# Patient Record
Sex: MALE | Race: WHITE | Employment: OTHER | URBAN - METROPOLITAN AREA
[De-identification: names, ages, dates, MRNs, and addresses within clinical notes are randomized per-mention and may not be internally consistent; named-entity substitution may affect disease eponyms.]

---

## 2017-01-10 ENCOUNTER — NURSE ONLY (OUTPATIENT)
Dept: CARDIOLOGY CLINIC | Age: 72
End: 2017-01-10

## 2017-01-10 DIAGNOSIS — Z45.09 ENCOUNTER FOR ELECTRONIC ANALYSIS OF REVEAL EVENT RECORDER: Primary | ICD-10-CM

## 2017-01-10 DIAGNOSIS — I48.0 PAROXYSMAL ATRIAL FIBRILLATION (HCC): ICD-10-CM

## 2017-01-10 PROCEDURE — 93299 PR REM INTERROG ICPMS/SCRMS <30 D TECH REVIEW: CPT | Performed by: INTERNAL MEDICINE

## 2017-01-10 PROCEDURE — 93298 REM INTERROG DEV EVAL SCRMS: CPT | Performed by: INTERNAL MEDICINE

## 2017-02-03 RX ORDER — LOSARTAN POTASSIUM 25 MG/1
TABLET ORAL
Qty: 90 TABLET | Refills: 3 | Status: SHIPPED | OUTPATIENT
Start: 2017-02-03 | End: 2017-08-21 | Stop reason: SDUPTHER

## 2017-02-03 RX ORDER — LORAZEPAM 1 MG/1
1 TABLET ORAL 2 TIMES DAILY PRN
Qty: 180 TABLET | Refills: 1 | OUTPATIENT
Start: 2017-02-03

## 2017-02-14 ENCOUNTER — NURSE ONLY (OUTPATIENT)
Dept: CARDIOLOGY CLINIC | Age: 72
End: 2017-02-14

## 2017-02-14 DIAGNOSIS — Z45.09 ENCOUNTER FOR ELECTRONIC ANALYSIS OF REVEAL EVENT RECORDER: Primary | ICD-10-CM

## 2017-02-14 DIAGNOSIS — I48.0 PAROXYSMAL ATRIAL FIBRILLATION (HCC): ICD-10-CM

## 2017-02-14 PROCEDURE — 93299 PR REM INTERROG ICPMS/SCRMS <30 D TECH REVIEW: CPT | Performed by: INTERNAL MEDICINE

## 2017-02-14 PROCEDURE — 93298 REM INTERROG DEV EVAL SCRMS: CPT | Performed by: INTERNAL MEDICINE

## 2017-02-21 ENCOUNTER — OFFICE VISIT (OUTPATIENT)
Dept: CARDIOLOGY CLINIC | Age: 72
End: 2017-02-21

## 2017-02-21 VITALS
BODY MASS INDEX: 30.21 KG/M2 | OXYGEN SATURATION: 97 % | SYSTOLIC BLOOD PRESSURE: 130 MMHG | WEIGHT: 211 LBS | HEIGHT: 70 IN | HEART RATE: 66 BPM | DIASTOLIC BLOOD PRESSURE: 70 MMHG

## 2017-02-21 DIAGNOSIS — I48.0 PAROXYSMAL ATRIAL FIBRILLATION (HCC): Primary | ICD-10-CM

## 2017-02-21 DIAGNOSIS — R55 SYNCOPE AND COLLAPSE: ICD-10-CM

## 2017-02-21 DIAGNOSIS — R07.2 PRECORDIAL PAIN: ICD-10-CM

## 2017-02-21 PROCEDURE — G8417 CALC BMI ABV UP PARAM F/U: HCPCS | Performed by: NURSE PRACTITIONER

## 2017-02-21 PROCEDURE — 3017F COLORECTAL CA SCREEN DOC REV: CPT | Performed by: NURSE PRACTITIONER

## 2017-02-21 PROCEDURE — G8427 DOCREV CUR MEDS BY ELIG CLIN: HCPCS | Performed by: NURSE PRACTITIONER

## 2017-02-21 PROCEDURE — 1036F TOBACCO NON-USER: CPT | Performed by: NURSE PRACTITIONER

## 2017-02-21 PROCEDURE — 1123F ACP DISCUSS/DSCN MKR DOCD: CPT | Performed by: NURSE PRACTITIONER

## 2017-02-21 PROCEDURE — 99214 OFFICE O/P EST MOD 30 MIN: CPT | Performed by: NURSE PRACTITIONER

## 2017-02-21 PROCEDURE — 4040F PNEUMOC VAC/ADMIN/RCVD: CPT | Performed by: NURSE PRACTITIONER

## 2017-02-21 PROCEDURE — 93000 ELECTROCARDIOGRAM COMPLETE: CPT | Performed by: NURSE PRACTITIONER

## 2017-02-21 PROCEDURE — G8484 FLU IMMUNIZE NO ADMIN: HCPCS | Performed by: NURSE PRACTITIONER

## 2017-02-21 RX ORDER — SOTALOL HYDROCHLORIDE 80 MG/1
40 TABLET ORAL 2 TIMES DAILY
Qty: 60 TABLET | Refills: 5 | Status: ON HOLD | OUTPATIENT
Start: 2017-02-21 | End: 2022-05-14 | Stop reason: HOSPADM

## 2017-02-22 ENCOUNTER — TELEPHONE (OUTPATIENT)
Dept: CARDIOLOGY | Age: 72
End: 2017-02-22

## 2017-02-27 ENCOUNTER — TELEPHONE (OUTPATIENT)
Dept: CARDIOLOGY CLINIC | Age: 72
End: 2017-02-27

## 2017-02-27 ENCOUNTER — OFFICE VISIT (OUTPATIENT)
Dept: CARDIOLOGY CLINIC | Age: 72
End: 2017-02-27

## 2017-02-27 VITALS
DIASTOLIC BLOOD PRESSURE: 86 MMHG | HEART RATE: 62 BPM | SYSTOLIC BLOOD PRESSURE: 146 MMHG | BODY MASS INDEX: 30.64 KG/M2 | WEIGHT: 214 LBS | OXYGEN SATURATION: 96 % | HEIGHT: 70 IN

## 2017-02-27 DIAGNOSIS — I48.0 PAROXYSMAL ATRIAL FIBRILLATION (HCC): ICD-10-CM

## 2017-02-27 DIAGNOSIS — R07.2 PRECORDIAL PAIN: Primary | ICD-10-CM

## 2017-02-27 PROCEDURE — 1123F ACP DISCUSS/DSCN MKR DOCD: CPT | Performed by: INTERNAL MEDICINE

## 2017-02-27 PROCEDURE — G8417 CALC BMI ABV UP PARAM F/U: HCPCS | Performed by: INTERNAL MEDICINE

## 2017-02-27 PROCEDURE — 1036F TOBACCO NON-USER: CPT | Performed by: INTERNAL MEDICINE

## 2017-02-27 PROCEDURE — 99214 OFFICE O/P EST MOD 30 MIN: CPT | Performed by: INTERNAL MEDICINE

## 2017-02-27 PROCEDURE — 3017F COLORECTAL CA SCREEN DOC REV: CPT | Performed by: INTERNAL MEDICINE

## 2017-02-27 PROCEDURE — G8484 FLU IMMUNIZE NO ADMIN: HCPCS | Performed by: INTERNAL MEDICINE

## 2017-02-27 PROCEDURE — 4040F PNEUMOC VAC/ADMIN/RCVD: CPT | Performed by: INTERNAL MEDICINE

## 2017-02-27 PROCEDURE — G8427 DOCREV CUR MEDS BY ELIG CLIN: HCPCS | Performed by: INTERNAL MEDICINE

## 2017-02-27 RX ORDER — ASPIRIN 81 MG/1
81 TABLET ORAL DAILY
Qty: 30 TABLET | Refills: 3 | Status: SHIPPED | OUTPATIENT
Start: 2017-02-27 | End: 2017-06-23 | Stop reason: SINTOL

## 2017-02-27 RX ORDER — OMEPRAZOLE 20 MG/1
20 CAPSULE, DELAYED RELEASE ORAL 2 TIMES DAILY
COMMUNITY

## 2017-03-07 ENCOUNTER — TELEPHONE (OUTPATIENT)
Dept: CARDIOLOGY | Age: 72
End: 2017-03-07

## 2017-03-08 ENCOUNTER — TELEPHONE (OUTPATIENT)
Dept: CARDIOLOGY CLINIC | Age: 72
End: 2017-03-08

## 2017-03-22 ENCOUNTER — OFFICE VISIT (OUTPATIENT)
Dept: CARDIOLOGY CLINIC | Age: 72
End: 2017-03-22

## 2017-03-22 VITALS
WEIGHT: 214 LBS | HEART RATE: 68 BPM | OXYGEN SATURATION: 97 % | BODY MASS INDEX: 30.64 KG/M2 | SYSTOLIC BLOOD PRESSURE: 132 MMHG | HEIGHT: 70 IN | DIASTOLIC BLOOD PRESSURE: 74 MMHG

## 2017-03-22 DIAGNOSIS — Z79.01 CHRONIC ANTICOAGULATION: ICD-10-CM

## 2017-03-22 DIAGNOSIS — I48.0 PAROXYSMAL ATRIAL FIBRILLATION (HCC): Primary | ICD-10-CM

## 2017-03-22 DIAGNOSIS — I25.10 CORONARY ARTERY DISEASE INVOLVING NATIVE CORONARY ARTERY OF NATIVE HEART WITHOUT ANGINA PECTORIS: ICD-10-CM

## 2017-03-22 DIAGNOSIS — E78.5 HYPERLIPIDEMIA, UNSPECIFIED HYPERLIPIDEMIA TYPE: ICD-10-CM

## 2017-03-22 PROCEDURE — G8484 FLU IMMUNIZE NO ADMIN: HCPCS | Performed by: NURSE PRACTITIONER

## 2017-03-22 PROCEDURE — G8427 DOCREV CUR MEDS BY ELIG CLIN: HCPCS | Performed by: NURSE PRACTITIONER

## 2017-03-22 PROCEDURE — 1036F TOBACCO NON-USER: CPT | Performed by: NURSE PRACTITIONER

## 2017-03-22 PROCEDURE — G8598 ASA/ANTIPLAT THER USED: HCPCS | Performed by: NURSE PRACTITIONER

## 2017-03-22 PROCEDURE — 4040F PNEUMOC VAC/ADMIN/RCVD: CPT | Performed by: NURSE PRACTITIONER

## 2017-03-22 PROCEDURE — 3017F COLORECTAL CA SCREEN DOC REV: CPT | Performed by: NURSE PRACTITIONER

## 2017-03-22 PROCEDURE — 99214 OFFICE O/P EST MOD 30 MIN: CPT | Performed by: NURSE PRACTITIONER

## 2017-03-22 PROCEDURE — 1123F ACP DISCUSS/DSCN MKR DOCD: CPT | Performed by: NURSE PRACTITIONER

## 2017-03-22 PROCEDURE — G8417 CALC BMI ABV UP PARAM F/U: HCPCS | Performed by: NURSE PRACTITIONER

## 2017-03-22 RX ORDER — ATORVASTATIN CALCIUM 40 MG/1
40 TABLET, FILM COATED ORAL NIGHTLY
Qty: 90 TABLET | Refills: 3 | Status: SHIPPED | OUTPATIENT
Start: 2017-03-22 | End: 2017-06-23 | Stop reason: ALTCHOICE

## 2017-03-22 RX ORDER — ISOSORBIDE MONONITRATE 30 MG/1
30 TABLET, EXTENDED RELEASE ORAL DAILY
Qty: 90 TABLET | Refills: 3 | Status: SHIPPED | OUTPATIENT
Start: 2017-03-22 | End: 2017-08-21 | Stop reason: SDUPTHER

## 2017-03-28 ENCOUNTER — NURSE ONLY (OUTPATIENT)
Dept: CARDIOLOGY CLINIC | Age: 72
End: 2017-03-28

## 2017-03-28 DIAGNOSIS — Z45.09 ENCOUNTER FOR ELECTRONIC ANALYSIS OF REVEAL EVENT RECORDER: Primary | ICD-10-CM

## 2017-03-28 DIAGNOSIS — R55 SYNCOPE AND COLLAPSE: ICD-10-CM

## 2017-03-28 PROCEDURE — 93299 PR REM INTERROG ICPMS/SCRMS <30 D TECH REVIEW: CPT | Performed by: INTERNAL MEDICINE

## 2017-03-28 PROCEDURE — 93298 REM INTERROG DEV EVAL SCRMS: CPT | Performed by: INTERNAL MEDICINE

## 2017-04-10 RX ORDER — PRASUGREL 10 MG/1
10 TABLET, FILM COATED ORAL DAILY
Qty: 90 TABLET | Refills: 3 | Status: SHIPPED | OUTPATIENT
Start: 2017-04-10 | End: 2017-06-23 | Stop reason: ALTCHOICE

## 2017-05-02 ENCOUNTER — NURSE ONLY (OUTPATIENT)
Dept: CARDIOLOGY CLINIC | Age: 72
End: 2017-05-02

## 2017-05-02 DIAGNOSIS — Z45.09 ENCOUNTER FOR ELECTRONIC ANALYSIS OF REVEAL EVENT RECORDER: Primary | ICD-10-CM

## 2017-05-02 DIAGNOSIS — R55 SYNCOPE AND COLLAPSE: ICD-10-CM

## 2017-05-02 DIAGNOSIS — I48.0 PAROXYSMAL ATRIAL FIBRILLATION (HCC): ICD-10-CM

## 2017-05-02 PROCEDURE — 93298 REM INTERROG DEV EVAL SCRMS: CPT | Performed by: INTERNAL MEDICINE

## 2017-05-02 PROCEDURE — 93299 PR REM INTERROG ICPMS/SCRMS <30 D TECH REVIEW: CPT | Performed by: INTERNAL MEDICINE

## 2017-06-06 ENCOUNTER — NURSE ONLY (OUTPATIENT)
Dept: CARDIOLOGY CLINIC | Age: 72
End: 2017-06-06

## 2017-06-06 DIAGNOSIS — Z45.09 ENCOUNTER FOR ELECTRONIC ANALYSIS OF REVEAL EVENT RECORDER: Primary | ICD-10-CM

## 2017-06-06 DIAGNOSIS — I48.0 PAROXYSMAL ATRIAL FIBRILLATION (HCC): ICD-10-CM

## 2017-06-06 DIAGNOSIS — R55 SYNCOPE AND COLLAPSE: ICD-10-CM

## 2017-06-06 PROCEDURE — 93298 REM INTERROG DEV EVAL SCRMS: CPT | Performed by: INTERNAL MEDICINE

## 2017-06-06 PROCEDURE — 93299 PR REM INTERROG ICPMS/SCRMS <30 D TECH REVIEW: CPT | Performed by: INTERNAL MEDICINE

## 2017-06-23 ENCOUNTER — OFFICE VISIT (OUTPATIENT)
Dept: CARDIOLOGY CLINIC | Age: 72
End: 2017-06-23

## 2017-06-23 VITALS
SYSTOLIC BLOOD PRESSURE: 110 MMHG | HEIGHT: 70 IN | DIASTOLIC BLOOD PRESSURE: 60 MMHG | HEART RATE: 104 BPM | BODY MASS INDEX: 30.21 KG/M2 | WEIGHT: 211 LBS

## 2017-06-23 DIAGNOSIS — I25.10 CORONARY ARTERY DISEASE INVOLVING NATIVE CORONARY ARTERY OF NATIVE HEART WITHOUT ANGINA PECTORIS: Primary | ICD-10-CM

## 2017-06-23 PROCEDURE — G8427 DOCREV CUR MEDS BY ELIG CLIN: HCPCS | Performed by: INTERNAL MEDICINE

## 2017-06-23 PROCEDURE — 1123F ACP DISCUSS/DSCN MKR DOCD: CPT | Performed by: INTERNAL MEDICINE

## 2017-06-23 PROCEDURE — 99214 OFFICE O/P EST MOD 30 MIN: CPT | Performed by: INTERNAL MEDICINE

## 2017-06-23 PROCEDURE — G8417 CALC BMI ABV UP PARAM F/U: HCPCS | Performed by: INTERNAL MEDICINE

## 2017-06-23 PROCEDURE — 1036F TOBACCO NON-USER: CPT | Performed by: INTERNAL MEDICINE

## 2017-06-23 PROCEDURE — G8598 ASA/ANTIPLAT THER USED: HCPCS | Performed by: INTERNAL MEDICINE

## 2017-06-23 PROCEDURE — 4040F PNEUMOC VAC/ADMIN/RCVD: CPT | Performed by: INTERNAL MEDICINE

## 2017-06-23 PROCEDURE — 3017F COLORECTAL CA SCREEN DOC REV: CPT | Performed by: INTERNAL MEDICINE

## 2017-06-23 RX ORDER — CLOPIDOGREL BISULFATE 75 MG/1
75 TABLET ORAL DAILY
COMMUNITY
End: 2017-08-07 | Stop reason: SDUPTHER

## 2017-06-23 RX ORDER — ATORVASTATIN CALCIUM 40 MG/1
40 TABLET, FILM COATED ORAL DAILY
COMMUNITY
End: 2017-08-07 | Stop reason: SDUPTHER

## 2017-07-11 ENCOUNTER — PROCEDURE VISIT (OUTPATIENT)
Dept: CARDIOLOGY CLINIC | Age: 72
End: 2017-07-11

## 2017-07-11 DIAGNOSIS — I48.0 PAROXYSMAL ATRIAL FIBRILLATION (HCC): ICD-10-CM

## 2017-07-11 DIAGNOSIS — Z45.09 ENCOUNTER FOR ELECTRONIC ANALYSIS OF REVEAL EVENT RECORDER: Primary | ICD-10-CM

## 2017-07-11 PROCEDURE — 93299 PR REM INTERROG ICPMS/SCRMS <30 D TECH REVIEW: CPT | Performed by: INTERNAL MEDICINE

## 2017-07-11 PROCEDURE — 93298 REM INTERROG DEV EVAL SCRMS: CPT | Performed by: INTERNAL MEDICINE

## 2017-08-04 ENCOUNTER — TELEPHONE (OUTPATIENT)
Dept: CARDIOLOGY CLINIC | Age: 72
End: 2017-08-04

## 2017-08-07 RX ORDER — ATORVASTATIN CALCIUM 40 MG/1
40 TABLET, FILM COATED ORAL DAILY
Qty: 90 TABLET | Refills: 3 | Status: SHIPPED | OUTPATIENT
Start: 2017-08-07 | End: 2017-08-21 | Stop reason: SDUPTHER

## 2017-08-07 RX ORDER — DABIGATRAN ETEXILATE 150 MG/1
150 CAPSULE, COATED PELLETS ORAL 2 TIMES DAILY
Qty: 180 CAPSULE | Refills: 3 | Status: ON HOLD | OUTPATIENT
Start: 2017-08-07 | End: 2022-05-14 | Stop reason: HOSPADM

## 2017-08-07 RX ORDER — CLOPIDOGREL BISULFATE 75 MG/1
75 TABLET ORAL DAILY
Qty: 90 TABLET | Refills: 3 | Status: SHIPPED | OUTPATIENT
Start: 2017-08-07 | End: 2017-09-26 | Stop reason: ALTCHOICE

## 2017-08-15 ENCOUNTER — NURSE ONLY (OUTPATIENT)
Dept: CARDIOLOGY CLINIC | Age: 72
End: 2017-08-15

## 2017-08-15 DIAGNOSIS — Z45.09 ENCOUNTER FOR ELECTRONIC ANALYSIS OF REVEAL EVENT RECORDER: Primary | ICD-10-CM

## 2017-08-15 DIAGNOSIS — I48.0 PAROXYSMAL ATRIAL FIBRILLATION (HCC): ICD-10-CM

## 2017-08-15 PROCEDURE — 93298 REM INTERROG DEV EVAL SCRMS: CPT | Performed by: INTERNAL MEDICINE

## 2017-08-15 PROCEDURE — 93299 PR REM INTERROG ICPMS/SCRMS <30 D TECH REVIEW: CPT | Performed by: INTERNAL MEDICINE

## 2017-08-21 ENCOUNTER — PROCEDURE VISIT (OUTPATIENT)
Dept: CARDIOLOGY CLINIC | Age: 72
End: 2017-08-21

## 2017-08-21 ENCOUNTER — OFFICE VISIT (OUTPATIENT)
Dept: CARDIOLOGY CLINIC | Age: 72
End: 2017-08-21

## 2017-08-21 VITALS
SYSTOLIC BLOOD PRESSURE: 134 MMHG | HEART RATE: 57 BPM | DIASTOLIC BLOOD PRESSURE: 76 MMHG | BODY MASS INDEX: 30.21 KG/M2 | HEIGHT: 70 IN | WEIGHT: 211 LBS

## 2017-08-21 DIAGNOSIS — I25.10 CORONARY ARTERY DISEASE INVOLVING NATIVE CORONARY ARTERY OF NATIVE HEART WITHOUT ANGINA PECTORIS: ICD-10-CM

## 2017-08-21 DIAGNOSIS — I48.0 PAROXYSMAL ATRIAL FIBRILLATION (HCC): Primary | ICD-10-CM

## 2017-08-21 DIAGNOSIS — I48.0 PAROXYSMAL ATRIAL FIBRILLATION (HCC): ICD-10-CM

## 2017-08-21 DIAGNOSIS — Z45.09 ENCOUNTER FOR ELECTRONIC ANALYSIS OF REVEAL EVENT RECORDER: Primary | ICD-10-CM

## 2017-08-21 PROCEDURE — 1036F TOBACCO NON-USER: CPT | Performed by: INTERNAL MEDICINE

## 2017-08-21 PROCEDURE — 99214 OFFICE O/P EST MOD 30 MIN: CPT | Performed by: INTERNAL MEDICINE

## 2017-08-21 PROCEDURE — 93285 PRGRMG DEV EVAL SCRMS IP: CPT | Performed by: INTERNAL MEDICINE

## 2017-08-21 PROCEDURE — 1123F ACP DISCUSS/DSCN MKR DOCD: CPT | Performed by: INTERNAL MEDICINE

## 2017-08-21 PROCEDURE — 3017F COLORECTAL CA SCREEN DOC REV: CPT | Performed by: INTERNAL MEDICINE

## 2017-08-21 PROCEDURE — 4040F PNEUMOC VAC/ADMIN/RCVD: CPT | Performed by: INTERNAL MEDICINE

## 2017-08-21 PROCEDURE — G8417 CALC BMI ABV UP PARAM F/U: HCPCS | Performed by: INTERNAL MEDICINE

## 2017-08-21 PROCEDURE — G8598 ASA/ANTIPLAT THER USED: HCPCS | Performed by: INTERNAL MEDICINE

## 2017-08-21 PROCEDURE — G8427 DOCREV CUR MEDS BY ELIG CLIN: HCPCS | Performed by: INTERNAL MEDICINE

## 2017-08-21 RX ORDER — LOSARTAN POTASSIUM 25 MG/1
TABLET ORAL
Qty: 90 TABLET | Refills: 3 | Status: SHIPPED | OUTPATIENT
Start: 2017-08-21 | End: 2018-02-02 | Stop reason: SDUPTHER

## 2017-08-21 RX ORDER — ATORVASTATIN CALCIUM 40 MG/1
40 TABLET, FILM COATED ORAL DAILY
Qty: 90 TABLET | Refills: 3 | Status: ON HOLD | OUTPATIENT
Start: 2017-08-21 | End: 2022-07-19 | Stop reason: SDUPTHER

## 2017-08-21 RX ORDER — ISOSORBIDE MONONITRATE 30 MG/1
30 TABLET, EXTENDED RELEASE ORAL DAILY
Qty: 90 TABLET | Refills: 3 | Status: ON HOLD | OUTPATIENT
Start: 2017-08-21 | End: 2022-05-14 | Stop reason: HOSPADM

## 2017-09-26 ENCOUNTER — OFFICE VISIT (OUTPATIENT)
Dept: ORTHOPEDIC SURGERY | Age: 72
End: 2017-09-26

## 2017-09-26 ENCOUNTER — NURSE ONLY (OUTPATIENT)
Dept: CARDIOLOGY CLINIC | Age: 72
End: 2017-09-26

## 2017-09-26 VITALS — HEIGHT: 70 IN | WEIGHT: 200 LBS | BODY MASS INDEX: 28.63 KG/M2

## 2017-09-26 DIAGNOSIS — M79.641 HAND PAIN, RIGHT: ICD-10-CM

## 2017-09-26 DIAGNOSIS — M79.642 LEFT HAND PAIN: Primary | ICD-10-CM

## 2017-09-26 DIAGNOSIS — R55 SYNCOPE AND COLLAPSE: ICD-10-CM

## 2017-09-26 DIAGNOSIS — Z45.09 ENCOUNTER FOR ELECTRONIC ANALYSIS OF REVEAL EVENT RECORDER: Primary | ICD-10-CM

## 2017-09-26 DIAGNOSIS — I48.0 PAROXYSMAL ATRIAL FIBRILLATION (HCC): ICD-10-CM

## 2017-09-26 DIAGNOSIS — M18.0 ARTHRITIS OF CARPOMETACARPAL (CMC) JOINTS OF BOTH THUMBS: ICD-10-CM

## 2017-09-26 PROCEDURE — 3017F COLORECTAL CA SCREEN DOC REV: CPT | Performed by: ORTHOPAEDIC SURGERY

## 2017-09-26 PROCEDURE — 99203 OFFICE O/P NEW LOW 30 MIN: CPT | Performed by: ORTHOPAEDIC SURGERY

## 2017-09-26 PROCEDURE — 73130 X-RAY EXAM OF HAND: CPT | Performed by: ORTHOPAEDIC SURGERY

## 2017-09-26 PROCEDURE — 93299 PR REM INTERROG ICPMS/SCRMS <30 D TECH REVIEW: CPT | Performed by: INTERNAL MEDICINE

## 2017-09-26 PROCEDURE — G8417 CALC BMI ABV UP PARAM F/U: HCPCS | Performed by: ORTHOPAEDIC SURGERY

## 2017-09-26 PROCEDURE — 93298 REM INTERROG DEV EVAL SCRMS: CPT | Performed by: INTERNAL MEDICINE

## 2017-09-26 PROCEDURE — G8427 DOCREV CUR MEDS BY ELIG CLIN: HCPCS | Performed by: ORTHOPAEDIC SURGERY

## 2017-09-26 PROCEDURE — G8598 ASA/ANTIPLAT THER USED: HCPCS | Performed by: ORTHOPAEDIC SURGERY

## 2017-09-26 PROCEDURE — 4040F PNEUMOC VAC/ADMIN/RCVD: CPT | Performed by: ORTHOPAEDIC SURGERY

## 2017-09-26 PROCEDURE — 1036F TOBACCO NON-USER: CPT | Performed by: ORTHOPAEDIC SURGERY

## 2017-09-26 RX ORDER — MELOXICAM 15 MG/1
15 TABLET ORAL DAILY
Qty: 30 TABLET | Refills: 3 | Status: SHIPPED | OUTPATIENT
Start: 2017-09-26 | End: 2017-09-26 | Stop reason: CLARIF

## 2017-10-31 ENCOUNTER — NURSE ONLY (OUTPATIENT)
Dept: CARDIOLOGY CLINIC | Age: 72
End: 2017-10-31

## 2017-10-31 DIAGNOSIS — R55 SYNCOPE AND COLLAPSE: ICD-10-CM

## 2017-10-31 DIAGNOSIS — Z45.09 ENCOUNTER FOR ELECTRONIC ANALYSIS OF REVEAL EVENT RECORDER: Primary | ICD-10-CM

## 2017-10-31 PROCEDURE — 93299 PR REM INTERROG ICPMS/SCRMS <30 D TECH REVIEW: CPT | Performed by: INTERNAL MEDICINE

## 2017-10-31 PROCEDURE — 93298 REM INTERROG DEV EVAL SCRMS: CPT | Performed by: INTERNAL MEDICINE

## 2017-10-31 NOTE — LETTER
[address] [facility] [phone]  [place]- [address] [facility] [phone]    Pacemaker/Defibrillator Clinic          11/07/17        Adele Pedro  [id number]  [place]        Dear Adele Pedro    This letter is to inform you that we received the transmission from your monitor at home that checks your pacemaker and/or defibrillator, or implanted heart monitor. Everything is within normal limits. The next date your monitor will automatically transmit will be 12/5/17. Please do not send additional routine transmissions unless specifically requested. Your device and monitor are wireless and most transmit cellularly, but please periodically check your monitor is still plugged in to the electrical outlet. If you still use the telephone land line to send please ensure the connection to the phone shelby is secure. This will help to ensure successful automatic transmissions in the future. Also, the monitor needs to be close to you while sleeping at night. Please be aware that the remote device transmission sites are periodically monitored only during regular business hours during which simultaneous in-office device clinics are being run. If your transmission requires attention, we will contact you as soon as possible. Thank you.             [address]

## 2017-11-07 NOTE — PROGRESS NOTES
See PACEART report under Cardiology tab. Carelink/loop recorder transmission. No new arrhythmias recorded. No symptom episodes recorded. Follow up in 1 month via carelink.

## 2017-12-05 ENCOUNTER — NURSE ONLY (OUTPATIENT)
Dept: CARDIOLOGY CLINIC | Age: 72
End: 2017-12-05

## 2017-12-05 DIAGNOSIS — Z45.09 ENCOUNTER FOR ELECTRONIC ANALYSIS OF REVEAL EVENT RECORDER: Primary | ICD-10-CM

## 2017-12-05 DIAGNOSIS — R55 SYNCOPE AND COLLAPSE: ICD-10-CM

## 2017-12-05 PROCEDURE — 93299 PR REM INTERROG ICPMS/SCRMS <30 D TECH REVIEW: CPT | Performed by: INTERNAL MEDICINE

## 2017-12-05 PROCEDURE — 93298 REM INTERROG DEV EVAL SCRMS: CPT | Performed by: INTERNAL MEDICINE

## 2017-12-06 ENCOUNTER — OFFICE VISIT (OUTPATIENT)
Dept: CARDIOLOGY CLINIC | Age: 72
End: 2017-12-06

## 2017-12-06 VITALS
OXYGEN SATURATION: 97 % | HEIGHT: 70 IN | BODY MASS INDEX: 29.35 KG/M2 | DIASTOLIC BLOOD PRESSURE: 72 MMHG | HEART RATE: 58 BPM | WEIGHT: 205 LBS | SYSTOLIC BLOOD PRESSURE: 122 MMHG

## 2017-12-06 DIAGNOSIS — I48.0 PAROXYSMAL ATRIAL FIBRILLATION (HCC): ICD-10-CM

## 2017-12-06 DIAGNOSIS — I25.10 CORONARY ARTERY DISEASE INVOLVING NATIVE CORONARY ARTERY OF NATIVE HEART WITHOUT ANGINA PECTORIS: Primary | ICD-10-CM

## 2017-12-06 DIAGNOSIS — E78.00 PURE HYPERCHOLESTEROLEMIA: ICD-10-CM

## 2017-12-06 DIAGNOSIS — Z79.01 CHRONIC ANTICOAGULATION: ICD-10-CM

## 2017-12-06 PROCEDURE — G8417 CALC BMI ABV UP PARAM F/U: HCPCS | Performed by: NURSE PRACTITIONER

## 2017-12-06 PROCEDURE — G8427 DOCREV CUR MEDS BY ELIG CLIN: HCPCS | Performed by: NURSE PRACTITIONER

## 2017-12-06 PROCEDURE — 3017F COLORECTAL CA SCREEN DOC REV: CPT | Performed by: NURSE PRACTITIONER

## 2017-12-06 PROCEDURE — 1036F TOBACCO NON-USER: CPT | Performed by: NURSE PRACTITIONER

## 2017-12-06 PROCEDURE — 99213 OFFICE O/P EST LOW 20 MIN: CPT | Performed by: NURSE PRACTITIONER

## 2017-12-06 PROCEDURE — G8484 FLU IMMUNIZE NO ADMIN: HCPCS | Performed by: NURSE PRACTITIONER

## 2017-12-06 PROCEDURE — G8598 ASA/ANTIPLAT THER USED: HCPCS | Performed by: NURSE PRACTITIONER

## 2017-12-06 PROCEDURE — 4040F PNEUMOC VAC/ADMIN/RCVD: CPT | Performed by: NURSE PRACTITIONER

## 2017-12-06 PROCEDURE — 1123F ACP DISCUSS/DSCN MKR DOCD: CPT | Performed by: NURSE PRACTITIONER

## 2017-12-06 RX ORDER — B-COMPLEX WITH VITAMIN C
TABLET ORAL
COMMUNITY

## 2017-12-06 NOTE — PROGRESS NOTES
gastrointestinal endoscopy (09/26/2016); and Coronary angioplasty with stent. Social History:   reports that he quit smoking about 24 years ago. He has a 80.00 pack-year smoking history. He has never used smokeless tobacco. He reports that he does not drink alcohol or use drugs. Family History:   Family History   Problem Relation Age of Onset    Heart Disease Mother     Arthritis Mother     High Blood Pressure Mother    Jatinder Templeton / Rae Snyder Mother    [facility] Stroke Mother     Arthritis Father     Cancer Sister     Arthritis Sister     Depression Sister     Arthritis Brother     Arthritis Maternal Grandmother     Arthritis Maternal Grandfather     Arthritis Paternal Grandmother     Diabetes Paternal Grandmother     Arthritis Paternal Grandfather        Home Medications:  Prior to Admission medications    Medication Sig Start Date End Date Taking?  Authorizing Provider   clopidogrel (PLAVIX) 75 MG tablet Take 75 mg by mouth daily   Yes Historical Provider, MD   vitamin B-2 (RIBOFLAVIN) 25 MG TABS Take by mouth   Yes Historical Provider, MD   diclofenac sodium (VOLTAREN) 1 % GEL Apply 4 g topically 4 times daily 10/3/17 12/6/17 Yes Oscar Servin MD   diclofenac (PENNSAID) 2 % SOLN 2 pumps to the affected area 2 times a day [id number] 9/26/17  Yes Oscar Servin MD   atorvastatin (LIPITOR) 40 MG tablet Take 1 tablet by mouth daily 8/21/17  Yes Damir Lang MD   isosorbide mononitrate (IMDUR) 30 MG extended release tablet Take 1 tablet by mouth daily 8/21/17  Yes Damir Lang MD   losartan (COZAAR) 25 MG tablet TAKE 1 TABLET DAILY 8/21/17  Yes Damir Lang MD   dabigatran (PRADAXA) 150 MG capsule Take 1 capsule by mouth 2 times daily 8/7/17  Yes Ami Bennett MD   omeprazole (PRILOSEC) 20 MG delayed release capsule Take 20 mg by mouth 2 times daily   Yes Historical Provider, MD   sotalol (BETAPACE) 80 MG tablet Take 0.5 tablets by mouth 2 times daily 2/21/17  Yes Christel GEOVANNA Dale   fluocinonide (LIDEX) 0.05 % cream Apply topically 2 times daily. 5/20/16  Yes Gayatri Cruz CNP   furosemide (LASIX) 20 MG tablet TAKE 1 TABLET DAILY  Patient taking differently: TAKE 1 TABLET DAILY prn 11/19/15  Yes Angelica Almanza MD   ferrous sulfate 325 (65 FE) MG tablet Take 1 tablet by mouth 2 times daily (with meals) 10/5/15  Yes Angelica Almanza MD   zolpidem (AMBIEN) 10 MG tablet Take 10 mg by mouth nightly as needed. Yes Historical Provider, MD   LORazepam (ATIVAN) 1 MG tablet Take 1 mg by mouth 2 times daily as needed. Yes Historical Provider, MD   Lacosamide (VIMPAT PO) Take 100 mg by mouth 2 times daily. Indications: take dos   Yes Historical Provider, MD   paroxetine (PAXIL) 20 MG tablet Take 30 mg by mouth every morning  12/28/10  Yes Historical Provider, MD        Allergies:  Morphine; Codeine; Penicillins; and Plavix [clopidogrel bisulfate]     Review of Systems:   · Constitutional: there has been no unanticipated weight loss. There's been no change in energy level, sleep pattern, or activity level. · Eyes: No visual changes or diplopia. No scleral icterus. · ENT: No Headaches, hearing loss or vertigo. No mouth sores or sore throat. · Cardiovascular: Reviewed in HPI  · Respiratory: No cough or wheezing, no sputum production. No hematemesis. · Gastrointestinal: No abdominal pain, appetite loss, blood in stools. No change in bowel or bladder habits. · Genitourinary: No dysuria, trouble voiding, or hematuria. · Musculoskeletal:  No gait disturbance, weakness or joint complaints. · Integumentary: No rash or pruritis. · Neurological: No headache, diplopia, change in muscle strength, numbness or tingling. No change in gait, balance, coordination, mood, affect, memory, mentation, behavior. · Psychiatric: No anxiety, no depression. · Endocrine: No malaise, fatigue or temperature intolerance. No excessive thirst, fluid intake, or urination.  No tremor. · Hematologic/Lymphatic: No abnormal bruising or bleeding, blood clots or swollen lymph nodes. · Allergic/Immunologic: No nasal congestion or hives. Physical Examination:    Vitals:    12/06/17 1234   BP: 122/72   Pulse: 58   SpO2: 97%   Weight: 205 lb (93 kg)   Height: 5' 10\" (1.778 m)        Constitutional and General Appearance: Warm and dry, no apparent distress, normal coloration  HEENT:  Normocephalic, atraumatic  Respiratory:  · Normal excursion and expansion without use of accessory muscles  · Resp Auscultation: Normal breath sounds without dullness  Cardiovascular:  · The apical impulses not displaced  · Heart tones are crisp and normal  · JVP 8 cm H2O  · Regular rate and rhythm, normal S1S2, no m/g/r/c  · Peripheral pulses are symmetrical and full  · There is no clubbing, cyanosis of the extremities.   · No edema  · Pedal Pulses: 2+ and equal   Abdomen:  · No masses or tenderness  · Liver/Spleen: No Abnormalities Noted  Neurological/Psychiatric:  · Alert and oriented in all spheres  · Moves all extremities well  · Exhibits normal gait balance and coordination  · No abnormalities of mood, affect, memory, mentation, or behavior are noted    Lab Data:    CBC:   Lab Results   Component Value Date    WBC 7.7 03/06/2017    WBC 8.6 01/13/2017    WBC 7.0 12/07/2016    WBC 7.7 08/17/2016    WBC 9.0 10/16/2014    WBC 8.3 09/26/2014    RBC 4.51 03/06/2017    RBC 4.84 01/13/2017    RBC 4.43 12/07/2016    RBC 4.85 08/17/2016    HGB 13.8 03/06/2017    HGB 14.4 01/13/2017    HGB 13.5 12/07/2016    HCT 40.5 03/06/2017    HCT 46.5 01/13/2017    HCT 42.4 12/07/2016    MCV 89.8 03/06/2017    MCV 96.0 01/13/2017    MCV 95.8 12/07/2016    RDW 14.2 03/06/2017    RDW 14.1 01/13/2017    RDW 15.0 12/07/2016     03/06/2017     01/13/2017     12/07/2016     BMP:  Lab Results   Component Value Date     03/07/2017     03/06/2017     06/03/2016    K 4.5 03/07/2017    K 4.2 03/06/2017 K 4.5 06/03/2016     03/07/2017     03/06/2017     06/03/2016    CO2 27 03/07/2017    CO2 27 03/06/2017    CO2 25 06/03/2016    BUN 17 03/07/2017    BUN 19 03/06/2017    BUN 18 06/03/2016    CREATININE 0.8 03/07/2017    CREATININE 0.7 03/06/2017    CREATININE 1.0 06/03/2016     BNP: No results found for: PROBNP     Cardiac Imaging:  ECHO: 9/27/14  Summary  No significant pericardial effusion noted. Overall left ventricular function is normal.  Ejection fraction is visually estimated to be 55-60 %     DONG 9/4/14:  Summary   Overall left ventricular function is normal.   Ejection fraction is visually estimated to be 55-60 %. Mitral valve is structurally normal.   Mild mitral regurgitation is present. There is no evidence of mass or thrombus in the left atrium or appendage. Redundancy of the interatrial septum. No evidence of atrial septal defect. CTA cardiac 10/2014  FINDINGS:       Left atrium is again noted to be dilated. There is mild esophageal   wall thickening. Esophagus however is incompletely distended. No obvious gas seen in the left atrium. No obvious gas seen in the   esophageal soft tissues. No contrast extravasation is seen. Coronary artery calcifications are seen       Mild emphysema is seen within the lungs. Diffuse coronary artery calcifications are identified          , LDL 93, HDL 45, TRIG 235 3/6/17      CATH: 3/6/17  IMPRESSION:  1. Successful percutaneous coronary intervention of high-grade distal LAD  lesion with placement of 2.25 mm x 18 mm Xience Alpine drug-eluting stent. 2.  Mild circumflex and right coronary artery disease. 3.  Normal left ventricular systolic function. 4.  Mildly elevated left ventricular filling pressure, from hypertension. Assessment:    1. Coronary artery disease involving native coronary artery of native heart without angina pectoris    2. Paroxysmal atrial fibrillation (HCC)    3.  Chronic anticoagulation

## 2017-12-30 RX ORDER — CLOPIDOGREL BISULFATE 75 MG/1
75 TABLET ORAL DAILY
COMMUNITY
End: 2018-03-02 | Stop reason: ALTCHOICE

## 2018-02-05 RX ORDER — LOSARTAN POTASSIUM 25 MG/1
25 TABLET ORAL DAILY
Qty: 90 TABLET | Refills: 3 | Status: ON HOLD | OUTPATIENT
Start: 2018-02-05 | End: 2022-05-14 | Stop reason: HOSPADM

## 2018-02-06 ENCOUNTER — NURSE ONLY (OUTPATIENT)
Dept: CARDIOLOGY CLINIC | Age: 73
End: 2018-02-06

## 2018-02-06 DIAGNOSIS — Z45.09 ENCOUNTER FOR ELECTRONIC ANALYSIS OF REVEAL EVENT RECORDER: Primary | ICD-10-CM

## 2018-02-06 DIAGNOSIS — I48.0 PAROXYSMAL ATRIAL FIBRILLATION (HCC): ICD-10-CM

## 2018-02-06 PROCEDURE — 93298 REM INTERROG DEV EVAL SCRMS: CPT | Performed by: INTERNAL MEDICINE

## 2018-02-06 PROCEDURE — 93299 PR REM INTERROG ICPMS/SCRMS <30 D TECH REVIEW: CPT | Performed by: INTERNAL MEDICINE

## 2018-02-26 ENCOUNTER — PROCEDURE VISIT (OUTPATIENT)
Dept: CARDIOLOGY CLINIC | Age: 73
End: 2018-02-26

## 2018-02-26 DIAGNOSIS — Z45.09 ENCOUNTER FOR ELECTRONIC ANALYSIS OF REVEAL EVENT RECORDER: Primary | ICD-10-CM

## 2018-03-02 ENCOUNTER — OFFICE VISIT (OUTPATIENT)
Dept: CARDIOLOGY CLINIC | Age: 73
End: 2018-03-02

## 2018-03-02 ENCOUNTER — PROCEDURE VISIT (OUTPATIENT)
Dept: CARDIOLOGY CLINIC | Age: 73
End: 2018-03-02

## 2018-03-02 VITALS
OXYGEN SATURATION: 95 % | WEIGHT: 206 LBS | DIASTOLIC BLOOD PRESSURE: 62 MMHG | BODY MASS INDEX: 29.49 KG/M2 | HEART RATE: 59 BPM | HEIGHT: 70 IN | SYSTOLIC BLOOD PRESSURE: 122 MMHG

## 2018-03-02 DIAGNOSIS — I48.0 PAROXYSMAL ATRIAL FIBRILLATION (HCC): Primary | ICD-10-CM

## 2018-03-02 DIAGNOSIS — I48.0 PAROXYSMAL ATRIAL FIBRILLATION (HCC): ICD-10-CM

## 2018-03-02 DIAGNOSIS — R42 DIZZINESS: ICD-10-CM

## 2018-03-02 DIAGNOSIS — I25.10 CORONARY ARTERY DISEASE INVOLVING NATIVE CORONARY ARTERY OF NATIVE HEART WITHOUT ANGINA PECTORIS: ICD-10-CM

## 2018-03-02 DIAGNOSIS — Z45.09 ENCOUNTER FOR ELECTRONIC ANALYSIS OF REVEAL EVENT RECORDER: Primary | ICD-10-CM

## 2018-03-02 DIAGNOSIS — R55 SYNCOPE AND COLLAPSE: ICD-10-CM

## 2018-03-02 PROCEDURE — 3017F COLORECTAL CA SCREEN DOC REV: CPT | Performed by: NURSE PRACTITIONER

## 2018-03-02 PROCEDURE — G8598 ASA/ANTIPLAT THER USED: HCPCS | Performed by: NURSE PRACTITIONER

## 2018-03-02 PROCEDURE — 1123F ACP DISCUSS/DSCN MKR DOCD: CPT | Performed by: NURSE PRACTITIONER

## 2018-03-02 PROCEDURE — G8417 CALC BMI ABV UP PARAM F/U: HCPCS | Performed by: NURSE PRACTITIONER

## 2018-03-02 PROCEDURE — G8484 FLU IMMUNIZE NO ADMIN: HCPCS | Performed by: NURSE PRACTITIONER

## 2018-03-02 PROCEDURE — G8427 DOCREV CUR MEDS BY ELIG CLIN: HCPCS | Performed by: NURSE PRACTITIONER

## 2018-03-02 PROCEDURE — 93291 INTERROG DEV EVAL SCRMS IP: CPT | Performed by: INTERNAL MEDICINE

## 2018-03-02 PROCEDURE — 99214 OFFICE O/P EST MOD 30 MIN: CPT | Performed by: NURSE PRACTITIONER

## 2018-03-02 PROCEDURE — 93000 ELECTROCARDIOGRAM COMPLETE: CPT | Performed by: NURSE PRACTITIONER

## 2018-03-02 PROCEDURE — 1036F TOBACCO NON-USER: CPT | Performed by: NURSE PRACTITIONER

## 2018-03-02 PROCEDURE — 4040F PNEUMOC VAC/ADMIN/RCVD: CPT | Performed by: NURSE PRACTITIONER

## 2018-03-02 NOTE — COMMUNICATION BODY
[facility]     Outpatient Follow Up Note    CHIEF COMPLAINT / HPI: Hospital Follow Up secondary to chest pain and light headedness    Hospital record has been [facility]  [facility] progressed as follows per discharge summary: 2/26/18  Presented with a 3 weeks hx of CP/SOB and light headedness occurring greatest bending over; resolved with writing position. Also c/o room spinning x 1 week. Losartan recently added. C/O CP off/on for two weeks lasting 3 minutes +6-7/10  132/71 (60). Troponin < 0.02, proBNP 91. EKG sinus rhythm, no acute ST changes. Loop recorder interrogated without events noted. Lorraine Newton is 67 y.o. male who presents today for a routine follow up after a recent hospitalization related to the above mentioned issues. He recalls not feeling well. His heart was fluttering. It made him feel weak and a little sweaty. Subjective:   Since the time of discharge, the patient admits their symptoms have improved. He guesses he feels alright. He hasn't been up walking much. He had surgery on his neck yesterday: knots removed from the back of his neck. He keeps getting gas causing discomfort in his chest. Its different than his angina. There is no SOB yet sometimes he needs to take deeper breaths to catch up. He denies orthopnea/PND/swelling. His weight is stable. The patient had a brief episode of fluttering yesterday, transient in duration. These symptoms are improving over the last several hours. With regard to medication therapy the patient has been compliant with prescribed regimen. They have tolerated therapy to date.      Current Outpatient Prescriptions   Medication Sig Dispense Refill    carbidopa-levodopa (SINEMET)  MG per tablet Take 1 tablet by mouth daily      losartan (COZAAR) 25 MG tablet Take 1 tablet by mouth daily TAKE 1 TABLET DAILY 90 tablet 3    vitamin B-2 (RIBOFLAVIN) 25 MG TABS Take by mouth      diclofenac (PENNSAID) 2 % SOLN 2 pumps to the affected area 2 times a day [phone] 1 Bottle 0    atorvastatin (LIPITOR) 40 MG tablet Take 1 tablet by mouth daily 90 tablet 3    isosorbide mononitrate (IMDUR) 30 MG extended release tablet Take 1 tablet by mouth daily 90 tablet 3    dabigatran (PRADAXA) 150 MG capsule Take 1 capsule by mouth 2 times daily 180 capsule 3    omeprazole (PRILOSEC) 20 MG delayed release capsule Take 20 mg by mouth 2 times daily      sotalol (BETAPACE) 80 MG tablet Take 0.5 tablets by mouth 2 times daily 60 tablet 5    fluocinonide (LIDEX) 0.05 % cream Apply topically 2 times daily. 1 Tube 0    furosemide (LASIX) 20 MG tablet TAKE 1 TABLET DAILY (Patient taking differently: TAKE 1 TABLET DAILY prn) 90 tablet 2    ferrous sulfate 325 (65 FE) MG tablet Take 1 tablet by mouth 2 times daily (with meals) 180 tablet 3    zolpidem (AMBIEN) 10 MG tablet Take 10 mg by mouth nightly as needed.  LORazepam (ATIVAN) 1 MG tablet Take 1 mg by mouth 2 times daily as needed.  Lacosamide (VIMPAT PO) Take 100 mg by mouth 2 times daily. Indications: take dos      paroxetine (PAXIL) 20 MG tablet Take 30 mg by mouth every morning       diclofenac sodium (VOLTAREN) 1 % GEL Apply 4 g topically 4 times daily 5 Tube 0       Objective:   PHYSICAL EXAM:    Vitals:    03/02/18 1108 03/02/18 1216   BP: 110/60 122/62   Pulse: 59    SpO2: 95%    Weight: 206 lb (93.4 kg)    Height: 5' 10\" (1.778 m)          VITALS:  /60   Pulse 59   Ht 5' 10\" (1.778 m)   Wt 206 lb (93.4 kg)   SpO2 95%   BMI 29.56 kg/m²      CONSTITUTIONAL: Cooperative, no apparent distress, and appears well nourished / developed  NEUROLOGIC:  Awake and orientated to person, place and time. PSYCH: Calm affect. SKIN: Warm and dry. HEENT: Sclera non-icteric, normocephalic, neck supple, no elevation of JVP, normal carotid pulses with no bruits and thyroid normal size.   LUNGS:  No increased work of breathing and clear to auscultation, no crackles or wheezing. CARDIOVASCULAR:  Regular rate 60 and rhythm with no murmurs, gallops, rubs, or abnormal heart sounds, normal PMI. The apical impulses not displaced. Heart tones are crisp and normal                                                                                            Cervical veins are not engorged                 JVP less than 8 cm H2O                                                                              The carotid upstroke is normal in amplitude and contour without delay or bruit    ABDOMEN:  Normal bowel sounds, non-distended and non-tender to palpation   EXT: No edema, no calf tenderness. Pulses are present bilaterally. Assessment:     1. Paroxysmal atrial fibrillation (HCC)   ~unremarkable Loop interrogation for arrhythmia  ~s/p DCCV & RFA '14  ~sotalol 40 mg bid / NOAC  ~CHADsVASc 3 EKG 12 Lead   2. Coronary artery disease involving native coronary artery of native heart without angina pectoris   ~s/p PTCA LAD March '17  ~atypical chest discomfort from angina. Trop neg. EKG without acute changes  ~plavix /  Statin / BB    3. Dizziness   ~ unclear description of events. Hasn't done much after having skin lesions removed yesterday  ~Loop recorder: interrogation : neg for events   ~BP controlled / AP 60        Patient  is stable since hospital discharge. Plan:  EKG: sinus layne 54   ~device interrogation: no events noted. Avg HR 60 (nearing end of battery life)   F/U as scheduled in three months       Thank you for allowing to us to participate in the care of Mary Cooper.       [facility]

## 2018-03-02 NOTE — LETTER
Current Outpatient Prescriptions   Medication Sig Dispense Refill    carbidopa-levodopa (SINEMET)  MG per tablet Take 1 tablet by mouth daily      losartan (COZAAR) 25 MG tablet Take 1 tablet by mouth daily TAKE 1 TABLET DAILY 90 tablet 3    vitamin B-2 (RIBOFLAVIN) 25 MG TABS Take by mouth      diclofenac (PENNSAID) 2 % SOLN 2 pumps to the affected area 2 times a day [phone] 1 Bottle 0    atorvastatin (LIPITOR) 40 MG tablet Take 1 tablet by mouth daily 90 tablet 3    isosorbide mononitrate (IMDUR) 30 MG extended release tablet Take 1 tablet by mouth daily 90 tablet 3    dabigatran (PRADAXA) 150 MG capsule Take 1 capsule by mouth 2 times daily 180 capsule 3    omeprazole (PRILOSEC) 20 MG delayed release capsule Take 20 mg by mouth 2 times daily      sotalol (BETAPACE) 80 MG tablet Take 0.5 tablets by mouth 2 times daily 60 tablet 5    fluocinonide (LIDEX) 0.05 % cream Apply topically 2 times daily. 1 Tube 0    furosemide (LASIX) 20 MG tablet TAKE 1 TABLET DAILY (Patient taking differently: TAKE 1 TABLET DAILY prn) 90 tablet 2    ferrous sulfate 325 (65 FE) MG tablet Take 1 tablet by mouth 2 times daily (with meals) 180 tablet 3    zolpidem (AMBIEN) 10 MG tablet Take 10 mg by mouth nightly as needed.  LORazepam (ATIVAN) 1 MG tablet Take 1 mg by mouth 2 times daily as needed.  Lacosamide (VIMPAT PO) Take 100 mg by mouth 2 times daily.  Indications: take dos      paroxetine (PAXIL) 20 MG tablet Take 30 mg by mouth every morning       diclofenac sodium (VOLTAREN) 1 % GEL Apply 4 g topically 4 times daily 5 Tube 0       Objective:   PHYSICAL EXAM:    Vitals:    03/02/18 1108 03/02/18 1216   BP: 110/60 122/62   Pulse: 59    SpO2: 95%    Weight: 206 lb (93.4 kg)    Height: 5' 10\" (1.778 m)          VITALS:  /60   Pulse 59   Ht 5' 10\" (1.778 m)   Wt 206 lb (93.4 kg)   SpO2 95%   BMI 29.56 kg/m²      CONSTITUTIONAL: Cooperative, no apparent distress, and appears well nourished / developed  NEUROLOGIC:  Awake and orientated to person, place and time. PSYCH: Calm affect. SKIN: Warm and dry. HEENT: Sclera non-icteric, normocephalic, neck supple, no elevation of JVP, normal carotid pulses with no bruits and thyroid normal size. LUNGS:  No increased work of breathing and clear to auscultation, no crackles or wheezing. CARDIOVASCULAR:  Regular rate 60 and rhythm with no murmurs, gallops, rubs, or abnormal heart sounds, normal PMI. The apical impulses not displaced. Heart tones are crisp and normal                                                                                            Cervical veins are not engorged                 JVP less than 8 cm H2O                                                                              The carotid upstroke is normal in amplitude and contour without delay or bruit    ABDOMEN:  Normal bowel sounds, non-distended and non-tender to palpation   EXT: No edema, no calf tenderness. Pulses are present bilaterally. Assessment:     1. Paroxysmal atrial fibrillation (HCC)   ~unremarkable Loop interrogation for arrhythmia  ~s/p DCCV & RFA '14  ~sotalol 40 mg bid / NOAC  ~CHADsVASc 3 EKG 12 Lead   2. Coronary artery disease involving native coronary artery of native heart without angina pectoris   ~s/p PTCA LAD March '17  ~atypical chest discomfort from angina. Trop neg. EKG without acute changes  ~plavix /  Statin / BB    3. Dizziness   ~ unclear description of events. Hasn't done much after having skin lesions removed yesterday  ~Loop recorder: interrogation : neg for events   ~BP controlled / AP 60        Patient  is stable since hospital discharge. Plan:  EKG: sinus layne 54   ~device interrogation: no events noted. Avg HR 60 (nearing end of battery life)   F/U as scheduled in three months       Thank you for allowing to us to participate in the care of Gui Stafford.   [address]

## 2018-03-02 NOTE — PROGRESS NOTES
for arrhythmia  ~s/p DCCV & RFA '14  ~sotalol 40 mg bid / NOAC  ~CHADsVASc 3 EKG 12 Lead   2. Coronary artery disease involving native coronary artery of native heart without angina pectoris   ~s/p PTCA LAD March '17  ~atypical chest discomfort from angina. Trop neg. EKG without acute changes  ~plavix /  Statin / BB    3. Dizziness   ~ unclear description of events. Hasn't done much after having skin lesions removed yesterday  ~Loop recorder: interrogation : neg for events   ~BP controlled / AP 60        Patient  is stable since hospital discharge. Plan:  EKG: sinus layne 54   ~device interrogation: no events noted. Avg HR 60 (nearing end of battery life)   F/U as scheduled in three months     I have addressed the patient's cardiac risk factors and adjusted pharmacologic treatment as needed. In addition, I have reinforced the need for patient directed risk factor modification. Further evaluation will be based upon the patient's clinical course and testing results. All questions and concerns were addressed to the patient. Alternatives to  treatment were discussed. The patient  currently  is not smoking. The risks related to smoking were reviewed with the patient. Recommend maintaining a smoke-free lifestyle. Antiplatelet therapy / anti-coagulation has been recommended / prescribed for this patient. Education conducted on adverse reactions including bleeding was discussed. The patient verbalizes understanding. Pt is on a BB  Pt is on an ARB  Pt is on a statin      Saturated fat diet discussed  Exercise program discussed    Thank you for allowing to us to participate in the care of Kindra [address]  Documentation of today's visit sent to PCP

## 2019-06-03 ENCOUNTER — HOSPITAL ENCOUNTER (OUTPATIENT)
Dept: ULTRASOUND IMAGING | Age: 74
Discharge: HOME | End: 2019-06-03
Payer: MEDICARE

## 2019-06-03 DIAGNOSIS — Z13.6 SCREENING FOR AAA (ABDOMINAL AORTIC ANEURYSM): ICD-10-CM

## 2019-06-03 PROCEDURE — 76706 US ABDL AORTA SCREEN AAA: CPT

## 2020-07-29 ENCOUNTER — OFFICE VISIT (OUTPATIENT)
Dept: ORTHOPEDIC SURGERY | Age: 75
End: 2020-07-29
Payer: MEDICARE

## 2020-07-29 VITALS — BODY MASS INDEX: 29.49 KG/M2 | WEIGHT: 206 LBS | HEIGHT: 70 IN

## 2020-07-29 PROBLEM — M65.4 RADIAL STYLOID TENOSYNOVITIS (DE QUERVAIN): Status: ACTIVE | Noted: 2020-07-29

## 2020-07-29 PROCEDURE — 4004F PT TOBACCO SCREEN RCVD TLK: CPT | Performed by: ORTHOPAEDIC SURGERY

## 2020-07-29 PROCEDURE — G8428 CUR MEDS NOT DOCUMENT: HCPCS | Performed by: ORTHOPAEDIC SURGERY

## 2020-07-29 PROCEDURE — L3908 WHO COCK-UP NONMOLDE PRE OTS: HCPCS | Performed by: ORTHOPAEDIC SURGERY

## 2020-07-29 PROCEDURE — 20550 NJX 1 TENDON SHEATH/LIGAMENT: CPT | Performed by: ORTHOPAEDIC SURGERY

## 2020-07-29 PROCEDURE — 3017F COLORECTAL CA SCREEN DOC REV: CPT | Performed by: ORTHOPAEDIC SURGERY

## 2020-07-29 PROCEDURE — 1123F ACP DISCUSS/DSCN MKR DOCD: CPT | Performed by: ORTHOPAEDIC SURGERY

## 2020-07-29 PROCEDURE — G8417 CALC BMI ABV UP PARAM F/U: HCPCS | Performed by: ORTHOPAEDIC SURGERY

## 2020-07-29 PROCEDURE — 4040F PNEUMOC VAC/ADMIN/RCVD: CPT | Performed by: ORTHOPAEDIC SURGERY

## 2020-07-29 PROCEDURE — 99213 OFFICE O/P EST LOW 20 MIN: CPT | Performed by: ORTHOPAEDIC SURGERY

## 2020-07-29 RX ORDER — LIDOCAINE HYDROCHLORIDE 10 MG/ML
1 INJECTION, SOLUTION INFILTRATION; PERINEURAL ONCE
Status: COMPLETED | OUTPATIENT
Start: 2020-07-29 | End: 2020-07-29

## 2020-07-29 RX ORDER — BETAMETHASONE SODIUM PHOSPHATE AND BETAMETHASONE ACETATE 3; 3 MG/ML; MG/ML
1 INJECTION, SUSPENSION INTRA-ARTICULAR; INTRALESIONAL; INTRAMUSCULAR; SOFT TISSUE ONCE
Status: COMPLETED | OUTPATIENT
Start: 2020-07-29 | End: 2020-07-29

## 2020-07-29 RX ADMIN — LIDOCAINE HYDROCHLORIDE 1 ML: 10 INJECTION, SOLUTION INFILTRATION; PERINEURAL at 08:39

## 2020-07-29 RX ADMIN — BETAMETHASONE SODIUM PHOSPHATE AND BETAMETHASONE ACETATE 1.02 MG: 3; 3 INJECTION, SUSPENSION INTRA-ARTICULAR; INTRALESIONAL; INTRAMUSCULAR; SOFT TISSUE at 08:38

## 2020-07-29 NOTE — PROGRESS NOTES
Chief Complaint   Patient presents with    Hand Pain     right hand pain for 2.5 months, atruamatic       HISTORY OF PRESENT ILLNESS: Jie Wolff is a  right-hand-dominant patient here for a new problem regarding his right hand and wrist. Patient developed tenderness over the dorsal radial thumb and wrist approximately 3 months. he has not had any specific traumatic injury or numbness and tingling. .Pain is persistent, typically moderate in intensity, and is exacerbated by with thumb extension or pinch. Medical History:  Past Medical History:   Diagnosis Date    Anxiety     Arthritis     OA    Bradycardia 4/19/2014    Cancer ([address]     skin cancer-forearm right , face    Hemoptysis 10/16/2014    Since Ablation    Irregular heart  beats     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[facility] [address] 12/10/2014    S/P drug eluting coronary stent placement 03/06/2017    2.25 x 18 Xience [place] ADRI[facility]N - Distal LAD    Seizure disorder ([facility] [address]     4 weeks ago black out, pt states seizure but may be due to Heart Rate changes    Seizures (HCC)     Shortness of breath 9/4/2014    Total knee replacement status 1/12/2011     Past Surgical History:   Procedure Laterality Date    ABLATION OF DYSRHYTHMIC FOCUS  9/2014    CARPAL TUNNEL RELEASE      CERVICAL [address] SURGERY  2010    COLONOSCOPY  09/26/2016    normal    CORONARY ANGIOPLASTY WITH STENT PLACEMENT      FEMUR SURGERY      left    KNEE SURGERY  1-12-11 R total knee replacement with femoral nerve block for pain control    OTHER SURGICAL HISTORY Bilateral     excisions of lesions on bilat. neck and back    OTHER SURGICAL HISTORY  9/2014    Reveal Loop recorder placed in Cath Lab    UPPER GASTROINTESTINAL ENDOSCOPY  09/26/2016    gastric and esophogeal biopsy     Family History   Problem Relation Age of Onset    Heart Disease Mother     Arthritis Mother     High Blood Pressure Mother    Belenda Roof / Stillbirths Mother     Stroke Mother     Arthritis Father     Cancer Sister     Arthritis Sister     Depression Sister     Arthritis Brother     Arthritis Maternal Grandmother     Arthritis Maternal Grandfather     Arthritis Paternal Grandmother     Diabetes Paternal Grandmother     Arthritis Paternal Grandfather      Social History     Socioeconomic History    Marital status:      Spouse name: Not on file    Number of children: Not on file    Years of education: Not on file    Highest education level: Not on file   Occupational History    Not on file   Social Needs    Financial resource strain: Not on file    Food insecurity     Worry: Not on file     Inability: Not on file   [employer] needs     Medical: Not on file     Non-medical: Not on file   Tobacco Use    Smoking status: Former Smoker     Packs/day: 2.00     Years: 40.00     Pack years: 80.00     Last attempt to quit: 1993     Years since quittin.1    Smokeless tobacco: Never Used    Tobacco comment: 20 yrs   Substance and Sexual Activity    Alcohol use: No    Drug use: No    Sexual activity: Not Currently   Lifestyle    Physical activity     Days per week: Not on file     Minutes per session: Not on file    Stress: Not on file   Relationships    Social connections     Talks on phone: Not on file     Gets together: Not on file     Attends Mormonism service: Not on file     Active member of club or organization: Not on file     Attends meetings of clubs or organizations: Not on file     Relationship status: Not on file    Intimate partner violence     Fear of current or ex partner: Not on file     Emotionally abused: Not on file     Physically abused: Not on file     Forced sexual activity: Not on file   Other Topics Concern    Not on file   Social History Narrative    Not on file     Current Outpatient Medications   Medication Sig Dispense Refill    diclofenac sodium 1 % GEL APPLY 4GM TOPICALLY FOUR TIMES A DAY 5 Tube 0    carbidopa-levodopa (SINEMET)  MG per tablet lymphatic examination bilaterally reveals all areas to be without enlargement or induration. Skin intact without lymphadenopathy, discoloration, or abnormal temperature. Vascular: There is intact, symmetric circulation in both upper extremities. Musculoskeletal       Cervical spine, shoulders, elbows:  satisfactory comfortable baseline range of motion, strength, and stability       Hand/Wrist and digits:   Satisfactory range of motion bilaterally. There is tenderness over the radial wrist at the                                    right first dorsal extensor compartment and a                                      positive Finkelsteins test which reproduces symptoms. No thumb triggering. Minimal to no discomfort over the [facility] joint today. Neurological: neg provocation testing for carpal tunnel    Radiology:     X-rays obtained and reviewed in office:  Views 3  Location right hand  Impression :  Mild narrowing thumb CMC joint without acute fracture or dislocation      IMPRESSION AND PLAN: right wrist and thumb de Quervain's tendinitis. I think this is amenable to conservative care as an initial line of treatment. I have recommended a short arm thumb spica brace and injection. Patient was in agreement. We also provided range of motion exercises. The risks and benefits of cortisone injection were discussed thoroughly. Risks discussed included infection, adverse drug reaction, increased pain post-injection, skin de-pigmentation, fatty atrophy, and persistent clinical symptoms despite injection. Use of ethyl chloride spray during injection to decrease injection site pain was discussed. The injection was given after cleansing the skin with alcohol. The right wrist first dorsal compartment tendon sheath was injected with 1 cc of 1% lidocaine without epinephrine and 1 cc of Celestone without difficulty.   The patient tolerated the injection well and a Band-aid was

## 2020-08-19 ENCOUNTER — OFFICE VISIT (OUTPATIENT)
Dept: ORTHOPEDIC SURGERY | Age: 75
End: 2020-08-19
Payer: MEDICARE

## 2020-08-19 VITALS — WEIGHT: 206 LBS | BODY MASS INDEX: 29.49 KG/M2 | HEIGHT: 70 IN

## 2020-08-19 PROCEDURE — 99213 OFFICE O/P EST LOW 20 MIN: CPT | Performed by: ORTHOPAEDIC SURGERY

## 2020-08-19 PROCEDURE — 20605 DRAIN/INJ JOINT/BURSA W/O US: CPT | Performed by: ORTHOPAEDIC SURGERY

## 2020-08-19 RX ORDER — BETAMETHASONE SODIUM PHOSPHATE AND BETAMETHASONE ACETATE 3; 3 MG/ML; MG/ML
1 INJECTION, SUSPENSION INTRA-ARTICULAR; INTRALESIONAL; INTRAMUSCULAR; SOFT TISSUE ONCE
Status: COMPLETED | OUTPATIENT
Start: 2020-08-19 | End: 2020-08-19

## 2020-08-19 RX ORDER — LIDOCAINE HYDROCHLORIDE 10 MG/ML
1 INJECTION, SOLUTION INFILTRATION; PERINEURAL ONCE
Status: COMPLETED | OUTPATIENT
Start: 2020-08-19 | End: 2020-08-19

## 2020-08-19 RX ADMIN — BETAMETHASONE SODIUM PHOSPHATE AND BETAMETHASONE ACETATE 1.02 MG: 3; 3 INJECTION, SUSPENSION INTRA-ARTICULAR; INTRALESIONAL; INTRAMUSCULAR; SOFT TISSUE at 09:38

## 2020-08-19 RX ADMIN — LIDOCAINE HYDROCHLORIDE 1 ML: 10 INJECTION, SOLUTION INFILTRATION; PERINEURAL at 09:39

## 2020-08-19 NOTE — PROGRESS NOTES
Chief Complaint  Follow-up (RIGHT ELBOW PAIN FOR 3 MONTHS, ATRUAMATIIC(5/19/2020))      History of Present Illness:  Cookie Vo is a 76 y.o. male right-hand-dominant who presents for lateral right elbow pain and some discomfort in the elbow with motion. He denies clicking or locking. He denies signs of bursitis. Patient states that the pain in the radial right wrist has gone away. Cortisone injection was placed there last visit. He is quite pleased. Patient denies numbness in the hand today. No thumb triggering reported. Medical History  Past Medical History:   Diagnosis Date    Anxiety     Arthritis     OA    Bradycardia 4/19/2014    Cancer ([address]     skin cancer-forearm right , face    Hemoptysis 10/16/2014    Since Ablation    Irregular heart  beats     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[address] 12/10/2014    S/P drug eluting coronary stent placement 03/06/2017    2.25 x 18 X[employer] [place]RI[place]N - Distal LAD    Seizure disorder ([address]     4 weeks ago black out, pt states seizure but may be due to Heart Rate changes    Seizures (HCC)     Shortness of breath 9/4/2014    Total knee replacement status 1/12/2011     Past Surgical History:   Procedure Laterality Date    ABLATION OF DYSRHYTHMIC FOCUS  9/2014    CARPAL TUNNEL RELEASE      CERVICAL [address] SURGERY  2010    COLONOSCOPY  09/26/2016    normal    CORONARY ANGIOPLASTY WITH STENT PLACEMENT      FEMUR SURGERY      left    KNEE SURGERY  1-12-11 R total knee replacement with femoral nerve block for pain control    OTHER SURGICAL HISTORY Bilateral     excisions of lesions on bilat. neck and back    OTHER SURGICAL HISTORY  9/2014    Reveal Loop recorder placed in Cath Lab    UPPER GASTROINTESTINAL ENDOSCOPY  09/26/2016    gastric and esophogeal biopsy     Family History   Problem Relation Age of Onset    Heart Disease Mother     Arthritis Mother     High Blood Pressure Mother    [de-identified] / Stillbirths Mother     Stroke Mother     Arthritis Father     Cancer Sister     Arthritis Sister     Depression Sister     Arthritis Brother     Arthritis Maternal Grandmother     Arthritis Maternal Grandfather     Arthritis Paternal Grandmother     Diabetes Paternal Grandmother     Arthritis Paternal Grandfather      Social History     Socioeconomic History    Marital status:      Spouse name: None    Number of children: None    Years of education: None    Highest education level: None   Occupational History    None   Social Needs    Financial resource strain: None    Food insecurity     Worry: None     Inability: None    Transportation needs     Medical: None     Non-medical: None   Tobacco Use    Smoking status: Former Smoker     Packs/day: 2.00     Years: 40.00     Pack years: 80.00     Last attempt to quit: 1993     Years since quittin.2    Smokeless tobacco: Never Used    Tobacco comment: 20 yrs   Substance and Sexual Activity    Alcohol use: No    Drug use: No    Sexual activity: Not Currently   Lifestyle    Physical activity     Days per week: None     Minutes per session: None    Stress: None   Relationships    Social connections     Talks on phone: None     Gets together: None     Attends Orthodoxy service: None     Active member of club or organization: None     Attends meetings of clubs or organizations: None     Relationship status: None    Intimate partner violence     Fear of current or ex partner: None     Emotionally abused: None     Physically abused: None     Forced sexual activity: None   Other Topics Concern    None   Social History Narrative    None     Current Outpatient Medications   Medication Sig Dispense Refill    diclofenac sodium 1 % GEL APPLY 4GM TOPICALLY FOUR TIMES A DAY 5 Tube 0    carbidopa-levodopa (SINEMET)  MG per tablet Take 1 tablet by mouth daily      losartan (COZAAR) 25 MG tablet Take 1 tablet by mouth daily TAKE 1 TABLET DAILY 90 tablet 3    vitamin B-2 (RIBOFLAVIN) 25 all areas to be without enlargement or induration. Neurological: The patient has good coordination. There is no weakness or sensory deficit. Elbow Examination  Inspection: No sign of bursitis. Good alignment of the elbow. No skin lesions. Palpation: Palpation about the elbow reveals no mass, except for the posterior olecranon spur. Range of Motion: Elbow motion reveals lack of terminal 10 degrees, flexion is full. He is full pronation, he has about 7 degrees of supination. Strength: Fingers reveal good motion. Fingers are well perfused    Special Tests: No ligamentous laxity at the elbow today    Skin: There are no additional worrisome rashes, ulcerations or lesions. Gait: normal    Circulation normal      Radiology:     X-rays obtained and reviewed in office:  Views 3  Location right elbow  Impression :  Arthritic change of the joint with a posterior olecranon spur. Assessment: Right elbow pain, right elbow arthritis    Impression:  Encounter Diagnosis   Name Primary?  Right elbow pain Yes       Office Procedures:  Orders Placed This Encounter   Procedures    XR ELBOW RIGHT (MIN 3 VIEWS)     Standing Status:   Future     Number of Occurrences:   1     Standing Expiration Date:   8/19/2021       Treatment Plan: Patient was interested in cortisone injection. We injected into the right elbow joint with 1 cc of 1% lidocaine without epinephrine and 1 cc of Celestone without difficulty. This was done under sterile conditions, skin cleansed with alcohol, and the use of ethyl chloride spray for his comfort. Tolerated well and Band-Aid placed. Activity modifications outlined. We discussed long-term diagnosis options for elbow arthritis. Follow-up as needed. All questions and concerns were addressed today. Patient is in agreement with the plan.         Leonel Alexander MD  Hand & Upper Extremity Surgery  [address]  A partner of [facility]        Please note that this transcription was created using voice recognition software. Any errors are unintentional and may be due to voice recognition transcription.

## 2020-08-27 ENCOUNTER — HOSPITAL ENCOUNTER (OUTPATIENT)
Dept: MAMMOGRAPHY | Age: 75
Discharge: HOME | End: 2020-08-27
Payer: MEDICARE

## 2020-08-27 ENCOUNTER — HOSPITAL ENCOUNTER (OUTPATIENT)
Dept: ULTRASOUND IMAGING | Age: 75
Discharge: HOME | End: 2020-08-27
Payer: MEDICARE

## 2020-08-27 PROCEDURE — 76642 ULTRASOUND BREAST LIMITED: CPT

## 2020-08-27 PROCEDURE — G0279 TOMOSYNTHESIS, MAMMO: HCPCS

## 2020-09-30 ENCOUNTER — OFFICE VISIT (OUTPATIENT)
Dept: ORTHOPEDIC SURGERY | Age: 75
End: 2020-09-30
Payer: MEDICARE

## 2020-09-30 VITALS — WEIGHT: 206 LBS | BODY MASS INDEX: 29.49 KG/M2 | HEIGHT: 70 IN

## 2020-09-30 PROCEDURE — 20550 NJX 1 TENDON SHEATH/LIGAMENT: CPT | Performed by: ORTHOPAEDIC SURGERY

## 2020-09-30 PROCEDURE — 99213 OFFICE O/P EST LOW 20 MIN: CPT | Performed by: ORTHOPAEDIC SURGERY

## 2020-09-30 RX ORDER — BETAMETHASONE SODIUM PHOSPHATE AND BETAMETHASONE ACETATE 3; 3 MG/ML; MG/ML
1 INJECTION, SUSPENSION INTRA-ARTICULAR; INTRALESIONAL; INTRAMUSCULAR; SOFT TISSUE ONCE
Status: COMPLETED | OUTPATIENT
Start: 2020-09-30 | End: 2020-09-30

## 2020-09-30 RX ORDER — LIDOCAINE HYDROCHLORIDE 10 MG/ML
1 INJECTION, SOLUTION INFILTRATION; PERINEURAL ONCE
Status: COMPLETED | OUTPATIENT
Start: 2020-09-30 | End: 2020-09-30

## 2020-09-30 RX ADMIN — BETAMETHASONE SODIUM PHOSPHATE AND BETAMETHASONE ACETATE 1.02 MG: 3; 3 INJECTION, SUSPENSION INTRA-ARTICULAR; INTRALESIONAL; INTRAMUSCULAR; SOFT TISSUE at 07:39

## 2020-09-30 RX ADMIN — LIDOCAINE HYDROCHLORIDE 1 ML: 10 INJECTION, SOLUTION INFILTRATION; PERINEURAL at 07:40

## 2020-09-30 NOTE — PROGRESS NOTES
Chief Complaint   Patient presents with    Follow-up     ck r wrist, Inj 8/19/20 DEQ       HISTORY OF PRESENT ILLNESS:  Wally Reynolds is a 76 y.o.  patient here for repeat evaluation of right wrist pain, dorsal radial.  Cortisone injection last month helped immensely, unfortunately wore off. He would like another injection today. ROS:  ROS neg     Past medical history is reviewed again today, no changes to report    PHYSICAL EXAMINATION:  Patient is alert and pleasant, in no acute distress. The affected extremity is examined today. Right wrist reveals no injection site change. Very mild swelling over the first dorsal compartment tendon sheath but there is a positive Finkelstein test.  No thumb triggering. Minimal  weakness. Intact neurovascular exam otherwise    X-rays: None today, no new injury reported    IMPRESSION AND PLAN: Right wrist de Quervain's tendinitis  We will continue with our current care plan. We offered a repeat cortisone injection into the right wrist, first dorsal compartment tendon sheath as the patient requested. The risks and benefits of cortisone injection were discussed thoroughly. Risks discussed included infection, adverse drug reaction, increased pain post-injection, skin de-pigmentation, fatty atrophy, and persistent clinical symptoms despite injection. Use of ethyl chloride spray during injection to decrease injection site pain was discussed. The injection was given after cleansing the skin with alcohol. The right wrist first dorsal compartment tendon sheath was injected with 1 cc of 1% lidocaine without epinephrine and 1 cc of Celestone without difficulty. The patient tolerated the injection well and a Band-aid was placed. Conservative measures as outlined previously and follow-up as needed. All questions and concerns were addressed today. Patient is in agreement with the plan.         Bola Valdivia MD  Hand & Upper Extremity Surgery  [facility] [address] [facility] partner of [facility] ([facility])        Please note that this transcription was created using voice recognition software. Any errors are unintentional and may be due to voice recognition transcription.

## 2021-05-10 ENCOUNTER — OFFICE VISIT (OUTPATIENT)
Dept: ENT CLINIC | Age: 76
End: 2021-05-10
Payer: MEDICARE

## 2021-05-10 VITALS
DIASTOLIC BLOOD PRESSURE: 68 MMHG | BODY MASS INDEX: 29.85 KG/M2 | WEIGHT: 213.2 LBS | HEART RATE: 67 BPM | TEMPERATURE: 97.2 F | SYSTOLIC BLOOD PRESSURE: 128 MMHG | HEIGHT: 71 IN

## 2021-05-10 DIAGNOSIS — J30.0 VASOMOTOR RHINITIS: Primary | ICD-10-CM

## 2021-05-10 DIAGNOSIS — M26.623 BILATERAL TEMPOROMANDIBULAR JOINT PAIN: ICD-10-CM

## 2021-05-10 PROCEDURE — 99204 OFFICE O/P NEW MOD 45 MIN: CPT | Performed by: STUDENT IN AN ORGANIZED HEALTH CARE EDUCATION/TRAINING PROGRAM

## 2021-05-10 RX ORDER — FINASTERIDE 5 MG/1
5 TABLET, FILM COATED ORAL DAILY
COMMUNITY

## 2021-05-10 RX ORDER — IPRATROPIUM BROMIDE 21 UG/1
2 SPRAY, METERED NASAL EVERY 12 HOURS
Qty: 1 BOTTLE | Refills: 3 | Status: SHIPPED | OUTPATIENT
Start: 2021-05-10

## 2021-05-10 ASSESSMENT — ENCOUNTER SYMPTOMS
VOMITING: 0
NAUSEA: 0
COUGH: 0
EYE PAIN: 0
RHINORRHEA: 1
SHORTNESS OF BREATH: 0

## 2021-05-10 NOTE — PROGRESS NOTES
[facility]      Patient Name: Francisco Garza Record Number:  [id number]  Primary Care Physician:  Martin Elena MD  Date of Consultation: 5/10/2021    Chief Complaint:   Chief Complaint   Patient presents with    Sinus Problem     States sinuses \"run, run, run\" all day long.  Ear Problem     Gets an ear ache everyonce in a while. HISTORY OF PRESENT ILLNESS  Ana Newton is a(n) 76 y.o. male who persistent nose drainage, and earaches. He states his nose drains all day every day. He does not get any relief from his nose draining. He does have ear pain that comes and goes without any treatment. He has dentures and arthritis throughout his entire body. He denies any significant drainage from his ears. He denies any ear pain or discomfort at this time. He does have some mild hearing loss that he is aware of. Is never had any history of ear surgeries or ear infections. He did have a skin cancer move from his external ear. Patient Active Problem List   Diagnosis    Dizziness    Syncope and collapse    Paroxysmal atrial fibrillation (HCC)    Contusion of knee, right    Dysphagia    Encounter for electronic analysis of reveal event recorder    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 [address]    History of nonmelanoma skin cancer    Ischemic chest pain ([facility] [address]    Left hand pain    Hand pain, right    Arthritis of carpometacarpal (CMC) joints of both thumbs    Radial styloid tenosynovitis (de quervain)     Past Surgical History:   Procedure Laterality Date    ABLATION OF DYSRHYTHMIC FOCUS  9/2014    CARPAL TUNNEL RELEASE      CERVICAL [address] SURGERY  2010    COLONOSCOPY  09/26/2016    normal    CORONARY ANGIOPLASTY WITH STENT PLACEMENT      FEMUR SURGERY      left    KNEE SURGERY  1-12-11 R total knee replacement with femoral nerve block for pain control    OTHER SURGICAL HISTORY Bilateral     excisions of lesions on bilat. neck and back    OTHER SURGICAL HISTORY  2014    Reveal Loop recorder placed in Cath Lab    UPPER GASTROINTESTINAL ENDOSCOPY  2016    gastric and esophogeal biopsy     Family History   Problem Relation Age of Onset    Heart Disease Mother     Arthritis Mother     High Blood Pressure Mother    [de-identified] / Stillbirths Mother     Stroke Mother     Arthritis Father     Cancer Sister     Arthritis Sister     Depression Sister     Arthritis Brother     Arthritis Maternal Grandmother     Arthritis Maternal Grandfather     Arthritis Paternal Grandmother     Diabetes Paternal Grandmother     Arthritis Paternal Grandfather      Social History     Socioeconomic History    Marital status:      Spouse name: Not on file    Number of children: Not on file    Years of education: Not on file    Highest education level: Not on file   Occupational History    Not on file   Social Needs    Financial resource strain: Not on file    Food insecurity     Worry: Not on file     Inability: Not on file    Transportation needs     Medical: Not on file     Non-medical: Not on file   Tobacco Use    Smoking status: Former Smoker     Packs/day: 2.00     Years: 40.00     Pack years: 80.00     Quit date: 1993     Years since quittin.9    Smokeless tobacco: Never Used    Tobacco comment: 20 yrs   Substance and Sexual Activity    Alcohol use: No    Drug use: No    Sexual activity: Not Currently   Lifestyle    Physical activity     Days per week: Not on file     Minutes per session: Not on file    Stress: Not on file   Relationships    Social connections     Talks on phone: Not on file     Gets together: Not on file     Attends Christianity service: Not on file     Active member of club or organization: Not on file     Attends meetings of clubs or organizations: Not on file     Relationship status: Not on file    Intimate partner violence     Fear of current or ex partner: Not on file     Emotionally abused: Not on file     Physically abused: Not on file     Forced sexual activity: Not on file   Other Topics Concern    Not on file   Social History Narrative    Not on file       DRUG/FOOD ALLERGIES: Morphine, Codeine, Penicillins, and Plavix [clopidogrel bisulfate]    CURRENT MEDICATIONS  Prior to Admission medications    Medication Sig Start Date End Date Taking? Authorizing Provider   finasteride (PROSCAR) 5 MG tablet Take 5 mg by mouth daily   Yes Historical Provider, MD   ipratropium (ATROVENT) 0.03 % nasal spray 2 sprays by Each Nostril route every 12 hours Can use of to 4 times daily as needed 5/10/21  Yes Javy Amin DO   losartan (COZAAR) 25 MG tablet Take 1 tablet by mouth daily TAKE 1 TABLET DAILY 2/5/18  Yes KATIE Mack CNP   atorvastatin (LIPITOR) 40 MG tablet Take 1 tablet by mouth daily 8/21/17  Yes Analisa Rodríguez MD   isosorbide mononitrate (IMDUR) 30 MG extended release tablet Take 1 tablet by mouth daily 8/21/17  Yes Analisa Rodríguez MD   dabigatran (PRADAXA) 150 MG capsule Take 1 capsule by mouth 2 times daily 8/7/17  Yes Yaneli Yoon MD   sotalol (BETAPACE) 80 MG tablet Take 0.5 tablets by mouth 2 times daily 2/21/17  Yes KATIE Escalera CNP   furosemide (LASIX) 20 MG tablet TAKE 1 TABLET DAILY  Patient taking differently: TAKE 1 TABLET DAILY prn 11/19/15  Yes Analisa Rodríguez MD   ferrous sulfate 325 (65 FE) MG tablet Take 1 tablet by mouth 2 times daily (with meals) 10/5/15  Yes Analisa Rodríguez MD   zolpidem (AMBIEN) 10 MG tablet Take 10 mg by mouth nightly as needed. Yes Historical Provider, MD   LORazepam (ATIVAN) 1 MG tablet Take 1 mg by mouth 2 times daily as needed. Yes Historical Provider, MD   Lacosamide (VIMPAT PO) Take 100 mg by mouth 2 times daily.  Indications: take dos   Yes Historical Provider, MD   paroxetine (PAXIL) 20 MG tablet Take 30 mg by mouth every morning  12/28/10  Yes Historical Provider, MD   diclofenac sodium 1 % GEL APPLY 4GM TOPICALLY FOUR TIMES A DAY 12/6/18   Too Sesay MD   carbidopa-levodopa (SINEMET)  MG per tablet Take 1 tablet by mouth daily    Historical Provider, MD   vitamin B-2 (RIBOFLAVIN) 25 MG TABS Take by mouth    Historical Provider, MD   diclofenac sodium (VOLTAREN) 1 % GEL Apply 4 g topically 4 times daily 10/3/17 12/6/17  Too Sesay MD   diclofenac (PENNSAID) 2 % SOLN 2 pumps to the affected area 2 times a day [id number] 9/26/17   Too Sesay MD   omeprazole (PRILOSEC) 20 MG delayed release capsule Take 20 mg by mouth 2 times daily    Historical Provider, MD   fluocinonide (LIDEX) 0.05 % cream Apply topically 2 times daily. 5/20/16   KATIE Slaughter - CNP       REVIEW OF SYSTEMS  The following systems were reviewed and revealed the following in addition to any already discussed in the HPI:    Review of Systems   Constitutional: Negative for fatigue and fever. HENT: Positive for ear pain and rhinorrhea. Negative for congestion, postnasal drip and sneezing. Eyes: Negative for pain and visual disturbance. Respiratory: Negative for cough and shortness of breath. Cardiovascular: Negative for chest pain. Gastrointestinal: Negative for nausea and vomiting. Endocrine: Negative. Genitourinary: Negative. Musculoskeletal: Negative for neck pain and neck stiffness. Skin: Negative for rash. Neurological: Negative for dizziness and headaches.           PHYSICAL EXAM  /68 (Site: Right Upper Arm, Position: Sitting, Cuff Size: Large Adult)   Pulse 67   Temp 97.2 °F (36.2 °C) (Oral)   Ht 5' 11\" (1.803 m)   Wt 213 lb 3.2 oz (96.7 kg)   BMI 29.74 kg/m²     GENERAL: No Acute Distress, Alert and Oriented, no hoarseness  EYES: EOMI, Anti-icteric  NOSE: No epistaxis, nasal mucosa within normal limits, no purulent drainage  EARS: Normal external canal appearance, EAC patent bilaterally, TMs intact bilaterally with no evidence of effusions  FACE: 1/6 House-Brackmann Scale, symmetric, sensation equal bilaterally  ORAL CAVITY: No masses or lesions palpated, uvula is midline, moist mucous membranes, post surgical UPPP, upper and lower dentures  NECK: Normal range of motion, no thyromegaly, trachea is midline, no lymphadenopathy, no neck masses, no crepitus  CHEST: Normal respiratory effort, no retractions, breathing comfortably  SKIN: No rashes, normal appearing skin, no evidence of skin lesions/tumors    RADIOLOGY  Summary of findings:      PROCEDURE      ASSESSMENT/PLAN  Jnue Zambrano is a very pleasant 76 y.o. male with    1. Vasomotor rhinitis  I believe his nasal drainage is due to vasomotor rhinitis. I will treat him with Atrovent nasal spray and see how he does. - ipratropium (ATROVENT) 0.03 % nasal spray; 2 sprays by Each Nostril route every 12 hours Can use of to 4 times daily as needed  Dispense: 1 Bottle; Refill: 3    2. Bilateral temporomandibular joint pain  He has TMJ bilaterally likely. He will monitor to see if this is an ear issue or a TMJ issue. If it is TMJ he will use Advil or ibuprofen with heat or ice whichever feels better. He will follow-up with me as needed. Medical Decision Making:   The following items were considered in medical decision making:  Independent review of images  Review / order clinical lab tests  Review / order radiology tests  Decision to obtain old records

## 2021-09-14 ENCOUNTER — OFFICE VISIT (OUTPATIENT)
Dept: ENT CLINIC | Age: 76
End: 2021-09-14
Payer: MEDICARE

## 2021-09-14 VITALS
HEART RATE: 85 BPM | DIASTOLIC BLOOD PRESSURE: 79 MMHG | BODY MASS INDEX: 30.21 KG/M2 | HEIGHT: 70 IN | TEMPERATURE: 97.3 F | WEIGHT: 211 LBS | SYSTOLIC BLOOD PRESSURE: 133 MMHG

## 2021-09-14 DIAGNOSIS — K13.70 ORAL MUCOSAL LESION: ICD-10-CM

## 2021-09-14 DIAGNOSIS — J30.0 VASOMOTOR RHINITIS: ICD-10-CM

## 2021-09-14 DIAGNOSIS — M26.623 BILATERAL TEMPOROMANDIBULAR JOINT PAIN: Primary | ICD-10-CM

## 2021-09-14 PROCEDURE — 99214 OFFICE O/P EST MOD 30 MIN: CPT | Performed by: STUDENT IN AN ORGANIZED HEALTH CARE EDUCATION/TRAINING PROGRAM

## 2021-09-14 RX ORDER — METHYLPREDNISOLONE 4 MG/1
TABLET ORAL
Qty: 1 KIT | Refills: 0 | Status: SHIPPED | OUTPATIENT
Start: 2021-09-14 | End: 2021-09-20

## 2021-09-14 ASSESSMENT — ENCOUNTER SYMPTOMS
SHORTNESS OF BREATH: 0
EYE PAIN: 0
COUGH: 0
NAUSEA: 0
VOMITING: 0

## 2021-09-14 NOTE — PROGRESS NOTES
esophogeal biopsy     Family History   Problem Relation Age of Onset    Heart Disease Mother     Arthritis Mother     High Blood Pressure Mother    [de-identified] / [place] Mother     Stroke Mother     Arthritis Father     Cancer Sister     Arthritis Sister     Depression Sister     Arthritis Brother     Arthritis Maternal Grandmother     Arthritis Maternal Grandfather     Arthritis Paternal Grandmother     Diabetes Paternal Grandmother     Arthritis Paternal Grandfather      Social History     Socioeconomic History    Marital status:      Spouse name: Not on file    Number of children: Not on file    Years of education: Not on file    Highest education level: Not on file   Occupational History    Not on file   Tobacco Use    Smoking status: Former Smoker     Packs/day: 2.00     Years: 40.00     Pack years: 80.00     Quit date: 1993     Years since quittin.3    Smokeless tobacco: Never Used    Tobacco comment: 21 yrs   Vaping Use    Vaping Use: Never used   Substance and Sexual Activity    Alcohol use: No    Drug use: No    Sexual activity: Not Currently   Other Topics Concern    Not on file   Social History Narrative    Not on file     Social Determinants of Health     Financial Resource Strain:     Difficulty of Paying Living Expenses:    Food Insecurity:     Worried About Running Out of Food in the Last Year:     [address] in the Last Year:    Transportation Needs:     Lack of Transportation (Medical):      Lack of Transportation (Non-Medical):    Physical Activity:     Days of Exercise per Week:     Minutes of Exercise per Session:    Stress:     Feeling of Stress :    Social Connections:     Frequency of Communication with Friends and Family:     Frequency of Social Gatherings with Friends and Family:     Attends Christianity Services:     Active Member of Clubs or Organizations:     Attends Club or Organization Meetings:     Marital Status: Intimate Partner Violence:     Fear of Current or Ex-Partner:     Emotionally Abused:     Physically Abused:     Sexually Abused:        DRUG/FOOD ALLERGIES: Morphine, Codeine, Penicillins, and Plavix [clopidogrel bisulfate]    CURRENT MEDICATIONS  Prior to Admission medications    Medication Sig Start Date End Date Taking? Authorizing Provider   methylPREDNISolone (MEDROL DOSEPACK) 4 MG tablet Take by mouth.  9/14/21 9/20/21 Yes Javy Amin DO   finasteride (PROSCAR) 5 MG tablet Take 5 mg by mouth daily    Historical Provider, MD   ipratropium (ATROVENT) 0.03 % nasal spray 2 sprays by Each Nostril route every 12 hours Can use of to 4 times daily as needed 5/10/21   Ramon Amin DO   diclofenac sodium 1 % GEL APPLY 4GM TOPICALLY FOUR TIMES A DAY 12/6/18   Luz Lake MD   carbidopa-levodopa (SINEMET)  MG per tablet Take 1 tablet by mouth daily    Historical Provider, MD   losartan (COZAAR) 25 MG tablet Take 1 tablet by mouth daily TAKE 1 TABLET DAILY 2/5/18   KATIE Loomis CNP   vitamin B-2 (RIBOFLAVIN) 25 MG TABS Take by mouth    Historical Provider, MD   diclofenac sodium (VOLTAREN) 1 % GEL Apply 4 g topically 4 times daily 10/3/17 12/6/17  Luz Lake MD   diclofenac (PENNSAID) 2 % SOLN 2 pumps to the affected area 2 times a day [id number] 9/26/17   Luz Lake MD   atorvastatin (LIPITOR) 40 MG tablet Take 1 tablet by mouth daily 8/21/17   Dany Patel MD   isosorbide mononitrate (IMDUR) 30 MG extended release tablet Take 1 tablet by mouth daily 8/21/17   Dany Patel MD   dabigatran (PRADAXA) 150 MG capsule Take 1 capsule by mouth 2 times daily 8/7/17   Nupur Martinez MD   omeprazole (PRILOSEC) 20 MG delayed release capsule Take 20 mg by mouth 2 times daily    Historical Provider, MD   sotalol (BETAPACE) 80 MG tablet Take 0.5 tablets by mouth 2 times daily 2/21/17   KATIE London CNP   fluocinonide (LIDEX) 0.05 % cream Apply topically 2 times daily. 5/20/16   Gordon Almanza, APRN - CNP   furosemide (LASIX) 20 MG tablet TAKE 1 TABLET DAILY  Patient taking differently: TAKE 1 TABLET DAILY prn 11/19/15   Joanne Cyr MD   ferrous sulfate 325 (65 FE) MG tablet Take 1 tablet by mouth 2 times daily (with meals) 10/5/15   Joanne Cyr MD   zolpidem (AMBIEN) 10 MG tablet Take 10 mg by mouth nightly as needed. Historical Provider, MD   LORazepam (ATIVAN) 1 MG tablet Take 1 mg by mouth 2 times daily as needed. Historical Provider, MD   Lacosamide (VIMPAT PO) Take 100 mg by mouth 2 times daily. Indications: take dos    Historical Provider, MD   paroxetine (PAXIL) 20 MG tablet Take 30 mg by mouth every morning  12/28/10   Historical Provider, MD       REVIEW OF SYSTEMS  The following systems were reviewed and revealed the following in addition to any already discussed in the HPI:    Review of Systems   Constitutional: Negative for fatigue and fever. HENT: Positive for ear discharge, ear pain and hearing loss. Negative for congestion, postnasal drip and sneezing. Eyes: Negative for pain and visual disturbance. Respiratory: Negative for cough and shortness of breath. Cardiovascular: Negative for chest pain. Gastrointestinal: Negative for nausea and vomiting. Endocrine: Negative. Genitourinary: Negative. Musculoskeletal: Negative for neck pain and neck stiffness. Skin: Negative for rash. Neurological: Negative for dizziness and headaches.           PHYSICAL EXAM  /79 (Site: Right Upper Arm, Position: Sitting)   Pulse 85   Temp 97.3 °F (36.3 °C)   Ht 5' 10\" (1.778 m)   Wt 211 lb (95.7 kg)   BMI 30.28 kg/m²     GENERAL: No Acute Distress, Alert and Oriented, no hoarseness  EYES: EOMI, Anti-icteric  NOSE: No epistaxis, nasal mucosa within normal limits, no purulent drainage  EARS: Normal external canal appearance, EAC patent bilaterally, TMs intact bilaterally with no evidence of effusions  FACE: 1/6 House-Brackmann Scale, symmetric, sensation equal bilaterally  ORAL CAVITY: No masses or lesions palpated, uvula is midline, moist mucous membranes, post surgical UPPP, small 0.5 mm ulceration of the right mandible underneath the denture, crepitus the bilateral TMJ joint  NECK: Normal range of motion, no thyromegaly, trachea is midline, no lymphadenopathy, no neck masses, no crepitus  CHEST: Normal respiratory effort, no retractions, breathing comfortably  SKIN: No rashes, normal appearing skin, no evidence of skin lesions/tumors    RADIOLOGY  Summary of findings:      PROCEDURE      ASSESSMENT/PLAN  Jet De La Cruz is a very pleasant 68 y.o. male with    1. Bilateral temporomandibular joint pain  I will prescribe a Medrol Dosepak to get him over the acute portion of his TMJ flareup. He will continue to use Advil and ibuprofen with ice and/or heat for continued pain. I recommend that he follow-up with his dentist who provides his dentures to see if have any other recommendations for TMJ issues. - methylPREDNISolone (MEDROL DOSEPACK) 4 MG tablet; Take by mouth. Dispense: 1 kit; Refill: 0    2. Oral mucosal lesion  Small lesion on the alveolar surface of the right mandible has been present for 1 to 2 weeks. This is underneath his denture and is causing him pain which could be contributing to his TMJ issues. 3. Vasomotor rhinitis  Vasomotor rhinitis is improved with Atrovent that he has been taking. He will follow-up in 1 month for evaluation of his oral lesion to ensure that it does not worsen if it does we will biopsy to the point time. Medical Decision Making:   The following items were considered in medical decision making:  Independent review of images  Review / order clinical lab tests  Review / order radiology tests  Decision to obtain old records

## 2021-10-19 NOTE — PROGRESS NOTES
Received a call from Farideh sun/ [facility] Dr. Zeb Morejon' office. She still has not heard anything from [address] regarding patient's upcoming scan. His scan is still pending with insurance. She will give us a call back once she hears anything more. Patient waiting for PET to see Dr. Su Helm.

## 2021-10-20 ENCOUNTER — TELEPHONE (OUTPATIENT)
Dept: CARDIOTHORACIC SURGERY | Age: 76
End: 2021-10-20

## 2021-10-20 NOTE — TELEPHONE ENCOUNTER
Spoke with Celina Melendez at [facility] regarding delay in scheduling pt PET as is needed in order to provide a thorough evaluation. Pt not yet scheduled with Dr. Navarro Cruz.  Was notified that pt needed a Peer to Peer review but it was not completed and the request for the scan was closed by the insurance co. As reported, Dr. Temi Vega will need to complete an appeal. Await further report.  Osvaldo Pendleton

## 2021-10-28 ENCOUNTER — TELEPHONE (OUTPATIENT)
Dept: CARDIOTHORACIC SURGERY | Age: 76
End: 2021-10-28

## 2021-10-28 NOTE — TELEPHONE ENCOUNTER
Waiting on PET scan to be scheduled. Appeal was needed by Dr Adrianne Ball. No testing sched as of yet . Attempted to reach pt. NA. Will follow up with phone call to MD for verification.   Esperanza Smith

## 2021-10-29 ENCOUNTER — TELEPHONE (OUTPATIENT)
Dept: CARDIOTHORACIC SURGERY | Age: 76
End: 2021-10-29

## 2021-10-29 NOTE — TELEPHONE ENCOUNTER
Contacted Dr. Luo Skill office regarding scheduling of a PET scan. Message left with his office staff requesting call back to verify when pt will be having a PET scan. Await a return call.

## 2021-11-03 ENCOUNTER — TELEPHONE (OUTPATIENT)
Dept: CARDIOTHORACIC SURGERY | Age: 76
End: 2021-11-03

## 2021-11-03 NOTE — TELEPHONE ENCOUNTER
Was able to reach Dr. Zen Schultz nurse, Jordon Crespo, who reports that PET scan was not approved and learned he had one at the [facility] in August 2021. [facility] and spoke with Selina. Request for disk of recent films for PET, CT and CXR along with reports to be prepared for  by . Successful transmission of fax. Await call for .

## 2021-11-03 NOTE — TELEPHONE ENCOUNTER
Spoke with pt. States he has an appt tomorrow at the [facility] first thing in the am. Wants to wait until after that appt to schedule with Dr. Servando Boles. Requested that writer call back tomorrow late morning.

## 2021-11-05 ENCOUNTER — TELEPHONE (OUTPATIENT)
Dept: CARDIOTHORACIC SURGERY | Age: 76
End: 2021-11-05

## 2021-11-05 NOTE — TELEPHONE ENCOUNTER
[facility] requesting call back to sched appt with Dr. Bing Blair as requested by the [facility].

## 2021-11-08 ENCOUNTER — TELEPHONE (OUTPATIENT)
Dept: CARDIOTHORACIC SURGERY | Age: 76
End: 2021-11-08

## 2021-11-08 NOTE — TELEPHONE ENCOUNTER
Pt referred to Dr. Apolonia Rangel for surgical consideration of Lung Cancer. Pt had delayed scheduling appt until discuss with [facility].  In order to have surgery covered, pt will be having surgery at [facility].  Duly noted.   Benjamin Hernandez

## 2022-03-30 ENCOUNTER — APPOINTMENT (OUTPATIENT)
Dept: CT IMAGING | Age: 77
DRG: 563 | End: 2022-03-30
Payer: MEDICARE

## 2022-03-30 ENCOUNTER — APPOINTMENT (OUTPATIENT)
Dept: GENERAL RADIOLOGY | Age: 77
DRG: 563 | End: 2022-03-30
Payer: MEDICARE

## 2022-03-30 ENCOUNTER — HOSPITAL ENCOUNTER (INPATIENT)
Age: 77
LOS: 1 days | Discharge: HOME | DRG: 563 | End: 2022-04-01
Attending: EMERGENCY MEDICINE | Admitting: INTERNAL MEDICINE
Payer: MEDICARE

## 2022-03-30 DIAGNOSIS — R53.1 GENERAL WEAKNESS: ICD-10-CM

## 2022-03-30 DIAGNOSIS — S42.402A LEFT ELBOW FRACTURE, CLOSED, INITIAL ENCOUNTER: ICD-10-CM

## 2022-03-30 DIAGNOSIS — E86.0 DEHYDRATION: ICD-10-CM

## 2022-03-30 DIAGNOSIS — R29.6 FREQUENT FALLS: Primary | ICD-10-CM

## 2022-03-30 DIAGNOSIS — D69.6 THROMBOCYTOPENIA (HCC): ICD-10-CM

## 2022-03-30 DIAGNOSIS — S09.90XA CLOSED HEAD INJURY, INITIAL ENCOUNTER: ICD-10-CM

## 2022-03-30 DIAGNOSIS — Z79.01 ANTICOAGULATED: ICD-10-CM

## 2022-03-30 DIAGNOSIS — J90 PLEURAL EFFUSION ON RIGHT: ICD-10-CM

## 2022-03-30 LAB
A/G RATIO: 1.6 (ref 1.1–2.2)
ALBUMIN SERPL-MCNC: 4.1 G/DL (ref 3.4–5)
ALP BLD-CCNC: 107 U/L (ref 40–129)
ALT SERPL-CCNC: 14 U/L (ref 10–40)
ANION GAP SERPL CALCULATED.3IONS-SCNC: 11 MMOL/L (ref 3–16)
AST SERPL-CCNC: 17 U/L (ref 15–37)
BASOPHILS ABSOLUTE: 0.1 K/UL (ref 0–0.2)
BASOPHILS RELATIVE PERCENT: 2.1 %
BILIRUB SERPL-MCNC: 0.6 MG/DL (ref 0–1)
BUN BLDV-MCNC: 24 MG/DL (ref 7–20)
CALCIUM SERPL-MCNC: 9 MG/DL (ref 8.3–10.6)
CHLORIDE BLD-SCNC: 102 MMOL/L (ref 99–110)
CO2: 26 MMOL/L (ref 21–32)
CREAT SERPL-MCNC: 1.3 MG/DL (ref 0.8–1.3)
EOSINOPHILS ABSOLUTE: 0.1 K/UL (ref 0–0.6)
EOSINOPHILS RELATIVE PERCENT: 1 %
GFR AFRICAN AMERICAN: >60
GFR NON-AFRICAN AMERICAN: 54
GLUCOSE BLD-MCNC: 102 MG/DL (ref 70–99)
HCT VFR BLD CALC: 32.7 % (ref 40.5–52.5)
HEMOGLOBIN: 10.9 G/DL (ref 13.5–17.5)
LACTIC ACID, SEPSIS: 1.4 MMOL/L (ref 0.4–1.9)
LYMPHOCYTES ABSOLUTE: 1.6 K/UL (ref 1–5.1)
LYMPHOCYTES RELATIVE PERCENT: 28.8 %
MCH RBC QN AUTO: 29 PG (ref 26–34)
MCHC RBC AUTO-ENTMCNC: 33.3 G/DL (ref 31–36)
MCV RBC AUTO: 87.1 FL (ref 80–100)
MONOCYTES ABSOLUTE: 0.6 K/UL (ref 0–1.3)
MONOCYTES RELATIVE PERCENT: 11.2 %
NEUTROPHILS ABSOLUTE: 3.1 K/UL (ref 1.7–7.7)
NEUTROPHILS RELATIVE PERCENT: 56.9 %
PDW BLD-RTO: 16.3 % (ref 12.4–15.4)
PLATELET # BLD: 75 K/UL (ref 135–450)
PLATELET SLIDE REVIEW: ABNORMAL
PMV BLD AUTO: 7.5 FL (ref 5–10.5)
POTASSIUM REFLEX MAGNESIUM: 4.9 MMOL/L (ref 3.5–5.1)
RBC # BLD: 3.76 M/UL (ref 4.2–5.9)
SARS-COV-2, NAAT: NOT DETECTED
SLIDE REVIEW: ABNORMAL
SODIUM BLD-SCNC: 139 MMOL/L (ref 136–145)
TOTAL PROTEIN: 6.7 G/DL (ref 6.4–8.2)
TROPONIN: <0.01 NG/ML
WBC # BLD: 5.5 K/UL (ref 4–11)

## 2022-03-30 PROCEDURE — 72125 CT NECK SPINE W/O DYE: CPT

## 2022-03-30 PROCEDURE — 29105 APPLICATION LONG ARM SPLINT: CPT

## 2022-03-30 PROCEDURE — 73560 X-RAY EXAM OF KNEE 1 OR 2: CPT

## 2022-03-30 PROCEDURE — 87635 SARS-COV-2 COVID-19 AMP PRB: CPT

## 2022-03-30 PROCEDURE — 80053 COMPREHEN METABOLIC PANEL: CPT

## 2022-03-30 PROCEDURE — 72131 CT LUMBAR SPINE W/O DYE: CPT

## 2022-03-30 PROCEDURE — 84484 ASSAY OF TROPONIN QUANT: CPT

## 2022-03-30 PROCEDURE — 96360 HYDRATION IV INFUSION INIT: CPT

## 2022-03-30 PROCEDURE — 70450 CT HEAD/BRAIN W/O DYE: CPT

## 2022-03-30 PROCEDURE — 87040 BLOOD CULTURE FOR BACTERIA: CPT

## 2022-03-30 PROCEDURE — 2580000003 HC RX 258: Performed by: PHYSICIAN ASSISTANT

## 2022-03-30 PROCEDURE — 71045 X-RAY EXAM CHEST 1 VIEW: CPT

## 2022-03-30 PROCEDURE — 83605 ASSAY OF LACTIC ACID: CPT

## 2022-03-30 PROCEDURE — 73070 X-RAY EXAM OF ELBOW: CPT

## 2022-03-30 PROCEDURE — 85025 COMPLETE CBC W/AUTO DIFF WBC: CPT

## 2022-03-30 PROCEDURE — 93005 ELECTROCARDIOGRAM TRACING: CPT | Performed by: PHYSICIAN ASSISTANT

## 2022-03-30 PROCEDURE — 6370000000 HC RX 637 (ALT 250 FOR IP): Performed by: PHYSICIAN ASSISTANT

## 2022-03-30 PROCEDURE — 36415 COLL VENOUS BLD VENIPUNCTURE: CPT

## 2022-03-30 PROCEDURE — 99284 EMERGENCY DEPT VISIT MOD MDM: CPT

## 2022-03-30 RX ORDER — 0.9 % SODIUM CHLORIDE 0.9 %
1000 INTRAVENOUS SOLUTION INTRAVENOUS ONCE
Status: COMPLETED | OUTPATIENT
Start: 2022-03-30 | End: 2022-03-30

## 2022-03-30 RX ORDER — ACETAMINOPHEN 325 MG/1
650 TABLET ORAL ONCE
Status: COMPLETED | OUTPATIENT
Start: 2022-03-30 | End: 2022-03-30

## 2022-03-30 RX ADMIN — SODIUM CHLORIDE 1000 ML: 9 INJECTION, SOLUTION INTRAVENOUS at 19:22

## 2022-03-30 RX ADMIN — ACETAMINOPHEN 650 MG: 325 TABLET ORAL at 19:21

## 2022-03-30 ASSESSMENT — PAIN DESCRIPTION - LOCATION: LOCATION: KNEE

## 2022-03-30 ASSESSMENT — PAIN SCALES - GENERAL
PAINLEVEL_OUTOF10: 6
PAINLEVEL_OUTOF10: 8
PAINLEVEL_OUTOF10: 8

## 2022-03-30 ASSESSMENT — PAIN - FUNCTIONAL ASSESSMENT
PAIN_FUNCTIONAL_ASSESSMENT: ACTIVITIES ARE NOT PREVENTED
PAIN_FUNCTIONAL_ASSESSMENT: 0-10

## 2022-03-30 ASSESSMENT — PAIN DESCRIPTION - PROGRESSION: CLINICAL_PROGRESSION: NOT CHANGED

## 2022-03-30 ASSESSMENT — ENCOUNTER SYMPTOMS
VOMITING: 0
DIARRHEA: 0
SHORTNESS OF BREATH: 1
ABDOMINAL PAIN: 0
BACK PAIN: 1

## 2022-03-30 ASSESSMENT — PAIN DESCRIPTION - PAIN TYPE
TYPE: ACUTE PAIN
TYPE: ACUTE PAIN

## 2022-03-30 ASSESSMENT — PAIN DESCRIPTION - DESCRIPTORS
DESCRIPTORS: BURNING
DESCRIPTORS: DISCOMFORT

## 2022-03-30 ASSESSMENT — PAIN DESCRIPTION - ORIENTATION: ORIENTATION: LEFT

## 2022-03-30 ASSESSMENT — PAIN DESCRIPTION - FREQUENCY: FREQUENCY: CONTINUOUS

## 2022-03-30 ASSESSMENT — PAIN DESCRIPTION - ONSET: ONSET: ON-GOING

## 2022-03-30 NOTE — ED TRIAGE NOTES
Chief Complaint   Patient presents with   John Medrano     pt states has fallen 4 times in past 2 days, getting chemo for lung cancer, weak, states fell backwards and hit head on concrete, denies loc, on blood thinners, left knee pain and swelling

## 2022-03-30 NOTE — ED PROVIDER NOTES
[address] ED  EMERGENCY DEPARTMENT ENCOUNTER        Pt Name: Di Aguayo  MRN: [id number]  Armstrongfsoraya [birth year]  Date of evaluation: 3/30/2022  Provider: Chasity Schultz PA-C  PCP: Juma Bolivar MD    Shared Visit or Autonomous Visit:  I have seen and evaluated this patient with my supervising physician Dr Cherry Poster   Patient presents with   [facility]     pt states has fallen 4 times in past 2 days, getting chemo for lung cancer, weak, states fell backwards and hit head on concrete, denies loc, on blood thinners, left knee pain and swelling       HISTORY OF PRESENT ILLNESS   (Location/Symptom, Timing/Onset, Context/Setting, Quality, Duration, Modifying Factors, Severity)  Note limiting factors. Di Aguayo is a 68 y.o. male presenting to the emergency department for evaluation after multiple falls. Has fallen 4 times in the past 2 days. Today fell when his wife was trying to get him in the doorway states he just fell backwards hit his head hard. He is on Pradaxa blood thinner. Has cardiac stent. Denies any chest pain. He has lung cancer undergoing chemotherapy last chemo was 2 weeks ago. Has been generally weak and fatigued and also lightheaded. Denies any syncope. No cough or fever. Wife states he is also been a little confused for about 3 to 4 days she had wanted to get his urine checked having some difficulty with urination possible UTI. The history is provided by the patient and the spouse. Fall  The accident occurred 1 to 2 hours ago. The fall occurred while standing. He landed on concrete. There was no blood loss. The point of impact was the head (low back, left elbow and left knee). Associated symptoms include headaches. Pertinent negatives include no fever, no numbness, no abdominal pain, no vomiting and no loss of consciousness.    Fatigue  Duration:  4 days  Progression:  Worsening  Chronicity:  New  Associated symptoms: dizziness, headaches and shortness of breath    Associated symptoms: no abdominal pain, no chest pain, no diarrhea, no numbness in extremities, no fever, no loss of consciousness, no syncope and no vomiting        Nursing Notes were reviewed    REVIEW OF SYSTEMS    (2-9 systems for level 4, 10 or more for level 5)     Review of Systems   Constitutional: Positive for fatigue. Negative for fever. Respiratory: Positive for shortness of breath. Cardiovascular: Negative for chest pain and syncope. Gastrointestinal: Negative for abdominal pain, diarrhea and vomiting. Musculoskeletal: Positive for back pain. Left elbow pain, left knee pain   Neurological: Positive for dizziness, light-headedness and headaches. Negative for loss of consciousness, syncope and numbness. Psychiatric/Behavioral: Positive for confusion. All other systems reviewed and are negative. Positives and Pertinent negatives as per HPI.        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    S/P drug eluting coronary stent placement 03/06/2017    2.25 x 18 [employer] ADRI[facility]N - Distal LAD    Seizure disorder ([facility] [address]     4 weeks ago black out, pt states seizure but may be due to Heart Rate changes    Seizures (HCC)     Shortness of breath 9/4/2014    Total knee replacement status 1/12/2011         SURGICAL HISTORY     Past Surgical History:   Procedure Laterality Date    ABLATION OF DYSRHYTHMIC FOCUS  9/2014    CARPAL TUNNEL RELEASE      CERVICAL [address] SURGERY  2010    COLONOSCOPY  09/26/2016    normal    CORONARY ANGIOPLASTY WITH STENT PLACEMENT      FEMUR SURGERY      left    KNEE SURGERY  1-12-11 R total knee replacement with femoral nerve block for pain control    LUNG REMOVAL, PARTIAL Right     20%    OTHER SURGICAL HISTORY Bilateral     excisions of lesions on bilat. neck and back    OTHER SURGICAL HISTORY  9/2014    Reveal Loop recorder placed in Cath Lab    UPPER GASTROINTESTINAL ENDOSCOPY  09/26/2016    gastric and esophogeal biopsy         CURRENTMEDICATIONS       Previous Medications    ATORVASTATIN (LIPITOR) 40 MG TABLET    Take 1 tablet by mouth daily    CARBIDOPA-LEVODOPA (SINEMET)  MG PER TABLET    Take 1 tablet by mouth daily    DABIGATRAN (PRADAXA) 150 MG CAPSULE    Take 1 capsule by mouth 2 times daily    DICLOFENAC (PENNSAID) 2 % SOLN    2 pumps to the affected area 2 times a day [phone]    DICLOFENAC SODIUM (VOLTAREN) 1 % GEL    Apply 4 g topically 4 times daily    DICLOFENAC SODIUM 1 % GEL    APPLY 4GM TOPICALLY FOUR TIMES A DAY    FERROUS SULFATE 325 (65 FE) MG TABLET    Take 1 tablet by mouth 2 times daily (with meals)    FINASTERIDE (PROSCAR) 5 MG TABLET    Take 5 mg by mouth daily    FLUOCINONIDE (LIDEX) 0.05 % CREAM    Apply topically 2 times daily. FUROSEMIDE (LASIX) 20 MG TABLET    TAKE 1 TABLET DAILY    IPRATROPIUM (ATROVENT) 0.03 % NASAL SPRAY    2 sprays by Each Nostril route every 12 hours Can use of to 4 times daily as needed    ISOSORBIDE MONONITRATE (IMDUR) 30 MG EXTENDED RELEASE TABLET    Take 1 tablet by mouth daily    LACOSAMIDE (VIMPAT PO)    Take 100 mg by mouth 2 times daily. Indications: take dos    LORAZEPAM (ATIVAN) 1 MG TABLET    Take 1 mg by mouth 2 times daily as needed.     LOSARTAN (COZAAR) 25 MG TABLET    Take 1 tablet by mouth daily TAKE 1 TABLET DAILY    OMEPRAZOLE (PRILOSEC) 20 MG DELAYED RELEASE CAPSULE    Take 20 mg by mouth 2 times daily    PAROXETINE (PAXIL) 20 MG TABLET    Take 30 mg by mouth every morning     SOTALOL (BETAPACE) 80 MG TABLET    Take 0.5 tablets by mouth 2 times daily    VITAMIN B-2 (RIBOFLAVIN) 25 MG TABS    Take by mouth    ZOLPIDEM (AMBIEN) 10 MG TABLET    Take 10 mg by mouth nightly as needed. ALLERGIES     Morphine, Codeine, Penicillins, and Plavix [clopidogrel bisulfate]    FAMILYHISTORY       Family History   Problem Relation Age of Onset    Heart Disease Mother     Arthritis Mother     High Blood Pressure Mother    [de-identified] / Stillbirths Mother     Stroke Mother     Arthritis Father     Cancer Sister     Arthritis Sister     Depression Sister     Arthritis Brother     Arthritis Maternal Grandmother     Arthritis Maternal Grandfather     Arthritis Paternal Grandmother     Diabetes Paternal Grandmother     Arthritis Paternal Grandfather           SOCIAL HISTORY       Social History     Socioeconomic History    Marital status:      Spouse name: None    Number of children: None    Years of education: None    Highest education level: None   Occupational History    None   Tobacco Use    Smoking status: Former Smoker     Packs/day: 2.00     Years: 40.00     Pack years: 80.00     Quit date: 1993     Years since quittin.8    Smokeless tobacco: Never Used    Tobacco comment: 21 yrs   Vaping Use    Vaping Use: Never used   Substance and Sexual Activity    Alcohol use: No    Drug use: No    Sexual activity: Not Currently   Other Topics Concern    None   Social History Narrative    None     Social Determinants of Health     Financial Resource Strain:     Difficulty of Paying Living Expenses: Not on file   Food Insecurity:     Worried About Running Out of Food in the Last Year: Not on file    Christine of Food in the Last Year: Not on file   Transportation Needs:     Lack of Transportation (Medical): Not on file    Lack of Transportation (Non-Medical):  Not on file   Physical Activity:     Days of Exercise per Week: Not on file    Minutes of Exercise per Session: Not on file   Stress:     Feeling of Stress : Not on file   Social Connections:     Frequency of Communication with Friends and Family: Not on file    Frequency of Social Gatherings with Friends and Family: Not on file    Attends Gnosticism Services: Not on file    Active Member of Clubs or Organizations: Not on file    Attends Club or Organization Meetings: Not on file    Marital Status: Not on file   Intimate Partner Violence:     Fear of Current or Ex-Partner: Not on file    Emotionally Abused: Not on file    Physically Abused: Not on file    Sexually Abused: Not on file   Housing Stability:     Unable to Pay for Housing in the Last Year: Not on file    Number of [place] in the Last Year: Not on file    Unstable Housing in the Last Year: Not on file       SCREENINGS    [place] Coma Scale  Eye Opening: Spontaneous  Best Verbal Response: Oriented  Best Motor Response: Obeys commands  Tala Coma Scale Score: 15        PHYSICAL EXAM    (up to 7 for level 4, 8 or more for level 5)     ED Triage Vitals [03/30/22 1739]   BP Temp Temp Source Pulse Resp SpO2 Height Weight   (!) 93/54 97.4 °F (36.3 °C) Oral 90 16 93 % 5' 9.5\" (1.765 m) 175 lb (79.4 kg)       Physical Exam  Vitals and nursing note reviewed. Constitutional:       Appearance: He is well-developed. He is not toxic-appearing. HENT:      Head: Normocephalic. Contusion present. No raccoon eyes, Kennedy's sign or laceration. Right Ear: Tympanic membrane and ear canal normal. No hemotympanum. Left Ear: Tympanic membrane and ear canal normal. No hemotympanum. Mouth/Throat:      Mouth: Mucous membranes are moist.      Pharynx: Oropharynx is clear. No pharyngeal swelling or posterior oropharyngeal erythema. Eyes:      Extraocular Movements: Extraocular movements intact. Conjunctiva/sclera: Conjunctivae normal.      Pupils: Pupils are equal, round, and reactive to light. Cardiovascular:      Rate and Rhythm: Normal rate and regular rhythm. Heart sounds: Normal heart sounds. Pulmonary:      Effort: Pulmonary effort is normal. No respiratory distress. Breath sounds: Normal breath sounds. No stridor. No wheezing, rhonchi or rales. Abdominal:      General: Bowel sounds are normal. There is no distension. Palpations: Abdomen is soft. Abdomen is not rigid. Tenderness: There is no abdominal tenderness. There is no guarding or rebound. Musculoskeletal:         General: Normal range of motion. Left elbow: Swelling present. No deformity or lacerations. Tenderness present. Cervical back: Normal range of motion and neck supple. No tenderness. Thoracic back: No tenderness. Lumbar back: Tenderness present. Left knee: Swelling and ecchymosis present. No deformity, erythema or crepitus. Tenderness present. Skin:     General: Skin is warm and dry. Neurological:      Mental Status: He is alert and oriented to person, place, and time. GCS: GCS eye subscore is 4. GCS verbal subscore is 5. GCS motor subscore is 6. Cranial Nerves: No cranial nerve deficit. Sensory: Sensation is intact. No sensory deficit. Motor: Motor function is intact. No abnormal muscle tone. Coordination: Coordination normal.      Comments: Sitting up in the chair. Alert and oriented x3. Moving all extremities. Equal strength upper and lower extremities. Intact sensation. Psychiatric:         Speech: Speech normal.         Behavior: Behavior normal. Behavior is cooperative. Thought Content:  Thought content normal.         DIAGNOSTIC RESULTS   LABS:    Labs Reviewed   CBC WITH AUTO DIFFERENTIAL - Abnormal; Notable for the following components:       Result Value    RBC 3.76 (*)     Hemoglobin 10.9 (*)     Hematocrit 32.7 (*)     RDW 16.3 (*)     Platelets 75 (*)     All other components within normal limits   COMPREHENSIVE METABOLIC PANEL W/ REFLEX TO MG FOR LOW K - Abnormal; Notable for the following components:    Glucose 102 (*)     BUN 24 (*)     GFR Non- 54 (*)     All other components within normal limits COVID-19, RAPID   CULTURE, BLOOD 1   CULTURE, BLOOD 2   TROPONIN   LACTATE, SEPSIS   URINALYSIS WITH REFLEX TO CULTURE     Results for orders placed or performed during the hospital encounter of 03/30/22   COVID-19, Rapid    Specimen: Nasopharyngeal Swab   Result Value Ref Range    SARS-CoV-2, NAAT Not Detected Not Detected   CBC with Auto Differential   Result Value Ref Range    WBC 5.5 4.0 - 11.0 K/uL    RBC 3.76 (L) 4.20 - 5.90 M/uL    Hemoglobin 10.9 (L) 13.5 - 17.5 g/dL    Hematocrit 32.7 (L) 40.5 - 52.5 %    MCV 87.1 80.0 - 100.0 fL    MCH 29.0 26.0 - 34.0 pg    MCHC 33.3 31.0 - 36.0 g/dL    RDW 16.3 (H) 12.4 - 15.4 %    Platelets 75 (L) 571 - 450 K/uL    MPV 7.5 5.0 - 10.5 fL    PLATELET SLIDE REVIEW Decreased     SLIDE REVIEW see below     Neutrophils % 56.9 %    Lymphocytes % 28.8 %    Monocytes % 11.2 %    Eosinophils % 1.0 %    Basophils % 2.1 %    Neutrophils Absolute 3.1 1.7 - 7.7 K/uL    Lymphocytes Absolute 1.6 1.0 - 5.1 K/uL    Monocytes Absolute 0.6 0.0 - 1.3 K/uL    Eosinophils Absolute 0.1 0.0 - 0.6 K/uL    Basophils Absolute 0.1 0.0 - 0.2 K/uL   Comprehensive Metabolic Panel w/ Reflex to MG   Result Value Ref Range    Sodium 139 136 - 145 mmol/L    Potassium reflex Magnesium 4.9 3.5 - 5.1 mmol/L    Chloride 102 99 - 110 mmol/L    CO2 26 21 - 32 mmol/L    Anion Gap 11 3 - 16    Glucose 102 (H) 70 - 99 mg/dL    BUN 24 (H) 7 - 20 mg/dL    CREATININE 1.3 0.8 - 1.3 mg/dL    GFR Non-African American 54 (A) >60    GFR African American >60 >60    Calcium 9.0 8.3 - 10.6 mg/dL    Total Protein 6.7 6.4 - 8.2 g/dL    Albumin 4.1 3.4 - 5.0 g/dL    Albumin/Globulin Ratio 1.6 1.1 - 2.2    Total Bilirubin 0.6 0.0 - 1.0 mg/dL    Alkaline Phosphatase 107 40 - 129 U/L    ALT 14 10 - 40 U/L    AST 17 15 - 37 U/L   Troponin   Result Value Ref Range    Troponin <0.01 <0.01 ng/mL   Lactate, Sepsis   Result Value Ref Range    Lactic Acid, Sepsis 1.4 0.4 - 1.9 mmol/L   EKG 12 Lead   Result Value Ref Range Ventricular Rate 87 BPM    Atrial Rate 388 BPM    QRS Duration 80 ms    Q-T Interval 378 ms    QTc Calculation (Bazett) 454 ms    R Axis 18 degrees    T Axis 46 degrees    Diagnosis       Atrial flutter with variable A-V blockAbnormal ECGNo previous ECGs available       All other labs were within normal range or not returned as of this dictation. EKG: All EKG's are interpreted by the Emergency Department Physician in the absence of a cardiologist.  Please see their note for interpretation of EKG. RADIOLOGY:   Non-plain film images such as CT, Ultrasound and MRI are read by the radiologist. Plain radiographic images are visualized andpreliminarily interpreted by the  ED Provider with the below findings:        Interpretation [facility] Radiologist below, if available at the time of this note:    [facility]   Final Result   1. No acute fracture. No listhesis. 2. Nonspecific small complex right pleural effusion. Correlate with history. 3. Other findings as described. CT CERVICAL SPINE WO CONTRAST   Final Result   No acute abnormality of the cervical spine. CT HEAD WO CONTRAST   Final Result   No acute intracranial abnormality. RECOMMENDATIONS:   Unavailable         XR CHEST PORTABLE   Final Result   Possible right pleural effusion      Otherwise, no acute cardiopulmonary process         XR KNEE LEFT (1-2 VIEWS)   Preliminary Result   1. No acute abnormality of the left knee. 2. Chronic healed fracture deformity of the distal left femur. 3. Mild tricompartmental osteoarthritis. XR ELBOW LEFT (2 VIEWS)   Final Result   Proximal ulna fractures suspected. CT suggested to further evaluate           XR ELBOW LEFT (2 VIEWS)    Result Date: 3/30/2022  EXAMINATION: XRAY VIEWS OF THE LEFT ELBOW 3/30/2022 6:41 pm COMPARISON: None.  HISTORY: ORDERING SYSTEM PROVIDED HISTORY: Trauma, R/O fx TECHNOLOGIST PROVIDED HISTORY: Reason for exam:->Trauma, R/O fx Reason for Exam: Fall (pt states has fallen 4 times in past 2 days, getting chemo for lung cancer, weak, states fell backwards and hit head on concrete, denies loc, on blood thinners, left knee pain and swelling FINDINGS: In one view there is subtle irregularity of the proximal ulna suspicious for nondisplaced fracture. There is soft tissue swelling. There are moderate degenerative changes. Proximal ulna fractures suspected. CT suggested to further evaluate     XR KNEE LEFT (1-2 VIEWS)    Result Date: 3/30/2022  EXAMINATION: TWO XRAY VIEWS OF THE LEFT KNEE 3/30/2022 6:41 pm COMPARISON: None. HISTORY: ORDERING SYSTEM PROVIDED HISTORY: trauma TECHNOLOGIST PROVIDED HISTORY: Reason for exam:->trauma Reason for Exam: Fall (pt states has fallen 4 times in past 2 days, getting chemo for lung cancer, weak, states fell backwards and hit head on concrete, denies loc, on blood thinners, left knee pain and swelling FINDINGS: Frontal and lateral views of the left knee were performed. There is no acute fracture or dislocation. A joint effusion is not identified. There is a chronic healed fracture deformity of the distal left femur. There is mild osteoarthritis within all 3 compartments. No destructive osseous lesion is seen. No significant soft tissue abnormality is evident. 1. No acute abnormality of the left knee. 2. Chronic healed fracture deformity of the distal left femur. 3. Mild tricompartmental osteoarthritis. CT HEAD WO CONTRAST    Result Date: 3/30/2022  EXAMINATION: CT OF THE HEAD WITHOUT CONTRAST  3/30/2022 6:50 pm TECHNIQUE: CT of the head was performed without the administration of intravenous contrast. Dose modulation, iterative reconstruction, and/or weight based adjustment of the mA/kV was utilized to reduce the radiation dose to as low as reasonably achievable. COMPARISON: None.  HISTORY: ORDERING SYSTEM PROVIDED HISTORY: trauma TECHNOLOGIST PROVIDED HISTORY: Has a \"code stroke\" or \"stroke alert\" been called? ->No Reason for exam:->trauma Decision Support Exception - unselect if not a suspected or confirmed emergency medical condition->Emergency Medical Condition (MA) Reason for Exam: fell multiple times FINDINGS: BRAIN/VENTRICLES: There is no acute intracranial hemorrhage, mass effect or midline shift. No abnormal extra-axial fluid collection. The gray-white differentiation is maintained without evidence of an acute infarct. There is no evidence of hydrocephalus. ORBITS: The visualized portion of the orbits demonstrate no acute abnormality. SINUSES: The visualized paranasal sinuses and mastoid air cells demonstrate no acute abnormality. SOFT TISSUES/SKULL:  No acute abnormality of the visualized skull or soft tissues. No acute intracranial abnormality. RECOMMENDATIONS: Unavailable     CT CERVICAL SPINE WO CONTRAST    Result Date: 3/30/2022  EXAMINATION: CT OF THE CERVICAL SPINE WITHOUT CONTRAST 3/30/2022 7:01 pm TECHNIQUE: CT of the cervical spine was performed without the administration of intravenous contrast. Multiplanar reformatted images are provided for review. Dose modulation, iterative reconstruction, and/or weight based adjustment of the mA/kV was utilized to reduce the radiation dose to as low as reasonably achievable. COMPARISON: 03/02/2005 HISTORY: ORDERING SYSTEM PROVIDED HISTORY: trauma TECHNOLOGIST PROVIDED HISTORY: Reason for exam:->trauma Decision Support Exception - unselect if not a suspected or confirmed emergency medical condition->Emergency Medical Condition (MA) Reason for Exam: fell multiple times FINDINGS: BONES/ALIGNMENT: There is no acute fracture or traumatic malalignment. Anterior fusion C5-7 status post C6 corpectomy. DEGENERATIVE CHANGES: Multilevel degenerative changes. SOFT TISSUES: There is no prevertebral soft tissue swelling. No acute abnormality of the cervical spine.      CT LUMBAR SPINE WO CONTRAST    Result Date: 3/30/2022  EXAMINATION: CT OF THE LUMBAR SPINE WITHOUT CONTRAST  3/30/2022 TECHNIQUE: CT of the lumbar spine was performed without the administration of intravenous contrast. Multiplanar reformatted images are provided for review. Adjustment of mA and/or kV according to patient size was utilized. Dose modulation, iterative reconstruction, and/or weight based adjustment of the mA/kV was utilized to reduce the radiation dose to as low as reasonably achievable. COMPARISON: None HISTORY: ORDERING SYSTEM PROVIDED HISTORY: trauma TECHNOLOGIST PROVIDED HISTORY: Reason for exam:->trauma Decision Support Exception - unselect if not a suspected or confirmed emergency medical condition->Emergency Medical Condition (MA) Reason for Exam: fell multiple times FINDINGS: BONES/ALIGNMENT: The inferior-most complete disc space represents L5-S1. Alignment is within normal limits. Vertebral body heights appear maintained. Mild loss of disc height. Chronic left L5 pars defect. Posterior elements appear otherwise intact. DEGENERATIVE CHANGES: Scattered degenerative changes noted in the visualized spine without spondylolisthesis. SOFT TISSUES: Visualized paraspinal soft tissues appear unremarkable. Small complex right pleural effusion. Borderline enlarged lymph node in the peripancreatic region. [place] for penile implant noted in the left lower quadrant. 1. No acute fracture. No listhesis. 2. Nonspecific small complex right pleural effusion. Correlate with history. 3. Other findings as described.      XR CHEST PORTABLE    Result Date: 3/30/2022  EXAMINATION: ONE XRAY VIEW OF THE CHEST 3/30/2022 6:41 pm COMPARISON: 09/08/2014 HISTORY: ORDERING SYSTEM PROVIDED HISTORY: sob, fatigue, lung cancer TECHNOLOGIST PROVIDED HISTORY: Reason for exam:->sob, fatigue, lung cancer Reason for Exam: Fall (pt states has fallen 4 times in past 2 days, getting chemo for lung cancer, weak, states fell backwards and hit head on concrete, denies loc, on blood thinners, left knee pain and swelling FINDINGS: Loop recorder in place. Probable right pleural effusion. Heart size within normal limits. No acute airspace disease. No pneumothorax. Possible right pleural effusion Otherwise, no acute cardiopulmonary process         PROCEDURES   Unless otherwise noted below, none     Procedures    CRITICAL CARE TIME       CONSULTS:  IP CONSULT TO HOSPITALIST      EMERGENCY DEPARTMENT COURSE and DIFFERENTIAL DIAGNOSIS/MDM:   Vitals:    Vitals:    03/31/22 0025 03/31/22 0032 03/31/22 0047 03/31/22 0109   BP: 105/85 81/64 103/69 118/66   Pulse: 85   84   Resp:    16   Temp:    98.2 °F (36.8 °C)   TempSrc:    Oral   SpO2: 93% 96%  96%   Weight:       Height:           Patient was given thefollowing medications:  Medications   0.9 % sodium chloride bolus (0 mLs IntraVENous Stopped 3/30/22 2024)   acetaminophen (TYLENOL) tablet 650 mg (650 mg Oral Given 3/30/22 1921)       ED course  Patient has lung cancer is undergoing chemotherapy last was 2 weeks ago, presented to the ER for evaluation of frequent falls wife states he has fallen 4 times in the past 2 days. Today he fell in the doorway states was too weak to hold himself up and hit his head. No loss of consciousness. He is on Pradaxa. Complains of headache, low back pain, left elbow pain, left knee pain. CT brain negative for hemorrhage or fracture cervical spine no fracture, lumbar spine no fracture, left elbow suspicious for nondisplaced proximal ulnar fracture. Placed in long-arm splint and sling. Left knee no fracture. BP a little low on arrival in the 90s was given IV fluids with improvement. Plan for admission due to fatigue, risk for falling and anticoagulated. FINAL IMPRESSION      1. Frequent falls    2. General weakness    3. Dehydration    4. Thrombocytopenia ([address]    5. Anticoagulated    6. Closed head injury, initial encounter    7. Left elbow fracture, closed, initial encounter    8.  Pleural effusion on right DISPOSITION/PLAN   DISPOSITION Admitted    PATIENT REFERREDTO:  No follow-up provider specified.     DISCHARGE MEDICATIONS:  New Prescriptions    No medications on file       DISCONTINUED MEDICATIONS:  Discontinued Medications    No medications on file              (Please note that portions ofthis note were completed with a voice recognition program.  Efforts were made to edit the dictations but occasionally words are mis-transcribed.)    Mike Watts PA-C (electronically signed)            Benson Collado PA-C  03/31/22 0113

## 2022-03-31 PROBLEM — N17.9 AKI (ACUTE KIDNEY INJURY) (HCC): Status: ACTIVE | Noted: 2022-03-31

## 2022-03-31 LAB
A/G RATIO: 1.5 (ref 1.1–2.2)
ALBUMIN SERPL-MCNC: 3.3 G/DL (ref 3.4–5)
ALP BLD-CCNC: 86 U/L (ref 40–129)
ALT SERPL-CCNC: 13 U/L (ref 10–40)
ANION GAP SERPL CALCULATED.3IONS-SCNC: 10 MMOL/L (ref 3–16)
AST SERPL-CCNC: 14 U/L (ref 15–37)
BASOPHILS ABSOLUTE: 0 K/UL (ref 0–0.2)
BASOPHILS RELATIVE PERCENT: 0.2 %
BILIRUB SERPL-MCNC: 0.4 MG/DL (ref 0–1)
BUN BLDV-MCNC: 26 MG/DL (ref 7–20)
CALCIUM SERPL-MCNC: 8 MG/DL (ref 8.3–10.6)
CHLORIDE BLD-SCNC: 107 MMOL/L (ref 99–110)
CO2: 23 MMOL/L (ref 21–32)
CREAT SERPL-MCNC: 1.1 MG/DL (ref 0.8–1.3)
EKG ATRIAL RATE: 388 BPM
EKG DIAGNOSIS: NORMAL
EKG Q-T INTERVAL: 378 MS
EKG QRS DURATION: 80 MS
EKG QTC CALCULATION (BAZETT): 454 MS
EKG R AXIS: 18 DEGREES
EKG T AXIS: 46 DEGREES
EKG VENTRICULAR RATE: 87 BPM
EOSINOPHILS ABSOLUTE: 0 K/UL (ref 0–0.6)
EOSINOPHILS RELATIVE PERCENT: 1.1 %
GFR AFRICAN AMERICAN: >60
GFR NON-AFRICAN AMERICAN: >60
GLUCOSE BLD-MCNC: 122 MG/DL (ref 70–99)
HCT VFR BLD CALC: 28.7 % (ref 40.5–52.5)
HEMOGLOBIN: 9.2 G/DL (ref 13.5–17.5)
LYMPHOCYTES ABSOLUTE: 1.7 K/UL (ref 1–5.1)
LYMPHOCYTES RELATIVE PERCENT: 43.7 %
MCH RBC QN AUTO: 28.8 PG (ref 26–34)
MCHC RBC AUTO-ENTMCNC: 32 G/DL (ref 31–36)
MCV RBC AUTO: 90.2 FL (ref 80–100)
MONOCYTES ABSOLUTE: 0.6 K/UL (ref 0–1.3)
MONOCYTES RELATIVE PERCENT: 14.1 %
NEUTROPHILS ABSOLUTE: 1.6 K/UL (ref 1.7–7.7)
NEUTROPHILS RELATIVE PERCENT: 40.9 %
PDW BLD-RTO: 16.6 % (ref 12.4–15.4)
PLATELET # BLD: 58 K/UL (ref 135–450)
PMV BLD AUTO: 7.5 FL (ref 5–10.5)
POTASSIUM REFLEX MAGNESIUM: 4 MMOL/L (ref 3.5–5.1)
RBC # BLD: 3.18 M/UL (ref 4.2–5.9)
SODIUM BLD-SCNC: 140 MMOL/L (ref 136–145)
TOTAL PROTEIN: 5.5 G/DL (ref 6.4–8.2)
WBC # BLD: 4 K/UL (ref 4–11)

## 2022-03-31 PROCEDURE — 1200000000 HC SEMI PRIVATE

## 2022-03-31 PROCEDURE — 96361 HYDRATE IV INFUSION ADD-ON: CPT

## 2022-03-31 PROCEDURE — 97166 OT EVAL MOD COMPLEX 45 MIN: CPT

## 2022-03-31 PROCEDURE — 80053 COMPREHEN METABOLIC PANEL: CPT

## 2022-03-31 PROCEDURE — 93010 ELECTROCARDIOGRAM REPORT: CPT | Performed by: INTERNAL MEDICINE

## 2022-03-31 PROCEDURE — 36415 COLL VENOUS BLD VENIPUNCTURE: CPT

## 2022-03-31 PROCEDURE — 6370000000 HC RX 637 (ALT 250 FOR IP): Performed by: INTERNAL MEDICINE

## 2022-03-31 PROCEDURE — 2580000003 HC RX 258: Performed by: INTERNAL MEDICINE

## 2022-03-31 PROCEDURE — 97162 PT EVAL MOD COMPLEX 30 MIN: CPT

## 2022-03-31 PROCEDURE — 85025 COMPLETE CBC W/AUTO DIFF WBC: CPT

## 2022-03-31 PROCEDURE — G0378 HOSPITAL OBSERVATION PER HR: HCPCS

## 2022-03-31 PROCEDURE — 97116 GAIT TRAINING THERAPY: CPT

## 2022-03-31 PROCEDURE — 97530 THERAPEUTIC ACTIVITIES: CPT

## 2022-03-31 PROCEDURE — 97535 SELF CARE MNGMENT TRAINING: CPT

## 2022-03-31 RX ORDER — SODIUM CHLORIDE 9 MG/ML
INJECTION, SOLUTION INTRAVENOUS CONTINUOUS
Status: DISCONTINUED | OUTPATIENT
Start: 2022-03-31 | End: 2022-04-01 | Stop reason: HOSPADM

## 2022-03-31 RX ORDER — PAROXETINE HYDROCHLORIDE 20 MG/1
20 TABLET, FILM COATED ORAL 2 TIMES DAILY
Status: DISCONTINUED | OUTPATIENT
Start: 2022-03-31 | End: 2022-04-01 | Stop reason: HOSPADM

## 2022-03-31 RX ORDER — POTASSIUM CHLORIDE 7.45 MG/ML
10 INJECTION INTRAVENOUS PRN
Status: DISCONTINUED | OUTPATIENT
Start: 2022-03-31 | End: 2022-04-01 | Stop reason: HOSPADM

## 2022-03-31 RX ORDER — LANOLIN ALCOHOL/MO/W.PET/CERES
3 CREAM (GRAM) TOPICAL NIGHTLY PRN
Status: DISCONTINUED | OUTPATIENT
Start: 2022-03-31 | End: 2022-04-01 | Stop reason: HOSPADM

## 2022-03-31 RX ORDER — FINASTERIDE 5 MG/1
5 TABLET, FILM COATED ORAL DAILY
Status: DISCONTINUED | OUTPATIENT
Start: 2022-03-31 | End: 2022-04-01 | Stop reason: HOSPADM

## 2022-03-31 RX ORDER — OXYCODONE HYDROCHLORIDE AND ACETAMINOPHEN 5; 325 MG/1; MG/1
1 TABLET ORAL EVERY 6 HOURS PRN
Status: DISCONTINUED | OUTPATIENT
Start: 2022-03-31 | End: 2022-04-01 | Stop reason: HOSPADM

## 2022-03-31 RX ORDER — POTASSIUM CHLORIDE 20 MEQ/1
40 TABLET, EXTENDED RELEASE ORAL PRN
Status: DISCONTINUED | OUTPATIENT
Start: 2022-03-31 | End: 2022-04-01 | Stop reason: HOSPADM

## 2022-03-31 RX ORDER — SODIUM CHLORIDE 9 MG/ML
INJECTION, SOLUTION INTRAVENOUS PRN
Status: DISCONTINUED | OUTPATIENT
Start: 2022-03-31 | End: 2022-04-01 | Stop reason: HOSPADM

## 2022-03-31 RX ORDER — POLYETHYLENE GLYCOL 3350 17 G/17G
17 POWDER, FOR SOLUTION ORAL DAILY PRN
Status: DISCONTINUED | OUTPATIENT
Start: 2022-03-31 | End: 2022-04-01 | Stop reason: HOSPADM

## 2022-03-31 RX ORDER — ATORVASTATIN CALCIUM 40 MG/1
40 TABLET, FILM COATED ORAL DAILY
Status: DISCONTINUED | OUTPATIENT
Start: 2022-03-31 | End: 2022-04-01 | Stop reason: HOSPADM

## 2022-03-31 RX ORDER — ONDANSETRON 2 MG/ML
4 INJECTION INTRAMUSCULAR; INTRAVENOUS EVERY 6 HOURS PRN
Status: DISCONTINUED | OUTPATIENT
Start: 2022-03-31 | End: 2022-04-01 | Stop reason: HOSPADM

## 2022-03-31 RX ORDER — PANTOPRAZOLE SODIUM 20 MG/1
20 TABLET, DELAYED RELEASE ORAL
Status: DISCONTINUED | OUTPATIENT
Start: 2022-03-31 | End: 2022-04-01 | Stop reason: HOSPADM

## 2022-03-31 RX ORDER — FERROUS SULFATE 325(65) MG
325 TABLET ORAL 2 TIMES DAILY WITH MEALS
Status: DISCONTINUED | OUTPATIENT
Start: 2022-03-31 | End: 2022-04-01 | Stop reason: HOSPADM

## 2022-03-31 RX ORDER — DABIGATRAN ETEXILATE 150 MG/1
150 CAPSULE, COATED PELLETS ORAL 2 TIMES DAILY
Status: DISCONTINUED | OUTPATIENT
Start: 2022-03-31 | End: 2022-04-01 | Stop reason: HOSPADM

## 2022-03-31 RX ORDER — LORAZEPAM 1 MG/1
1 TABLET ORAL 2 TIMES DAILY PRN
Status: DISCONTINUED | OUTPATIENT
Start: 2022-03-31 | End: 2022-04-01 | Stop reason: HOSPADM

## 2022-03-31 RX ORDER — ACETAMINOPHEN 325 MG/1
650 TABLET ORAL EVERY 6 HOURS PRN
Status: DISCONTINUED | OUTPATIENT
Start: 2022-03-31 | End: 2022-04-01 | Stop reason: HOSPADM

## 2022-03-31 RX ORDER — SODIUM CHLORIDE 0.9 % (FLUSH) 0.9 %
5-40 SYRINGE (ML) INJECTION EVERY 12 HOURS SCHEDULED
Status: DISCONTINUED | OUTPATIENT
Start: 2022-03-31 | End: 2022-04-01 | Stop reason: HOSPADM

## 2022-03-31 RX ORDER — CALCIUM CARBONATE 200(500)MG
500 TABLET,CHEWABLE ORAL 3 TIMES DAILY PRN
Status: DISCONTINUED | OUTPATIENT
Start: 2022-03-31 | End: 2022-04-01 | Stop reason: HOSPADM

## 2022-03-31 RX ORDER — MAGNESIUM SULFATE IN WATER 40 MG/ML
2000 INJECTION, SOLUTION INTRAVENOUS PRN
Status: DISCONTINUED | OUTPATIENT
Start: 2022-03-31 | End: 2022-04-01 | Stop reason: HOSPADM

## 2022-03-31 RX ORDER — SODIUM CHLORIDE 0.9 % (FLUSH) 0.9 %
5-40 SYRINGE (ML) INJECTION PRN
Status: DISCONTINUED | OUTPATIENT
Start: 2022-03-31 | End: 2022-04-01 | Stop reason: HOSPADM

## 2022-03-31 RX ORDER — ACETAMINOPHEN 650 MG/1
650 SUPPOSITORY RECTAL EVERY 6 HOURS PRN
Status: DISCONTINUED | OUTPATIENT
Start: 2022-03-31 | End: 2022-04-01 | Stop reason: HOSPADM

## 2022-03-31 RX ORDER — ONDANSETRON 4 MG/1
4 TABLET, ORALLY DISINTEGRATING ORAL EVERY 8 HOURS PRN
Status: DISCONTINUED | OUTPATIENT
Start: 2022-03-31 | End: 2022-04-01 | Stop reason: HOSPADM

## 2022-03-31 RX ORDER — LACOSAMIDE 50 MG/1
100 TABLET ORAL 2 TIMES DAILY
Status: DISCONTINUED | OUTPATIENT
Start: 2022-03-31 | End: 2022-04-01 | Stop reason: HOSPADM

## 2022-03-31 RX ADMIN — PAROXETINE HYDROCHLORIDE 20 MG: 20 TABLET, FILM COATED ORAL at 19:56

## 2022-03-31 RX ADMIN — FERROUS SULFATE TAB 325 MG (65 MG ELEMENTAL FE) 325 MG: 325 (65 FE) TAB at 09:38

## 2022-03-31 RX ADMIN — SODIUM CHLORIDE: 9 INJECTION, SOLUTION INTRAVENOUS at 19:57

## 2022-03-31 RX ADMIN — SODIUM CHLORIDE: 9 INJECTION, SOLUTION INTRAVENOUS at 03:27

## 2022-03-31 RX ADMIN — CALCIUM CARBONATE (ANTACID) CHEW TAB 500 MG 500 MG: 500 CHEW TAB at 22:42

## 2022-03-31 RX ADMIN — OXYCODONE HYDROCHLORIDE AND ACETAMINOPHEN 1 TABLET: 5; 325 TABLET ORAL at 03:45

## 2022-03-31 RX ADMIN — PANTOPRAZOLE SODIUM 20 MG: 20 TABLET, DELAYED RELEASE ORAL at 05:16

## 2022-03-31 RX ADMIN — FINASTERIDE 5 MG: 5 TABLET, FILM COATED ORAL at 09:38

## 2022-03-31 RX ADMIN — LORAZEPAM 1 MG: 1 TABLET ORAL at 19:53

## 2022-03-31 RX ADMIN — LACOSAMIDE 100 MG: 50 TABLET, FILM COATED ORAL at 09:38

## 2022-03-31 RX ADMIN — CALCIUM CARBONATE (ANTACID) CHEW TAB 500 MG 500 MG: 500 CHEW TAB at 17:30

## 2022-03-31 RX ADMIN — DABIGATRAN ETEXILATE MESYLATE 150 MG: 150 CAPSULE ORAL at 09:51

## 2022-03-31 RX ADMIN — LORAZEPAM 1 MG: 1 TABLET ORAL at 03:26

## 2022-03-31 RX ADMIN — LACOSAMIDE 100 MG: 50 TABLET, FILM COATED ORAL at 19:53

## 2022-03-31 RX ADMIN — PANTOPRAZOLE SODIUM 20 MG: 20 TABLET, DELAYED RELEASE ORAL at 16:42

## 2022-03-31 RX ADMIN — ATORVASTATIN CALCIUM 40 MG: 40 TABLET, FILM COATED ORAL at 09:38

## 2022-03-31 RX ADMIN — FERROUS SULFATE TAB 325 MG (65 MG ELEMENTAL FE) 325 MG: 325 (65 FE) TAB at 16:43

## 2022-03-31 RX ADMIN — PAROXETINE HYDROCHLORIDE 20 MG: 20 TABLET, FILM COATED ORAL at 09:38

## 2022-03-31 RX ADMIN — DABIGATRAN ETEXILATE MESYLATE 150 MG: 150 CAPSULE ORAL at 21:24

## 2022-03-31 RX ADMIN — CARBIDOPA AND LEVODOPA 1 TABLET: 25; 100 TABLET ORAL at 09:38

## 2022-03-31 RX ADMIN — OXYCODONE HYDROCHLORIDE AND ACETAMINOPHEN 1 TABLET: 5; 325 TABLET ORAL at 23:57

## 2022-03-31 ASSESSMENT — PAIN SCALES - GENERAL
PAINLEVEL_OUTOF10: 0
PAINLEVEL_OUTOF10: 5
PAINLEVEL_OUTOF10: 7
PAINLEVEL_OUTOF10: 0
PAINLEVEL_OUTOF10: 6

## 2022-03-31 ASSESSMENT — PAIN - FUNCTIONAL ASSESSMENT
PAIN_FUNCTIONAL_ASSESSMENT: PREVENTS OR INTERFERES SOME ACTIVE ACTIVITIES AND ADLS
PAIN_FUNCTIONAL_ASSESSMENT: PREVENTS OR INTERFERES SOME ACTIVE ACTIVITIES AND ADLS
PAIN_FUNCTIONAL_ASSESSMENT: 0-10
PAIN_FUNCTIONAL_ASSESSMENT: ACTIVITIES ARE NOT PREVENTED

## 2022-03-31 ASSESSMENT — PAIN DESCRIPTION - PROGRESSION
CLINICAL_PROGRESSION: NOT CHANGED
CLINICAL_PROGRESSION: GRADUALLY IMPROVING

## 2022-03-31 ASSESSMENT — PAIN DESCRIPTION - FREQUENCY
FREQUENCY: CONTINUOUS

## 2022-03-31 ASSESSMENT — PAIN DESCRIPTION - PAIN TYPE
TYPE: ACUTE PAIN

## 2022-03-31 ASSESSMENT — PAIN DESCRIPTION - DESCRIPTORS
DESCRIPTORS: ACHING

## 2022-03-31 ASSESSMENT — PAIN DESCRIPTION - ORIENTATION
ORIENTATION: LEFT

## 2022-03-31 ASSESSMENT — PAIN DESCRIPTION - ONSET
ONSET: ON-GOING

## 2022-03-31 ASSESSMENT — PAIN DESCRIPTION - LOCATION
LOCATION: ARM
LOCATION: ARM;ELBOW
LOCATION: ARM

## 2022-03-31 NOTE — ED PROVIDER NOTES
I independently performed a history and physical on Jaky Mullins. All diagnostic, treatment, and disposition decisions were made by myself in conjunction with the advanced practice provider. I have participated in the medical decision making and directed the treatment plan and disposition of the patient. For further details of [facility] emergency department encounter, please see the advanced practice provider's documentation. CHIEF COMPLAINT  Chief Complaint   Patient presents with   Walter Osman     pt states has fallen 4 times in past 2 days, getting chemo for lung cancer, weak, states fell backwards and hit head on concrete, denies loc, on blood thinners, left knee pain and swelling       Briefly, Jaky Mullins is a 68 y.o. male  who presents to the ED complaining of fall with weakness and fatigue. States he is fallen 4 times in the past 2 days. States he has no appetite feels weak fatigue has not been eating or drinking well. He has fallen backwards and hit his head but did not blackout or lose consciousness. He is on Pradaxa for underlying A. fib. Complaining of elbow pain as well. Denies fevers or chills. No vomiting or diarrhea    FOCUSED PHYSICAL EXAMINATION  /76   Pulse 88   Temp 98.1 °F (36.7 °C) (Oral)   Resp 18   Ht 5' 9.5\" (1.765 m)   Wt 175 lb (79.4 kg)   SpO2 98%   BMI 25.47 kg/m²      Focused physical examination:  General appearance:  Cooperative. No acute distress. Skin:  Warm. Dry. Eye:  Extraocular movements intact. Ears, nose, mouth and throat:  Oral mucosa moist,  Neck:  Trachea midline. No cervical spine tenderness    Heart:  Regular rate and rhythm  Perfusion:  intact  Respiratory:  Lungs clear to auscultation bilaterally. Respirations nonlabored. Abdominal:   Non distended. Nontender  Neurological:  Alert and oriented x 3.   Moves all extremities spontaneously  Musculoskeletal:   Tenderness to palpation of the left elbow with somewhat limited range of motion and difficulty supinating the elbow. Psychiatric:  Normal mood      EKG: Atrial flutter variable block rate of 87 bpm without ST elevation. MDM: Patient presents emergency department today for weakness and fatigue. History of lung cancer receiving outpatient chemotherapy and radiation. Last chemo 2 weeks ago. Has fallen 4 times over the past 2 days and is injured his left elbow and x-rays are concerning for possible underlying fracture. Placed in a posterior long-arm splint and may require CT scan. Imaging of the head neck were performed due to being on blood thinners and fall and ultimately negative. Patient was initially somewhat hypotensive and does note significant positional dyspnea is likely contributing to his falls. Question underlying sepsis but no clear infectious etiology. No leukocytosis. No lactic acidosis. Chest x-ray with some pleural effusion but has had prior surgery at the site I have no signs or concerns for infection at present. Was given IV fluids and blood pressure has improved but he was found to have renal insufficiency as well. Still feels unsteady will be admitted for further rehydration and treatment. During the patient's ED course, the patient was given:  Medications   0.9 % sodium chloride bolus (0 mLs IntraVENous Stopped 3/30/22 2024)   acetaminophen (TYLENOL) tablet 650 mg (650 mg Oral Given 3/30/22 1921)        CLINICAL IMPRESSION  1. Frequent falls    2. General weakness    3. Dehydration    4. Thrombocytopenia ([address]    5. Anticoagulated    6. Closed head injury, initial encounter    7. Left elbow fracture, closed, initial encounter        DISPOSITION  Admission      This chart was created using Dragon dictation software. Efforts were made by me to ensure accuracy, however some errors may be present due to limitations of this technology.             Tong Castillo MD  03/30/22 2383

## 2022-03-31 NOTE — CARE COORDINATION
Case Management Assessment  Initial Evaluation      Patient Name: Alek Finley  YOB: 1945  Diagnosis: Dehydration [E86.0]  Thrombocytopenia ([facility] [D69.6]  General weakness [R53.1]  Anticoagulated [Z79.01]  Pleural effusion on right [J90]  MIRNA (acute kidney injury) ([facility] [N17.9]  Frequent falls [R29.6]  Closed head injury, initial encounter [S09.90XA]  Left elbow fracture, closed, initial encounter Faith Khoi  Date / Time: 3/30/2022  5:42 PM    Admission status/Date:3/31 inpt  Chart Reviewed: Yes      Patient Interviewed: Yes   Family Interviewed:  No      Hospitalization in the last 30 days:  No      Health Care Decision Maker :   Primary Decision Maker: Helen Luna - Spouse - [phone]    (CM - must 1st enter selection under Navigator - emergency contact- Health Care Decision Maker Relationship and pick relationship)   Who do you trust or have selected to make healthcare decisions for you      Met with: patient  Interview conducted  (bedside/phone):    Current PCP: [address] [place] [address] required for SNF : Y          3 night stay required -  [employer]  Support Systems/Care Needs: Spouse/Significant Other  Transportation: self    Meal Preparation: self    Housing  Living Arrangements: lives 1 story home  Steps: 3  Intent for return to present living arrangements: Yes  Identified Issues: NN/A    Home Care Information  Active with [address] : No Agency:(Services)  Type of Home Care Services: None  Passport/Waiver : No  :                      Phone Number:    Passport/Waiver Services: N/A          Durable Medical Equiptment   DME Provider:   Equipment:   Walker___Cane_x__RTS___ BSC___Shower Chair_x__Hospital Bed___W/C____Other________  02 at ____Liter(s)---wears(frequency)_______ HHN ___ CPAP___ BiPap___   N/A____      Home O2 Use :  No    If No for home O2---if presently on O2 during hospitalization:  No  if yes CM to follow for potential DC O2 need  Informed of need for care provider to bring portable home O2 tank on day of discharge for nursing to connect prior to leaving:   Not Indicated  Verbalized agreement/Understanding:   Not Indicated    Community Service Affiliation  Dialysis:  No    · Agency:  · Location:  · Dialysis Schedule:  · Phone:   · Fax: Other Community Services: (ex:PT/OT,Mental Health,Wound Clinic, Cardio/Pul [address]) None    DISCHARGE PLAN: Explained Case Management role/services. Reviewed chart and met with pt at bedside. Role of CM explained. States is IPTA with assist of wife. Noted pt has increased weakness d/t chemo Tx  staes is active with [facility] outpt therapy PTA. Will follow and request therapy eval and rec's as may need inpt rehab. Will follow.

## 2022-03-31 NOTE — PROGRESS NOTES
house   Steps to second floor: N/A  Bathroom: tub/shower unit, shower chair , comfort height commode  Equipment owned: [facility], two wheeled walkers, shower seat ,reacher      Preadmission Status:  Pt. Able to drive: Yes  Pt Fully independent with ADLs: Yes  Pt. Required assistance from family for: Cooking and both do the cleaning and laundry   Pt. independent for transfers and gait and walked with No Device  History of falls Yes, multiple in last ~week. Pt reports due to low BP and dehydration. Pain   Yes  Location: back  Rating: mild /10  Pain Medicine Status: No request made    Cognition    A&O x4   Able to follow 2 step commands    Subjective  Patient lying supine in bed with no family present. Pt agreeable to this PT eval & tx. Upper Extremity ROM/Strength  Please see OT evaluation. Lower Extremity ROM / Strength   AROM WFL: Yes    BLE strength WFL, but not formally assessed with MMT. Lower Extremity Sensation    WFL    Lower Extremity Proprioception:   WFL    Coordination and Tone  WFL    Balance  Sitting:  Good ; Supervision  Comments: some limitations in trunk ROM / dynamic reach 2/2 presence of sling. Standing: Good - ; SBA  Comments: with SPC and LBQC. Bed Mobility   Supine to Sit:    Min A   Sit to Supine:   Not Tested   (up to chair to end session)  Rolling:   N/A  Scooting in sitting: Independent  Scooting in supine:  Not Tested    Transfer Training  (to/from EOB, recliner, and std height toilet)   Sit to stand:   SBA  Stand to sit:   SBA  Bed to Chair:   SBA with use of Quad cane    Gait gait completed as indicated below  Distance:      20 ft + 20 ft (with SBQC) + 150 ft (with SPC)  Deviations (firm surface/linoleum):  decreased chico and narrow JAYCEE  Assistive Device Used:    as above  Level of Assist:    SBA  Comment: steady throughout.     Stair Training stairs completed as indicated below  # of Steps:   2 (single step ups on 6\" stepstool)  Level of Assist:  CGA  UE Support:  no handrail  Assistive Device:  Cane, SPCon right  Pattern:   non-reciprocal pattern  Comments: pt education RE sequencing    Activity Tolerance   Pt completed therapy session with No adverse symptoms noted w/activity. Denies dizziness/lightheadedness at all times. BP sitting 147/78   BP standing 106/62    Positioning Needs   Pt up in chair, alarm set, positioned in proper neutral alignment and pressure relief provided. Call light provided and all needs within reach    Exercises Initiated  Hortencia deferred secondary to treatment focus on functional mobility  NA    Other  RN aware of status and BP drop. Patient/Family Education   Pt educated on role of inpatient PT, POC, importance of continued activity, DC recommendations, safety awareness, transfer techniques, pacing activity and calling for assist with mobility. Assessment  Pt seen for Physical Therapy evaluation in acute care setting. Pt demonstrated decreased Activity tolerance and Balance as well as decreased independence with Ambulation and Transfers. Pt has had multiple falls in last week which he attributes to hypotension/dehydration. Now NWB LUE in sling following ulnar fx. Today his standing balance is Good(-) with SPC and he is able to mobilize safely at SBA. He has good awareness of his limitations. Good family support at home. Recommending Home 24 hr supervision and with home PT upon discharge as patient functioning below baseline level and would benefit from continued therapy services    Goals : To be met in 3 visits:  1). Independent with LE Ex x 10 reps    To be met in 6 visits:  1). Supine to/from sit: Independent  2). Sit to/from stand: Modified Independent  3). Bed to chair: Modified Independent  4). Gait: Ambulate 150 ft.  with  Modified Independent and use of Single point cane   5). Tolerate B LE exercises 3 sets of 10-15 reps  6).   Ascend/descend 2 steps with Supervision with use of no handrail and Single point cane Rehabilitation Potential: Good  Strengths for achieving goals include:   Pt motivated, PLOF, Family Support and Pt cooperative   Barriers to achieving goals include:    No Barriers    Plan    To be seen 2-3 x / week  while in acute care setting for therapeutic exercises, bed mobility, transfers, progressive gait training, balance training, and family/patient education. Signature: Jaye Johnson, PT, DPT    If patient discharges from this facility prior to next visit, this note will serve as the Discharge Summary.

## 2022-03-31 NOTE — CONSULTS
Department of Orthopedic Surgery  Physician Assistant   Consult Note        Reason for Consult:  Left elbow pain  Requesting Physician: Parviz Summers MD  Date of Service: 3/31/2022 12:23 PM    CHIEF COMPLAINT:  As Above    History Obtained From:  patient    HISTORY OF PRESENT ILLNESS:                The patient is a right hand dominant 68 y.o. male with PMHx of cancer, and afib who presents with above chief complaint. States he was outside in his yard yesterday when he became lightheaded and fell, landing on his left side. Reports that he actually fell 2-3 times yesterday and a few times a day before. He attributes his weakness and lightheadedness to current chemotherapy treatments. He was able to get up and ambulate after falling with the help of his neighbors, however, he contacted his primary care physician who recommended that he come to the hospital.  He was evaluated at [facility] emergency department for elbow x-rays were obtained showing proximal ulnar fracture. Patient denies any current pain in his elbow. He is currently immobilized in an Orthoglass splint. Denies any dysesthesias. Lives at home with his wife, uses a cane at baseline.     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    S/P drug eluting coronary stent placement 03/06/2017    2.25 x 18 Xience [employer] ADRIÁN - Distal LAD    Seizure disorder ([address]     4 weeks ago black out, pt states seizure but may be due to Heart Rate changes    Seizures (HCC)     Shortness of breath 9/4/2014    Total knee replacement status 1/12/2011     Past Surgical History:        Procedure Laterality Date    ABLATION OF DYSRHYTHMIC FOCUS  9/2014    CARPAL TUNNEL RELEASE      CERVICAL DISC SURGERY  2010    COLONOSCOPY  09/26/2016    normal    CORONARY ANGIOPLASTY WITH STENT PLACEMENT      FEMUR SURGERY      left    KNEE SURGERY  1-12-11 R total knee replacement with femoral nerve block for pain control    LUNG REMOVAL, PARTIAL Right     20%    OTHER SURGICAL HISTORY Bilateral     excisions of lesions on bilat. neck and back    OTHER SURGICAL HISTORY  9/2014    Reveal Loop recorder placed in Cath Lab    UPPER GASTROINTESTINAL ENDOSCOPY  09/26/2016    gastric and esophogeal biopsy         Medications Prior to Admission:   Prior to Admission medications    Medication Sig Start Date End Date Taking?  Authorizing Provider   finasteride (PROSCAR) 5 MG tablet Take 5 mg by mouth daily    Historical Provider, MD   ipratropium (ATROVENT) 0.03 % nasal spray 2 sprays by Each Nostril route every 12 hours Can use of to 4 times daily as needed 5/10/21   Michael Amin DO   diclofenac sodium 1 % GEL APPLY 4GM TOPICALLY FOUR TIMES A DAY 12/6/18   Jeanette Altman MD   carbidopa-levodopa (SINEMET)  MG per tablet Take 1 tablet by mouth daily    Historical Provider, MD   losartan (COZAAR) 25 MG tablet Take 1 tablet by mouth daily TAKE 1 TABLET DAILY 2/5/18   KATIE Stiles - CNP   vitamin B-2 (RIBOFLAVIN) 25 MG TABS Take by mouth    Historical Provider, MD   diclofenac (PENNSAID) 2 % SOLN 2 pumps to the affected area 2 times a day [id number] 9/26/17   Jeanette Altman MD   atorvastatin (LIPITOR) 40 MG tablet Take 1 tablet by mouth daily 8/21/17   Jayy Jackson MD   isosorbide mononitrate (IMDUR) 30 MG extended release tablet Take 1 tablet by mouth daily 8/21/17   Jayy Jackson MD   dabigatran (PRADAXA) 150 MG capsule Take 1 capsule by mouth 2 times daily 8/7/17   John Onofre MD   omeprazole (PRILOSEC) 20 MG delayed release capsule Take 20 mg by mouth 2 times daily    Historical Provider, MD   sotalol (BETAPACE) 80 MG tablet Take 0.5 tablets by mouth 2 times daily 2/21/17   KATIE Quinn - CNP   fluocinonide (LIDEX) 0.05 % cream Apply topically 2 times daily. 5/20/16   KATIE Villalba CNP   furosemide (LASIX) 20 MG tablet TAKE 1 TABLET DAILY  Patient taking differently: TAKE 1 TABLET DAILY prn 11/19/15   Ynes Goncalves MD   ferrous sulfate 325 (65 FE) MG tablet Take 1 tablet by mouth 2 times daily (with meals) 10/5/15   Ynes Goncalves MD   zolpidem (AMBIEN) 10 MG tablet Take 10 mg by mouth nightly as needed. Historical Provider, MD   LORazepam (ATIVAN) 1 MG tablet Take 1 mg by mouth 2 times daily as needed. Historical Provider, MD   Lacosamide (VIMPAT PO) Take 100 mg by mouth 2 times daily. Indications: take dos    Historical Provider, MD   paroxetine (PAXIL) 20 MG tablet Take 20 mg by mouth in the morning and at bedtime  12/28/10   Historical Provider, MD       Allergies:  Morphine, Codeine, Penicillins, and Plavix [clopidogrel bisulfate]    Social History:    Tobacco:  reports that he quit smoking about 28 years ago. He has a 80.00 pack-year smoking history. He has never used smokeless tobacco.   Alcohol:  reports no history of alcohol use.    Illicit Drug: No  Family History:       Problem Relation Age of Onset    Heart Disease Mother     Arthritis Mother     High Blood Pressure Mother    [de-identified] / [place] Mother     Stroke Mother     Arthritis Father     Cancer Sister     Arthritis Sister     Depression Sister     Arthritis Brother     Arthritis Maternal Grandmother     Arthritis Maternal Grandfather     Arthritis Paternal Grandmother     Diabetes Paternal Grandmother     Arthritis Paternal Grandfather        REVIEW OF SYSTEMS:    CONSTITUTIONAL:  negative  MUSCULOSKELETAL:  positive for  pain  All other systems reviewed and negative    PHYSICAL EXAM:    awake, alert, cooperative, no apparent distress, and appears stated age  MUSCULOSKELETAL:  LEFT ELBOW:  Immobilized in splint, well aligned, no pressure points  No edema noted into the left hand  Full ROM in the left hand and wrist  Ulnar, median, and radial nerve motor intact  NVI sensation intact to light touch on the outside of splint  Radial pulse 2+, brisk cap refill  Non-tender to palpation throughout exposed upper extremity    DATA:    CBC:   Recent Labs     03/30/22 1925 03/31/22  0602   WBC 5.5 4.0   HGB 10.9* 9.2*   PLT 75* 58*     BMP:    Recent Labs     03/30/22 1925 03/31/22  0602    140   K 4.9 4.0    107   CO2 26 23   BUN 24* 26*   CREATININE 1.3 1.1   GLUCOSE 102* 122*     INR: No results for input(s): INR in the last 72 hours. Radiology:   CT LUMBAR SPINE WO CONTRAST   Final Result   1. No acute fracture. No listhesis. 2. Nonspecific small complex right pleural effusion. Correlate with history. 3. Other findings as described. CT CERVICAL SPINE WO CONTRAST   Final Result   No acute abnormality of the cervical spine. CT HEAD WO CONTRAST   Final Result   No acute intracranial abnormality. RECOMMENDATIONS:   Unavailable         XR CHEST PORTABLE   Final Result   Possible right pleural effusion      Otherwise, no acute cardiopulmonary process         XR KNEE LEFT (1-2 VIEWS)   Preliminary Result   1. No acute abnormality of the left knee. 2. Chronic healed fracture deformity of the distal left femur. 3. Mild tricompartmental osteoarthritis. XR ELBOW LEFT (2 VIEWS)   Final Result   Proximal ulna fractures suspected. CT suggested to further evaluate                IMPRESSION/RECOMMENDATIONS:    Assessment: left non-displaced proximal ulnar fracture    Plan:  1) discussed case with Dr. Anne Colin, orthopedic surgeon on-call. Patient will be managed nonoperatively, in Ortho-Glass splint in approximately 45 degrees flexion. Nonweightbearing to left upper extremity, okay for gentle hand/finger/wrist range of motion. Please follow-up outpatient in office in 2 weeks for repeat x-rays.   Ortho will sign off at this time, call with any questions        Thank you for the opportunity to consult on this patient.     Marilee Obrien

## 2022-03-31 NOTE — ACP (ADVANCE CARE PLANNING)
Advance Care Planning     General Advance Care Planning (ACP) Conversation    Date of Conversation: 3/30/2022  Conducted with: Patient with Decision Making Capacity    Healthcare Decision Maker:    Primary Decision Maker: Helen Luna - [facility] - [phone]  Click here to complete Healthcare Decision Makers including selection of the Healthcare Decision Maker Relationship (ie \"Primary\"). Today we documented Decision Maker(s) consistent with Legal Next of Kin hierarchy.     Content/Action Overview:  DECLINED ACP Conversation - will revisit periodically  Reviewed DNR/DNI and patient elects DNR order - referred to ACP Clinical Specialist & placed order        Length of Voluntary ACP Conversation in minutes:  <16 minutes (Non-Billable)    Rosalie Boone RN

## 2022-03-31 NOTE — PROGRESS NOTES
Patient admitted to room __225__ from ER. Patient oriented to room, call light, bed rails, phone, lights and bathroom. Patient instructed about the schedule of the day including: vital sign frequency, lab draws, possible tests, frequency of MD and staff rounds, daily weights, I &O's and prescribed diet. Bed alarm on, Telemetry box in place, patient aware of placement and reason. Bed locked, in lowest position, side rails up 2/4, call light within reach. Recliner Assessment:     Patient is not able to demonstrate the ability to move from a reclining position to an upright position within the recliner due to L arm fx.       4 Eyes Skin Assessment     The patient is being assess for   Admission    I agree that 2 RN's have performed a thorough Head to Toe Skin Assessment on the patient. ALL assessment sites listed below have been assessed. Areas assessed for pressure by both nurses:   [x]   Head, Face, and Ears   [x]   Shoulders, Back, and Chest, Abdomen  [x]   Arms, Elbows, and Hands   [x]   Coccyx, Sacrum, and Ischium  [x]   Legs, Feet, and Heels        Skin Assessed Under all Medical Devices by both nurses:  n/a              All Mepilex Borders were peeled back and area peeked at by both nurses:  No: n/a  Please list where Mepilex Borders are located:  n/a             **SHARE this note so that the co-signing nurse is able to place an eSignature**    Co-signer eSignature: Electronically signed by Esther Awad RN on 3/31/22 at 5:19 AM EDT    Does the Patient have Skin Breakdown related to pressure?   No     (Insert Photo here n/a)         Jorge Prevention initiated:  Yes   Wound Care Orders initiated:  NA      [facility] nurse consulted for Pressure Injury (Stage 3,4, Unstageable, DTI, NWPT, Complex wounds)and New or Established Ostomies:  NA      Primary Nurse eSignature: Electronically signed by Luis Christie RN on 3/31/22 at 2:30 AM EDT

## 2022-03-31 NOTE — H&P
Hospital Medicine History & Physical      PCP: Brennan Fraga MD    Date of Service: Pt seen/examined on 3/31/22 and admitted on 3/31/22 to Inpatient    Chief Complaint   Patient presents with   Levonia Halo     pt states has fallen 4 times in past 2 days, getting chemo for lung cancer, weak, states fell backwards and hit head on concrete, denies loc, on blood thinners, left knee pain and swelling       History Of Present Illness: The patient is a 68 y.o. male with PMH below, presents with multiple falls, generalized weakness, L knee and elbow pain, lung cancer, struck head, fatigue, lightheadedness. Pt has hx of lung cancer and currently undergoing chemo/XRT. Pt has fallen 4 times over the last 2 days. Today he fell backwards and hit his head. He denies LOC but he is on Pradaxa. CT head did not show bleed. He has had L knee pain and swelling since his fall. Xray of his knee was negative for change. He also has L elbow pain. He was found to have non-displaced L ulnar Fx. He has had increasing associate fatigue, generalized weakness and intermittent lightheadedness.       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    S/P drug eluting coronary stent placement 03/06/2017    2.25 x 18 [employer] ADRIÁN - Distal LAD    Seizure disorder ([address]     4 weeks ago black out, pt states seizure but may be due to Heart Rate changes    Seizures (HCC)     Shortness of breath 9/4/2014    Total knee replacement status 1/12/2011       Past Surgical History:        Procedure Laterality Date    ABLATION OF DYSRHYTHMIC FOCUS  9/2014    CARPAL TUNNEL RELEASE      CERVICAL [address] SURGERY  2010    COLONOSCOPY  09/26/2016    normal    CORONARY ANGIOPLASTY WITH STENT PLACEMENT      FEMUR SURGERY      left    KNEE SURGERY  1-12-11 R total knee replacement with femoral nerve block for pain control    LUNG REMOVAL, PARTIAL Right     20%    OTHER SURGICAL HISTORY Bilateral     excisions of lesions on bilat. neck and back    OTHER SURGICAL HISTORY  9/2014    Reveal Loop recorder placed in Cath Lab    UPPER GASTROINTESTINAL ENDOSCOPY  09/26/2016    gastric and esophogeal biopsy       Medications Prior to Admission:    Prior to Admission medications    Medication Sig Start Date End Date Taking?  Authorizing Provider   finasteride (PROSCAR) 5 MG tablet Take 5 mg by mouth daily    Historical Provider, MD   ipratropium (ATROVENT) 0.03 % nasal spray 2 sprays by Each Nostril route every 12 hours Can use of to 4 times daily as needed 5/10/21   Lesvia Ground Mitts, DO   diclofenac sodium 1 % GEL APPLY 4GM TOPICALLY FOUR TIMES A DAY 12/6/18   Lashawn Greenfield MD   carbidopa-levodopa (SINEMET)  MG per tablet Take 1 tablet by mouth daily    Historical Provider, MD   losartan (COZAAR) 25 MG tablet Take 1 tablet by mouth daily TAKE 1 TABLET DAILY 2/5/18   KATIE Posadas - CNP   vitamin B-2 (RIBOFLAVIN) 25 MG TABS Take by mouth    Historical Provider, MD   diclofenac sodium (VOLTAREN) 1 % GEL Apply 4 g topically 4 times daily 10/3/17 12/6/17  Lashawn Greenfield MD   diclofenac (PENNSAID) 2 % SOLN 2 pumps to the affected area 2 times a day [id number] 9/26/17   Lashawn Greenfield MD   atorvastatin (LIPITOR) 40 MG tablet Take 1 tablet by mouth daily 8/21/17   Natalia Puckett MD   isosorbide mononitrate (IMDUR) 30 MG extended release tablet Take 1 tablet by mouth daily 8/21/17   Natalia Puckett MD   dabigatran (PRADAXA) 150 MG capsule Take 1 capsule by mouth 2 times daily 8/7/17   Higinio Snow MD   omeprazole (PRILOSEC) 20 MG delayed release capsule Take 20 mg by mouth 2 times daily    Historical Provider, MD   sotalol (BETAPACE) 80 MG tablet Take 0.5 tablets by mouth 2 times daily 2/21/17   KATIE Peng CNP   fluocinonide (LIDEX) 0.05 % cream Apply topically 2 times daily. 5/20/16   KATIE Casiano CNP   furosemide (LASIX) 20 MG tablet TAKE 1 TABLET DAILY  Patient taking differently: TAKE 1 TABLET DAILY prn 11/19/15   Reuben Peter MD   ferrous sulfate 325 (65 FE) MG tablet Take 1 tablet by mouth 2 times daily (with meals) 10/5/15   Reuben Peter MD   zolpidem (AMBIEN) 10 MG tablet Take 10 mg by mouth nightly as needed. Historical Provider, MD   LORazepam (ATIVAN) 1 MG tablet Take 1 mg by mouth 2 times daily as needed. Historical Provider, MD   Lacosamide (VIMPAT PO) Take 100 mg by mouth 2 times daily. Indications: take dos    Historical Provider, MD   paroxetine (PAXIL) 20 MG tablet Take 30 mg by mouth every morning  12/28/10   Historical Provider, MD       Allergies:  Morphine, Codeine, Penicillins, and Plavix [clopidogrel bisulfate]    Social History:    TOBACCO:   reports that he quit smoking about 28 years ago. He has a 80.00 pack-year smoking history. He has never used smokeless tobacco.  ETOH:   reports no history of alcohol use. Family History:  Reviewed in detail and negative for DM, Early CAD, Cancer (except as below).  Positive as follows:        Problem Relation Age of Onset    Heart Disease Mother     Arthritis Mother     High Blood Pressure Mother    [place] / [place] Mother     Stroke Mother     Arthritis Father     Cancer Sister     Arthritis Sister     Depression Sister     Arthritis Brother     Arthritis Maternal Grandmother     Arthritis Maternal Grandfather     Arthritis Paternal Grandmother     Diabetes Paternal Grandmother     Arthritis Paternal Grandfather        REVIEW OF SYSTEMS:   Pertinent positives/negatives as follows: multiple falls, generalized weakness, L knee and elbow pain, lung cancer, struck head, fatigue, lightheadedness, and as discussed in HPI, otherwise a complete ROS performed and all other systems are negative. PHYSICAL EXAM PERFORMED:  /76   Pulse 88   Temp 98.1 °F (36.7 °C) (Oral)   Resp 18   Ht 5' 9.5\" (1.765 m)   Wt 175 lb (79.4 kg)   SpO2 98%   BMI 25.47 kg/m²     GEN:  A&Ox3, appears chronically ill. HEENT:  NC/AT,EOMI, dry MM, no erythema/exudates or visible masses. CVS:  Normal S1,S2.  ii. Without M/G/R.   LUNG:   CTA-B. no wheezes, rales or rhonchi  ABD:  Soft, ND/NT, BS+ x4. Without G/R.  EXT: 2+ pulses, no c/c/e. L knee contusion and ttp. L distal elbow ttp, currently in sling. Brisk cap refill. PSY:  Thought process intact, affect appropriate. LESTER:  CN III-XII intact, moves all 4 spontaneously, sensory grossly intact. SKIN: Decreased skin turgor. Chart review shows recent radiographs:  XR ELBOW LEFT (2 VIEWS)    Result Date: 3/30/2022  EXAMINATION: XRAY VIEWS OF THE LEFT ELBOW 3/30/2022 6:41 pm COMPARISON: None. HISTORY: ORDERING SYSTEM PROVIDED HISTORY: Trauma, R/O fx TECHNOLOGIST PROVIDED HISTORY: Reason for exam:->Trauma, R/O fx Reason for Exam: Fall (pt states has fallen 4 times in past 2 days, getting chemo for lung cancer, weak, states fell backwards and hit head on concrete, denies loc, on blood thinners, left knee pain and swelling FINDINGS: In one view there is subtle irregularity of the proximal ulna suspicious for nondisplaced fracture. There is soft tissue swelling. There are moderate degenerative changes. Proximal ulna fractures suspected. CT suggested to further evaluate     XR KNEE LEFT (1-2 VIEWS)    Result Date: 3/30/2022  EXAMINATION: TWO XRAY VIEWS OF THE LEFT KNEE 3/30/2022 6:41 pm COMPARISON: None.  HISTORY: ORDERING SYSTEM PROVIDED HISTORY: trauma TECHNOLOGIST PROVIDED HISTORY: Reason for exam:->trauma Reason for Exam: Fall (pt states has fallen 4 times in past 2 days, getting chemo for lung cancer, weak, states fell backwards and hit head on concrete, denies loc, on blood thinners, left knee pain and swelling FINDINGS: Frontal and lateral views of the left knee were performed. There is no acute fracture or dislocation. A joint effusion is not identified. There is a chronic healed fracture deformity of the distal left femur. There is mild osteoarthritis within all 3 compartments. No destructive osseous lesion is seen. No significant soft tissue abnormality is evident. 1. No acute abnormality of the left knee. 2. Chronic healed fracture deformity of the distal left femur. 3. Mild tricompartmental osteoarthritis. CT HEAD WO CONTRAST    Result Date: 3/30/2022  EXAMINATION: CT OF THE HEAD WITHOUT CONTRAST  3/30/2022 6:50 pm TECHNIQUE: CT of the head was performed without the administration of intravenous contrast. Dose modulation, iterative reconstruction, and/or weight based adjustment of the mA/kV was utilized to reduce the radiation dose to as low as reasonably achievable. COMPARISON: None. HISTORY: ORDERING SYSTEM PROVIDED HISTORY: trauma TECHNOLOGIST PROVIDED HISTORY: Has a \"code stroke\" or \"stroke alert\" been called? ->No Reason for exam:->trauma Decision Support Exception - unselect if not a suspected or confirmed emergency medical condition->Emergency Medical Condition (MA) Reason for Exam: fell multiple times FINDINGS: BRAIN/VENTRICLES: There is no acute intracranial hemorrhage, mass effect or midline shift. No abnormal extra-axial fluid collection. The gray-white differentiation is maintained without evidence of an acute infarct. There is no evidence of hydrocephalus. ORBITS: The visualized portion of the orbits demonstrate no acute abnormality. SINUSES: The visualized paranasal sinuses and mastoid air cells demonstrate no acute abnormality. SOFT TISSUES/SKULL:  No acute abnormality of the visualized skull or soft tissues. No acute intracranial abnormality.  RECOMMENDATIONS: Unavailable     CT CERVICAL SPINE WO CONTRAST    Result Date: 3/30/2022  EXAMINATION: CT OF THE CERVICAL SPINE WITHOUT CONTRAST 3/30/2022 7:01 pm TECHNIQUE: CT of the cervical spine was performed without the administration of intravenous contrast. Multiplanar reformatted images are provided for review. Dose modulation, iterative reconstruction, and/or weight based adjustment of the mA/kV was utilized to reduce the radiation dose to as low as reasonably achievable. COMPARISON: 03/02/2005 HISTORY: ORDERING SYSTEM PROVIDED HISTORY: trauma TECHNOLOGIST PROVIDED HISTORY: Reason for exam:->trauma Decision Support Exception - unselect if not a suspected or confirmed emergency medical condition->Emergency Medical Condition (MA) Reason for Exam: fell multiple times FINDINGS: BONES/ALIGNMENT: There is no acute fracture or traumatic malalignment. Anterior fusion C5-7 status post C6 corpectomy. DEGENERATIVE CHANGES: Multilevel degenerative changes. SOFT TISSUES: There is no prevertebral soft tissue swelling. No acute abnormality of the cervical spine. CT LUMBAR SPINE WO CONTRAST    Result Date: 3/30/2022  EXAMINATION: CT OF THE LUMBAR SPINE WITHOUT CONTRAST  3/30/2022 TECHNIQUE: CT of the lumbar spine was performed without the administration of intravenous contrast. Multiplanar reformatted images are provided for review. Adjustment of mA and/or kV according to patient size was utilized. Dose modulation, iterative reconstruction, and/or weight based adjustment of the mA/kV was utilized to reduce the radiation dose to as low as reasonably achievable. COMPARISON: None HISTORY: ORDERING SYSTEM PROVIDED HISTORY: trauma TECHNOLOGIST PROVIDED HISTORY: Reason for exam:->trauma Decision Support Exception - unselect if not a suspected or confirmed emergency medical condition->Emergency Medical Condition (MA) Reason for Exam: fell multiple times FINDINGS: BONES/ALIGNMENT: The inferior-most complete disc space represents L5-S1. Alignment is within normal limits. Vertebral body heights appear maintained. Mild loss of disc height. Chronic left L5 pars defect. Posterior elements appear otherwise intact. DEGENERATIVE CHANGES: Scattered degenerative changes noted in the visualized spine without spondylolisthesis. SOFT TISSUES: Visualized paraspinal soft tissues appear unremarkable. Small complex right pleural effusion. Borderline enlarged lymph node in the peripancreatic region. [place] for penile implant noted in the left lower quadrant. 1. No acute fracture. No listhesis. 2. Nonspecific small complex right pleural effusion. Correlate with history. 3. Other findings as described. XR CHEST PORTABLE    Result Date: 3/30/2022  EXAMINATION: ONE XRAY VIEW OF THE CHEST 3/30/2022 6:41 pm COMPARISON: 09/08/2014 HISTORY: ORDERING SYSTEM PROVIDED HISTORY: sob, fatigue, lung cancer TECHNOLOGIST PROVIDED HISTORY: Reason for exam:->sob, fatigue, lung cancer Reason for Exam: Fall (pt states has fallen 4 times in past 2 days, getting chemo for lung cancer, weak, states fell backwards and hit head on concrete, denies loc, on blood thinners, left knee pain and swelling FINDINGS: Loop recorder in place. Probable right pleural effusion. Heart size within normal limits. No acute airspace disease. No pneumothorax.      Possible right pleural effusion Otherwise, no acute cardiopulmonary process       EKG:    EKG 12 Lead [[id number]]    Collected: 03/30/22 1926    Updated: 03/30/22 1958     Ventricular Rate 87 BPM    Atrial Rate 388 BPM    QRS Duration 80 ms    Q-T Interval 378 ms    QTc Calculation (Bazett) 454 ms    R Axis 18 degrees    T Axis 46 degrees    Diagnosis Atrial flutter with variable A-V blockAbnormal ECGNo previous ECGs available         CBC:  Recent Labs     03/30/22 1925   WBC 5.5   HGB 10.9*   HCT 32.7*   PLT 75*        RENAL  Recent Labs     03/30/22 1925      K 4.9      CO2 26   BUN 24*   CREATININE 1.3   GLUCOSE 102*       Hemoglobin a1c:  Lab Results   Component Value Date    LABA1C 5.6 01/13/2011 LFT'S:  Recent Labs     03/30/22 1925   AST 17   ALT 14   BILITOT 0.6   ALKPHOS 107     CARDIAC ENZYMES:   Recent Labs     03/30/22 1925   TROPONINI <0.01     LACTIC ACID:  Recent Labs     03/30/22 1925   LACTSEPSIS 1.4     PHYSICIAN CERTIFICATION  I certify that Chicho Hoover is expected to be hospitalized for 2 midnights based on the following assessment and plan:    ASSESSMENT/PLAN:  1. MIRNA, Cr 1.3, baseline 0.8. Pt appears dry so suspect pre-renal, however, since ion chemo intra renal is a consideration. IVF and repeat in am.  Defer to DD for nephro c/s if needed. 2. Generalized weakness w/ multiple recent falls. On AC. Multifactorial.  Fall precautions. PT/OT eval.  May need rehab placement. 3. L ulnar Fx, non-displaced, 2/2 fall. Placed on sling in ED. Likely non-surgical.  This may negatively affect pt being appropriate for home based PT. Defer to DD for Ortho c/s.  4. Hx lung cancer currently undergoing chemo. Pt of Dr. Tobias Huffman. Hem Onc c/s.  5. HTN, has had soft BP's so holding home regimen for now. Restart as tolerated. DVT Prophylaxis: Pradaxa  Diet: gen   Code Status: Full Code   PT/OT Eval Status: Will order if needed and as patient condition allows  Dispo - Admit to inpatient 2w    Gilberto Monroe MD    Thank you Ashley Boyd MD for the opportunity to be involved in this patient's care. If you have any questions or concerns please feel free to contact me via the [employer] at [phone]. This chart was generated using the [address] [place] [address] [address]  [employer] system. I created this record but it may contain dictation errors given the limitations of this technology.

## 2022-03-31 NOTE — PROGRESS NOTES
/64   Pulse 85   Temp 97 °F (36.1 °C) (Oral)   Resp 16   Ht 5' 10\" (1.778 m)   Wt 185 lb 9 oz (84.2 kg)   SpO2 94%   BMI 26.63 kg/m²     Assessment complete. Meds passed. Pt denies needs at this time. pradaxa given per md. Ortho consulted. PTOT to see pateint. Bedside Mobility Assessment Tool (BMAT):     Assessment Level 1- Sit and Shake    1. From a semi-reclined position, ask patient to sit up and rotate to a seated position at the side of the bed. Can use the bedrail. 2. Ask patient to reach out and grab your hand and shake making sure patient reaches across his/her midline. Pass- Patient is able to come to a seated position, maintain core strength. Maintains seated balance while reaching across midline. Move on to Assessment Level 2. Assessment Level 2- Stretch and Point   1. With patient in seated position at the side of the bed, have patient place both feet on the floor (or stool) with knees no higher than hips. 2. Ask patient to stretch one leg and straighten the knee, then bend the ankle/flex and point the toes. If appropriate, repeat with the other leg. Pass- Patient is able to demonstrate appropriate quad strength on intended weight bearing limb(s). Move onto Assessment Level 3. Assessment Level 3- Stand   1. Ask patient to elevate off the bed or chair (seated to standing) using an assistive device (cane, bedrail). 2. Patient should be able to raise buttocks off be and hold for a count of five. May repeat once. Fail- Patient unable to demonstrate standing stability. Patient is MOBILITY LEVEL 3. Assessment Level 4- Walk   1. Ask patient to march in place at bedside. 2. Then ask patient to advance step and return each foot. Some medical conditions may render a patient from stepping backwards, use your best clinical judgement. Fail- Patient not able to complete tasks OR requires use of assistive device. Patient is MOBILITY LEVEL 3.        Mobility Level- 3

## 2022-03-31 NOTE — PROGRESS NOTES
Pt c/o anxiety, pain 6/10 in left arm, pt has left elbow Fx, gave ativan 1 mg po and percocet 5/325 mg po, will continue to monitor.  Antolin Pereira RN

## 2022-03-31 NOTE — PROGRESS NOTES
pain, discharge, change in appearance or texture  Gastrointestinal:  No pain, cramping, jaundice, change to eating and bowel habits  Urinary:  No pain, bleeding or change in continence  Genitalia: No pain, bleeding or discharge  Musculoskeletal:  No redness, pain, edema or weakness  Skin:  No pruritus, rash, change to nodules or lesions  Neurologic:  No discomfort, change in mental status, speech, sensory or motor activity  Psychiatric:  No change in concentration or change to affect or mood  Endocrine:  No hot flashes, increased thirst, or change to urine production  Hematologic: No petechiae, ecchymosis or bleeding  Lymphatic:  No lymphadenopathy or lymphedema  Allergy / Immunologic:  No eczema, hives, frequent or recurrent infections      Vital Signs  Blood pressure: 120/60, Regular, Pulse: 90, Temperature: 97.6 F, Respirations: 12, O2 sat: , Pain Scale: 0, Height: 70 in, Weight: 191 lb, BSA: 2.07, BMI: 27.41 kg/m2    Physical Exam    CONSTITUTIONAL: awake, alert, cooperative, no apparent distress   EYES: pupils equal, round and reactive to light, sclera clear and conjunctiva normal  ENT: Normocephalic, without obvious abnormality, atraumatic  NECK: supple, symmetrical, no jugular venous distension and no carotid bruits   HEMATOLOGIC/LYMPHATIC: no cervical, supraclavicular or axillary lymphadenopathy   LUNGS: no increased work of breathing and clear to auscultation   CARDIOVASCULAR: regular rate and rhythm, normal S1 and S2, no murmur noted  ABDOMEN: normal bowel sounds x 4, soft, non-distended, non-tender, no masses palpated, no hepatosplenomegaly   MUSCULOSKELETAL: full range of motion noted, tone is normal  NEUROLOGIC: awake, alert, oriented to name, place and time. Motor skills grossly intact. SKIN: Normal skin color, texture, turgor and no jaundice.  appears intact   EXTREMITIES: no LE edema         Labs  CBC  Lab Results 03/17/2022 03/10/2022 02/24/2022 02/15/2022 02/01/2022 10/12/2021    CBC WBC x 10^3/uL 6.7 4.9 9.4 (H) 9.7 (H)   7.0      RBC x 10^6/uL 4.32 (L) 3.70 (L) 3.86 (L) 4.12 (L)   4.19 (L)      HGB g/dL 12.3 (L) 10.6 (L) 11.1 (L) 11.8 (L)   13.0 (L)      HCT % 38.6 (L) 32.6 (L) 34.9 (L) 37.7 (L)   38.0 (L)      MCV fL 89.4 88.1 90.4 91.5   90.7      MCH pg 28.5 28.6 28.8 28.6   31.0      MCHC g/dL 31.9 (L) 32.5 31.8 (L) 31.3 (L)   34.2      RDW-CV, % 15.8 (H) 13.8 14.1 14.3   13.9      PLT x 10^3/uL 285.0 97.0 (L) 243.0 236.0   180.0      Ac % 48.9 43.7 68.5 (H) 60.4   45.5      LY % 39.3 42.8 21.2 (L) 26.9   42.7      MO % 9.7 10.2 8.4 10.0   9.1      EO % 1.8 3.1 1.7 2.6   2.3      BA % 0.3 0.2 0.2 0.1 (L)   0.4      Ac # (ANC) x 10^3/uL 3.28 2.15 6.41 (H) 5.86 (H)   3.17      LY # x 10^3/uL 2.63 2.10 1.98 2.61   2.97      MO # x 10^3/uL 0.65 0.50 0.79 0.97 (H)   0.63      EO # x 10^3/uL 0.12 0.15 0.16 0.25   0.16      BA # x 10^3/uL 0.02 0.01 0.02 0.01   0.03  CMP  Lab Results 03/17/2022 03/10/2022 02/24/2022 02/15/2022 02/01/2022 10/12/2021    Chemistries                 Glucose mg/dL   95 117 106         BUN mg/dL   17 14 14         Creatinine mg/dL   0.87 0.9 0.93         BUN/Creatinine ratio   19.5   15.1         Sodium mmol/L   141 142 144         Potassium mmol/L   4.6 4.4 4.6         Chloride mmol/L   105 111 (H) 107         CO2 mmol/L   27.8 28 28.4         Anion gap, mmol/L   8.2   8.6         Calcium mg/dL   9.0 9.4 9.3         Albumin g/dL   3.2 (L) 3.4 3.3 (L)         Total protein g/dL   6.8 7.7 7.4         A/G ratio   0.9 (L)   0.8 (L)         Bilirubin, total mg/dL   0.7 0.9 0.6         Alkaline phosphatase U/L   113 126 152 (H)         AST/SGOT U/L   19 30 21         ALT/SGPT U/L   7 (L) 9 (L) 7 (L)         GFR non-African American, estimated mL/min/1.73m2   >60.0 >60.0 >60.0         GFR African American, estimated mL/min/1.73m2   >60.0 >60.0 >60.0                    Lab Results 03/17/2022 03/10/2022 02/24/2022 02/15/2022 02/01/2022 10/12/2021    Anemia Labs               Imaging  None      OCM - Patient Care Management    Pain, if applicable:        Performance Status: ECOG 0 Normal activity. Fully active, able to carry on all pre-disease performance without restriction. (Date: 03/17/2022)     Depression Status: Was screened; Outcome positive: Yes; Screening Date: 02/15/2022; Screening Tool: Patient Health Questionnaire [facility]); Plan: Other interventions or follow-up for the diagnosis or treatment of depression; Total depression score: 15    Psycho-social PHQ-9 Follow-up Plan (if applicable):     Research    Would you like this patient screened for clinical trial eligibility? If yes, please enter the order for \"Research: Screen for Eligibility\"    Assessment & Plan      1. Porphyria cutanea tarda. He has had excellent previous response to phlebotomy. CBC remains stable  His last ferritin was 133   He does feel his symptoms are well controlled with a q 2-3 month phlebotomy scheduled      2. Stage IIB Adenocarcinoma RUL  S/P RUL lobectomy at the [address] [facility] 12/2022  [id number] - stage IIB  Healing well since surgery   Recommend adjuvant chemotherapy with platinum/alimta x 4 cycles with node (+) disease  Concerned he will not tolerate cisplatin - will place POT for carboplatin  Will request PDL-1 and EGFR testing to see if he is a candidate for maintetnance immunotherapy or biologics after initial chemotherapy  We will proceed with cycle #2 today  Encouraged to push fluids   Refilled folic acid and Q65     3. URI  Resolved     4.  Anxiety  Continue Lorazepam

## 2022-03-31 NOTE — PROGRESS NOTES
" Discharge Summary    Fadia George MRN# [id number]   Age: 1 day old YOB: 2018     Date of Admission:  2018  4:12 PM  Date of Discharge::  2018  Admitting Physician:  Savanah Wilder MD  Discharge Physician:  Andressa Mabry MD, MD  Primary care provider: No Ref-Primary, Physician         Interval history:   Fadia George was born at 2018 4:12 PM by      Stable, no new events  Feeding plan: Breast feeding going well    Hearing Screen Date: 10/06/18  Hearing Screen Left Ear Abr (Auditory Brainstem Response): passed  Hearing Screen Right Ear Abr (Auditory Brainstem Response): passed     Oxygen Screen/CCHD                     Immunization History   Administered Date(s) Administered     Hep B, Peds or Adolescent 2018            Physical Exam:   Vital Signs:  Patient Vitals for the past 24 hrs:   Temp Temp src Heart Rate Resp SpO2 Height Weight   10/06/18 0050 98.2  F (36.8  C) Axillary 148 48 - - 3.99 kg (8 lb 12.7 oz)   10/05/18 2113 98.5  F (36.9  C) - - - - - -   10/05/18 1830 - - - - 100 % - -   10/05/18 1745 99.1  F (37.3  C) Axillary 142 40 - - -   10/05/18 1715 98.2  F (36.8  C) Axillary 140 42 - - -   10/05/18 1645 98.8  F (37.1  C) Axillary 130 48 - - -   10/05/18 1615 98.1  F (36.7  C) Axillary 160 38 - - -   10/05/18 1612 - - - - - 0.552 m (1' 9.75\") 4.03 kg (8 lb 14.2 oz)     Wt Readings from Last 3 Encounters:   10/06/18 3.99 kg (8 lb 12.7 oz) (93 %)*     * Growth percentiles are based on WHO (Girls, 0-2 years) data.     Weight change since birth: -1%    General:  alert and normally responsive  Skin:  no abnormal markings; normal color without significant rash.  No jaundice  Head/Neck  normal anterior and posterior fontanelle, intact scalp; Neck without masses.  Eyes  normal red reflex  Ears/Nose/Mouth:  intact canals, patent nares, mouth normal  Thorax:  normal contour, clavicles intact  Lungs:  clear, no retractions, no increased work of " Consult has been called to Dr. Isha Junior on 3/31/22. Spoke with perfect served dr Isha Junior.  10:32 AM    [facility]  3/31/2022 breathing  Heart:  normal rate, rhythm.  No murmurs.  Normal femoral pulses.  Abdomen  soft without mass, tenderness, organomegaly, hernia.  Umbilicus normal.  Genitalia:  normal female external genitalia  Anus:  patent  Trunk/Spine  straight, intact  Musculoskeletal:  Normal Huitron and Ortolani maneuvers.  intact without deformity.  Normal digits.  Neurologic:  normal, symmetric tone and strength.  normal reflexes.         Data:   All laboratory data reviewed  TcB:  No results for input(s): TCBIL in the last 168 hours. and Serum bilirubin:No results for input(s): BILITOTAL in the last 168 hours.      bilitool        Assessment:   Baby1 Ronna George is a Term  appropriate for gestational age female    Patient Active Problem List   Diagnosis     Single liveborn infant delivered vaginally           Plan:   -Discharge to home with parents    Attestation:  I have reviewed today's vital signs, notes, medications, labs and imaging.        Andressa Mabry MD, MD

## 2022-03-31 NOTE — CONSULTS
HEMATOLOGY/ONCOLOGY CONSULTATION:     3/31/2022 7:59 AM    REASON FOR CONSULT: Lung cancer    PROVIDERS:  Sridhar Tuttle MD    CHIEF COMPLAINT:     Chief Complaint   Patient presents with   Krista Ríos     pt states has fallen 4 times in past 2 days, getting chemo for lung cancer, weak, states fell backwards and hit head on concrete, denies loc, on blood thinners, left knee pain and swelling       HISTORY OF PRESENT ILLNESS:     HPI:  68 WM with pmh stage II lung cancer, a fib and porphyria cutanea tarda. The patient had a RUL 12/2021 and has been on adjuvant chemotherapy with carbo/alimta. He received cycle 2 on 3/17/22. He presented to the ER after having several falls at home. XR revealed an ulnar fracture. His Cr was mildly elevated at 1.3. PAST MEDICAL HISTORY:     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    S/P drug eluting coronary stent placement 03/06/2017    2.25 x 18 [employer] ADRI[facility]N - Distal LAD    Seizure disorder ([address]     4 weeks ago black out, pt states seizure but may be due to Heart Rate changes    Seizures (HCC)     Shortness of breath 9/4/2014    Total knee replacement status 1/12/2011       PAST SURGICAL HISTORY:        Past Surgical History:   Procedure Laterality Date    ABLATION OF DYSRHYTHMIC FOCUS  9/2014    CARPAL TUNNEL RELEASE      CERVICAL [address] SURGERY  2010    COLONOSCOPY  09/26/2016    normal    CORONARY ANGIOPLASTY WITH STENT PLACEMENT      FEMUR SURGERY      left    KNEE SURGERY  1-12-11 R total knee replacement with femoral nerve block for pain control    LUNG REMOVAL, PARTIAL Right     20%    OTHER SURGICAL HISTORY Bilateral     excisions of lesions on bilat. neck and back    OTHER SURGICAL HISTORY  2014    Reveal Loop recorder placed in Cath Lab    UPPER GASTROINTESTINAL ENDOSCOPY  2016    gastric and esophogeal biopsy       SOCIAL HISTORY:     Social History     Socioeconomic History    Marital status:      Spouse name: Not on file    Number of children: Not on file    Years of education: Not on file    Highest education level: Not on file   Occupational History    Not on file   Tobacco Use    Smoking status: Former Smoker     Packs/day: 2.00     Years: 40.00     Pack years: 80.00     Quit date: 1993     Years since quittin.8    Smokeless tobacco: Never Used    Tobacco comment: 21 yrs   Vaping Use    Vaping Use: Never used   Substance and Sexual Activity    Alcohol use: No    Drug use: No    Sexual activity: Not Currently   Other Topics Concern    Not on file   Social History Narrative    Not on file     Social Determinants of Health     Financial Resource Strain:     Difficulty of Paying Living Expenses: Not on file   Food Insecurity:     Worried About [employer] in the Last Year: Not on file    Christine of Food in the Last Year: Not on file   Transportation Needs:     Lack of Transportation (Medical): Not on file    Lack of Transportation (Non-Medical):  Not on file   Physical Activity:     Days of Exercise per Week: Not on file    Minutes of Exercise per Session: Not on file   Stress:     Feeling of Stress : Not on file   Social Connections:     Frequency of Communication with Friends and Family: Not on file    Frequency of Social Gatherings with Friends and Family: Not on file    Attends Judaism Services: Not on file    Active Member of Clubs or Organizations: Not on file    Attends Club or Organization Meetings: Not on file    Marital Status: Not on file   Intimate Partner Violence:     Fear of Current or Ex-Partner: Not on file    Emotionally Abused: Not on file    Physically Abused: Not on file    Sexually Abused: Not dabigatran (PRADAXA) 150 MG capsule Take 1 capsule by mouth 2 times daily 180 capsule 3    omeprazole (PRILOSEC) 20 MG delayed release capsule Take 20 mg by mouth 2 times daily      sotalol (BETAPACE) 80 MG tablet Take 0.5 tablets by mouth 2 times daily 60 tablet 5    fluocinonide (LIDEX) 0.05 % cream Apply topically 2 times daily. 1 Tube 0    furosemide (LASIX) 20 MG tablet TAKE 1 TABLET DAILY (Patient taking differently: TAKE 1 TABLET DAILY prn) 90 tablet 2    ferrous sulfate 325 (65 FE) MG tablet Take 1 tablet by mouth 2 times daily (with meals) 180 tablet 3    zolpidem (AMBIEN) 10 MG tablet Take 10 mg by mouth nightly as needed.  LORazepam (ATIVAN) 1 MG tablet Take 1 mg by mouth 2 times daily as needed.  Lacosamide (VIMPAT PO) Take 100 mg by mouth 2 times daily. Indications: take dos      paroxetine (PAXIL) 20 MG tablet Take 20 mg by mouth in the morning and at bedtime        REVIEW OF SYSTEMS:       10 point ROS completed. Pertinent positives in HPI, otherwise negative.      PHYSICAL EXAM:       Vitals:    03/31/22 0300   BP: (!) 148/73   Pulse: 95   Resp: 16   Temp: 98.1 °F (36.7 °C)   SpO2: 96%       General appearance: alert and cooperative  Head: Normocephalic, without obvious abnormality, atraumatic  Neck: No palpable lymphadenopathy in supraclavicular or cervical chains  Lungs: Clear to auscultation bilaterally, no audible rales, wheezes or crackles  Heart: Regular rate and rhythm, S1, S2 normal  Abdomen: Soft, non-tender; bowel sounds normal; no masses,  no organomegaly  Extremities: without cyanosis, clubbing, edema or asymmetry  Skin: No jaundice, purpura or petechiae      LABS:     Lab Results   Component Value Date    WBC 4.0 03/31/2022    HGB 9.2 (L) 03/31/2022    HCT 28.7 (L) 03/31/2022    MCV 90.2 03/31/2022    PLT 58 (L) 03/31/2022       Lab Results   Component Value Date    GLUCOSE 122 (H) 03/31/2022    BUN 26 (H) 03/31/2022    CREATININE 1.1 03/31/2022    K 4.0 03/31/2022 Lab Results   Component Value Date    ALKPHOS 86 03/31/2022    ALT 13 03/31/2022    AST 14 (L) 03/31/2022    BILITOT 0.4 03/31/2022    BILIDIR 0.20 04/19/2014    PROT 5.5 (L) 03/31/2022       Lab Results   Component Value Date    IRON 175 08/12/2015    TIBC 392 08/12/2015    FERRITIN 58.8 01/13/2017       IMAGING:     XR ELBOW LEFT (2 VIEWS)  Result Date: 3/30/2022  Proximal ulna fractures suspected. CT suggested to further evaluate     XR KNEE LEFT (1-2 VIEWS)  Result Date: 3/30/2022  1. No acute abnormality of the left knee. 2. Chronic healed fracture deformity of the distal left femur. 3. Mild tricompartmental osteoarthritis. CT HEAD WO CONTRAST  Result Date: 3/30/2022  No acute intracranial abnormality. RECOMMENDATIONS: Unavailable     CT CERVICAL SPINE WO CONTRAST  Result Date: 3/30/2022  No acute abnormality of the cervical spine. CT LUMBAR SPINE WO CONTRAST  Result Date: 3/30/2022  1. No acute fracture. No listhesis. 2. Nonspecific small complex right pleural effusion. Correlate with history. 3. Other findings as described. XR CHEST PORTABLE  Result Date: 3/30/2022  Possible right pleural effusion Otherwise, no acute cardiopulmonary process       STAGING:     Cancer Staging  IIB    ASSESSMENT:     Problem List Items Addressed This Visit     None      Visit Diagnoses     Frequent falls    -  Primary    General weakness        Dehydration        Thrombocytopenia (HCC)        Anticoagulated        Closed head injury, initial encounter        Left elbow fracture, closed, initial encounter        Relevant Medications    acetaminophen (TYLENOL) tablet 650 mg (Completed)    oxyCODONE-acetaminophen (PERCOCET) 5-325 MG per tablet 1 tablet    acetaminophen (TYLENOL) tablet 650 mg    acetaminophen (TYLENOL) suppository 650 mg    Pleural effusion on right                    PLAN:     1. L Ulnar Fx  - per ortho    2.  Stage IIB NSCLC   - s/p RUL lobectomy 12/2022  - getting adjuvant chemotherapy with carbo/alimta  - s/p C2/4 3/17/22  - monitor CBC    3. MIRNA/Dehydration  - suspect pre-renal  - IVF    4. TCP  - from chemo  - monitor CBC    5. Afib  - on pradaxa  - may need to hold if Plt < 50    6. PCT  - gets intermittent phlebotomy to keep ferritin around 50  - no intervention at this time    7.  Falls  - PT/OT    Aviva Olson MD

## 2022-03-31 NOTE — PROGRESS NOTES
Inpatient Occupational Therapy  Evaluation and Treatment    Unit: 2 [place]  Date:  3/31/2022  Patient Name:    Diego Hutton  Admitting diagnosis:  Dehydration [E86.0]  Thrombocytopenia ([facility] [D69.6]  General weakness [R53.1]  Anticoagulated [Z79.01]  Pleural effusion on right [J90]  MIRNA (acute kidney injury) ([facility] [N17.9]  Frequent falls [R29.6]  Closed head injury, initial encounter [S09.90XA]  Left elbow fracture, closed, initial encounter Kathlean Nail  Admit Date:  3/30/2022   Precautions/Restrictions/WB Status/ Lines/ Wounds/ Oxygen: Fall risk, Bed/chair alarm and Lines -IV. Left UE sling   NWB on the Left UE   Treatment Time:  8380-0865  Treatment Number: 1   Timed code treatment minutes 30minutes   Total Treatment minutes:   40   minutes    Patient Goals for Therapy:  \" not stated \"      Discharge Recommendations: Home 24 hr assist   DME needs for discharge: Needs Met       Therapy recommendations for staff:   Assist of 1 with use of quad cane [facility]) for all ambulation to/from bathroom    History of Present Illness:   68 y.o. male presenting to the emergency department for evaluation after multiple falls. Has fallen 4 times in the past 2 days. Today fell when his wife was trying to get him in the doorway states he just fell backwards hit his head hard. He is on Pradaxa blood thinner. Has cardiac stent. Denies any chest pain. He has lung cancer undergoing chemotherapy last chemo was 2 weeks ago. Has been generally weak and fatigued and also lightheaded. Denies any syncope. No cough or fever. Wife states he is also been a little confused for about 3 to 4 days she had wanted to get his urine checked having some difficulty with urination possible UTI. The history is provided by the patient and the spouse  Home Health S4 Level Recommendation:  Level 1 Standard  AM-PAC Score: 16    Preadmission Environment    Pt.  Lives with spouse  Home environment:  one story home  Steps to enter first floor: 2 steps to enter no railing and one step in the house   Steps to second floor: N/A  Bathroom: tub/shower unit, shower chair , comfort height commode  Equipment owned: Melissa Lundberg, two wheeled walkers, shower seat ,reacher     Preadmission Status:  Pt. Able to drive: Yes  Pt Fully independent with ADLs: Yes  Pt. Required assistance from family for: Cooking and both do the cleaning and laundry   Pt. independent for transfers and gait and walked with No Device  History of falls Yes    Pain  Yes  Rating:mild  Location:back   Pain Medicine Status: No request made      Cognition    A&O x4   Able to follow 2 step commands    Subjective  Patient lying supine in bed with no family present. Pt agreeable to this OT eval & tx. Upper Extremity ROM:    WFL on the right   Left UE in sling   Pt is rt handed   Upper Extremity Strength:        Rt UE WFL   Upper Extremity Sensation    WFL    Upper Extremity Proprioception:  WFL    Coordination and Tone  WFL    Balance  Functional Sitting Balance:  WFL  Functional Standing Balance:Diminished    Bed mobility:    Supine to sit:   Min A   Sit to supine:   Not Tested  Rolling:    Not Tested  Scooting in sitting:  Independent  Scooting to head of bed:   Not Tested    Bridging:   Not Tested    Transfers:    Sit to stand:  SBA  Stand to sit:  SBA with VC for hand placement   Bed to chair:   SBA with cane  Standard toilet: Not Tested  Bed to [facility]:  Not Tested    Dressing:      UE:   Not Tested  LE:    Min assist to pull underwear up/down    Bathing:    UE:  Not Tested  LE:  Not Tested    Eating:   Not Tested    Toileting:  CGA and Min A     Activity Tolerance   Pt completed therapy session with decreased BP with sit to stand however no adverse symptoms  BP sitting 147/78   BP standing 106/62    Positioning Needs:   Pt up in chair, alarm set, positioned in proper neutral alignment and pressure relief provided.      Exercise / Activities Initiated:   N/A    Patient/Family Education:   Role of OT    Assessment of Deficits: Pt seen for Occupational therapy evaluation in acute care setting. Pt demonstrated decreased Activity tolerance, IADLs, Balance , Bed mobility, Safety Awareness and Transfers. Pt functioning below baseline and will likely benefit from skilled occupational therapy services to maximize safety and independence. Goal(s) : To be met in 3 Visits:  1). Bed to toilet/BSC: Supervision    To be met in 5 Visits:  1). Supine to/from Sit:  SBA  2). Upper Body Bathing:   Min A   3). Lower Body Bathing:   Min A   4). Upper Body Dressing:  Min A   5). Lower Body Dressing:  Min A   6). Pt to demonstrate UE exs x 15 reps with minimal cues    Rehabilitation Potential:  Fair for goals listed above. Strengths for achieving goals include: Pt cooperative  Barriers to achieving goals include:  Complexity of condition     Plan: To be seen 3-5 x/wk while in acute care setting for therapeutic exercises, bed mobility, transfers, dressing, bathing, family/patient education, ADL/IADL retraining, energy conservation training.      Junior Hernandes OTR/L [id number]          If patient discharges from this facility prior to next visit, this note will serve as the Discharge Summary

## 2022-03-31 NOTE — PLAN OF CARE
Problem: Pain:  Goal: Control of acute pain  Description: Control of acute pain  Outcome: Ongoing  Goal: Control of chronic pain  Description: Control of chronic pain  Outcome: Ongoing  Goal: Patient's pain/discomfort is manageable  Description: Patient's pain/discomfort is manageable  Outcome: Ongoing

## 2022-04-01 VITALS
BODY MASS INDEX: 26.57 KG/M2 | RESPIRATION RATE: 16 BRPM | WEIGHT: 185.56 LBS | DIASTOLIC BLOOD PRESSURE: 66 MMHG | OXYGEN SATURATION: 94 % | HEIGHT: 70 IN | SYSTOLIC BLOOD PRESSURE: 115 MMHG | TEMPERATURE: 98.7 F | HEART RATE: 87 BPM

## 2022-04-01 PROBLEM — N17.9 AKI (ACUTE KIDNEY INJURY) (HCC): Status: RESOLVED | Noted: 2022-03-31 | Resolved: 2022-04-01

## 2022-04-01 LAB
ANION GAP SERPL CALCULATED.3IONS-SCNC: 12 MMOL/L (ref 3–16)
BASOPHILS ABSOLUTE: 0 K/UL (ref 0–0.2)
BASOPHILS RELATIVE PERCENT: 0.3 %
BUN BLDV-MCNC: 17 MG/DL (ref 7–20)
CALCIUM SERPL-MCNC: 8.7 MG/DL (ref 8.3–10.6)
CHLORIDE BLD-SCNC: 105 MMOL/L (ref 99–110)
CO2: 20 MMOL/L (ref 21–32)
CREAT SERPL-MCNC: 0.8 MG/DL (ref 0.8–1.3)
EOSINOPHILS ABSOLUTE: 0 K/UL (ref 0–0.6)
EOSINOPHILS RELATIVE PERCENT: 0.9 %
GFR AFRICAN AMERICAN: >60
GFR NON-AFRICAN AMERICAN: >60
GLUCOSE BLD-MCNC: 92 MG/DL (ref 70–99)
HCT VFR BLD CALC: 31.4 % (ref 40.5–52.5)
HEMOGLOBIN: 10.2 G/DL (ref 13.5–17.5)
LYMPHOCYTES ABSOLUTE: 1.8 K/UL (ref 1–5.1)
LYMPHOCYTES RELATIVE PERCENT: 45.2 %
MCH RBC QN AUTO: 29 PG (ref 26–34)
MCHC RBC AUTO-ENTMCNC: 32.5 G/DL (ref 31–36)
MCV RBC AUTO: 89 FL (ref 80–100)
MONOCYTES ABSOLUTE: 0.6 K/UL (ref 0–1.3)
MONOCYTES RELATIVE PERCENT: 14.7 %
NEUTROPHILS ABSOLUTE: 1.5 K/UL (ref 1.7–7.7)
NEUTROPHILS RELATIVE PERCENT: 38.9 %
PDW BLD-RTO: 16.5 % (ref 12.4–15.4)
PLATELET # BLD: 65 K/UL (ref 135–450)
PMV BLD AUTO: 7.4 FL (ref 5–10.5)
POTASSIUM REFLEX MAGNESIUM: 4.1 MMOL/L (ref 3.5–5.1)
RBC # BLD: 3.53 M/UL (ref 4.2–5.9)
SODIUM BLD-SCNC: 137 MMOL/L (ref 136–145)
WBC # BLD: 3.9 K/UL (ref 4–11)

## 2022-04-01 PROCEDURE — 99238 HOSP IP/OBS DSCHRG MGMT 30/<: CPT

## 2022-04-01 PROCEDURE — 80048 BASIC METABOLIC PNL TOTAL CA: CPT

## 2022-04-01 PROCEDURE — 6370000000 HC RX 637 (ALT 250 FOR IP): Performed by: INTERNAL MEDICINE

## 2022-04-01 PROCEDURE — 36415 COLL VENOUS BLD VENIPUNCTURE: CPT

## 2022-04-01 PROCEDURE — 96361 HYDRATE IV INFUSION ADD-ON: CPT

## 2022-04-01 PROCEDURE — G0378 HOSPITAL OBSERVATION PER HR: HCPCS

## 2022-04-01 PROCEDURE — 85025 COMPLETE CBC W/AUTO DIFF WBC: CPT

## 2022-04-01 RX ORDER — OXYCODONE HYDROCHLORIDE AND ACETAMINOPHEN 5; 325 MG/1; MG/1
1 TABLET ORAL EVERY 6 HOURS PRN
Qty: 12 TABLET | Refills: 0 | Status: SHIPPED | OUTPATIENT
Start: 2022-04-01 | End: 2022-04-04

## 2022-04-01 RX ORDER — FUROSEMIDE 20 MG/1
TABLET ORAL
Qty: 90 TABLET | Refills: 2 | Status: SHIPPED | OUTPATIENT
Start: 2022-04-01 | End: 2022-05-09

## 2022-04-01 RX ADMIN — ATORVASTATIN CALCIUM 40 MG: 40 TABLET, FILM COATED ORAL at 09:37

## 2022-04-01 RX ADMIN — CALCIUM CARBONATE (ANTACID) CHEW TAB 500 MG 500 MG: 500 CHEW TAB at 09:37

## 2022-04-01 RX ADMIN — FERROUS SULFATE TAB 325 MG (65 MG ELEMENTAL FE) 325 MG: 325 (65 FE) TAB at 09:37

## 2022-04-01 RX ADMIN — FINASTERIDE 5 MG: 5 TABLET, FILM COATED ORAL at 09:37

## 2022-04-01 RX ADMIN — DABIGATRAN ETEXILATE MESYLATE 150 MG: 150 CAPSULE ORAL at 09:37

## 2022-04-01 RX ADMIN — CARBIDOPA AND LEVODOPA 1 TABLET: 25; 100 TABLET ORAL at 09:37

## 2022-04-01 RX ADMIN — PANTOPRAZOLE SODIUM 20 MG: 20 TABLET, DELAYED RELEASE ORAL at 06:16

## 2022-04-01 RX ADMIN — PAROXETINE HYDROCHLORIDE 20 MG: 20 TABLET, FILM COATED ORAL at 09:37

## 2022-04-01 RX ADMIN — LACOSAMIDE 100 MG: 50 TABLET, FILM COATED ORAL at 09:36

## 2022-04-01 ASSESSMENT — PAIN SCALES - GENERAL
PAINLEVEL_OUTOF10: 0
PAINLEVEL_OUTOF10: 0

## 2022-04-01 NOTE — PROGRESS NOTES
ONCOLOGY FOLLOW-UP:       Primary Oncologist:  Ani    PROBLEM LIST:       Patient Active Problem List   Diagnosis Code    Dizziness R42    Syncope and collapse R55    Paroxysmal atrial fibrillation (HCC) I48.0    Contusion of knee, right S80. 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[facility] E80.1    History of nonmelanoma skin cancer Z85.828    Ischemic chest pain (HCC) I20.9    Left hand pain M79.642    Hand pain, right M79.641    Arthritis of carpometacarpal (CMC) joints of both thumbs M18.0    Radial styloid tenosynovitis (de quervain) M65.4    MIRNA (acute kidney injury) ([facility] N17.9       INTERVAL HISTORY:       Pt remains admitted. He is afebrile. Seen by orthopedics and no surgery planned. AM CBC pending. REVIEW OF SYSTEMS:       10 point ROS completed. Pertinent positives in HPI, otherwise negative.      PHYSICAL EXAM:       Vitals:    04/01/22 0239   BP: 121/69   Pulse: 88   Resp: 16   Temp: 97.2 °F (36.2 °C)   SpO2: 94%       General appearance: alert and cooperative  Head: Normocephalic, without obvious abnormality, atraumatic  Neck: No palpable lymphadenopathy in supraclavicular or cervical chains  Lungs: Clear to auscultation bilaterally, no audible rales, wheezes or crackles  Heart: Regular rate and rhythm, S1, S2 normal  Abdomen: Soft, non-tender; bowel sounds normal; no masses,  no organomegaly  Extremities: without cyanosis, clubbing, edema or asymmetry  Skin: No jaundice, purpura or petechiae      LABS:     Lab Results   Component Value Date    WBC 4.0 03/31/2022    HGB 9.2 (L) 03/31/2022    HCT 28.7 (L) 03/31/2022    MCV 90.2 03/31/2022    PLT 58 (L) 03/31/2022       Lab Results   Component Value Date    GLUCOSE 122 (H) 03/31/2022    BUN 26 (H) 03/31/2022    CREATININE 1.1 03/31/2022    K 4.0 03/31/2022       Lab Results   Component Value Date    ALKPHOS 86 03/31/2022    ALT 13 03/31/2022    AST 14 (L) 03/31/2022    BILITOT 0.4 03/31/2022    BILIDIR 0.20 04/19/2014    PROT 5.5 (L) 03/31/2022       Lab Results   Component Value Date    IRON 175 08/12/2015    TIBC 392 08/12/2015    FERRITIN 58.8 01/13/2017       IMAGING:     XR ELBOW LEFT (2 VIEWS)  Result Date: 3/30/2022  Proximal ulna fractures suspected. CT suggested to further evaluate     XR KNEE LEFT (1-2 VIEWS)  Result Date: 3/30/2022  1. No acute abnormality of the left knee. 2. Chronic healed fracture deformity of the distal left femur. 3. Mild tricompartmental osteoarthritis. CT HEAD WO CONTRAST  Result Date: 3/30/2022  No acute intracranial abnormality. RECOMMENDATIONS: Unavailable     CT CERVICAL SPINE WO CONTRAST  Result Date: 3/30/2022  No acute abnormality of the cervical spine. CT LUMBAR SPINE WO CONTRAST  Result Date: 3/30/2022  1. No acute fracture. No listhesis. 2. Nonspecific small complex right pleural effusion. Correlate with history. 3. Other findings as described. XR CHEST PORTABLE  Result Date: 3/30/2022  Possible right pleural effusion Otherwise, no acute cardiopulmonary process       STAGING:     Cancer Staging  IIB    ASSESSMENT:     Problem List Items Addressed This Visit     None      Visit Diagnoses     Frequent falls    -  Primary    General weakness        Dehydration        Thrombocytopenia (HCC)        Anticoagulated        Closed head injury, initial encounter        Left elbow fracture, closed, initial encounter        Relevant Medications    acetaminophen (TYLENOL) tablet 650 mg (Completed)    oxyCODONE-acetaminophen (PERCOCET) 5-325 MG per tablet 1 tablet    acetaminophen (TYLENOL) tablet 650 mg    acetaminophen (TYLENOL) suppository 650 mg    Pleural effusion on right                    PLAN:     1. L Ulnar Fx  - per ortho - no surgery planned at this time    2. Stage IIB NSCLC   - s/p RUL lobectomy 12/2022  - getting adjuvant chemotherapy with carbo/alimta  - s/p C2/4 3/17/22  - monitor CBC    3. MIRNA/Dehydration  - suspect pre-renal  - IVF    4. TCP  - from chemo  - monitor CBC    5. Afib  - on pradaxa  - may need to hold if Plt < 50    6. PCT  - gets intermittent phlebotomy to keep ferritin around 50  - no intervention at this time    7.  Falls  - PT/OT    Bhavna Rodríguez MD

## 2022-04-01 NOTE — CARE COORDINATION
DISCHARGE ORDER  Date/Time 2022 10:32 AM  Completed by: Corazon Vinson RN, Case Management    Patient Name: Chaim Chavez      : 1945  Admitting Diagnosis: Dehydration [E86.0]  Thrombocytopenia ([facility] [address] [D69.6]  General weakness [R53.1]  Anticoagulated [Z79.01]  Pleural effusion on right [J90]  MIRNA (acute kidney injury) ([facility] [address] [N17.9]  Frequent falls [R29.6]  Closed head injury, initial encounter [S09.90XA]  Left elbow fracture, closed, initial encounter [S42.402A]      Admit order Date and Status: 3/31/22 inpt  (verify MD's last order for status of admission)      Noted discharge order. If applicable PT/OT recommendation at Discharge:  Home with prn assist  DME recommendation by PT/OT:N/A  Confirmed discharge plan : Yes  with whom patient  If pt confirmed DC plan does family need to be contacted by CM Yes if yes who wife  Discharge Plan:  Order for dc noted. Spoke with pt and wife at bedside. Plan remains for return home. Therapy eval noted. Discussed HHC for SN and pt and wife decline at this time. Chart reviewed and no other dc needs identified. Reviewed chart. Role of discharge planner explained and patient verbalized understanding. Discharge order is noted. Has Home O2 in place on admit:  No  Informed of need to bring portable home O2 tank on day of discharge for nursing to connect prior to leaving:   Not Indicated  Verbalized agreement/Understanding:   Not Indicated  Pt is being d/c'd to home  today. Pt's O2 sats are 94% on RA. Discharge timeout done with  Nsg, CM and pt and wife. All discharge needs and concerns addressed.

## 2022-04-01 NOTE — PLAN OF CARE
Problem: Falls - Risk of:  Goal: Will remain free from falls  Description: Will remain free from falls  4/1/2022 1026 by Shraddha Gamboa RN  Outcome: Completed  4/1/2022 0553 by Luis Christie RN  Outcome: Ongoing  Goal: Absence of physical injury  Description: Absence of physical injury  4/1/2022 1026 by Shraddha Gamboa RN  Outcome: Completed  4/1/2022 0553 by Luis Christie RN  Outcome: Ongoing     Problem: Pain:  Goal: Pain level will decrease  Description: Pain level will decrease  4/1/2022 1026 by Shraddha Gamboa RN  Outcome: Completed  4/1/2022 0553 by Luis Christie RN  Outcome: Ongoing  Goal: Control of acute pain  Description: Control of acute pain  4/1/2022 1026 by Shraddha Gambao RN  Outcome: Completed  4/1/2022 0553 by Luis Christie RN  Outcome: Ongoing  Goal: Control of chronic pain  Description: Control of chronic pain  4/1/2022 1026 by Shraddha Gamboa RN  Outcome: Completed  4/1/2022 [id number] by Luis Christie RN  Outcome: Ongoing  Goal: Patient's pain/discomfort is manageable  Description: Patient's pain/discomfort is manageable  4/1/2022 1026 by Shraddha Gamboa RN  Outcome: Completed  4/1/2022 0553 by Luis Christie RN  Outcome: Ongoing     Problem: Infection:  Goal: Will remain free from infection  Description: Will remain free from infection  4/1/2022 1026 by Shraddha Gamboa RN  Outcome: Completed  4/1/2022 0553 by Luis Christie RN  Outcome: Ongoing     Problem: Safety:  Goal: Free from accidental physical injury  Description: Free from accidental physical injury  4/1/2022 1026 by Shraddha Gamboa RN  Outcome: Completed  4/1/2022 0553 by Luis Christie RN  Outcome: Ongoing  Goal: Free from intentional harm  Description: Free from intentional harm  4/1/2022 1026 by Shraddha Gamboa RN  Outcome: Completed  4/1/2022 0553 by Luis Christie RN  Outcome: Ongoing     Problem: Daily Care:  Goal: Daily care needs are met  Description: Daily care needs are met  4/1/2022 1026 by Shraddha Gamboa RN  Outcome: Completed  4/1/2022 0553 by Clinton Alonzo RN  Outcome: Ongoing     Problem: Skin Integrity:  Goal: Skin integrity will stabilize  Description: Skin integrity will stabilize  4/1/2022 1026 by Karl Rivera RN  Outcome: Completed  4/1/2022 0553 by Clinton Alonzo RN  Outcome: Ongoing     Problem: Discharge Planning:  Goal: Patients continuum of care needs are met  Description: Patients continuum of care needs are met  4/1/2022 1026 by Karl Rivera RN  Outcome: Completed  4/1/2022 0553 by Clinton Alonzo RN  Outcome: Ongoing

## 2022-04-01 NOTE — PROGRESS NOTES
Pt alert and oriented, up in chair, shift assessment completed, vss, medications given, pt c/o anxiety gave Ativan 1 mg po, call light within reach, will continue to monitor.  Matthew Kebede RN

## 2022-04-01 NOTE — DISCHARGE INSTR - COC
Continuity of Care Form    Patient Name: Rosalind Pedersen   :    MRN:  [id number]    Admit date:  3/30/2022  Discharge date:  ***    Code Status Order: Full Code   Advance Directives:      Admitting Physician:  Latisha Martin MD  PCP: Tamanna Head MD    Discharging Nurse: [facility] Unit/Room#: 0225/0225-02  Discharging Unit Phone Number: ***    Emergency Contact:   Extended Emergency Contact Information  Primary Emergency Contact: Helen Luna  Address: [facility]           [address] , [facility] [address] [place] [address] [place] [address] Phone: [phone]  Mobile Phone: [phone]  Relation: Spouse   needed? No  Secondary Emergency Contact: [address] [place] [address] Phone: [phone]  Mobile Phone: [phone]  Relation: Other   needed? No    Past Surgical History:  Past Surgical History:   Procedure Laterality Date    ABLATION OF DYSRHYTHMIC FOCUS  2014    CARPAL TUNNEL RELEASE      CERVICAL [address] [place] SURGERY  2010    COLONOSCOPY  2016    normal    CORONARY ANGIOPLASTY WITH STENT PLACEMENT      FEMUR SURGERY      left    KNEE SURGERY  11 R total knee replacement with femoral nerve block for pain control    LUNG REMOVAL, PARTIAL Right     20%    OTHER SURGICAL HISTORY Bilateral     excisions of lesions on bilat. neck and back    OTHER SURGICAL HISTORY  2014    Reveal Loop recorder placed in Cath Lab    UPPER GASTROINTESTINAL ENDOSCOPY  2016    gastric and esophogeal biopsy       Immunization History:   Immunization History   Administered Date(s) Administered    Influenza Virus Vaccine 2014       Active Problems:  Patient Active Problem List   Diagnosis Code    Dizziness R42    Syncope and collapse R55    Paroxysmal atrial fibrillation (HCC) I48.0    Contusion of knee, right S80. 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    History of nonmelanoma skin cancer Z85.828    Ischemic chest pain (HCC) I20.9    Left hand pain M79.642    Hand pain, right M79.641    Arthritis of carpometacarpal (CMC) joints of both thumbs M18.0    Radial styloid tenosynovitis (de quervain) M65.4       Isolation/Infection:   Isolation            No Isolation          Patient Infection Status       None to display            Nurse Assessment:  Last Vital Signs: /66   Pulse 87   Temp 98.7 °F (37.1 °C) (Oral)   Resp 16   Ht 5' 10\" (1.778 m)   Wt 185 lb 9 oz (84.2 kg)   SpO2 94%   BMI 26.63 kg/m²     Last documented pain score (0-10 scale): Pain Level: 0  Last Weight:   Wt Readings from Last 1 Encounters:   03/31/22 185 lb 9 oz (84.2 kg)     Mental Status:  {IP PT MENTAL STATUS:20030}    IV Access:  { JOANIE IV ACCESS:226828951}    Nursing Mobility/ADLs:  Walking   {P DME UQOI:117168312}  Transfer  {[facility] DME MKXT:861589248}  Bathing  {[facility] DME VKQW:543599439}  Dressing  {P DME TUNI:021382202}  Toileting  {[facility] DME NAQU:161705158}  Feeding  {[facility] DME MGZV:460788642}  Med Admin  {[facility] DME RLGA:239702975}  Med Delivery   { JOANIE MED Delivery:180611411}    Wound Care Documentation and Therapy:        Elimination:  Continence: Bowel: {YES / CF:28345}  Bladder: {YES / KI:60420}  Urinary Catheter: {Urinary Catheter:948896191}   Colostomy/Ileostomy/Ileal Conduit: {YES / PQ:81270}       Date of Last BM: ***    Intake/Output Summary (Last 24 hours) at 4/1/2022 1036  Last data filed at 4/1/2022 0931  Gross per 24 hour   Intake 1428.07 ml   Output 450 ml   Net 978.07 ml     I/O last 3 completed shifts:   In: 240 [P.O.:240]  Out: 450 [Urine:450]    Safety Concerns:     [address] Safety Concerns:383598170}    Impairments/Disabilities:      [address] NAIR Impairments/Disabilities:022300570}    Nutrition Therapy:  Current Nutrition Therapy:   [address] NAIR Diet List:724379696}    Routes of Feeding: {CHP DME Other Feedings:257683832}  Liquids: {Slp liquid thickness:24240}  Daily Fluid Restriction: {LUANNE ASENCIO Yes amt XSNCTAL:703632301}  Last Modified Barium Swallow with Video (Video Swallowing Test): {Done Not Done BFCZ:329406576}    Treatments at the Time of Hospital Discharge:   Respiratory Treatments: ***  Oxygen Therapy:  {Therapy; copd oxygen:90151}  Ventilator:    {[facility] Vent IOEO:222212618}    Rehab Therapies: {THERAPEUTIC INTERVENTION:7090583842}  Weight Bearing Status/Restrictions: {[facility] Weight Bearin}  Other Medical Equipment (for information only, NOT a DME order):  {EQUIPMENT:034282222}  Other Treatments: ***    Patient's personal belongings (please select all that are sent with patient):  {CHP DME Belongings:241074080}    RN SIGNATURE:  {Esignature:668223721}    CASE MANAGEMENT/SOCIAL WORK SECTION    Inpatient Status Date: ***    Readmission Risk Assessment Score:  Readmission Risk              Risk of Unplanned Readmission:  20           Discharging to Facility/ Agency   Name:   Address:  Phone:  Fax:    Dialysis Facility (if applicable)   Name:  Address:  Dialysis Schedule:  Phone:  Fax:    / signature: {Esignature:700406330}    PHYSICIAN SECTION    Prognosis: {Prognosis:3861790894}    Condition at Discharge: [facility] Patient Condition:294293610}    Rehab Potential (if transferring to Rehab): {Prognosis:5594079996}    Recommended Follow-up, Labs or Other Treatments After Discharge:    Non weight bearing Left arm. Keep arm immobilized in splint at all times until follow up appointment. You may use a sling for comfort  Follow up with Dr. Eileen Saucedo for orthopaedics. He will give further recommendations about splint usage at the office visit.  Call [address] [place] [address] at [phone] to schedule an appointment or with any questions               Physician Certification: I certify the above information and transfer of Jaky Mullins  is necessary for the continuing treatment of the diagnosis listed and that he requires {Admit to Appropriate Level of Care:54507} for {GREATER/LESS:158831779} 30 days.      Update Admission H&P: {CHP DME Changes in XPNWV:400332336}    PHYSICIAN SIGNATURE:  {Esignature:490306327}

## 2022-04-01 NOTE — PROGRESS NOTES
/66   Pulse 87   Temp 98.7 °F (37.1 °C) (Oral)   Resp 16   Ht 5' 10\" (1.778 m)   Wt 185 lb 9 oz (84.2 kg)   SpO2 94%   BMI 26.63 kg/m²     Assessment complete. Meds passed. Pt denies needs at this time. x1 up with a cane        Bedside Mobility Assessment Tool (BMAT):     Assessment Level 1- Sit and Shake    1. From a semi-reclined position, ask patient to sit up and rotate to a seated position at the side of the bed. Can use the bedrail. 2. Ask patient to reach out and grab your hand and shake making sure patient reaches across his/her midline. Pass- Patient is able to come to a seated position, maintain core strength. Maintains seated balance while reaching across midline. Move on to Assessment Level 2. Assessment Level 2- Stretch and Point   1. With patient in seated position at the side of the bed, have patient place both feet on the floor (or stool) with knees no higher than hips. 2. Ask patient to stretch one leg and straighten the knee, then bend the ankle/flex and point the toes. If appropriate, repeat with the other leg. Pass- Patient is able to demonstrate appropriate quad strength on intended weight bearing limb(s). Move onto Assessment Level 3. Assessment Level 3- Stand   1. Ask patient to elevate off the bed or chair (seated to standing) using an assistive device (cane, bedrail). 2. Patient should be able to raise buttocks off be and hold for a count of five. May repeat once. Pass- Patient maintains standing stability for at least 5 seconds, proceed to assessment level 4. Assessment Level 4- Walk   1. Ask patient to march in place at bedside. 2. Then ask patient to advance step and return each foot. Some medical conditions may render a patient from stepping backwards, use your best clinical judgement. Fail- Patient not able to complete tasks OR requires use of assistive device. Patient is MOBILITY LEVEL 3.        Mobility Level- 3

## 2022-04-01 NOTE — DISCHARGE SUMMARY
Name:  [place]  Room:  85/1955-73  MRN:    [id number]    Discharge Summary      This discharge summary is in conjunction with a complete physical exam done on the day of discharge. Discharging Provider: Jessa Hickman PA-C  Attending Physician: Dr. Jose Manuel Holder MD     Admit: 3/30/2022  Discharge:  4/1/2022     HPI taken from admission H&P:    The patient is a 68 y.o. male with PMH below, presents with multiple falls, generalized weakness, L knee and elbow pain, lung cancer, struck head, fatigue, lightheadedness. Pt has hx of lung cancer and currently undergoing chemo/XRT. Pt has fallen 4 times over the last 2 days. Today he fell backwards and hit his head. He denies LOC but he is on Pradaxa. CT head did not show bleed. He has had L knee pain and swelling since his fall. Xray of his knee was negative for change. He also has L elbow pain. He was found to have non-displaced L ulnar Fx. He has had increasing associate fatigue, generalized weakness and intermittent lightheadedness. Diagnoses this Admission and Hospital Course   #L Ulnar Fx  -Ortho consulted; no surgery planned at this time  -Non weight bearing Left arm.  Keep arm immobilized in splint at all times until follow up appointment in 2 weeks with Dr. August Mckoy  -Pain controlled at this time; sent with 3 days of percocet for breakthrough pain     #MIRNA/Dehydration  -Resolved with IVF    #Stage IIB NSCLC   -S/p RUL lobectomy 12/2022  -Chemotherapy with carbo/alimta  -Heme/Onc consulted    #Thrombocytopenia  -From chemotherapy  -Monitored CBC  -Followed by Dr. Kina Bridges     #Afib  -On pradaxa     #Generalized weakness and falls  - PT/OT - rec home with PRN assist    Procedures (Please Review Full Report for Details)  None    Consults    Ortho  Heme/Onc    Physical Exam at Discharge:    /66   Pulse 87   Temp 98.7 °F (37.1 °C) (Oral)   Resp 16   Ht 5' 10\" (1.778 m)   Wt 185 lb 9 oz (84.2 kg)   SpO2 94%   BMI 26.63 kg/m²     Gen: No distress. Alert. Eyes: PERRL. No sclera icterus. No conjunctival injection. ENT: No discharge. Pharynx clear. Neck: Trachea midline. Resp: No accessory muscle use. No crackles. No wheezes. No rhonchi. CV: Regular rate. Regular rhythm. No murmur. No rub. No edema. Capillary Refill: Brisk,< 3 seconds   Peripheral Pulses: +2 palpable, equal bilaterally   GI: Non-tender. Non-distended. Normal bowel sounds. Skin: Warm and dry. No nodule on exposed extremities. No rash on exposed extremities. M/S: No cyanosis. No joint deformity. No clubbing. Splint in place over left arm. Left hand neurovascularly in tact. Neuro: Awake. Grossly nonfocal    Psych: Oriented x 3. No anxiety or agitation. CBC:   Recent Labs     03/30/22 1925 03/31/22  0602 04/01/22  0910   WBC 5.5 4.0 3.9*   HGB 10.9* 9.2* 10.2*   HCT 32.7* 28.7* 31.4*   MCV 87.1 90.2 89.0   PLT 75* 58* 65*     BMP:   Recent Labs     03/30/22 1925 03/31/22  0602 04/01/22  0910    140 137   K 4.9 4.0 4.1    107 105   CO2 26 23 20*   BUN 24* 26* 17   CREATININE 1.3 1.1 0.8     LIVER PROFILE:   Recent Labs     03/30/22 1925 03/31/22  0602   AST 17 14*   ALT 14 13   BILITOT 0.6 0.4   ALKPHOS 107 86     CULTURES  Results for La Rodriguez (MRN [id number]) as of 4/1/2022 12:14   Ref. Range 3/30/2022 23:01   SARS-CoV-2, NAAT Latest Ref Range: Not Detected  Not Detected     Blood cx x2 NGTD    RADIOLOGY  CT LUMBAR SPINE WO CONTRAST   Final Result   1. No acute fracture. No listhesis. 2. Nonspecific small complex right pleural effusion. Correlate with history. 3. Other findings as described. CT CERVICAL SPINE WO CONTRAST   Final Result   No acute abnormality of the cervical spine. CT HEAD WO CONTRAST   Final Result   No acute intracranial abnormality.       RECOMMENDATIONS:   Unavailable         XR CHEST PORTABLE   Final Result   Possible right pleural effusion      Otherwise, no acute cardiopulmonary process         XR KNEE LEFT (1-2 VIEWS)   Final Result   1. No acute abnormality of the left knee. 2. Chronic healed fracture deformity of the distal left femur. 3. Mild tricompartmental osteoarthritis. XR ELBOW LEFT (2 VIEWS)   Final Result   Proximal ulna fractures suspected. CT suggested to further evaluate              Discharge Medications     Medication List      START taking these medications    oxyCODONE-acetaminophen 5-325 MG per tablet  Commonly known as: PERCOCET  Take 1 tablet by mouth every 6 hours as needed for Pain for up to 3 days. Notes to patient: Oxycontin(Oxycodone)  Use:  pain    Side effects: sleepiness, constipation        CONTINUE taking these medications    atorvastatin 40 MG tablet  Commonly known as: LIPITOR  Take 1 tablet by mouth daily     carbidopa-levodopa  MG per tablet  Commonly known as: SINEMET     dabigatran 150 MG capsule  Commonly known as: PRADAXA  Take 1 capsule by mouth 2 times daily     * diclofenac 2 % Soln  Commonly known as: Pennsaid  2 pumps to the affected area 2 times a day [phone]     * diclofenac sodium 1 % Gel  Commonly known as: VOLTAREN  APPLY 4GM TOPICALLY FOUR TIMES A DAY     ferrous sulfate 325 (65 Fe) MG tablet  Commonly known as: IRON 325  Take 1 tablet by mouth 2 times daily (with meals)     finasteride 5 MG tablet  Commonly known as: PROSCAR     fluocinonide 0.05 % cream  Commonly known as: LIDEX  Apply topically 2 times daily.      furosemide 20 MG tablet  Commonly known as: LASIX  TAKE 1 TABLET DAILY prn     ipratropium 0.03 % nasal spray  Commonly known as: ATROVENT  2 sprays by Each Nostril route every 12 hours Can use of to 4 times daily as needed     isosorbide mononitrate 30 MG extended release tablet  Commonly known as: IMDUR  Take 1 tablet by mouth daily     LORazepam 1 MG tablet  Commonly known as: ATIVAN     losartan 25 MG tablet  Commonly known as: COZAAR  Take 1 tablet by mouth daily TAKE 1 TABLET DAILY     omeprazole 20 MG delayed

## 2022-04-03 LAB
BLOOD CULTURE, ROUTINE: NORMAL
CULTURE, BLOOD 2: NORMAL

## 2022-05-09 ENCOUNTER — APPOINTMENT (OUTPATIENT)
Dept: GENERAL RADIOLOGY | Age: 77
DRG: 057 | End: 2022-05-09
Payer: MEDICARE

## 2022-05-09 ENCOUNTER — HOSPITAL ENCOUNTER (INPATIENT)
Age: 77
LOS: 1 days | Discharge: HOME HEALTH | DRG: 057 | End: 2022-05-14
Attending: EMERGENCY MEDICINE | Admitting: INTERNAL MEDICINE
Payer: MEDICARE

## 2022-05-09 ENCOUNTER — APPOINTMENT (OUTPATIENT)
Dept: CT IMAGING | Age: 77
DRG: 057 | End: 2022-05-09
Payer: MEDICARE

## 2022-05-09 DIAGNOSIS — Z79.01 ANTICOAGULATED: ICD-10-CM

## 2022-05-09 DIAGNOSIS — R29.6 FREQUENT FALLS: ICD-10-CM

## 2022-05-09 DIAGNOSIS — R53.1 GENERAL WEAKNESS: ICD-10-CM

## 2022-05-09 DIAGNOSIS — I95.1 ORTHOSTATIC HYPOTENSION: Primary | ICD-10-CM

## 2022-05-09 LAB
A/G RATIO: 1.1 (ref 1.1–2.2)
ALBUMIN SERPL-MCNC: 3.9 G/DL (ref 3.4–5)
ALP BLD-CCNC: 103 U/L (ref 40–129)
ALT SERPL-CCNC: 14 U/L (ref 10–40)
ANION GAP SERPL CALCULATED.3IONS-SCNC: 11 MMOL/L (ref 3–16)
AST SERPL-CCNC: 22 U/L (ref 15–37)
BASOPHILS ABSOLUTE: 0 K/UL (ref 0–0.2)
BASOPHILS RELATIVE PERCENT: 0.3 %
BILIRUB SERPL-MCNC: 0.6 MG/DL (ref 0–1)
BILIRUBIN URINE: NEGATIVE
BLOOD, URINE: NEGATIVE
BUN BLDV-MCNC: 15 MG/DL (ref 7–20)
CALCIUM SERPL-MCNC: 9.1 MG/DL (ref 8.3–10.6)
CHLORIDE BLD-SCNC: 101 MMOL/L (ref 99–110)
CLARITY: CLEAR
CO2: 26 MMOL/L (ref 21–32)
COLOR: YELLOW
CREAT SERPL-MCNC: 0.7 MG/DL (ref 0.8–1.3)
EKG ATRIAL RATE: 357 BPM
EKG DIAGNOSIS: NORMAL
EKG Q-T INTERVAL: 400 MS
EKG QRS DURATION: 84 MS
EKG QTC CALCULATION (BAZETT): 470 MS
EKG R AXIS: 5 DEGREES
EKG T AXIS: 34 DEGREES
EKG VENTRICULAR RATE: 83 BPM
EOSINOPHILS ABSOLUTE: 0 K/UL (ref 0–0.6)
EOSINOPHILS RELATIVE PERCENT: 0.8 %
GFR AFRICAN AMERICAN: >60
GFR NON-AFRICAN AMERICAN: >60
GLUCOSE BLD-MCNC: 104 MG/DL (ref 70–99)
GLUCOSE URINE: 100 MG/DL
HCT VFR BLD CALC: 26.4 % (ref 40.5–52.5)
HEMOGLOBIN: 9 G/DL (ref 13.5–17.5)
INR BLD: 1.45 (ref 0.88–1.12)
KETONES, URINE: NEGATIVE MG/DL
LEUKOCYTE ESTERASE, URINE: NEGATIVE
LYMPHOCYTES ABSOLUTE: 1.4 K/UL (ref 1–5.1)
LYMPHOCYTES RELATIVE PERCENT: 34.2 %
MCH RBC QN AUTO: 30.5 PG (ref 26–34)
MCHC RBC AUTO-ENTMCNC: 34.2 G/DL (ref 31–36)
MCV RBC AUTO: 89.1 FL (ref 80–100)
MICROSCOPIC EXAMINATION: ABNORMAL
MONOCYTES ABSOLUTE: 0.9 K/UL (ref 0–1.3)
MONOCYTES RELATIVE PERCENT: 21.2 %
NEUTROPHILS ABSOLUTE: 1.8 K/UL (ref 1.7–7.7)
NEUTROPHILS RELATIVE PERCENT: 43.5 %
NITRITE, URINE: NEGATIVE
PDW BLD-RTO: 22.2 % (ref 12.4–15.4)
PH UA: 6 (ref 5–8)
PLATELET # BLD: 107 K/UL (ref 135–450)
PMV BLD AUTO: 7.3 FL (ref 5–10.5)
POTASSIUM SERPL-SCNC: 3.7 MMOL/L (ref 3.5–5.1)
PROTEIN UA: NEGATIVE MG/DL
PROTHROMBIN TIME: 16.6 SEC (ref 9.9–12.7)
RAPID INFLUENZA  B AGN: NEGATIVE
RAPID INFLUENZA A AGN: NEGATIVE
RBC # BLD: 2.96 M/UL (ref 4.2–5.9)
SARS-COV-2, NAAT: NOT DETECTED
SODIUM BLD-SCNC: 138 MMOL/L (ref 136–145)
SPECIFIC GRAVITY UA: 1.02 (ref 1–1.03)
TOTAL PROTEIN: 7.4 G/DL (ref 6.4–8.2)
TROPONIN: <0.01 NG/ML
URINE REFLEX TO CULTURE: ABNORMAL
URINE TYPE: ABNORMAL
UROBILINOGEN, URINE: 0.2 E.U./DL
WBC # BLD: 4.2 K/UL (ref 4–11)

## 2022-05-09 PROCEDURE — 72125 CT NECK SPINE W/O DYE: CPT

## 2022-05-09 PROCEDURE — 99285 EMERGENCY DEPT VISIT HI MDM: CPT

## 2022-05-09 PROCEDURE — 96361 HYDRATE IV INFUSION ADD-ON: CPT

## 2022-05-09 PROCEDURE — 93005 ELECTROCARDIOGRAM TRACING: CPT | Performed by: NURSE PRACTITIONER

## 2022-05-09 PROCEDURE — G0378 HOSPITAL OBSERVATION PER HR: HCPCS

## 2022-05-09 PROCEDURE — 6370000000 HC RX 637 (ALT 250 FOR IP): Performed by: NURSE PRACTITIONER

## 2022-05-09 PROCEDURE — 71046 X-RAY EXAM CHEST 2 VIEWS: CPT

## 2022-05-09 PROCEDURE — 85610 PROTHROMBIN TIME: CPT

## 2022-05-09 PROCEDURE — 97165 OT EVAL LOW COMPLEX 30 MIN: CPT

## 2022-05-09 PROCEDURE — 97535 SELF CARE MNGMENT TRAINING: CPT

## 2022-05-09 PROCEDURE — 81003 URINALYSIS AUTO W/O SCOPE: CPT

## 2022-05-09 PROCEDURE — 87804 INFLUENZA ASSAY W/OPTIC: CPT

## 2022-05-09 PROCEDURE — 87635 SARS-COV-2 COVID-19 AMP PRB: CPT

## 2022-05-09 PROCEDURE — 85025 COMPLETE CBC W/AUTO DIFF WBC: CPT

## 2022-05-09 PROCEDURE — 73130 X-RAY EXAM OF HAND: CPT

## 2022-05-09 PROCEDURE — 73560 X-RAY EXAM OF KNEE 1 OR 2: CPT

## 2022-05-09 PROCEDURE — 93010 ELECTROCARDIOGRAM REPORT: CPT | Performed by: INTERNAL MEDICINE

## 2022-05-09 PROCEDURE — 97162 PT EVAL MOD COMPLEX 30 MIN: CPT

## 2022-05-09 PROCEDURE — 97116 GAIT TRAINING THERAPY: CPT

## 2022-05-09 PROCEDURE — 70450 CT HEAD/BRAIN W/O DYE: CPT

## 2022-05-09 PROCEDURE — 6370000000 HC RX 637 (ALT 250 FOR IP): Performed by: INTERNAL MEDICINE

## 2022-05-09 PROCEDURE — 2580000003 HC RX 258: Performed by: INTERNAL MEDICINE

## 2022-05-09 PROCEDURE — 84484 ASSAY OF TROPONIN QUANT: CPT

## 2022-05-09 PROCEDURE — 80053 COMPREHEN METABOLIC PANEL: CPT

## 2022-05-09 PROCEDURE — 2580000003 HC RX 258: Performed by: NURSE PRACTITIONER

## 2022-05-09 PROCEDURE — 73502 X-RAY EXAM HIP UNI 2-3 VIEWS: CPT

## 2022-05-09 RX ORDER — ACETAMINOPHEN 650 MG/1
650 SUPPOSITORY RECTAL EVERY 6 HOURS PRN
Status: DISCONTINUED | OUTPATIENT
Start: 2022-05-09 | End: 2022-05-14 | Stop reason: HOSPADM

## 2022-05-09 RX ORDER — SOTALOL HYDROCHLORIDE 80 MG/1
40 TABLET ORAL 2 TIMES DAILY
Status: DISCONTINUED | OUTPATIENT
Start: 2022-05-09 | End: 2022-05-14 | Stop reason: HOSPADM

## 2022-05-09 RX ORDER — ACETAMINOPHEN 325 MG/1
650 TABLET ORAL ONCE
Status: COMPLETED | OUTPATIENT
Start: 2022-05-09 | End: 2022-05-09

## 2022-05-09 RX ORDER — FERROUS SULFATE 325(65) MG
325 TABLET ORAL 2 TIMES DAILY WITH MEALS
Status: DISCONTINUED | OUTPATIENT
Start: 2022-05-09 | End: 2022-05-14 | Stop reason: HOSPADM

## 2022-05-09 RX ORDER — PANTOPRAZOLE SODIUM 20 MG/1
20 TABLET, DELAYED RELEASE ORAL
Status: DISCONTINUED | OUTPATIENT
Start: 2022-05-10 | End: 2022-05-14 | Stop reason: HOSPADM

## 2022-05-09 RX ORDER — LOSARTAN POTASSIUM 25 MG/1
25 TABLET ORAL DAILY
Status: DISCONTINUED | OUTPATIENT
Start: 2022-05-09 | End: 2022-05-14 | Stop reason: HOSPADM

## 2022-05-09 RX ORDER — ENOXAPARIN SODIUM 100 MG/ML
40 INJECTION SUBCUTANEOUS DAILY
Status: DISCONTINUED | OUTPATIENT
Start: 2022-05-10 | End: 2022-05-14 | Stop reason: HOSPADM

## 2022-05-09 RX ORDER — PAROXETINE HYDROCHLORIDE 20 MG/1
20 TABLET, FILM COATED ORAL 2 TIMES DAILY
Status: DISCONTINUED | OUTPATIENT
Start: 2022-05-09 | End: 2022-05-14 | Stop reason: HOSPADM

## 2022-05-09 RX ORDER — FINASTERIDE 5 MG/1
5 TABLET, FILM COATED ORAL DAILY
Status: DISCONTINUED | OUTPATIENT
Start: 2022-05-09 | End: 2022-05-14 | Stop reason: HOSPADM

## 2022-05-09 RX ORDER — SODIUM CHLORIDE 0.9 % (FLUSH) 0.9 %
5-40 SYRINGE (ML) INJECTION EVERY 12 HOURS SCHEDULED
Status: DISCONTINUED | OUTPATIENT
Start: 2022-05-09 | End: 2022-05-14 | Stop reason: HOSPADM

## 2022-05-09 RX ORDER — LORAZEPAM 1 MG/1
2 TABLET ORAL NIGHTLY
Status: DISCONTINUED | OUTPATIENT
Start: 2022-05-09 | End: 2022-05-14 | Stop reason: HOSPADM

## 2022-05-09 RX ORDER — SODIUM CHLORIDE 0.9 % (FLUSH) 0.9 %
5-40 SYRINGE (ML) INJECTION PRN
Status: DISCONTINUED | OUTPATIENT
Start: 2022-05-09 | End: 2022-05-14 | Stop reason: HOSPADM

## 2022-05-09 RX ORDER — 0.9 % SODIUM CHLORIDE 0.9 %
1000 INTRAVENOUS SOLUTION INTRAVENOUS ONCE
Status: COMPLETED | OUTPATIENT
Start: 2022-05-09 | End: 2022-05-09

## 2022-05-09 RX ORDER — SODIUM CHLORIDE 9 MG/ML
INJECTION, SOLUTION INTRAVENOUS CONTINUOUS
Status: DISCONTINUED | OUTPATIENT
Start: 2022-05-09 | End: 2022-05-11

## 2022-05-09 RX ORDER — LACOSAMIDE 50 MG/1
100 TABLET ORAL 2 TIMES DAILY
Status: DISCONTINUED | OUTPATIENT
Start: 2022-05-09 | End: 2022-05-14 | Stop reason: HOSPADM

## 2022-05-09 RX ORDER — ACETAMINOPHEN 325 MG/1
650 TABLET ORAL EVERY 6 HOURS PRN
Status: DISCONTINUED | OUTPATIENT
Start: 2022-05-09 | End: 2022-05-14 | Stop reason: HOSPADM

## 2022-05-09 RX ORDER — LORAZEPAM 1 MG/1
1 TABLET ORAL DAILY
Status: DISCONTINUED | OUTPATIENT
Start: 2022-05-10 | End: 2022-05-14 | Stop reason: HOSPADM

## 2022-05-09 RX ORDER — PROCHLORPERAZINE EDISYLATE 5 MG/ML
10 INJECTION INTRAMUSCULAR; INTRAVENOUS EVERY 6 HOURS PRN
Status: DISCONTINUED | OUTPATIENT
Start: 2022-05-09 | End: 2022-05-14 | Stop reason: HOSPADM

## 2022-05-09 RX ORDER — ATORVASTATIN CALCIUM 40 MG/1
40 TABLET, FILM COATED ORAL DAILY
Status: DISCONTINUED | OUTPATIENT
Start: 2022-05-10 | End: 2022-05-14 | Stop reason: HOSPADM

## 2022-05-09 RX ORDER — SODIUM CHLORIDE 9 MG/ML
INJECTION, SOLUTION INTRAVENOUS PRN
Status: DISCONTINUED | OUTPATIENT
Start: 2022-05-09 | End: 2022-05-14 | Stop reason: HOSPADM

## 2022-05-09 RX ORDER — POLYETHYLENE GLYCOL 3350 17 G/17G
17 POWDER, FOR SOLUTION ORAL DAILY PRN
Status: DISCONTINUED | OUTPATIENT
Start: 2022-05-09 | End: 2022-05-14 | Stop reason: HOSPADM

## 2022-05-09 RX ORDER — ZOLPIDEM TARTRATE 5 MG/1
10 TABLET ORAL NIGHTLY
Status: DISCONTINUED | OUTPATIENT
Start: 2022-05-09 | End: 2022-05-14 | Stop reason: HOSPADM

## 2022-05-09 RX ADMIN — CARBIDOPA AND LEVODOPA 1 TABLET: 25; 100 TABLET ORAL at 23:16

## 2022-05-09 RX ADMIN — SODIUM CHLORIDE 1000 ML: 9 INJECTION, SOLUTION INTRAVENOUS at 16:12

## 2022-05-09 RX ADMIN — SODIUM CHLORIDE, PRESERVATIVE FREE 10 ML: 5 INJECTION INTRAVENOUS at 23:18

## 2022-05-09 RX ADMIN — ZOLPIDEM TARTRATE 10 MG: 5 TABLET ORAL at 23:15

## 2022-05-09 RX ADMIN — PAROXETINE HYDROCHLORIDE 20 MG: 20 TABLET, FILM COATED ORAL at 23:16

## 2022-05-09 RX ADMIN — LACOSAMIDE 100 MG: 50 TABLET, FILM COATED ORAL at 23:16

## 2022-05-09 RX ADMIN — ACETAMINOPHEN 650 MG: 325 TABLET ORAL at 12:22

## 2022-05-09 RX ADMIN — SOTALOL HYDROCHLORIDE 40 MG: 80 TABLET ORAL at 23:15

## 2022-05-09 RX ADMIN — SODIUM CHLORIDE: 9 INJECTION, SOLUTION INTRAVENOUS at 23:23

## 2022-05-09 RX ADMIN — LORAZEPAM 2 MG: 1 TABLET ORAL at 23:16

## 2022-05-09 ASSESSMENT — PAIN - FUNCTIONAL ASSESSMENT
PAIN_FUNCTIONAL_ASSESSMENT: NONE - DENIES PAIN
PAIN_FUNCTIONAL_ASSESSMENT: 0-10
PAIN_FUNCTIONAL_ASSESSMENT: NONE - DENIES PAIN

## 2022-05-09 ASSESSMENT — LIFESTYLE VARIABLES
HOW OFTEN DO YOU HAVE A DRINK CONTAINING ALCOHOL: NEVER
HOW MANY STANDARD DRINKS CONTAINING ALCOHOL DO YOU HAVE ON A TYPICAL DAY: 1 OR 2
HOW OFTEN DO YOU HAVE A DRINK CONTAINING ALCOHOL: NEVER

## 2022-05-09 ASSESSMENT — PAIN SCALES - GENERAL: PAINLEVEL_OUTOF10: 0

## 2022-05-09 ASSESSMENT — PAIN DESCRIPTION - LOCATION: LOCATION: HEAD

## 2022-05-09 NOTE — PROGRESS NOTES
Occupational Therapy  Facility/Department: [address] [place] [address]  ED  Occupational Therapy Initial Assessment    Name: Amilcar Carvalho  : [birth year]3  MRN: [id number]  Date of Service: 2022    Discharge Recommendations:  24 hour supervision or assist,Outpatient OT          Patient Diagnosis(es): There were no encounter diagnoses. Past Medical History:  has a past medical history of MIRNA (acute kidney injury) ([facility] [address] Anxiety, Arthritis, Bradycardia, Cancer ([facility] [address] Hemoptysis, Irregular heart  beats, 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[facility] [address] S/P drug eluting coronary stent placement, Seizure disorder ([facility] [address] Seizures ([facility] [address] Shortness of breath, and Total knee replacement status. Past Surgical History:  has a past surgical history that includes Carpal tunnel release; Femur Surgery; knee surgery (12-11 R total knee replacement with femoral nerve block for pain control); Cervical disc surgery (); other surgical history (Bilateral); ablation of dysrhythmic focus (2014); other surgical history (2014); Colonoscopy (2016); Upper gastrointestinal endoscopy (2016); Coronary angioplasty with stent; and Lung removal, partial (Right). Assessment   Performance deficits / Impairments: Decreased functional mobility ; Decreased endurance;Decreased ADL status; Decreased balance;Decreased high-level IADLs  Assessment: Pt tolerated OT evaluation well. At baseline pt Mod I with ADLs, Mod I light IADLs, drives, ambulates without AD. Pt reports 7 falls over the last month and has recently started using a cane. Pt currently requiring CGA with simple transfers and ambulation using cane. Pt requiring Min-CGA with simple ADLs. Pt is below baseline level of occupational performance. Recommend continued OT services to increase safety and independence with ADLs and functional transfers.  Anticipate pt will be safe to return home with 24/7 supervision and outpatient therapy  Prognosis: Good  Decision Making: Low Complexity    REQUIRES OT FOLLOW-UP: Yes  Activity Tolerance  Activity Tolerance: Patient Tolerated treatment well  Activity Tolerance Comments: Vitals supine: 106/53, 79bpm. Vitals seated: 105/63, 95bpm. Vitals standin/53. Vitals after ambulation 150 feet: 116/56, 108bpm        Plan   Plan  Times per Week: 3-5x/week  Current Treatment Recommendations: Balance training,Functional mobility training,Endurance training,Gait training,Neuromuscular re-education,Safety education & training,Patient/Caregiver education & training,Equipment evaluation, education, & procurement,Positioning,Self-Care / ADL,Home management training     Restrictions  Restrictions/Precautions  Restrictions/Precautions: Fall Risk    Subjective   General  Chart Reviewed: Yes  Patient assessed for rehabilitation services?: Yes  Additional Pertinent Hx: lung cancer, R lobectomy, Parkinson's disease  Family / Caregiver Present: Yes (wife)  Diagnosis: Pt admitted due to recurrent falls. Pt reports 7 falls in the last 3 weeks. Subjective  Subjective: Pt supine in bed upon therapy arrival. Pt reports that when he falls often his legs become weak and tremulous. General Comment  Comments: RN agreeable to OT session. Social/Functional History  Social/Functional History  Lives With: Spouse  Type of Home: House  Home Access: Stairs to enter without rails  Entrance Stairs - Number of Steps: 1  Bathroom Shower/Tub: Tub/Shower unit,Shower chair with back  Bathroom Toilet: Handicap height  Home Equipment: Cane,Walker, rolling  Has the patient had two or more falls in the past year or any fall with injury in the past year?: Yes  ADL Assistance: Independent  Homemaking Assistance: Independent  Ambulation Assistance: Independent  Transfer Assistance: Independent  Active : Yes  Occupation: Retired  Type of Occupation: helm  Leisure & Hobbies: mowing the lawn. Additional Comments: Pt reports 7 falls in the last month.  Pt ambulates without AD, but recently started using a cane due to frequent falls. Wife available to provide 24/7 supervision/assistance if needed. Objective   Pulse: 82  Heart Rate Source: Monitor  BP: 119/68  BP Location: Left upper arm  BP Method: Automatic  Patient Position: Left side; High fowlers  MAP (Calculated): 85  Resp: 15  SpO2: 98 %  O2 Device: None (Room air)     Vision Exceptions: Wears glasses for reading  Hearing: Exceptions to [facility]  Hearing Exceptions: Bilateral hearing aid;Hard of hearing/hearing concerns          Safety Devices  Type of Devices: All fall risk precautions in place;Gait belt;Call light within reach;Nurse notified; Left in bed     Bed Mobility Training  Bed Mobility Training: Yes  Overall Level of Assistance: Stand-by assistance  Supine to Sit: Stand-by assistance; Additional time  Sit to Supine: Stand-by assistance; Additional time  Scooting: Stand-by assistance    Balance  Sitting: Intact  Standing: Impaired (CGA with cane)  Standing - Dynamic:  (CGA for marching in place with [facility])    Transfer Training  Transfer Training: Yes  Overall Level of Assistance: Contact-guard assistance  Interventions: Verbal cues  Sit to Stand: Contact-guard assistance; Adaptive equipment  Stand to Sit: Contact-guard assistance; Adaptive equipment  Bed to Chair: Contact-guard assistance    Gait Training  Gait Training: Yes  Gait  Overall Level of Assistance: Contact-guard assistance  Interventions: Verbal cues  Base of Support: Widened  Speed/Shruthi: Slow  Distance (ft): 150 Feet  Assistive Device: Cane, straight     AROM: Within functional limits  PROM: Within functional limits  Strength:  Within functional limits (grossly 4+/5 BUE)  Coordination: Within functional limits  Tone: Normal  Sensation: Impaired     ADL  Grooming: Contact guard assistance (standing at sink to wash hands)  LE Dressing: Minimal assistance  LE Dressing Skilled Clinical Factors: doff/don socks and shoes with extra time     Activity Tolerance  Activity Tolerance: Patient tolerated evaluation without incident  Activity Tolerance Comments: Sitting /63; Standing BP 98/53; after ambulation /56     Bed mobility  Supine to Sit: Stand by assistance  Sit to Supine: Stand by assistance     Transfers  Stand Step Transfers: Contact guard assistance  Sit to stand: Contact guard assistance  Stand to sit: Contact guard assistance  Transfer Comments: to/from EOB using cane     Vision - Basic Assessment  Prior Vision: Wears glasses only for reading  Visual History: No significant visual history  Patient Visual Report: No visual complaint reported. Visual Field Cut: No  Oculo Motor Control: WNL     Cognition  Overall Cognitive Status: WNL                  Education Given To: Patient; Family  Education Provided: Role of Therapy;Plan of Care;ADL Adaptive Strategies;Transfer Training;Family Education; Fall Prevention Strategies  Education Method: Demonstration;Verbal  Barriers to Learning: None  Education Outcome: Verbalized understanding;Demonstrated understanding  LUE AROM (degrees)  LUE AROM : WNL  Left Hand AROM (degrees)  Left Hand AROM: WNL      patient Safety: pt left supine in bed, wife present, call light in reach, all needs met, RN aware. AM-PAC Score        AM-[facility] Inpatient Daily Activity Raw Score: 19 (05/09/22 1550)  AM-PAC Inpatient ADL T-Scale Score : 40.22 (05/09/22 1550)  ADL Inpatient CMS 0-100% Score: 42.8 (05/09/22 1550)  ADL Inpatient CMS G-Code Modifier : CK (05/09/22 1550)    Goals  Short Term Goals  Time Frame for Short term goals: 2 weeks (5/23/22)  Short Term Goal 1: Mod I functional transfers to/from EOB, chair, toilet/BSC  Short Term Goal 2: Mod I LB dressing  Short Term Goal 3: Mod I toileting  Short Term Goal 4: Mod I grooming in stance at sink  Short Term Goal 5: Supervision bathing.   Patient Goals   Patient goals : to walk without falling       Therapy Time   Individual Concurrent Group Co-treatment   Time In 1404         Time Out 3874 Minutes 23         Timed Code Treatment Minutes: 8 Minutes (15 minute evaluation)     If pt is discharged prior to next OT session, this note will serve as the discharge summary.     Angie Gomez, OT

## 2022-05-09 NOTE — ED NOTES
Sandy Rodriguez is a 68 y.o. male who presents to the ED via wife for recurrent falls. Pt reports symptoms began approximately 3 weeks ago, and he has had 7 falls. Pt also reports prior to the fall his legs become shaky and weak, then give out. Last fall Friday night, which resulted in hitting head. Pt takes Pradaxa. Pt reports headache x 1 month. Pt has Hx of lung cancer resulting in right lobectomy. Pt reports last chemo just before onset of falls. Pt uses cane at baseline. Pt denies pain locations other than head, numbness/tingling, chest pain, shortness of breath, recent medication changes.        Lola Frankel, RN  05/09/22 4525

## 2022-05-09 NOTE — ED PROVIDER NOTES
I independently performed a history and physical on Imelda Hodgson. All diagnostic, treatment, and disposition decisions were made by myself in conjunction with the advanced practice provider. I have participated in the medical decision making and directed the treatment plan and disposition of the patient. For further details of [facility] emergency department encounter, please see the advanced practice provider's documentation. CHIEF COMPLAINT  Chief Complaint   Patient presents with    Fall     7 falls over a month, last fall was friday night, hit his head, has leg weakness       Briefly, Imelda Hodgson is a 68 y.o. male  who presents to the ED complaining of multiple falls. FOCUSED PHYSICAL EXAMINATION  /68   Pulse 82   Temp 98.7 °F (37.1 °C) (Oral)   Resp 14   Ht 5' 10.5\" (1.791 m)   Wt 186 lb (84.4 kg)   SpO2 97%   BMI 26.31 kg/m²      Focused physical examination:  General appearance:  Cooperative. No acute distress. Skin:  Warm. Dry. Eye:  Extraocular movements intact. Ears, nose, mouth and throat:  Oral mucosa moist,  Neck:  Trachea midline. Heart:  Regular rate and rhythm  Perfusion:  intact  Respiratory:  Lungs clear to auscultation bilaterally. Respirations nonlabored. Abdominal:   Non distended. Nontender  Neurological:  Alert and oriented x 3. Moves all extremities spontaneously  Musculoskeletal:   Normal ROM, no deformities          Psychiatric:  Normal mood    EKG: Atrial fibrillation rate of 83 bpm.  No ST elevation    MDM: Patient presents the emergency department today after multiple frequent falls. Primarily positional in. Has had some syncopal events. EKG with A. fib but stable. Laboratory studies and thorough radiographic examination shows no obvious abnormalities. Patient is markedly orthostatic here. No other signs of dehydration.   Given IV fluids with plan to admit to the hospital    During the patient's ED course, the patient was given: Medications   0.9 % sodium chloride bolus (has no administration in time range)   acetaminophen (TYLENOL) tablet 650 mg (650 mg Oral Given 5/9/22 1222)        CLINICAL IMPRESSION  1. Orthostatic hypotension    2. General weakness    3. Frequent falls    4. Anticoagulated        DISPOSITION  Admission      This chart was created using Dragon dictation software. Efforts were made by me to ensure accuracy, however some errors may be present due to limitations of this technology.             Luz Bazan MD  05/09/22 6161

## 2022-05-09 NOTE — CARE COORDINATION
CASE MANAGEMENT INITIAL ASSESSMENT      Reviewed chart and completed assessment with patient: yes and with wife, Venancio Severs  Family present: wife  Explained Case Management role/services. yes and with wife    Primary contact information:Wife, Alexis Augustin Decision Maker :   Primary Decision Maker: Caprice Almaraz - Spouse - [phone]          Can this person be reached and be able to respond quickly, such as within a few minutes or hours? Yes    Admit date/status:ER  Diagnosis: Fall   Is this a Readmission?:  No      Insurance:[employer] Medicare   Precert required for SNF: Yes       3 night stay required: No    Living arrangements, Adls, care needs, prior to admission:Lives at home with wife, independent in ADLs    Durable Medical Equipment at home:  Walker__Cane_X_RTS__ BSC__Shower Chair__  02__ HHN__ CPAP__  BiPap__  Hospital Bed__ W/C___ Other_____    Services in the home and/or outpatient, prior to admission: none    Current PCP: Asher Elmore MD                          Medications: yes   Prescription coverage? Yes Will pt require financial assistance with medications No     Transportation needs: none     Dialysis Facility (if applicable)   · Name:  · Address:  · Dialysis Schedule:  · Phone:  · Fax:    PT/OT recs:    Hospital Exemption Notification (HEN):    Barriers to discharge:medical complications     Plan/comments:Referred to patient and wife for discharge planning. Patient is a 68year old male presenting to the ED with frequent falls. Patient and wife reports that he uses a cane at home and is independent in ADLs. Patient comes from a one story home with wife. Pt and wife report there is currently no home care or any other assistance coming to the home. Wife reports that they cannot afford private duty home care. Referral in place for pt to be seen by PT/OT. Case management offered local SNF and rehabilitation facilities covered by pt's insurance. No other needs at this time. Electronically signed by MAURA Gipson Student on 5/9/2022 at 12:29 PM   Electronically signed by TRAMAINE Almeida on 5/9/2022 at 1:39 PM     UPDATE: Patient was reviewed by PT/OT, they do not think pt is suitable for SNF at this time and recommends being d/c to home with prescription for outpatient PT/OT. Pt and wife stated they will call [facility] to set up appointment.   Electronically signed by MAURA Gipson on 5/9/2022 at 3:01 PM   ECOC on chart for MD signature

## 2022-05-09 NOTE — ED PROVIDER NOTES
[facility] Emergency Department    CHIEF COMPLAINT  Fall (7 falls over a month, last fall was friday night, hit his head, has leg weakness)      HISTORY OF PRESENT ILLNESS  Shay Sainz is a 68 y.o. male who presents to the ED complaining of frequent falls. Patient reports that his legs are weak and \"give out on him. \"  Patient reports he has had a 7 falls in the last month. Last fall was Friday evening he did hit the back of his head and his forehead. Patient denies loss of consciousness. Patient also reports from the 7 falls he has left hand pain right buttocks pain and right knee pain. Patient also reports some neck stiffness and discomfort. Patient denies numbness, tingling, extremity weakness, saddle anesthesia, bowel or bladder incontinence, urinary retention, fever, chills, body aches, recent illness, cough, chest pain, shortness of breath, emesis, diarrhea, urinary complaints or flank pain, leg swelling, calf pain, palpitations. Patient is notably on blood thinning medications. Patient was also admitted to the hospital for frequent falls approximately 1 months 1 month ago but ultimately declined rehab for physical therapy. Patient is from home with his wife. No other complaints, modifying factors or associated symptoms. Nursing notes reviewed.    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    S/P drug eluting coronary stent placement 03/06/2017    2.25 x 18 Xience [place] ADRIÁN - Distal LAD    Seizure disorder ([address]     4 weeks ago black out, pt states seizure but may be due to Heart Rate changes    Seizures (HCC)     Shortness of breath 9/4/2014    Total knee replacement status 1/12/2011 Past Surgical History:   Procedure Laterality Date    ABLATION OF DYSRHYTHMIC FOCUS  2014    CARPAL TUNNEL RELEASE      CERVICAL [address] SURGERY  2010    COLONOSCOPY  2016    normal    CORONARY ANGIOPLASTY WITH STENT PLACEMENT      FEMUR SURGERY      left    KNEE SURGERY  11 R total knee replacement with femoral nerve block for pain control    LUNG REMOVAL, PARTIAL Right     20%    OTHER SURGICAL HISTORY Bilateral     excisions of lesions on bilat. neck and back    OTHER SURGICAL HISTORY  2014    Reveal Loop recorder placed in Cath Lab    UPPER GASTROINTESTINAL ENDOSCOPY  2016    gastric and esophogeal biopsy     Family History   Problem Relation Age of Onset    Heart Disease Mother     Arthritis Mother     High Blood Pressure Mother    [de-identified] / Stillbirths Mother     Stroke Mother     Arthritis Father     Cancer Sister     Arthritis Sister     Depression Sister     Arthritis Brother     Arthritis Maternal Grandmother     Arthritis Maternal Grandfather     Arthritis Paternal Grandmother     Diabetes Paternal Grandmother     Arthritis Paternal Grandfather      Social History     Socioeconomic History    Marital status:      Spouse name: Not on file    Number of children: Not on file    Years of education: Not on file    Highest education level: Not on file   Occupational History    Not on file   Tobacco Use    Smoking status: Former Smoker     Packs/day: 2.00     Years: 40.00     Pack years: 80.00     Quit date: 1993     Years since quittin.9    Smokeless tobacco: Never Used    Tobacco comment: 21 yrs   Vaping Use    Vaping Use: Never used   Substance and Sexual Activity    Alcohol use: No    Drug use: No    Sexual activity: Not Currently   Other Topics Concern    Not on file   Social History Narrative    Not on file     Social Determinants of Health     Financial Resource Strain:     Difficulty of Paying Living Expenses: Not on file Food Insecurity:     Worried About Running Out of Food in the Last Year: Not on file    Christine of Food in the Last Year: Not on file   Transportation Needs:     Lack of Transportation (Medical): Not on file    Lack of Transportation (Non-Medical): Not on file   Physical Activity:     Days of Exercise per Week: Not on file    Minutes of Exercise per Session: Not on file   Stress:     Feeling of Stress : Not on file   Social Connections:     Frequency of Communication with Friends and Family: Not on file    Frequency of Social Gatherings with Friends and Family: Not on file    Attends Hinduism Services: Not on file    Active Member of [address] [place] [address] [email or web address] or Organizations: Not on file    Attends Club or Organization Meetings: Not on file    Marital Status: Not on file   Intimate Partner Violence:     Fear of Current or Ex-Partner: Not on file    Emotionally Abused: Not on file    Physically Abused: Not on file    Sexually Abused: Not on file   Housing Stability:     Unable to Pay for Housing in the Last Year: Not on file    Number of [place] in the Last Year: Not on file    Unstable Housing in the Last Year: Not on file     No current facility-administered medications for this encounter.      Current Outpatient Medications   Medication Sig Dispense Refill    furosemide (LASIX) 20 MG tablet TAKE 1 TABLET DAILY prn 90 tablet 2    finasteride (PROSCAR) 5 MG tablet Take 5 mg by mouth daily      ipratropium (ATROVENT) 0.03 % nasal spray 2 sprays by Each Nostril route every 12 hours Can use of to 4 times daily as needed 1 Bottle 3    diclofenac sodium 1 % GEL APPLY 4GM TOPICALLY FOUR TIMES A DAY 5 Tube 0    carbidopa-levodopa (SINEMET)  MG per tablet Take 1 tablet by mouth daily      losartan (COZAAR) 25 MG tablet Take 1 tablet by mouth daily TAKE 1 TABLET DAILY 90 tablet 3    vitamin B-2 (RIBOFLAVIN) 25 MG TABS Take by mouth      diclofenac (PENNSAID) 2 % SOLN 2 pumps to the affected area 2 times a day [phone] 1 Bottle 0    atorvastatin (LIPITOR) 40 MG tablet Take 1 tablet by mouth daily 90 tablet 3    isosorbide mononitrate (IMDUR) 30 MG extended release tablet Take 1 tablet by mouth daily 90 tablet 3    dabigatran (PRADAXA) 150 MG capsule Take 1 capsule by mouth 2 times daily 180 capsule 3    omeprazole (PRILOSEC) 20 MG delayed release capsule Take 20 mg by mouth 2 times daily      sotalol (BETAPACE) 80 MG tablet Take 0.5 tablets by mouth 2 times daily 60 tablet 5    fluocinonide (LIDEX) 0.05 % cream Apply topically 2 times daily. 1 Tube 0    ferrous sulfate 325 (65 FE) MG tablet Take 1 tablet by mouth 2 times daily (with meals) 180 tablet 3    zolpidem (AMBIEN) 10 MG tablet Take 10 mg by mouth nightly as needed.  LORazepam (ATIVAN) 1 MG tablet Take 1 mg by mouth 2 times daily as needed.  Lacosamide (VIMPAT PO) Take 100 mg by mouth 2 times daily. Indications: take dos      paroxetine (PAXIL) 20 MG tablet Take 20 mg by mouth in the morning and at bedtime        Allergies   Allergen Reactions    Morphine      Bad sweats    Codeine      dizzy    Penicillins Nausea And Vomiting    Plavix [Clopidogrel Bisulfate] Rash       REVIEW OF SYSTEMS  10 systems reviewed, pertinent positives per HPI otherwise noted to be negative    PHYSICAL EXAM  /77   Pulse 80   Temp 98.7 °F (37.1 °C) (Oral)   Resp 15   Ht 5' 10.5\" (1.791 m)   Wt 186 lb (84.4 kg)   SpO2 97%   BMI 26.31 kg/m²   GENERAL APPEARANCE: Awake and alert. Cooperative. No acute distress. Vital signs are stable. Well appearing and non toxic. HEAD: Normocephalic. Small abrasion to the right side of the forehead. Otherwise no scalp hematoma or laceration. No mastoid tenderness or lieberman sign. EYES: PERRL. EOM's grossly intact. ENT: Mucous membranes are moist.   NECK: Supple. Normal ROM. No cervical spine tenderness to palpation. Paraspinal musculature tenderness to the right and left.   No crepitus or step-off. HEART: RRR. Distal pulses are equal and intact. Cap refill less than 2 seconds. LUNGS: Respirations unlabored. CTAB. Good air exchange. Speaking comfortably in full sentences. No wheezing, rhonchi, rales, stridor. ABDOMEN: Soft. Non-distended. Non-tender. No guarding or rebound. No rigidity. Bowel sounds are present. Negative parker's. Negative McBurney's point. Negative CVA tenderness. EXTREMITIES: No peripheral edema. Moves all extremities equally. All extremities neurovascularly intact. No obvious deformities. Left hand no erythema, edema, ecchymosis. No bony tenderness. Patient able to perform active and passive range of motion. Patient able to perform finger to thumb opposition. Normal strength. Sensation intact. Cap refill less than 2 seconds. Distal pulse intact. Neurovascularly intact. Right hip no leg shortening, internal or external rotation, erythema, edema, ecchymosis. Right knee no edema, erythema, ecchymosis. Patient able to perform active and passive range of motion. Normal strength. Sensation intact. Cap refill less than 2 seconds. Distal pulse intact. Neurovascularly intact. SKIN: Warm and dry. No acute rashes. NEUROLOGICAL: Alert and oriented. No gross facial drooping. Strength 5/5, sensation intact. PSYCHIATRIC: Normal mood and affect. SCREENINGS       RADIOLOGY  XR HAND LEFT (MIN 3 VIEWS)    Result Date: 5/9/2022  EXAMINATION: THREE XRAY VIEWS OF THE LEFT HAND 5/9/2022 11:59 am COMPARISON: None. HISTORY: ORDERING SYSTEM PROVIDED HISTORY: fall TECHNOLOGIST PROVIDED HISTORY: Reason for exam:->fall Reason for Exam: fall FINDINGS: There is no evidence of a fracture. There is a 7 mm long metallic foreign body in the soft tissue at the medial aspect of the distal metaphysis of the 2nd metacarpal.  There is slight hypertrophic change at the D IP joints and at the 5th PIP joint. There is calcification of the triangular fibrocartilage.   There is 1 mm of ulna plus variance. There is moderate narrowing of the 3rd MCP joint. Degenerative changes. Metallic foreign body. Chondrocalcinosis. 1 mm of ulna plus variance. No acute process. Sign report     XR KNEE RIGHT (1-2 VIEWS)    Result Date: 5/9/2022  EXAMINATION: 2 XRAY VIEWS OF THE RIGHT KNEE 5/9/2022 11:59 am COMPARISON: None. HISTORY: ORDERING SYSTEM PROVIDED HISTORY: fall TECHNOLOGIST PROVIDED HISTORY: Reason for exam:->fall Reason for Exam: fall FINDINGS: There is a right TKA with a nonmetallic patellar resurfacing component. The metallic orthopedic components appear well seated. There are osteocartilaginous loose bodies projected at the suprapatellar region and posterior aspect of the knee joint. There is regional osteoporosis. There is no evidence of a fracture, subluxation, nor knee joint effusion. Well-seated TKA. Regional osteoporosis. No evidence of an acute process. CT HEAD WO CONTRAST    Result Date: 5/9/2022  EXAMINATION: CT OF THE HEAD WITHOUT CONTRAST  5/9/2022 12:39 pm TECHNIQUE: CT of the head was performed without the administration of intravenous contrast. Dose modulation, iterative reconstruction, and/or weight based adjustment of the mA/kV was utilized to reduce the radiation dose to as low as reasonably achievable. COMPARISON: None. HISTORY: ORDERING SYSTEM PROVIDED HISTORY: fall head injury TECHNOLOGIST PROVIDED HISTORY: Reason for exam:->fall head injury Has a \"code stroke\" or \"stroke alert\" been called? ->No Decision Support Exception - unselect if not a suspected or confirmed emergency medical condition->Emergency Medical Condition (MA) Reason for Exam: fall; hx frequent falls; takes blood thinners; headache; pt fell 2 days ago and hit front of head; Additional signs and symptoms: hx seizure FINDINGS: BRAIN/VENTRICLES: There is slight generalized, age-appropriate bilateral cerebral atrophy. There is no acute intracranial hemorrhage, mass effect or midline shift.   No abnormal left greater than right. Mild degenerative change at the C1-2 articulation anteriorly. SOFT TISSUES: There is no prevertebral soft tissue swelling. Calcified atherosclerotic plaque at the level the carotid bifurcation bilaterally. No acute abnormality of the cervical spine. Stable postoperative changes at C5 through C7. Multilevel cervical spondylosis and facet arthropathy also stable. XR HIP 2-3 VW W PELVIS RIGHT    Result Date: 5/9/2022  EXAMINATION: ONE XRAY VIEW OF THE PELVIS AND TWO XRAY VIEWS RIGHT HIP 5/9/2022 11:59 am COMPARISON: None. HISTORY: ORDERING SYSTEM PROVIDED HISTORY: fall TECHNOLOGIST PROVIDED HISTORY: Reason for exam:->fall Reason for Exam: fall FINDINGS: No acute fracture or dislocation. Degenerative changes about the left hip greater trochanter. Mild narrowing of the superolateral hip joint space bilaterally. No acute findings. Degenerative changes as described above. CONSULTS  IP CONSULT TO CASE MANAGEMENT    ED COURSE/MDM  Patient seen and evaluated. Old records reviewed. Diagnostic testing reviewed and results discussed. I have seen this patient in collaboration with supervising physician Dr. Artem Nielson. We thoroughly discussed the history, physical exam, diagnostic testing and emergency department course. Lawrence Holley presented to the ED today with above noted complaints. Imaging of reported injuries was obtained. All of which are unremarkable no findings of acute fracture, intracranial hemorrhage, dislocation. Blood work unremarkable stable anemia hemoglobin and hematocrit 9 and 26.4. Platelets 858. No leukocytosis or bandemia. No MIRNA. No electrolyte abnormality. No transaminitis. Urinalysis negative for infection or hematuria. Rapid influenza and rapid COVID are both negative.     Orthostatic vital signs positive for a significant drop in blood pressure upon standing from 121/64 to 74/47    Physical therapy and occupational therapy evaluated patient in the emergency department. Unfortunately they do not recommend SNF at this time and this is very concerning for patient's wife who does not feel comfortable taking patient home. Given the significant orthostasis with frequent falls, hospitalist did agree to admit patient for further observation and management. Patient received tylenol for pain, with good relief. While in ED patient received   Medications   acetaminophen (TYLENOL) tablet 650 mg (650 mg Oral Given 5/9/22 1222)   0.9 % sodium chloride bolus (0 mLs IntraVENous Stopped 5/9/22 1712)       A discussion was had with the patient and/or their surrogate regarding diagnosis, diagnostic testing results, treatment/ plan of care. There was shared decision-making between myself as well as the patient and/or their surrogate and we are all in agreement with hospital admission. There was an opportunity for questions and all questions were answered to the best of my ability and to the satisfaction of the patient and/or patient family.      Results for orders placed or performed during the hospital encounter of 05/09/22   Rapid influenza A/B antigens    Specimen: Nasopharyngeal   Result Value Ref Range    Rapid Influenza A Ag Negative Negative    Rapid Influenza B Ag Negative Negative   COVID-19, Rapid    Specimen: Nasopharyngeal Swab   Result Value Ref Range    SARS-CoV-2, NAAT Not Detected Not Detected   CBC with Auto Differential   Result Value Ref Range    WBC 4.2 4.0 - 11.0 K/uL    RBC 2.96 (L) 4.20 - 5.90 M/uL    Hemoglobin 9.0 (L) 13.5 - 17.5 g/dL    Hematocrit 26.4 (L) 40.5 - 52.5 %    MCV 89.1 80.0 - 100.0 fL    MCH 30.5 26.0 - 34.0 pg    MCHC 34.2 31.0 - 36.0 g/dL    RDW 22.2 (H) 12.4 - 15.4 %    Platelets 092 (L) 874 - 450 K/uL    MPV 7.3 5.0 - 10.5 fL    Neutrophils % 43.5 %    Lymphocytes % 34.2 %    Monocytes % 21.2 %    Eosinophils % 0.8 %    Basophils % 0.3 %    Neutrophils Absolute 1.8 1.7 - 7.7 K/uL    Lymphocytes Absolute 1.4 1.0 - 5.1 K/uL    Monocytes Absolute 0.9 0.0 - 1.3 K/uL    Eosinophils Absolute 0.0 0.0 - 0.6 K/uL    Basophils Absolute 0.0 0.0 - 0.2 K/uL   Comprehensive Metabolic Panel   Result Value Ref Range    Sodium 138 136 - 145 mmol/L    Potassium 3.7 3.5 - 5.1 mmol/L    Chloride 101 99 - 110 mmol/L    CO2 26 21 - 32 mmol/L    Anion Gap 11 3 - 16    Glucose 104 (H) 70 - 99 mg/dL    BUN 15 7 - 20 mg/dL    CREATININE 0.7 (L) 0.8 - 1.3 mg/dL    GFR Non-African American >60 >60    GFR African American >60 >60    Calcium 9.1 8.3 - 10.6 mg/dL    Total Protein 7.4 6.4 - 8.2 g/dL    Albumin 3.9 3.4 - 5.0 g/dL    Albumin/Globulin Ratio 1.1 1.1 - 2.2    Total Bilirubin 0.6 0.0 - 1.0 mg/dL    Alkaline Phosphatase 103 40 - 129 U/L    ALT 14 10 - 40 U/L    AST 22 15 - 37 U/L   Troponin   Result Value Ref Range    Troponin <0.01 <0.01 ng/mL   Protime-INR   Result Value Ref Range    Protime 16.6 (H) 9.9 - 12.7 sec    INR 1.45 (H) 0.88 - 1.12   Urinalysis with Reflex to Culture    Specimen: Urine   Result Value Ref Range    Color, UA Yellow Straw/Yellow    Clarity, UA Clear Clear    Glucose, Ur 100 (A) Negative mg/dL    Bilirubin Urine Negative Negative    Ketones, Urine Negative Negative mg/dL    Specific Gravity, UA 1.025 1.005 - 1.030    Blood, Urine Negative Negative    pH, UA 6.0 5.0 - 8.0    Protein, UA Negative Negative mg/dL    Urobilinogen, Urine 0.2 <2.0 E.U./dL    Nitrite, Urine Negative Negative    Leukocyte Esterase, Urine Negative Negative    Microscopic Examination Not Indicated     Urine Type NotGiven     Urine Reflex to Culture Not Indicated    EKG 12 Lead   Result Value Ref Range    Ventricular Rate 83 BPM    Atrial Rate 357 BPM    QRS Duration 84 ms    Q-T Interval 400 ms    QTc Calculation (Bazett) 470 ms    R Axis 5 degrees    T Axis 34 degrees    Diagnosis       Baseline artifactAtrial fibrillationLow voltage in the limb leadsAbnormal ECGWhen compared with ECG of 30-MAR-2022 19:26,artifact is slightly improvedConfirmed by Carley Ayala MD, Nataliya [id number] ([id number]) on 5/9/2022 6:55:47 PM       I spoke with Dr. Tye Russell. We thoroughly discussed the history, physical exam, laboratory and imaging studies, as well as, emergency department course. Based upon that discussion, we've decided to admit Marlene Guaman for further observation and evaluation of Yas Luna's orthostatic hypotension and frequent falls. FINAL IMPRESSION  1. Orthostatic hypotension    2. General weakness    3. Frequent falls    4. Anticoagulated        Vitals:  Blood pressure 137/75, pulse 80, temperature 98.7 °F (37.1 °C), temperature source Oral, resp. rate 16, height 5' 10.5\" (1.791 m), weight 186 lb (84.4 kg), SpO2 (!) 77 %. DISPOSITION  Patient was admitted to the hospital in stable condition. Comment: Please note this report has been produced using speech recognition software and may contain errors related to that system including errors in grammar, punctuation, and spelling, as well as words and phrases that may be inappropriate. If there are any questions or concerns please feel free to contact the dictating provider for clarification.             Phylicia Calloway, KATIE - GEOVANNA  05/09/22 1925

## 2022-05-09 NOTE — PROGRESS NOTES
Physical Therapy  Facility/Department: [address] [place] [address]  ED  Physical Therapy Initial Assessment/Treatment    Name: Tash Alexandra  : [id number]  MRN: [id number]  Date of Service: 2022    Discharge Recommendations:  24 hour supervision or assist,Outpatient PT   PT Equipment Recommendations  Equipment Needed: No (Pt has cane)      Patient Diagnosis(es): There were no encounter diagnoses. Past Medical History:  has a past medical history of MIRNA (acute kidney injury) ([facility] Anxiety, Arthritis, Bradycardia, Cancer ([facility] [address] [facility] Hemoptysis, Irregular heart  beats, 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[facility] [address] S/P drug eluting coronary stent placement, Seizure disorder ([facility] [address] Seizures ([facility] [address] Shortness of breath, and Total knee replacement status. Past Surgical History:  has a past surgical history that includes Carpal tunnel release; Femur Surgery; knee surgery (12-11 R total knee replacement with femoral nerve block for pain control); Cervical disc surgery (); other surgical history (Bilateral); ablation of dysrhythmic focus (2014); other surgical history (2014); Colonoscopy (2016); Upper gastrointestinal endoscopy (2016); Coronary angioplasty with stent; and Lung removal, partial (Right). Assessment   Body Structures, Functions, Activity Limitations Requiring Skilled Therapeutic Intervention: Decreased functional mobility ; Decreased endurance;Decreased strength;Decreased balance  Assessment: Pt presents to [facility] with multiple falls, reports 7 falls in the last 3 weeks. Prior to falls, pt was independent and ambulating without device. Pt currently using cane but continues to have falls. Pt currently requires CGA for functional mobility with SPC. Pt would benefit from continued skilled PT to address deficits.  Recommend home with 24/7 supervision, continues use of cane and outpatient PT  Treatment Diagnosis: impaired functional mobility  Therapy Prognosis: Good  Decision Making: Medium Complexity  Requires PT Follow-Up: Yes  Activity Tolerance  Activity Tolerance: Patient tolerated evaluation without incident  Activity Tolerance Comments: Sitting /63; Standing BP 98/53; after ambulation /56    Disease Specific Education: Pt educated on importance of OOB mobility, prevention of complications of bedrest, and general safety during hospitalization. Pt verbalized understanding    Plan   Plan  Plan: 3-5 times per week  Current Treatment Recommendations: Strengthening,Balance training,Functional mobility training,Transfer training,Endurance training,Neuromuscular re-education,Stair training,Gait training,Pain management,Home exercise program,Safety education & training,Patient/Caregiver education & training,Therapeutic activities  Safety Devices  Type of Devices:  All fall risk precautions in place,Gait belt,Call light within reach,Nurse notified,Left in bed     Restrictions  Restrictions/Precautions  Restrictions/Precautions: Fall Risk     Subjective   General  Chart Reviewed: Yes  Patient assessed for rehabilitation services?: Yes  Response To Previous Treatment: Not applicable  Family / Caregiver Present: Yes (wife)  Referring Practitioner: KATIE Morgan Ma, CNP  Referral Date : 05/09/22  Diagnosis: Falls  Follows Commands: Within Functional Limits  General Comment  Comments: Rn cleared pt for PT eval  Subjective  Subjective: Pt supine in bed upon arrival with OT present, agreeable to PT eval         Social/Functional History  Social/Functional History  Lives With: Spouse  Type of Home: House  Home Access: Stairs to enter without rails  Entrance Stairs - Number of Steps: 1  Bathroom Shower/Tub: Tub/Shower unit,Shower chair with back  Bathroom Toilet: Handicap height  Home Equipment: Cane,Walker, rolling  Has the patient had two or more falls in the past year or any fall with injury in the past year?: Yes  ADL Assistance: Independent  Homemaking Assistance: Independent  Ambulation Assistance: Independent  Transfer Assistance: Independent  Active : Yes  Occupation: Retired  Type of Occupation: helm  Leisure & Hobbies: mowing the lawn. Additional Comments: Pt reports 7 falls in the last month. Pt ambulates without AD, but recently started using a cane due to frequent falls. Wife available to provide 24/7 supervision/assistance if needed. Vision/Hearing  Vision Exceptions: Wears glasses for reading  Hearing: Exceptions to [place]  Hearing Exceptions: Bilateral hearing aid;Hard of hearing/hearing concerns      Cognition   Orientation  Overall Orientation Status: Within Normal Limits  Orientation Level: Oriented X4  Cognition  Overall Cognitive Status: WNL     Objective   Pulse: 83  Heart Rate Source: Monitor  BP: (!) 106/53  BP Location: Left upper arm  BP Method: Automatic  Patient Position: Semi fowlers  MAP (Calculated): 70.67  Resp: 15  SpO2: 97 %  O2 Device: None (Room air)        Gross Assessment  AROM: Within functional limits  PROM: Within functional limits  Strength: Generally decreased, functional  Coordination: Within functional limits  Tone: Normal  Sensation: Intact     Bed Mobility Training  Bed Mobility Training: Yes  Overall Level of Assistance: Stand-by assistance  Supine to Sit: Stand-by assistance; Additional time  Sit to Supine: Stand-by assistance; Additional time  Scooting: Stand-by assistance  Balance  Sitting: Intact  Standing: Impaired (CGA with cane)  Standing - Dynamic:  (CGA for marching in place with [facility])  Transfer Training  Transfer Training: Yes  Overall Level of Assistance: Contact-guard assistance  Interventions: Verbal cues  Sit to Stand: Contact-guard assistance; Adaptive equipment (cane)  Stand to Sit: Contact-guard assistance; Adaptive equipment  Gait Training  Gait Training: Yes  Gait  Overall Level of Assistance: Contact-guard assistance (no overt LOB; pt denies dizziness)  Interventions: Verbal cues  Base of Support: Widened  Speed/Shruthi: Slow  Distance (ft): 150 Feet  Assistive Device: Cane, straight       AM-PAC Score  AM-PAC Inpatient Mobility Raw Score : 18 (05/09/22 1449)  AM-PAC Inpatient T-Scale Score : 43.63 (05/09/22 1449)  Mobility Inpatient CMS 0-100% Score: 46.58 (05/09/22 1449)  Mobility Inpatient CMS G-Code Modifier : CK (05/09/22 1449)          Goals  Long Term Goals  Time Frame for Long term goals : 7 days (5/16/22)  Long term goal 1: Pt will perform bed mobility IND  Long term goal 2: Pt will perform transfers with LRAD and IND  Long term goal 3: Pt will ambulate 150 ft with LRAD and supervision  Long term goal 4: Pt will perform 12-15 reps of BLE exercises to improve strength by 5/12/22  Patient Goals   Patient goals : \"to get stronger and stop falling\"       Therapy Time   Individual Concurrent Group Co-treatment   Time In 1428         Time Out 1448         Minutes 20         Timed Code Treatment Minutes: 10 Minutes (10 min eval)     If pt is unable to be seen after this session, please let this note serve as discharge summary. Please see case management note for discharge disposition. Thank you.     Agustin De Los Santos, PT

## 2022-05-10 LAB
ANION GAP SERPL CALCULATED.3IONS-SCNC: 9 MMOL/L (ref 3–16)
BUN BLDV-MCNC: 13 MG/DL (ref 7–20)
CALCIUM SERPL-MCNC: 8.8 MG/DL (ref 8.3–10.6)
CHLORIDE BLD-SCNC: 107 MMOL/L (ref 99–110)
CO2: 26 MMOL/L (ref 21–32)
CREAT SERPL-MCNC: 0.7 MG/DL (ref 0.8–1.3)
GFR AFRICAN AMERICAN: >60
GFR NON-AFRICAN AMERICAN: >60
GLUCOSE BLD-MCNC: 99 MG/DL (ref 70–99)
HCT VFR BLD CALC: 24 % (ref 40.5–52.5)
HEMOGLOBIN: 8.2 G/DL (ref 13.5–17.5)
MCH RBC QN AUTO: 30.7 PG (ref 26–34)
MCHC RBC AUTO-ENTMCNC: 34.3 G/DL (ref 31–36)
MCV RBC AUTO: 89.6 FL (ref 80–100)
PDW BLD-RTO: 21.8 % (ref 12.4–15.4)
PLATELET # BLD: 76 K/UL (ref 135–450)
PMV BLD AUTO: 7 FL (ref 5–10.5)
POTASSIUM REFLEX MAGNESIUM: 3.8 MMOL/L (ref 3.5–5.1)
RBC # BLD: 2.68 M/UL (ref 4.2–5.9)
SODIUM BLD-SCNC: 142 MMOL/L (ref 136–145)
WBC # BLD: 3.3 K/UL (ref 4–11)

## 2022-05-10 PROCEDURE — 6370000000 HC RX 637 (ALT 250 FOR IP): Performed by: INTERNAL MEDICINE

## 2022-05-10 PROCEDURE — 2580000003 HC RX 258: Performed by: INTERNAL MEDICINE

## 2022-05-10 PROCEDURE — G0378 HOSPITAL OBSERVATION PER HR: HCPCS

## 2022-05-10 PROCEDURE — 85027 COMPLETE CBC AUTOMATED: CPT

## 2022-05-10 PROCEDURE — 99205 OFFICE O/P NEW HI 60 MIN: CPT | Performed by: INTERNAL MEDICINE

## 2022-05-10 PROCEDURE — 97116 GAIT TRAINING THERAPY: CPT

## 2022-05-10 PROCEDURE — 36415 COLL VENOUS BLD VENIPUNCTURE: CPT

## 2022-05-10 PROCEDURE — 97530 THERAPEUTIC ACTIVITIES: CPT

## 2022-05-10 PROCEDURE — 96361 HYDRATE IV INFUSION ADD-ON: CPT

## 2022-05-10 PROCEDURE — 96372 THER/PROPH/DIAG INJ SC/IM: CPT

## 2022-05-10 PROCEDURE — 6360000002 HC RX W HCPCS: Performed by: INTERNAL MEDICINE

## 2022-05-10 PROCEDURE — 80048 BASIC METABOLIC PNL TOTAL CA: CPT

## 2022-05-10 RX ADMIN — SOTALOL HYDROCHLORIDE 40 MG: 80 TABLET ORAL at 08:32

## 2022-05-10 RX ADMIN — ATORVASTATIN CALCIUM 40 MG: 40 TABLET, FILM COATED ORAL at 08:32

## 2022-05-10 RX ADMIN — PANTOPRAZOLE SODIUM 20 MG: 20 TABLET, DELAYED RELEASE ORAL at 17:20

## 2022-05-10 RX ADMIN — FINASTERIDE 5 MG: 5 TABLET, FILM COATED ORAL at 08:32

## 2022-05-10 RX ADMIN — PANTOPRAZOLE SODIUM 20 MG: 20 TABLET, DELAYED RELEASE ORAL at 05:17

## 2022-05-10 RX ADMIN — ENOXAPARIN SODIUM 40 MG: 100 INJECTION SUBCUTANEOUS at 08:33

## 2022-05-10 RX ADMIN — CARBIDOPA AND LEVODOPA 1 TABLET: 25; 100 TABLET ORAL at 08:32

## 2022-05-10 RX ADMIN — ZOLPIDEM TARTRATE 10 MG: 5 TABLET ORAL at 22:02

## 2022-05-10 RX ADMIN — LORAZEPAM 2 MG: 1 TABLET ORAL at 22:02

## 2022-05-10 RX ADMIN — LORAZEPAM 1 MG: 1 TABLET ORAL at 08:33

## 2022-05-10 RX ADMIN — PAROXETINE HYDROCHLORIDE 20 MG: 20 TABLET, FILM COATED ORAL at 08:32

## 2022-05-10 RX ADMIN — PAROXETINE HYDROCHLORIDE 20 MG: 20 TABLET, FILM COATED ORAL at 22:02

## 2022-05-10 RX ADMIN — LACOSAMIDE 100 MG: 50 TABLET, FILM COATED ORAL at 08:36

## 2022-05-10 RX ADMIN — FERROUS SULFATE TAB 325 MG (65 MG ELEMENTAL FE) 325 MG: 325 (65 FE) TAB at 17:20

## 2022-05-10 RX ADMIN — LACOSAMIDE 100 MG: 50 TABLET, FILM COATED ORAL at 22:02

## 2022-05-10 RX ADMIN — FERROUS SULFATE TAB 325 MG (65 MG ELEMENTAL FE) 325 MG: 325 (65 FE) TAB at 08:32

## 2022-05-10 RX ADMIN — SODIUM CHLORIDE: 9 INJECTION, SOLUTION INTRAVENOUS at 08:37

## 2022-05-10 ASSESSMENT — PAIN SCALES - GENERAL
PAINLEVEL_OUTOF10: 0

## 2022-05-10 NOTE — PLAN OF CARE
Problem: Discharge Planning  Goal: Discharge to home or other facility with appropriate resources  5/10/2022 1226 by Nabil Knowles  Outcome: Progressing  Flowsheets (Taken 5/10/2022 0842)  Discharge to home or other facility with appropriate resources:   Identify barriers to discharge with patient and caregiver   Arrange for needed discharge resources and transportation as appropriate  5/10/2022 0148 by Abdiel Calderón RN  Outcome: Progressing  Flowsheets (Taken 5/9/2022 2220)  Discharge to home or other facility with appropriate resources: Identify discharge learning needs (meds, wound care, etc)     Problem: Safety - Adult  Goal: Free from fall injury  5/10/2022 1226 by Nabil Knowles  Outcome: Progressing  5/10/2022 0148 by Abdiel Calderón RN  Outcome: Progressing     Problem: ABCDS Injury Assessment  Goal: Absence of physical injury  5/10/2022 1226 by Nabil Knowles  Outcome: Progressing  5/10/2022 0148 by Abdiel Calderón RN  Outcome: Progressing     Problem: Pain  Goal: Verbalizes/displays adequate comfort level or baseline comfort level  5/10/2022 1226 by Nabil Knowles  Outcome: Progressing  5/10/2022 0148 by Abdiel Calderón RN  Outcome: Progressing

## 2022-05-10 NOTE — DISCHARGE INSTR - COC
Continuity of Care Form    Patient Name: Mercy Zhong   :  [age]  MRN:  [id number]    Admit date:  2022  Discharge date:  2022    Code Status Order: Full Code   Advance Directives:      Admitting Physician:  No admitting provider for patient encounter. PCP: Vince Abrams MD    Discharging Nurse: [facility] Unit/Room#: 1122/4027-01  Discharging Unit Phone Number:     Emergency Contact:   Extended Emergency Contact Information  Primary Emergency Contact: Helen Luna  Address: [facility]           [address], [address] Phone: [phone]  Mobile Phone: [phone]  Relation: Spouse   needed? No  Secondary Emergency Contact: [address] [place] [address] Phone: [phone]  Mobile Phone: [phone]  Relation: Other   needed? No    Past Surgical History:  Past Surgical History:   Procedure Laterality Date    ABLATION OF DYSRHYTHMIC FOCUS  2014    CARPAL TUNNEL RELEASE      CERVICAL [address] SURGERY  2010    COLONOSCOPY  2016    normal    CORONARY ANGIOPLASTY WITH STENT PLACEMENT      FEMUR SURGERY      left    KNEE SURGERY  11 R total knee replacement with femoral nerve block for pain control    LUNG REMOVAL, PARTIAL Right     20%    OTHER SURGICAL HISTORY Bilateral     excisions of lesions on bilat. neck and back    OTHER SURGICAL HISTORY  2014    Reveal Loop recorder placed in Cath Lab    UPPER GASTROINTESTINAL ENDOSCOPY  2016    gastric and esophogeal biopsy       Immunization History:   Immunization History   Administered Date(s) Administered    Influenza Virus Vaccine 2014       Active Problems:  Patient Active Problem List   Diagnosis Code    Dizziness R42    Syncope and collapse R55    Paroxysmal atrial fibrillation (HCC) I48.0    Contusion of knee, right S80. 01XA    Dysphagia R13.10    Encounter for electronic analysis of reveal event recorder Z45.09    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 E80.1    History of nonmelanoma skin cancer Z85.828    Ischemic chest pain (HCC) I20.9    Left hand pain M79.642    Hand pain, right M79.641    Arthritis of carpometacarpal (CMC) joints of both thumbs M18.0    Radial styloid tenosynovitis (de quervain) M65.4    Left elbow fracture, closed, initial encounter S42.402A    Frequent falls R29.6    General weakness R53.1    Thrombocytopenia (HCC) D69.6    Malignant neoplasm of lung (HCC) C34.90    Orthostasis I95.1       Isolation/Infection:   Isolation            No Isolation          Patient Infection Status       Infection Onset Added Last Indicated Last Indicated By Review Planned Expiration Resolved Resolved By    None active    Resolved    COVID-19 (Rule Out) 05/09/22 05/09/22 05/09/22 COVID-19, Rapid (Ordered)   05/09/22 Rule-Out Test Resulted            Nurse Assessment:  Last Vital Signs: /64   Pulse 78   Temp 97.4 °F (36.3 °C) (Oral)   Resp 18   Ht 5' 10.5\" (1.791 m)   Wt 186 lb (84.4 kg)   SpO2 97%   BMI 26.31 kg/m²     Last documented pain score (0-10 scale): Pain Level: 0  Last Weight:   Wt Readings from Last 1 Encounters:   05/09/22 186 lb (84.4 kg)     Mental Status:  oriented and alert    IV Access:  - None    Nursing Mobility/ADLs:  Walking   Independent  Transfer  Independent  Bathing  Independent  Dressing  Independent  Toileting  Independent  Feeding  [address]  Independent  Med Delivery   whole    Wound Care Documentation and Therapy:        Elimination:  Continence: Bowel: Yes  Bladder: Yes  Urinary Catheter: None   Colostomy/Ileostomy/Ileal Conduit: No       Date of Last BM: 5/14/2022    Intake/Output Summary (Last 24 hours) at 5/10/2022 1646  Last data filed at 5/10/2022 0630  Gross per 24 hour   Intake 1000 ml   Output 475 ml   Net 525 ml     I/O last 3 completed shifts:   In: 1000 [IV Piggyback:1000]  Out: 475 [Urine:475]    Safety Concerns:     History of Falls (last 30 days) and At Risk for Falls    Impairments/Disabilities:      None    Nutrition Therapy:  Current Nutrition Therapy:   - Oral Diet:  General    Routes of Feeding: Oral  Liquids: Thin Liquids  Daily Fluid Restriction: no  Last Modified Barium Swallow with Video (Video Swallowing Test): not done    Treatments at the Time of Hospital Discharge:   Respiratory Treatments:   Oxygen Therapy:  is not on home oxygen therapy. Ventilator:    - No ventilator support    Rehab Therapies: Physical Therapy, Occupational Therapy,   Weight Bearing Status/Restrictions: No weight bearing restrictions  Other Medical Equipment (for information only, NOT a DME order):  cane  Other Treatments:     Patient's personal belongings (please select all that are sent with patient): All belonging sent with patient at time of discharge. RN SIGNATURE:  Electronically signed by Amalia Bolton RN on 5/14/22 at 3:45 PM EDT    CASE MANAGEMENT/SOCIAL WORK SECTION    Inpatient Status Date:  5/10/22    Readmission Risk Assessment Score:  Readmission Risk              Risk of Unplanned Readmission:  0           Discharging to Facility/ [address], [address] [place] [address]   [phone]     / signature: Electronically signed by Tyron Burgess RN on 5/14/22 at 2:42 PM EDT    PHYSICIAN SECTION    Prognosis: Fair    Condition at Discharge: Stable    Rehab Potential (if transferring to Rehab): Fair    Recommended Labs or Other Treatments After Discharge: Home care, Repeat Digoxin level and BMP in 3 days, Follow up with PCP/Cardiology/Hematology    Physician Certification: I certify the above information and transfer of Sam Olsen  is necessary for the continuing treatment of the diagnosis listed and that he requires [address] for less 30 days.      Update Admission H&P: No change in H&P    PHYSICIAN SIGNATURE:  Electronically signed by KATIE Cardona - CNP/Ej Espino MD on 5/14/22 at 2:45 PM EDT

## 2022-05-10 NOTE — PROGRESS NOTES
Physical Therapy  Facility/Department: [facility] C5 - MED SURG/ORTHO  Daily Treatment Note  NAME: Amrita Mora  :   MRN: [id number]    Date of Service: 5/10/2022    Discharge Recommendations:  24 hour supervision or assist,Outpatient PT   PT Equipment Recommendations  Equipment Needed: No    Patient Diagnosis(es): The primary encounter diagnosis was Orthostatic hypotension. Diagnoses of General weakness, Frequent falls, and Anticoagulated were also pertinent to this visit. Assessment   Assessment: Pt seen this date for follow up PT session. Pt reports orthostatis with nursing staff earlier this date. Vitals from this session Sitting /64, Standing BP 9/61, Post-ambulation BP 95/50, after seated exercises /60. Pt continues to require grossly CGA for functional mobility. Pt would benefit from continued skilled PT to address deficits listed above. Activity Tolerance: Patient tolerated evaluation without incident  Equipment Needed: No     Plan    Plan  Plan: 3-5 times per week  Current Treatment Recommendations: Strengthening;Balance training;Functional mobility training;Transfer training; Endurance training;Neuromuscular re-education;Stair training;Gait training;Pain management;Home exercise program;Safety education & training;Patient/Caregiver education & training; Therapeutic activities     Restrictions  Restrictions/Precautions  Restrictions/Precautions: Fall Risk     Subjective    Subjective  Subjective: Pt supine in bed upon arrival, agreeable to PT session. Pt states he became orthostatic with nursing earlier  Pain: Denies  Orientation  Overall Orientation Status: Within Normal Limits  Orientation Level: Oriented X4  Cognition  Overall Cognitive Status: WNL     Objective   Vitals  Vitals:    05/10/22 1554   BP: 105/64   Pulse: 78   Resp: 18   Temp:    SpO2: 97% on RA          Bed Mobility Training  Bed Mobility Training: Yes  Overall Level of Assistance: Supervision; Additional time  Supine to Sit: Supervision; Additional time  Balance  Sitting: Intact (Pt sits EOB ~3-4 minutes while assessing BP prior to ambulation)  Standing: Impaired  Standing - Static: Good (with SPC while assessing BP prior to ambulation)  Standing - Dynamic: Good  Transfer Training  Transfer Training: Yes  Overall Level of Assistance: Contact-guard assistance (all transfers with SPC; pt denies dizziness upon standing; static standing 1-2 minutes prior to ambulation)  Interventions: Verbal cues  Sit to Stand: Contact-guard assistance; Adaptive equipment  Stand to Sit: Contact-guard assistance; Adaptive equipment  Bed to Chair: Contact-guard assistance; Adaptive equipment  Gait Training  Gait Training: Yes  Gait  Overall Level of Assistance: Contact-guard assistance (After ambulation, pt reports mild dizziness; BP 95/50)  Interventions: Verbal cues  Base of Support: Widened  Speed/Shruthi: Slow  Distance (ft): 100 Feet  Assistive Device: Cane, straight;Gait belt     PT Exercises  A/AROM Exercises: Pt perform seated marches x1 minute, and LAQ x1 minute after ambulation; BP after exercises 113/60     Safety Devices  Type of Devices: All fall risk precautions in place;Gait belt;Call light within reach;Nurse notified; Chair alarm in place; Left in chair     Patient Education  Education Given To: Patient  Education Provided: Role of Therapy;Transfer Training; Fall Prevention Strategies  Education Provided Comments: Pt educated on monitoring symptoms, performing exercises prior to ambulating, etc.  Education Method: Demonstration;Verbal  Barriers to Learning: None  Education Outcome: Verbalized understanding;Demonstrated understanding     [facility] 6 Clicks Inpatient Mobility:  AM-PAC Mobility Inpatient   How much difficulty turning over in bed?: A Little  How much difficulty sitting down on / standing up from a chair with arms?: A Little  How much difficulty moving from lying on back to sitting on side of bed?: A Little  How much help from another person moving to and from a bed to a chair?: A Little  How much help from another person needed to walk in hospital room?: A Little  How much help from another person for climbing 3-5 steps with a railing?: A Little  AM-PAC Inpatient Mobility Raw Score : 18  AM-PAC Inpatient T-Scale Score : 43.63  Mobility Inpatient CMS 0-100% Score: 46.58  Mobility Inpatient CMS G-Code Modifier : CK      Goals  Long Term Goals  Time Frame for Long term goals : 7 days (5/16/22)  Long term goal 1: Pt will perform bed mobility IND  Long term goal 2: Pt will perform transfers with LRAD and IND  Long term goal 3: Pt will ambulate 150 ft with LRAD and supervision  Long term goal 4: Pt will perform 12-15 reps of BLE exercises to improve strength by 5/12/22  Patient Goals   Patient goals : \"to get stronger and stop falling\"      Therapy Time   Individual Concurrent Group Co-treatment   Time In 1545         Time Out 1608         Minutes 23         Timed Code Treatment Minutes: 23 Minutes     If pt is unable to be seen after this session, please let this note serve as discharge summary. Please see case management note for discharge disposition. Thank you.     Juan Sexton, PT

## 2022-05-10 NOTE — PLAN OF CARE
Problem: Discharge Planning  Goal: Discharge to home or other facility with appropriate resources  Outcome: Progressing  Flowsheets (Taken 5/9/2022 2220)  Discharge to home or other facility with appropriate resources: Identify discharge learning needs (meds, wound care, etc)     Problem: Safety - Adult  Goal: Free from fall injury  Outcome: Progressing     Problem: ABCDS Injury Assessment  Goal: Absence of physical injury  Outcome: Progressing     Problem: Pain  Goal: Verbalizes/displays adequate comfort level or baseline comfort level  Outcome: Progressing

## 2022-05-10 NOTE — PROGRESS NOTES
4 Eyes Skin Assessment     The patient is being assess for   Admission    I agree that 2 RN's have performed a thorough Head to Toe Skin Assessment on the patient. ALL assessment sites listed below have been assessed. Areas assessed for pressure by both nurses:   []   Head, Face, and Ears   []   Shoulders, Back, and Chest, Abdomen  []   Arms, Elbows, and Hands   []   Coccyx, Sacrum, and Ischium  []   Legs, Feet, and Heels  Looked at what patient would allow me to Abrasion to right knee from fall, two scabbed over lacerations form all on right side of head and forehead. Scattered bruising from falls. Skin Assessed Under all Medical Devices by both nurses:  {Medical devices:06826}              All Mepilex Borders were peeled back and area peeked at by both nurses:  No: n/a  Please list where Mepilex Borders are located:              **SHARE this note so that the co-signing nurse is able to place an eSignature**    Co-signer eSignature: Electronically signed by Laura Kovacs on 5/10/22 at 1:35 AM EDT    Does the Patient have Skin Breakdown related to pressure?   No              Jorge Prevention initiated:  Yes   Wound Care Orders initiated:  NO      C nurse consulted for Pressure Injury (Stage 3,4, Unstageable, DTI, NWPT, Complex wounds)and New or Established Ostomies:  No      Primary Nurse eSignature: Electronically signed by No Grey RN on 5/10/22 at 1:33 AM EDT

## 2022-05-10 NOTE — CARE COORDINATION
CM spoke to wife about DCP and therapy recs Home with PT/OT. Wife in agreement with [facility], [facility]. with no agency preference. Referral to [facility], Qi Zavala with [address] [phone]. SN, PT and OT and they will work on referrals on Area on [employer].  CM following-Birdie Jones RN

## 2022-05-10 NOTE — H&P
Hospital Medicine History & Physical      PCP: Aslhey Boyd MD    Date of Admission: 5/9/2022    Date of Service: Pt seen/examined on 05/09/22 and placed in Observation. Chief Complaint:  Frequent falls, generalized weakness    History Of Present Illness: The patient is a pleasant 68 Y M with a h/o former smoking, CAD, PAF, parkinson disease, and NSCLC s/p RU lobectomy in 12/2021 and completion of chemotherapy 10 days ago. He and his wife say that in the first 4 months after his lung resection he unintentionally lost about 50 lbs. Since losing all of this weight he has been falling often. He was admitted to [facility] in 3/2022 due to falls. An ulnar fracture was treated conservatively. His MIRNA resolved with IVFs. He was discharged home with \"PRN assistance\" per PT/OT recommendations. Since then the patient has continued to fall. He estimates he has fallen about 7 times since that discharge 6 weeks ago. Sometimes he just loses his balance, sometimes he trips on the vacuum robot, sometimes the wind just blows him over. The only consistent theme between all of these falls is that they always seem to happen within a few seconds of the patient standing up. He denies any lightheadedness or dizziness, but after standing up \"my legs just go to jello after a few steps and I fall down. \"  In the ED he was severely orthostatic: BP dropped from 121/64 to 71/47. He actually did quite well with PT, walked 150 feet with contact-guard assistance, and they only recommended outpatient PT. He and his wife insisted that he wasn't safe to go home like this, since they were sure he would seriously injure himself before long. It has gotten to the point that the patient is afraid to get up out of his chair \"because I know I'll just fall over. \"  His wife worries his muscles are going to wast away due to inactivity. The patient doesn't have any new, acute symptoms and doesn't feel particularly ill.   He and his wife agree that he has been eating well more recently, and intentionally drinking lots of fluids. His labs and imaging were fairly unremarkable. Past Medical History:          Diagnosis Date    MIRNA (acute kidney injury) ([facility] [address] 3/31/2022    Anxiety     Arthritis     OA    Bradycardia 4/19/2014    Cancer ([facility] [address]     skin cancer-forearm right , face    Hemoptysis 10/16/2014    Since Ablation    Irregular heart  beats     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[facility] [address] 12/10/2014    S/P drug eluting coronary stent placement 03/06/2017    2.25 x 18 [employer] ADRI[facility]N - Distal LAD    Seizure disorder ([facility] [address]     4 weeks ago black out, pt states seizure but may be due to Heart Rate changes    Seizures (HCC)     Shortness of breath 9/4/2014    Total knee replacement status 1/12/2011       Past Surgical History:          Procedure Laterality Date    ABLATION OF DYSRHYTHMIC FOCUS  9/2014    CARPAL TUNNEL RELEASE      CERVICAL [address] [place] SURGERY  2010    COLONOSCOPY  09/26/2016    normal    CORONARY ANGIOPLASTY WITH STENT PLACEMENT      FEMUR SURGERY      left    KNEE SURGERY  1-12-11 R total knee replacement with femoral nerve block for pain control    LUNG REMOVAL, PARTIAL Right     20%    OTHER SURGICAL HISTORY Bilateral     excisions of lesions on bilat. neck and back    OTHER SURGICAL HISTORY  9/2014    Reveal Loop recorder placed in Cath Lab    UPPER GASTROINTESTINAL ENDOSCOPY  09/26/2016    gastric and esophogeal biopsy       Medications Prior to Admission:      Prior to Admission medications    Medication Sig Start Date End Date Taking?  Authorizing Provider   finasteride (PROSCAR) 5 MG tablet Take 5 mg by mouth daily    Historical Provider, MD   ipratropium (ATROVENT) 0.03 % nasal spray 2 sprays by Each Nostril route every 12 hours Can use of to 4 times daily as needed 5/10/21   Kenrick Amin,    diclofenac sodium 1 % GEL APPLY 4GM TOPICALLY FOUR TIMES A DAY 12/6/18   Cyril Mohs, MD   carbidopa-levodopa Lori Sunil)  MG per tablet Take 1 tablet by mouth daily    Historical Provider, MD   losartan (COZAAR) 25 MG tablet Take 1 tablet by mouth daily TAKE 1 TABLET DAILY 2/5/18   KATIE Cook CNP   vitamin B-2 (RIBOFLAVIN) 25 MG TABS Take by mouth    Historical Provider, MD   diclofenac (PENNSAID) 2 % SOLN 2 pumps to the affected area 2 times a day [id number] 9/26/17   Jarrell Bazan MD   atorvastatin (LIPITOR) 40 MG tablet Take 1 tablet by mouth daily 8/21/17   Haim Ball MD   isosorbide mononitrate (IMDUR) 30 MG extended release tablet Take 1 tablet by mouth daily 8/21/17   Haim Ball MD   dabigatran (PRADAXA) 150 MG capsule Take 1 capsule by mouth 2 times daily 8/7/17   Lorna Lentz MD   omeprazole (PRILOSEC) 20 MG delayed release capsule Take 20 mg by mouth 2 times daily    Historical Provider, MD   sotalol (BETAPACE) 80 MG tablet Take 0.5 tablets by mouth 2 times daily 2/21/17   KATIE Mondragon CNP   fluocinonide (LIDEX) 0.05 % cream Apply topically 2 times daily. 5/20/16   Darylene Basta, APRN - CNP   ferrous sulfate 325 (65 FE) MG tablet Take 1 tablet by mouth 2 times daily (with meals) 10/5/15   Haim Ball MD   zolpidem (AMBIEN) 10 MG tablet Take 10 mg by mouth nightly as needed. Historical Provider, MD   LORazepam (ATIVAN) 1 MG tablet Take 1 mg by mouth 2 times daily as needed. Historical Provider, MD   Lacosamide (VIMPAT PO) Take 100 mg by mouth 2 times daily. Indications: take dos    Historical Provider, MD   paroxetine (PAXIL) 20 MG tablet Take 20 mg by mouth in the morning and at bedtime  12/28/10   Historical Provider, MD       Allergies:  Morphine, Codeine, Penicillins, and Plavix [clopidogrel bisulfate]    Social History:      The patient currently lives at home    TOBACCO:   reports that he quit smoking about 28 years ago. He has a 80.00 pack-year smoking history.  He has never used smokeless tobacco.  ETOH:   reports no history of alcohol use.  E-Cigarettes/Vaping Use     Questions Responses    E-Cigarette/Vaping Use Never User    Start Date     Passive Exposure     Quit Date     Counseling Given     Comments             Family History:      Reviewed in detail and negative for ESRD. Positive as follows:        Problem Relation Age of Onset    Heart Disease Mother     Arthritis Mother     High Blood Pressure Mother    [de-identified] / [place] Mother     Stroke Mother     Arthritis Father     Cancer Sister     Arthritis Sister     Depression Sister     Arthritis Brother     Arthritis Maternal Grandmother     Arthritis Maternal Grandfather     Arthritis Paternal Grandmother     Diabetes Paternal Grandmother     Arthritis Paternal Grandfather        REVIEW OF SYSTEMS COMPLETED:   Pertinent positives as noted in the HPI. All other systems reviewed and negative. PHYSICAL EXAM PERFORMED:    BP (!) 141/83   Pulse 90   Temp 98.7 °F (37.1 °C) (Oral)   Resp 17   Ht 5' 10.5\" (1.791 m)   Wt 186 lb (84.4 kg)   SpO2 98%   BMI 26.31 kg/m²       General appearance:  No apparent distress, appears stated age and cooperative. Appears chronically ill. HEENT:  Normal cephalic, atraumatic without obvious deformity. Pupils equal, round, and reactive to light. Extra ocular muscles intact. Conjunctivae/corneas clear. Neck: Supple, with full range of motion. No jugular venous distention. Trachea midline. Respiratory:  Normal respiratory effort. Clear to auscultation, bilaterally without Rales/Wheezes/Rhonchi. Cardiovascular:  Regular rate and rhythm with normal S1/S2 without murmurs, rubs or gallops. Abdomen: Soft, non-tender, non-distended with normal bowel sounds. Musculoskeletal:  No clubbing, cyanosis or edema bilaterally. Full range of motion without deformity. Skin: Skin color, texture, turgor normal.  No rashes or lesions. Neurologic:  Neurovascularly intact without any focal sensory/motor deficits.  Cranial nerves: II-XII intact, grossly non-focal.  Psychiatric:  Alert and oriented, thought content appropriate, normal insight. Stoic. Capillary Refill: Brisk,3 seconds, normal  Peripheral Pulses: +2 palpable, equal bilaterally       Labs:     Recent Labs     05/09/22  1232   WBC 4.2   HGB 9.0*   HCT 26.4*   *     Recent Labs     05/09/22  1232      K 3.7      CO2 26   BUN 15   CREATININE 0.7*   CALCIUM 9.1     Recent Labs     05/09/22  1232   AST 22   ALT 14   BILITOT 0.6   ALKPHOS 103     Recent Labs     05/09/22  1232   INR 1.45*     Recent Labs     05/09/22  1232   TROPONINI <0.01       Urinalysis:      Lab Results   Component Value Date    NITRU Negative 05/09/2022    WBCUA 0-2 09/08/2014    BACTERIA 1+ 05/31/2012    RBCUA 0-2 09/08/2014    BLOODU Negative 05/09/2022    SPECGRAV 1.025 05/09/2022    GLUCOSEU 100 05/09/2022    GLUCOSEU NEGATIVE 05/31/2012       Radiology:     CXR: I have reviewed the CXR with the following interpretation: small R effusion  EKG:  I have reviewed the EKG with the following interpretation: nonspecific abnormalities    XR CHEST (2 VW)   Final Result   Small right pleural effusion. CT HEAD WO CONTRAST   Final Result   Fluid collections described in few left mastoid sinus cells are likely   incidental sterile fluid collections. No acute intracranial abnormality. CT CERVICAL SPINE WO CONTRAST   Final Result   No acute abnormality of the cervical spine. Stable postoperative changes at C5 through C7. Multilevel cervical   spondylosis and facet arthropathy also stable. XR HAND LEFT (MIN 3 VIEWS)   Final Result   Degenerative changes. Metallic foreign body. Chondrocalcinosis. 1 mm of ulna plus variance. No acute process. Sign report         XR KNEE RIGHT (1-2 VIEWS)   Final Result   Well-seated TKA. Regional osteoporosis. No evidence of an acute process. XR HIP 2-3 VW W PELVIS RIGHT   Final Result   No acute findings. Degenerative changes as described above. ASSESSMENT:    Active Hospital Problems    Diagnosis Date Noted    Orthostasis [I95.1] 05/09/2022     Priority: Medium    Frequent falls [R29.6]          PLAN:      Falls. Likely due to combination of muscular deconditioning and orthostasis. - nutrition supplements. PT/OT. - stand up slowly, stay hydrated  - we discussed many of his medications which might be contributing to his falls:   - he no longer takes his PRN furosemide. Good. - held losartan, might have to tolerate higher supine BP's. Trend orthostats daily. - stopped ISMN. His coronary stent was 10 years ago and he hasn't had angina in a long time. - the patient feels strongly about continuing his high dose zolpidem and lorazepam.   - supposedly he was diagnosed with parkinson's disease by a neurologist 5 years ago and that neurologist started him on sinemet. The neurologist moved out of state and his PCP has been refilling this med ever since. The patient says that he had a bad tremor before he started sinemet, and he doesn't want to stop or try cutting back on this med. PAF. Continue sotalol. Stopped dabigatran. He has hit his head hard twice in the last few weeks alone. Reassess in the future if he stops falling. F/u with cardiology for consideration of watchman device. Obstructive uropathy. Finasteride. HLD. Statin. DVT Prophylaxis: enoxaparin  Diet: ADULT DIET; Regular  ADULT ORAL NUTRITION SUPPLEMENT; Breakfast, Lunch, Dinner; Standard High Calorie/High Protein Oral Supplement  Code Status: Full Code    PT/OT Eval Status: rec'd outpatient PT/OT    Dispo - home when his orthstasis seems about as good as it is going to get. Perhaps 5/10 - 5/12. Estefania Omer MD    Thank you Saima López MD for the opportunity to be involved in this patient's care. If you have any questions or concerns please feel free to contact me at [phone].

## 2022-05-11 LAB
ANION GAP SERPL CALCULATED.3IONS-SCNC: 10 MMOL/L (ref 3–16)
BUN BLDV-MCNC: 17 MG/DL (ref 7–20)
CALCIUM SERPL-MCNC: 8.3 MG/DL (ref 8.3–10.6)
CHLORIDE BLD-SCNC: 109 MMOL/L (ref 99–110)
CO2: 25 MMOL/L (ref 21–32)
CREAT SERPL-MCNC: 0.9 MG/DL (ref 0.8–1.3)
GFR AFRICAN AMERICAN: >60
GFR NON-AFRICAN AMERICAN: >60
GLUCOSE BLD-MCNC: 98 MG/DL (ref 70–99)
HCT VFR BLD CALC: 24.7 % (ref 40.5–52.5)
HEMOGLOBIN: 8.4 G/DL (ref 13.5–17.5)
MCH RBC QN AUTO: 30.8 PG (ref 26–34)
MCHC RBC AUTO-ENTMCNC: 33.9 G/DL (ref 31–36)
MCV RBC AUTO: 91.1 FL (ref 80–100)
PDW BLD-RTO: 22.6 % (ref 12.4–15.4)
PLATELET # BLD: 65 K/UL (ref 135–450)
PLATELET SLIDE REVIEW: ABNORMAL
PMV BLD AUTO: 7.4 FL (ref 5–10.5)
POTASSIUM REFLEX MAGNESIUM: 4.5 MMOL/L (ref 3.5–5.1)
RBC # BLD: 2.72 M/UL (ref 4.2–5.9)
SLIDE REVIEW: ABNORMAL
SODIUM BLD-SCNC: 144 MMOL/L (ref 136–145)
WBC # BLD: 4.3 K/UL (ref 4–11)

## 2022-05-11 PROCEDURE — 96372 THER/PROPH/DIAG INJ SC/IM: CPT

## 2022-05-11 PROCEDURE — G0378 HOSPITAL OBSERVATION PER HR: HCPCS

## 2022-05-11 PROCEDURE — 36415 COLL VENOUS BLD VENIPUNCTURE: CPT

## 2022-05-11 PROCEDURE — 99215 OFFICE O/P EST HI 40 MIN: CPT | Performed by: NURSE PRACTITIONER

## 2022-05-11 PROCEDURE — 6370000000 HC RX 637 (ALT 250 FOR IP): Performed by: INTERNAL MEDICINE

## 2022-05-11 PROCEDURE — 97110 THERAPEUTIC EXERCISES: CPT

## 2022-05-11 PROCEDURE — 85027 COMPLETE CBC AUTOMATED: CPT

## 2022-05-11 PROCEDURE — 97116 GAIT TRAINING THERAPY: CPT

## 2022-05-11 PROCEDURE — 6360000002 HC RX W HCPCS: Performed by: INTERNAL MEDICINE

## 2022-05-11 PROCEDURE — 2580000003 HC RX 258: Performed by: INTERNAL MEDICINE

## 2022-05-11 PROCEDURE — 97530 THERAPEUTIC ACTIVITIES: CPT

## 2022-05-11 PROCEDURE — 80048 BASIC METABOLIC PNL TOTAL CA: CPT

## 2022-05-11 RX ADMIN — LACOSAMIDE 100 MG: 50 TABLET, FILM COATED ORAL at 20:32

## 2022-05-11 RX ADMIN — LORAZEPAM 2 MG: 1 TABLET ORAL at 20:32

## 2022-05-11 RX ADMIN — LACOSAMIDE 100 MG: 50 TABLET, FILM COATED ORAL at 08:48

## 2022-05-11 RX ADMIN — ATORVASTATIN CALCIUM 40 MG: 40 TABLET, FILM COATED ORAL at 08:48

## 2022-05-11 RX ADMIN — PAROXETINE HYDROCHLORIDE 20 MG: 20 TABLET, FILM COATED ORAL at 20:32

## 2022-05-11 RX ADMIN — ZOLPIDEM TARTRATE 10 MG: 5 TABLET ORAL at 20:31

## 2022-05-11 RX ADMIN — PANTOPRAZOLE SODIUM 20 MG: 20 TABLET, DELAYED RELEASE ORAL at 17:03

## 2022-05-11 RX ADMIN — ENOXAPARIN SODIUM 40 MG: 100 INJECTION SUBCUTANEOUS at 08:47

## 2022-05-11 RX ADMIN — FINASTERIDE 5 MG: 5 TABLET, FILM COATED ORAL at 08:48

## 2022-05-11 RX ADMIN — SODIUM CHLORIDE, PRESERVATIVE FREE 10 ML: 5 INJECTION INTRAVENOUS at 08:49

## 2022-05-11 RX ADMIN — PAROXETINE HYDROCHLORIDE 20 MG: 20 TABLET, FILM COATED ORAL at 08:48

## 2022-05-11 RX ADMIN — CARBIDOPA AND LEVODOPA 1 TABLET: 25; 100 TABLET ORAL at 08:48

## 2022-05-11 RX ADMIN — FERROUS SULFATE TAB 325 MG (65 MG ELEMENTAL FE) 325 MG: 325 (65 FE) TAB at 08:48

## 2022-05-11 RX ADMIN — LORAZEPAM 1 MG: 1 TABLET ORAL at 08:48

## 2022-05-11 RX ADMIN — FERROUS SULFATE TAB 325 MG (65 MG ELEMENTAL FE) 325 MG: 325 (65 FE) TAB at 17:03

## 2022-05-11 RX ADMIN — PANTOPRAZOLE SODIUM 20 MG: 20 TABLET, DELAYED RELEASE ORAL at 05:07

## 2022-05-11 ASSESSMENT — PAIN SCALES - GENERAL
PAINLEVEL_OUTOF10: 0

## 2022-05-11 NOTE — CONSULTS
[address]                                  CONSULTATION    PATIENT NAME:  La Rodriguez                    :        1945  MED REC NO:   [id number]                          ROOM:       [id number]  ACCOUNT NO:   [de-identified]                           ADMIT DATE: 2022  PROVIDER:     Amaury Gilliam MD    CONSULT DATE:  05/10/2022    CARDIAC ELECTROPHYSIOLOGY CONSULTATION    REASON FOR CONSULTATION:  Syncope. HISTORY OF PRESENT ILLNESS:  The patient is a 80-year-old man with  history of coronary artery disease, atrial fibrillation and lung cancer  under treatment who presents for recurrent syncope. The patient reports  that following thoracotomy for lung resection, he has lost about 50  pounds. He is currently finishing his second round of chemotherapy. He  reports excessive weakness and frequent episodes of falling. The  episodes of falling always occur following change in posture from seated  or supine to standing position. He becomes progressively lightheaded to  the point that he can no longer maintain postural tone and falls to the  ground. He knows he is falling, but simply cannot stop it. He  estimates that this occurs within 10 seconds of standing. On admission,  orthostatic blood pressure evaluation did demonstrate a decline of 50  mmHg systolic with a drop from 121 mmHg supine to 71 mmHg standing. He has history of atrial fibrillation which is longstanding. He  underwent catheter ablation for atrial fibrillation in 2014. He also  had an ILR implanted at that time. For the next four years, the ILR was  followed through [facility]'s office and there are no  recurrences of atrial fibrillation. ECG during a stress test in 2020  demonstrated sinus rhythm. The next ECG in our system from 2022  demonstrates atrial fibrillation.   In atrial fibrillation, his heart  rates are well controlled and he does not recognize the arrhythmia. He  has been on Pradaxa for many years for stroke prophylaxis. PAST MEDICAL HISTORY:  1. Coronary artery disease status post LAD stent 03/17. 2.  Paroxysmal atrial fibrillation status post catheter ablation  09/2014. 3.  Porphyria cutanea tarda. 4.  Parkinson's syndrome. 5.  Non-small-cell lung cancer. MEDICATIONS:  At the time of admission include Lipitor 40 mg p.o. daily,  Sinemet 25/100 one p.o. daily, Pradaxa 150 mg p.o. b.i.d., ferrous  sulfate 325 mg p.o. b.i.d., Proscar 5 mg p.o. daily, isosorbide  mononitrate 30 mg p.o. daily, losartan 25 mg p.o. daily, Ativan 1 mg  p.o. b.i.d. as needed, Prilosec 20 mg p.o. daily, Paxil 20 mg p.o. at  bedtime, sotalol 40 mg p.o. b.i.d. and Ambien 10 mg p.o. at bedtime as  needed    ALLERGIES:  Include CODEINE, PENICILLIN, PLAVIX and MORPHINE. SOCIAL HISTORY:  The patient is a former smoker who stopped smoking in  1993. He does not consume alcohol at this time. FAMILY HISTORY:  Remarkable for hypertension, stroke and heart disease  in the mother. There is a history of heart disease in the father. History of arthritis in multiple family members. REVIEW OF SYSTEMS:  Demonstrates recent 50 pounds weight loss since  12/2021. He does not describe palpitations. He has not had abrupt  syncope, but does describe the above-mentioned episodes of progressive  lightheadedness with change in posture. All other components of  14-system review are negative. PHYSICAL EXAMINATION:  VITAL SIGNS:  Blood pressure is 105/64 in the seated position. Heart  rate is 78 beats per minute regular. The patient is afebrile. GENERAL:  He is awake, alert, oriented and in no discomfort. HEENT:  Exam demonstrates normocephalic, atraumatic head. There is no  scleral icterus. Pupils are round and reactive. NECK:  Supple without thyromegaly. There is no cervical  lymphadenopathy.   LUNGS:  Clear to auscultation. CARDIOVASCULAR:  Exam reveals an irregular rhythm. The apical impulse  is discrete. S1 and S2 are normal.  There is no audible murmur or  gallop. The jugular venous pressure does not appear elevated. ABDOMEN:  Mildly obese, soft, and nontender. EXTREMITIES:  Demonstrate no pitting pretibial dependent edema. There  is no cyanosis. There is no evidence for chronic venous stasis. SKIN:  Otherwise, warm and dry without skin rash. DIAGNOSTIC DATA:  A 12-lead ECG from 05/09/2022 demonstrates atrial  fibrillation with an average ventricular rate of 83 beats per minute. There is low QRS voltage in the limb leads. IMPRESSION:  1. Recurrent falls. 2.  Persistent atrial fibrillation of unknown duration. 3.  Coronary artery disease. 4.  Parkinson's syndrome. 5.  Non-small-cell lung cancer. 6.  Orthostatic hypotension. The patient presents with recurring episodes of falling. The  association of these events with a course of chemotherapy, substantial  weight loss, and change in posture, would all favor orthostatic  hypotension as the etiology for the syncope. He has a long history of  atrial fibrillation, but event recordings from 2014 to 2018 did not  demonstrate any recurrences. He informs me that he had another ILR  implanted recently. We will interrogate this device to identify the  behavior of his atrial fibrillation over the last year. An office note  from 2021 appears to suggest sinus rhythm, but ECG was not obtained at  that time. The rate is adequately controlled and he is asymptomatic  with respect to palpitations. At issue is his risk for stroke versus risk for intracranial hemorrhage  on oral anticoagulation in view of his recurring falls. Since his falls  have been unremitting, I would advise holding Pradaxa for now. We can  make several modifications in his medical regimen in an attempt to  minimize the likelihood of falling.   OT and PT have also made  recommendations to minimize the fall likelihood. Ultimately, if the  falls cannot be prevented and the atrial fibrillation persists, he may  be a candidate for left atrial appendage occlusion. RECOMMENDATIONS:  1. Hold Proscar, Imdur, and losartan for now. 2.  Interrogate ILR to evaluate AF behavior. 3.  Fall risk reduction per OT/PT. 4.  If AF is persistent and falls continue despite appropriate  modification and medical regimen, consideration for left atrial  appendage occlusion would be reasonable. Thanks for the opportunity to assist in the care of the patient. Please  contact me, if you have any questions regarding his evaluation.         Adam Russell MD    D: 05/10/2022 17:00:08       T: 05/10/2022 17:04:12     [email or web address]  Job#: [id number]     Doc#: [id number]    CC:

## 2022-05-11 NOTE — PROGRESS NOTES
Physical Therapy  Facility/Department: [place] [address] - MED SURG/ORTHO  Physical Therapy Daily Note    Name: Aydee Corona  :   MRN: [id number]  Date of Service: 2022    Discharge Recommendations:  24 hour supervision or assist,Outpatient PT   PT Equipment Recommendations  Equipment Needed: No      Patient Diagnosis(es): The primary encounter diagnosis was Orthostatic hypotension. Diagnoses of General weakness, Frequent falls, and Anticoagulated were also pertinent to this visit. Past Medical History:  has a past medical history of MIRNA (acute kidney injury) ([address] Anxiety, Arthritis, Bradycardia, Cancer ([facility] [address] Hemoptysis, Irregular heart  beats, 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[facility] [address] S/P drug eluting coronary stent placement, Seizure disorder ([facility] [address] Seizures ([facility] [address] Shortness of breath, and Total knee replacement status. Past Surgical History:  has a past surgical history that includes Carpal tunnel release; Femur Surgery; knee surgery (11 R total knee replacement with femoral nerve block for pain control); Cervical disc surgery (); other surgical history (Bilateral); ablation of dysrhythmic focus (2014); other surgical history (2014); Colonoscopy (2016); Upper gastrointestinal endoscopy (2016); Coronary angioplasty with stent; and Lung removal, partial (Right). Assessment   Body Structures, Functions, Activity Limitations Requiring Skilled Therapeutic Intervention: Decreased functional mobility ; Decreased endurance;Decreased strength;Decreased balance  Assessment: Pt continues to require SBA/CGA for transfers and ambulation with use of SPC. Pt continues to be limited by low BP with activity. Will continue to progress mobility as pt tolerates. Recommend home with 24hr supervision and outpatient PT. Treatment Diagnosis: impaired functional mobility  Therapy Prognosis: Good  Requires PT Follow-Up: Yes  Activity Tolerance  Activity Tolerance: Patient tolerated treatment well; Other CMS G-Code Modifier : CK (05/11/22 1035)         Education:  Educated on safety with mobility and BP dropping with standing/walking. Pt verbalized understanding.     Goals  Long Term Goals  Time Frame for Long term goals : 7 days (5/16/22)  Long term goal 1: Pt will perform bed mobility IND 5/11 not tested  Long term goal 2: Pt will perform transfers with LRAD and IND 5/11 not met  Long term goal 3: Pt will ambulate 150 ft with LRAD and supervision 5/11 not met  Long term goal 4: Pt will perform 12-15 reps of BLE exercises to improve strength by 5/12/22  Patient Goals   Patient goals : \"to get stronger and stop falling\"     Therapy Time   Individual Concurrent Group Co-treatment   Time In 0368         Time Out 1007         Minutes 40         Timed Code Treatment Minutes: [address] [place], [place] [id number]

## 2022-05-11 NOTE — PROGRESS NOTES
Hospitalist Progress Note      PCP: Vince Abrams MD    Date of Admission: 5/9/2022    Chief Complaint: freq fall, generalized weakness    Hospital Course: The patient is a pleasant 68 Y M with a h/o former smoking, CAD, PAF, parkinson disease, and NSCLC s/p RU lobectomy in 12/2021 and completion of chemotherapy 10 days ago. He and his wife say that in the first 4 months after his lung resection he unintentionally lost about 50 lbs. Since losing all of this weight he has been falling often. He was admitted to [facility] in 3/2022 due to falls. An ulnar fracture was treated conservatively. His MIRNA resolved with IVFs. He was discharged home with \"PRN assistance\" per PT/OT recommendations. Since then the patient has continued to fall. He estimates he has fallen about 7 times since that discharge 6 weeks ago. Sometimes he just loses his balance, sometimes he trips on the vacuum robot, sometimes the wind just blows him over. The only consistent theme between all of these falls is that they always seem to happen within a few seconds of the patient standing up. He denies any lightheadedness or dizziness, but after standing up \"my legs just go to jello after a few steps and I fall down. \"  In the ED he was severely orthostatic: BP dropped from 121/64 to 71/47. He actually did quite well with PT, walked 150 feet with contact-guard assistance, and they only recommended outpatient PT. He and his wife insisted that he wasn't safe to go home like this, since they were sure he would seriously injure himself before long. It has gotten to the point that the patient is afraid to get up out of his chair \"because I know I'll just fall over. \"  His wife worries his muscles are going to wast away due to inactivity. The patient doesn't have any new, acute symptoms and doesn't feel particularly ill. He and his wife agree that he has been eating well more recently, and intentionally drinking lots of fluids.   His labs and imaging were fairly unremarkable. Subjective: No new complaints, updated on plan of care      Medications:  Reviewed    Infusion Medications    sodium chloride 100 mL/hr at 05/10/22 0837    sodium chloride       Scheduled Medications    atorvastatin  40 mg Oral Daily    carbidopa-levodopa  1 tablet Oral Daily    ferrous sulfate  325 mg Oral BID WC    finasteride  5 mg Oral Daily    lacosamide  100 mg Oral BID    LORazepam  1 mg Oral Daily    LORazepam  2 mg Oral Nightly    [Held by provider] losartan  25 mg Oral Daily    pantoprazole  20 mg Oral BID AC    PARoxetine  20 mg Oral BID    [Held by provider] sotalol  40 mg Oral BID    zolpidem  10 mg Oral Nightly    sodium chloride flush  5-40 mL IntraVENous 2 times per day    enoxaparin  40 mg SubCUTAneous Daily     PRN Meds: perflutren lipid microspheres, sodium chloride flush, sodium chloride, polyethylene glycol, acetaminophen **OR** acetaminophen, prochlorperazine      Intake/Output Summary (Last 24 hours) at 5/11/2022 1650  Last data filed at 5/11/2022 1200  Gross per 24 hour   Intake --   Output 1935 ml   Net -1935 ml       Physical Exam Performed:    /70   Pulse 80   Temp 98 °F (36.7 °C) (Oral)   Resp 16   Ht 5' 10.5\" (1.791 m)   Wt 186 lb (84.4 kg)   SpO2 (!) 87%   BMI 26.31 kg/m²     General appearance: No apparent distress, appears stated age and cooperative. HEENT: Pupils equal, round, and reactive to light. Conjunctivae/corneas clear. Neck: Supple, with full range of motion. No jugular venous distention. Trachea midline. Respiratory:  Normal respiratory effort. Clear to auscultation, bilaterally without Rales/Wheezes/Rhonchi. Cardiovascular: Regular rate and rhythm with normal S1/S2 without murmurs, rubs or gallops. Abdomen: Soft, non-tender, non-distended with normal bowel sounds. Musculoskeletal: No clubbing, cyanosis or edema bilaterally. Full range of motion without deformity.   Skin: Skin color, texture, turgor normal.  No rashes or lesions. Neurologic:  Neurovascularly intact without any focal sensory/motor deficits. Cranial nerves: II-XII intact, grossly non-focal.  Psychiatric: Alert and oriented, thought content appropriate, normal insight  Capillary Refill: Brisk,3 seconds, normal   Peripheral Pulses: +2 palpable, equal bilaterally       Labs:   Recent Labs     05/09/22  1232 05/10/22  0631 05/11/22  0652   WBC 4.2 3.3* 4.3   HGB 9.0* 8.2* 8.4*   HCT 26.4* 24.0* 24.7*   * 76* 65*     Recent Labs     05/09/22  1232 05/10/22  0631 05/11/22  0652    142 144   K 3.7 3.8 4.5    107 109   CO2 26 26 25   BUN 15 13 17   CREATININE 0.7* 0.7* 0.9   CALCIUM 9.1 8.8 8.3     Recent Labs     05/09/22  1232   AST 22   ALT 14   BILITOT 0.6   ALKPHOS 103     Recent Labs     05/09/22  1232   INR 1.45*     Recent Labs     05/09/22  1232   TROPONINI <0.01       Urinalysis:      Lab Results   Component Value Date    NITRU Negative 05/09/2022    WBCUA 0-2 09/08/2014    BACTERIA 1+ 05/31/2012    RBCUA 0-2 09/08/2014    BLOODU Negative 05/09/2022    SPECGRAV 1.025 05/09/2022    GLUCOSEU 100 05/09/2022    GLUCOSEU NEGATIVE 05/31/2012       Radiology:  XR CHEST (2 VW)   Final Result   Small right pleural effusion. CT HEAD WO CONTRAST   Final Result   Fluid collections described in few left mastoid sinus cells are likely   incidental sterile fluid collections. No acute intracranial abnormality. CT CERVICAL SPINE WO CONTRAST   Final Result   No acute abnormality of the cervical spine. Stable postoperative changes at C5 through C7. Multilevel cervical   spondylosis and facet arthropathy also stable. XR HAND LEFT (MIN 3 VIEWS)   Final Result   Degenerative changes. Metallic foreign body. Chondrocalcinosis. 1 mm of ulna plus variance. No acute process. Sign report         XR KNEE RIGHT (1-2 VIEWS)   Final Result   Well-seated TKA. Regional osteoporosis.       No evidence of an acute process. XR HIP 2-3 VW W PELVIS RIGHT   Final Result   No acute findings. Degenerative changes as described above. Assessment/Plan:    Active Hospital Problems    Diagnosis     Orthostasis [I95.1]      Priority: Medium    Frequent falls [R29.6]      Falls. Likely due to combination of muscular deconditioning and orthostasis. - nutrition supplements. PT/OT. - stand up slowly, stay hydrated  - we discussed many of his medications which might be contributing to his falls:              - he no longer takes his PRN furosemide.                - held losartan, might have to tolerate higher supine BP's. Trend orthostats daily. - stopped ISMN. His coronary stent was 10 years ago and he hasn't had angina in a long time. - the patient feels strongly about continuing his high dose zolpidem and lorazepam.              - supposedly he was diagnosed with parkinson's disease by a neurologist 5 years ago and that neurologist started him on sinemet. The neurologist moved out of state and his PCP has been refilling this med ever since. The patient says that he had a bad tremor before he started sinemet, and he doesn't want to stop or try cutting back on this med.     PAF. held sotalol. Stopped dabigatran. He has hit his head hard twice in the last few weeks alone. Reassess in the future if he stops falling. F/u with cardiology for consideration of watchman device. ECHO pending      Obstructive uropathy. Finasteride.     HLD. Statin. DVT Prophylaxis: Scds  Diet: ADULT DIET;  Regular  ADULT ORAL NUTRITION SUPPLEMENT; Breakfast, Lunch, Dinner; Standard High Calorie/High Protein Oral Supplement  Code Status: Full Code    PT/OT Eval Status: consulted    Dispo - 1-2 days pending clinical improvement    sEtelita Montalvo, APRN - CNP

## 2022-05-11 NOTE — PROGRESS NOTES
[address]     Electrophysiology                                     Progress Note    Admission date:  2022    Reason for follow up visit: Recurrent syncope    HPI/CC: Lexi Napoles was admitted on 2022 with excessive weakness, recurrent falls and near syncope. Orthostatic BP's were positive. Rhythm has been AF. Subjective: He is feeling better. Reports that his legs are sore from PT/OT. Denies chest pain, palpitations, shortness of breath, and dizziness. Vitals:  Blood pressure (!) 143/52, pulse 79, temperature 98 °F (36.7 °C), temperature source Oral, resp. rate 16, height 5' 10.5\" (1.791 m), weight 186 lb (84.4 kg), SpO2 93 %.   Temp  Av.2 °F (36.8 °C)  Min: 97.9 °F (36.6 °C)  Max: 98.7 °F (37.1 °C)  Pulse  Av.6  Min: 78  Max: 91  BP  Min: 100/54  Max: 144/79  SpO2  Av.3 %  Min: 93 %  Max: 94 %    24 hour I/O    Intake/Output Summary (Last 24 hours) at 2022 1016  Last data filed at 2022 1590  Gross per 24 hour   Intake --   Output 675 ml   Net -675 ml     Current Facility-Administered Medications   Medication Dose Route Frequency Provider Last Rate Last Admin    perflutren lipid microspheres (DEFINITY) injection 1.65 mg  1.5 mL IntraVENous ONCE PRN Esetlita Montalvo APRN - CNP        0.9 % sodium chloride infusion   IntraVENous Continuous Jak Collier  mL/hr at 05/10/22 0837 New Bag at 05/10/22 0837    atorvastatin (LIPITOR) tablet 40 mg  40 mg Oral Daily Jak Collier MD   40 mg at 22 0848    carbidopa-levodopa (SINEMET)  MG per tablet 1 tablet  1 tablet Oral Daily Jak Collier MD   1 tablet at 22 0848    ferrous sulfate (IRON 325) tablet 325 mg  325 mg Oral BID  Jak Collier MD   325 mg at 22 0848    finasteride (PROSCAR) tablet 5 mg  5 mg Oral Daily Jak Collier MD   5 mg at 22 0848    lacosamide (VIMPAT) tablet 100 mg  100 mg Oral BID Jak Collier MD   100 mg at 22 0848    LORazepam (ATIVAN) tablet 1 mg  1 mg Oral Daily Binh Pierson MD   1 mg at 05/11/22 0848    LORazepam (ATIVAN) tablet 2 mg  2 mg Oral Nightly Binh Pierson MD   2 mg at 05/10/22 2202    [Held by provider] losartan (COZAAR) tablet 25 mg  25 mg Oral Daily Binh Pierson MD        pantoprazole (PROTONIX) tablet 20 mg  20 mg Oral BID AC Binh Pierson MD   20 mg at 05/11/22 0507    PARoxetine (PAXIL) tablet 20 mg  20 mg Oral BID Binh Pierson MD   20 mg at 05/11/22 0848    [Held by provider] sotalol (BETAPACE) tablet 40 mg  40 mg Oral BID Binh Pierson MD   40 mg at 05/10/22 6793    zolpidem (AMBIEN) tablet 10 mg  10 mg Oral Nightly Binh Pierson MD   10 mg at 05/10/22 2202    sodium chloride flush 0.9 % injection 5-40 mL  5-40 mL IntraVENous 2 times per day Binh Pierson MD   10 mL at 05/11/22 0849    sodium chloride flush 0.9 % injection 5-40 mL  5-40 mL IntraVENous PRN Binh Pierson MD        0.9 % sodium chloride infusion   IntraVENous PRN Binh Pierson MD        enoxaparin (LOVENOX) injection 40 mg  40 mg SubCUTAneous Daily Binh Pierson MD   40 mg at 05/11/22 0847    polyethylene glycol (GLYCOLAX) packet 17 g  17 g Oral Daily PRN Binh Pierson MD        acetaminophen (TYLENOL) tablet 650 mg  650 mg Oral Q6H PRN Binh Pierson MD        Or    acetaminophen (TYLENOL) suppository 650 mg  650 mg Rectal Q6H PRN Binh Pierson MD        prochlorperazine (COMPAZINE) injection 10 mg  10 mg IntraVENous Q6H PRN Binh Pierson MD           Objective:     Telemetry monitor: AF    Physical Exam:  Constitutional and general appearance: alert, cooperative, no distress and appears stated age  HEENT: PERRL, no cervical lymphadenopathy. No masses palpable.  Normal oral mucosa  Respiratory:  · Normal excursion and expansion without use of accessory muscles  · Resp auscultation: Normal breath sounds without wheezing, rhonchi, and rales  Cardiovascular:  · The apical impulse is not displaced  · Heart tones are crisp and normal. regular S1 and S2.  · Jugular venous pulsation Normal  · The carotid upstroke is normal in amplitude and contour without delay or bruit  · Peripheral pulses are symmetrical and full   Abdomen:  · No masses or tenderness  · Bowel sounds present  Extremities:  ·  No cyanosis or clubbing  ·  No lower extremity edema  ·  Skin: warm and dry  Neurological:  · Alert and oriented  · Moves all extremities well  · No abnormalities of mood, affect, memory, mentation, or behavior are noted    Data  Stress 12/2020: Interpetation Summary:   The LV EF is 81%   The left ventricular wall motion is normal.   There is a small sized, fixed defect in the inferolateral wall consistent with   mild infarct.        o Non-diagnostic ECG for ischemia with pharmocologic stress.      o No significant areas of ischemia identified with pharmocologic stress. Stress echo 11/2020:    Baseline Echo Findings: The LV ejection fraction at rest is 55-60%. The LV   chamber size is normal. There is mild LVH. Post Stress Echo Findings: The LV ejection fraction post stress is 60-65%. Normal increase in contractillity of all segments was observed. Echo 9/2014:      Conclusions      Summary   No significant pericardial effusion noted. Overall left ventricular function is normal.   Ejection fraction is visually estimated to be 55-60 %     All labs and testing reviewed.   Lab Review     Renal Profile:   Lab Results   Component Value Date    CREATININE 0.9 05/11/2022    BUN 17 05/11/2022     05/11/2022    K 4.5 05/11/2022     05/11/2022    CO2 25 05/11/2022     CBC:    Lab Results   Component Value Date    WBC 3.3 05/10/2022    RBC 2.68 05/10/2022    RBC 4.84 01/13/2017    HGB 8.2 05/10/2022    HCT 24.0 05/10/2022    MCV 89.6 05/10/2022    RDW 21.8 05/10/2022    PLT 76 05/10/2022     BNP:    Lab Results   Component Value Date    BNP 99 04/09/2012     Fasting Lipid Panel:    Lab Results Component Value Date    CHOL 185 03/06/2017    HDL 45 03/06/2017    TRIG 235 03/06/2017     Cardiac Enzymes:  CK/MbTroponin  Lab Results   Component Value Date    TROPONINI <0.01 05/09/2022     PT/ INR   Lab Results   Component Value Date    INR 1.45 05/09/2022    INR 1.06 03/06/2017    INR 1.27 09/26/2014    PROTIME 16.6 05/09/2022    PROTIME 12.0 03/06/2017    PROTIME 13.9 09/26/2014     PTT No results found for: PTT No results found for: MG   Lab Results   Component Value Date    TSH 3.21 04/19/2014       Assessment:  Recurrent falls/syncope: stable    -orthostatic BP's positive this admission   Persistent atrial fibrillation of unknown duration: stable    -s/p RFCA of AF 9/2014   -[id number] score 3 (age, CAD)  S/P loop recorder implant 2014 with reimplant 2018   -device at [facility]   CAD s/p PCI to LAD 3/2017  Non-small cell lung cancer s/p thoracotomy    -undergoing second round of chemotherapy   Parkinson's syndrome       Plan:   1. Ideally, there will be an improvement in orthostasis and ultimately falls/syncope with IV hydration, medication adjustments and utilization of PT/OT strategies. 2. Given recurrent falls, would not resume Pradaxa at this time as risk for injury/bleeding outweighs the benefit of stroke protection. If falls are recurrent despite medication changes and lifestyle modifications, would refer for Watchman evaluation. 3. Would not resume sotalol at discharge due to ineffectiveness and absence of AC   4. Unable to interrogate loop recorder as battery has reached end of life. 5. Office to arrange for follow up  6.  Nothing further to add from an EP standpoint     KATIE Licona-GEOVANNA  [address]  [phone]

## 2022-05-12 ENCOUNTER — APPOINTMENT (OUTPATIENT)
Dept: GENERAL RADIOLOGY | Age: 77
DRG: 057 | End: 2022-05-12
Payer: MEDICARE

## 2022-05-12 LAB
ANION GAP SERPL CALCULATED.3IONS-SCNC: 9 MMOL/L (ref 3–16)
BUN BLDV-MCNC: 16 MG/DL (ref 7–20)
CALCIUM SERPL-MCNC: 8.5 MG/DL (ref 8.3–10.6)
CHLORIDE BLD-SCNC: 105 MMOL/L (ref 99–110)
CO2: 26 MMOL/L (ref 21–32)
CREAT SERPL-MCNC: 0.8 MG/DL (ref 0.8–1.3)
GFR AFRICAN AMERICAN: >60
GFR NON-AFRICAN AMERICAN: >60
GLUCOSE BLD-MCNC: 103 MG/DL (ref 70–99)
HCT VFR BLD CALC: 22.6 % (ref 40.5–52.5)
HEMOGLOBIN: 7.9 G/DL (ref 13.5–17.5)
LV EF: 58 %
LVEF MODALITY: NORMAL
MCH RBC QN AUTO: 30.7 PG (ref 26–34)
MCHC RBC AUTO-ENTMCNC: 34.9 G/DL (ref 31–36)
MCV RBC AUTO: 87.9 FL (ref 80–100)
PDW BLD-RTO: 22.3 % (ref 12.4–15.4)
PLATELET # BLD: 61 K/UL (ref 135–450)
PMV BLD AUTO: 7.4 FL (ref 5–10.5)
POTASSIUM REFLEX MAGNESIUM: 3.7 MMOL/L (ref 3.5–5.1)
RBC # BLD: 2.57 M/UL (ref 4.2–5.9)
SODIUM BLD-SCNC: 140 MMOL/L (ref 136–145)
WBC # BLD: 4.2 K/UL (ref 4–11)

## 2022-05-12 PROCEDURE — 71045 X-RAY EXAM CHEST 1 VIEW: CPT

## 2022-05-12 PROCEDURE — 6370000000 HC RX 637 (ALT 250 FOR IP): Performed by: INTERNAL MEDICINE

## 2022-05-12 PROCEDURE — 6370000000 HC RX 637 (ALT 250 FOR IP): Performed by: NURSE PRACTITIONER

## 2022-05-12 PROCEDURE — 36415 COLL VENOUS BLD VENIPUNCTURE: CPT

## 2022-05-12 PROCEDURE — G0378 HOSPITAL OBSERVATION PER HR: HCPCS

## 2022-05-12 PROCEDURE — 85027 COMPLETE CBC AUTOMATED: CPT

## 2022-05-12 PROCEDURE — 97535 SELF CARE MNGMENT TRAINING: CPT

## 2022-05-12 PROCEDURE — 97530 THERAPEUTIC ACTIVITIES: CPT

## 2022-05-12 PROCEDURE — 2580000003 HC RX 258: Performed by: INTERNAL MEDICINE

## 2022-05-12 PROCEDURE — C8929 TTE W OR WO FOL WCON,DOPPLER: HCPCS

## 2022-05-12 PROCEDURE — 80048 BASIC METABOLIC PNL TOTAL CA: CPT

## 2022-05-12 RX ORDER — DIGOXIN 125 MCG
125 TABLET ORAL DAILY
Status: DISCONTINUED | OUTPATIENT
Start: 2022-05-12 | End: 2022-05-14 | Stop reason: HOSPADM

## 2022-05-12 RX ORDER — TETRAHYDROZOLINE HCL 0.05 %
2 DROPS OPHTHALMIC (EYE) 2 TIMES DAILY
Status: DISCONTINUED | OUTPATIENT
Start: 2022-05-12 | End: 2022-05-14 | Stop reason: HOSPADM

## 2022-05-12 RX ADMIN — CARBIDOPA AND LEVODOPA 1 TABLET: 25; 100 TABLET ORAL at 09:21

## 2022-05-12 RX ADMIN — ZOLPIDEM TARTRATE 10 MG: 5 TABLET ORAL at 21:47

## 2022-05-12 RX ADMIN — SODIUM CHLORIDE, PRESERVATIVE FREE 10 ML: 5 INJECTION INTRAVENOUS at 21:50

## 2022-05-12 RX ADMIN — LACOSAMIDE 100 MG: 50 TABLET, FILM COATED ORAL at 09:31

## 2022-05-12 RX ADMIN — ATORVASTATIN CALCIUM 40 MG: 40 TABLET, FILM COATED ORAL at 09:21

## 2022-05-12 RX ADMIN — PANTOPRAZOLE SODIUM 20 MG: 20 TABLET, DELAYED RELEASE ORAL at 05:13

## 2022-05-12 RX ADMIN — LORAZEPAM 1 MG: 1 TABLET ORAL at 09:21

## 2022-05-12 RX ADMIN — SODIUM CHLORIDE, PRESERVATIVE FREE 10 ML: 5 INJECTION INTRAVENOUS at 09:22

## 2022-05-12 RX ADMIN — FINASTERIDE 5 MG: 5 TABLET, FILM COATED ORAL at 09:22

## 2022-05-12 RX ADMIN — PAROXETINE HYDROCHLORIDE 20 MG: 20 TABLET, FILM COATED ORAL at 21:48

## 2022-05-12 RX ADMIN — FERROUS SULFATE TAB 325 MG (65 MG ELEMENTAL FE) 325 MG: 325 (65 FE) TAB at 09:26

## 2022-05-12 RX ADMIN — FERROUS SULFATE TAB 325 MG (65 MG ELEMENTAL FE) 325 MG: 325 (65 FE) TAB at 17:18

## 2022-05-12 RX ADMIN — DIGOXIN 125 MCG: 125 TABLET ORAL at 18:05

## 2022-05-12 RX ADMIN — TETRAHYDROZOLINE HCL 2 DROP: 0.05 SOLUTION/ DROPS OPHTHALMIC at 22:19

## 2022-05-12 RX ADMIN — PAROXETINE HYDROCHLORIDE 20 MG: 20 TABLET, FILM COATED ORAL at 09:22

## 2022-05-12 RX ADMIN — LACOSAMIDE 100 MG: 50 TABLET, FILM COATED ORAL at 21:47

## 2022-05-12 RX ADMIN — LORAZEPAM 2 MG: 1 TABLET ORAL at 21:47

## 2022-05-12 RX ADMIN — PANTOPRAZOLE SODIUM 20 MG: 20 TABLET, DELAYED RELEASE ORAL at 17:18

## 2022-05-12 ASSESSMENT — PAIN SCALES - GENERAL
PAINLEVEL_OUTOF10: 0

## 2022-05-12 NOTE — PROGRESS NOTES
DONNA- ASHLEY.    Sent-Patient states starting to feel short of breathe when up and ambulating. O2 assessed 97% on room air. Lungs sound dim in lower bases.     Reply- we can check port cxr    Sent-Patient also states he uses visine eye drops at home and requesting something for dry eyes    Reply-Can you order pharmacy equivalent

## 2022-05-12 NOTE — PROGRESS NOTES
and imaging were fairly unremarkable. Subjective: No new complaints, updated on plan of care. HR in 110-140's intermittently      Medications:  Reviewed    Infusion Medications    sodium chloride       Scheduled Medications    atorvastatin  40 mg Oral Daily    carbidopa-levodopa  1 tablet Oral Daily    ferrous sulfate  325 mg Oral BID WC    finasteride  5 mg Oral Daily    lacosamide  100 mg Oral BID    LORazepam  1 mg Oral Daily    LORazepam  2 mg Oral Nightly    [Held by provider] losartan  25 mg Oral Daily    pantoprazole  20 mg Oral BID AC    PARoxetine  20 mg Oral BID    [Held by provider] sotalol  40 mg Oral BID    zolpidem  10 mg Oral Nightly    sodium chloride flush  5-40 mL IntraVENous 2 times per day    [Held by provider] enoxaparin  40 mg SubCUTAneous Daily     PRN Meds: perflutren lipid microspheres, sodium chloride flush, sodium chloride, polyethylene glycol, acetaminophen **OR** acetaminophen, prochlorperazine      Intake/Output Summary (Last 24 hours) at 5/12/2022 1507  Last data filed at 5/12/2022 1316  Gross per 24 hour   Intake 480 ml   Output 650 ml   Net -170 ml       Physical Exam Performed:    /62   Pulse 84   Temp 98.2 °F (36.8 °C)   Resp 18   Ht 5' 10.5\" (1.791 m)   Wt 186 lb (84.4 kg)   SpO2 94%   BMI 26.31 kg/m²     General appearance: No apparent distress, appears stated age and cooperative. HEENT: Pupils equal, round, and reactive to light. Conjunctivae/corneas clear. Neck: Supple, with full range of motion. No jugular venous distention. Trachea midline. Respiratory:  Normal respiratory effort. Clear to auscultation, bilaterally without Rales/Wheezes/Rhonchi. Cardiovascular: Regular rate and rhythm with normal S1/S2 without murmurs, rubs or gallops. Abdomen: Soft, non-tender, non-distended with normal bowel sounds. Musculoskeletal: No clubbing, cyanosis or edema bilaterally. Full range of motion without deformity.   Skin: Skin color, texture, turgor normal.  No rashes or lesions. Neurologic:  Neurovascularly intact without any focal sensory/motor deficits. Cranial nerves: II-XII intact, grossly non-focal.  Psychiatric: Alert and oriented, thought content appropriate, normal insight  Capillary Refill: Brisk,3 seconds, normal   Peripheral Pulses: +2 palpable, equal bilaterally       Labs:   Recent Labs     05/10/22  0631 05/11/22  0652 05/12/22  0641   WBC 3.3* 4.3 4.2   HGB 8.2* 8.4* 7.9*   HCT 24.0* 24.7* 22.6*   PLT 76* 65* 61*     Recent Labs     05/10/22  0631 05/11/22  0652 05/12/22  0641    144 140   K 3.8 4.5 3.7    109 105   CO2 26 25 26   BUN 13 17 16   CREATININE 0.7* 0.9 0.8   CALCIUM 8.8 8.3 8.5     No results for input(s): AST, ALT, BILIDIR, BILITOT, ALKPHOS in the last 72 hours. No results for input(s): INR in the last 72 hours. No results for input(s): Peter Beery in the last 72 hours. Urinalysis:      Lab Results   Component Value Date    NITRU Negative 05/09/2022    WBCUA 0-2 09/08/2014    BACTERIA 1+ 05/31/2012    RBCUA 0-2 09/08/2014    BLOODU Negative 05/09/2022    SPECGRAV 1.025 05/09/2022    GLUCOSEU 100 05/09/2022    GLUCOSEU NEGATIVE 05/31/2012       Radiology:  XR CHEST (2 VW)   Final Result   Small right pleural effusion. CT HEAD WO CONTRAST   Final Result   Fluid collections described in few left mastoid sinus cells are likely   incidental sterile fluid collections. No acute intracranial abnormality. CT CERVICAL SPINE WO CONTRAST   Final Result   No acute abnormality of the cervical spine. Stable postoperative changes at C5 through C7. Multilevel cervical   spondylosis and facet arthropathy also stable. XR HAND LEFT (MIN 3 VIEWS)   Final Result   Degenerative changes. Metallic foreign body. Chondrocalcinosis. 1 mm of ulna plus variance. No acute process. Sign report         XR KNEE RIGHT (1-2 VIEWS)   Final Result   Well-seated TKA. Regional osteoporosis. No evidence of an acute process. XR HIP 2-3 VW W PELVIS RIGHT   Final Result   No acute findings. Degenerative changes as described above. Assessment/Plan:    Active Hospital Problems    Diagnosis     Orthostasis [I95.1]      Priority: Medium    Frequent falls [R29.6]      Falls. Likely due to combination of muscular deconditioning and orthostasis. - nutrition supplements. PT/OT. - stand up slowly, stay hydrated  - we discussed many of his medications which might be contributing to his falls:              - he no longer takes his PRN furosemide.                - held losartan, might have to tolerate higher supine BP's. Trend orthostats daily. - stopped ISMN. His coronary stent was 10 years ago and he hasn't had angina in a long time. - the patient feels strongly about continuing his high dose zolpidem and lorazepam.              - supposedly he was diagnosed with parkinson's disease by a neurologist 5 years ago and that neurologist started him on sinemet. The neurologist moved out of state and his PCP has been refilling this med ever since. The patient says that he had a bad tremor before he started sinemet, and he doesn't want to stop or try cutting back on this med.     PAF. held sotalol. Stopped dabigatran. He has hit his head hard twice in the last few weeks alone. Reassess in the future if he stops falling. F/u with cardiology for consideration of watchman device. ECHO reviewed      Obstructive uropathy. Finasteride.     HLD. Statin. DVT Prophylaxis: Scds  Diet: ADULT DIET;  Regular  ADULT ORAL NUTRITION SUPPLEMENT; Breakfast, Lunch, Dinner; Standard High Calorie/High Protein Oral Supplement  Code Status: Full Code    PT/OT Eval Status: consulted    Dispo - 1-2 days pending clinical improvement    Farideh Tian, KATIE - CNP

## 2022-05-12 NOTE — PROGRESS NOTES
KAMINI HARRISON    SENT-Patient ECHO still pending. Patient wanting MD to come see patient regarding discharge. Reply- Neita Cue from EP coming to see patient in morning. HR was in 140s this afternoon while sitting in chair.  NO) Digoxin 125 daily

## 2022-05-12 NOTE — PROGRESS NOTES
Occupational Therapy  Facility/Department: [facility] C5 - MED SURG/ORTHO  Daily Treatment Note  NAME: Meenakshi Adams  :   MRN: [id number]    Date of Service: 2022    Discharge Recommendations:  24 hour supervision or assist,Home with Home health OT       AM-PAC Daily Activity Inpatient   How much help for putting on and taking off regular lower body clothing?: A Little  How much help for Bathing?: A Little  How much help for Toileting?: A Little  How much help for putting on and taking off regular upper body clothing?: A Little  How much help for taking care of personal grooming?: A Little  How much help for eating meals?: None  AM-PAC Inpatient Daily Activity Raw Score: 19  AM-PAC Inpatient ADL T-Scale Score : 40.22  ADL Inpatient CMS 0-100% Score: 42.8  ADL Inpatient CMS G-Code Modifier : CK      Patient Diagnosis(es): The primary encounter diagnosis was Orthostatic hypotension. Diagnoses of General weakness, Frequent falls, and Anticoagulated were also pertinent to this visit. Assessment    Assessment: Pt tolerated OT session well, no c/o dizziness or lightheadedness with standing activity. Pt does report feeling fatigued, SOB and increase in tremors with activity, SPO2 did drop grom 94-90% and HR from 80s to 140s with activity, however recovers with seated rest. Pt grossly SBA for functional transfers and mobility with SPC, some mild undsteadiness at times but no LOB. Pt with increased activity tolerance, continues to make progress toward goals. Pt would benefit from continued skilled OT per POC. Activity Tolerance: Patient tolerated treatment well  Discharge Recommendations: 24 hour supervision or assist;Home with Home health OT      Plan   Plan  Times per Week: 3-5x/week     Restrictions  Restrictions/Precautions  Restrictions/Precautions: Fall Risk    Subjective   Subjective  Subjective: Pt up in chair, pleasant and agreeable to OT treatment. Pain: Pt denies pain.     Cognition  Overall Cognitive Status: WFL        Objective    Vitals  Vitals  Pulse: 84  BP: 105/62  BP Location: Left upper arm  BP Method: Automatic  Patient Position: Sitting  MAP (Calculated): 76.33  SpO2: 94 %  O2 Device: None (Room air)  Comment: SPO2 90% and  after functional mobility    Bed Mobility Training  Bed Mobility Training: No (pt in chair at start and end of session)    Balance  Sitting: Intact  Standing:  (static standing prior to mobility, functional/bathroom mobility, LE dressing, sink ADLs ~6 mins at sink)  Standing - Static: Good (with SPC)  Standing - Dynamic: Good (with SPC)    Transfer Training  Transfer Training: Yes  Sit to Stand: Stand-by assistance; Additional time  Stand to Sit: Stand-by assistance  Toilet Transfer: Stand-by assistance (with use of grab bars)     ADL  Grooming: Stand by assistance (in stance at sink to brush teeth, wash face, and comb hair)  LE Dressing: Stand by assistance (socks, briefs, pants)     Safety Devices  Type of Devices: Left in chair;Chair alarm in place;Call light within reach;Nurse notified;Gait belt     Patient Education  Education Given To: Patient; Family  Education Provided: Role of Therapy;Plan of Care;ADL Adaptive Strategies;Transfer Training  Education Method: Demonstration;Verbal  Barriers to Learning: None  Education Outcome: Verbalized understanding;Demonstrated understanding    Goals  Short Term Goals  Time Frame for Short term goals: 2 weeks (5/23/22)  Short Term Goal 1: Mod I functional transfers to/from EOB, chair, toilet/BSC  Short Term Goal 2: Mod I LB dressing  Short Term Goal 3: Mod I toileting  Short Term Goal 4: Mod I grooming in stance at sink  Short Term Goal 5: Supervision bathing.   Patient Goals   Patient goals : to walk without falling       Therapy Time   Individual Concurrent Group Co-treatment   Time In 1334         Time Out 1415         Minutes 1263 PERRY Lind/L

## 2022-05-13 ENCOUNTER — APPOINTMENT (OUTPATIENT)
Dept: CT IMAGING | Age: 77
DRG: 057 | End: 2022-05-13
Payer: MEDICARE

## 2022-05-13 LAB
ANION GAP SERPL CALCULATED.3IONS-SCNC: 9 MMOL/L (ref 3–16)
BUN BLDV-MCNC: 15 MG/DL (ref 7–20)
CALCIUM SERPL-MCNC: 8.6 MG/DL (ref 8.3–10.6)
CHLORIDE BLD-SCNC: 107 MMOL/L (ref 99–110)
CO2: 28 MMOL/L (ref 21–32)
CREAT SERPL-MCNC: 0.7 MG/DL (ref 0.8–1.3)
GFR AFRICAN AMERICAN: >60
GFR NON-AFRICAN AMERICAN: >60
GLUCOSE BLD-MCNC: 95 MG/DL (ref 70–99)
HCT VFR BLD CALC: 22.7 % (ref 40.5–52.5)
HEMOGLOBIN: 7.9 G/DL (ref 13.5–17.5)
MCH RBC QN AUTO: 31.2 PG (ref 26–34)
MCHC RBC AUTO-ENTMCNC: 34.8 G/DL (ref 31–36)
MCV RBC AUTO: 89.5 FL (ref 80–100)
PDW BLD-RTO: 23.3 % (ref 12.4–15.4)
PLATELET # BLD: 67 K/UL (ref 135–450)
PMV BLD AUTO: 7.8 FL (ref 5–10.5)
POTASSIUM REFLEX MAGNESIUM: 3.9 MMOL/L (ref 3.5–5.1)
RBC # BLD: 2.53 M/UL (ref 4.2–5.9)
SODIUM BLD-SCNC: 144 MMOL/L (ref 136–145)
TSH REFLEX: 4.18 UIU/ML (ref 0.27–4.2)
WBC # BLD: 4.2 K/UL (ref 4–11)

## 2022-05-13 PROCEDURE — 6360000004 HC RX CONTRAST MEDICATION: Performed by: NURSE PRACTITIONER

## 2022-05-13 PROCEDURE — 6370000000 HC RX 637 (ALT 250 FOR IP): Performed by: NURSE PRACTITIONER

## 2022-05-13 PROCEDURE — 36415 COLL VENOUS BLD VENIPUNCTURE: CPT

## 2022-05-13 PROCEDURE — G0378 HOSPITAL OBSERVATION PER HR: HCPCS

## 2022-05-13 PROCEDURE — 6370000000 HC RX 637 (ALT 250 FOR IP): Performed by: INTERNAL MEDICINE

## 2022-05-13 PROCEDURE — 71260 CT THORAX DX C+: CPT

## 2022-05-13 PROCEDURE — 99233 SBSQ HOSP IP/OBS HIGH 50: CPT | Performed by: NURSE PRACTITIONER

## 2022-05-13 PROCEDURE — 85027 COMPLETE CBC AUTOMATED: CPT

## 2022-05-13 PROCEDURE — 80048 BASIC METABOLIC PNL TOTAL CA: CPT

## 2022-05-13 PROCEDURE — 84443 ASSAY THYROID STIM HORMONE: CPT

## 2022-05-13 PROCEDURE — 2580000003 HC RX 258: Performed by: INTERNAL MEDICINE

## 2022-05-13 RX ADMIN — PANTOPRAZOLE SODIUM 20 MG: 20 TABLET, DELAYED RELEASE ORAL at 06:53

## 2022-05-13 RX ADMIN — SODIUM CHLORIDE, PRESERVATIVE FREE 10 ML: 5 INJECTION INTRAVENOUS at 08:36

## 2022-05-13 RX ADMIN — TETRAHYDROZOLINE HCL 2 DROP: 0.05 SOLUTION/ DROPS OPHTHALMIC at 08:34

## 2022-05-13 RX ADMIN — FERROUS SULFATE TAB 325 MG (65 MG ELEMENTAL FE) 325 MG: 325 (65 FE) TAB at 17:00

## 2022-05-13 RX ADMIN — PAROXETINE HYDROCHLORIDE 20 MG: 20 TABLET, FILM COATED ORAL at 21:37

## 2022-05-13 RX ADMIN — IOPAMIDOL 75 ML: 755 INJECTION, SOLUTION INTRAVENOUS at 10:11

## 2022-05-13 RX ADMIN — LACOSAMIDE 100 MG: 50 TABLET, FILM COATED ORAL at 21:37

## 2022-05-13 RX ADMIN — LORAZEPAM 1 MG: 1 TABLET ORAL at 08:34

## 2022-05-13 RX ADMIN — LORAZEPAM 2 MG: 1 TABLET ORAL at 21:37

## 2022-05-13 RX ADMIN — PANTOPRAZOLE SODIUM 20 MG: 20 TABLET, DELAYED RELEASE ORAL at 17:00

## 2022-05-13 RX ADMIN — FERROUS SULFATE TAB 325 MG (65 MG ELEMENTAL FE) 325 MG: 325 (65 FE) TAB at 08:33

## 2022-05-13 RX ADMIN — ATORVASTATIN CALCIUM 40 MG: 40 TABLET, FILM COATED ORAL at 08:34

## 2022-05-13 RX ADMIN — PAROXETINE HYDROCHLORIDE 20 MG: 20 TABLET, FILM COATED ORAL at 08:34

## 2022-05-13 RX ADMIN — LACOSAMIDE 100 MG: 50 TABLET, FILM COATED ORAL at 08:34

## 2022-05-13 RX ADMIN — CARBIDOPA AND LEVODOPA 1 TABLET: 25; 100 TABLET ORAL at 08:34

## 2022-05-13 RX ADMIN — ZOLPIDEM TARTRATE 10 MG: 5 TABLET ORAL at 21:37

## 2022-05-13 RX ADMIN — FINASTERIDE 5 MG: 5 TABLET, FILM COATED ORAL at 08:34

## 2022-05-13 RX ADMIN — DIGOXIN 125 MCG: 125 TABLET ORAL at 08:34

## 2022-05-13 RX ADMIN — TETRAHYDROZOLINE HCL 2 DROP: 0.05 SOLUTION/ DROPS OPHTHALMIC at 21:38

## 2022-05-13 RX ADMIN — SODIUM CHLORIDE, PRESERVATIVE FREE 10 ML: 5 INJECTION INTRAVENOUS at 21:38

## 2022-05-13 RX ADMIN — SODIUM CHLORIDE, PRESERVATIVE FREE 10 ML: 5 INJECTION INTRAVENOUS at 08:35

## 2022-05-13 ASSESSMENT — PAIN SCALES - GENERAL
PAINLEVEL_OUTOF10: 0
PAINLEVEL_OUTOF10: 0

## 2022-05-13 NOTE — CARE COORDINATION
Case Management/Follow up:    Chart reviewed for length of stay. Hospital day #  4 Unit:  C5  Diagnosis and current status as per MD progress: Pt admitted into observation status with frequent falls, generalized weakness. Being followed by IM and EP. Anticipated d/c date: 1-2 days. .  Expected plan for discharge: Home with [facility] through [employer]. Potential barriers: None  Precert required/date initiated: N/A  Confirmed plan with patient and/or family: yes  Comments:  Had long discussion with pt and wife at bedside and [facility] Spoke to [place] with [employer] and they will reach out to [facility] as well once pt is at home.  CM following-Birdie Jones RN

## 2022-05-13 NOTE — PROGRESS NOTES
[facility]     Electrophysiology                                     Progress Note    Admission date:  2022    Reason for follow up visit: Recurrent syncope    HPI/CC: Robert Leon was admitted on 2022 with excessive weakness, recurrent falls and near syncope. Orthostatic BP's were positive. On 2022, he had brief RVR from approximately 6509-8037. He has remained orthostatic. Hgb 7.9 and platelets 23,490. He complained of shortness of breath and CXR showed hazy right basilar abnormalities. Rhythm has been AF. Average rates are in the 80's. Subjective: He complains of some shortness of breath but improvement in dizziness. Denies chest pain or palpitations. Vitals:  Blood pressure (!) 146/80, pulse 80, temperature 98.1 °F (36.7 °C), temperature source Oral, resp. rate 18, height 5' 10.5\" (1.791 m), weight 186 lb (84.4 kg), SpO2 94 %.   Temp  Av.1 °F (36.7 °C)  Min: 97.8 °F (36.6 °C)  Max: 98.7 °F (37.1 °C)  Pulse  Av.4  Min: 80  Max: 151  BP  Min: 105/62  Max: 150/75  SpO2  Av %  Min: 94 %  Max: 94 %    24 hour I/O    Intake/Output Summary (Last 24 hours) at 2022 1020  Last data filed at 2022 0811  Gross per 24 hour   Intake 480 ml   Output 1275 ml   Net -795 ml     Current Facility-Administered Medications   Medication Dose Route Frequency Provider Last Rate Last Admin    digoxin (LANOXIN) tablet 125 mcg  125 mcg Oral Daily Isabel Rivera APRN - CNP   125 mcg at 22 0834    tetrahydrozoline 0.05 % ophthalmic solution 2 drop  2 drop Both Eyes BID KATIE Nassar - CNP   2 drop at 22 0834    perflutren lipid microspheres (DEFINITY) injection 1.65 mg  1.5 mL IntraVENous ONCE PRN Isabel Rivera APRN - CNP        atorvastatin (LIPITOR) tablet 40 mg  40 mg Oral Daily Tiffany Marcano MD   40 mg at 22 0834    carbidopa-levodopa (SINEMET)  MG per tablet 1 tablet  1 tablet Oral Daily Tiffany Marcano MD   1 tablet at 22 0812    ferrous sulfate (IRON 325) tablet 325 mg  325 mg Oral BID WC Angella Douglas MD   325 mg at 05/13/22 0833    finasteride (PROSCAR) tablet 5 mg  5 mg Oral Daily Angella Douglas MD   5 mg at 05/13/22 0834    lacosamide (VIMPAT) tablet 100 mg  100 mg Oral BID Angella Douglas MD   100 mg at 05/13/22 0834    LORazepam (ATIVAN) tablet 1 mg  1 mg Oral Daily Angella Douglas MD   1 mg at 05/13/22 0834    LORazepam (ATIVAN) tablet 2 mg  2 mg Oral Nightly Angella Douglas MD   2 mg at 05/12/22 2147    [Held by provider] losartan (COZAAR) tablet 25 mg  25 mg Oral Daily Angella Douglas MD        pantoprazole (PROTONIX) tablet 20 mg  20 mg Oral BID AC Angella Douglas MD   20 mg at 05/13/22 0653    PARoxetine (PAXIL) tablet 20 mg  20 mg Oral BID Angella Douglas MD   20 mg at 05/13/22 0834    [Held by provider] sotalol (BETAPACE) tablet 40 mg  40 mg Oral BID Angella Douglas MD   40 mg at 05/10/22 7828    zolpidem (AMBIEN) tablet 10 mg  10 mg Oral Nightly Angella Douglas MD   10 mg at 05/12/22 2147    sodium chloride flush 0.9 % injection 5-40 mL  5-40 mL IntraVENous 2 times per day Angella Douglas MD   10 mL at 05/13/22 0836    sodium chloride flush 0.9 % injection 5-40 mL  5-40 mL IntraVENous PRN Angella Douglas MD        0.9 % sodium chloride infusion   IntraVENous PRN Angella Douglas MD        [Held by provider] enoxaparin (LOVENOX) injection 40 mg  40 mg SubCUTAneous Daily Angella Douglas MD   40 mg at 05/11/22 0847    polyethylene glycol (GLYCOLAX) packet 17 g  17 g Oral Daily PRN Angella Douglas MD        acetaminophen (TYLENOL) tablet 650 mg  650 mg Oral Q6H PRN Angella Douglas MD        Or    acetaminophen (TYLENOL) suppository 650 mg  650 mg Rectal Q6H PRN Angella Douglas MD        prochlorperazine (COMPAZINE) injection 10 mg  10 mg IntraVENous Q6H PRN Angella Douglas MD           Objective:     Telemetry monitor: AF    Physical Exam:  Constitutional and general appearance: alert, cooperative, no distress and appears stated age  [de-identified]: PERRL, no cervical lymphadenopathy. No masses palpable. Normal oral mucosa  Respiratory:  · Normal excursion and expansion without use of accessory muscles  · Resp auscultation: Normal breath sounds without wheezing, rhonchi, and rales  Cardiovascular:  · The apical impulse is not displaced  · Heart tones are crisp and normal. regular S1 and S2.  · Jugular venous pulsation Normal  · The carotid upstroke is normal in amplitude and contour without delay or bruit  · Peripheral pulses are symmetrical and full   Abdomen:  · No masses or tenderness  · Bowel sounds present  Extremities:  ·  No cyanosis or clubbing  ·  No lower extremity edema  ·  Skin: warm and dry  Neurological:  · Alert and oriented  · Moves all extremities well  · No abnormalities of mood, affect, memory, mentation, or behavior are noted    Data  Stress 12/2020: Interpetation Summary:   The LV EF is 81%   The left ventricular wall motion is normal.   There is a small sized, fixed defect in the inferolateral wall consistent with   mild infarct.        o Non-diagnostic ECG for ischemia with pharmocologic stress.      o No significant areas of ischemia identified with pharmocologic stress. Stress echo 11/2020:    Baseline Echo Findings: The LV ejection fraction at rest is 55-60%. The LV   chamber size is normal. There is mild LVH. Post Stress Echo Findings: The LV ejection fraction post stress is 60-65%. Normal increase in contractillity of all segments was observed. Echo 9/2014:      Conclusions      Summary   No significant pericardial effusion noted. Overall left ventricular function is normal.   Ejection fraction is visually estimated to be 55-60 %     All labs and testing reviewed.   Lab Review     Renal Profile:   Lab Results   Component Value Date    CREATININE 0.7 05/13/2022    BUN 15 05/13/2022     05/13/2022    K 3.9 05/13/2022     05/13/2022    CO2 28 05/13/2022     CBC: Lab Results   Component Value Date    WBC 4.2 05/13/2022    RBC 2.53 05/13/2022    RBC 4.84 01/13/2017    HGB 7.9 05/13/2022    HCT 22.7 05/13/2022    MCV 89.5 05/13/2022    RDW 23.3 05/13/2022    PLT 67 05/13/2022     BNP:    Lab Results   Component Value Date    BNP 99 04/09/2012     Fasting Lipid Panel:    Lab Results   Component Value Date    CHOL 185 03/06/2017    HDL 45 03/06/2017    TRIG 235 03/06/2017     Cardiac Enzymes:  CK/MbTroponin  Lab Results   Component Value Date    TROPONINI <0.01 05/09/2022     PT/ INR   Lab Results   Component Value Date    INR 1.45 05/09/2022    INR 1.06 03/06/2017    INR 1.27 09/26/2014    PROTIME 16.6 05/09/2022    PROTIME 12.0 03/06/2017    PROTIME 13.9 09/26/2014     PTT No results found for: PTT No results found for: MG   Lab Results   Component Value Date    TSH 3.21 04/19/2014       Assessment:  Recurrent falls/syncope: stable    -orthostatic BP's positive this admission   Persistent atrial fibrillation of unknown duration: stable    -s/p RFCA of AF 9/2014   -[id number] score 3 (age, CAD)  S/P loop recorder implant 2014 with reimplant 2018   -device at [facility]   CAD s/p PCI to LAD 3/2017  Non-small cell lung cancer s/p thoracotomy    -undergoing second round of chemotherapy   Parkinson's syndrome   Normocytic anemia  Thrombocytopenia   Shortness of breath       Plan:   1. Continue digoxin for rate control. Check digoxin level in 5 days. 2. Recheck orthostatic blood pressures. Suspect orthostasis may be related to anemia. 3. Continue compression stockings. 4. Given recurrent falls and thrombocytopenia, would not resume Pradaxa at this time as risk for injury/bleeding outweighs the benefit of stroke protection. If falls are recurrent despite medication changes, normal platelet count and lifestyle modifications, would refer for Watchman evaluation. 5. Would not resume sotalol at discharge due to ineffectiveness and absence of AC   6.  Unable to interrogate loop

## 2022-05-13 NOTE — PROGRESS NOTES
PS- B.    Patient stating still SOB this morning. Chest x-ray was complete last night showing some issues on the right side.

## 2022-05-13 NOTE — PROGRESS NOTES
Hospitalist Progress Note      PCP: Alyce Martínez MD    Date of Admission: 5/9/2022    Chief Complaint: freq fall, generalized weakness    Hospital Course: The patient is a pleasant 68 Y M with a h/o former smoking, CAD, PAF, parkinson disease, and NSCLC s/p RU lobectomy in 12/2021 and completion of chemotherapy 10 days ago. He and his wife say that in the first 4 months after his lung resection he unintentionally lost about 50 lbs. Since losing all of this weight he has been falling often. He was admitted to [facility] in 3/2022 due to falls. An ulnar fracture was treated conservatively. His MIRNA resolved with IVFs. He was discharged home with \"PRN assistance\" per PT/OT recommendations. Since then the patient has continued to fall. He estimates he has fallen about 7 times since that discharge 6 weeks ago. Sometimes he just loses his balance, sometimes he trips on the vacuum robot, sometimes the wind just blows him over. The only consistent theme between all of these falls is that they always seem to happen within a few seconds of the patient standing up. He denies any lightheadedness or dizziness, but after standing up \"my legs just go to jello after a few steps and I fall down. \"  In the ED he was severely orthostatic: BP dropped from 121/64 to 71/47. He actually did quite well with PT, walked 150 feet with contact-guard assistance, and they only recommended outpatient PT. He and his wife insisted that he wasn't safe to go home like this, since they were sure he would seriously injure himself before long. It has gotten to the point that the patient is afraid to get up out of his chair \"because I know I'll just fall over. \"  His wife worries his muscles are going to wast away due to inactivity. The patient doesn't have any new, acute symptoms and doesn't feel particularly ill. He and his wife agree that he has been eating well more recently, and intentionally drinking lots of fluids.   His labs of motion without deformity. Skin: Skin color-Pale, texture/turgor normal.  No rashes or lesions. Neurologic:  Neurovascularly intact without any focal sensory/motor deficits. Cranial nerves: II-XII intact, grossly non-focal.  Psychiatric: Alert and oriented, thought content appropriate, normal insight  Capillary Refill: Brisk,3 seconds, normal   Peripheral Pulses: +2 palpable, equal bilaterally       Labs:   Recent Labs     05/11/22 0652 05/12/22 0641 05/13/22 0630   WBC 4.3 4.2 4.2   HGB 8.4* 7.9* 7.9*   HCT 24.7* 22.6* 22.7*   PLT 65* 61* 67*     Recent Labs     05/11/22 0652 05/12/22 0641 05/13/22 0630    140 144   K 4.5 3.7 3.9    105 107   CO2 25 26 28   BUN 17 16 15   CREATININE 0.9 0.8 0.7*   CALCIUM 8.3 8.5 8.6     No results for input(s): AST, ALT, BILIDIR, BILITOT, ALKPHOS in the last 72 hours. No results for input(s): INR in the last 72 hours. No results for input(s): [place] in the last 72 hours. Urinalysis:      Lab Results   Component Value Date    NITRU Negative 05/09/2022    WBCUA 0-2 09/08/2014    BACTERIA 1+ 05/31/2012    RBCUA 0-2 09/08/2014    BLOODU Negative 05/09/2022    SPECGRAV 1.025 05/09/2022    GLUCOSEU 100 05/09/2022    GLUCOSEU NEGATIVE 05/31/2012       Radiology:  CT CHEST PULMONARY EMBOLISM W CONTRAST   Final Result   No evidence of pulmonary embolism. Small right pleural effusion with adjacent atelectasis. XR CHEST PORTABLE   Final Result   Mild cardiomegaly and mild central pulmonary congestion which is more   apparent. Hazy right basilar atelectasis or developing infiltrate and a small right   pleural effusion or pleural thickening which is more apparent. XR CHEST (2 VW)   Final Result   Small right pleural effusion. CT HEAD WO CONTRAST   Final Result   Fluid collections described in few left mastoid sinus cells are likely   incidental sterile fluid collections. No acute intracranial abnormality.          CT CERVICAL SPINE WO CONTRAST   Final Result   No acute abnormality of the cervical spine. Stable postoperative changes at C5 through C7. Multilevel cervical   spondylosis and facet arthropathy also stable. XR HAND LEFT (MIN 3 VIEWS)   Final Result   Degenerative changes. Metallic foreign body. Chondrocalcinosis. 1 mm of ulna plus variance. No acute process. Sign report         XR KNEE RIGHT (1-2 VIEWS)   Final Result   Well-seated TKA. Regional osteoporosis. No evidence of an acute process. XR HIP 2-3 VW W PELVIS RIGHT   Final Result   No acute findings. Degenerative changes as described above. Assessment/Plan:    Active Hospital Problems    Diagnosis     Orthostasis [I95.1]      Priority: Medium    Frequent falls [R29.6]      Falls. Likely due to combination of muscular deconditioning and orthostasis. - nutrition supplements. PT/OT. - stand up slowly, stay hydrated  - we discussed many of his medications which might be contributing to his falls:              - he no longer takes his PRN furosemide.                - held losartan, might have to tolerate higher supine BP's. Trend orthostats daily. - stopped ISMN. His coronary stent was 10 years ago and he hasn't had angina in a long time. - the patient feels strongly about continuing his high dose zolpidem and lorazepam.              - supposedly he was diagnosed with parkinson's disease by a neurologist 5 years ago and that neurologist started him on sinemet. The neurologist moved out of [place] and his PCP has been refilling this med ever since. The patient says that he had a bad tremor before he started sinemet, and he doesn't want to stop or try cutting back on this med.     PAF. held sotalol. Stopped dabigatran. He has hit his head hard twice in the last few weeks alone. Reassess in the future if he stops falling.   F/u with cardiology for consideration of watchman device. ECHO reviewed, CTPA: No evidence of pulmonary embolism. Small right pleural effusion with adjacent atelectasis.     Obstructive uropathy. Finasteride.     HLD. Statin. DVT Prophylaxis: Scds  Diet: ADULT DIET;  Regular  ADULT ORAL NUTRITION SUPPLEMENT; Breakfast, Lunch, Dinner; Standard High Calorie/High Protein Oral Supplement  Code Status: Full Code    PT/OT Eval Status: consulted    Dispo -anticipate 5/14    KATIE Larios - CNP

## 2022-05-13 NOTE — PROGRESS NOTES
Orthostatic Blood pressure obtained at this time. 154/83 lying  139/63 sitting  148/64 standing      oxygen walk test not completed at this time.  Still needed

## 2022-05-14 VITALS
RESPIRATION RATE: 14 BRPM | HEART RATE: 83 BPM | WEIGHT: 186 LBS | SYSTOLIC BLOOD PRESSURE: 129 MMHG | DIASTOLIC BLOOD PRESSURE: 71 MMHG | HEIGHT: 71 IN | OXYGEN SATURATION: 94 % | BODY MASS INDEX: 26.04 KG/M2 | TEMPERATURE: 98.1 F

## 2022-05-14 PROBLEM — I95.9 HYPOTENSION: Status: ACTIVE | Noted: 2022-05-14

## 2022-05-14 LAB
ANION GAP SERPL CALCULATED.3IONS-SCNC: 9 MMOL/L (ref 3–16)
BUN BLDV-MCNC: 14 MG/DL (ref 7–20)
CALCIUM SERPL-MCNC: 8.6 MG/DL (ref 8.3–10.6)
CHLORIDE BLD-SCNC: 105 MMOL/L (ref 99–110)
CO2: 27 MMOL/L (ref 21–32)
CREAT SERPL-MCNC: 0.8 MG/DL (ref 0.8–1.3)
GFR AFRICAN AMERICAN: >60
GFR NON-AFRICAN AMERICAN: >60
GLUCOSE BLD-MCNC: 105 MG/DL (ref 70–99)
HCT VFR BLD CALC: 24.5 % (ref 40.5–52.5)
HEMOGLOBIN: 8.3 G/DL (ref 13.5–17.5)
MCH RBC QN AUTO: 31.2 PG (ref 26–34)
MCHC RBC AUTO-ENTMCNC: 34 G/DL (ref 31–36)
MCV RBC AUTO: 91.6 FL (ref 80–100)
PDW BLD-RTO: 23.6 % (ref 12.4–15.4)
PLATELET # BLD: 85 K/UL (ref 135–450)
PMV BLD AUTO: 7.6 FL (ref 5–10.5)
POTASSIUM REFLEX MAGNESIUM: 4.1 MMOL/L (ref 3.5–5.1)
PRO-BNP: 2402 PG/ML (ref 0–449)
RBC # BLD: 2.68 M/UL (ref 4.2–5.9)
SODIUM BLD-SCNC: 141 MMOL/L (ref 136–145)
WBC # BLD: 4.2 K/UL (ref 4–11)

## 2022-05-14 PROCEDURE — 96374 THER/PROPH/DIAG INJ IV PUSH: CPT

## 2022-05-14 PROCEDURE — 85027 COMPLETE CBC AUTOMATED: CPT

## 2022-05-14 PROCEDURE — 97535 SELF CARE MNGMENT TRAINING: CPT

## 2022-05-14 PROCEDURE — 36415 COLL VENOUS BLD VENIPUNCTURE: CPT

## 2022-05-14 PROCEDURE — 97110 THERAPEUTIC EXERCISES: CPT

## 2022-05-14 PROCEDURE — 1200000000 HC SEMI PRIVATE

## 2022-05-14 PROCEDURE — G0378 HOSPITAL OBSERVATION PER HR: HCPCS

## 2022-05-14 PROCEDURE — 80048 BASIC METABOLIC PNL TOTAL CA: CPT

## 2022-05-14 PROCEDURE — 2580000003 HC RX 258: Performed by: INTERNAL MEDICINE

## 2022-05-14 PROCEDURE — 83880 ASSAY OF NATRIURETIC PEPTIDE: CPT

## 2022-05-14 PROCEDURE — 6370000000 HC RX 637 (ALT 250 FOR IP): Performed by: INTERNAL MEDICINE

## 2022-05-14 PROCEDURE — 97530 THERAPEUTIC ACTIVITIES: CPT

## 2022-05-14 PROCEDURE — 6360000002 HC RX W HCPCS: Performed by: NURSE PRACTITIONER

## 2022-05-14 PROCEDURE — 6370000000 HC RX 637 (ALT 250 FOR IP): Performed by: NURSE PRACTITIONER

## 2022-05-14 RX ORDER — DIGOXIN 125 MCG
125 TABLET ORAL DAILY
Qty: 30 TABLET | Refills: 0 | Status: ON HOLD
Start: 2022-05-15 | End: 2022-06-17 | Stop reason: HOSPADM

## 2022-05-14 RX ORDER — FUROSEMIDE 10 MG/ML
20 INJECTION INTRAMUSCULAR; INTRAVENOUS ONCE
Status: COMPLETED | OUTPATIENT
Start: 2022-05-14 | End: 2022-05-14

## 2022-05-14 RX ADMIN — LACOSAMIDE 100 MG: 50 TABLET, FILM COATED ORAL at 10:19

## 2022-05-14 RX ADMIN — SODIUM CHLORIDE, PRESERVATIVE FREE 10 ML: 5 INJECTION INTRAVENOUS at 08:40

## 2022-05-14 RX ADMIN — PANTOPRAZOLE SODIUM 20 MG: 20 TABLET, DELAYED RELEASE ORAL at 08:39

## 2022-05-14 RX ADMIN — CARBIDOPA AND LEVODOPA 1 TABLET: 25; 100 TABLET ORAL at 08:39

## 2022-05-14 RX ADMIN — FERROUS SULFATE TAB 325 MG (65 MG ELEMENTAL FE) 325 MG: 325 (65 FE) TAB at 08:39

## 2022-05-14 RX ADMIN — TETRAHYDROZOLINE HCL 2 DROP: 0.05 SOLUTION/ DROPS OPHTHALMIC at 08:40

## 2022-05-14 RX ADMIN — PAROXETINE HYDROCHLORIDE 20 MG: 20 TABLET, FILM COATED ORAL at 08:39

## 2022-05-14 RX ADMIN — FERROUS SULFATE TAB 325 MG (65 MG ELEMENTAL FE) 325 MG: 325 (65 FE) TAB at 15:07

## 2022-05-14 RX ADMIN — PANTOPRAZOLE SODIUM 20 MG: 20 TABLET, DELAYED RELEASE ORAL at 15:07

## 2022-05-14 RX ADMIN — FUROSEMIDE 20 MG: 10 INJECTION, SOLUTION INTRAMUSCULAR; INTRAVENOUS at 10:19

## 2022-05-14 RX ADMIN — ATORVASTATIN CALCIUM 40 MG: 40 TABLET, FILM COATED ORAL at 08:39

## 2022-05-14 RX ADMIN — FINASTERIDE 5 MG: 5 TABLET, FILM COATED ORAL at 08:39

## 2022-05-14 RX ADMIN — DIGOXIN 125 MCG: 125 TABLET ORAL at 08:39

## 2022-05-14 RX ADMIN — LORAZEPAM 1 MG: 1 TABLET ORAL at 08:39

## 2022-05-14 ASSESSMENT — PAIN SCALES - GENERAL
PAINLEVEL_OUTOF10: 0
PAINLEVEL_OUTOF10: 0

## 2022-05-14 NOTE — CARE COORDINATION
CASE MANAGEMENT DISCHARGE SUMMARY      Discharge to: Home with [address]    Transportation:    Family/car: yes     Confirmed discharge plan with:     Patient: yes per RN     Family:      Facility/Agency, name:  [phone]     RN, name: Selma Goldberg RN    Note: Discharging nurse to complete JOANIE, reconcile AVS, and place final copy with patient's discharge packet. RN to ensure that written prescriptions for  Level II medications are sent with patient to the facility as per protocol.

## 2022-05-14 NOTE — PROGRESS NOTES
Pt a/o x4. VSS. All discharge and follow up instructions given to the patient and wife. The patient verbalizes understanding and denies questions. All belongings collected and sent with the patient. The patient was discharged off the unit by wheelchair and writer in stable condition.

## 2022-05-14 NOTE — PROGRESS NOTES
Occupational Therapy  Facility/Department: [facility] C5 - MED SURG/ORTHO  Daily Treatment Note  NAME: Barrington Flores  :   MRN: [id number]    Date of Service: 2022    Discharge Recommendations:  24 hour supervision or assist,Home with Home health OT,S Level 1  OT Equipment Recommendations  Equipment Needed: No      Patient Diagnosis(es): The primary encounter diagnosis was Orthostatic hypotension. Diagnoses of General weakness, Frequent falls, and Anticoagulated were also pertinent to this visit. Assessment    Assessment: Pt tolerated treatment well. Pt completed ambulation >200ft with can and Mod I. Pt demo good safety and O2 level maintained >92% for the duration of the walk. Pt demo good tolerance of BUE therex and completed ADLs with Mod I. Pt educated on fall prevention strategies for return to home and reports he has no concerns for d/c. Pt at baseline level and no longer needs acute OT-- will sign off. Cont with d/c recommendations  Activity Tolerance: Patient tolerated treatment well  Discharge Recommendations: 24 hour supervision or assist;Home with Home health OT;S Level 1  Equipment Needed: No      Plan   Plan  Times per Week: 3-5x/week  Current Treatment Recommendations: Balance training;Functional mobility training; Endurance training;Gait training;Neuromuscular re-education; Safety education & training;Patient/Caregiver education & training;Equipment evaluation, education, & procurement;Positioning;Self-Care / ADL; Home management training     Restrictions  Restrictions/Precautions  Restrictions/Precautions: Fall Risk    Subjective   Subjective  Subjective: Pt up in chair, pleasant and agreeable to OT treatment. Pain: Pt denies pain.   Cognition  Overall Cognitive Status: WFL        Objective    Vitals  Vitals  Pulse: 94  BP: 126/75  BP Location: Right upper arm  Patient Position: Semi fowlers  MAP (Calculated): 92  SpO2: 96 %  O2 Device: None (Room air)  Bed Mobility Training  Bed Mobility Training: No (seated in chair on arrival)  Balance  Sitting: Intact  Standing: Intact  Standing - Static: Good  Standing - Dynamic: Good  Transfer Training  Transfer Training: Yes  Overall Level of Assistance: Modified independent  Sit to Stand: Independent  Stand to Sit: Independent     ADL  Grooming: Modified independent   LE Dressing: Modified independent   OT Exercises  A/AROM Exercises: Pt completed x20 BUE shoulder press, chest press, shoulder AB/Adduction, elbow flexion with good tolerance and range     Safety Devices  Type of Devices: Left in chair;Call light within reach;Nurse notified;Gait belt (pt unalarmed on arrival and left as he was found)     Patient Education  Education Given To: Patient  Education Provided: Role of Therapy;Plan of Care;ADL Adaptive Strategies;Transfer Training  Education Method: Demonstration;Verbal  Barriers to Learning: None  Education Outcome: Verbalized understanding;Demonstrated understanding    Goals  Short Term Goals  Time Frame for Short term goals: 2 weeks (5/23/22)  Short Term Goal 1: Mod I functional transfers to/from EOB, chair, toilet/BSC  Short Term Goal 2: Mod I LB dressing  Short Term Goal 3: Mod I toileting  Short Term Goal 4: Mod I grooming in stance at sink  Short Term Goal 5: Supervision bathing.   Patient Goals   Patient goals : to walk without falling       Therapy Time   Individual Concurrent Group Co-treatment   Time In 1315         Time Out 1353         Minutes 38         Timed Code Treatment Minutes: [address] [facility], OT

## 2022-05-16 ENCOUNTER — CARE COORDINATION (OUTPATIENT)
Dept: CASE MANAGEMENT | Age: 77
End: 2022-05-16

## 2022-05-16 NOTE — CARE COORDINATION
Rafat [facility] Transitions Initial Follow Up Call    Call within 2 business days of discharge: Yes    Patient: Alek Finley Patient : [birth year]   MRN: [id number]  Reason for Admission: Fall orthostatic hypotension   Discharge Date: 22 RARS: Readmission Risk Score: 17.7 ( )      Last Discharge [facility]       Complaint Diagnosis Description Type Department Provider    22 Fall Orthostatic hypotension . .. ED to Hosp-Admission (Discharged) (ADMITTED) MT Leal MD; Sarah Delong . .. Attempted to reach patient via phone for initial post hospital transition call. VM left stating purpose of call along with my contact information requesting a return call.     Non-face-to-face services provided:  Communication with home health agencies or other community services the patient is currently using-[facility]     Care Transitions 24 Hour Call    Care Transitions Interventions         Follow Up  Future Appointments   Date Time Provider Dmitriy Galo   2022 10:00 AM MMO CT  [facility]   2022 11:00 AM KATIE Alberto - GEOVANNA CHAUHAN, RN, [facility]  Care Transition Nurse  [phone] mobile

## 2022-05-17 ENCOUNTER — CARE COORDINATION (OUTPATIENT)
Dept: CASE MANAGEMENT | Age: 77
End: 2022-05-17

## 2022-05-17 DIAGNOSIS — R29.6 FREQUENT FALLS: Primary | ICD-10-CM

## 2022-05-17 PROCEDURE — 1111F DSCHRG MED/CURRENT MED MERGE: CPT | Performed by: FAMILY MEDICINE

## 2022-05-17 NOTE — CARE COORDINATION
Rafat 45 Transitions Initial Follow Up Call    Call within 2 business days of discharge: Yes    Patient: Lexi Napoles Patient :    MRN: [id number]  Reason for Admission: Fall   Discharge Date: 22 RARS: Readmission Risk Score: 17.7 ( )      Last Discharge [facility]       Complaint Diagnosis Description Type Department Provider    22 Fall Orthostatic hypotension . .. ED to Hosp-Admission (Discharged) (ADMITTED) MT Can MD; Shantell Whittaker . .. Spoke with: [address]: ANGELITO     Non-face-to-face services provided:  Obtained and reviewed discharge summary and/or continuity of care documents  Education of patient/family/caregiver/guardian to support self-management-.     Transitions of Care Initial Call    Was this an external facility discharge? No Discharge Facility: n/a    Challenges to be reviewed by the provider   Additional needs identified to be addressed with provider: No  none             Method of communication with provider : none    Advance Care Planning:   Does patient have an Advance Directive: not on file. Care Transition Nurse (CTN) contacted the patient by telephone to perform post hospital discharge assessment. Verified name and  with patient as identifiers. Provided introduction to self, and explanation of the CTN role. CTN reviewed discharge instructions, medical action plan and red flags with patient who verbalized understanding. Patient given an opportunity to ask questions and does not have any further questions or concerns at this time. Were discharge instructions available to patient? Yes. Reviewed appropriate site of care based on symptoms and resources available to patient including: PCP  Home health  When to call 911. The patient agrees to contact the PCP office for questions related to their healthcare.      Medication reconciliation was performed with patient, who verbalizes understanding of administration of home medications. Advised obtaining a 90-day supply of all daily and as-needed medications. Was patient discharged with a pulse oximeter? no    CTN provided contact information. No further follow-up call indicated based on severity of symptoms and risk factors. CTN spoke with patient who reported he is doing alright. Patient reported he is using a walker and getting around fairly well on his own. CTN reviewed all medications with patient who reported he is taking all as prescribed. Patient reported he has an apt with PCP Dr. Artie Ball on 5/19. Patient denied any other concerns at this time. Patient encouraged to notify his provider right away if he has any worsening weakness, balance issues, or dizziness. CTN advised patient of use of urgent care or physicians 24 hr access line if assistance is needed after hours. Also advised that they can always contact their home care provider to request a nurse visit even if it isn't their regularly scheduled day for their nurse to visit.          Care Transitions 24 Hour Call    Do you have a copy of your discharge instructions?: Yes  Do you have all of your prescriptions and are they filled?: Yes  Have you been contacted by a [address]?: No  Have you scheduled your follow up appointment?: Yes  How are you going to get to your appointment?: Car - family or friend to transport  Do you have support at home?: Partner/Spouse/SO  Do you feel like you have everything you need to keep you well at home?: Yes  Are you an active caregiver in your home?: No  Care Transitions Interventions         Follow Up  Future Appointments   Date Time Provider Dmitriy Galo   5/23/2022 10:00 AM BRITTANYO CT [facility]   7/21/2022 11:00 AM KATIE Hernandez - GEOVANNA CHAUHAN, RN, [facility]  Care Transition Nurse  [phone] mobile

## 2022-05-18 ENCOUNTER — HOSPITAL ENCOUNTER (OUTPATIENT)
Age: 77
Discharge: HOME | End: 2022-05-18
Payer: MEDICARE

## 2022-05-18 DIAGNOSIS — Z79.899 MEDICATION MANAGEMENT: Primary | ICD-10-CM

## 2022-05-18 DIAGNOSIS — Z79.899 MEDICATION MANAGEMENT: ICD-10-CM

## 2022-05-18 LAB
ANION GAP SERPL CALCULATED.3IONS-SCNC: 13 MMOL/L (ref 3–16)
BUN BLDV-MCNC: 18 MG/DL (ref 7–20)
CALCIUM SERPL-MCNC: 8.8 MG/DL (ref 8.3–10.6)
CHLORIDE BLD-SCNC: 104 MMOL/L (ref 99–110)
CO2: 25 MMOL/L (ref 21–32)
CREAT SERPL-MCNC: 0.8 MG/DL (ref 0.8–1.3)
DIGOXIN LEVEL: 1.1 NG/ML (ref 0.8–2)
GFR AFRICAN AMERICAN: >60
GFR NON-AFRICAN AMERICAN: >60
GLUCOSE BLD-MCNC: 105 MG/DL (ref 70–99)
POTASSIUM SERPL-SCNC: 4.5 MMOL/L (ref 3.5–5.1)
SODIUM BLD-SCNC: 142 MMOL/L (ref 136–145)

## 2022-05-18 PROCEDURE — 80162 ASSAY OF DIGOXIN TOTAL: CPT

## 2022-05-18 PROCEDURE — 36415 COLL VENOUS BLD VENIPUNCTURE: CPT

## 2022-05-18 PROCEDURE — 80048 BASIC METABOLIC PNL TOTAL CA: CPT

## 2022-05-22 NOTE — DISCHARGE SUMMARY
Hospital Medicine Discharge Summary    Patient ID: Destiney Irene      Patient's PCP: Mae Garcia MD    Admit Date: 5/9/2022     Discharge Date: 5/14/2022      Admitting Provider: Timmy Rice MD     Discharge Provider: KATIE Nassar CNP     Discharge Diagnoses: Active Hospital Problems    Diagnosis     Hypotension [I95.9]      Priority: Medium    Orthostasis [I95.1]      Priority: Medium    Frequent falls [R29.6]        The patient was seen and examined on day of discharge and this discharge summary is in conjunction with any daily progress note from day of discharge. Hospital Course:  68 Y M with a h/o former smoking, CAD, PAF, parkinson disease, and NSCLC s/p RU lobectomy in 12/2021 and completion of chemotherapy 10 days ago. [facility] and his wife say that in the first 4 months after his lung resection he unintentionally lost about 50 lbs.  Since losing all of this weight he has been falling often. [facility] was admitted to [facility] in 3/2022 due to falls.  An ulnar fracture was treated conservatively.  His MIRNA resolved with IVFs. [facility] was discharged home with \"PRN assistance\" per PT/OT recommendations. Sriram Sainz then the patient has continued to fall. [facility] estimates he has fallen about 7 times since that discharge 6 weeks ago.  Sometimes he just loses his balance, sometimes he trips on the vacuum robot, sometimes the wind just blows him over.  The only consistent theme between all of these falls is that they always seem to happen within a few seconds of the patient standing up. [facility] denies any lightheadedness or dizziness, but after standing up \"my legs just go to jello after a few steps and I fall down. \"  In the ED he was severely orthostatic: BP dropped from 121/64 to 71/47. [facility] actually did quite well with PT, walked 150 feet with contact-guard assistance, and they only recommended outpatient PT. [facility] and his wife insisted that he wasn't safe to go home like this, since they were sure he would seriously injure himself before long. Adela Vergara has gotten to the point that the patient is afraid to get up out of his chair \"because I know I'll just fall over. \"  His wife worries his muscles are going to wast away due to inactivity.  The patient doesn't have any new, acute symptoms and doesn't feel particularly ill.  He and his wife agree that he has been eating well more recently, and intentionally drinking lots of fluids.  His labs and imaging were fairly unremarkable. Falls.  Likely due to combination of muscular deconditioning and orthostasis. - nutrition supplements.  PT/OT. - stand up slowly, stay hydrated  - we discussed many of his medications which might be contributing to his falls:              - he no longer takes his PRN furosemide.                - held losartan, might have to tolerate higher supine BP's.  Trend orthostats daily.             - stopped ISMN.  His coronary stent was 10 years ago and he hasn't had angina in a long time.              - the patient feels strongly about continuing his high dose zolpidem and lorazepam.              - supposedly he was diagnosed with parkinson's disease by a neurologist 5 years ago and that neurologist started him on sinemet.  The neurologist [place] out of [place] and his PCP has been refilling this med ever since. Renetta Almanzaer patient says that he had a bad tremor before he started sinemet, and he doesn't want to stop or try cutting back on this med.     PAF.  held sotalol.  Stopped dabigatran. Terri Dave has hit his head hard twice in the last few weeks alone.  Reassess in the future if he stops falling.  F/u with cardiology for consideration of watchman device. ECHO reviewed, CTPA: No evidence of pulmonary embolism. Small right pleural effusion with adjacent atelectasis.     Obstructive uropathy.  Finasteride.     HLD.  Statin. HOLD Pradaxa, Losartan, Imdur and Sotalol for now, follow up with PCP/Cardiology/Hematology.  BMP/Dig level in 3 days-call to arrange through PCP. Continue compression stockings. Monitor blood pressure daily, keep a log for PCP. Physical Exam Performed:     /71   Pulse 83   Temp 98.1 °F (36.7 °C) (Oral)   Resp 14   Ht 5' 10.5\" (1.791 m)   Wt 186 lb (84.4 kg)   SpO2 94%   BMI 26.31 kg/m²       General appearance:  No apparent distress, appears stated age and cooperative. HEENT:  Normal cephalic, atraumatic without obvious deformity. Pupils equal, round, and reactive to light. Extra ocular muscles intact. Conjunctivae/corneas clear. Neck: Supple, with full range of motion. No jugular venous distention. Trachea midline. Respiratory:  Normal respiratory effort. Clear to auscultation, bilaterally without Rales/Wheezes/Rhonchi. Cardiovascular:  Regular rate and rhythm with normal S1/S2 without murmurs, rubs or gallops. Abdomen: Soft, non-tender, non-distended with normal bowel sounds. Musculoskeletal:  No clubbing, cyanosis or edema bilaterally. Full range of motion without deformity. Skin: Skin color, texture, turgor normal.  No rashes or lesions. Neurologic:  Neurovascularly intact without any focal sensory/motor deficits. Cranial nerves: II-XII intact, grossly non-focal.  Psychiatric:  Alert and oriented, thought content appropriate, normal insight  Capillary Refill: Brisk,< 3 seconds   Peripheral Pulses: +2 palpable, equal bilaterally       Labs:  For convenience and continuity at follow-up the following most recent labs are provided:      CBC:    Lab Results   Component Value Date    WBC 4.2 05/14/2022    HGB 8.3 05/14/2022    HCT 24.5 05/14/2022    PLT 85 05/14/2022       Renal:    Lab Results   Component Value Date     05/18/2022    K 4.5 05/18/2022    K 4.1 05/14/2022     05/18/2022    CO2 25 05/18/2022    BUN 18 05/18/2022    CREATININE 0.8 05/18/2022    CALCIUM 8.8 05/18/2022         Significant Diagnostic Studies    Radiology:   CT CHEST PULMONARY EMBOLISM W CONTRAST   Final Result   No evidence of pulmonary embolism. Small right pleural effusion with adjacent atelectasis. XR CHEST PORTABLE   Final Result   Mild cardiomegaly and mild central pulmonary congestion which is more   apparent. Hazy right basilar atelectasis or developing infiltrate and a small right   pleural effusion or pleural thickening which is more apparent. XR CHEST (2 VW)   Final Result   Small right pleural effusion. CT HEAD WO CONTRAST   Final Result   Fluid collections described in few left mastoid sinus cells are likely   incidental sterile fluid collections. No acute intracranial abnormality. CT CERVICAL SPINE WO CONTRAST   Final Result   No acute abnormality of the cervical spine. Stable postoperative changes at C5 through C7. Multilevel cervical   spondylosis and facet arthropathy also stable. XR HAND LEFT (MIN 3 VIEWS)   Final Result   Degenerative changes. Metallic foreign body. Chondrocalcinosis. 1 mm of ulna plus variance. No acute process. Sign report         XR KNEE RIGHT (1-2 VIEWS)   Final Result   Well-seated TKA. Regional osteoporosis. No evidence of an acute process. XR HIP 2-3 VW W PELVIS RIGHT   Final Result   No acute findings. Degenerative changes as described above.                 Consults:     IP CONSULT TO CASE MANAGEMENT  IP CONSULT TO CARDIOLOGY  IP CONSULT TO HOME CARE NEEDS    Disposition: Home     Condition at Discharge: Stable    Discharge Instructions/Follow-up:  See AVS    Code Status:  Prior     Activity: activity as tolerated    Diet: cardiac diet      Discharge Medications:     Discharge Medication List as of 5/14/2022  3:45 PM           Details   digoxin (LANOXIN) 125 MCG tablet Take 1 tablet by mouth daily, Disp-30 tablet, R-0Normal              Details   finasteride (PROSCAR) 5 MG tablet Take 5 mg by mouth dailyHistorical Med      ipratropium (ATROVENT) 0.03 % nasal spray 2 sprays by Each Nostril route every 12 hours Can use of to 4 times daily as needed, Disp-1 Bottle, R-3Normal      diclofenac sodium 1 % GEL APPLY 4GM TOPICALLY FOUR TIMES A DAY, Disp-5 Tube, R-0, Normal      carbidopa-levodopa (SINEMET)  MG per tablet Take 1 tablet by mouth dailyHistorical Med      vitamin B-2 (RIBOFLAVIN) 25 MG TABS Take by mouthHistorical Med      diclofenac (PENNSAID) 2 % SOLN 2 pumps to the affected area 2 times a day [phone], Disp-1 Bottle, R-0Normal      atorvastatin (LIPITOR) 40 MG tablet Take 1 tablet by mouth daily, Disp-90 tablet, R-3Print      omeprazole (PRILOSEC) 20 MG delayed release capsule Take 20 mg by mouth 2 times daily      fluocinonide (LIDEX) 0.05 % cream Apply topically 2 times daily. , Disp-1 Tube, R-0, VELASQUEZ, Normal      ferrous sulfate 325 (65 FE) MG tablet Take 1 tablet by mouth 2 times daily (with meals), Disp-180 tablet, R-3      zolpidem (AMBIEN) 10 MG tablet Take 10 mg by mouth nightly as needed. LORazepam (ATIVAN) 1 MG tablet Take 1 mg by mouth 2 times daily as needed. Lacosamide (VIMPAT PO) Take 100 mg by mouth 2 times daily. Indications: take dos      paroxetine (PAXIL) 20 MG tablet Take 20 mg by mouth in the morning and at bedtime Historical Med             Time Spent on discharge is more than 30 minutes in the examination, evaluation, counseling and review of medications and discharge plan. Signed:    KATIE Bedolla - CNP   5/22/2022      Thank you Judy Cr MD for the opportunity to be involved in this patient's care. If you have any questions or concerns, please feel free to contact me at [phone].

## 2022-05-23 ENCOUNTER — HOSPITAL ENCOUNTER (OUTPATIENT)
Age: 77
Discharge: HOME | End: 2022-05-23
Payer: MEDICARE

## 2022-05-23 ENCOUNTER — HOSPITAL ENCOUNTER (OUTPATIENT)
Dept: CT IMAGING | Age: 77
Discharge: HOME | End: 2022-05-23
Payer: MEDICARE

## 2022-05-23 DIAGNOSIS — C34.01 MALIGNANT NEOPLASM OF RIGHT MAIN BRONCHUS (HCC): ICD-10-CM

## 2022-05-23 LAB
BUN BLDV-MCNC: 20 MG/DL (ref 7–20)
CREAT SERPL-MCNC: 0.8 MG/DL (ref 0.8–1.3)
GFR AFRICAN AMERICAN: >60
GFR NON-AFRICAN AMERICAN: >60

## 2022-05-23 PROCEDURE — 84520 ASSAY OF UREA NITROGEN: CPT

## 2022-05-23 PROCEDURE — 74177 CT ABD & PELVIS W/CONTRAST: CPT

## 2022-05-23 PROCEDURE — 6360000004 HC RX CONTRAST MEDICATION: Performed by: NURSE PRACTITIONER

## 2022-05-23 PROCEDURE — 36415 COLL VENOUS BLD VENIPUNCTURE: CPT

## 2022-05-23 PROCEDURE — 82565 ASSAY OF CREATININE: CPT

## 2022-05-23 RX ADMIN — IOPAMIDOL 75 ML: 755 INJECTION, SOLUTION INTRAVENOUS at 10:54

## 2022-05-23 RX ADMIN — IOHEXOL 50 ML: 240 INJECTION, SOLUTION INTRATHECAL; INTRAVASCULAR; INTRAVENOUS; ORAL at 10:54

## 2022-06-04 NOTE — PROGRESS NOTES
Physician Progress Note      PATIENT:               Franca Mireles  CSN #:                  [id number]  :                       1945  ADMIT DATE:       2022 11:19 AM  DISCH DATE:        2022 4:56 PM  RESPONDING  PROVIDER #:        Tameka BERGER          QUERY TEXT:    Pt admitted with frequent falls. Pt noted to have history of Parkinson's   \"Suspect orthostasis may be related to anemia\" per Cardiology. If possible,   please document in progress notes and discharge summary the relationship, if   any, between *** and ***. The medical record reflects the following:  Risk Factors: anemia, Parkinson's, anemia, afib  Clinical Indicators: frequent falls, positive orthostatics, Cardiology states,   \"Suspect orthostasis may be related to anemia\"  Treatment: serial labs, Cardiology consult, Sinemet, monitor hemodynamics, CT,   ECHO, supportive care    Thank you,  Yelena Aponte RN, MARY KAY [email or web address]  Options provided:  -- Neurogenic orthostatic hypotension due to Parkinson's  -- Orthostatic hypotension due to anemia  -- Other - I will add my own diagnosis  -- Disagree - Not applicable / Not valid  -- Disagree - Clinically unable to determine / Unknown  -- Refer to Clinical Documentation Reviewer    PROVIDER RESPONSE TEXT:    Orthostatic hypotension, multifactorial, likely due to medications-could be   neurogenic due to Parkinson's.     Query created by: Jagdish Bruce on 6/3/2022 1:27 PM      Electronically signed by:  Laron Herron 2022 9:13 AM

## 2022-06-14 ENCOUNTER — APPOINTMENT (OUTPATIENT)
Dept: CT IMAGING | Age: 77
DRG: 308 | End: 2022-06-14
Payer: MEDICARE

## 2022-06-14 ENCOUNTER — APPOINTMENT (OUTPATIENT)
Dept: GENERAL RADIOLOGY | Age: 77
DRG: 308 | End: 2022-06-14
Payer: MEDICARE

## 2022-06-14 ENCOUNTER — HOSPITAL ENCOUNTER (INPATIENT)
Age: 77
LOS: 2 days | Discharge: HOME | DRG: 308 | End: 2022-06-17
Attending: STUDENT IN AN ORGANIZED HEALTH CARE EDUCATION/TRAINING PROGRAM | Admitting: INTERNAL MEDICINE
Payer: MEDICARE

## 2022-06-14 DIAGNOSIS — J90 PLEURAL EFFUSION: ICD-10-CM

## 2022-06-14 DIAGNOSIS — R10.9 ABDOMINAL PAIN, UNSPECIFIED ABDOMINAL LOCATION: ICD-10-CM

## 2022-06-14 DIAGNOSIS — U07.1 COVID-19: Primary | ICD-10-CM

## 2022-06-14 LAB
A/G RATIO: 1.2 (ref 1.1–2.2)
ALBUMIN SERPL-MCNC: 4.1 G/DL (ref 3.4–5)
ALP BLD-CCNC: 114 U/L (ref 40–129)
ALT SERPL-CCNC: <5 U/L (ref 10–40)
AMORPHOUS: ABNORMAL /HPF
ANION GAP SERPL CALCULATED.3IONS-SCNC: 13 MMOL/L (ref 3–16)
AST SERPL-CCNC: 25 U/L (ref 15–37)
BASOPHILS ABSOLUTE: 0.1 K/UL (ref 0–0.2)
BASOPHILS RELATIVE PERCENT: 1.3 %
BILIRUB SERPL-MCNC: 1.2 MG/DL (ref 0–1)
BILIRUBIN URINE: ABNORMAL
BLOOD, URINE: NEGATIVE
BUN BLDV-MCNC: 12 MG/DL (ref 7–20)
CALCIUM SERPL-MCNC: 9.3 MG/DL (ref 8.3–10.6)
CHLORIDE BLD-SCNC: 104 MMOL/L (ref 99–110)
CLARITY: CLEAR
CO2: 25 MMOL/L (ref 21–32)
COLOR: YELLOW
CREAT SERPL-MCNC: 0.7 MG/DL (ref 0.8–1.3)
DIGOXIN LEVEL: 2.8 NG/ML (ref 0.8–2)
EKG ATRIAL RATE: 220 BPM
EKG DIAGNOSIS: NORMAL
EKG Q-T INTERVAL: 338 MS
EKG QRS DURATION: 86 MS
EKG QTC CALCULATION (BAZETT): 354 MS
EKG R AXIS: -28 DEGREES
EKG T AXIS: 253 DEGREES
EKG VENTRICULAR RATE: 66 BPM
EOSINOPHILS ABSOLUTE: 0.1 K/UL (ref 0–0.6)
EOSINOPHILS RELATIVE PERCENT: 1.2 %
GFR AFRICAN AMERICAN: >60
GFR NON-AFRICAN AMERICAN: >60
GLUCOSE BLD-MCNC: 103 MG/DL (ref 70–99)
GLUCOSE URINE: NEGATIVE MG/DL
HCT VFR BLD CALC: 35.7 % (ref 40.5–52.5)
HEMATOLOGY PATH CONSULT: NO
HEMOGLOBIN: 11.7 G/DL (ref 13.5–17.5)
INFLUENZA A: NOT DETECTED
INFLUENZA B: NOT DETECTED
KETONES, URINE: 15 MG/DL
LEUKOCYTE ESTERASE, URINE: NEGATIVE
LIPASE: 19 U/L (ref 13–60)
LYMPHOCYTES ABSOLUTE: 1.1 K/UL (ref 1–5.1)
LYMPHOCYTES RELATIVE PERCENT: 13.6 %
MCH RBC QN AUTO: 32.1 PG (ref 26–34)
MCHC RBC AUTO-ENTMCNC: 32.7 G/DL (ref 31–36)
MCV RBC AUTO: 98.2 FL (ref 80–100)
MICROSCOPIC EXAMINATION: YES
MONOCYTES ABSOLUTE: 1.1 K/UL (ref 0–1.3)
MONOCYTES RELATIVE PERCENT: 14 %
MUCUS: ABNORMAL /LPF
NEUTROPHILS ABSOLUTE: 5.7 K/UL (ref 1.7–7.7)
NEUTROPHILS RELATIVE PERCENT: 69.9 %
NITRITE, URINE: NEGATIVE
PDW BLD-RTO: 17.9 % (ref 12.4–15.4)
PH UA: 6 (ref 5–8)
PLATELET # BLD: 221 K/UL (ref 135–450)
PLATELET SLIDE REVIEW: ADEQUATE
PMV BLD AUTO: 8.2 FL (ref 5–10.5)
POTASSIUM REFLEX MAGNESIUM: 3.8 MMOL/L (ref 3.5–5.1)
PRO-BNP: 3857 PG/ML (ref 0–449)
PROCALCITONIN: 0.05 NG/ML (ref 0–0.15)
PROTEIN UA: 30 MG/DL
RBC # BLD: 3.63 M/UL (ref 4.2–5.9)
RBC UA: ABNORMAL /HPF (ref 0–4)
SARS-COV-2 RNA, RT PCR: DETECTED
SLIDE REVIEW: ABNORMAL
SODIUM BLD-SCNC: 142 MMOL/L (ref 136–145)
SPECIFIC GRAVITY UA: >=1.03 (ref 1–1.03)
TOTAL PROTEIN: 7.6 G/DL (ref 6.4–8.2)
TROPONIN: 0.01 NG/ML
TROPONIN: 0.02 NG/ML
URINE TYPE: ABNORMAL
UROBILINOGEN, URINE: 0.2 E.U./DL
WBC # BLD: 8.1 K/UL (ref 4–11)
WBC UA: ABNORMAL /HPF (ref 0–5)

## 2022-06-14 PROCEDURE — 93005 ELECTROCARDIOGRAM TRACING: CPT | Performed by: NURSE PRACTITIONER

## 2022-06-14 PROCEDURE — 2580000003 HC RX 258: Performed by: STUDENT IN AN ORGANIZED HEALTH CARE EDUCATION/TRAINING PROGRAM

## 2022-06-14 PROCEDURE — 93005 ELECTROCARDIOGRAM TRACING: CPT | Performed by: STUDENT IN AN ORGANIZED HEALTH CARE EDUCATION/TRAINING PROGRAM

## 2022-06-14 PROCEDURE — 70450 CT HEAD/BRAIN W/O DYE: CPT

## 2022-06-14 PROCEDURE — 6360000002 HC RX W HCPCS: Performed by: STUDENT IN AN ORGANIZED HEALTH CARE EDUCATION/TRAINING PROGRAM

## 2022-06-14 PROCEDURE — 74177 CT ABD & PELVIS W/CONTRAST: CPT

## 2022-06-14 PROCEDURE — 84145 PROCALCITONIN (PCT): CPT

## 2022-06-14 PROCEDURE — 81001 URINALYSIS AUTO W/SCOPE: CPT

## 2022-06-14 PROCEDURE — 6360000004 HC RX CONTRAST MEDICATION: Performed by: STUDENT IN AN ORGANIZED HEALTH CARE EDUCATION/TRAINING PROGRAM

## 2022-06-14 PROCEDURE — 83690 ASSAY OF LIPASE: CPT

## 2022-06-14 PROCEDURE — 87636 SARSCOV2 & INF A&B AMP PRB: CPT

## 2022-06-14 PROCEDURE — 6370000000 HC RX 637 (ALT 250 FOR IP): Performed by: STUDENT IN AN ORGANIZED HEALTH CARE EDUCATION/TRAINING PROGRAM

## 2022-06-14 PROCEDURE — 36415 COLL VENOUS BLD VENIPUNCTURE: CPT

## 2022-06-14 PROCEDURE — 6360000002 HC RX W HCPCS: Performed by: NURSE PRACTITIONER

## 2022-06-14 PROCEDURE — 96367 TX/PROPH/DG ADDL SEQ IV INF: CPT

## 2022-06-14 PROCEDURE — 85025 COMPLETE CBC W/AUTO DIFF WBC: CPT

## 2022-06-14 PROCEDURE — 84484 ASSAY OF TROPONIN QUANT: CPT

## 2022-06-14 PROCEDURE — 80053 COMPREHEN METABOLIC PANEL: CPT

## 2022-06-14 PROCEDURE — 83880 ASSAY OF NATRIURETIC PEPTIDE: CPT

## 2022-06-14 PROCEDURE — 96375 TX/PRO/DX INJ NEW DRUG ADDON: CPT

## 2022-06-14 PROCEDURE — 80162 ASSAY OF DIGOXIN TOTAL: CPT

## 2022-06-14 PROCEDURE — 96365 THER/PROPH/DIAG IV INF INIT: CPT

## 2022-06-14 PROCEDURE — 71260 CT THORAX DX C+: CPT

## 2022-06-14 PROCEDURE — 71045 X-RAY EXAM CHEST 1 VIEW: CPT

## 2022-06-14 PROCEDURE — 99285 EMERGENCY DEPT VISIT HI MDM: CPT

## 2022-06-14 PROCEDURE — 2580000003 HC RX 258: Performed by: NURSE PRACTITIONER

## 2022-06-14 PROCEDURE — 93010 ELECTROCARDIOGRAM REPORT: CPT | Performed by: INTERNAL MEDICINE

## 2022-06-14 RX ORDER — 0.9 % SODIUM CHLORIDE 0.9 %
1000 INTRAVENOUS SOLUTION INTRAVENOUS ONCE
Status: COMPLETED | OUTPATIENT
Start: 2022-06-14 | End: 2022-06-14

## 2022-06-14 RX ORDER — ONDANSETRON 2 MG/ML
4 INJECTION INTRAMUSCULAR; INTRAVENOUS ONCE
Status: COMPLETED | OUTPATIENT
Start: 2022-06-14 | End: 2022-06-14

## 2022-06-14 RX ORDER — ASPIRIN 81 MG/1
324 TABLET, CHEWABLE ORAL ONCE
Status: COMPLETED | OUTPATIENT
Start: 2022-06-14 | End: 2022-06-14

## 2022-06-14 RX ADMIN — DEXTROSE MONOHYDRATE 500 MG: 50 INJECTION, SOLUTION INTRAVENOUS at 23:20

## 2022-06-14 RX ADMIN — CEFTRIAXONE SODIUM 1000 MG: 1 INJECTION, POWDER, FOR SOLUTION INTRAMUSCULAR; INTRAVENOUS at 22:10

## 2022-06-14 RX ADMIN — ASPIRIN 324 MG: 81 TABLET, CHEWABLE ORAL at 22:06

## 2022-06-14 RX ADMIN — SODIUM CHLORIDE 1000 ML: 9 INJECTION, SOLUTION INTRAVENOUS at 18:28

## 2022-06-14 RX ADMIN — ONDANSETRON HYDROCHLORIDE 4 MG: 2 INJECTION, SOLUTION INTRAMUSCULAR; INTRAVENOUS at 18:30

## 2022-06-14 RX ADMIN — IOPAMIDOL 85 ML: 755 INJECTION, SOLUTION INTRAVENOUS at 19:23

## 2022-06-14 RX ADMIN — HYDROMORPHONE HYDROCHLORIDE 0.25 MG: 1 INJECTION, SOLUTION INTRAMUSCULAR; INTRAVENOUS; SUBCUTANEOUS at 18:35

## 2022-06-14 ASSESSMENT — ENCOUNTER SYMPTOMS
DIARRHEA: 0
EYE PAIN: 0
ABDOMINAL PAIN: 1
RHINORRHEA: 0
SORE THROAT: 0
BLOOD IN STOOL: 0
SHORTNESS OF BREATH: 0
COUGH: 0
NAUSEA: 0
BACK PAIN: 0
VOMITING: 0

## 2022-06-14 ASSESSMENT — PAIN SCALES - GENERAL
PAINLEVEL_OUTOF10: 8
PAINLEVEL_OUTOF10: 4
PAINLEVEL_OUTOF10: 4

## 2022-06-14 ASSESSMENT — PAIN DESCRIPTION - LOCATION
LOCATION: ABDOMEN

## 2022-06-14 ASSESSMENT — PAIN DESCRIPTION - ORIENTATION: ORIENTATION: RIGHT

## 2022-06-14 ASSESSMENT — PAIN - FUNCTIONAL ASSESSMENT: PAIN_FUNCTIONAL_ASSESSMENT: 0-10

## 2022-06-14 ASSESSMENT — PAIN DESCRIPTION - PAIN TYPE: TYPE: ACUTE PAIN

## 2022-06-14 ASSESSMENT — PAIN DESCRIPTION - DESCRIPTORS: DESCRIPTORS: SHARP

## 2022-06-14 NOTE — ED TRIAGE NOTES
Chief Complaint   Patient presents with    Abdominal Pain     pt c/o right side abd pain x 4-5 days, states having nausea and vomiting, denies urinary symptoms

## 2022-06-14 NOTE — ED PROVIDER NOTES
I independently examined and evaluated Sam Olsen. I personally saw the patient and performed a substantive portion of the visit including all aspects of the medical decision making. In brief, this 57-year-old male is presenting of right lower chest/right upper quadrant abdominal pain for the last week. Has had nausea and 2 episodes of yellow emesis. Denies any fevers or chills. Endorses some constipation. No shortness of breath. Focused exam revealed   General: Alert, no acute distress, patient resting comfortably   Skin: warm, intact, no pallor noted   Head: Normocephalic, atraumatic   Eye: Normal conjunctiva   Cardiac: Normal peripheral perfusion  Respiratory: No acute distress. Decreased lung sounds in the right lower chest.  Abdominal: Moderate diffuse tenderness with severe right upper quadrant tenderness to palpation. Musculoskeletal: No deformity, full ROM. Neurological: alert and oriented, normal sensory and motor observed. Psychiatric: Cooperative    ED course: We will obtain lab work and CT of the chest/abdomen/pelvis. Treat with hydromorphone for pain. Lab work reveals a mildly elevated troponin 0.02. Also has mildly elevated BNP. At this point he had already been treated with 1 L of IV fluid. Repeat Trop did decrease to 0.01. Digoxin level is mildly elevated 2.8. CT imaging concerning for possible right middle lobe pneumonia. He is started on Rocephin and azithromycin. COVID swab was initially reported as negative in error, he is COVID-positive. Discussed with on-call cardiology, Dr. Cipriano Farnsworth. He agrees that the EKG changes are likely due to his digoxin. Regarding his level 2.8, he recommends holding the patient's digoxin, but no other intervention. He does feel that he is appropriate for [facility] admission. Patient discussed with Dr. Denilson Gonzalez, who agreed to admit the patient to his service.       ECG    The Ekg interpreted by me shows A. fib with a rate of 66 bpm.

## 2022-06-14 NOTE — ED PROVIDER NOTES
pain. Negative for blood in stool, diarrhea, nausea and vomiting. Genitourinary: Negative for difficulty urinating, dysuria, flank pain and frequency. Musculoskeletal: Negative for back pain and neck pain. Skin: Negative for rash and wound. Neurological: Negative for dizziness and light-headedness. 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    S/P drug eluting coronary stent placement 03/06/2017    2.25 x 18 Xience Alpine ADRIÁN - Distal LAD    Seizure disorder ([facility] [address]     4 weeks ago black out, pt states seizure but may be due to Heart Rate changes    Seizures (HCC)     Shortness of breath 9/4/2014    Total knee replacement status 1/12/2011       SURGICAL HISTORY     Past Surgical History:   Procedure Laterality Date    ABLATION OF DYSRHYTHMIC FOCUS  9/2014    CARPAL TUNNEL RELEASE      CERVICAL [address] [place] SURGERY  2010    COLONOSCOPY  09/26/2016    normal    CORONARY ANGIOPLASTY WITH STENT PLACEMENT      FEMUR SURGERY      left    KNEE SURGERY  1-12-11 R total knee replacement with femoral nerve block for pain control    LUNG REMOVAL, PARTIAL Right     20%    OTHER SURGICAL HISTORY Bilateral     excisions of lesions on bilat. neck and back    OTHER SURGICAL HISTORY  9/2014    Reveal Loop recorder placed in Cath Lab    UPPER GASTROINTESTINAL ENDOSCOPY  09/26/2016    gastric and esophogeal biopsy       CURRENTMEDICATIONS       Current Discharge Medication List      CONTINUE these medications which have NOT CHANGED    Details   digoxin (LANOXIN) 125 MCG tablet Take 1 tablet by mouth daily  Qty: 30 tablet, Refills: 0      finasteride (PROSCAR) 5 MG tablet Take 5 mg by mouth daily      ipratropium (ATROVENT) 0.03 % nasal spray 2 sprays by Each Nostril route every 12 hours Can use of to 4 times daily as needed  Qty: 1 Bottle, Refills: 3    Associated Diagnoses: Vasomotor rhinitis      diclofenac sodium 1 % GEL APPLY 4GM TOPICALLY FOUR TIMES A DAY  Qty: 5 Tube, Refills: 0      carbidopa-levodopa (SINEMET)  MG per tablet Take 1 tablet by mouth in the morning and at bedtime       vitamin B-2 (RIBOFLAVIN) 25 MG TABS Take by mouth      diclofenac (PENNSAID) 2 % SOLN 2 pumps to the affected area 2 times a day [phone]  Qty: 1 Bottle, Refills: 0    Associated Diagnoses: Left hand pain; Hand pain, right      atorvastatin (LIPITOR) 40 MG tablet Take 1 tablet by mouth daily  Qty: 90 tablet, Refills: 3      omeprazole (PRILOSEC) 20 MG delayed release capsule Take 20 mg by mouth 2 times daily      fluocinonide (LIDEX) 0.05 % cream Apply topically 2 times daily. Qty: 1 Tube, Refills: 0      ferrous sulfate 325 (65 FE) MG tablet Take 1 tablet by mouth 2 times daily (with meals)  Qty: 180 tablet, Refills: 3      zolpidem (AMBIEN) 10 MG tablet Take 10 mg by mouth nightly as needed. LORazepam (ATIVAN) 1 MG tablet Take 2 mg by mouth in the morning and at bedtime. Lacosamide (VIMPAT PO) Take 100 mg by mouth 2 times daily.  Indications: take dos      paroxetine (PAXIL) 20 MG tablet Take 20 mg by mouth in the morning and at bedtime              ALLERGIES     Morphine, Codeine, Penicillins, and Plavix [clopidogrel bisulfate]    FAMILYHISTORY       Family History   Problem Relation Age of Onset    Heart Disease Mother     Arthritis Mother     High Blood Pressure Mother    Deena Rede / Stillbirths Mother     Stroke Mother     Arthritis Father     Heart Disease Father     Cancer Sister     Arthritis Sister     Depression Sister     Arthritis Brother     Arthritis Maternal Grandmother     Arthritis Maternal Grandfather     Arthritis Paternal Grandmother     Diabetes Paternal Grandmother     Arthritis Paternal Grandfather         SOCIAL HISTORY       Social History     Socioeconomic History    Marital status:      Spouse name: None    Number of children: None    Years of education: None    Highest education level: None   Occupational History    None   Tobacco Use    Smoking status: Former Smoker     Packs/day: 2.00     Years: 40.00     Pack years: 80.00     Quit date: 1993     Years since quittin.0    Smokeless tobacco: Never Used    Tobacco comment: 21 yrs   Vaping Use    Vaping Use: Never used   Substance and Sexual Activity    Alcohol use: No    Drug use: No    Sexual activity: Not Currently   Other Topics Concern    None   Social History Narrative    None     Social Determinants of Health     Financial Resource Strain:     Difficulty of Paying Living Expenses: Not on file   Food Insecurity:     Worried About Running Out of Food in the Last Year: Not on file    Christine of Food in the Last Year: Not on file   Transportation Needs:     Lack of Transportation (Medical): Not on file    Lack of Transportation (Non-Medical):  Not on file   Physical Activity:     Days of Exercise per Week: Not on file    Minutes of Exercise per Session: Not on file   Stress:     Feeling of Stress : Not on file   Social Connections:     Frequency of Communication with Friends and Family: Not on file    Frequency of Social Gatherings with Friends and Family: Not on file    Attends Druze Services: Not on file    Active Member of Clubs or Organizations: Not on file    Attends Club or Organization Meetings: Not on file    Marital Status: Not on file   Intimate Partner Violence:     Fear of Current or Ex-Partner: Not on file    Emotionally Abused: Not on file    Physically Abused: Not on file    Sexually Abused: Not on file   Housing Stability:     Unable to Pay for Housing in the Last Year: Not on file    Number of [place] in the Last Year: Not on file    Unstable Housing in the Last Year: Not on file       SCREENINGS    Tala Coma Scale  Eye Opening: Spontaneous  Best Verbal Response: Oriented  Best Motor Response: Obeys commands  Tlaa Coma Scale Score: 15        PHYSICAL EXAM    (up to 7 for level 4, 8 or more for level 5)     ED Triage Vitals [06/14/22 1501]   BP Temp Temp src Heart Rate Resp SpO2 Height Weight   125/60 98.1 °F (36.7 °C) -- 56 16 96 % 5' 10.5\" (1.791 m) 180 lb (81.6 kg)       Physical Exam  Vitals and nursing note reviewed. Constitutional:       Appearance: Normal appearance. He is not toxic-appearing or diaphoretic. HENT:      Head: Normocephalic and atraumatic. Nose: Nose normal.   Eyes:      General:         Right eye: No discharge. Left eye: No discharge. Cardiovascular:      Rate and Rhythm: Normal rate and regular rhythm. Heart sounds: No murmur heard. Pulmonary:      Effort: Pulmonary effort is normal. No respiratory distress. Abdominal:      General: Abdomen is flat. Bowel sounds are increased. There is no distension. Palpations: Abdomen is soft. Tenderness: There is abdominal tenderness in the right upper quadrant. There is no rebound. Negative signs include Jose's sign and McBurney's sign. Musculoskeletal:         General: Normal range of motion. Cervical back: Normal range of motion and neck supple. Skin:     General: Skin is warm and dry. Capillary Refill: Capillary refill takes less than 2 seconds. Neurological:      General: No focal deficit present. Mental Status: He is alert and oriented to person, place, and time. GCS: GCS eye subscore is 4. GCS verbal subscore is 5. GCS motor subscore is 6. Cranial Nerves: No dysarthria. Sensory: Sensation is intact. No sensory deficit. Motor: Motor function is intact.    Psychiatric:         Mood and Affect: Mood normal.         Behavior: Behavior normal.         DIAGNOSTIC RESULTS   LABS:    Labs Reviewed   COVID-19 & INFLUENZA COMBO - Abnormal; Notable for the following components:       Result Value    SARS-CoV-2 RNA, RT PCR DETECTED (*)     All other components within normal limits   CBC WITH AUTO DIFFERENTIAL - Abnormal; Notable for the following components:    RBC 3.63 (*)     Hemoglobin 11.7 (*)     Hematocrit 35.7 (*)     RDW 17.9 (*)     All other components within normal limits   URINALYSIS WITH MICROSCOPIC - Abnormal; Notable for the following components:    Bilirubin Urine MODERATE (*)     Ketones, Urine 15 (*)     Protein, UA 30 (*)     Mucus, UA 2+ (*)     All other components within normal limits   COMPREHENSIVE METABOLIC PANEL W/ REFLEX TO MG FOR LOW K - Abnormal; Notable for the following components:    Glucose 103 (*)     CREATININE 0.7 (*)     Total Bilirubin 1.2 (*)     ALT <5 (*)     All other components within normal limits    Narrative:     CALL  [place]  [facility] [employer] tel. [phone],  Chemistry results called to and read back by Christiano Lam RN, 06/14/2022  18:50, by [facility] - Abnormal; Notable for the following components:    Digoxin Lvl 2.8 (*)     All other components within normal limits    Narrative:     CALL  [facility] [employer] tel. [phone],  Chemistry results called to and read back by Christiano Lam RN, 06/14/2022  18:50, by Yves Hsieh   TROPONIN - Abnormal; Notable for the following components:    Troponin 0.02 (*)     All other components within normal limits    Narrative:     CALL  [place]  [facility] [employer] tel. [phone],  Chemistry results called to and read back by Christiano Lam RN, 06/14/2022  18:50, by [address] - Abnormal; Notable for the following components:    Pro-BNP 3,857 (*)     All other components within normal limits    Narrative:     [facility]  [facility] tel. [phone],  Chemistry results called to and read back by Christiano Lam RN, 06/14/2022  18:50, by [address]    Narrative:     Simon Burleson tel. [phone],  Chemistry results called to and read back by Priscilla Berger Shawn Doshi RN, 06/14/2022  18:50, by Melody Lopez   TROPONIN   PROCALCITONIN   COMPREHENSIVE METABOLIC PANEL W/ REFLEX TO MG FOR LOW K   CBC WITH AUTO DIFFERENTIAL   DIGOXIN LEVEL       When ordered, only abnormal lab results are displayed. All other labs were within normal range or not returned as of this dictation. EKG: When ordered, EKG's are interpreted by the Emergency Department Physician in the absence of a cardiologist.  Please see their note for interpretation of EKG. RADIOLOGY:   Non-plain film images such as CT, Ultrasound and MRI are read by the radiologist. Plain radiographic images are visualized andpreliminarily interpreted by the  ED Provider with the below findings:        Interpretation perthe Radiologist below, if available at the time of this note:    CT ABDOMEN PELVIS W IV CONTRAST Additional Contrast? None   Final Result   No acute intra-abdominal abnormality seen. Scattered diverticula along the left colon with no pericolonic inflammation. Penile prosthesis in place which is unchanged. Small right pleural effusion which has decreased in size with slowly   resolving bibasilar atelectasis or infiltrates. Mild chronic liver changes with fatty replacement throughout which is   unchanged. CT CHEST PULMONARY EMBOLISM W CONTRAST   Final Result   1. No evidence of pulmonary embolic disease. 2. Patchy ground-glass opacification in the right middle lobe, possibly   infiltrate. Stable small partially loculated right pleural effusion with   dependent atelectasis. 3. Cardiomegaly with coronary vascular calcification. CT HEAD WO CONTRAST   Final Result   Mild atrophy and mild chronic microischemic changes scattered in the deep   white matter which is unchanged with no acute abnormality seen. Mild chronic ethmoid sinusitis which is more prominent and mild chronic left   mastoiditis which is unchanged.          XR CHEST PORTABLE   Final Result   Small right pleural effusion with associated right basilar airspace opacities   which could represent atelectasis or infiltrate. XR CHEST PORTABLE    Result Date: 6/14/2022  EXAMINATION: ONE XRAY VIEW OF THE CHEST 6/14/2022 4:22 pm COMPARISON: None. HISTORY: ORDERING SYSTEM PROVIDED HISTORY: abdominal pain TECHNOLOGIST PROVIDED HISTORY: Reason for exam:->abdominal pain Reason for Exam: Pt c/o SOB and RUQ pain FINDINGS: Small right pleural effusion with associated right basilar airspace opacities. The left lung is clear. No pneumothorax. The cardiomediastinal silhouette is normal.     Small right pleural effusion with associated right basilar airspace opacities which could represent atelectasis or infiltrate.          PROCEDURES   Unless otherwise noted below, none     Procedures    CRITICAL CARE TIME   N/A    CONSULTS:  IP CONSULT TO HOSPITALIST  IP CONSULT TO CARDIOLOGY      EMERGENCY DEPARTMENT COURSE and DIFFERENTIAL DIAGNOSIS/MDM:   Vitals:    Vitals:    06/15/22 0930 06/15/22 1409 06/15/22 2107 06/16/22 0004   BP:  (!) 139/59 123/63 (!) 151/76   Pulse:  51 55 61   Resp:  16 16 16   Temp:  97.8 °F (36.6 °C) 98.5 °F (36.9 °C) 97.1 °F (36.2 °C)   TempSrc:  Oral Oral Oral   SpO2: 94% 95% 96% 95%   Weight:    180 lb 9.6 oz (81.9 kg)   Height:           Patient was given thefollowing medications:  Medications   sodium chloride flush 0.9 % injection 10 mL (10 mLs IntraVENous Given 6/15/22 2109)   sodium chloride flush 0.9 % injection 10 mL (has no administration in time range)   0.9 % sodium chloride infusion (has no administration in time range)   potassium chloride 10 mEq/100 mL IVPB (Peripheral Line) (has no administration in time range)   magnesium sulfate 2000 mg in 50 mL IVPB premix (has no administration in time range)   enoxaparin (LOVENOX) injection 40 mg (40 mg SubCUTAneous Given 6/15/22 0928)   promethazine (PHENERGAN) tablet 12.5 mg (has no administration in time range)     Or   ondansetron (ZOFRAN) injection 4 mg (has no administration in time range)   acetaminophen (TYLENOL) tablet 650 mg (has no administration in time range)     Or   acetaminophen (TYLENOL) suppository 650 mg (has no administration in time range)   atorvastatin (LIPITOR) tablet 40 mg (40 mg Oral Given 6/15/22 0924)   ferrous sulfate (IRON 325) tablet 325 mg (325 mg Oral Given 6/15/22 1634)   finasteride (PROSCAR) tablet 5 mg (5 mg Oral Given 6/15/22 0924)   omeprazole (PRILOSEC) delayed release capsule 20 mg (20 mg Oral Given 6/15/22 2109)   LORazepam (ATIVAN) tablet 1 mg (has no administration in time range)   PARoxetine (PAXIL) tablet 20 mg (20 mg Oral Given 6/15/22 2109)   lacosamide (VIMPAT) tablet 100 mg (100 mg Oral Given 6/15/22 2109)   zolpidem (AMBIEN) tablet 10 mg (10 mg Oral Given 6/15/22 2109)   carbidopa-levodopa (SINEMET)  MG per tablet 1 tablet (1 tablet Oral Given 6/15/22 2109)   0.9 % sodium chloride bolus (0 mLs IntraVENous Stopped 6/14/22 2022)   ondansetron (ZOFRAN) injection 4 mg (4 mg IntraVENous Given 6/14/22 1830)   iopamidol (ISOVUE-370) 76 % injection 85 mL (85 mLs IntraVENous Given 6/14/22 1923)   aspirin chewable tablet 324 mg (324 mg Oral Given 6/14/22 2206)   cefTRIAXone (ROCEPHIN) 1000 mg IVPB in 50 mL D5W minibag (0 mg IntraVENous Stopped 6/14/22 2316)     And   azithromycin (ZITHROMAX) 500 mg in D5W 250ml addavial (0 mg IntraVENous Stopped 6/15/22 0020)         Is this patient to be included in the SEP-1 Core Measure due to severe sepsis or septic shock? No   Exclusion criteria - the patient is NOT to be included for SEP-1 Core Measure due to:  2+ SIRS criteria are not met    The patient was placed in the hospital.  The patient diagnosed with COVID-19 and abdominal pain. He is also had vomiting. Patient is to be admitted to the hospital service for further evaluation and treatment. No other acute complaints at this time. Patient's basic basic laboratory findings are unremarkable.   Patient is noted to have a elevation in the digoxin level. FINAL IMPRESSION      1. COVID-19    2. Pleural effusion    3. Abdominal pain, unspecified abdominal location          DISPOSITION/PLAN   DISPOSITION Admitted 06/15/2022 12:32:02 AM      PATIENT REFERREDTO:  No follow-up provider specified.     DISCHARGE MEDICATIONS:  Current Discharge Medication List          DISCONTINUED MEDICATIONS:  Current Discharge Medication List                 (Please note that portions ofthis note were completed with a voice recognition program.  Efforts were made to edit the dictations but occasionally words are mis-transcribed.)    KATIE Zuleta CNP (electronically signed)            KATIE Zuleta CNP  06/16/22 0522

## 2022-06-15 PROBLEM — R07.9 CHEST PAIN: Status: ACTIVE | Noted: 2022-06-15

## 2022-06-15 PROBLEM — I48.91 ATRIAL FIBRILLATION (HCC): Status: ACTIVE | Noted: 2022-06-15

## 2022-06-15 LAB
EKG ATRIAL RATE: 288 BPM
EKG DIAGNOSIS: NORMAL
EKG Q-T INTERVAL: 388 MS
EKG QRS DURATION: 84 MS
EKG QTC CALCULATION (BAZETT): 393 MS
EKG R AXIS: -3 DEGREES
EKG T AXIS: -32 DEGREES
EKG VENTRICULAR RATE: 62 BPM

## 2022-06-15 PROCEDURE — 2060000000 HC ICU INTERMEDIATE R&B

## 2022-06-15 PROCEDURE — 6370000000 HC RX 637 (ALT 250 FOR IP): Performed by: INTERNAL MEDICINE

## 2022-06-15 PROCEDURE — 2580000003 HC RX 258: Performed by: INTERNAL MEDICINE

## 2022-06-15 PROCEDURE — 93010 ELECTROCARDIOGRAM REPORT: CPT | Performed by: INTERNAL MEDICINE

## 2022-06-15 PROCEDURE — 6360000002 HC RX W HCPCS: Performed by: INTERNAL MEDICINE

## 2022-06-15 PROCEDURE — 99223 1ST HOSP IP/OBS HIGH 75: CPT | Performed by: INTERNAL MEDICINE

## 2022-06-15 RX ORDER — ACETAMINOPHEN 325 MG/1
650 TABLET ORAL EVERY 6 HOURS PRN
Status: DISCONTINUED | OUTPATIENT
Start: 2022-06-15 | End: 2022-06-17 | Stop reason: HOSPADM

## 2022-06-15 RX ORDER — FERROUS SULFATE 325(65) MG
325 TABLET ORAL 2 TIMES DAILY WITH MEALS
Status: DISCONTINUED | OUTPATIENT
Start: 2022-06-15 | End: 2022-06-17 | Stop reason: HOSPADM

## 2022-06-15 RX ORDER — ATORVASTATIN CALCIUM 40 MG/1
40 TABLET, FILM COATED ORAL DAILY
Status: DISCONTINUED | OUTPATIENT
Start: 2022-06-15 | End: 2022-06-17 | Stop reason: HOSPADM

## 2022-06-15 RX ORDER — ACETAMINOPHEN 650 MG/1
650 SUPPOSITORY RECTAL EVERY 6 HOURS PRN
Status: DISCONTINUED | OUTPATIENT
Start: 2022-06-15 | End: 2022-06-17 | Stop reason: HOSPADM

## 2022-06-15 RX ORDER — MAGNESIUM SULFATE IN WATER 40 MG/ML
2000 INJECTION, SOLUTION INTRAVENOUS PRN
Status: DISCONTINUED | OUTPATIENT
Start: 2022-06-15 | End: 2022-06-17 | Stop reason: HOSPADM

## 2022-06-15 RX ORDER — ENOXAPARIN SODIUM 100 MG/ML
40 INJECTION SUBCUTANEOUS DAILY
Status: DISCONTINUED | OUTPATIENT
Start: 2022-06-15 | End: 2022-06-17 | Stop reason: HOSPADM

## 2022-06-15 RX ORDER — PAROXETINE HYDROCHLORIDE 20 MG/1
20 TABLET, FILM COATED ORAL 2 TIMES DAILY
Status: DISCONTINUED | OUTPATIENT
Start: 2022-06-15 | End: 2022-06-17 | Stop reason: HOSPADM

## 2022-06-15 RX ORDER — SODIUM CHLORIDE 0.9 % (FLUSH) 0.9 %
10 SYRINGE (ML) INJECTION PRN
Status: DISCONTINUED | OUTPATIENT
Start: 2022-06-15 | End: 2022-06-17 | Stop reason: HOSPADM

## 2022-06-15 RX ORDER — OMEPRAZOLE 20 MG/1
20 CAPSULE, DELAYED RELEASE ORAL 2 TIMES DAILY
Status: DISCONTINUED | OUTPATIENT
Start: 2022-06-15 | End: 2022-06-17 | Stop reason: HOSPADM

## 2022-06-15 RX ORDER — SODIUM CHLORIDE 0.9 % (FLUSH) 0.9 %
10 SYRINGE (ML) INJECTION EVERY 12 HOURS SCHEDULED
Status: DISCONTINUED | OUTPATIENT
Start: 2022-06-15 | End: 2022-06-17 | Stop reason: HOSPADM

## 2022-06-15 RX ORDER — FINASTERIDE 5 MG/1
5 TABLET, FILM COATED ORAL DAILY
Status: DISCONTINUED | OUTPATIENT
Start: 2022-06-15 | End: 2022-06-17 | Stop reason: HOSPADM

## 2022-06-15 RX ORDER — PROMETHAZINE HYDROCHLORIDE 25 MG/1
12.5 TABLET ORAL EVERY 6 HOURS PRN
Status: DISCONTINUED | OUTPATIENT
Start: 2022-06-15 | End: 2022-06-17 | Stop reason: HOSPADM

## 2022-06-15 RX ORDER — SODIUM CHLORIDE 9 MG/ML
INJECTION, SOLUTION INTRAVENOUS PRN
Status: DISCONTINUED | OUTPATIENT
Start: 2022-06-15 | End: 2022-06-17 | Stop reason: HOSPADM

## 2022-06-15 RX ORDER — POTASSIUM CHLORIDE 7.45 MG/ML
10 INJECTION INTRAVENOUS PRN
Status: DISCONTINUED | OUTPATIENT
Start: 2022-06-15 | End: 2022-06-17 | Stop reason: HOSPADM

## 2022-06-15 RX ORDER — LACOSAMIDE 50 MG/1
100 TABLET ORAL 2 TIMES DAILY
Status: DISCONTINUED | OUTPATIENT
Start: 2022-06-15 | End: 2022-06-17 | Stop reason: HOSPADM

## 2022-06-15 RX ORDER — ONDANSETRON 2 MG/ML
4 INJECTION INTRAMUSCULAR; INTRAVENOUS EVERY 6 HOURS PRN
Status: DISCONTINUED | OUTPATIENT
Start: 2022-06-15 | End: 2022-06-17 | Stop reason: HOSPADM

## 2022-06-15 RX ORDER — ZOLPIDEM TARTRATE 5 MG/1
10 TABLET ORAL NIGHTLY PRN
Status: DISCONTINUED | OUTPATIENT
Start: 2022-06-15 | End: 2022-06-17 | Stop reason: HOSPADM

## 2022-06-15 RX ORDER — LORAZEPAM 1 MG/1
1 TABLET ORAL 2 TIMES DAILY PRN
Status: DISCONTINUED | OUTPATIENT
Start: 2022-06-15 | End: 2022-06-17 | Stop reason: HOSPADM

## 2022-06-15 RX ORDER — DIGOXIN 125 MCG
125 TABLET ORAL DAILY
Status: DISCONTINUED | OUTPATIENT
Start: 2022-06-15 | End: 2022-06-15

## 2022-06-15 RX ADMIN — OMEPRAZOLE 20 MG: 20 CAPSULE, DELAYED RELEASE ORAL at 21:09

## 2022-06-15 RX ADMIN — PAROXETINE HYDROCHLORIDE 20 MG: 20 TABLET, FILM COATED ORAL at 21:09

## 2022-06-15 RX ADMIN — PAROXETINE HYDROCHLORIDE 20 MG: 20 TABLET, FILM COATED ORAL at 09:24

## 2022-06-15 RX ADMIN — LACOSAMIDE 100 MG: 50 TABLET, FILM COATED ORAL at 09:24

## 2022-06-15 RX ADMIN — Medication 10 ML: at 21:09

## 2022-06-15 RX ADMIN — CARBIDOPA AND LEVODOPA 1 TABLET: 25; 100 TABLET ORAL at 21:09

## 2022-06-15 RX ADMIN — OMEPRAZOLE 20 MG: 20 CAPSULE, DELAYED RELEASE ORAL at 09:24

## 2022-06-15 RX ADMIN — ENOXAPARIN SODIUM 40 MG: 100 INJECTION SUBCUTANEOUS at 09:28

## 2022-06-15 RX ADMIN — CARBIDOPA AND LEVODOPA 1 TABLET: 25; 100 TABLET ORAL at 09:24

## 2022-06-15 RX ADMIN — FINASTERIDE 5 MG: 5 TABLET, FILM COATED ORAL at 09:24

## 2022-06-15 RX ADMIN — Medication 10 ML: at 09:29

## 2022-06-15 RX ADMIN — FERROUS SULFATE TAB 325 MG (65 MG ELEMENTAL FE) 325 MG: 325 (65 FE) TAB at 09:24

## 2022-06-15 RX ADMIN — ATORVASTATIN CALCIUM 40 MG: 40 TABLET, FILM COATED ORAL at 09:24

## 2022-06-15 RX ADMIN — FERROUS SULFATE TAB 325 MG (65 MG ELEMENTAL FE) 325 MG: 325 (65 FE) TAB at 16:34

## 2022-06-15 RX ADMIN — ZOLPIDEM TARTRATE 10 MG: 5 TABLET ORAL at 21:09

## 2022-06-15 RX ADMIN — LACOSAMIDE 100 MG: 50 TABLET, FILM COATED ORAL at 21:09

## 2022-06-15 ASSESSMENT — PAIN SCALES - GENERAL
PAINLEVEL_OUTOF10: 0

## 2022-06-15 NOTE — H&P
Hospital Medicine History & Physical      PCP: Mikel Bay MD    Date of Admission: 6/14/2022    Date of Service: Pt seen/examined on 6/15/2022 and Admitted to Inpatient       Chief Complaint:  Chest pain. History Of Present Illness: The patient is a 68 y.o. male Hx of R lung Cancer s/p surgical resection at [place] and undergoing chemotherapy, who presents with chest pain. Pt reports symptoms onset yesterday and has since mostly resolved. He denies nausea or emesis. No fever, no cough. + emesis x2 - non bloody non bilious. He reports he has received 2 doses of COVID vaccine. Work up in ED showed reassuring CT chest abd and pelvis. Negative troponin. New EKG changes with II III and Avf and anterolateral T wave inversions. He is noted to be in Afib with SVR - HR as low as 32 overnight. Digoxin level elevated at 2.8.            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    S/P drug eluting coronary stent placement 03/06/2017    2.25 x 18 Xience [place]pine ADRIÁN - Distal LAD    Seizure disorder ([address]     4 weeks ago black out, pt states seizure but may be due to Heart Rate changes    Seizures (HCC)     Shortness of breath 9/4/2014    Total knee replacement status 1/12/2011       Past Surgical History:        Procedure Laterality Date    ABLATION OF DYSRHYTHMIC FOCUS  9/2014    CARPAL TUNNEL RELEASE      CERVICAL [address] [place] SURGERY  2010    COLONOSCOPY  09/26/2016    normal    CORONARY ANGIOPLASTY WITH STENT PLACEMENT      FEMUR SURGERY      left    KNEE SURGERY 1-12-11 R total knee replacement with femoral nerve block for pain control    LUNG REMOVAL, PARTIAL Right     20%    OTHER SURGICAL HISTORY Bilateral     excisions of lesions on bilat. neck and back    OTHER SURGICAL HISTORY  9/2014    Reveal Loop recorder placed in Cath Lab    UPPER GASTROINTESTINAL ENDOSCOPY  09/26/2016    gastric and esophogeal biopsy       Medications Prior to Admission:    Prior to Admission medications    Medication Sig Start Date End Date Taking? Authorizing Provider   digoxin (LANOXIN) 125 MCG tablet Take 1 tablet by mouth daily 5/15/22   KATIE Irizarry CNP   finasteride (PROSCAR) 5 MG tablet Take 5 mg by mouth daily    Historical Provider, MD   ipratropium (ATROVENT) 0.03 % nasal spray 2 sprays by Each Nostril route every 12 hours Can use of to 4 times daily as needed 5/10/21   Michael Amin,    diclofenac sodium 1 % GEL APPLY 4GM TOPICALLY FOUR TIMES A DAY 12/6/18   Jeanette Altman MD   carbidopa-levodopa (SINEMET)  MG per tablet Take 1 tablet by mouth in the morning and at bedtime     Historical Provider, MD   vitamin B-2 (RIBOFLAVIN) 25 MG TABS Take by mouth    Historical Provider, MD   diclofenac (PENNSAID) 2 % SOLN 2 pumps to the affected area 2 times a day [phone] 9/26/17   Jeanette Altman MD   atorvastatin (LIPITOR) 40 MG tablet Take 1 tablet by mouth daily 8/21/17   Jayy Jackson MD   omeprazole (PRILOSEC) 20 MG delayed release capsule Take 20 mg by mouth 2 times daily    Historical Provider, MD   fluocinonide (LIDEX) 0.05 % cream Apply topically 2 times daily. 5/20/16   KATIE Reynoso CNP   ferrous sulfate 325 (65 FE) MG tablet Take 1 tablet by mouth 2 times daily (with meals) 10/5/15   Jayy Jackson MD   zolpidem (AMBIEN) 10 MG tablet Take 10 mg by mouth nightly as needed. Historical Provider, MD   LORazepam (ATIVAN) 1 MG tablet Take 2 mg by mouth in the morning and at bedtime.      Historical Provider, MD   Lacosamide (VIMPAT PO) Take 100 point of maximum impulse non-displaced  Abdomen: Soft, non-tender or non-distended without rigidity or guarding and positive bowel sounds all four quadrants. Extremities: No clubbing, cyanosis, or edema bilaterally. Full range of motion without deformity and normal gait intact. Skin: Skin color, texture, turgor normal.  No rashes or lesions. Neurologic: Alert and oriented X 3, neurovascularly intact with sensory/motor intact upper extremities/lower extremities, bilaterally. Cranial nerves: II-XII intact, grossly non-focal.  Mental status: Alert, oriented, thought content appropriate. Capillary Refill: Acceptable  < 3 seconds  Peripheral Pulses: +3 Easily felt, not easily obliterated with pressure        CBC   Recent Labs     06/14/22  1651   WBC 8.1   HGB 11.7*   HCT 35.7*         RENAL  Recent Labs     06/14/22  1800      K 3.8      CO2 25   BUN 12   CREATININE 0.7*     LFT'S  Recent Labs     06/14/22  1800   AST 25   ALT <5*   BILITOT 1.2*   ALKPHOS 114     COAG  No results for input(s): INR in the last 72 hours.   CARDIAC ENZYMES  Recent Labs     06/14/22  1800 06/14/22  2018   TROPONINI 0.02* 0.01       U/A:    Lab Results   Component Value Date    NITRITE neg 05/31/2012    COLORU Yellow 06/14/2022    WBCUA 0-2 06/14/2022    RBCUA 3-4 06/14/2022    MUCUS 2+ 06/14/2022    BACTERIA 1+ 05/31/2012    CLARITYU Clear 06/14/2022    SPECGRAV >=1.030 06/14/2022    LEUKOCYTESUR Negative 06/14/2022    BLOODU Negative 06/14/2022    GLUCOSEU Negative 06/14/2022    GLUCOSEU NEGATIVE 05/31/2012    AMORPHOUS Rare 06/14/2022       ABG  No results found for: TGO7LQE, BEART, M6NVCROE, PHART, THGBART, APE5KGL, PO2ART, EPC5VXL        Active Hospital Problems    Diagnosis Date Noted    Chest pain [R07.9] 06/15/2022     Priority: Medium    Abnormal finding on EKG [R94.31]      Priority: Medium    Digoxin toxicity, accidental or unintentional, initial encounter [T46.0X1A]      Priority: Medium PHYSICIANS CERTIFICATION:    I certify that Diego Hutton is expected to be hospitalized for more than 2 midnights based on the following assessment and plan:      ASSESSMENT/PLAN:      Afib SVR. HR as low as 32 overnight. BP stable. Digoxin and coreg stopped. As of recent (about a month) has been off all AC due to several high risk falls and hitting his head - stopped by physician prior to admission and being evaluated for Watchman procedure. Cardiology eval.   Monitor on telemetry. COVID Infection. Vaccinated patient - 2 doses, no booster. No evidence of COVID PNA on CT. Supportive care. Hx of Lung Ca   S/p VATS resection RLL at [place]. Plan for OP Chemo          DVT Prophylaxis: lovenox  Diet: ADULT DIET; Regular  Code Status: Full Code    Dispo - inpatient. Tania Berrios MD    Thank you Umu Gleason MD for the opportunity to be involved in this patient's care. If you have any questions or concerns please feel free to contact me at [phone].

## 2022-06-15 NOTE — PROGRESS NOTES
Patient admitted to room 320 from ER. Transferred to bed as a SBA. Oriented to room, call light, phone, TV, thermostat, bed controls, bathroom, emergency cord, white board, therapy times, unit routine, visiting hours,  and meal times. Patient verbalized understanding. Call light and personal items in reach, alarms active and in place. 4 Eyes Admission Assessment     I agree as the admission nurse that 2 RN's have performed a thorough Head to Toe Skin Assessment on the patient. ALL assessment sites listed below have been assessed on admission. Areas assessed by both nurses: Admission  [x]   Head, Face, and Ears   [x]   Shoulders, Back, and Chest  [x]   Arms, Elbows, and Hands   [x]   Coccyx, Sacrum, and Ischum  [x]   Legs, Feet, and Heels        Does the Patient have Skin Breakdown?   No         Jorge Prevention initiated:  No   Wound Care Orders initiated:  No      [facility] nurse consulted for Pressure Injury (Stage 3,4, Unstageable, DTI, NWPT, and Complex wounds):  No      Nurse 1 eSignature: Electronically signed by Bisi Arevalo RN on 6/15/22 at 2:12 AM EDT    **SHARE this note so that the co-signing nurse is able to place an eSignature**    Nurse 2 eSignature: Electronically signed by Olman Dawn RN on 6/15/22 at 5:11 AM EDT

## 2022-06-15 NOTE — PROGRESS NOTES
Patient's oxygen saturation dropping into the 60s and 70s while sleep. History of SEYMOUR [facility] - patient denies diagnosis or using a CPAP.   Placed on 4L NC while asleep

## 2022-06-15 NOTE — ED NOTES
2122-Perfectserve sent to Dr Alessia Kapoor returned call and spoke with Dr Sandra Azul  06/14/22 2158

## 2022-06-15 NOTE — ACP (ADVANCE CARE PLANNING)
Advance Care Planning     General Advance Care Planning (ACP) Conversation    Date of Conversation: 6/14/2022  Conducted with: Patient with Decision Making Capacity    Healthcare Decision Maker:    Primary Decision Maker: Helen Luna - [facility] - [phone]  Click here to complete Healthcare Decision Makers including selection of the Healthcare Decision Maker Relationship (ie \"Primary\"). Today we documented Decision Maker(s) consistent with Legal Next of Kin hierarchy.     Content/Action Overview:    Reviewed DNR/DNI and patient elects Full Code (Attempt Resuscitation)    Length of Voluntary ACP Conversation in minutes:  <16 minutes (Non-Billable)    Mirian Ramos MSW, CARONW-S

## 2022-06-15 NOTE — ED NOTES
2305-Call placed to the [address] [place] [address].     2306-Dr Ivet Davis returned call and spoke with Dr Aubrey Hughes  06/14/22 9992

## 2022-06-15 NOTE — PROGRESS NOTES
Meds were given. Diet was ordered. Patient placed on RA at this time. Patient denies any needs. RN reviewed with  at this time.

## 2022-06-15 NOTE — FLOWSHEET NOTE
06/15/22 0802   Vital Signs   Temp 97.7 °F (36.5 °C)   Temp Source Oral   Heart Rate (!) 48   Heart Rate Source Monitor   Resp 18   /69   BP Location Left upper arm   MAP (Calculated) 90   Patient Position Semi fowlers   Level of Consciousness Alert (0)   MEWS Score 2   Pain Assessment   Pain Assessment 0-10   Pain Level 0   Oxygen Therapy   SpO2 93 %   Pulse Oximetry Type Intermittent   Pulse Oximeter Device Mode Intermittent   Pulse Oximeter Device Location Finger   O2 Device Nasal cannula   O2 Flow Rate (L/min) 2 L/min   Patient is resting showing no s/s of distress. Patient is alert and oriented. Meds were HELD for cardiology consult, see MAR. Patient is denying any needs. Bed is in lowest position and call light is within reach. Will continue to monitor. Shift assessment complete, see flowsheets. Patient states his HR is usually 50s-60s. He states he was taken off his blood thinner a few weeks ago but was uncertain of the reason. Patient was 100% on 4L, titrated down to 2L at this time. l

## 2022-06-15 NOTE — FLOWSHEET NOTE
06/15/22 1409   Vital Signs   Temp 97.8 °F (36.6 °C)   Temp Source Oral   Heart Rate 51   Heart Rate Source Monitor   Resp 16   BP (!) 139/59   BP Location Left upper arm   MAP (Calculated) 85.67   Patient Position Semi fowlers   Level of Consciousness Alert (0)   MEWS Score 1   Pain Assessment   Pain Assessment 0-10   Pain Level 0   Oxygen Therapy   SpO2 95 %   Pulse Oximetry Type Intermittent   Pulse Oximeter Device Mode Intermittent   Pulse Oximeter Device Location Finger   O2 Device None (Room air)   Patient resting. Patient provided with a water pitcher. Denies any other needs.

## 2022-06-15 NOTE — PLAN OF CARE
Problem: Discharge Planning  Goal: Discharge to home or other facility with appropriate resources  Outcome: Progressing  Flowsheets (Taken 6/15/2022 0204)  Discharge to home or other facility with appropriate resources:   Identify barriers to discharge with patient and caregiver   Refer to discharge planning if patient needs post-hospital services based on physician order or complex needs related to functional status, cognitive ability or social support system   Identify discharge learning needs (meds, wound care, etc)   Arrange for needed discharge resources and transportation as appropriate   Arrange for interpreters to assist at discharge as needed     Problem: Pain  Goal: Verbalizes/displays adequate comfort level or baseline comfort level  Outcome: Progressing     Problem: Safety - Adult  Goal: Free from fall injury  Outcome: Progressing     Problem: ABCDS Injury Assessment  Goal: Absence of physical injury  Outcome: Progressing

## 2022-06-15 NOTE — CONSULTS
[address] [place] [address]  [phone]        Reason for Consultation/Chief Complaint: \"I have been having abdominal pain, N/V, cought . \"  Consult for right side chest pain     History of Present Illness:  Brendon Kruse is a 68 y.o. patient who presented to [facility] 6/14/22 with c/o abdominal and chest pain. Prior saw Dr. Aby Padilla in 2017, and Monty Saunders CNP, last seen in the office 2018. She was seen by Dr. Carolina Garcia and Manda AUSTIN while in the hospital 5/2022 for recurrent syncope. She has a PMH of CAD s/p PCI to LAD 3/2017, PAF- s/p RFCA of AF 9/2014, S/P loop recorder implant 2014 with reimplant 2018- (device at [facility]), lung cancer s/p thoracotomy and undergoing chemotx, Parkinson's syndrome, Normocytic anemia, and thrombocytopenia. He was admitted on 5/9/2022 with excessive weakness, recurrent falls and near syncope. Orthostatic BP's were positive. On 5/12/2022, Hgb 7.9 and platelets 90,608. Dr. Carolina Garcia rec holding pradaxa due to recurrent falls and consideration of Watchman. Held imdur, losartan, and proscar also. Now presents to [facility] with complaints of abd pain. Reports 4 day history cramping right abd pain with N/V. Also with cough and yellow phlegm with fever 1-2 weeks. CT negative PE; RML GGO. CXR right ASD and small effusion Admit EKG afib 66; ST change inferolateral c/w ischemia vs dig effect. Second EKG same. Note ST changes new from 5/22 EKG. Char 0.02, 0.01. ProBNP= 3,857, Creatinine 0.7, Covid +; Dig Level 2.8. Most recent walking Светлана-Rell showed no ischemia. Most recent Echo 5/12/2022 showed an EF of 55-60%, mild MR. Patient with no complaints of chest pain, SOB, palpitations, dizziness, edema, or orthopnea/PND. I have been asked to provide consultation regarding further management and testing.        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 Surgical History:   has a past surgical history that includes Carpal tunnel release; Femur Surgery; knee surgery (1-12-11 R total knee replacement with femoral nerve block for pain control); Cervical disc surgery (2010); other surgical history (Bilateral); ablation of dysrhythmic focus (9/2014); other surgical history (9/2014); Colonoscopy (09/26/2016); Upper gastrointestinal endoscopy (09/26/2016); Coronary angioplasty with stent; and Lung removal, partial (Right). Social History:   reports that he quit smoking about 29 years ago. He has a 80.00 pack-year smoking history. He has never used smokeless tobacco. He reports that he does not drink alcohol and does not use drugs. Family History:  family history includes Arthritis in his brother, father, maternal grandfather, maternal grandmother, mother, paternal grandfather, paternal grandmother, and sister; Cancer in his sister; Depression in his sister; Diabetes in his paternal grandmother; Heart Disease in his father and mother; High Blood Pressure in his mother; Tory  / [place] in his mother; Stroke in his mother. Home Medications:  Were reviewed and are listed in nursing record. and/or listed below  Prior to Admission medications    Medication Sig Start Date End Date Taking?  Authorizing Provider   digoxin (LANOXIN) 125 MCG tablet Take 1 tablet by mouth daily 5/15/22   KATIE Chen - GEOVANNA   finasteride (PROSCAR) 5 MG tablet Take 5 mg by mouth daily    Historical Provider, MD   ipratropium (ATROVENT) 0.03 % nasal spray 2 sprays by Each Nostril route every 12 hours Can use of to 4 times daily as needed 5/10/21   Asher Amin DO   diclofenac sodium 1 % GEL APPLY 4GM TOPICALLY FOUR TIMES A DAY 12/6/18   Oliver Zuniga MD   carbidopa-levodopa (SINEMET)  MG per tablet Take 1 tablet by mouth in the morning and at bedtime     Historical Provider, MD   vitamin B-2 (RIBOFLAVIN) 25 MG TABS Take by mouth    Historical Provider, MD   diclofenac (PENNSAID) 2 % SOLN 2 pumps to the affected area 2 times a day [id number] 9/26/17   Malaika Huggins MD   atorvastatin (LIPITOR) 40 MG tablet Take 1 tablet by mouth daily 8/21/17   Joanne Cyr MD   omeprazole (PRILOSEC) 20 MG delayed release capsule Take 20 mg by mouth 2 times daily    Historical Provider, MD   fluocinonide (LIDEX) 0.05 % cream Apply topically 2 times daily. 5/20/16   KATIE Nuñez - CNP   ferrous sulfate 325 (65 FE) MG tablet Take 1 tablet by mouth 2 times daily (with meals) 10/5/15   Joanne Cyr MD   zolpidem (AMBIEN) 10 MG tablet Take 10 mg by mouth nightly as needed. Historical Provider, MD   LORazepam (ATIVAN) 1 MG tablet Take 2 mg by mouth in the morning and at bedtime. Historical Provider, MD   Lacosamide (VIMPAT PO) Take 100 mg by mouth 2 times daily.  Indications: take dos    Historical Provider, MD   paroxetine (PAXIL) 20 MG tablet Take 20 mg by mouth in the morning and at bedtime  12/28/10   Historical Provider, MD        Allergies:  Morphine, Codeine, Penicillins, and Plavix [clopidogrel bisulfate]     Review of Systems:   12 point ROS negative in all areas as listed below except as in [place]  Constitutional, EENT, Cardiovascular, pulmonary, GI, , Musculoskeletal, skin, neurological, hematological, endocrine, Psychiatric    Physical Examination:    Vitals:    06/15/22 0453   BP: (!) 151/69   Pulse: 50   Resp: 18   Temp: 98.1 °F (36.7 °C)   SpO2: 99%    Weight: 182 lb 8 oz (82.8 kg)         General Appearance:  Alert, cooperative, no distress, appears stated age   Head:  Normocephalic, without obvious abnormality, atraumatic   Eyes:  PERRL, conjunctiva/corneas clear       Nose: Nares normal, no drainage or sinus tenderness   Throat: Lips, mucosa, and tongue normal   Neck: Supple, symmetrical, trachea midline, no adenopathy, thyroid: not enlarged, symmetric, no tenderness/mass/nodules, no carotid bruit or JVD       Lungs:   Soft bs;  respirations unlabored   Chest Wall:  No tenderness or deformity   Heart:  Irregularly irregular; S1, S2 normal, no murmur, rub or gallop   Abdomen:   Soft, +tender RUQ; bowel sounds active all four quadrants,  no masses, no organomegaly           Extremities: Extremities normal, atraumatic, no cyanosis or edema   Pulses: 2+ and symmetric   Skin: Skin color, texture, turgor normal, no rashes or lesions   Pysch: Normal mood and affect   Neurologic: Normal gross motor and sensory exam.         Labs  CBC:   Lab Results   Component Value Date    WBC 8.1 06/14/2022    RBC 3.63 06/14/2022    RBC 4.84 01/13/2017    HGB 11.7 06/14/2022    HCT 35.7 06/14/2022    MCV 98.2 06/14/2022    RDW 17.9 06/14/2022     06/14/2022     CMP:    Lab Results   Component Value Date     06/14/2022    K 3.8 06/14/2022     06/14/2022    CO2 25 06/14/2022    BUN 12 06/14/2022    CREATININE 0.7 06/14/2022    GFRAA >60 06/14/2022    GFRAA >60 06/07/2012    AGRATIO 1.2 06/14/2022    LABGLOM >60 06/14/2022    GLUCOSE 103 06/14/2022    GLUCOSE 100 06/03/2016    PROT 7.6 06/14/2022    PROT 7.6 06/03/2016    CALCIUM 9.3 06/14/2022    BILITOT 1.2 06/14/2022    ALKPHOS 114 06/14/2022    AST 25 06/14/2022    ALT <5 06/14/2022     PT/INR:  No results found for: PTINR  Lab Results   Component Value Date    TROPONINI 0.01 06/14/2022       EKG:  I have reviewed EKG with the following interpretation:  Impression:  See HPI    EKG 6/14/2022  Atrial fibrillation  ST & Marked T-wave abnormality, consider inferolateral ischemia or digitalis effect  RSR' or QR pattern in V1 suggests right ventricular conduction delay    Echo 2014  Summary    Left ventricular size is normal .    Global ejection fraction is normal and estimated from 55% to 60%. No regional wall motion abnormalities are noted.     Normal diastolic filling pattern for age. Systolic pulmonary artery pressure (SPAP) is estimated at 35 mmHg (RA    pressure 3 mm Hg). DONG 9/4/2014  Summary   Overall left ventricular function is normal.   Ejection fraction is visually estimated to be 55-60 %. Mitral valve is structurally normal.   Mild mitral regurgitation is present. There is no evidence of mass or thrombus in the left atrium or appendage. Redundancy of the interatrial septum. No evidence of atrial septal defect. Cardiac CTA 10/16/2014  No obvious gas in the left atrium to suggest fistula. No obvious pneumomediastinum . No obvious active contrast extravasation seen   Mild esophageal wall thickening. This may be due to incomplete distention or underlying esophagitis. Mild emphysema in the lungs. No obvious pneumonia or pleural effusion       Walking Светлана-Rell 7/14/2016  Conclusions    Summary  Normal myocardial perfusion study with normal left ventricular function,  size, and wall motion. The estimated left ventricular function is 72%. CATH: 3/6/17  IMPRESSION:  1. Successful percutaneous coronary intervention of high-grade distal LAD  lesion with placement of 2.25 mm x 18 mm Xience Alpine drug-eluting stent. 2.  Mild circumflex and right coronary artery disease. 3.  Normal left ventricular systolic function. 4.  Mildly elevated left ventricular filling pressure, from hypertension. Echo: 5/12/20222  Conclusions      Summary   Normal left ventricular systolic function with ejection fraction of 55-60%. No regional wall motion abnormalites are seen. Normal left ventricular size with mild concentric left ventricular   hypertrophy. Normal left ventricular systolic function with ejection fraction of 55-60%. No regional wall motion abnormalites are seen. Mild mitral regurgitation. The right atrium is mildly dilated. CT chest 6/14/2022  1. No evidence of pulmonary embolic disease.  2. Patchy ground-glass opacification in the right middle lobe, possibly infiltrate. Stable small partially loculated right pleural effusion with dependent atelectasis. 3. Cardiomegaly with coronary vascular calcification. Chest Xray 6/14/2022  Small right pleural effusion with associated right basilar airspace opacities which could represent atelectasis or infiltrate       CT abdomin and pelvis 6/14/2022  No acute intra-abdominal abnormality seen. Scattered diverticula along the left colon with no pericolonic inflammation. Penile prosthesis in place which is unchanged. Small right pleural effusion which has decreased in size with slowly resolving bibasilar atelectasis or infiltrates. Mild chronic liver changes with fatty replacement throughout which is unchanged. Assessment:  Amilcar Carvalho is a 68 y.o. patient who presented to [facility] 6/14/22 with c/o abdominal and chest pain. Prior saw Dr. Kelley Ruby in 2017, and Sascha Reese CNP, last seen in the office 2018. She was seen by Dr. Jacy Antonio and Manda AUSTIN while in the hospital 5/2022 for recurrent syncope. She has a PMH of CAD s/p PCI to LAD 3/2017, PAF- s/p RFCA of AF 9/2014, S/P loop recorder implant 2014 with reimplant 2018- (device at [facility]), lung cancer s/p thoracotomy and undergoing chemotx, Parkinson's syndrome, Normocytic anemia, and thrombocytopenia. He was admitted on 5/9/2022 with excessive weakness, recurrent falls and near syncope. Orthostatic BP's were positive. On 5/12/2022, Hgb 7.9 and platelets 98,816. Dr. Jacy Antonio rec holding pradaxa due to recurrent falls and consideration of Watchman. Held imdur, losartan, and proscar also. Now presents to [facility] with complaints of abd pain. Reports 4 day history cramping right abd pain with N/V. Also with cough and yellow phlegm with fever 1-2 weeks. CT negative PE; RML GGO. CXR right ASD and small effusion. Admit EKG afib 66; ST change inferolateral c/w ischemia vs dig effect. Second EKG same. Note ST changes new from 5/22 EKG.  Char 0.02, 0.01. ProBNP= 3,857, Creatinine 0.7, Covid +; Dig Level 2.8. Most recent walking Светлана-Rell showed no ischemia. Most recent Echo 5/12/2022 showed an EF of 55-60%, mild MR. Diagnosis of abnormal EKG finding and digoxin toxicity in elderly male with longstanding afib and hx recurrent syncope now found Covid + and possible R PNA. Recs:  1. D/C digoxin entirely and follow tele  2. NO AC due to EP doc recs in May with recurrent falls, weight loss, anemia and plts issues. 3. No need for cardiac testing at this time. 4. Continue lipitor 40mg qd, proscar 5mg qd      Patient Active Problem List   Diagnosis    Dizziness    Syncope and collapse    Paroxysmal atrial fibrillation (HCC)    Contusion of knee, right    Dysphagia    Encounter for electronic analysis of reveal event recorder    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[facility]    History of nonmelanoma skin cancer    Ischemic chest pain ([address]    Left hand pain    Hand pain, right    Arthritis of carpometacarpal (CMC) joints of both thumbs    Radial styloid tenosynovitis (de quervain)    Left elbow fracture, closed, initial encounter    Frequent falls    General weakness    Thrombocytopenia (HCC)    Malignant neoplasm of lung ([address]    Orthostasis    Hypotension    Chest pain       Thank you for allowing to us to participate in the care or [facility]. Further evaluation will be based upon the patient's clinical course and testing results.

## 2022-06-15 NOTE — CARE COORDINATION
Case Management Assessment  Initial Evaluation      Patient Name: Di Aguayo  YOB: 1945  Diagnosis: Chest pain [R07.9]  Date / Time: 6/14/2022  4:02 PM    Admission status/Date: 0/614/2022 Observation   Chart Reviewed: Yes      Patient Interviewed: Yes   Family Interviewed:  No      Hospitalization in the last 30 days:  No    Health Care Decision Maker :   Primary Decision Maker: Helen Luna - Spouse - [phone]    (CM - must 1st enter selection under Navigator - emergency contact- [place] Relationship and pick relationship)   Who do you trust or have selected to make healthcare decisions for you    Met with: pt via phone call due to covid isolation     Current PCP: Juma Bolivar MD; [address] Medicare  Precert required for SNF : Y          3 night stay required -  N    ADLS  Support Systems/Care Needs: Spouse/Significant Other  Transportation: self    Meal Preparation: self    Housing  Living Arrangements: pt lives at home with his wife  Steps: 1  Intent for return to present living arrangements: Yes  Identified Issues: [address] [place] [address] with [address] : Yes - [address] through the [place] [facility]:(Services) Rosamaria Sainz confirmed pt is active with them for RN and PT. Mau Porras also aware pt is in observation and will pull information from epic.  Mau Porras stated that they need orders if pt is in inpatient and confirmed he is a [facility] pt  Type of Home Care Services: None  Passport/Waiver : No  :                      Phone Number:    Passport/Waiver Services: n/a          Durable Medical Equiptment   DME Provider: n/a  Equipment:   Walker___Cane__x_RTS___ BSC___Shower Chair___Hospital Bed___W/C____Other________  02 at ____Liter(s)---wears(frequency)_______ HHN ___ CPAP___ BiPap___   N/A____    Home O2 Use :  No    If No for home O2---if presently on O2 during hospitalization:  Yes  if yes CM to follow for potential DC O2 need    Community Service Affiliation  Dialysis:  No    · Agency:  · Location:  · Dialysis Schedule:  · Phone:   · Fax: Other Community Services: n/a    DISCHARGE PLAN: Explained Case Management role/services. Chart review completed. Called and spoke with pt via bedside phone. Pt stated he is independent at home and plans on returning home. He denied needs for CM. CM will follow. Please notify CM if needs or concerns arise.      Mariama LORENZO, LIZS

## 2022-06-16 LAB
A/G RATIO: 1.4 (ref 1.1–2.2)
ALBUMIN SERPL-MCNC: 3.8 G/DL (ref 3.4–5)
ALP BLD-CCNC: 110 U/L (ref 40–129)
ALT SERPL-CCNC: <5 U/L (ref 10–40)
ANION GAP SERPL CALCULATED.3IONS-SCNC: 12 MMOL/L (ref 3–16)
AST SERPL-CCNC: 24 U/L (ref 15–37)
BASOPHILS ABSOLUTE: 0 K/UL (ref 0–0.2)
BASOPHILS RELATIVE PERCENT: 0.7 %
BILIRUB SERPL-MCNC: 0.9 MG/DL (ref 0–1)
BUN BLDV-MCNC: 9 MG/DL (ref 7–20)
CALCIUM SERPL-MCNC: 8.5 MG/DL (ref 8.3–10.6)
CHLORIDE BLD-SCNC: 105 MMOL/L (ref 99–110)
CO2: 23 MMOL/L (ref 21–32)
CREAT SERPL-MCNC: 0.6 MG/DL (ref 0.8–1.3)
DIGOXIN LEVEL: 2.2 NG/ML (ref 0.8–2)
EOSINOPHILS ABSOLUTE: 0.2 K/UL (ref 0–0.6)
EOSINOPHILS RELATIVE PERCENT: 3.7 %
GFR AFRICAN AMERICAN: >60
GFR NON-AFRICAN AMERICAN: >60
GLUCOSE BLD-MCNC: 90 MG/DL (ref 70–99)
HCT VFR BLD CALC: 31 % (ref 40.5–52.5)
HEMOGLOBIN: 10.5 G/DL (ref 13.5–17.5)
LYMPHOCYTES ABSOLUTE: 1.6 K/UL (ref 1–5.1)
LYMPHOCYTES RELATIVE PERCENT: 25.1 %
MAGNESIUM: 1.7 MG/DL (ref 1.8–2.4)
MCH RBC QN AUTO: 32.7 PG (ref 26–34)
MCHC RBC AUTO-ENTMCNC: 33.8 G/DL (ref 31–36)
MCV RBC AUTO: 96.9 FL (ref 80–100)
MONOCYTES ABSOLUTE: 1.1 K/UL (ref 0–1.3)
MONOCYTES RELATIVE PERCENT: 18.1 %
NEUTROPHILS ABSOLUTE: 3.2 K/UL (ref 1.7–7.7)
NEUTROPHILS RELATIVE PERCENT: 52.4 %
PDW BLD-RTO: 17.1 % (ref 12.4–15.4)
PLATELET # BLD: 190 K/UL (ref 135–450)
PMV BLD AUTO: 7.8 FL (ref 5–10.5)
POTASSIUM REFLEX MAGNESIUM: 3.5 MMOL/L (ref 3.5–5.1)
RBC # BLD: 3.2 M/UL (ref 4.2–5.9)
SODIUM BLD-SCNC: 140 MMOL/L (ref 136–145)
TOTAL PROTEIN: 6.6 G/DL (ref 6.4–8.2)
TROPONIN: <0.01 NG/ML
WBC # BLD: 6.2 K/UL (ref 4–11)

## 2022-06-16 PROCEDURE — 99232 SBSQ HOSP IP/OBS MODERATE 35: CPT | Performed by: NURSE PRACTITIONER

## 2022-06-16 PROCEDURE — 85025 COMPLETE CBC W/AUTO DIFF WBC: CPT

## 2022-06-16 PROCEDURE — 6360000002 HC RX W HCPCS: Performed by: INTERNAL MEDICINE

## 2022-06-16 PROCEDURE — 36415 COLL VENOUS BLD VENIPUNCTURE: CPT

## 2022-06-16 PROCEDURE — 6370000000 HC RX 637 (ALT 250 FOR IP): Performed by: INTERNAL MEDICINE

## 2022-06-16 PROCEDURE — 2060000000 HC ICU INTERMEDIATE R&B

## 2022-06-16 PROCEDURE — 83735 ASSAY OF MAGNESIUM: CPT

## 2022-06-16 PROCEDURE — 80053 COMPREHEN METABOLIC PANEL: CPT

## 2022-06-16 PROCEDURE — 84484 ASSAY OF TROPONIN QUANT: CPT

## 2022-06-16 PROCEDURE — 99233 SBSQ HOSP IP/OBS HIGH 50: CPT | Performed by: INTERNAL MEDICINE

## 2022-06-16 PROCEDURE — 93005 ELECTROCARDIOGRAM TRACING: CPT | Performed by: INTERNAL MEDICINE

## 2022-06-16 PROCEDURE — 2580000003 HC RX 258: Performed by: INTERNAL MEDICINE

## 2022-06-16 PROCEDURE — 80162 ASSAY OF DIGOXIN TOTAL: CPT

## 2022-06-16 RX ORDER — MAGNESIUM SULFATE IN WATER 40 MG/ML
2000 INJECTION, SOLUTION INTRAVENOUS ONCE
Status: COMPLETED | OUTPATIENT
Start: 2022-06-16 | End: 2022-06-16

## 2022-06-16 RX ADMIN — LACOSAMIDE 100 MG: 50 TABLET, FILM COATED ORAL at 09:02

## 2022-06-16 RX ADMIN — OMEPRAZOLE 20 MG: 20 CAPSULE, DELAYED RELEASE ORAL at 19:51

## 2022-06-16 RX ADMIN — LACOSAMIDE 100 MG: 50 TABLET, FILM COATED ORAL at 19:50

## 2022-06-16 RX ADMIN — FERROUS SULFATE TAB 325 MG (65 MG ELEMENTAL FE) 325 MG: 325 (65 FE) TAB at 09:02

## 2022-06-16 RX ADMIN — ENOXAPARIN SODIUM 40 MG: 100 INJECTION SUBCUTANEOUS at 09:03

## 2022-06-16 RX ADMIN — ATORVASTATIN CALCIUM 40 MG: 40 TABLET, FILM COATED ORAL at 09:03

## 2022-06-16 RX ADMIN — Medication 10 ML: at 09:03

## 2022-06-16 RX ADMIN — FERROUS SULFATE TAB 325 MG (65 MG ELEMENTAL FE) 325 MG: 325 (65 FE) TAB at 16:41

## 2022-06-16 RX ADMIN — PAROXETINE HYDROCHLORIDE 20 MG: 20 TABLET, FILM COATED ORAL at 19:50

## 2022-06-16 RX ADMIN — MAGNESIUM SULFATE HEPTAHYDRATE 2000 MG: 40 INJECTION, SOLUTION INTRAVENOUS at 09:04

## 2022-06-16 RX ADMIN — ZOLPIDEM TARTRATE 10 MG: 5 TABLET ORAL at 19:50

## 2022-06-16 RX ADMIN — PAROXETINE HYDROCHLORIDE 20 MG: 20 TABLET, FILM COATED ORAL at 09:02

## 2022-06-16 RX ADMIN — OMEPRAZOLE 20 MG: 20 CAPSULE, DELAYED RELEASE ORAL at 09:02

## 2022-06-16 RX ADMIN — FINASTERIDE 5 MG: 5 TABLET, FILM COATED ORAL at 09:02

## 2022-06-16 RX ADMIN — SODIUM CHLORIDE: 9 INJECTION, SOLUTION INTRAVENOUS at 09:03

## 2022-06-16 RX ADMIN — Medication 10 ML: at 19:50

## 2022-06-16 RX ADMIN — CARBIDOPA AND LEVODOPA 1 TABLET: 25; 100 TABLET ORAL at 09:02

## 2022-06-16 RX ADMIN — CARBIDOPA AND LEVODOPA 1 TABLET: 25; 100 TABLET ORAL at 19:50

## 2022-06-16 ASSESSMENT — PAIN SCALES - GENERAL
PAINLEVEL_OUTOF10: 0

## 2022-06-16 ASSESSMENT — ENCOUNTER SYMPTOMS
GASTROINTESTINAL NEGATIVE: 1
RESPIRATORY NEGATIVE: 1

## 2022-06-16 NOTE — PROGRESS NOTES
Hospitalist Progress Note      PCP: Eleazar Pendleton MD    Date of Admission: 6/14/2022    Chief Complaint: chest pain. Subjective:      Pt feels better today. No repeat chest pain. He appears worried and preoccupied with his insurance payment for present admission. His HR drops to mid 30s Afib SVR despite rate control agents all stopped. He remains largely asymptomatic from this. Medications:  Reviewed    Infusion Medications    sodium chloride 5 mL/hr at 06/16/22 0903     Scheduled Medications    sodium chloride flush  10 mL IntraVENous 2 times per day    enoxaparin  40 mg SubCUTAneous Daily    atorvastatin  40 mg Oral Daily    ferrous sulfate  325 mg Oral BID WC    finasteride  5 mg Oral Daily    omeprazole  20 mg Oral BID    PARoxetine  20 mg Oral BID    lacosamide  100 mg Oral BID    carbidopa-levodopa  1 tablet Oral BID     PRN Meds: sodium chloride flush, sodium chloride, potassium chloride, magnesium sulfate, promethazine **OR** ondansetron, acetaminophen **OR** acetaminophen, LORazepam, zolpidem      Intake/Output Summary (Last 24 hours) at 6/16/2022 1548  Last data filed at 6/16/2022 1324  Gross per 24 hour   Intake 220 ml   Output 400 ml   Net -180 ml       Physical Exam Performed:    BP (!) 144/73   Pulse 63   Temp 98.2 °F (36.8 °C) (Oral)   Resp 16   Ht 5' 10.5\" (1.791 m)   Wt 180 lb 9.6 oz (81.9 kg)   SpO2 94%   BMI 25.55 kg/m²     General appearance: No apparent distress appears stated age and cooperative. HEENT Normal cephalic, atraumatic without obvious deformity. Pupils equal, round, and reactive to light. Extra ocular muscles intact. Conjunctivae/corneas clear. Neck: Supple, No jugular venous distention/bruits. Trachea midline without thyromegaly or adenopathy with full range of motion. Lungs: Clear to auscultation, bilaterally without Rales/Wheezes/Rhonchi with good respiratory effort.   Heart: Regular rate and rhythm with Normal S1/S2 chronic liver changes with fatty replacement throughout which is   unchanged. CT CHEST PULMONARY EMBOLISM W CONTRAST   Final Result   1. No evidence of pulmonary embolic disease. 2. Patchy ground-glass opacification in the right middle lobe, possibly   infiltrate. Stable small partially loculated right pleural effusion with   dependent atelectasis. 3. Cardiomegaly with coronary vascular calcification. CT HEAD WO CONTRAST   Final Result   Mild atrophy and mild chronic microischemic changes scattered in the deep   white matter which is unchanged with no acute abnormality seen. Mild chronic ethmoid sinusitis which is more prominent and mild chronic left   mastoiditis which is unchanged. XR CHEST PORTABLE   Final Result   Small right pleural effusion with associated right basilar airspace opacities   which could represent atelectasis or infiltrate. Assessment/Plan:    Active Hospital Problems    Diagnosis     Chest pain [R07.9]      Priority: Medium    Atrial fibrillation with slow ventricular response (HCC) [I48.91]      Priority: Medium    Abnormal finding on EKG [R94.31]      Priority: Medium    Digoxin toxicity, accidental or unintentional, initial encounter [T46.0X1A]      Priority: Medium    COVID-19 virus infection [U07.1]      Priority: Medium       Afib SVR. Possible Digoxin Toxicity - level supra therapeutic on presentation. HR as low as 32 overnight and recurrent again. This improves during the day, though HR consistent in 40s. BP stable. Digoxin and coreg stopped. As of recent (about a month) has been off all AC due to several high risk falls and hitting his head - stopped by physician prior to admission and being evaluated for Watchman procedure. Cardiology eval.   Monitor on telemetry         COVID Infection. Vaccinated patient - 2 doses, no booster. No evidence of COVID PNA on CT.    Supportive care.            Hx of Lung Ca   S/p VATS resection RLL at [place]. Plan for OP Chemo              DVT Prophylaxis: lovenox  Diet: ADULT DIET;  Regular  Code Status: Full Code     Dispo - inpatient.          Georgette Ricks MD

## 2022-06-16 NOTE — PROGRESS NOTES
Homar sent to Dr. Joanne Cedillo at this time. STAT trop and EKG ordered per protocol. RN will continue to monitor.

## 2022-06-16 NOTE — PROGRESS NOTES
Pt in bed, eyes closed. No distress noted. Call light in reach. Will continue to monitor.   Danette Catalan RN

## 2022-06-16 NOTE — PROGRESS NOTES
Pt in bed, eyes closed. No distress noted. Call light in reach. Will continue to monitor.   Kriss Montaño RN

## 2022-06-16 NOTE — FLOWSHEET NOTE
06/16/22 1837   Vital Signs   Heart Rate 84   Heart Rate Source Monitor   BP (!) 163/78   BP Location Left upper arm   BP Method Automatic   MAP (Calculated) 106.33     Pt reported chest pain to L chest 6/10. Stated it felt like a squeezing that has been going on for around an hour he reports. He states that bilateral legs feel \"numb. \" C/o lower back pain. All symptoms and vs reported to ANDREA Gaines.

## 2022-06-16 NOTE — PROGRESS NOTES
Pt in chair , awake, A/O X4. Shift assessment complete, night meds given. No C/O pain at this time. . No other needs expressed. Call light in reach. Will monitor.   Alivia Acosta RN

## 2022-06-16 NOTE — FLOWSHEET NOTE
06/16/22 1345   Vital Signs   Temp 98.2 °F (36.8 °C)   Temp Source Oral   Heart Rate 63   Heart Rate Source Monitor   Resp 16   BP (!) 144/73   BP Location Left upper arm   MAP (Calculated) 96.67   Patient Position Up in chair   Level of Consciousness Alert (0)   MEWS Score 1   Pain Assessment   Pain Assessment 0-10   Pain Level 0   Oxygen Therapy   SpO2 94 %   Pulse Oximetry Type Intermittent   Pulse Oximeter Device Mode Intermittent   Pulse Oximeter Device Location Finger   O2 Device None (Room air)   Patient in the chair, denies any needs. Neuro exam intact.

## 2022-06-16 NOTE — PLAN OF CARE
Problem: Discharge Planning  Goal: Discharge to home or other facility with appropriate resources  Outcome: Progressing  Flowsheets (Taken 6/16/2022 1953)  Discharge to home or other facility with appropriate resources:   Identify barriers to discharge with patient and caregiver   Arrange for needed discharge resources and transportation as appropriate   Identify discharge learning needs (meds, wound care, etc)   Arrange for interpreters to assist at discharge as needed   Refer to discharge planning if patient needs post-hospital services based on physician order or complex needs related to functional status, cognitive ability or social support system     Problem: Pain  Goal: Verbalizes/displays adequate comfort level or baseline comfort level  Outcome: Progressing  Flowsheets (Taken 6/16/2022 1945)  Verbalizes/displays adequate comfort level or baseline comfort level:   Encourage patient to monitor pain and request assistance   Assess pain using appropriate pain scale   Administer analgesics based on type and severity of pain and evaluate response   Implement non-pharmacological measures as appropriate and evaluate response   Consider cultural and social influences on pain and pain management   Notify Licensed Independent Practitioner if interventions unsuccessful or patient reports new pain     Problem: Safety - Adult  Goal: Free from fall injury  Outcome: Progressing  Flowsheets (Taken 6/16/2022 0913 by Rene García RN)  Free From Fall Injury:   Instruct family/caregiver on patient safety   Based on caregiver fall risk screen, instruct family/caregiver to ask for assistance with transferring infant if caregiver noted to have fall risk factors Laceration Repair Laceration Repair

## 2022-06-16 NOTE — PROGRESS NOTES
[facility], [place], operated by [facility]  Cardiology  Progress Note    Admission date:  2022    Reason for follow up visit: Chest pain, AF    HPI/CC: Amilcar Carvalho is a 68 y.o. male who presented 2022 for abdominal and chest pain. EKG abnormal and digoxin level elevated c/w digoxin toxicity. COVID+. Rhythm has been afib 50s. Subjective: Denies chest pain, syncope, shortness of breath, palpitations and dizziness. He is worried about insurance coverage with hospital providers. Vitals:  Blood pressure (!) 144/73, pulse 63, temperature 98.2 °F (36.8 °C), temperature source Oral, resp. rate 16, height 5' 10.5\" (1.791 m), weight 180 lb 9.6 oz (81.9 kg), SpO2 94 %.   Temp  Av °F (36.7 °C)  Min: 97.1 °F (36.2 °C)  Max: 98.5 °F (36.9 °C)  Pulse  Av  Min: 55  Max: 63  BP  Min: 123/63  Max: 151/76  SpO2  Av.3 %  Min: 92 %  Max: 96 %    24 hour I/O    Intake/Output Summary (Last 24 hours) at 2022 1432  Last data filed at 2022 1324  Gross per 24 hour   Intake 220 ml   Output 400 ml   Net -180 ml     Current Facility-Administered Medications   Medication Dose Route Frequency Provider Last Rate Last Admin    sodium chloride flush 0.9 % injection 10 mL  10 mL IntraVENous 2 times per day Anoop Horner DO   10 mL at 22 0903    sodium chloride flush 0.9 % injection 10 mL  10 mL IntraVENous PRN Eileen Horner DO        0.9 % sodium chloride infusion   IntraVENous PRN Vlad Horner DO 5 mL/hr at 22 0903 New Bag at 22 0903    potassium chloride 10 mEq/100 mL IVPB (Peripheral Line)  10 mEq IntraVENous PRN Anoop Horner DO        magnesium sulfate 2000 mg in 50 mL IVPB premix  2,000 mg IntraVENous PRN Anoop Horner DO        enoxaparin (LOVENOX) injection 40 mg  40 mg SubCUTAneous Daily Eileen Horner DO   40 mg at 22 0903    promethazine (PHENERGAN) tablet 12.5 mg  12.5 mg Oral Q6H PRN Eileen Horner DO        Or    ondansetron (ZOFRAN) injection 4 mg  4 mg IntraVENous Q6H PRN Rafiq Vega, DO        acetaminophen (TYLENOL) tablet 650 mg  650 mg Oral Q6H PRN [facility], DO        Or    acetaminophen (TYLENOL) suppository 650 mg  650 mg Rectal Q6H PRN Reggie Mejiazi, DO        atorvastatin (LIPITOR) tablet 40 mg  40 mg Oral Daily Ahmad  Evens, DO   40 mg at 06/16/22 0903    ferrous sulfate (IRON 325) tablet 325 mg  325 mg Oral BID WC mad [facility], [facility]   325 mg at 06/16/22 0902    finasteride (PROSCAR) tablet 5 mg  5 mg Oral Daily [facility]  Evens, DO   5 mg at 06/16/22 0902    omeprazole (PRILOSEC) delayed release capsule 20 mg  20 mg Oral BID [facility], DO   20 mg at 06/16/22 0902    LORazepam (ATIVAN) tablet 1 mg  1 mg Oral BID PRN Reggie Trujilloas VICKIE Horner, DO        PARoxetine (PAXIL) tablet 20 mg  20 mg Oral BID [facility], DO   20 mg at 06/16/22 0902    lacosamide (VIMPAT) tablet 100 mg  100 mg Oral BID [facility], [facility]   100 mg at 06/16/22 0902    zolpidem (AMBIEN) tablet 10 mg  10 mg Oral Nightly PRN [facility] VICKIE Horner, DO   10 mg at 06/15/22 2109    carbidopa-levodopa (SINEMET)  MG per tablet 1 tablet  1 tablet Oral BID Yumiko Hairston MD   1 tablet at 06/16/22 0902     Review of Systems   Constitutional: Negative. Respiratory: Negative. Cardiovascular: Negative. Gastrointestinal: Negative. Neurological: Negative.       Objective:     Telemetry monitor: AF    Physical Exam:  Constitutional:  Comfortable and alert, NAD, appears stated age  Eyes: PERRL, sclera nonicteric  Neck:  Supple, no masses, no thyroidmegaly, no JVD  Skin:  Warm and dry; no rash or lesions  Heart: Irregular, normal apex, S1 and S2 normal, no M/G/R  Lungs:  Normal respiratory effort; clear; no wheezing/rhonchi/rales  Abdomen: soft, non tender, + bowel sounds  Extremities:  No edema or cyanosis; no clubbing  Neuro: alert and oriented, moves legs and arms equally, normal mood and affect    Data Reviewed:    Echo 5/12/2022:   Normal left ventricular systolic function with ejection fraction of 55-60%. No regional wall motion abnormalites are seen. Normal left ventricular size with mild concentric left ventricular   hypertrophy. Normal left ventricular systolic function with ejection fraction of 55-60%. No regional wall motion abnormalites are seen. Mild mitral regurgitation. The right atrium is mildly dilated. Stress test 12/4/2020: The LV EF is 81%   The left ventricular wall motion is normal.   There is a small sized, fixed defect in the inferolateral wall consistent with   mild infarct.      Lab Reviewed:     Renal Profile:  Lab Results   Component Value Date    CREATININE 0.6 06/16/2022    BUN 9 06/16/2022     06/16/2022    K 3.5 06/16/2022     06/16/2022    CO2 23 06/16/2022     CBC:    Lab Results   Component Value Date    WBC 6.2 06/16/2022    RBC 3.20 06/16/2022    RBC 4.84 01/13/2017    HGB 10.5 06/16/2022    HCT 31.0 06/16/2022    MCV 96.9 06/16/2022    RDW 17.1 06/16/2022     06/16/2022     BNP:    Lab Results   Component Value Date    PROBNP 3,857 06/14/2022    PROBNP 2,402 05/14/2022     Fasting Lipid Panel:    Lab Results   Component Value Date    CHOL 185 03/06/2017    HDL 45 03/06/2017    TRIG 235 03/06/2017     Cardiac Enzymes:  CK/MbTroponin  Lab Results   Component Value Date    TROPONINI 0.01 06/14/2022     PT/ INR   Lab Results   Component Value Date    INR 1.45 05/09/2022    INR 1.06 03/06/2017    INR 1.27 09/26/2014    PROTIME 16.6 05/09/2022    PROTIME 12.0 03/06/2017    PROTIME 13.9 09/26/2014     PTT No results found for: PTT   Lab Results   Component Value Date    MG 1.70 06/16/2022      Lab Results   Component Value Date    TSH 3.21 04/19/2014     All labs and imaging reviewed today    Assessment:  Abnormal EKG  Digoxin toxicity  Persistent atrial fibrillation: ongoing but stable, SVR noted   - s/p ablation 2014    - s/p ILR (now at [facility])   - off AC due to falls  CAD: no ischemia on stress test 2020; s/p PCI LAD 2017  Orthostatic hypotension  Syncope  Lung cancer s/p thoracotomy  Recurrent falls  Anemia  COVID+    Plan:   1. Holding digoxin due to bradycardia and toxicity  2. Not a good candidate for anticoagulation due to falls per EP  3. Continue statin  4. Avoid glen agents for HTN at this time  5. Monitor telemetry  6. Cardiology will sign off, please call with questions. He has follow up.     Iain Drummond, APRN-CNP  [facility]  [phone]

## 2022-06-16 NOTE — FLOWSHEET NOTE
06/16/22 0901   Vital Signs   Temp 98.2 °F (36.8 °C)   Temp Source Oral   Heart Rate 61   Heart Rate Source Monitor   Resp 18   BP (!) 141/82   BP Location Left upper arm   MAP (Calculated) 101.67   Patient Position Semi fowlers   Level of Consciousness Alert (0)   MEWS Score 1   Pain Assessment   Pain Assessment 0-10   Pain Level 0   Oxygen Therapy   SpO2 92 %   Pulse Oximetry Type Intermittent   Pulse Oximeter Device Mode Intermittent   Pulse Oximeter Device Location Finger   O2 Device None (Room air)   Patient is resting showing no s/s of distress. Patient is alert and oriented. Meds were given, see MAR. Patient is denying any needs. Bed is in lowest position and call light is within reach. Will continue to monitor. Shift assessment complete, see flowsheets. Mag being replaced at this time.

## 2022-06-17 VITALS
SYSTOLIC BLOOD PRESSURE: 114 MMHG | HEIGHT: 71 IN | BODY MASS INDEX: 25.25 KG/M2 | RESPIRATION RATE: 16 BRPM | TEMPERATURE: 97.8 F | HEART RATE: 77 BPM | OXYGEN SATURATION: 95 % | WEIGHT: 180.38 LBS | DIASTOLIC BLOOD PRESSURE: 58 MMHG

## 2022-06-17 LAB
A/G RATIO: 1.2 (ref 1.1–2.2)
ALBUMIN SERPL-MCNC: 3.5 G/DL (ref 3.4–5)
ALP BLD-CCNC: 115 U/L (ref 40–129)
ALT SERPL-CCNC: 8 U/L (ref 10–40)
ANION GAP SERPL CALCULATED.3IONS-SCNC: 10 MMOL/L (ref 3–16)
AST SERPL-CCNC: 26 U/L (ref 15–37)
BASOPHILS ABSOLUTE: 0 K/UL (ref 0–0.2)
BASOPHILS RELATIVE PERCENT: 0.5 %
BILIRUB SERPL-MCNC: 0.9 MG/DL (ref 0–1)
BUN BLDV-MCNC: 8 MG/DL (ref 7–20)
CALCIUM SERPL-MCNC: 8.4 MG/DL (ref 8.3–10.6)
CHLORIDE BLD-SCNC: 102 MMOL/L (ref 99–110)
CO2: 27 MMOL/L (ref 21–32)
CREAT SERPL-MCNC: 0.6 MG/DL (ref 0.8–1.3)
DIGOXIN LEVEL: 1.6 NG/ML (ref 0.8–2)
EKG ATRIAL RATE: 288 BPM
EKG DIAGNOSIS: NORMAL
EKG Q-T INTERVAL: 392 MS
EKG QRS DURATION: 86 MS
EKG QTC CALCULATION (BAZETT): 394 MS
EKG R AXIS: -31 DEGREES
EKG T AXIS: -51 DEGREES
EKG VENTRICULAR RATE: 61 BPM
EOSINOPHILS ABSOLUTE: 0.2 K/UL (ref 0–0.6)
EOSINOPHILS RELATIVE PERCENT: 3.7 %
GFR AFRICAN AMERICAN: >60
GFR NON-AFRICAN AMERICAN: >60
GLUCOSE BLD-MCNC: 83 MG/DL (ref 70–99)
HCT VFR BLD CALC: 30.7 % (ref 40.5–52.5)
HEMOGLOBIN: 10.6 G/DL (ref 13.5–17.5)
LYMPHOCYTES ABSOLUTE: 1.4 K/UL (ref 1–5.1)
LYMPHOCYTES RELATIVE PERCENT: 21.9 %
MCH RBC QN AUTO: 33 PG (ref 26–34)
MCHC RBC AUTO-ENTMCNC: 34.4 G/DL (ref 31–36)
MCV RBC AUTO: 95.9 FL (ref 80–100)
MONOCYTES ABSOLUTE: 1.3 K/UL (ref 0–1.3)
MONOCYTES RELATIVE PERCENT: 19.5 %
NEUTROPHILS ABSOLUTE: 3.5 K/UL (ref 1.7–7.7)
NEUTROPHILS RELATIVE PERCENT: 54.4 %
PDW BLD-RTO: 16.6 % (ref 12.4–15.4)
PLATELET # BLD: 175 K/UL (ref 135–450)
PMV BLD AUTO: 7.7 FL (ref 5–10.5)
POTASSIUM REFLEX MAGNESIUM: 3.8 MMOL/L (ref 3.5–5.1)
RBC # BLD: 3.2 M/UL (ref 4.2–5.9)
SODIUM BLD-SCNC: 139 MMOL/L (ref 136–145)
TOTAL PROTEIN: 6.5 G/DL (ref 6.4–8.2)
WBC # BLD: 6.5 K/UL (ref 4–11)

## 2022-06-17 PROCEDURE — 80162 ASSAY OF DIGOXIN TOTAL: CPT

## 2022-06-17 PROCEDURE — 36415 COLL VENOUS BLD VENIPUNCTURE: CPT

## 2022-06-17 PROCEDURE — 80053 COMPREHEN METABOLIC PANEL: CPT

## 2022-06-17 PROCEDURE — 85025 COMPLETE CBC W/AUTO DIFF WBC: CPT

## 2022-06-17 PROCEDURE — 6360000002 HC RX W HCPCS: Performed by: INTERNAL MEDICINE

## 2022-06-17 PROCEDURE — 93010 ELECTROCARDIOGRAM REPORT: CPT | Performed by: INTERNAL MEDICINE

## 2022-06-17 PROCEDURE — 6370000000 HC RX 637 (ALT 250 FOR IP): Performed by: INTERNAL MEDICINE

## 2022-06-17 PROCEDURE — 2580000003 HC RX 258: Performed by: INTERNAL MEDICINE

## 2022-06-17 PROCEDURE — 99239 HOSP IP/OBS DSCHRG MGMT >30: CPT | Performed by: INTERNAL MEDICINE

## 2022-06-17 RX ADMIN — CARBIDOPA AND LEVODOPA 1 TABLET: 25; 100 TABLET ORAL at 10:19

## 2022-06-17 RX ADMIN — ATORVASTATIN CALCIUM 40 MG: 40 TABLET, FILM COATED ORAL at 10:19

## 2022-06-17 RX ADMIN — FINASTERIDE 5 MG: 5 TABLET, FILM COATED ORAL at 10:19

## 2022-06-17 RX ADMIN — FERROUS SULFATE TAB 325 MG (65 MG ELEMENTAL FE) 325 MG: 325 (65 FE) TAB at 10:19

## 2022-06-17 RX ADMIN — LACOSAMIDE 100 MG: 50 TABLET, FILM COATED ORAL at 10:18

## 2022-06-17 RX ADMIN — Medication 10 ML: at 10:22

## 2022-06-17 RX ADMIN — ENOXAPARIN SODIUM 40 MG: 100 INJECTION SUBCUTANEOUS at 10:18

## 2022-06-17 RX ADMIN — OMEPRAZOLE 20 MG: 20 CAPSULE, DELAYED RELEASE ORAL at 10:19

## 2022-06-17 RX ADMIN — PAROXETINE HYDROCHLORIDE 20 MG: 20 TABLET, FILM COATED ORAL at 10:19

## 2022-06-17 ASSESSMENT — PAIN SCALES - GENERAL
PAINLEVEL_OUTOF10: 0
PAINLEVEL_OUTOF10: 0

## 2022-06-17 NOTE — PROGRESS NOTES
O2 sat @ rest on RA is 95%. O2 sat with activity on RA is 94%. O2 sat at rest with oxygen at 0lpm is 95%. O2 sat with activity with O2 @ 0lpm is 94%.   Kana Lennon RN

## 2022-06-17 NOTE — CARE COORDINATION
DISCHARGE ORDER  Date/Time 2022 11:44 AM  Completed by: Aimee Renteria RN, Case Management    Patient Name: Jie Miguel      : 1945  Admitting Diagnosis: Chest pain [R07.9]  Atrial fibrillation ([address] [I48.91]      Admit order Date and Status:6/15/2022  (verify MD's last order for status of admission)      Noted discharge order. If applicable PT/OT recommendation at Discharge: NA  DME recommendation by PT/OT:NA  Confirmed discharge plan: Yes  with whom__pt_____________  If pt confirmed DC plan does family need to be contacted by CM No if yes who______  Discharge Plan: Chart reviewed. Spoke with pt via telephone as he is covid+. Pt returning home with spouse. Pt would like to resume services for SN/PT with [facility]. TC to [address] [place] [address] and spoke with Afshan Mendes and she is aware pt will discharge home today, all needed documentation faxed to [address] [place] [address] at this time. Pt to be supplied with pulse ox by bedside ANDREA Cruz. Pt ambulated by bedside ANDREA Cruz on RA and maintained SpO2 above 94%. Date of Last IMM Given: 2022    Reviewed chart. Role of discharge planner explained and patient verbalized understanding. Discharge order is noted. Has Home O2 in place on admit:  No    Pt is being d/c'd to home today. Pt's O2 sats are 95% on RA. Discharge timeout done with Martin Cruz. All discharge needs and concerns addressed.

## 2022-06-17 NOTE — PROGRESS NOTES
Patient educated on discharge instructions as well as new medications use, dosage, administration and possible side effects. Patient verified knowledge. IV removed without difficulty and dry dressing in place. Telemetry monitor removed and [facility]. Pt left facility in stable condition toHome with all of their personal belongings.  Dudley Carter RN

## 2022-06-17 NOTE — PROGRESS NOTES
Shift assessment complete, morning medications given, patient resting with no complaints of pain, patient denies SOB, will continue to monitor.  Carmen Madrid RN

## 2022-06-17 NOTE — DISCHARGE INSTR - COC
Continuity of Care Form    Patient Name: Jie Miguel   :  [birth year]  MRN:  [id number]    Admit date:  2022  Discharge date:  2022    Code Status Order: Full Code   Advance Directives:      Admitting Physician:  Alan Roe MD  PCP: Saima López MD    Discharging Nurse: [facility] Unit/Room#: /8318-66  Discharging Unit Phone Number: ***    Emergency Contact:   Extended Emergency Contact Information  Primary Emergency Contact: Helen Luna  Address: [address]           [address] [facility], [facility] [address] Phone: [phone]  Mobile Phone: [phone]  Relation: Spouse   needed? No  Secondary Emergency Contact: [address] [place] [address] Phone: [phone]  Mobile Phone: [phone]  Relation: Other   needed? No    Past Surgical History:  Past Surgical History:   Procedure Laterality Date    ABLATION OF DYSRHYTHMIC FOCUS  2014    CARPAL TUNNEL RELEASE      CERVICAL [address] SURGERY  2010    COLONOSCOPY  2016    normal    CORONARY ANGIOPLASTY WITH STENT PLACEMENT      FEMUR SURGERY      left    KNEE SURGERY  11 R total knee replacement with femoral nerve block for pain control    LUNG REMOVAL, PARTIAL Right     20%    OTHER SURGICAL HISTORY Bilateral     excisions of lesions on bilat. neck and back    OTHER SURGICAL HISTORY  2014    Reveal Loop recorder placed in Cath Lab    UPPER GASTROINTESTINAL ENDOSCOPY  2016    gastric and esophogeal biopsy       Immunization History:   Immunization History   Administered Date(s) Administered    Influenza Virus Vaccine 2014       Active Problems:  Patient Active Problem List   Diagnosis Code    Dizziness R42    Syncope and collapse R55    Paroxysmal atrial fibrillation (HCC) I48.0    Contusion of knee, right S80. 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    History of nonmelanoma skin cancer Z85.828    Ischemic chest pain (HCC) I20.9    Left hand pain M79.642    Hand pain, right M79.641    Arthritis of carpometacarpal (CMC) joints of both thumbs M18.0    Radial styloid tenosynovitis (de quervain) M65.4    Left elbow fracture, closed, initial encounter S42.402A    Frequent falls R29.6    General weakness R53.1    Thrombocytopenia (HCC) D69.6    Malignant neoplasm of lung (HCC) C34.90    Orthostasis I95.1    Hypotension I95.9    Chest pain R07.9    Abnormal finding on EKG R94.31    Digoxin toxicity, accidental or unintentional, initial encounter T46.0X1A    Atrial fibrillation with slow ventricular response (HCC) I48.91    COVID-19 virus infection U07.1       Isolation/Infection:   Isolation            Droplet Plus          Patient Infection Status       Infection Onset Added Last Indicated Last Indicated By Review Planned Expiration Resolved Resolved By    COVID-19 22 COVID-19 & Influenza Combo 22      Resolved    COVID-19 (Rule Out) 22 COVID-19 & Influenza Combo (Ordered)   22 Rule-Out Test Resulted    COVID-19 (Rule Out) 22 COVID-19, Rapid (Ordered)   22 Rule-Out Test Resulted            Nurse Assessment:  Last Vital Signs: BP (!) 114/58   Pulse 77   Temp 97.8 °F (36.6 °C) (Oral)   Resp 16   Ht 5' 10.5\" (1.791 m)   Wt 180 lb 6 oz (81.8 kg)   SpO2 95%   BMI 25.52 kg/m²     Last documented pain score (0-10 scale): Pain Level: 0  Last Weight:   Wt Readings from Last 1 Encounters:   22 180 lb 6 oz (81.8 kg)     Mental Status:  {IP PT MENTAL STATUS:}    IV Access:  {[facility] IV ACCESS:187267055}    Nursing Mobility/ADLs:  Walking   {[facility] DME RYT}  Transfer  {[facility] DME SBUV:564657403}  Bathing  {[facility] DME ANEP:218740312}  Dressing  {[facility] DME YAAO:655131623}  Toileting  {[facility] DME PQUR:502656465}  Feeding  {[facility] DME MDLK:276493790}  Med Admin  {[facility] DME PTLK:391768109}  Med Delivery [employer] MED Delivery:577303605}    Wound Care Documentation and Therapy:        Elimination:  Continence: Bowel: {YES / QH:91154}  Bladder: {YES / OD:60857}  Urinary Catheter: {Urinary Catheter:898064426}   Colostomy/Ileostomy/Ileal Conduit: {YES / RZ:51300}       Date of Last BM: ***    Intake/Output Summary (Last 24 hours) at 2022 1139  Last data filed at 2022 0942  Gross per 24 hour   Intake 595 ml   Output 320 ml   Net 275 ml     I/O last 3 completed shifts:   In: 18 [P.O.:575]  Out: 620 [Urine:620]    Safety Concerns:     [employer] Safety Concerns:408454185}    Impairments/Disabilities:      [employer] Impairments/Disabilities:834771292}    Nutrition Therapy:  Current Nutrition Therapy:   [employer] Diet List:620272121}    Routes of Feeding: {[facility] DME Other Feedings:556943031}  Liquids: {Slp liquid thickness:55627}  Daily Fluid Restriction: {[facility] DME Yes amt example:707878029}  Last Modified Barium Swallow with Video (Video Swallowing Test): {Done Not Done NIKW:291223021}    Treatments at the Time of Hospital Discharge:   Respiratory Treatments: ***  Oxygen Therapy:  {Therapy; copd oxygen:29562}  Ventilator:    {MH CC Vent DLKC:947572678}    Rehab Therapies: {THERAPEUTIC INTERVENTION:8936537391}  Weight Bearing Status/Restrictions: [employer]  Weight Bearin}  Other Medical Equipment (for information only, NOT a DME order):  {EQUIPMENT:706260041}  Other Treatments: ***    Patient's personal belongings (please select all that are sent with patient):  {[facility] DME Belongings:098718514}    RN SIGNATURE:  {Esignature:486349547}    CASE MANAGEMENT/SOCIAL WORK SECTION    Inpatient Status Date: 6/15/2022    Readmission Risk Assessment Score:  Readmission Risk              Risk of Unplanned Readmission:  20           Discharging to Facility/ Agency   Name: [facility]  Address:  Phone:[phone]  Fax:    Dialysis Facility (if applicable)   Name:  Address:  Dialysis Schedule:  Phone:  Fax:    Case Manager/ signature: Electronically signed by Lilliam Cuevas RN on 6/17/22 at 11:40 AM EDT    PHYSICIAN SECTION    Prognosis: Good    Condition at Discharge: Stable    Rehab Potential (if transferring to Rehab):     Recommended Labs or Other Treatments After Discharge:     Physician Certification: I certify the above information and transfer of Amrita Mora  is necessary for the continuing treatment of the diagnosis listed and that he requires [address] for less 30 days.      Update Admission H&P: No change in H&P    PHYSICIAN SIGNATURE:  V/O Regina Oconnell MD/Electronically signed by Lilliam Cuevas RN on 6/17/22 at 11:40 AM EDT

## 2022-06-17 NOTE — DISCHARGE SUMMARY
ABDOMEN PELVIS W IV CONTRAST Additional Contrast? None   Final Result   No acute intra-abdominal abnormality seen. Scattered diverticula along the left colon with no pericolonic inflammation. Penile prosthesis in place which is unchanged. Small right pleural effusion which has decreased in size with slowly   resolving bibasilar atelectasis or infiltrates. Mild chronic liver changes with fatty replacement throughout which is   unchanged. CT CHEST PULMONARY EMBOLISM W CONTRAST   Final Result   1. No evidence of pulmonary embolic disease. 2. Patchy ground-glass opacification in the right middle lobe, possibly   infiltrate. Stable small partially loculated right pleural effusion with   dependent atelectasis. 3. Cardiomegaly with coronary vascular calcification. CT HEAD WO CONTRAST   Final Result   Mild atrophy and mild chronic microischemic changes scattered in the deep   white matter which is unchanged with no acute abnormality seen. Mild chronic ethmoid sinusitis which is more prominent and mild chronic left   mastoiditis which is unchanged. XR CHEST PORTABLE   Final Result   Small right pleural effusion with associated right basilar airspace opacities   which could represent atelectasis or infiltrate. Consults:     IP CONSULT TO HOSPITALIST  IP CONSULT TO CARDIOLOGY  IP CONSULT TO CARDIOLOGY  IP CONSULT TO HOME CARE NEEDS    Disposition:  Home with wife. Condition at Discharge: Stable    Discharge Instructions/Follow-up:  PCP 1 week.      Code Status:  Full Code     Activity: activity as tolerated    Diet: regular diet      Discharge Medications:     Discharge Medication List as of 6/17/2022 11:43 AM           Details   finasteride (PROSCAR) 5 MG tablet Take 5 mg by mouth dailyHistorical Med      ipratropium (ATROVENT) 0.03 % nasal spray 2 sprays by Each Nostril route every 12 hours Can use of to 4 times daily as needed, Disp-1 Bottle, R-3Normal diclofenac sodium 1 % GEL APPLY 4GM TOPICALLY FOUR TIMES A DAY, Disp-5 Tube, R-0, Normal      carbidopa-levodopa (SINEMET)  MG per tablet Take 1 tablet by mouth in the morning and at bedtime Historical Med      vitamin B-2 (RIBOFLAVIN) 25 MG TABS Take by mouthHistorical Med      diclofenac (PENNSAID) 2 % SOLN 2 pumps to the affected area 2 times a day [phone], Disp-1 Bottle, R-0Normal      atorvastatin (LIPITOR) 40 MG tablet Take 1 tablet by mouth daily, Disp-90 tablet, R-3Print      omeprazole (PRILOSEC) 20 MG delayed release capsule Take 20 mg by mouth 2 times daily      fluocinonide (LIDEX) 0.05 % cream Apply topically 2 times daily. , Disp-1 Tube, R-0, VELASQUEZ, Normal      ferrous sulfate 325 (65 FE) MG tablet Take 1 tablet by mouth 2 times daily (with meals), Disp-180 tablet, R-3      zolpidem (AMBIEN) 10 MG tablet Take 10 mg by mouth nightly as needed. LORazepam (ATIVAN) 1 MG tablet Take 2 mg by mouth in the morning and at bedtime. Historical Med      Lacosamide (VIMPAT PO) Take 100 mg by mouth 2 times daily. Indications: take dos      paroxetine (PAXIL) 20 MG tablet Take 20 mg by mouth in the morning and at bedtime Historical Med             Time Spent on discharge is more than 30 minutes in the examination, evaluation, counseling and review of medications and discharge plan. Signed:    Dandre Rangel MD   6/17/2022      Thank you Rufus Schmid MD for the opportunity to be involved in this patient's care. If you have any questions or concerns, please feel free to contact me at [phone].

## 2022-06-20 ENCOUNTER — CARE COORDINATION (OUTPATIENT)
Dept: CASE MANAGEMENT | Age: 77
End: 2022-06-20

## 2022-06-20 NOTE — CARE COORDINATION
[facility] Transitions Initial Follow Up Call    Call within 2 business days of discharge: Yes    Patient: Chicho Hoover Patient : 1945   MRN: [id number]  Reason for Admission: COVID-19 Detected on 2022, A Fib, orthostatic hypotension, syncope, anemia, digoxin toxicity, no AC 2/2 falls being eval for Watchman, hx lung ca s/p VATS resection RLL at [facility] plans OP chemo -> home with [facility] PCP  Discharge Date: 22 RARS: Readmission Risk Score: 22.9 ( )      Last Discharge [facility]       Complaint Diagnosis Description Type Department Provider    22 Abdominal Pain COVID-19 . .. ED to Hosp-Admission (Discharged) (ADMITTED) [address]  PCU Robert Rasheed MD; Yesica Shirley . .. Was this an external facility discharge? No Discharge Facility: NA    1st attempt - CTN attempted follow-up outreach to patient. Message left including CTN contact information for return call. CTN contacted Vanesa at [facility] and his DEBBIE is scheduled for today, 2022. CTN received return call from Benny at [facility]. The [facility]. nurse reached patient by phone last night to set up time for [facility] today, but today the [facility]. nurse has been unable to reach him by phone and drive by knocking on every door. They have also notified PCP Dr Bailee Mullins with [facility] of inability to reach. Patient has hx of not answering phone or door even before this visit attempt today. The last time they were able to get into the home as 2022. CTN attempted patient outreach again. Messages left on home number, spouse cell and reached friend Ileana Kingston. She states they are home, that they sleep all day and that if she is able to reach them she will let him know he needs to contact his [address] [place] [address]. States she also has trouble reaching him. [facility] notified.      Follow Up  Future Appointments   Date Time Provider Dmitriy Galo   2022 11:00 AM Manda landis, KATIE - CNP [facility]       Suzanne Toney RN

## 2022-06-21 ENCOUNTER — CARE COORDINATION (OUTPATIENT)
Dept: CASE MANAGEMENT | Age: 77
End: 2022-06-21

## 2022-06-21 NOTE — CARE COORDINATION
[facility]    Call within 2 business days of discharge: Yes    Patient: Washington Bridges Patient : 1945   MRN: [id number]  Reason for Admission: COVID-19 Detected on 2022, A Fib, orthostatic hypotension, syncope, anemia, digoxin toxicity, no AC 2/2 falls being eval for Watchman, hx lung ca s/p VATS resection RLL at [facility] plans OP chemo -> home with [facility] PCP  Discharge Date: 22 RARS: Readmission Risk Score: 22.9 ( )      Last Discharge [address] [address]       Complaint Diagnosis Description Type Department Provider    22 Abdominal Pain COVID-19 . .. ED to Hosp-Admission (Discharged) (ADMITTED) [address] PCU Epifanio Kamara MD; Antolin Ramirez . .. Spoke with: Washington Bridges (patient)    Facility: [facility]    Non-face-to-face services provided:  Obtained and reviewed discharge summary and/or continuity of care documents  Education of patient/family/caregiver/guardian to support self-management-s/s monitor; when to call home care; f/up plan  Assessment and support for treatment adherence and medication management-stopped digoxin     Was this an external facility discharge? No Discharge Facility: NA    Challenges to be reviewed by the provider   Additional needs identified to be addressed with provider: no       Method of communication with provider : none    Advance Care Planning:   Does patient have an Advance Directive: decision maker updated. Care Transition Nurse contacted the patient by telephone to perform post hospital discharge assessment. Verified name and  with patient as identifiers. Provided introduction to self, and explanation of the CTN role. CTN reviewed discharge instructions, medical action plan and red flags with patient who verbalized understanding. Patient given an opportunity to ask questions and does not have any further questions or concerns at this time. Were discharge instructions available to patient?  Yes. Reviewed appropriate site of care based on symptoms and resources available to patient including: PCP  Specialist  Home health. The patient agrees to contact the PCP office for questions related to their healthcare. Medication reconciliation was performed with patient, who verbalizes understanding of administration of home medications. Denies any COVID symptoms including SOB, fever, chills, cough, n/v/d. Confirms Loni Montemayor was able to connect with him yesterday and knows how to reach them fro prn concerns. States he has call out to PCP at [facility] (Dr Richie Tamayo) for hosp f/up appt. He is aware of contact info for Dr Fredo Villegas to schedule eval for Watchman. Reviewed cardiology appt as below. He denies needs or referrals at this time. His wife is not having COVID symptoms. CTN provided contact information. No further follow-up call identified based on severity of symptoms and risk factors.     Care Transitions 24 Hour Call    Do you have all of your prescriptions and are they filled?: Yes  Do you have support at home?: Partner/Spouse/SO  Are you an active caregiver in your home?: No  Care Transitions Interventions         Follow Up  Future Appointments   Date Time Provider Dmitriy Galo   7/21/2022 11:00 AM KATIE Ramirez - CNP [facility]       Lindsey Dean RN

## 2022-07-17 ENCOUNTER — APPOINTMENT (OUTPATIENT)
Dept: CT IMAGING | Age: 77
DRG: 181 | End: 2022-07-17
Payer: MEDICARE

## 2022-07-17 ENCOUNTER — HOSPITAL ENCOUNTER (INPATIENT)
Age: 77
LOS: 2 days | Discharge: HOME | DRG: 181 | End: 2022-07-19
Attending: EMERGENCY MEDICINE | Admitting: INTERNAL MEDICINE
Payer: MEDICARE

## 2022-07-17 ENCOUNTER — APPOINTMENT (OUTPATIENT)
Dept: GENERAL RADIOLOGY | Age: 77
DRG: 181 | End: 2022-07-17
Payer: MEDICARE

## 2022-07-17 DIAGNOSIS — R07.81 RIB PAIN ON RIGHT SIDE: ICD-10-CM

## 2022-07-17 DIAGNOSIS — U07.1 COVID: Primary | ICD-10-CM

## 2022-07-17 LAB
A/G RATIO: 1.1 (ref 1.1–2.2)
ALBUMIN SERPL-MCNC: 4.2 G/DL (ref 3.4–5)
ALP BLD-CCNC: 128 U/L (ref 40–129)
ALT SERPL-CCNC: 21 U/L (ref 10–40)
ANION GAP SERPL CALCULATED.3IONS-SCNC: 14 MMOL/L (ref 3–16)
AST SERPL-CCNC: 32 U/L (ref 15–37)
BASOPHILS ABSOLUTE: 0 K/UL (ref 0–0.2)
BASOPHILS RELATIVE PERCENT: 0.8 %
BILIRUB SERPL-MCNC: 0.7 MG/DL (ref 0–1)
BUN BLDV-MCNC: 12 MG/DL (ref 7–20)
CALCIUM SERPL-MCNC: 9.9 MG/DL (ref 8.3–10.6)
CHLORIDE BLD-SCNC: 102 MMOL/L (ref 99–110)
CO2: 25 MMOL/L (ref 21–32)
CREAT SERPL-MCNC: 0.9 MG/DL (ref 0.8–1.3)
EKG ATRIAL RATE: 416 BPM
EKG DIAGNOSIS: NORMAL
EKG Q-T INTERVAL: 392 MS
EKG QRS DURATION: 80 MS
EKG QTC CALCULATION (BAZETT): 457 MS
EKG R AXIS: -9 DEGREES
EKG T AXIS: -31 DEGREES
EKG VENTRICULAR RATE: 82 BPM
EOSINOPHILS ABSOLUTE: 0.1 K/UL (ref 0–0.6)
EOSINOPHILS RELATIVE PERCENT: 2 %
GFR AFRICAN AMERICAN: >60
GFR NON-AFRICAN AMERICAN: >60
GLUCOSE BLD-MCNC: 99 MG/DL (ref 70–99)
HCT VFR BLD CALC: 38.9 % (ref 40.5–52.5)
HEMOGLOBIN: 12.8 G/DL (ref 13.5–17.5)
INFLUENZA A: NOT DETECTED
INFLUENZA B: NOT DETECTED
LYMPHOCYTES ABSOLUTE: 1.4 K/UL (ref 1–5.1)
LYMPHOCYTES RELATIVE PERCENT: 21.2 %
MCH RBC QN AUTO: 31.5 PG (ref 26–34)
MCHC RBC AUTO-ENTMCNC: 32.9 G/DL (ref 31–36)
MCV RBC AUTO: 95.6 FL (ref 80–100)
MONOCYTES ABSOLUTE: 0.8 K/UL (ref 0–1.3)
MONOCYTES RELATIVE PERCENT: 11.6 %
NEUTROPHILS ABSOLUTE: 4.2 K/UL (ref 1.7–7.7)
NEUTROPHILS RELATIVE PERCENT: 64.4 %
PDW BLD-RTO: 13.7 % (ref 12.4–15.4)
PLATELET # BLD: 170 K/UL (ref 135–450)
PMV BLD AUTO: 8.2 FL (ref 5–10.5)
POTASSIUM REFLEX MAGNESIUM: 4.1 MMOL/L (ref 3.5–5.1)
RBC # BLD: 4.07 M/UL (ref 4.2–5.9)
SARS-COV-2 RNA, RT PCR: DETECTED
SODIUM BLD-SCNC: 141 MMOL/L (ref 136–145)
TOTAL PROTEIN: 8.2 G/DL (ref 6.4–8.2)
TROPONIN: <0.01 NG/ML
WBC # BLD: 6.5 K/UL (ref 4–11)

## 2022-07-17 PROCEDURE — 6370000000 HC RX 637 (ALT 250 FOR IP): Performed by: PHYSICIAN ASSISTANT

## 2022-07-17 PROCEDURE — 93010 ELECTROCARDIOGRAM REPORT: CPT | Performed by: INTERNAL MEDICINE

## 2022-07-17 PROCEDURE — 6370000000 HC RX 637 (ALT 250 FOR IP): Performed by: INTERNAL MEDICINE

## 2022-07-17 PROCEDURE — 71260 CT THORAX DX C+: CPT

## 2022-07-17 PROCEDURE — 84484 ASSAY OF TROPONIN QUANT: CPT

## 2022-07-17 PROCEDURE — 6360000002 HC RX W HCPCS: Performed by: PHYSICIAN ASSISTANT

## 2022-07-17 PROCEDURE — 2580000003 HC RX 258: Performed by: INTERNAL MEDICINE

## 2022-07-17 PROCEDURE — 96374 THER/PROPH/DIAG INJ IV PUSH: CPT

## 2022-07-17 PROCEDURE — 1200000000 HC SEMI PRIVATE

## 2022-07-17 PROCEDURE — 80053 COMPREHEN METABOLIC PANEL: CPT

## 2022-07-17 PROCEDURE — 36415 COLL VENOUS BLD VENIPUNCTURE: CPT

## 2022-07-17 PROCEDURE — 93005 ELECTROCARDIOGRAM TRACING: CPT | Performed by: PHYSICIAN ASSISTANT

## 2022-07-17 PROCEDURE — 99285 EMERGENCY DEPT VISIT HI MDM: CPT

## 2022-07-17 PROCEDURE — 96372 THER/PROPH/DIAG INJ SC/IM: CPT

## 2022-07-17 PROCEDURE — G0378 HOSPITAL OBSERVATION PER HR: HCPCS

## 2022-07-17 PROCEDURE — 85025 COMPLETE CBC W/AUTO DIFF WBC: CPT

## 2022-07-17 PROCEDURE — 71101 X-RAY EXAM UNILAT RIBS/CHEST: CPT

## 2022-07-17 PROCEDURE — 6360000004 HC RX CONTRAST MEDICATION: Performed by: PHYSICIAN ASSISTANT

## 2022-07-17 PROCEDURE — 70450 CT HEAD/BRAIN W/O DYE: CPT

## 2022-07-17 PROCEDURE — 87636 SARSCOV2 & INF A&B AMP PRB: CPT

## 2022-07-17 PROCEDURE — 72125 CT NECK SPINE W/O DYE: CPT

## 2022-07-17 PROCEDURE — 6360000002 HC RX W HCPCS: Performed by: INTERNAL MEDICINE

## 2022-07-17 RX ORDER — SODIUM CHLORIDE 0.9 % (FLUSH) 0.9 %
5-40 SYRINGE (ML) INJECTION EVERY 12 HOURS SCHEDULED
Status: DISCONTINUED | OUTPATIENT
Start: 2022-07-17 | End: 2022-07-19 | Stop reason: HOSPADM

## 2022-07-17 RX ORDER — LACOSAMIDE 50 MG/1
100 TABLET ORAL 2 TIMES DAILY
Status: DISCONTINUED | OUTPATIENT
Start: 2022-07-17 | End: 2022-07-19 | Stop reason: HOSPADM

## 2022-07-17 RX ORDER — FERROUS SULFATE 325(65) MG
325 TABLET ORAL 2 TIMES DAILY WITH MEALS
Status: DISCONTINUED | OUTPATIENT
Start: 2022-07-17 | End: 2022-07-19 | Stop reason: HOSPADM

## 2022-07-17 RX ORDER — SODIUM CHLORIDE 9 MG/ML
INJECTION, SOLUTION INTRAVENOUS PRN
Status: DISCONTINUED | OUTPATIENT
Start: 2022-07-17 | End: 2022-07-19 | Stop reason: HOSPADM

## 2022-07-17 RX ORDER — LIDOCAINE 4 G/G
1 PATCH TOPICAL ONCE
Status: COMPLETED | OUTPATIENT
Start: 2022-07-17 | End: 2022-07-18

## 2022-07-17 RX ORDER — ENOXAPARIN SODIUM 100 MG/ML
30 INJECTION SUBCUTANEOUS 2 TIMES DAILY
Status: DISCONTINUED | OUTPATIENT
Start: 2022-07-17 | End: 2022-07-17 | Stop reason: DRUGHIGH

## 2022-07-17 RX ORDER — ENOXAPARIN SODIUM 100 MG/ML
40 INJECTION SUBCUTANEOUS NIGHTLY
Status: DISCONTINUED | OUTPATIENT
Start: 2022-07-17 | End: 2022-07-19 | Stop reason: HOSPADM

## 2022-07-17 RX ORDER — TRAMADOL HYDROCHLORIDE 50 MG/1
50 TABLET ORAL EVERY 6 HOURS PRN
Status: DISCONTINUED | OUTPATIENT
Start: 2022-07-17 | End: 2022-07-19 | Stop reason: HOSPADM

## 2022-07-17 RX ORDER — ATORVASTATIN CALCIUM 40 MG/1
40 TABLET, FILM COATED ORAL DAILY
Status: DISCONTINUED | OUTPATIENT
Start: 2022-07-17 | End: 2022-07-17

## 2022-07-17 RX ORDER — LORAZEPAM 2 MG/1
2 TABLET ORAL 2 TIMES DAILY PRN
Status: DISCONTINUED | OUTPATIENT
Start: 2022-07-17 | End: 2022-07-19 | Stop reason: HOSPADM

## 2022-07-17 RX ORDER — ONDANSETRON 2 MG/ML
4 INJECTION INTRAMUSCULAR; INTRAVENOUS EVERY 6 HOURS PRN
Status: DISCONTINUED | OUTPATIENT
Start: 2022-07-17 | End: 2022-07-19 | Stop reason: HOSPADM

## 2022-07-17 RX ORDER — POLYETHYLENE GLYCOL 3350 17 G/17G
17 POWDER, FOR SOLUTION ORAL DAILY PRN
Status: DISCONTINUED | OUTPATIENT
Start: 2022-07-17 | End: 2022-07-19 | Stop reason: HOSPADM

## 2022-07-17 RX ORDER — ONDANSETRON 4 MG/1
4 TABLET, ORALLY DISINTEGRATING ORAL EVERY 8 HOURS PRN
Status: DISCONTINUED | OUTPATIENT
Start: 2022-07-17 | End: 2022-07-19 | Stop reason: HOSPADM

## 2022-07-17 RX ORDER — FINASTERIDE 5 MG/1
5 TABLET, FILM COATED ORAL DAILY
Status: DISCONTINUED | OUTPATIENT
Start: 2022-07-17 | End: 2022-07-19 | Stop reason: HOSPADM

## 2022-07-17 RX ORDER — SODIUM CHLORIDE 0.9 % (FLUSH) 0.9 %
5-40 SYRINGE (ML) INJECTION PRN
Status: DISCONTINUED | OUTPATIENT
Start: 2022-07-17 | End: 2022-07-19 | Stop reason: HOSPADM

## 2022-07-17 RX ORDER — PAROXETINE HYDROCHLORIDE 20 MG/1
20 TABLET, FILM COATED ORAL 2 TIMES DAILY
Status: DISCONTINUED | OUTPATIENT
Start: 2022-07-17 | End: 2022-07-19 | Stop reason: HOSPADM

## 2022-07-17 RX ORDER — ACETAMINOPHEN 650 MG/1
650 SUPPOSITORY RECTAL EVERY 6 HOURS PRN
Status: DISCONTINUED | OUTPATIENT
Start: 2022-07-17 | End: 2022-07-19 | Stop reason: HOSPADM

## 2022-07-17 RX ORDER — TRAMADOL HYDROCHLORIDE 50 MG/1
100 TABLET ORAL EVERY 6 HOURS PRN
Status: DISCONTINUED | OUTPATIENT
Start: 2022-07-17 | End: 2022-07-19 | Stop reason: HOSPADM

## 2022-07-17 RX ORDER — ACETAMINOPHEN 500 MG
1000 TABLET ORAL ONCE
Status: COMPLETED | OUTPATIENT
Start: 2022-07-17 | End: 2022-07-17

## 2022-07-17 RX ORDER — ACETAMINOPHEN 325 MG/1
650 TABLET ORAL EVERY 6 HOURS PRN
Status: DISCONTINUED | OUTPATIENT
Start: 2022-07-17 | End: 2022-07-19 | Stop reason: HOSPADM

## 2022-07-17 RX ORDER — PANTOPRAZOLE SODIUM 40 MG/1
40 TABLET, DELAYED RELEASE ORAL
Status: DISCONTINUED | OUTPATIENT
Start: 2022-07-18 | End: 2022-07-19 | Stop reason: HOSPADM

## 2022-07-17 RX ORDER — ATORVASTATIN CALCIUM 10 MG/1
20 TABLET, FILM COATED ORAL DAILY
Status: DISCONTINUED | OUTPATIENT
Start: 2022-07-18 | End: 2022-07-19 | Stop reason: HOSPADM

## 2022-07-17 RX ADMIN — ACETAMINOPHEN 1000 MG: 500 TABLET ORAL at 11:30

## 2022-07-17 RX ADMIN — PAROXETINE HYDROCHLORIDE 20 MG: 20 TABLET, FILM COATED ORAL at 21:22

## 2022-07-17 RX ADMIN — LORAZEPAM 2 MG: 2 TABLET ORAL at 23:30

## 2022-07-17 RX ADMIN — ENOXAPARIN SODIUM 40 MG: 100 INJECTION SUBCUTANEOUS at 21:22

## 2022-07-17 RX ADMIN — FERROUS SULFATE TAB 325 MG (65 MG ELEMENTAL FE) 325 MG: 325 (65 FE) TAB at 19:03

## 2022-07-17 RX ADMIN — IOPAMIDOL 75 ML: 755 INJECTION, SOLUTION INTRAVENOUS at 12:44

## 2022-07-17 RX ADMIN — LACOSAMIDE 100 MG: 50 TABLET, FILM COATED ORAL at 21:22

## 2022-07-17 RX ADMIN — CARBIDOPA AND LEVODOPA 1 TABLET: 25; 100 TABLET ORAL at 21:22

## 2022-07-17 RX ADMIN — Medication 10 ML: at 21:22

## 2022-07-17 RX ADMIN — TRAMADOL HYDROCHLORIDE 100 MG: 50 TABLET ORAL at 21:22

## 2022-07-17 RX ADMIN — FINASTERIDE 5 MG: 5 TABLET, FILM COATED ORAL at 19:03

## 2022-07-17 RX ADMIN — HYDROMORPHONE HYDROCHLORIDE 0.5 MG: 1 INJECTION, SOLUTION INTRAMUSCULAR; INTRAVENOUS; SUBCUTANEOUS at 14:58

## 2022-07-17 ASSESSMENT — PAIN DESCRIPTION - LOCATION: LOCATION: NECK;RIB CAGE

## 2022-07-17 ASSESSMENT — ENCOUNTER SYMPTOMS
ABDOMINAL PAIN: 1
RESPIRATORY NEGATIVE: 1

## 2022-07-17 ASSESSMENT — PAIN SCALES - GENERAL
PAINLEVEL_OUTOF10: 8
PAINLEVEL_OUTOF10: 3
PAINLEVEL_OUTOF10: 8
PAINLEVEL_OUTOF10: 8
PAINLEVEL_OUTOF10: 4

## 2022-07-17 ASSESSMENT — LIFESTYLE VARIABLES: HOW OFTEN DO YOU HAVE A DRINK CONTAINING ALCOHOL: NEVER

## 2022-07-17 ASSESSMENT — PAIN - FUNCTIONAL ASSESSMENT: PAIN_FUNCTIONAL_ASSESSMENT: PREVENTS OR INTERFERES WITH MANY ACTIVE NOT PASSIVE ACTIVITIES

## 2022-07-17 ASSESSMENT — PAIN DESCRIPTION - ORIENTATION: ORIENTATION: RIGHT

## 2022-07-17 ASSESSMENT — PAIN SCALES - WONG BAKER: WONGBAKER_NUMERICALRESPONSE: 2

## 2022-07-17 ASSESSMENT — PAIN DESCRIPTION - DESCRIPTORS: DESCRIPTORS: SHARP;SHOOTING;STABBING

## 2022-07-17 NOTE — ED PROVIDER NOTES
[address] ED  EMERGENCY DEPARTMENT ENCOUNTER        Pt Name: Calin Saucedo  MRN: [id number]  Armsseangfsoraya [birth year]  Date of evaluation: 7/17/2022  Provider: Parul Estrada PA-C  PCP: Raman Louis MD  Note Started: 10:37 AM EDT        I have seen and evaluated this patient with my supervising physician Cher Jain MD.    [facility]       Chief Complaint   Patient presents with    Rib Pain     States he has right sided rib pain, states he fell x1 week ago. HISTORY OF PRESENT ILLNESS   (Location, Timing/Onset, Context/Setting, Quality, Duration, Modifying Factors, Severity, Associated Signs and Symptoms)  Note limiting factors. Chief Complaint: right rib pain    Calin Saucedo is a 68 y.o. male with a past medical history of VATS R thoracotomy for lung CA, h/o afib s/p ablation in 2014, not currently on [facility], former tobacco use, seizures, CAD on in today by private vehicle with wife with complaints of right-sided rib pain and right-sided lower abdominal pain status post a fall 1 week ago. Patient states that he was getting out of the car and slipped fell backwards on the concrete. He did hit his head but there was no loss of consciousness. He is not currently on any anticoagulation. He states since then he has had pain to his right ribs and right lower quadrant as well and some pain to the back of his head. Denies pain to his neck or back. Denies numbness or tingling or bowel or bladder incontinence. He has been able to ambulate with a cane which is his baseline. He states the pain became so severe he decided to come in today. He rates his current pain an 8 out of 10 no radiation. He has not taken anything for pain otherwise denies any other complaints. No aggravating symptoms. No alleviating symptoms. Denies any nausea vomiting diarrhea. Denies blood in stool. Denies urinary symptoms or blood in his urine.   Otherwise denies any other complaints. Nursing Notes were all reviewed and agreed with or any disagreements were addressed in the HPI. REVIEW OF SYSTEMS    (2-9 systems for level 4, 10 or more for level 5)     Review of Systems   Constitutional: Negative. Respiratory: Negative. Cardiovascular: Negative. Gastrointestinal:  Positive for abdominal pain. Genitourinary: Negative. Musculoskeletal:  Positive for arthralgias. Skin: Negative. Neurological: Negative. Positives and Pertinent negatives as per HPI. Except as noted above in the ROS, all other systems were reviewed and negative. 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    S/P drug eluting coronary stent placement 03/06/2017    2.25 x 18 [employer] ADRIÁN - Distal LAD    Seizure disorder ([address]     4 weeks ago black out, pt states seizure but may be due to Heart Rate changes    Seizures ([address]     Shortness of breath 9/4/2014    Total knee replacement status 1/12/2011         SURGICAL HISTORY     Past Surgical History:   Procedure Laterality Date    ABLATION OF DYSRHYTHMIC FOCUS  9/2014    CARPAL TUNNEL RELEASE      CERVICAL [address] SURGERY  2010    COLONOSCOPY  09/26/2016    normal    CORONARY ANGIOPLASTY WITH STENT PLACEMENT      FEMUR SURGERY      left    KNEE SURGERY  1-12-11 R total knee replacement with femoral nerve block for pain control    LUNG REMOVAL, PARTIAL Right     20%    OTHER SURGICAL HISTORY Bilateral     excisions of lesions on bilat. neck and back    OTHER SURGICAL HISTORY  9/2014    Reveal Loop recorder placed in Cath Lab    UPPER GASTROINTESTINAL ENDOSCOPY  09/26/2016    gastric and esophogeal biopsy         CURRENTMEDICATIONS       Previous Medications ATORVASTATIN (LIPITOR) 40 MG TABLET    Take 1 tablet by mouth daily    CARBIDOPA-LEVODOPA (SINEMET)  MG PER TABLET    Take 1 tablet by mouth in the morning and at bedtime     DICLOFENAC (PENNSAID) 2 % SOLN    2 pumps to the affected area 2 times a day [phone]    DICLOFENAC SODIUM 1 % GEL    APPLY 4GM TOPICALLY FOUR TIMES A DAY    FERROUS SULFATE 325 (65 FE) MG TABLET    Take 1 tablet by mouth 2 times daily (with meals)    FINASTERIDE (PROSCAR) 5 MG TABLET    Take 5 mg by mouth daily    FLUOCINONIDE (LIDEX) 0.05 % CREAM    Apply topically 2 times daily. IPRATROPIUM (ATROVENT) 0.03 % NASAL SPRAY    2 sprays by Each Nostril route every 12 hours Can use of to 4 times daily as needed    LACOSAMIDE (VIMPAT PO)    Take 100 mg by mouth 2 times daily. Indications: take dos    LORAZEPAM (ATIVAN) 1 MG TABLET    Take 2 mg by mouth in the morning and at bedtime. OMEPRAZOLE (PRILOSEC) 20 MG DELAYED RELEASE CAPSULE    Take 20 mg by mouth 2 times daily    PAROXETINE (PAXIL) 20 MG TABLET    Take 20 mg by mouth in the morning and at bedtime     VITAMIN B-2 (RIBOFLAVIN) 25 MG TABS    Take by mouth    ZOLPIDEM (AMBIEN) 10 MG TABLET    Take 10 mg by mouth nightly as needed.          ALLERGIES     Morphine, Codeine, Penicillins, and Plavix [clopidogrel bisulfate]    FAMILYHISTORY       Family History   Problem Relation Age of Onset    Heart Disease Mother     Arthritis Mother     High Blood Pressure Mother     Miscarriages / [place] Mother     Stroke Mother     Arthritis Father     Heart Disease Father     Cancer Sister     Arthritis Sister     Depression Sister     Arthritis Brother     Arthritis Maternal Grandmother     Arthritis Maternal Grandfather     Arthritis Paternal Grandmother     Diabetes Paternal Grandmother     Arthritis Paternal Grandfather           SOCIAL HISTORY       Social History     Tobacco Use    Smoking status: Former     Packs/day: 2.00     Years: 40.00     Pack years: 80.00     Types: Posterior tibial pulses are 2+ on the right side and 2+ on the left side. Heart sounds: Normal heart sounds. No murmur heard. No gallop. Pulmonary:      Effort: Pulmonary effort is normal. No respiratory distress. Breath sounds: Normal breath sounds. No decreased breath sounds, wheezing, rhonchi or rales. Chest:      Chest wall: Tenderness present. No mass, lacerations, deformity, swelling, crepitus or edema. There is no dullness to percussion. Comments: Tenderness noted to right-sided anterior rib cage. Abdominal:      Palpations: Abdomen is soft. Tenderness: abdominal tenderness in the right lower quadrant There is no right CVA tenderness, left CVA tenderness, guarding or rebound. Negative signs include Jose's sign and McBurney's sign. Musculoskeletal:         General: No deformity. Normal range of motion. Cervical back: Full passive range of motion without pain, normal range of motion and neck supple. No swelling, edema, deformity, erythema, signs of trauma, lacerations, rigidity, spasms, torticollis, tenderness, bony tenderness or crepitus. No pain with movement, spinous process tenderness or muscular tenderness. Normal range of motion. Thoracic back: Normal. No swelling, edema, deformity, signs of trauma, lacerations, spasms, tenderness or bony tenderness. Normal range of motion. No scoliosis. Lumbar back: Normal. No swelling, edema, deformity, signs of trauma, lacerations, spasms, tenderness or bony tenderness. Normal range of motion. Negative right straight leg raise test and negative left straight leg raise test. No scoliosis. Right lower leg: No edema. Left lower leg: No edema. Comments: No bony numbness to the cervical, thoracic or lumbar spine. No step-off deformity. No skin changes color streaking or ecchymosis noted. Patient able to move all extremities freely. Strength assessed upper lower extremities 5 out of 5 and symmetric. Sensation intact in upper and lower extremities 5 out of 5 and symmetric. Skin:     General: Skin is warm and dry. Neurological:      Mental Status: He is alert and oriented to person, place, and time. Psychiatric:         Behavior: Behavior normal. Behavior is cooperative. DIAGNOSTIC RESULTS   LABS:    Labs Reviewed   COVID-19 & INFLUENZA COMBO - Abnormal; Notable for the following components:       Result Value    SARS-CoV-2 RNA, RT PCR DETECTED (*)     All other components within normal limits   CBC WITH AUTO DIFFERENTIAL - Abnormal; Notable for the following components:    RBC 4.07 (*)     Hemoglobin 12.8 (*)     Hematocrit 38.9 (*)     All other components within normal limits   COMPREHENSIVE METABOLIC PANEL W/ REFLEX TO MG FOR LOW K   TROPONIN       When ordered only abnormal lab results are displayed. All other labs were within normal range or not returned as of this dictation. EKG: When ordered, EKG's are interpreted by the Emergency Department Physician in the absence of a cardiologist.  Please see their note for interpretation of EKG. RADIOLOGY:   Non-plain film images such as CT, Ultrasound and MRI are read by the radiologist. Plain radiographic images are visualized and preliminarily interpreted by the ED Provider with the below findings:        Interpretation per the Radiologist below, if available at the time of this note:    CT CHEST [address]   Final Result   1. CT chest: Status post right upper lobe lobectomy. No mass or adenopathy   is identified. 2. Regressing right middle lobe ground-glass opacity, likely inflammatory. Otherwise parenchymal fibrotic changes in the lungs is stable. Small right   pleural effusion. 3. CT abdomen pelvis: No acute abdominopelvic abnormality. CT CERVICAL SPINE WO CONTRAST   Final Result   No acute fracture or subluxation. C6 corpectomy and anterior cervical fusion from C5-C7. Hardware is in stable   position. Advanced multilevel spondylosis. CT HEAD WO CONTRAST   Final Result   No acute intracranial abnormality. Stable pattern of atrophy and microangiopathic disease. Chronic inflammatory disease of the left ethmoid sinus. Chronic left mastoid   effusion. XR RIBS RIGHT INCLUDE CHEST (MIN 3 VIEWS)   Final Result   Interval increase in size of the right pleural effusion. No evidence of a   fracture nor pulmonary consolidation. RECOMMENDATION:   Clinical correlation of the patient's right pleural effusion. XR RIBS RIGHT (2 VIEWS)    (Results Pending)     No results found. PROCEDURES   Unless otherwise noted below, none     Procedures    CRITICAL CARE TIME       CONSULTS:  IP CONSULT TO HOSPITALIST      EMERGENCY DEPARTMENT COURSE and DIFFERENTIAL DIAGNOSIS/MDM:   Vitals:    Vitals:    07/17/22 1400 07/17/22 1500 07/17/22 1530 07/17/22 1600   BP: (!) 161/92 (!) 168/109 (!) 150/96 (!) 156/83   Pulse:  82 80 84   Resp:       Temp:       SpO2: 96% 97% 95% 93%   Weight:       Height:           Patient was given the following medications:  Medications   lidocaine 4 % external patch 1 patch (1 patch TransDERmal Patch Applied 7/17/22 1457)   acetaminophen (TYLENOL) tablet 1,000 mg (1,000 mg Oral Given 7/17/22 1130)   iopamidol (ISOVUE-370) 76 % injection 75 mL (75 mLs IntraVENous Given 7/17/22 1244)   HYDROmorphone (DILAUDID) injection 0.5 mg (0.5 mg IntraVENous Given 7/17/22 1458)         Is this patient to be included in the SEP-1 Core Measure due to severe sepsis or septic shock? No   Exclusion criteria - the patient is NOT to be included for SEP-1 Core Measure due to:  Viral etiology found or highly suspected (including COVID-19) without concomitant bacterial infection    Patient brought in today by private vehicle with complaints of right-sided rib pain and right-sided lower abdominal pain status post a fall 1 week prior.   On exam he is alert oriented afebrile breathing on room air satting at 97%. Nontoxic no acute respiratory distress. Old labs and records reviewed. Patient was seen by myself as well as my attending. CBC shows no white count. Hemoglobin of 12.8. EKG reviewed by my attending please see note. NO Acute electrolyte abnormalities. Kidney function liver function unremarkable. Troponin less than 0.01.    CT cervical spine shows no acute fracture or subluxation. C6 corpectomy and anterior cervical fusion from C5-C7. Hardware is in stable position. Advanced multilevel spondylosis. CT head shows no acute intracranial abnormality. Stable pattern of atrophy and microangiopathic disease chronic inflammatory disease of the left ethmoid sinus chronic left mastoid effusion. Reevaluated and is requesting additional pain medication. In addition he states that he is in so much pain and it is making it difficult for him to get up and around. He does not feel as though he can go home because of the pain and would like to stay for pain control. Will discuss with the hospitalist.    Patient was found to be COVID-positive. We will plan to admit. Patient stable and agreeable to this plan. FINAL IMPRESSION      1. COVID    2. Rib pain on right side          DISPOSITION/PLAN   DISPOSITION Admitted 07/17/2022 04:26:48 PM      PATIENT REFERRED TO:  No follow-up provider specified.     DISCHARGE MEDICATIONS:  New Prescriptions    No medications on file       DISCONTINUED MEDICATIONS:  Discontinued Medications    No medications on file              (Please note that portions of this note were completed with a voice recognition program.  Efforts were made to edit the dictations but occasionally words are mis-transcribed.)    Dina Plata PA-C (electronically signed)           Dina Plata PA-C  07/17/22 2082

## 2022-07-17 NOTE — ED NOTES
Lab called w/ positive COVID result at [phone] all providers aware     Lewis Sebastian  07/17/22 153

## 2022-07-17 NOTE — ED NOTES
Tami sent to Dr. Lyudmila Zuniga at [address] [place] [address]  07/17/22 8020    Tami completed w/ call back from Dr. Lyudmila Zuniga at [address] [address]  07/17/22 6991

## 2022-07-17 NOTE — ED PROVIDER NOTES
I independently examined and evaluated Zulema Cagle. In brief, patient is a 70-year-old male presents to the emergency department for evaluation after fall. Patient reports having a fall about a week ago and fell onto the right side. Says since then, he has been having pain to the right lower rib. Focused exam revealed stable patient with tenderness to lateral right lower chest wall/upper abdomen. For COVID-19. Patient requiring pain control. Hospitalist consulted for admission for further evaluation and treatment. Admit. The Ekg interpreted by me shows  Atrial fibrillation with a rate of 82  Axis is normal  QTc is prolonged at 457    ST Segments: Nonspecific changes    All diagnostic, treatment, and disposition decisions were made by myself in conjunction with the advanced practice provider. I personally saw the patient and performed a substantive portion of the visit including aspects of the medical decision making. Comment: Please note this report has been produced using speech recognition software and may contain errors related to that system including errors in grammar, punctuation, and spelling, as well as words and phrases that may be inappropriate. If there are any questions or concerns please feel free to contact the dictating provider for clarification. For all further details of the patient's emergency department visit, please see the advanced practice provider's documentation.         Kat Brenner MD  07/20/22 1792

## 2022-07-17 NOTE — PLAN OF CARE
76yo man Hx of VATS R thoracotomy for Lung Ca, hx of Afib s/p ablation in 2014, though recurrent paroxysmal Afib. Off AC past 7+ weeks due to recurrent falls and hitting head and deemed too high risk for long term AC and referred for OP Watchman procedure evaluation. He was admitted mid June this year treated for Afib SVR had digoxin stopped. Presents to ED today with complaint of worsening R side chest pain. [place] about a week ago - hit his head again. Work up unrevealing. Suspected rib fractures - XR dedicated Rib film pending at this time. Still testing positive for COVID. Will admit to med surg with pain control and SW evaluation. His wife is unable to care for pt at home per ED report.      Tere Velez MD  7/17/2022  4:29 PM

## 2022-07-18 PROCEDURE — 96372 THER/PROPH/DIAG INJ SC/IM: CPT

## 2022-07-18 PROCEDURE — 6370000000 HC RX 637 (ALT 250 FOR IP): Performed by: INTERNAL MEDICINE

## 2022-07-18 PROCEDURE — 97535 SELF CARE MNGMENT TRAINING: CPT

## 2022-07-18 PROCEDURE — 2580000003 HC RX 258: Performed by: INTERNAL MEDICINE

## 2022-07-18 PROCEDURE — 99220 PR INITIAL OBSERVATION CARE/DAY 70 MINUTES: CPT | Performed by: INTERNAL MEDICINE

## 2022-07-18 PROCEDURE — 6360000002 HC RX W HCPCS: Performed by: INTERNAL MEDICINE

## 2022-07-18 PROCEDURE — 97530 THERAPEUTIC ACTIVITIES: CPT

## 2022-07-18 PROCEDURE — G0378 HOSPITAL OBSERVATION PER HR: HCPCS

## 2022-07-18 PROCEDURE — 1200000000 HC SEMI PRIVATE

## 2022-07-18 PROCEDURE — 97165 OT EVAL LOW COMPLEX 30 MIN: CPT

## 2022-07-18 RX ADMIN — LORAZEPAM 2 MG: 2 TABLET ORAL at 23:20

## 2022-07-18 RX ADMIN — CARBIDOPA AND LEVODOPA 1 TABLET: 25; 100 TABLET ORAL at 21:50

## 2022-07-18 RX ADMIN — PAROXETINE HYDROCHLORIDE 20 MG: 20 TABLET, FILM COATED ORAL at 21:49

## 2022-07-18 RX ADMIN — LACOSAMIDE 100 MG: 50 TABLET, FILM COATED ORAL at 21:50

## 2022-07-18 RX ADMIN — ENOXAPARIN SODIUM 40 MG: 100 INJECTION SUBCUTANEOUS at 21:50

## 2022-07-18 RX ADMIN — Medication 10 ML: at 08:33

## 2022-07-18 RX ADMIN — FERROUS SULFATE TAB 325 MG (65 MG ELEMENTAL FE) 325 MG: 325 (65 FE) TAB at 08:32

## 2022-07-18 RX ADMIN — PANTOPRAZOLE SODIUM 40 MG: 40 TABLET, DELAYED RELEASE ORAL at 05:11

## 2022-07-18 RX ADMIN — FINASTERIDE 5 MG: 5 TABLET, FILM COATED ORAL at 08:32

## 2022-07-18 RX ADMIN — PANTOPRAZOLE SODIUM 40 MG: 40 TABLET, DELAYED RELEASE ORAL at 15:15

## 2022-07-18 RX ADMIN — FERROUS SULFATE TAB 325 MG (65 MG ELEMENTAL FE) 325 MG: 325 (65 FE) TAB at 17:39

## 2022-07-18 RX ADMIN — CARBIDOPA AND LEVODOPA 1 TABLET: 25; 100 TABLET ORAL at 08:32

## 2022-07-18 RX ADMIN — PAROXETINE HYDROCHLORIDE 20 MG: 20 TABLET, FILM COATED ORAL at 08:33

## 2022-07-18 RX ADMIN — Medication 10 ML: at 21:51

## 2022-07-18 RX ADMIN — LACOSAMIDE 100 MG: 50 TABLET, FILM COATED ORAL at 08:32

## 2022-07-18 RX ADMIN — TRAMADOL HYDROCHLORIDE 100 MG: 50 TABLET ORAL at 21:50

## 2022-07-18 RX ADMIN — ATORVASTATIN CALCIUM 20 MG: 10 TABLET, FILM COATED ORAL at 08:32

## 2022-07-18 ASSESSMENT — PAIN DESCRIPTION - LOCATION: LOCATION: NECK;RIB CAGE

## 2022-07-18 ASSESSMENT — PAIN DESCRIPTION - FREQUENCY: FREQUENCY: CONTINUOUS

## 2022-07-18 ASSESSMENT — PAIN DESCRIPTION - ONSET: ONSET: GRADUAL

## 2022-07-18 ASSESSMENT — PAIN SCALES - WONG BAKER: WONGBAKER_NUMERICALRESPONSE: 2

## 2022-07-18 ASSESSMENT — PAIN DESCRIPTION - DESCRIPTORS: DESCRIPTORS: ACHING;SHARP;STABBING

## 2022-07-18 ASSESSMENT — PAIN - FUNCTIONAL ASSESSMENT: PAIN_FUNCTIONAL_ASSESSMENT: PREVENTS OR INTERFERES SOME ACTIVE ACTIVITIES AND ADLS

## 2022-07-18 ASSESSMENT — PAIN SCALES - GENERAL
PAINLEVEL_OUTOF10: 0
PAINLEVEL_OUTOF10: 0
PAINLEVEL_OUTOF10: 7
PAINLEVEL_OUTOF10: 7

## 2022-07-18 ASSESSMENT — PAIN DESCRIPTION - ORIENTATION: ORIENTATION: RIGHT;MID

## 2022-07-18 NOTE — ACP (ADVANCE CARE PLANNING)
explained [address] of  and Living Will documents for patient via telephone. Patient states that he is comfortable having his wife as his proxy for health care, and he does not have an alternate decision maker in mind at this  time, but would like to think about it. Likewise, he states that he would like to think about whether or not he wants to complete a Living Will. Patient requested a copy of the documents to review prior to completion. Writer provided documents for patient, along with instructions for contacting [facility] for assistance in either the inpatient or outpatient setting.

## 2022-07-18 NOTE — PROGRESS NOTES
Patient placed on O2 @ 2lpm per PRN order due to drops in O2. Monitor tech alerted writer that O2 would have short drops. Writer monitored O2 levels and noted O2 would drop to 82% for short bursts and rise immediately back to low 90s. Will continue to monitor.  Gera Mauricio RN

## 2022-07-18 NOTE — CARE COORDINATION
Case Management Assessment  Initial Evaluation      Patient Name: Renuka Spann  YOB: 1945  Diagnosis: Rib pain on right side [R07.81]  COVID [U07.1]  Date / Time: 7/17/2022 10:14 AM    Admission status/Date: IP0 7/17/2022  Chart Reviewed: Yes      Patient Interviewed: Yes   Family Interviewed:  No      Hospitalization in the last 30 days:  Yes      Health Care Decision Maker :   Primary Decision Maker: Helen Luna A - Spouse - [phone]    (CM - must 1st enter selection under Navigator - emergency contact- [place] Relationship and pick relationship)   Who do you trust or have selected to make healthcare decisions for you      Current PCP: Max Ramos ([facility])    Financial  [employer] Medicare  Precert required for SNF : YES          3 night stay required - NA    ADLS  Support Systems/Care Needs: Spouse/Significant Other, Children, Family Members  Transportation: self    Meal Preparation: self    Housing  Living Arrangements: Lives w/sp  Steps: one IZZY  Intent for return to present living arrangements: Yes  Identified Issues: [facility] with [address] : Discharged from [facility] recently  Type of [address] None  Passport/Waiver : No  :                      Phone Number:    Passport/Waiver Services: NA          [facility] Provider:   Equipment:   Walker___Cane__X_RTS___ BSC___Shower Chair___Hospital Bed___W/C____Other________  02 at ____Liter(s)---wears(frequency)_______ HHN ___ CPAP___ BiPap___   N/A____      Home O2 Use :  No    If No for home O2---if presently on O2 during hospitalization:  No  if yes CM to follow for potential DC O2 need  Informed of need for care provider to bring portable home O2 tank on day of discharge for nursing to connect prior to leaving:   Not Indicated  Verbalized agreement/Understanding:   Not Indicated    Community Service Affiliation  Dialysis:  No Agency:  Location:  Dialysis Schedule:  Phone:   Fax: Other Community Services: (ex:PT/OT,Mental Health,Wound Clinic, Cardio/Pul [address])    DISCHARGE PLAN: Explained Case Management role/services. Chart reviewed. Met with pt at bedside and explained the role of the CM. Readmission for rib pain, +COVID. Plans to return home. Unsure of Yessenia  needs at this time, requests to discuss it with spouse today. CM following.

## 2022-07-18 NOTE — FLOWSHEET NOTE
07/18/22 1500   Vital Signs   Temp 97.6 °F (36.4 °C)   Temp Source Oral   Heart Rate 84   Heart Rate Source Monitor   Resp 17   BP (!) 141/84   BP Location Left upper arm   MAP (Calculated) 103   Patient Position Sitting   Level of Consciousness Alert (0)   MEWS Score 1   Pain Assessment   Pain Assessment 0-10   Pain Level 0   Oxygen Therapy   SpO2 96 %   O2 Device None (Room air)   O2 Flow Rate (L/min) 0 L/min       Afternoon vitals completed. Meds given per MAR.     Alberta Chaves RN

## 2022-07-18 NOTE — H&P
Hospital Medicine History & Physical      PCP: Ramesh Meier MD    Date of Admission: 7/17/2022    Date of Service: Pt seen/examined on 7/18/2022    Chief Complaint:    Chief Complaint   Patient presents with    Rib Pain     States he has right sided rib pain, states he fell x1 week ago. History Of Present Illness: The patient is a 68 y.o. male with h/o lung cancer s/p VATS right thoracotomy at the [facility], undergoing chemotherapy , h/o a-fib s/p ablation, seizures, CAD who presents to [facility] with c/o right sided rib pain. He fell a week ago. He is not on any AC. He did hit his head but denies loss of consciousness. BP elevated. Found to be COVID positive. Labs stable. X-ray right ribs show interval increase in size of right pleural effusion. CT head with no acute abnormality, stable pattern of atrophy and microangiopathic disease, chronic inflammatory disease of left ethmoid sinus. CT cervical spine with no acute fracture or subluxation. CT chest/abdomen/pelvis s/p right upper lobe lobectomy, regressing right middle lobe ground glass opacity, small right pleural effusion. No acute abdominopelvic abnormality. Admitted for further management/care.      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    S/P drug eluting coronary stent placement 03/06/2017    2.25 x 18 [employer] ADRIÁN - Distal LAD    Seizure disorder ([address]     4 weeks ago black out, pt states seizure but may be due to Heart Rate changes    Seizures (HCC)     Shortness of breath 9/4/2014    Total knee replacement status 1/12/2011       Past Surgical History:        Procedure Laterality Date    ABLATION OF DYSRHYTHMIC FOCUS  9/2014    CARPAL TUNNEL RELEASE CERVICAL DISC SURGERY  2010    COLONOSCOPY  09/26/2016    normal    CORONARY ANGIOPLASTY WITH STENT PLACEMENT      FEMUR SURGERY      left    KNEE SURGERY  1-12-11 R total knee replacement with femoral nerve block for pain control    LUNG REMOVAL, PARTIAL Right     20%    OTHER SURGICAL HISTORY Bilateral     excisions of lesions on bilat. neck and back    OTHER SURGICAL HISTORY  9/2014    Reveal Loop recorder placed in Cath Lab    UPPER GASTROINTESTINAL ENDOSCOPY  09/26/2016    gastric and esophogeal biopsy       Medications Prior to Admission:    Prior to Admission medications    Medication Sig Start Date End Date Taking? Authorizing Provider   finasteride (PROSCAR) 5 MG tablet Take 5 mg by mouth daily    Historical Provider, MD   ipratropium (ATROVENT) 0.03 % nasal spray 2 sprays by Each Nostril route every 12 hours Can use of to 4 times daily as needed 5/10/21   Lalo Randolph Mitmarilu,    diclofenac sodium 1 % GEL APPLY 4GM TOPICALLY FOUR TIMES A DAY 12/6/18   Norbert Bal MD   carbidopa-levodopa (SINEMET)  MG per tablet Take 1 tablet by mouth in the morning and at bedtime     Historical Provider, MD   vitamin B-2 (RIBOFLAVIN) 25 MG TABS Take by mouth    Historical Provider, MD   diclofenac (PENNSAID) 2 % SOLN 2 pumps to the affected area 2 times a day [id number] 9/26/17   Norbert Bal MD   atorvastatin (LIPITOR) 40 MG tablet Take 1 tablet by mouth daily  Patient taking differently: Take 20 mg by mouth in the morning. 8/21/17   Hebert Sousa MD   omeprazole (PRILOSEC) 20 MG delayed release capsule Take 20 mg by mouth 2 times daily    Historical Provider, MD   fluocinonide (LIDEX) 0.05 % cream Apply topically 2 times daily. 5/20/16   Candy Kayser, APRN - CNP   ferrous sulfate 325 (65 FE) MG tablet Take 1 tablet by mouth 2 times daily (with meals) 10/5/15   Hebert Sousa MD   zolpidem (AMBIEN) 10 MG tablet Take 10 mg by mouth nightly as needed.     Historical Provider, MD   LORazepam (ATIVAN) 1 MG tablet Take 2 mg by mouth in the morning and at bedtime. Historical Provider, MD   Lacosamide (VIMPAT PO) Take 100 mg by mouth 2 times daily. Indications: take dos    Historical Provider, MD   paroxetine (PAXIL) 20 MG tablet Take 20 mg by mouth in the morning and at bedtime  12/28/10   Historical Provider, MD       Allergies:  Morphine, Codeine, Penicillins, and Plavix [clopidogrel bisulfate]    Social History:     TOBACCO:   reports that he quit smoking about 29 years ago. His smoking use included cigarettes. He has a 80.00 pack-year smoking history. He has never been exposed to tobacco smoke. He has never used smokeless tobacco.  ETOH:   reports no history of alcohol use. Family History:   Positive as follows:        Problem Relation Age of Onset    Heart Disease Mother     Arthritis Mother     High Blood Pressure Mother     Miscarriages / Stillbirths Mother     Stroke Mother     Arthritis Father     Heart Disease Father     Cancer Sister     Arthritis Sister     Depression Sister     Arthritis Brother     Arthritis Maternal Grandmother     Arthritis Maternal Grandfather     Arthritis Paternal Grandmother     Diabetes Paternal Grandmother     Arthritis Paternal Grandfather        REVIEW OF SYSTEMS:       Constitutional: Negative for fever   HENT: Negative for sore throat   Eyes: Negative for redness   Respiratory: Negative  for dyspnea, cough   Cardiovascular: Negative for chest pain   Gastrointestinal: Negative for vomiting, diarrhea   Genitourinary: Negative for hematuria   Musculoskeletal: Negative for arthralgias   Skin: Negative for rash   Neurological: Negative for syncope   Hematological: Negative for adenopathy       PHYSICAL EXAM:    BP (!) 145/85   Pulse 87   Temp 98.8 °F (37.1 °C) (Oral)   Resp 18   Ht 5' 10\" (1.778 m)   Wt 173 lb 5 oz (78.6 kg)   SpO2 93%   BMI 24.87 kg/m²     Gen: No distress. Alert. Eyes: PERRL. No sclera icterus. No conjunctival injection. ENT: No discharge. Pharynx clear. Neck: No JVD. No Carotid Bruit. Trachea midline. Resp: No accessory muscle use. No crackles. No wheezes. No rhonchi. CV: Regular rate. Regular rhythm. No murmur. No rub. No edema. GI: Non-tender. Non-distended. No masses. No organomegaly. Normal bowel sounds. No hernia. Skin: Warm and dry. No nodule on exposed extremities. No rash on exposed extremities. M/S: No cyanosis. No joint deformity. No clubbing. Neuro: Awake. Grossly nonfocal    Psych: Oriented x 3. No anxiety or agitation. CBC:   Recent Labs     07/17/22  1130   WBC 6.5   HGB 12.8*   HCT 38.9*   MCV 95.6        BMP:   Recent Labs     07/17/22  1130      K 4.1      CO2 25   BUN 12   CREATININE 0.9     LIVER PROFILE:   Recent Labs     07/17/22  1130   AST 32   ALT 21   BILITOT 0.7   ALKPHOS 128         CARDIAC ENZYMES  Recent Labs     07/17/22  1130   TROPONINI <0.01         CULTURES  COVID: detected  Influenza: negative    EKG:  I have reviewed the EKG with the following interpretation:   Atrial fibrillation, Baseline artifact    RADIOLOGY  CT CHEST ABDOMEN PELVIS W CONTRAST   Final Result   1. CT chest: Status post right upper lobe lobectomy. No mass or adenopathy   is identified. 2. Regressing right middle lobe ground-glass opacity, likely inflammatory. Otherwise parenchymal fibrotic changes in the lungs is stable. Small right   pleural effusion. 3. CT abdomen pelvis: No acute abdominopelvic abnormality. CT CERVICAL SPINE WO CONTRAST   Final Result   No acute fracture or subluxation. C6 corpectomy and anterior cervical fusion from C5-C7. Hardware is in stable   position. Advanced multilevel spondylosis. CT HEAD WO CONTRAST   Final Result   No acute intracranial abnormality. Stable pattern of atrophy and microangiopathic disease. Chronic inflammatory disease of the left ethmoid sinus. Chronic left mastoid   effusion.          XR RIBS RIGHT INCLUDE CHEST (MIN 3 VIEWS)   Final Result   Interval increase in size of the right pleural effusion. No evidence of a   fracture nor pulmonary consolidation. RECOMMENDATION:   Clinical correlation of the patient's right pleural effusion. Principal Problem:    COVID  Resolved Problems:    * No resolved hospital problems. *        ASSESSMENT/PLAN:  COVID-19  -not hypoxic -does not meet any criteria for treatment  -tested positive 6/14/22    Right rib pain  -Tramadol prn  -no fractures noted in imaging    Right lung cancer  -s/p VATS right thoracotomy   Small right pleural effusion on CT    Seizure disorder  -seizure precautions  -continue Vimpat    CAD  -on statin. No AC due to high risk falls    Persistent A-fib  -off AC due to high risk falls and hitting his head  -being evaluated for Watchman procedure  -rates stable  -needs follow up OP    Weakness  -PT/OT consult    Iron deficiency anemia  -cont ferrous sulfate    GERD  -on PPI    BPH  -con Proscar    Hyperlipidemia  -on Atorvastatin    DVT Prophylaxis: Lovenox  Diet: ADULT DIET; Regular  Code Status: Full Code    EVELIN Garcia.

## 2022-07-18 NOTE — FLOWSHEET NOTE
07/18/22 0815   Vital Signs   Temp 98.8 °F (37.1 °C)   Temp Source Oral   Heart Rate 87   Heart Rate Source Monitor   Resp 18   BP (!) 145/85   BP Location Left upper arm   MAP (Calculated) 105   Patient Position Sitting   Level of Consciousness Alert (0)   MEWS Score 1   Pain Assessment   Pain Assessment 0-10   Pain Level 0   Oxygen Therapy   SpO2 93 %   O2 Device None (Room air)   O2 Flow Rate (L/min) 0 L/min       Pt assessment complete; see flow sheets. Vitals completed. Meds given per MAR. Pt sitting up eating breakfast. Denies chest pain or pressure. Pt stable at this time.     Kennedy Rosario RN

## 2022-07-18 NOTE — PROGRESS NOTES
Patient admitted to room 327 from ER. Patient oriented to room, call light, bed rails, phone, lights and bathroom. Patient instructed about the schedule of the day including: vital sign frequency, lab draws, possible tests, frequency of MD and staff rounds, daily weights, I &O's and prescribed diet. Telemetry box in place, patient aware of placement and reason. Bed locked, in lowest position, side rails up 2/4, call light within reach. Recliner Assessment  Patient is able to demonstrate the ability to move from a reclining position to an upright position within the recliner. 4 Eyes Skin Assessment     The patient is being assess for   Admission    I agree that 2 RN's have performed a thorough Head to Toe Skin Assessment on the patient. ALL assessment sites listed below have been assessed.       Areas assessed for pressure by both nurses:   [x]   Head, Face, and Ears   [x]   Shoulders, Back, and Chest, Abdomen  [x]   Arms, Elbows, and Hands   [x]   Coccyx, Sacrum, and Ischium  [x]   Legs, Feet, and Heels        Skin Assessed Under all Medical Devices by both nurses: n/a  {Medical devices:33028}                 Areas noted:  Abrasion to center of back of head  Abrasion to outer aspect of left eyebrow        **SHARE this note so that the co-signing nurse is able to place an eSignature**    Co-signer eSignature: {Esignature:[id number]}    Does the Patient have Skin Breakdown related to pressure? no   Jorge Prevention initiated:  NA   Wound Care Orders initiated:  NA      [facility] nurse consulted for Pressure Injury (Stage 3,4, Unstageable, DTI, NWPT, Complex wounds)and New or Established Ostomies:  NA      Primary Nurse eSignature: Electronically signed by Chacha Cruz RN on 7/17/22 at 10:20 PM EDT

## 2022-07-18 NOTE — ACP (ADVANCE CARE PLANNING)
Advance Care Planning     General Advance Care Planning (ACP) Conversation    Date of Conversation: 7/17/2022  Conducted with: Patient with Decision Making Capacity    Healthcare Decision Maker:    Primary Decision Maker: Helen Luna [facility] - [phone]  Click here to complete Healthcare Decision Makers including selection of the Healthcare Decision Maker Relationship (ie \"Primary\"). Today we documented Decision Maker(s) consistent with Legal Next of Kin hierarchy.     Content/Action Overview:  Has NO ACP documents/care preferences - information provided, considering goals and options  Referal to pastoral Care for ACP dox  Reviewed DNR/DNI and patient elects Full Code (Attempt Resuscitation)    Length of Voluntary ACP Conversation in minutes:  <16 minutes (Non-Billable)    Acosta Deleon RN

## 2022-07-18 NOTE — PROGRESS NOTES
Bedside report and [facility]. Pt currently resting in bed with the call light within reach. Pt denies any other care needs at this time. Pt stable at this time.       Alberta Chaves RN

## 2022-07-18 NOTE — PLAN OF CARE
Problem: Safety - Adult  Goal: Free from fall injury  Outcome: Progressing  Flowsheets (Taken 7/17/2022 2312)  Free From Fall Injury: Instruct family/caregiver on patient safety  Note: Patient white board updated with RN and PCA phone numbers, verbalizes understanding on how to utilize call light. All standard safety precautions in place at this time. Problem: Skin/Tissue Integrity  Goal: Absence of new skin breakdown  Description: 1. Monitor for areas of redness and/or skin breakdown  2. Assess vascular access sites hourly  3. Every 4-6 hours minimum:  Change oxygen saturation probe site  4. Every 4-6 hours:  If on nasal continuous positive airway pressure, respiratory therapy assess nares and determine need for appliance change or resting period. Outcome: Progressing  Note: Skin remains intact at this time. Encouraged to frequently turn and reposition. Problem: Cardiovascular - Adult  Goal: Absence of cardiac dysrhythmias or at baseline  Outcome: Progressing  Flowsheets (Taken 7/17/2022 2312)  Absence of cardiac dysrhythmias or at baseline: Monitor cardiac rate and rhythm  Note: Continues on portable tele at this time.

## 2022-07-18 NOTE — FLOWSHEET NOTE
07/17/22 2122   Vital Signs   Temp 97.2 °F (36.2 °C)   Temp Source Oral   Heart Rate 90   Heart Rate Source Monitor   Resp 18   BP (!) 149/72   BP Location Left upper arm   BP Method Automatic   MAP (Calculated) 97.67   Patient Position Supine   Level of Consciousness Alert (0)   MEWS Score 1   Pain Assessment   Pain Level 8   Patient's Stated Pain Goal 0 - No pain   Pain Location Neck;Rib cage   Pain Orientation Right   Pain Descriptors Sharp; Shooting; Stabbing   Functional Pain Assessment Prevents or interferes with many active not passive activities   Non-Pharmaceutical Pain Intervention(s) Emotional support;Distraction; Rest   Opioid-Induced Sedation   POSS Score 1   Oxygen Therapy   SpO2 95 %   O2 Device None (Room air)     Assessment completed. C/O neck and right rib pain. PRN Tramadol per PRN order at this time. Will monitor for effectiveness. Resting in bed with call light in reach.  Deric Davis RN

## 2022-07-18 NOTE — PROGRESS NOTES
Inpatient Occupational Therapy  Evaluation and Treatment    Unit: PCU  Date:  7/18/2022  Patient Name:    Ekaterina Stanley  Admitting diagnosis:  Rib pain on right side [R07.81]  COVID [U07.1]  Admit Date:  7/17/2022  Precautions/Restrictions/WB Status/ Lines/ Wounds/ Oxygen: fall risk, IV, telemetry, and Droplet Plus precautions (+ COVID 19)    Treatment Time:  15:32-16:05  Treatment Number: 1     Billable Treatment Time: 23 minutes   Total Treatment Time:   33   minutes    Patient Goals for Therapy:  \" to stop falling \"      Discharge Recommendations: Home with 24/7 supervision and outpatient therapy  DME needs for discharge: Needs Met       Therapy recommendations for staff:   Isaac Zelaya with use of Single point cane on right for all ambulation within room    History of Present Illness: 68 y.o. male with a past medical history of VATS R thoracotomy for lung CA, h/o afib s/p ablation in 2014, not currently on [facility], former tobacco use, seizures, CAD on in today by private vehicle with wife with complaints of right-sided rib pain and right-sided lower abdominal pain status post a fall 1 week ago. Patient states that he was getting out of the car and slipped fell backwards on the concrete. He did hit his head but there was no loss of consciousness. He is not currently on any anticoagulation. He states since then he has had pain to his right ribs and right lower quadrant as well and some pain to the back of his head. Denies pain to his neck or back. Denies numbness or tingling or bowel or bladder incontinence. He has been able to ambulate with a cane which is his baseline. He states the pain became so severe he decided to come in today. He rates his current pain an 8 out of 10 no radiation. He has not taken anything for pain otherwise denies any other complaints. No aggravating symptoms. No alleviating symptoms. Denies any nausea vomiting diarrhea. Denies blood in stool.   Denies urinary symptoms or blood in his urine. Otherwise denies any other complaints. Home Health S4 Level Recommendation:  NA  AM-PAC Score:      Preadmission Environment    Pt. Lives with spouse  Home environment:  one story home  Steps to enter first floor:    1 steps to enter    Steps to second floor: N/A  Bathroom:  Tub/Shower unit, Shower Chair , and elevated toilet   Equipment owned:  [facility], [address] ([place]), [address] [email or web address] ([place]), and reacher     Preadmission Status / PLOF:  History of falls   Yes 3 falls in last month, falls mostly in PM   Pt. Able to drive   Yes hasn't driven in 3 months  Pt Fully independent with ADL's  Yes  Pt. Required assistance from family for:  Cleaning, Cooking, and Laundry     Pt. Fully independent for transfers and gait and walked with: [address], [place] but times he does not     Pain  Yes  Rating:mild  Location:abdomen   Pain Medicine Status: No request made      Cognition    A&O x4   Able to follow 2 step commands    Subjective  Patient lying supine in bed with no family present   Pt agreeable to this OT eval & tx. Upper Extremity ROM:    WFL,  pt able to perform all bed mobility, transfers, and gait without ROM limitation. Upper Extremity Strength:    BUE strength WFL, but not formally assessed w/ MMT    Upper Extremity Sensation    WFL    Upper Extremity Proprioception:  WFL    Coordination and Tone  WFL    Balance  Functional Sitting Balance:  WFL  Functional Standing Balance: WFL during eval however pt is slightly off balance at times. Standing balance test pt was able to maintain upright posture with no LOB noted     Bed mobility:    Supine to sit:    Independent  Sit to supine:   Independent  Rolling:    N/A  Scooting in sitting:  Independent  Scooting to head of bed:   N/A    Bridging:   N/A    Transfers:    Sit to stand:  SBA  Stand to sit:  SBA  Bed to chair:   SBA and with cane   Standard toilet: SBA and with cane   Bed to BSC:  N/A    Dressing:      UE:   N/A  LE:    Independent with don/doff socks Bathing:    UE:  N/A  LE:  N/A    Eating:   Independent    Toileting:  SBA    Grooming: SBA stand at sink wash hands     Activity Tolerance   Pt completed therapy session with Dizziness noted with standing   SpO2: 93% on RA at rest   HR: 95  BP: 125/80    Positioning Needs:   Up in chair, call light and needs in reach. Exercise / Activities Initiated:   N/A    Patient/Family Education:   Role of OT  Recommendations for DC  Safe RW use/hand placement    Assessment of Deficits: Pt seen for Occupational therapy evaluation in acute care setting. Pt demonstrated decreased Activity tolerance, ADLs, Balance , Safety Awareness, Strength, and Transfers. Pt functioning below baseline and will likely benefit from skilled occupational therapy services to maximize safety and independence. Goal(s) : To be met in 3 Visits:  1). Bed to toilet/BSC: Independent    To be met in 5 Visits:  1). Supine to/from Sit:  N/A  2). Upper Body Bathing:   N/A  3). Lower Body Bathing:   N/A  4). Upper Body Dressing:  N/A  5). Lower Body Dressing:  N/A  6). Pt to demonstrate UE exs x 15 reps with minimal cues    Rehabilitation Potential:  Good for goals listed above. Strengths for achieving goals include: Pt motivated, PLOF, Family Support, and Pt cooperative  Barriers to achieving goals include:  No barriers     Plan: To be seen 2-3 x/wk  while in acute care setting for therapeutic exercises, bed mobility, transfers, dressing, bathing, family/patient education, ADL/IADL retraining, energy conservation training.      Gayla Murillo OTR/L #[id number]            If patient discharges from this facility prior to next visit, this note will serve as the Discharge Summary

## 2022-07-19 ENCOUNTER — TELEPHONE (OUTPATIENT)
Dept: CARDIOLOGY CLINIC | Age: 77
End: 2022-07-19

## 2022-07-19 VITALS
RESPIRATION RATE: 16 BRPM | SYSTOLIC BLOOD PRESSURE: 131 MMHG | HEIGHT: 70 IN | HEART RATE: 83 BPM | TEMPERATURE: 97.4 F | WEIGHT: 168.25 LBS | OXYGEN SATURATION: 97 % | BODY MASS INDEX: 24.09 KG/M2 | DIASTOLIC BLOOD PRESSURE: 77 MMHG

## 2022-07-19 PROBLEM — R07.81 RIB PAIN ON RIGHT SIDE: Status: ACTIVE | Noted: 2022-07-19

## 2022-07-19 PROBLEM — I25.10 CORONARY ARTERY DISEASE INVOLVING NATIVE CORONARY ARTERY OF NATIVE HEART WITHOUT ANGINA PECTORIS: Status: ACTIVE | Noted: 2022-07-19

## 2022-07-19 PROCEDURE — 6370000000 HC RX 637 (ALT 250 FOR IP): Performed by: INTERNAL MEDICINE

## 2022-07-19 PROCEDURE — 97116 GAIT TRAINING THERAPY: CPT

## 2022-07-19 PROCEDURE — 99217 PR OBSERVATION CARE DISCHARGE MANAGEMENT: CPT | Performed by: INTERNAL MEDICINE

## 2022-07-19 PROCEDURE — 97112 NEUROMUSCULAR REEDUCATION: CPT

## 2022-07-19 PROCEDURE — G0378 HOSPITAL OBSERVATION PER HR: HCPCS

## 2022-07-19 PROCEDURE — 97161 PT EVAL LOW COMPLEX 20 MIN: CPT

## 2022-07-19 PROCEDURE — 2580000003 HC RX 258: Performed by: INTERNAL MEDICINE

## 2022-07-19 RX ORDER — ATORVASTATIN CALCIUM 40 MG/1
20 TABLET, FILM COATED ORAL DAILY
Qty: 30 TABLET | Refills: 1 | Status: SHIPPED | OUTPATIENT
Start: 2022-07-19

## 2022-07-19 RX ADMIN — Medication 10 ML: at 08:12

## 2022-07-19 RX ADMIN — LACOSAMIDE 100 MG: 50 TABLET, FILM COATED ORAL at 08:11

## 2022-07-19 RX ADMIN — FERROUS SULFATE TAB 325 MG (65 MG ELEMENTAL FE) 325 MG: 325 (65 FE) TAB at 08:12

## 2022-07-19 RX ADMIN — PANTOPRAZOLE SODIUM 40 MG: 40 TABLET, DELAYED RELEASE ORAL at 06:50

## 2022-07-19 RX ADMIN — PAROXETINE HYDROCHLORIDE 20 MG: 20 TABLET, FILM COATED ORAL at 08:12

## 2022-07-19 RX ADMIN — CARBIDOPA AND LEVODOPA 1 TABLET: 25; 100 TABLET ORAL at 08:17

## 2022-07-19 RX ADMIN — ATORVASTATIN CALCIUM 20 MG: 10 TABLET, FILM COATED ORAL at 08:12

## 2022-07-19 RX ADMIN — FINASTERIDE 5 MG: 5 TABLET, FILM COATED ORAL at 08:12

## 2022-07-19 NOTE — TELEPHONE ENCOUNTER
Patient is scheduled for Watchman clinic today. He is currently admitted. Will cancel this appointment. Please reschedule.

## 2022-07-19 NOTE — DISCHARGE INSTR - COC
Continuity of Care Form    Patient Name: Madiha Gibbons   :    MRN:  [id number]    Admit date:  2022  Discharge date:  ***    Code Status Order: Full Code   Advance Directives:     Admitting Physician:  Jacques Greene MD  PCP: Chaim Scott MD    Discharging Nurse: [facility] Unit/Room#: /9493-14  Discharging Unit Phone Number: ***    Emergency Contact:   Extended Emergency Contact Information  Primary Emergency Contact: Helen Luna  Address: [facility] [address]           [address] [facility], [facility] [address] Phone: [phone]  Mobile Phone: [phone]  Relation: Spouse   needed? No  Secondary Emergency Contact: [address] [place] [address] Phone: [phone]  Mobile Phone: [phone]  Relation: Other   needed? No    Past Surgical History:  Past Surgical History:   Procedure Laterality Date    ABLATION OF DYSRHYTHMIC FOCUS  2014    CARPAL TUNNEL RELEASE      CERVICAL [address] SURGERY  2010    COLONOSCOPY  2016    normal    CORONARY ANGIOPLASTY WITH STENT PLACEMENT      FEMUR SURGERY      left    KNEE SURGERY  11 R total knee replacement with femoral nerve block for pain control    LUNG REMOVAL, PARTIAL Right     20%    OTHER SURGICAL HISTORY Bilateral     excisions of lesions on bilat. neck and back    OTHER SURGICAL HISTORY  2014    Reveal Loop recorder placed in Cath Lab    UPPER GASTROINTESTINAL ENDOSCOPY  2016    gastric and esophogeal biopsy       Immunization History:   Immunization History   Administered Date(s) Administered    Influenza Virus Vaccine 2014       Active Problems:  Patient Active Problem List   Diagnosis Code    Dizziness R42    Syncope and collapse R55    Paroxysmal atrial fibrillation (HCC) I48.0    Contusion of knee, right S80. 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    History of nonmelanoma skin cancer Z85.828    Ischemic chest pain (HCC) I20.9    Left hand pain M79.642    Hand pain, right M79.641    Arthritis of carpometacarpal (CMC) joints of both thumbs M18.0    Radial styloid tenosynovitis (de quervain) M65.4    Left elbow fracture, closed, initial encounter S42.402A    Frequent falls R29.6    General weakness R53.1    Thrombocytopenia (HCC) D69.6    Malignant neoplasm of lung (HCC) C34.90    Orthostasis I95.1    Hypotension I95.9    Chest pain R07.9    Abnormal finding on EKG R94.31    Digoxin toxicity, accidental or unintentional, initial encounter T46.0X1A    Atrial fibrillation with slow ventricular response (HCC) I48.91    COVID-19 virus infection U07.1    COVID U07.1    Rib pain on right side R07.81    Coronary artery disease involving native coronary artery of native heart without angina pectoris I25.10       Isolation/Infection:   Isolation            Droplet Plus          Patient Infection Status       Infection Onset Added Last Indicated Last Indicated By Review Planned Expiration Resolved Resolved By    COVID-19 22 COVID-19 & Influenza Combo 22      Resolved    COVID-19 (Rule Out) 22 COVID-19 & Influenza Combo (Ordered)   22 Rule-Out Test Resulted    COVID-19 22 COVID-19 & Influenza Combo   22     COVID-19 (Rule Out) 22 COVID-19 & Influenza Combo (Ordered)   22 Rule-Out Test Resulted    COVID-19 (Rule Out) 22 COVID-19, Rapid (Ordered)   22 Rule-Out Test Resulted            Nurse Assessment:  Last Vital Signs: /77   Pulse 83   Temp 97.4 °F (36.3 °C) (Oral)   Resp 16   Ht 5' 10\" (1.778 m)   Wt 168 lb 4 oz (76.3 kg)   SpO2 97%   BMI 24.14 kg/m²     Last documented pain score (0-10 scale): Pain Level: 7  Last Weight:   Wt Readings from Last 1 Encounters:   22 168 lb 4 oz (76.3 kg)     Mental Status:  {IP PT MENTAL STATUS:}    IV Access:  { JOANIE IV ACCESS:790129023}    Nursing Mobility/ADLs:  Walking   {CHP DME UBFW:628670024}  Transfer  {CHP DME KVIS:341368610}  Bathing  {CHP DME IWRM:910289357}  Dressing  {CHP DME MCYQ:084722775}  Toileting  {CHP DME JPCQ:866986936}  Feeding  {P DME WGWV:431629394}  Med Admin  {CHP DME RGNQ:492499454}  Med Delivery   { JOANIE MED Delivery:658678172}    Wound Care Documentation and Therapy:        Elimination:  Continence: Bowel: {YES / QB:31020}  Bladder: {YES / LM:62906}  Urinary Catheter: {Urinary Catheter:744741823}   Colostomy/Ileostomy/Ileal Conduit: {YES / KS:57515}       Date of Last BM: ***    Intake/Output Summary (Last 24 hours) at 2022 1125  Last data filed at 2022 1819  Gross per 24 hour   Intake 420 ml   Output 200 ml   Net 220 ml     I/O last 3 completed shifts: In: 910 [P.O.:900;  I.V.:10]  Out: 700 [Urine:700]    Safety Concerns:     [address] [employer] Safety Concerns:648752484}    Impairments/Disabilities:      [address] [employer] Impairments/Disabilities:452127023}    Nutrition Therapy:  Current Nutrition Therapy:   [address] [employer] Diet List:400443182}    Routes of Feeding: {[facility] DME Other Feedings:204454671}  Liquids: {Slp liquid thickness:34235}  Daily Fluid Restriction: {CHP DME Yes amt example:755155899}  Last Modified Barium Swallow with Video (Video Swallowing Test): {Done Not Done HRYH:747656641}    Treatments at the Time of Hospital Discharge:   Respiratory Treatments: ***  Oxygen Therapy:  {Therapy; copd oxygen:35401}  Ventilator:    { CC Vent BNMS:397788730}    Rehab Therapies: {THERAPEUTIC INTERVENTION:7294401356}  Weight Bearing Status/Restrictions: [employer]  Weight Bearin}  Other Medical Equipment (for information only, NOT a DME order):  {EQUIPMENT:647483886}  Other Treatments: ***    Patient's personal belongings (please select all that are sent with patient):  {LUANNE DME Belongings:130748065}    RN SIGNATURE:  {Esignature:348802880}    CASE MANAGEMENT/SOCIAL WORK

## 2022-07-19 NOTE — FLOWSHEET NOTE
07/18/22 2132   Vital Signs   Temp 97.7 °F (36.5 °C)   Temp Source Oral   Heart Rate 82   Heart Rate Source Monitor   Resp 18   BP (!) 146/92   BP Location Left upper arm   BP Method Automatic   MAP (Calculated) 110   Patient Position Sitting   Level of Consciousness Alert (0)   MEWS Score 1   Pain Assessment   Pain Assessment 0-10   Pain Level 7   Patient's Stated Pain Goal 1   Pain Location Neck;Rib cage   Pain Orientation Right;Mid   Pain Descriptors Aching; Garon McCurtain; Stabbing   Functional Pain Assessment Prevents or interferes some active activities and ADLs   Pain Frequency Continuous   Pain Onset Gradual   Non-Pharmaceutical Pain Intervention(s) Rest;Repositioned;Nurse notified (comment)   Oxygen Therapy   SpO2 96 %   O2 Device None (Room air)     Shift assessment completed. VSS. PRN Tramadol given for c/o pain. Sitting in reclining chair at this time watching tv, will continue to monitor. Judson Quiroga RN.

## 2022-07-19 NOTE — DISCHARGE SUMMARY
Name:  Krysta Davis  Room:  /4052-76  MRN:    [id number]    Discharge Summary      This discharge summary is in conjunction with a complete physical exam done on the day of discharge. Attending Physician: Dr. Hermila Miller  Discharging Physician: Dr. Milagros Vicente: 7/17/2022  Discharge:  7/19/2022    HPI:    The patient is a 68 y.o. male with h/o lung cancer s/p VATS right thoracotomy at the [place], undergoing chemotherapy , h/o a-fib s/p ablation, seizures, CAD who presents to [facility] with c/o right sided rib pain. He fell a week ago. He is not on any AC. He did hit his head but denies loss of consciousness. BP elevated. Found to be COVID positive. Labs stable. X-ray right ribs show interval increase in size of right pleural effusion. CT head with no acute abnormality, stable pattern of atrophy and microangiopathic disease, chronic inflammatory disease of left ethmoid sinus. CT cervical spine with no acute fracture or subluxation. CT chest/abdomen/pelvis s/p right upper lobe lobectomy, regressing right middle lobe ground glass opacity, small right pleural effusion. No acute abdominopelvic abnormality. Admitted for further management/care. Diagnoses this Admission and Hospital Course   COVID-19  -not hypoxic -does not meet any criteria for treatment  -tested positive 6/14/22     Right rib pain  -Tramadol prn  -no fractures noted in imaging     Right lung cancer  -s/p VATS right thoracotomy   Small right pleural effusion on CT     Seizure disorder  -seizure precautions  -continued Vimpat     CAD  -on statin. No AC due to high risk falls     Persistent A-fib  -off AC due to high risk falls and hitting his head  -being evaluated for Watchman procedure  -rates stable  -needs follow up OP     Weakness  -PT/OT consult  D/c home.      Iron deficiency anemia  -cont'd ferrous sulfate    GERD  -on PPI     BPH  -cont'd Proscar     Hyperlipidemia  -on Atorvastatin     DVT Prophylaxis: Lovenox    Procedures (Please Review Full Report for Details)  N/A    Consults    N/A      Physical Exam at Discharge:    /77   Pulse 83   Temp 97.4 °F (36.3 °C) (Oral)   Resp 16   Ht 5' 10\" (1.778 m)   Wt 168 lb 4 oz (76.3 kg)   SpO2 97%   BMI 24.14 kg/m²     Gen: No distress. Alert. Eyes: PERRL. No sclera icterus. No conjunctival injection. ENT: No discharge. Pharynx clear. Neck: No JVD. No Carotid Bruit. Trachea midline. Resp: No accessory muscle use. No crackles. No wheezes. No rhonchi. CV: Regular rate. Regular rhythm. No murmur. No rub. No edema. GI: Non-tender. Non-distended. No masses. No organomegaly. Normal bowel sounds. No hernia. Skin: Warm and dry. No nodule on exposed extremities. No rash on exposed extremities. M/S: No cyanosis. No joint deformity. No clubbing. Neuro: Awake. Grossly nonfocal    Psych: Oriented x 3. No anxiety or agitation. CBC:   Recent Labs     07/17/22  1130   WBC 6.5   HGB 12.8*   HCT 38.9*   MCV 95.6        BMP:   Recent Labs     07/17/22  1130      K 4.1      CO2 25   BUN 12   CREATININE 0.9     LIVER PROFILE:   Recent Labs     07/17/22  1130   AST 32   ALT 21   BILITOT 0.7   ALKPHOS 128         CARDIAC ENZYMES  Recent Labs     07/17/22  1130   TROPONINI <0.01       U/A:    Lab Results   Component Value Date/Time    NITRITE neg 05/31/2012 03:04 PM    COLORU Yellow 06/14/2022 06:25 PM    WBCUA 0-2 06/14/2022 06:25 PM    RBCUA 3-4 06/14/2022 06:25 PM    MUCUS 2+ 06/14/2022 06:25 PM    BACTERIA 1+ 05/31/2012 02:51 PM    CLARITYU Clear 06/14/2022 06:25 PM    SPECGRAV >=1.030 06/14/2022 06:25 PM    LEUKOCYTESUR Negative 06/14/2022 06:25 PM    BLOODU Negative 06/14/2022 06:25 PM    GLUCOSEU Negative 06/14/2022 06:25 PM    GLUCOSEU NEGATIVE 05/31/2012 02:51 PM    AMORPHOUS Rare 06/14/2022 06:25 PM           CULTURES  COVID: detected  Influenza: negative    RADIOLOGY  CT CHEST ABDOMEN PELVIS W CONTRAST   Final Result   1.  CT chest: Status post right upper lobe lobectomy. No mass or adenopathy   is identified. 2. Regressing right middle lobe ground-glass opacity, likely inflammatory. Otherwise parenchymal fibrotic changes in the lungs is stable. Small right   pleural effusion. 3. CT abdomen pelvis: No acute abdominopelvic abnormality. CT CERVICAL SPINE WO CONTRAST   Final Result   No acute fracture or subluxation. C6 corpectomy and anterior cervical fusion from C5-C7. Hardware is in stable   position. Advanced multilevel spondylosis. CT HEAD WO CONTRAST   Final Result   No acute intracranial abnormality. Stable pattern of atrophy and microangiopathic disease. Chronic inflammatory disease of the left ethmoid sinus. Chronic left mastoid   effusion. XR RIBS RIGHT INCLUDE CHEST (MIN 3 VIEWS)   Final Result   Interval increase in size of the right pleural effusion. No evidence of a   fracture nor pulmonary consolidation. RECOMMENDATION:   Clinical correlation of the patient's right pleural effusion. Discharge Medications     Medication List        CONTINUE taking these medications      atorvastatin 40 MG tablet  Commonly known as: LIPITOR  Take 0.5 tablets by mouth in the morning. carbidopa-levodopa  MG per tablet  Commonly known as: SINEMET     * diclofenac 2 % Soln  Commonly known as: Pennsaid  2 pumps to the affected area 2 times a day [phone]     * diclofenac sodium 1 % Gel  Commonly known as: VOLTAREN  APPLY 4GM TOPICALLY FOUR TIMES A DAY     ferrous sulfate 325 (65 Fe) MG tablet  Commonly known as: IRON 325  Take 1 tablet by mouth 2 times daily (with meals)     finasteride 5 MG tablet  Commonly known as: PROSCAR     fluocinonide 0.05 % cream  Commonly known as: LIDEX  Apply topically 2 times daily.      ipratropium 0.03 % nasal spray  Commonly known as: ATROVENT  2 sprays by Each Nostril route every 12 hours Can use of to 4 times daily as needed     LORazepam 1 MG tablet  Commonly known as: ATIVAN     omeprazole 20 MG delayed release capsule  Commonly known as: PRILOSEC     PARoxetine 20 MG tablet  Commonly known as: PAXIL     VIMPAT PO     vitamin B-2 25 MG Tabs  Commonly known as: RIBOFLAVIN     zolpidem 10 MG tablet  Commonly known as: AMBIEN           * This list has 2 medication(s) that are the same as other medications prescribed for you. Read the directions carefully, and ask your doctor or other care provider to review them with you. Where to Get Your Medications        These medications were sent to [facility] [id number] [facility], [address]  [address], [address] [address]      Phone: [phone]   atorvastatin 40 MG tablet           Discharged in stable condition to home. Follow Up: Follow up with PCP. EVELIN Garcia.

## 2022-07-19 NOTE — PLAN OF CARE
Problem: Discharge Planning  Goal: Discharge to home or other facility with appropriate resources  7/19/2022 0844 by Goyo Stahl RN  Outcome: Progressing Towards Goal  Flowsheets (Taken 7/19/2022 0830)  Discharge to home or other facility with appropriate resources: Identify barriers to discharge with patient and caregiver  7/18/2022 2344 by Jose D Saucedo RN  Outcome: Progressing Towards Goal  Flowsheets (Taken 7/18/2022 2344)  Discharge to home or other facility with appropriate resources:   Identify barriers to discharge with patient and caregiver   Arrange for needed discharge resources and transportation as appropriate   Identify discharge learning needs (meds, wound care, etc)  Note: Verbalizes readiness to go home. Gait slow and steady, utilizes cane for mobility. Problem: Safety - Adult  Goal: Free from fall injury  7/18/2022 2344 by Jose D Saucedo RN  Outcome: Progressing Towards Goal  Flowsheets (Taken 7/18/2022 2344)  Free From Fall Injury: Instruct family/caregiver on patient safety  Note: Patient educated on fall prevention measures and verbalizes understanding. Problem: Cardiovascular - Adult  Goal: Maintains optimal cardiac output and hemodynamic stability  7/18/2022 2344 by Jose D Saucedo RN  Outcome: Progressing Towards Goal  Flowsheets (Taken 7/18/2022 2344)  Maintains optimal cardiac output and hemodynamic stability:   Monitor blood pressure and heart rate   Assess for signs of decreased cardiac output  Note: Continue to monitor BP and tele with portable telemetry pack per order.

## 2022-07-19 NOTE — PLAN OF CARE
Problem: Discharge Planning  Goal: Discharge to home or other facility with appropriate resources  7/18/2022 2344 by Saroj Bennett RN  Outcome: Progressing Towards Goal  Flowsheets (Taken 7/18/2022 2344)  Discharge to home or other facility with appropriate resources:   Identify barriers to discharge with patient and caregiver   Arrange for needed discharge resources and transportation as appropriate   Identify discharge learning needs (meds, wound care, etc)  Note: Verbalizes readiness to go home. Gait slow and steady, utilizes cane for mobility. 7/18/2022 1038 by Yvrose Bui RN  Outcome: Progressing Towards Goal     Problem: Pain  Goal: Verbalizes/displays adequate comfort level or baseline comfort level  7/18/2022 1038 by Yvrose Bui RN  Outcome: Progressing Towards Goal     Problem: Safety - Adult  Goal: Free from fall injury  7/18/2022 2344 by Saroj Bennett RN  Outcome: Progressing Towards Goal  Flowsheets (Taken 7/18/2022 2344)  Free From Fall Injury: Instruct family/caregiver on patient safety  Note: Patient educated on fall prevention measures and verbalizes understanding. 7/18/2022 1038 by Yvrose Bui RN  Outcome: Progressing Towards Goal  Flowsheets (Taken 7/18/2022 0957)  Free From Fall Injury:   Instruct family/caregiver on patient safety   Based on caregiver fall risk screen, instruct family/caregiver to ask for assistance with transferring infant if caregiver noted to have fall risk factors     Problem: Skin/Tissue Integrity  Goal: Absence of new skin breakdown  Description: 1. Monitor for areas of redness and/or skin breakdown  2. Assess vascular access sites hourly  3. Every 4-6 hours minimum:  Change oxygen saturation probe site  4. Every 4-6 hours:  If on nasal continuous positive airway pressure, respiratory therapy assess nares and determine need for appliance change or resting period.   7/18/2022 1038 by Yvrose Bui RN  Outcome: Progressing Towards Goal Problem: ABCDS Injury Assessment  Goal: Absence of physical injury  7/18/2022 1038 by Marina Goodson RN  Outcome: Progressing Towards Goal  Flowsheets  Taken 7/18/2022 0957 by Marina Goodson RN  Absence of Physical Injury: Implement safety measures based on patient assessment  Taken 7/17/2022 2157 by Truman Felty, RN  Absence of Physical Injury: Implement safety measures based on patient assessment     Problem: Respiratory - Adult  Goal: Achieves optimal ventilation and oxygenation  7/18/2022 1038 by Marina Goodson RN  Outcome: Progressing Towards Goal     Problem: Cardiovascular - Adult  Goal: Maintains optimal cardiac output and hemodynamic stability  7/18/2022 2344 by Truman Felty, RN  Outcome: Progressing Towards Goal  Flowsheets (Taken 7/18/2022 2344)  Maintains optimal cardiac output and hemodynamic stability:   Monitor blood pressure and heart rate   Assess for signs of decreased cardiac output  Note: Continue to monitor BP and tele with portable telemetry pack per order.    7/18/2022 1038 by Marina Goodson RN  Outcome: Progressing Towards Goal  Goal: Absence of cardiac dysrhythmias or at baseline  7/18/2022 1038 by Marina Goodson RN  Outcome: Progressing Towards Goal     Problem: Skin/Tissue Integrity - Adult  Goal: Skin integrity remains intact  7/18/2022 1038 by Marina Goodson RN  Outcome: Progressing Towards Goal  Flowsheets  Taken 7/18/2022 0958 by Marina Goodson RN  Skin Integrity Remains Intact:   Monitor for areas of redness and/or skin breakdown   Assess vascular access sites hourly  Taken 7/17/2022 2207 by Truman Felty, RN  Skin Integrity Remains Intact: Monitor for areas of redness and/or skin breakdown     Problem: Infection - Adult  Goal: Absence of infection at discharge  7/18/2022 1038 by Marina Goodson RN  Outcome: Progressing Towards Goal  Goal: Absence of infection during hospitalization  7/18/2022 1038 by Marina Goodson RN  Outcome: Progressing Towards Goal     Problem: Metabolic/Fluid and Electrolytes - Adult  Goal: Electrolytes maintained within normal limits  7/18/2022 1038 by Jane Borja RN  Outcome: Progressing Towards Goal     Problem: Hematologic - Adult  Goal: Maintains hematologic stability  7/18/2022 1038 by Jane Borja RN  Outcome: Progressing Towards Goal

## 2022-07-19 NOTE — PROGRESS NOTES
Patient educated on discharge instructions as well as new medications use, dosage, administration and possible side effects. Patient verified knowledge. IV removed without difficulty and dry dressing in place. Telemetry monitor removed and returned to [facility] [place]. Pt left facility in stable condition to Home with all of their personal belongings.

## 2022-07-19 NOTE — CARE COORDINATION
DISCHARGE ORDER  Date/Time 2022 11:25 AM  Completed by: Jennifer Jacobs RN, Case Management    Patient Name: Eva Hilton      : 1945  Admitting Diagnosis: Rib pain on right side [R07.81]  COVID [U07.1]      Admit order Date and Status:IP 2022  (verify MD's last order for status of admission)      Noted discharge order. If applicable PT/OT recommendation at Discharge: Home w/ OP PT/OT    Discharge Plan: Dc home today    Date of Last IMM Given:     Reviewed chart. Role of discharge planner explained and patient verbalized understanding. Discharge order is noted. Has Home O2 in place on admit:  No  Informed of need to bring portable home O2 tank on day of discharge for nursing to connect prior to leaving:   No  Verbalized agreement/Understanding:   No    Discharge timeout done with Emily Healy. All discharge needs and concerns addressed.

## 2022-07-19 NOTE — PROGRESS NOTES
Shift report given to [facility]. Patient stable. Care transferred.  Judson Quiroga RN

## 2022-07-19 NOTE — PROGRESS NOTES
Inpatient Physical Therapy Evaluation and Treatment    Unit: PCU  Date:  7/19/2022  Patient Name:    Stanley Hendrix  Admitting diagnosis:  Rib pain on right side [R07.81]  COVID [U07.1]  Admit Date:  7/17/2022  Precautions/Restrictions/WB Status/ Lines/ Wounds/ Oxygen: Fall risk, Bed/chair alarm, Lines -IV, Telemetry, and Isolation Precautions: Droplet Plus - COVID    Treatment Time: [id number] - [id number]  Treatment Number:  1   Timed Code Treatment Minutes: 31 minutes  Total Treatment Minutes:  41  minutes    Patient Goals for Therapy: \" Go home \"          Discharge Recommendations: Home PRN assist with Outpatient PT (recommended [employer] program)  DME needs for discharge: Needs Met       Therapy recommendation for EMS Transport: can transport by wheelchair    Therapy recommendations for staff: Independent with use of Single point cane  for all transfers and ambulation to/from chair  to/from bathroom  within room    History of Present Illness: H & P as per Terri Dumas MD's note dated 7/18/2022  The patient is a 68 y.o. male with h/o lung cancer s/p VATS right thoracotomy at [facility], undergoing chemotherapy , h/o a-fib s/p ablation, seizures, CAD who presents to [facility] with c/o right sided rib pain. He fell a week ago. He is not on any AC. He did hit his head but denies loss of consciousness. BP elevated. Found to be COVID positive. Labs stable. X-ray right ribs show interval increase in size of right pleural effusion. CT head with no acute abnormality, stable pattern of atrophy and microangiopathic disease, chronic inflammatory disease of left ethmoid sinus. CT cervical spine with no acute fracture or subluxation. CT chest/abdomen/pelvis s/p right upper lobe lobectomy, regressing right middle lobe ground glass opacity, small right pleural effusion. No acute abdominopelvic abnormality. Admitted for further management/care.     Home Health S4 Level Recommendation:  NA  AM-PAC Mobility Score    AM-PAC Inpatient Mobility Raw Score : 22       Preadmission Environment    Pt. Lives with spouse  Home environment:    one story home  Steps to enter first floor:    1 steps to enter      Steps to second floor: N/A  Bathroom:       Tub/Shower unit, Shower Chair , and elevated toilet   Equipment owned:      [employer], [address] [employer] (SW), Reyna John (RW), and reacher      Preadmission Status / PLOF:  History of falls             Yes 3 falls in last month, falls mostly in PM  Pt. Able to drive          Yes hasn't driven in 3 months  Pt Fully independent with ADL's         Yes  Pt. Required assistance from family for:  Cleaning, Cooking, and Laundry     Pt. Fully independent for transfers and gait and walked with: Javy Anna but times he does not     Pain   Yes  Location: R lower rib area (mid axillary line)  Ratin /10  Pain Medicine Status: RN notified    Cognition    A&O x4   Able to follow 2 step commands    Subjective  Patient sitting up in chair with no family present. Pt agreeable to this PT eval & tx. Upper Extremity ROM/Strength  Please see OT evaluation. Lower Extremity ROM / Strength   AROM WFL: Yes  ROM limitations: N/A    Strength Assessment (measured on a 0-5 scale):  R LE   Quad   5   Ant Tib  5   Hamstring 5   Iliopsoas 5  L LE  Quad   5   Ant Tib  5   Hamstring 5   Iliopsoas 5    Lower Extremity Sensation    WFL    Lower Extremity Proprioception:   WFL    Coordination and Tone  WFL    Balance  Sitting:  Normal; Independent  Comments:     Standing: Good ; Modified Independent  Comments: ~10 min    Bed Mobility   Supine to Sit:    Not Tested  Sit to Supine:   Not Tested  Rolling:   Not Tested  Scooting in sitting: Independent  Scooting in supine:  Not Tested    Transfer Training     Sit to stand:   Independent  Stand to sit:    Independent  Bed to Chair:   Independent with use of No AD    Gait gait completed as indicated below  Distance:      100 ft + 100 ft  Deviations (firm surface/linoleum):  decreased chico  Assistive Device Used:    No AD and gait belt + with SPC 2nd attempt of ambulation  Level of Assist:    Modified Independent  Comment: Patient showed decrease in walking speed while turning around and had difficulty to walk with increased speed, had to slow down while walking over obstacles. Stair Training stairs completed as indicated below  # of Steps:   3  Level of Assist:  Modified Independent  UE Support:  no handrail  Assistive Device:  No AD  Pattern:   non-reciprocal pattern  Comments:     Activity Tolerance   Pt completed therapy session with No adverse symptoms noted w/activity  BP: 128/74  SpO2: 95%  HR: 83 bpm    After ambulation  SpO2: 91 - 95% on RA  HR: 121 bpm recovered to baseline within 3 min    Positioning Needs   Pt up in chair, no alarm needed, positioned in proper neutral alignment and pressure relief provided. Call light provided and all needs within reach    Exercises Initiated  all completed bilaterally unless indicated  Ankle Pumps x 20 reps  marching x 20 reps    Other  None. Patient/Family Education   Pt educated on role of inpatient PT, POC, importance of continued activity, DC recommendations, safety awareness, transfer techniques, and calling for assist with mobility. Assessment  Pt seen for Physical Therapy evaluation in acute care setting. Pt demonstrated decreased Activity tolerance, Balance, Safety, and Strength as well as decreased independence with Ambulation and Transfers. Patient presented with increased postural sway during transitional functional mobility. Patient showed abnormality of the gait as presented by 18/24 score for dynamic gait index with high fall risk. Patient had difficulty to turn and reach out on the L side and looking over shoulder on the L side. Patient reported that he had been getting treated for Parkinson's disease as well.  Patient also presented with decreased stepping response with perturbations, decreased reaction time. Patient will benefit from Neuro rehabilitation to improve dynamic standing balance, improve gait and improve balance reactions to avoid falls. Patient will benefit from [employer] program to improve overall functional mobility and to create improved self awareness. Patient was provided hand out for [employer] program.    Recommending Home PRN assist and Outpatient Physical therapy upon discharge as patient functioning independently and would benefit from continued therapy services    Goals : To be met in 3 visits:  1). Independent with LE Ex x 10 reps    To be met in 6 visits:      Rehabilitation Potential: Good  Strengths for achieving goals include:   Pt motivated and Pt cooperative   Barriers to achieving goals include:    No Barriers    Plan    To be seen 2-3 x / week  while in acute care setting for therapeutic exercises, bed mobility, transfers, progressive gait training, balance training, and family/patient education. Signature: Demetria Spurling, MS PT, # [id number]    If patient discharges from this facility prior to next visit, this note will serve as the Discharge Summary.  6

## 2022-07-19 NOTE — TELEPHONE ENCOUNTER
07/18-Called ([phone]) unable to make contact, LM for pt to contact office in order to reschedule w/AV

## 2022-07-19 NOTE — DISCHARGE INSTR - DIET

## 2022-07-19 NOTE — TELEPHONE ENCOUNTER
Pts wife called [facility] and stated pt is hospitalized w/ covid currently. Requesting a call from [employer] to get back on the scheduled w/ Dr. Sasha Leonard as they were very interested in this.   Please advise

## 2022-07-19 NOTE — TELEPHONE ENCOUNTER
Pts wife called [facility] and cancelled [facility]'s hsfu appt for Thursday due to pt having covid. Is there a overbook date and time available in 3 or so weeks? Please advise.

## 2022-07-19 NOTE — FLOWSHEET NOTE
07/19/22 0223   Vital Signs   Temp 97.1 °F (36.2 °C)   Temp Source Oral   Heart Rate 86   Heart Rate Source Monitor   Resp 16   BP (!) 160/84   BP Location Left upper arm   BP Method Automatic   MAP (Calculated) 109.33   Patient Position Sitting   Level of Consciousness Alert (0)   MEWS Score 1   Oxygen Therapy   SpO2 97 %   O2 Device None (Room air)     Sitting up in chair watching tv. Denies pain/discomfort. States the bed makes him too hot and denies to desire to lay down. Denies needs at this time. Call light in reach.  Vonnie Mobley, RN

## 2022-07-19 NOTE — FLOWSHEET NOTE
07/19/22 0810   Vitals   Temp 97.4 °F (36.3 °C)   Temp Source Oral   Heart Rate 83   Heart Rate Source Monitor   Resp 16   /77   BP Location Left upper arm   BP Upper/Lower Upper   BP Method Automatic   Patient Position Sitting   Level of Consciousness Alert (0)   MEWS Score 1   Cardiac Rhythm Atrial fib   Oxygen Therapy   SpO2 97 %   O2 Device None (Room air)   Shift assessment completed-see flow sheet. Patient sitting up in chair awake,alert and oriented x4. Vitals stable. HR 83, A-fib on monitor. Lung sounds diminished. 97% on RA. Morning medications given per order. Patient denies any discomfort at this time. Patient denies any further needs at this time,call light within reach.

## 2022-07-20 ENCOUNTER — CARE COORDINATION (OUTPATIENT)
Dept: CASE MANAGEMENT | Age: 77
End: 2022-07-20

## 2022-07-20 NOTE — CARE COORDINATION
[facility] Transitions Initial Follow Up Call    Call within 2 business days of discharge: Yes    Patient: Aliza Bourne Patient : 1945   MRN: [id number]  Reason for Admission: COVID-19 + 2022 initially + 2022, R rib pain, R lung cancer s/p VATS R thoracotamy, seizure disorder, CAD, persistent A Fib (no AC) being eval for Watchman, weakness, iron deficiency anemia, GERD, BPH, HLD -> home no services  Discharge Date: 22 RARS: Readmission Risk Score: 19.1      Last Discharge [facility]       Date Complaint Diagnosis Description Type Department Provider    22 Rib Pain COVID . Susie Blew . ED to Hosp-Admission (Discharged) (ADMITTED) [facility] PCU Justyn Goyal MD; Marta Boateng. .. Was this an external facility discharge? No Discharge Facility: NA    1st attempt - CTN attempted follow-up outreach to patient. Message left including CTN contact information for return call on home number. Follow Up  No future appointments.     Jennifer Taylor RN

## 2022-07-20 NOTE — TELEPHONE ENCOUNTER
Spoke with pt's wife Emmie De La Garza, she proceeded to inform me that the pt was taken off his blood thinners while he was recently hospitalized and the pt is currently not taking any blood thinners. I was going to schedule for the next available with [facility] but that is 8/30/22. The pt's wife also informed me that they had to cancel the  appt for 7/19/22 due to pt having covid. Please advise how pt should proceed. Emmie De La Garza would like a call back to [phone].

## 2022-07-21 ENCOUNTER — CARE COORDINATION (OUTPATIENT)
Dept: CASE MANAGEMENT | Age: 77
End: 2022-07-21

## 2022-07-21 NOTE — CARE COORDINATION
Rafat [facility] Transitions Initial Follow Up Call    Call within 2 business days of discharge: Yes    Patient: Adina Brennan Patient : 1945   MRN: [id number]  Reason for Admission: COVID-19 + 2022 initially + 2022, R rib pain, R lung cancer s/p VATS R thoracotamy, seizure disorder, CAD, persistent A Fib (no AC) being eval for Watchman, weakness, iron deficiency anemia, GERD, BPH, HLD -> home no services  Discharge Date: 22 RARS: Readmission Risk Score: 19.1      Last Discharge [facility]       Date Complaint Diagnosis Description Type Department Provider    22 Rib Pain COVID . [facility] . ED to Hosp-Admission (Discharged) (ADMITTED) [facility] PCU Rosy Stephen MD; Mechelle Hamilton. .. Was this an external facility discharge? No Discharge Facility: NA    2nd attempt - CTN attempted follow-up outreach to patient. Message left including CTN contact information for return call. No further CTN outreach scheduled at this time. Follow Up  No future appointments.     Margarita Perez RN

## 2022-07-21 NOTE — TELEPHONE ENCOUNTER
07/21-Called ([phone]) unable to schedule a reschedule for AV. LM for pt to contact office & ask to peak to Belgica Whitaker.

## 2022-07-22 NOTE — TELEPHONE ENCOUNTER
Spoke to pt to relay message. V/U     Pt scheduled for [facility] first available 8/2.  Pt would like to further discuss watchman at 8/2 OV before rescheduling with Dr. Ana Rosa Kyle

## 2022-07-22 NOTE — TELEPHONE ENCOUNTER
I do not have anything available prior to August 30. I would like him to reschedule his appointment with Dr. Dorie Phelps to discuss [address].

## 2022-07-22 NOTE — TELEPHONE ENCOUNTER
Encounter Summary
  Created on: 2020
 
 SantosVeda
 External Reference #: [id number]
 : 43
 Sex: Female
 
 Demographics
 
 
+-----------------------+----------------------+
| Address               | [address]      |
|                       | [place]  [address] |
+-----------------------+----------------------+
| Home Phone            | [phone]      |
+-----------------------+----------------------+
| Preferred Language    | Unknown              |
+-----------------------+----------------------+
| Marital Status        |               |
+-----------------------+----------------------+
| Orthodoxy Affiliation | 1077                 |
+-----------------------+----------------------+
| Race                  | Unknown              |
+-----------------------+----------------------+
| Ethnic Group          | Unknown              |
+-----------------------+----------------------+
 
 
 Author
 
 
+--------------+--------------------------------------------+
| Author       | [facility]  |
|              | and [facility]                                |
+--------------+--------------------------------------------+
| Organization | [facility]  |
|              | and [facility]                                |
+--------------+--------------------------------------------+
| Address      | Unknown                                    |
+--------------+--------------------------------------------+
| Phone        | Unavailable                                |
+--------------+--------------------------------------------+
 
 
 
 Support
 
 
+----------------+--------------+-----------------+-----------------+
| Name           | Relationship | Address         | Phone           |
+----------------+--------------+-----------------+-----------------+
| Johan Santos | [place]         | [address] | [phone] |
|                |              | [place]  [address] |                 |
+----------------+--------------+-----------------+-----------------+
 
 
 
 
 Care Team Providers
 
 
+--------------------------+------+-----------------+
| Care Team Member Name    | Role | Phone           |
+--------------------------+------+-----------------+
| William Ferguson MD | PCP  | [phone] |
+--------------------------+------+-----------------+
 
 
 
 Reason for Visit
 
 
+----------------+----------+
| Reason         | Comments |
+----------------+----------+
| CPAP Follow Up |          |
+----------------+----------+
 
 
 
 Encounter Details
 
 
+--------+---------+----------------------+---------------------+----------------------+
| Date   | Type    | Department           | Care Team           | Description          |
+--------+---------+----------------------+---------------------+----------------------+
| / | Office  |   [facility]      |   Johan Shook  | EDMUNDO (obstructive     |
| 2017   | Visit   | SLEEP DISORDER  [address]  | [address]   | sleep apnea)         |
|        |         | [address]  [place]      | [address]  [place]    | (Primary Dx);        |
|        |         | [place] [address] | [place] [address]     | Restless legs        |
|        |         |   [phone]       | [phone]        | syndrome; History of |
|        |         |                      | [phone] (Fax)  |  anemia              |
+--------+---------+----------------------+---------------------+----------------------+
 
 
 
 Social History
 
 
+---------------+------------+-----------+--------+------------------+
| Tobacco Use   | Types      | Packs/Day | Years  | Date             |
|               |            |           | Used   |                  |
+---------------+------------+-----------+--------+------------------+
| Former Smoker | Cigarettes | 0.25      | 5      | Quit: 1968 |
+---------------+------------+-----------+--------+------------------+
 
 
 
+---------------------+---+---+---+
| Smokeless Tobacco:  |   |   |   |
| Never Used          |   |   |   |
+---------------------+---+---+---+
 
 
 
+-------------+-------------+---------+--------------+
| Alcohol Use | Drinks/Week | oz/Week | Comments     |
+-------------+-------------+---------+--------------+
 
| Yes         |             |         | Twice a year |
+-------------+-------------+---------+--------------+
 
 
 
+------------------+---------------+
| Sex Assigned at  | Date Recorded |
| Birth            |               |
+------------------+---------------+
| Not on file      |               |
+------------------+---------------+
 
 
 
+----------------+-------------+-------------+
| Job Start Date | Occupation  | Industry    |
+----------------+-------------+-------------+
| Not on file    | Not on file | Not on file |
+----------------+-------------+-------------+
 
 
 
+----------------+--------------+------------+
| Travel History | Travel Start | Travel End |
+----------------+--------------+------------+
 
 
 
+-------------------------------------+
| No recent travel history available. |
+-------------------------------------+
 documented as of this encounter
 
 Last Filed Vital Signs
 
 
+-------------------+----------------------+----------------------+----------+
| Vital Sign        | Reading              | Time Taken           | Comments |
+-------------------+----------------------+----------------------+----------+
| Blood Pressure    | 152/70               | 2017  1:40 PM  |          |
|                   |                      | PDT                  |          |
+-------------------+----------------------+----------------------+----------+
| Pulse             | 91                   | 2017  1:40 PM  |          |
|                   |                      | PDT                  |          |
+-------------------+----------------------+----------------------+----------+
| Temperature       | -                    | -                    |          |
+-------------------+----------------------+----------------------+----------+
| Respiratory Rate  | 16                   | 2017  1:40 PM  |          |
|                   |                      | PDT                  |          |
+-------------------+----------------------+----------------------+----------+
| Oxygen Saturation | 96%                  | 2017  1:40 PM  |          |
|                   |                      | PDT                  |          |
+-------------------+----------------------+----------------------+----------+
| Inhaled Oxygen    | -                    | -                    |          |
| Concentration     |                      |                      |          |
+-------------------+----------------------+----------------------+----------+
| Weight            | 94.2 kg (207 lb 11.2 | 2017  1:40 PM  |          |
|                   |  oz)                 | PDT                  |          |
+-------------------+----------------------+----------------------+----------+
| Height            | -                    | -                    |          |
 
+-------------------+----------------------+----------------------+----------+
| Body Mass Index   | 39.24                | 2017  9:32 AM  |          |
|                   |                      | PDT                  |          |
+-------------------+----------------------+----------------------+----------+
 documented in this encounter
 
 Patient Instructions
 Patient Instructions Johan Shook Jr., MD - 2017  2:04 PM PDT
 Restless Legs Syndrome: What You Can Do
 Symptoms of restless leg syndrome (RLS) can be treated. Together, you and your health care 
provider can work on your treatment plan. If needed, medications may be prescribed. Also daniel wiseman what you can do to ease your discomfort. Good sleep habits and a healthy lifestyle will h
elp you rest better at night and have more energy during the day.
 
 Working with your health care provider
 RLS may occur on its own and may be passed on in families. It is sometimes linked to other 
medical problems. Lowiron may cause some RLS symptoms. Your health care provider may order
 a lab test to check your iron level. Other medical problems associated with RLS are kidney 
disease, diabetes, and multiple sclerosis. Your doctor mayprescribe medications to reduce 
your symptoms and help you sleep better.
 Tips for temporary relief
 To reduce your discomfort, try the following:
  Walking or stretching
  Rubbing your legs
  Having a massage
  Taking a hot or cold bath
  Doing activities that make muscles in your hands or legs work
  Relaxing with yoga or meditation
 Good sleep habits
 Even though you have RLS, you can still have restful sleep. Try these good sleeping habits:
  Keep a regular sleep schedule. Go to bed and get up at the same time each day.
  Avoid or limit naps.
  Make sure the bedroom is quiet, dark, and not too hot or too cold.
  Use your bed only for sleep and sex.
 Healthy lifestyle
 Your lifestyle affects your health and your sleep. Here are some healthy habits:
  Eat a balanced diet. To get enough vitamins and minerals, you may also need to take supp
lements.
  Manage stress and learn ways to relax. Deep breathing techniques and visualization can h
elp to relax your muscles and calm your mind.
  Exercise regularly. It can help reduce stress. Also, you will have more energy during th
 day and be more tired at bedtime. Afternoon exercise is best. Nighttime exercise may affec
t how well you sleep.
  Avoid alcohol, nicotine, and caffeine.
 Date Last Reviewed: 2015-2016 [employer]. [address] [place] [address]. All righ
ts reserved. This information is not intended as a substitute for professional medical care.
 Always follow your healthcare professional's instructions.
 
 Electronically signed by Johan Shook Jr., MD at 2017  2:04 PM PDT
 documented in this encounter
 
 Progress Notes
 Johan Shook Jr., MD - 2017  1:45 PM PDTThe patient comes in for follow-up after 
undergoing diagnostic polysomnography. My interpretation of the patient's sleep study, which
 I have reviewed with the patient, is as follows:
 
 Polysomnogram Report on Veda Santos performed on 2017.
 
 Clinical Information: Veda Santos is a 74 y.o.  female who underwent diagnostic no
 
cturnal polysomnography on 2017 on referral from Dr. Katia Queen because of poss
ible obstructive sleep apnea and restless leg syndrome in a patient who also has hypertensio
n, chronic renal disease, hypothyroidism, and for plasmacytic lymphoma with IgM monoclonal g
ammopathy.  Polysomnography performed in  demonstrated a low sleep efficiency and a prol
onged latency to sleep onset and an Apnea Hypopnea Index of 29.9.  She has been on CPAP subs
equently but discontinued it about 6 months ago.  She comes in now for reevaluation..
 
 Technical Information: Please see technical data which is attached.
 
 Definitions (The AASM Manual for the Scoring of Sleep and Associated Events, Version 2.4; 2
017):
                      Apnea: There is a drop in the peak signal excursion by 90% or greater 
of pre-event baseline using an oronasal thermal sensor (diagnostic study), PAP device flow (
titration study), or an alternative apnea sensor (diagnostic study); the duration of the 90%
 or greater drop in sensor signal is 10 seconds or longer.
                                           Obstructive Apnea: Event associated with continue
d or increased inspiratory effort throughout the entire period of absent airflow.
                                           Central Apnea: Event associated with absent inspi
ratory effort throughout the entire period of absent airflow.
                                           Mixed Apnea: Event associated with absent inspira
tory effort in the initial portion of the event followed by resumption of inspiratory effort
 during the second portion of the event.
                      Hypopnea: The peak signal excursions drop by greater than or equal to 
30% of pre-event baseline using a recommended or alternative airflow sensor and the duration
 of the >= 30% drop in signal excursion is greater than or equal to 10 seconds and there is 
a greater than or equal to a 4% oxygen desaturation from pre-event baseline.
                      Respiratory Event Related Arousal: A sequence of breaths lasting 10 se
conds or longer characterized by increasing respiratory effort or by flattening of the inspi
ratory portion of the nasal pressure (diagnostic study) or PAP device flow (titration study)
 waveform leading to arousal from sleep when the sequence of breaths does not meet criteria 
for an apnea or hypopnea.
 
 Sleep Architecture:  Lights out was recorded at 2042 hundred hours on 2017 and lig
hts on was recorded at 0936 hundred hours on 2017. The latency to sleep onset was 1
2 minutes but the latency to persistent sleep was quite prolonged at 236 minutes. The patien
t slept for 580 minutes out of 774.5 minutes of study time resulting an a sleep efficiency t
hat low at 74.9 %. The amount of N1 sleep was  elevated at 18.1 % of the Total Sleep Time; t
he amount of N2 sleep was normal at 61.8 % of the Total Sleep Time; the amount of N3 sleep w
as low at 0 % of the Total Sleep Time; the amount of REM sleep was normal at 20.1 % of the T
otal Sleep Time and the latency to REM sleep prolonged at 348.5 minutes.
 
 Sleep in the following positions was recorded: left lateral decubitus 7.7 %, right lateral 
decubitus 0 %, supine 92.3 %, prone 0 %.
 
 Sleep was significantly fragmented; the Arousal Index was 44.2.
 
 The patient reported this to be a usual night's sleep.
 
 Cardiopulmonary Monitoring: The heart rate averaged in the low 50s beats per minute.  Mild 
rate variability was noted. The rhythm was sinus.
 
 In the course of the evening there were 20 obstructive apneas, 0 mixed apneas, 1 central ap
neas, 101 hypopneas, and 286 Respiratory Effort Related Arousals (RERA's). The Respiratory D
isturbance Index (RDI) was elevated at 42.2; the Apnea-Hypopnea Index 12.6; the Apnea Index 
(AI) 2.2. The respiratory events were not sleep stage dependent. The respiratory events were
 not significantly positional. 
 
 The respiratory events occasioned severe sleep fragmentation; the Respiratory Arousal Index
 was 37.6.
 
 
 The soraya oxygen saturation was 83 % and the patient spent 2.3 minutes with an oxygen satur
ation of less than 88%.
 
 ETCO2 was not pathologically elevated.
 
 Limb Movement Monitoring: There were 182 Periodic Limb Movements (PLMS Index of 18.8) of wh
ich 18 were associated with arousals; the PLMS Arousal Index was normal at 1.9.
 
 Interpretation: This polysomnogram is abnormal secondary to:
 
 Obstructive sleep apnea is diagnosed and this results in significant sleep fragmentation an
d mild oxygen desaturation.
 Periodic limb movements of sleep are present but they do not seem to significantly fragment
 sleep.
 The combination of a prolonged latency to persistent sleep, late wake time suggests a delay
ed sleep phase syndrome.
 
 Suggestions:
 1. The principles of sleep hygiene should be reviewed with the patient in the context of a 
Delayed Sleep Phase Syndrome
 2. Treatment of obstructive sleep apnea is advised.
 3. A ferritin level should be checked. If the ferritin level is less than 50, iron suppleme
ntation should be considered to raise the ferritin to above 50. This may help with PLMS. Onc
e the ferritin level is above 50, pharmacologic therapy of PLMS/RLS should be considered if 
they are felt to be clinically significant.
 
 /70  | Pulse 91  | Resp 16  | Wt 94.2 kg (207 lb 11.2 oz)  | SpO2 96%  | BMI 39.24 kg
/m 
 
 A: EDMUNDO: The patient does have clinically significant obstructive sleep apnea.  I have discu
ssed this with her.  I am suggesting CPAP therapy.  She is in agreement with this.
      Restless leg syndrome/periodic limb movements of sleep: We will check a ferritin level
 today to assure that it's above 50. I've discussed Restless Legs Syndrome with the patient.
 I've also discussed Periodic Limb Movements of Sleep. I've discussed the relationship betwe
en the two. I've also discussed that I generally offer treatment symptomatically. I've also 
discussed an overview of treatment: 1) maintain a ferritin level above 50; 2)  Bedtime leg/a
rm massage; 3) review the need for medications that can worsen RLS/PLMS (such as antidepress
ants (except for bupropion) and antihistamines); 4) prescribe medications such as a) dopamin
ergics, b) benzodiazepine receptor agonists, c) opiates, and/or d) atypical anti-seizure age
nts.  The patient has been anemic in the past and it is conceivable that she is low in iron.
  If her ferritin is less than 50 supplemented with iron can be helpful.
      Delayed Sleep Phase Syndrome: I have suggested she try to get as much bright light as 
possible morning upon awakening and that she try to awaken at the same time every day.
 
 P: Ferritin level
      Resmed AirSense 10 autoset CPAP 5-20cm is prescribed.
 F/u in 1 week with our Clinical Sleep Educator and our P[facility].
 
 Today, 15 minutes was spent face to face with the patient; the majority of time was spent c
john regarding EDMUNDO and RLS/PLMS.
 
 Electronically signed by Johan Shook Jr., MD at 2017  2:12 PM PDTdocumented in th
is encounter
 
 Plan of Treatment
 
 
+--------+---------+------------+----------------------+-------------+
| Date   | Type    | Specialty  | Care Team            | Description |
+--------+---------+------------+----------------------+-------------+
 
| / | Office  | Nephrology |   Dante Escudero MD  |             |
|    | Visit   |            |  [address]  [place]  |             |
|        |         |            | [address]  [place]   |             |
|        |         |            | [address]  [phone]  |             |
|        |         |            |  [phone] (Fax)  |             |
+--------+---------+------------+----------------------+-------------+
 documented as of this encounter
 
 Results
 Ferritin (2017  2:28 PM PDT)
 
+-----------+-------+----------------+-------------+--------------+
| Component | Value | Ref Range      | Performed   | Pathologist  |
|           |       |                | At          | Signature    |
+-----------+-------+----------------+-------------+--------------+
| FERRITIN  | 38    | 11 - 307 ng/mL | [facility]  |              |
|           |       |                | [facility]    |              |
|           |       |                | [facility]     |              |
|           |       |                | [facility] -    |              |
|           |       |                | LABORATORY  |              |
+-----------+-------+----------------+-------------+--------------+
 
 
 
+----------+
| Specimen |
+----------+
| Blood    |
+----------+
 
 
 
+----------------------+--------------------+--------------------+----------------+
| Performing           | Address            | City/State/Zipcode | Phone Number   |
| Organization         |                    |                    |                |
+----------------------+--------------------+--------------------+----------------+
|   [facility]     |   [address] | [place]    |   [phone] |
| [facility]  |                    | [address]              |                |
| - LABORATORY         |                    |                    |                |
+----------------------+--------------------+--------------------+----------------+
 documented in this encounter
 
 Visit Diagnoses
 
 
+----------------------------------------------------------------------------------------+
| Diagnosis                                                                              |
+----------------------------------------------------------------------------------------+
|   EDMUNDO (obstructive sleep apnea) - Primary  Obstructive sleep apnea (adult) (pediatric) |
+----------------------------------------------------------------------------------------+
|   Restless legs syndrome  Restless legs syndrome (RLS)                                 |
+----------------------------------------------------------------------------------------+
|   History of anemia  Personal history of diseases of blood and blood-forming organs    |
+----------------------------------------------------------------------------------------+
 documented in this encounter Please advise, pt states he is not taking any blood thinners. Next available hosp f/u with [facility] is 8/30/22. Is this date ok or would you like to overbook?     Pt also cancelled his 7/19/22 OV with Priscila Lennon

## 2022-07-26 ENCOUNTER — TELEPHONE (OUTPATIENT)
Dept: CARDIOLOGY CLINIC | Age: 77
End: 2022-07-26

## 2022-07-26 NOTE — TELEPHONE ENCOUNTER
I have tried to call patient regarding rescheduling missed Watchman consult. Sent message with Lily Mercedes that will see patient for follow up regarding his hospitalization to see if patient is interested in [facility]ae or not.

## 2022-07-27 NOTE — PROGRESS NOTES
Physician Progress Note      PATIENT:               Remigio Still  CSN #:                  [id number]  :                       1945  ADMIT DATE:       2022 10:14 AM  [facility] DATE:        2022 12:14 PM  RESPONDING  PROVIDER #:        Jovita Madden MD          QUERY TEXT:    Patient admitted with rib pain. Patient with recent Covid infection on    and noted positive Covid test on . If possible, please document in   progress notes and discharge summary if patient has an active Covid-19   infection or if patient is testing positive for Covid-19 without a current   active infection: The medical record reflects the following:  Risk Factors: recent COVID on , lung cancer  Clinical Indicators: COVID + , no hypoxia noted  Treatment: no COVID specific treatment, isolation, supportive care    Thank you,  Dinah Orona RN, [email or web address]  Options provided:  -- Positive Covid test result without an active current Covid-19 infection  -- Active Covid-19 infection is being treated and/or evaluated on current   encounter  -- Other - I will add my own diagnosis  -- Disagree - Not applicable / Not valid  -- Disagree - Clinically unable to determine / Unknown  -- Refer to Clinical Documentation Reviewer    PROVIDER RESPONSE TEXT:    Patient is testing positive for Covid without an active Covid-19 infection. Query created by: Eddie Stanford on 2022 7:40 AM      QUERY TEXT:    Patient admitted with rib pain, noted to have pleural effusion. If possible,   please document in progress notes and d/c summary further specificity   regarding the type/underlying cause of pleural effusion: The medical record reflects the following:  Risk Factors: lung cancer, s/p R VATS in   Clinical Indicators: \"Interval increase in size of the right pleural effusion\"   per CXR  Treatment: CXR, CT scan, supportive care    Thank you,  Dinah Orona RN, [email or web address]  Options provided:  -- malignant pleural effusion due to lung cancer  -- Other - I will add my own diagnosis  -- Disagree - Not applicable / Not valid  -- Disagree - Clinically unable to determine / Unknown  -- Refer to Clinical Documentation Reviewer    PROVIDER RESPONSE TEXT:    malignant pleural effusion due to lung cancer.     Query created by: Galen Carranza on 7/26/2022 7:52 AM      Electronically signed by:  Saúl Serrato MD 7/27/2022 1:58 PM

## 2022-08-01 NOTE — PROGRESS NOTES
Physician Progress Note      PATIENT:               Av Liang  CSN #:                  [id number]  :                       1945  ADMIT DATE:       2022 10:14 AM  Amanda Huber Wadsworth DATE:        2022 12:14 PM  RESPONDING  PROVIDER #:        Genesis Farnsworth MD          QUERY TEXT:    Pt admitted with rib pain. Pt noted to have increase in size of malignant   pleural effusion. If possible, please document in progress notes and discharge   summary the relationship of the rib pain and possible causes. The medical record reflects the following:  Risk Factors: increasing size in malignant pleural effusion, s/p fall  Clinical Indicators: no fractures noted on imaging, increase in size of   effusion  Treatment: CXR, CT, pain management, supportive care    Thank you,  Wali John RN, [email or web address]  Options provided:  -- Rib pain due to increasing size of malignant pleural effusion  -- Rib pain due to other, please specify. -- Other - I will add my own diagnosis  -- Disagree - Not applicable / Not valid  -- Disagree - Clinically unable to determine / Unknown  -- Refer to Clinical Documentation Reviewer    PROVIDER RESPONSE TEXT:    This patient has rib pain due to increasing size of malignant pleural   effusion.     Query created by: Homero Johnson on 2022 10:14 AM      Electronically signed by:  Genesis Farnsworth MD 2022 1:54 PM

## 2022-08-02 ENCOUNTER — OFFICE VISIT (OUTPATIENT)
Dept: CARDIOLOGY CLINIC | Age: 77
End: 2022-08-02
Payer: MEDICARE

## 2022-08-02 VITALS
DIASTOLIC BLOOD PRESSURE: 72 MMHG | SYSTOLIC BLOOD PRESSURE: 142 MMHG | BODY MASS INDEX: 24.05 KG/M2 | HEIGHT: 70 IN | WEIGHT: 168 LBS | OXYGEN SATURATION: 95 % | HEART RATE: 66 BPM

## 2022-08-02 DIAGNOSIS — I95.1 ORTHOSTASIS: ICD-10-CM

## 2022-08-02 DIAGNOSIS — I48.0 PAROXYSMAL ATRIAL FIBRILLATION (HCC): Primary | ICD-10-CM

## 2022-08-02 PROCEDURE — 93000 ELECTROCARDIOGRAM COMPLETE: CPT | Performed by: NURSE PRACTITIONER

## 2022-08-02 PROCEDURE — 1123F ACP DISCUSS/DSCN MKR DOCD: CPT | Performed by: NURSE PRACTITIONER

## 2022-08-02 PROCEDURE — 99214 OFFICE O/P EST MOD 30 MIN: CPT | Performed by: NURSE PRACTITIONER

## 2022-08-02 RX ORDER — PANTOPRAZOLE SODIUM 20 MG/1
20 TABLET, DELAYED RELEASE ORAL DAILY
COMMUNITY

## 2022-08-02 NOTE — PROGRESS NOTES
[address]   Electrophysiology Outpatient Note              Date:  August 2, 2022  Patient name: Paramjit Lott  YOB: 1945    Primary Care physician: Elke Barrientos MD    HISTORY OF PRESENT ILLNESS: The patient is a 68 y.o.  male with a history of PAF, lung cancer s/p thoracotmy, syncope and porphyria cutanea tarda   In 2014, he had an RFCA of atrial fibrillation, loop recorder implant and was on sotalol. Showed an EF of 55%. Cardiac CTA showed diffuse coronary calcification. In 6/2016, he complained of chest pain and had a normal stress test.  In 3/2017, he had a PCI to LAD. In 5/2022, he was admitted with excessive weakness, recurrent falls and near syncope. Orthostatic blood pressures were positive. Pradaxa was stopped and he was referred for watchman evaluation given recurrent falls and thrombocytopenia. In 6/2022, he was evaluated in the ED at [facility], [place]. Heart rates were noted to be in the 30s. Digoxin was stopped due to digoxin toxicity. In 7/2022, patient was admitted to [facility] for a subsequent fall. He was found to have COVID. EKG at that time showed rate controlled atrial fibrillation. Today he is being seen for AF. EKG shows AFL with a HR of 83. Patient reports that he has fallen a few times. He complains of shortness of breath especially with exertion. Denies palpitations or chest pain. 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     Past Surgical History:   has a past surgical history that includes Carpal tunnel release; Femur Surgery; knee surgery (1-12-11 R total knee replacement with femoral nerve block for pain control); Cervical disc surgery (2010); other surgical history (Bilateral); ablation of dysrhythmic focus (9/2014); other surgical history (9/2014); Colonoscopy (09/26/2016); Upper gastrointestinal endoscopy (09/26/2016); Coronary angioplasty with stent; and Lung removal, partial (Right). Home Medications:    Prior to Admission medications    Medication Sig Start Date End Date Taking? Authorizing Provider   pantoprazole (PROTONIX) 20 MG tablet Take 20 mg by mouth in the morning. Yes Historical Provider, MD   atorvastatin (LIPITOR) 40 MG tablet Take 0.5 tablets by mouth in the morning. 7/19/22  Yes Lauryn Gutierrez MD   ipratropium (ATROVENT) 0.03 % nasal spray 2 sprays by Each Nostril route every 12 hours Can use of to 4 times daily as needed 5/10/21  Yes Javy Amin,    diclofenac sodium 1 % GEL APPLY 4GM TOPICALLY FOUR TIMES A DAY 12/6/18  Yes Nain Alanis MD   carbidopa-levodopa (SINEMET)  MG per tablet Take 1 tablet by mouth in the morning and at bedtime    Yes Historical Provider, MD   vitamin B-2 (RIBOFLAVIN) 25 MG TABS Take by mouth   Yes Historical Provider, MD   diclofenac (PENNSAID) 2 % SOLN 2 pumps to the affected area 2 times a day [id number] 9/26/17  Yes Nani Alanis MD   omeprazole (PRILOSEC) 20 MG delayed release capsule Take 20 mg by mouth 2 times daily   Yes Historical Provider, MD   fluocinonide (LIDEX) 0.05 % cream Apply topically 2 times daily. 5/20/16  Yes KATIE Roberto CNP   zolpidem (AMBIEN) 10 MG tablet Take 10 mg by mouth nightly as needed. Yes Historical Provider, MD   LORazepam (ATIVAN) 1 MG tablet Take 2 mg by mouth in the morning and at bedtime. Yes Historical Provider, MD   Lacosamide (VIMPAT PO) Take 100 mg by mouth 2 times daily.  Indications: take dos   Yes Historical Provider, MD   paroxetine (PAXIL) 20 MG tablet Take 20 mg by mouth in the morning and at bedtime  12/28/10  Yes Historical Provider, MD   finasteride (PROSCAR) 5 MG tablet Take 5 mg by mouth daily  Patient not taking: Reported on 8/2/2022    Historical Provider, MD   ferrous sulfate 325 (65 FE) MG tablet Take 1 tablet by mouth 2 times daily (with meals)  Patient not taking: Reported on 8/2/2022 10/5/15   Kraig Tian MD       Allergies:  Morphine, Codeine, Penicillins, and Plavix [clopidogrel bisulfate]    Social History:   reports that he quit smoking about 29 years ago. His smoking use included cigarettes. He has a 80.00 pack-year smoking history. He has never been exposed to tobacco smoke. He has never used smokeless tobacco. He reports that he does not drink alcohol and does not use drugs. Family History: family history includes Arthritis in his brother, father, maternal grandfather, maternal grandmother, mother, paternal grandfather, paternal grandmother, and sister; Cancer in his sister; Depression in his sister; Diabetes in his paternal grandmother; Heart Disease in his father and mother; High Blood Pressure in his mother; [place] / [place] in his mother; Stroke in his mother. All 14 point review of systems are completed and pertinent positives are mentioned in the history of present illness. Other systems are reviewed and are negative. PHYSICAL EXAM:    Vital signs:    BP (!) 142/72   Pulse 66   Ht 5' 10\" (1.778 m)   Wt 168 lb (76.2 kg)   SpO2 95%   BMI 24.11 kg/m²      Constitutional and general appearance: alert, cooperative, no distress, and appears stated age  HEENT: PERRL, no cervical lymphadenopathy. No masses palpable. Normal oral mucosa  Respiratory:  Normal excursion and expansion without use of accessory muscles  Resp auscultation: Normal breath sounds without wheezing, rhonchi, and rales  Cardiovascular:   The apical impulse is not displaced  Heart tones are crisp and normal. irregular S1 and S2.  Jugular venous pulsation Normal  The carotid upstroke is normal in amplitude and contour without delay or bruit  Peripheral pulses are symmetrical and full   Abdomen:  No masses or tenderness  Bowel sounds present  Extremities:   No cyanosis or clubbing   No lower extremity edema   Skin: warm and dry  Neurological:  Alert and oriented  Moves all extremities well  No abnormalities of mood, affect, memory, mentation, or behavior are noted    DATA:    ECG 8/2/2022:  AFL 83 bpm     Stress 12/2020: Interpetation Summary:   The LV EF is 81%   The left ventricular wall motion is normal.   There is a small sized, fixed defect in the inferolateral wall consistent with   mild infarct.        o Non-diagnostic ECG for ischemia with pharmocologic stress. o No significant areas of ischemia identified with pharmocologic stress. Stress echo 11/2020:    Baseline Echo Findings: The LV ejection fraction at rest is 55-60%. The LV   chamber size is normal. There is mild LVH. Post Stress Echo Findings: The LV ejection fraction post stress is 60-65%. Normal increase in contractillity of all segments was observed. Echo 9/2014:      Conclusions      Summary   No significant pericardial effusion noted. Overall left ventricular function is normal.   Ejection fraction is visually estimated to be 55-60 %       All labs and testing reviewed. CARDIOLOGY LABS:   CBC: No results for input(s): WBC, HGB, HCT, PLT in the last 72 hours. BMP: No results for input(s): NA, K, CO2, BUN, CREATININE, LABGLOM, GLUCOSE in the last 72 hours. PT/INR: No results for input(s): PROTIME, INR in the last 72 hours. APTT:No results for input(s): APTT in the last 72 hours. FASTING LIPID PANEL:  Lab Results   Component Value Date/Time    HDL 45 03/06/2017 07:47 AM    LDLCALC 93 03/06/2017 07:47 AM    TRIG 235 03/06/2017 07:47 AM     LIVER PROFILE:No results for input(s): AST, ALT, ALB in the last 72 hours.     IMPRESSION:    Patient Active Problem List   Diagnosis    Dizziness    Syncope and collapse    Paroxysmal atrial fibrillation (HCC)    Contusion of knee, right    Dysphagia    Encounter for electronic analysis of reveal event recorder    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[facility]    History of nonmelanoma skin cancer    Ischemic chest pain ([facility]    Left hand pain    Hand pain, right    Arthritis of carpometacarpal (CMC) joints of both thumbs    Radial styloid tenosynovitis (de quervain)    Left elbow fracture, closed, initial encounter    Frequent falls    General weakness    Thrombocytopenia (HCC)    Malignant neoplasm of lung (HCC)    Orthostasis    Hypotension    Chest pain    Abnormal finding on EKG    Digoxin toxicity, accidental or unintentional, initial encounter    Atrial fibrillation with slow ventricular response ([facility]    COVID-19 virus infection    COVID    Rib pain on right side    Coronary artery disease involving native coronary artery of native heart without angina pectoris       Assessment:   Persistent atrial arrhythmias (A/AFL): stable              -s/p RFCA of AF 9/2014              -[id number] score 3 (age, CAD)  Recurrent falls/syncope: stable              -orthostatic hypotension noted 5/2022  S/P loop recorder implant 2014 with reimplant 2018              -device at [facility]  CAD s/p PCI to LAD 3/2017  Non-small cell lung cancer s/p thoracotomy              -undergoing second round of chemotherapy  Parkinson's syndrome   Normocytic anemia  Thrombocytopenia  Shortness of breath      Plan:   No changes today   Do not resume Pradaxa given recurrent falls. The risk of bleeding/injury curretnyl outweighs the risk of stroke protection.   Monitor BP and HR at home and call if consistently out of goal ranges  Follow up with Dr. Marcela Vick for Watchman evaluation   Follow up in 4 months     CHANDA Holman [address] [facility], APRN-CNP  [address]  [phone]

## 2022-08-02 NOTE — PATIENT INSTRUCTIONS
No changes today     Continue to stay off of Pradaxa    Follow up with Dr. Tami Cook to discuss Watchman implant     Follow up with me in 4 months

## 2022-08-02 NOTE — LETTER
[address] [place] [address] Cardiology - [address] [facility] [address] [address]  Phone: [phone]  Fax: [phone]    KATIE Milner CNP    August 3, 2022     Zahraa Bustamante MD  [address] [facility] [address]    Patient: Zulema Cagle   MR Number: [id number]   YOB: 1945   Date of Visit: 8/2/2022       Dear Zahraa Bustamante:    Thank you for referring Ulices Rosado to me for evaluation/treatment. Below are the relevant portions of my assessment and plan of care. If you have questions, please do not hesitate to call me. I look forward to following Esther Tejada along with you.     Sincerely,      KATIE Jerez CNP

## 2022-08-31 NOTE — TELEPHONE ENCOUNTER
Confirmed appointment on 9/2/2022 with patient for [facility] consult

## 2022-09-01 NOTE — PROGRESS NOTES
[address]  Cardiology Consult    Sivan Villareal  [birth year] September 1, 2022    Primary Cardiologist:   Referring Physician:     Reason for Referral: Left atrial appendage    CC: ***      Subjective:     History of Present Illness:    Sivan Villareal is a 68 y.o. patient with a PMH significant for paroxsymal atrial fibrillation, lung cancer s/p thoracotmy, syncope and porphyria cutanea tarda. In 5/2022, he was admitted with excessive weakness, recurrent falls and near syncope. Orthostatic blood pressures were positive. Pradaxa was stopped and he was referred for watchman evaluation given recurrent falls and thrombocytopenia. Patient is being referred for Left Atrial Appendage Closure with WATCHMAN device for management of stroke risk resulting from non-valvular atrial fibrillation. Based on their past history, it has been determined that they are poor candidates for long-term oral-anticoagulation, however may be tolerant of short term treatment with warfarin as necessary. 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 Surgical History:   has a past surgical history that includes Carpal tunnel release; Femur Surgery; knee surgery (1-12-11 R total knee replacement with femoral nerve block for pain control); Cervical disc surgery (2010); other surgical history (Bilateral); ablation of dysrhythmic focus (9/2014); other surgical history (9/2014); Colonoscopy (09/26/2016); Upper gastrointestinal endoscopy (09/26/2016); Coronary angioplasty with stent; and Lung removal, partial (Right).      Social History: reports that he quit smoking about 29 years ago. His smoking use included cigarettes. He has a 80.00 pack-year smoking history. He has never been exposed to tobacco smoke. He has never used smokeless tobacco. He reports that he does not drink alcohol and does not use drugs. Family History:  family history includes Arthritis in his brother, father, maternal grandfather, maternal grandmother, mother, paternal grandfather, paternal grandmother, and sister; Cancer in his sister; Depression in his sister; Diabetes in his paternal grandmother; Heart Disease in his father and mother; High Blood Pressure in his mother; Hildegard Cl[place] / [place] in his mother; Stroke in his mother. Home Medications:  Were reviewed and are listed in nursing record and/or below  Prior to Admission medications    Medication Sig Start Date End Date Taking? Authorizing Provider   pantoprazole (PROTONIX) 20 MG tablet Take 20 mg by mouth in the morning. Historical Provider, MD   atorvastatin (LIPITOR) 40 MG tablet Take 0.5 tablets by mouth in the morning.  7/19/22   Tamir Delgado MD   finasteride (PROSCAR) 5 MG tablet Take 5 mg by mouth daily  Patient not taking: Reported on 8/2/2022    Historical Provider, MD   ipratropium (ATROVENT) 0.03 % nasal spray 2 sprays by Each Nostril route every 12 hours Can use of to 4 times daily as needed 5/10/21   Benji Amin DO   diclofenac sodium 1 % GEL APPLY 4GM TOPICALLY FOUR TIMES A DAY 12/6/18   Ignacio Hauser MD   carbidopa-levodopa (SINEMET)  MG per tablet Take 1 tablet by mouth in the morning and at bedtime     Historical Provider, MD   vitamin B-2 (RIBOFLAVIN) 25 MG TABS Take by mouth    Historical Provider, MD   diclofenac (PENNSAID) 2 % SOLN 2 pumps to the affected area 2 times a day [id number] 9/26/17   Ignacio Hauser MD   omeprazole (PRILOSEC) 20 MG delayed release capsule Take 20 mg by mouth 2 times daily    Historical Provider, MD   fluocinonide (LIDEX) 0.05 % cream Apply topically 2 times daily. 5/20/16   Ousmane Bonilla APRN - CNP   ferrous sulfate 325 (65 FE) MG tablet Take 1 tablet by mouth 2 times daily (with meals)  Patient not taking: Reported on 8/2/2022 10/5/15   Park Lancaster MD   zolpidem (AMBIEN) 10 MG tablet Take 10 mg by mouth nightly as needed. Historical Provider, MD   LORazepam (ATIVAN) 1 MG tablet Take 2 mg by mouth in the morning and at bedtime. Historical Provider, MD   Lacosamide (VIMPAT PO) Take 100 mg by mouth 2 times daily. Indications: take dos    Historical Provider, MD   paroxetine (PAXIL) 20 MG tablet Take 20 mg by mouth in the morning and at bedtime  12/28/10   Historical Provider, MD        CURRENT Medications:  No current facility-administered medications for this visit. Allergies:  Morphine, Codeine, Penicillins, and Plavix [clopidogrel bisulfate]           Review of Systems: All reviewed and refer to HPI  Constitutional: no unanticipated weight loss. There's been no change in energy level, sleep pattern, or activity level. No fevers, chills. Eyes: No visual changes or diplopia. No scleral icterus. ENT: No Headaches, hearing loss or vertigo. No mouth sores or sore throat. Cardiovascular: No Chest pain, tightness or discomfort. No Shortness of breath. No Dyspnea on exertion, Orthopnea, Paroxysmal nocturnal dyspnea or breathlessness at rest.  No Palpitations. No Syncope ('blackouts', 'faints', 'collapse') or dizziness. Respiratory: No cough or wheezing, no sputum production. No hematemesis. Gastrointestinal: No abdominal pain, appetite loss, blood in stools. No change in bowel or bladder habits. Genitourinary: No dysuria, trouble voiding, or hematuria. Musculoskeletal:  No gait disturbance, no joint complaints. Integumentary: No rash or pruritis. Neurological: No headache, diplopia, change in muscle strength, numbness or tingling. Psychiatric: No anxiety or depression. Endocrine: No temperature intolerance. No excessive thirst, fluid intake, or urination. No tremor. Hematologic/Lymphatic: No abnormal bruising or bleeding, blood clots or swollen lymph nodes. Allergic/Immunologic: No nasal congestion or hives. Objective: all reveiwed      PHYSICAL EXAM:      There were no vitals filed for this visit. General Appearance:  Alert, cooperative, no distress, appears stated age. Head:  Normocephalic, without obvious abnormality, atraumatic. Eyes:  Pupils equal and round. No scleral icterus. Mouth: Moist mucosa, no pharyngeal erythema. Nose: Nares normal. No drainage or sinus tenderness. Neck: Supple, symmetrical, trachea midline. No adenopathy. No tenderness/mass/nodules. No carotid bruit or elevated JVD. Lungs:   Respiratory Effort: Normal   Auscultation: Clear to auscultation bilaterally, respirations unlabored. No wheeze, rales   Chest Wall:  No tenderness or deformity. Cardiovascular:    Pulses  Palpation: normal   Ascultation: Regular rate, S1/ S2 normal. No murmur, rub, or gallop. 2+ radial and pedal pulses, symmetric  Carotid  Femoral   Abdomen and Gastrointestinal:   Soft, non-tender, bowel sounds active. Liver and Spleen  Masses   Musculoskeletal: No muscle wasting  Back  Gait   Extremities: Extremities normal, atraumatic. No cyanosis or edema. No cyanosis clubbing       Skin: Inspection and palpation performed, no rashes or lesions. Pysch: Normal mood and affect.  Alert and oriented to time place person   Neurologic: Normal gross motor and sensory exam.       Labs: all labs have been reviewed      Lab Results   Component Value Date/Time    WBC 6.5 07/17/2022 11:30 AM    RBC 4.07 07/17/2022 11:30 AM    RBC 4.84 01/13/2017 11:05 AM    HGB 12.8 07/17/2022 11:30 AM    HCT 38.9 07/17/2022 11:30 AM    MCV 95.6 07/17/2022 11:30 AM    RDW 13.7 07/17/2022 11:30 AM     07/17/2022 11:30 AM     Lab Results   Component Value Date/Time     07/17/2022 11:30 AM    K 4.1 07/17/2022 11:30 AM     07/17/2022 11:30 AM    CO2 25 07/17/2022 11:30 AM    BUN 12 07/17/2022 11:30 AM    CREATININE 0.9 07/17/2022 11:30 AM    GFRAA >60 07/17/2022 11:30 AM    GFRAA >60 06/07/2012 07:07 AM    AGRATIO 1.1 07/17/2022 11:30 AM    LABGLOM >60 07/17/2022 11:30 AM    GLUCOSE 99 07/17/2022 11:30 AM    GLUCOSE 100 06/03/2016 09:48 AM    PROT 8.2 07/17/2022 11:30 AM    PROT 7.6 06/03/2016 09:48 AM    CALCIUM 9.9 07/17/2022 11:30 AM    BILITOT 0.7 07/17/2022 11:30 AM    ALKPHOS 128 07/17/2022 11:30 AM    AST 32 07/17/2022 11:30 AM    ALT 21 07/17/2022 11:30 AM     No results found for: PTINR  Lab Results   Component Value Date    LABA1C 5.6 01/13/2011     Lab Results   Component Value Date    TROPONINI <0.01 07/17/2022       Cardiac, Vascular and Imaging Data: All Personally Reviewed in Detail by Myself      EKG:     Echocardiogram:   ECHO 5/12/2022  Summary  Normal left ventricular systolic function with ejection fraction of 55-60%. No regional wall motion abnormalites are seen. Normal left ventricular size with mild concentric left ventricular  hypertrophy. Normal left ventricular systolic function with ejection fraction of 55-60%. No regional wall motion abnormalites are seen. Mild mitral regurgitation. The right atrium is mildly dilated. Stress Test:     Cath: Other imaging:     Assessment and Plan     Atrial Fibrillation,paroxsymal    Primary Cardiologist:   Referring Physician:   Referring Reason: Charlett Sessions is at least 3 (Age, CAD)   HASbled is at least 2     He is a poor candidate for chronic anticoagulation with his history of thrombocytopenia/falls. He would be an appropriate candidate for the watchman device. Patient is agreeable to proceed with the Watchman procedure and instructed Cheryl Shi, the coordinator will get in contact to facilitate scheduling.        Specifically regarding risk of anticoagulation they have demonstrated:  Intolerance oral anticoagulation  Increased bleeding risk (e.g. Thrombocytopenia, anemia)  High risk of recurrent falls    We have discussed their unique stroke and bleeding risk both on and off oral-anticoagulation, and the rationale for this referral.  Based on both stroke and bleeding risk, a shared decision has been made to pursue closure of the left atrial appendage as an alternative to oral anticoagulant therapy for stroke prophylaxis and to reduce their long term risk of incidence of bleeding. Discussed Watchman LAAC at length with patient, heart model, device model and video. We gave educational materials. We discussed pre-procedure workup, timing of restarting AC, AC length, procedure and post procedure follow up/management. No Iodine Allergy. No Nickel/Titanium Allergy. He/She is agreeable to short term AC, proceeding with DONG, 2nd opinion/shared decision making  and will follow up with me post workup to discussing timing of the procedure. I had a detail discussion with the patient and family regarding the risk and benefit of the procedures. I explained to them the details of the procedure. I explained to them that the procedure will be performed under general anesthesia. I explained any risk of bleeding, pericardial effusion and tamponade, perforation of the vessel, stroke, myocardial infarction or death. The patient and family understood the risk and benefits and the details of procedure and would like to go ahead with the procedure. The patient gave informed consent. All questions and concerns answered at this time. Confirmed per patient before leaving the office. I have also asked her to call the office and speak with the [address] [place] [address] with any questions or concerns moving forward. Thank you for allowing us to participate in the care of Ranjana Delgadillo. Please do not hesitate to contact me if you have any questions.     Jolie Langston MD, [facility] [address]  [facility], [address] [place] [address]  Ph: [phone]  Fax: [phone]        ***    ***

## 2022-09-02 ENCOUNTER — OFFICE VISIT (OUTPATIENT)
Dept: CARDIOLOGY CLINIC | Age: 77
End: 2022-09-02
Payer: MEDICARE

## 2022-09-02 VITALS
DIASTOLIC BLOOD PRESSURE: 76 MMHG | BODY MASS INDEX: 23.84 KG/M2 | OXYGEN SATURATION: 99 % | WEIGHT: 166.5 LBS | SYSTOLIC BLOOD PRESSURE: 124 MMHG | HEART RATE: 85 BPM | HEIGHT: 70 IN

## 2022-09-02 DIAGNOSIS — I48.19 PERSISTENT ATRIAL FIBRILLATION (HCC): Primary | ICD-10-CM

## 2022-09-02 DIAGNOSIS — I20.9 ISCHEMIC CHEST PAIN (HCC): ICD-10-CM

## 2022-09-02 PROCEDURE — 99215 OFFICE O/P EST HI 40 MIN: CPT | Performed by: INTERNAL MEDICINE

## 2022-09-02 PROCEDURE — 1123F ACP DISCUSS/DSCN MKR DOCD: CPT | Performed by: INTERNAL MEDICINE

## 2022-09-02 RX ORDER — ESCITALOPRAM OXALATE 20 MG/1
10 TABLET ORAL
COMMUNITY
Start: 2022-08-08

## 2022-09-02 RX ORDER — MEMANTINE HYDROCHLORIDE 10 MG/1
10 TABLET ORAL 2 TIMES DAILY
COMMUNITY

## 2022-09-02 NOTE — PATIENT INSTRUCTIONS
Reviewed issues with oral blood thinner and why patient was referred for Watchman  Aspirin for life post implant  Follow up with Dr. Gume Green 1 month for shared decision consult

## 2022-09-02 NOTE — PROGRESS NOTES
[facility]  Cardiology Consult    Zee Morgan  [birth year] September 2, 2022    Primary Cardiologist:   Referring Physician: ANDREI Lira    Reason for Referral: Left atrial appendage    CC: Falls and bleeding complications      Subjective:     History of Present Illness:    Zee Morgan is a 68 y.o. patient with a PMH significant for paroxsymal atrial fibrillation, lung cancer s/p thoracotmy, syncope and porphyria cutanea tarda. In 5/2022, he was admitted with excessive weakness, recurrent falls and near syncope. Orthostatic blood pressures were positive. Pradaxa was stopped and he was referred for watchman evaluation given recurrent falls and thrombocytopenia. Patient is being referred for Left Atrial Appendage Closure with WATCHMAN device for management of stroke risk resulting from non-valvular atrial fibrillation. Based on their past history, it has been determined that they are poor candidates for long-term oral-anticoagulation, however may be tolerant of short term treatment with warfarin as necessary. 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 Surgical History:   has a past surgical history that includes Carpal tunnel release; Femur Surgery; knee surgery (1-12-11 R total knee replacement with femoral nerve block for pain control); Cervical disc surgery (2010); other surgical history (Bilateral); ablation of dysrhythmic focus (9/2014); other surgical history (9/2014); Colonoscopy (09/26/2016); Upper gastrointestinal endoscopy (09/26/2016);  Coronary angioplasty with stent; and Lung removal, partial (Right). Social History:   reports that he quit smoking about 29 years ago. His smoking use included cigarettes. He has a 80.00 pack-year smoking history. He has never been exposed to tobacco smoke. He has never used smokeless tobacco. He reports that he does not drink alcohol and does not use drugs. Family History:  family history includes Arthritis in his brother, father, maternal grandfather, maternal grandmother, mother, paternal grandfather, paternal grandmother, and sister; Cancer in his sister; Depression in his sister; Diabetes in his paternal grandmother; Heart Disease in his father and mother; High Blood Pressure in his mother; Joelle Plume / [place] in his mother; Stroke in his mother. Home Medications:  Were reviewed and are listed in nursing record and/or below  Prior to Admission medications    Medication Sig Start Date End Date Taking? Authorizing Provider   pantoprazole (PROTONIX) 20 MG tablet Take 20 mg by mouth in the morning. Historical Provider, MD   atorvastatin (LIPITOR) 40 MG tablet Take 0.5 tablets by mouth in the morning.  7/19/22   Lauryn Gutierrez MD   finasteride (PROSCAR) 5 MG tablet Take 5 mg by mouth daily  Patient not taking: Reported on 8/2/2022    Historical Provider, MD   ipratropium (ATROVENT) 0.03 % nasal spray 2 sprays by Each Nostril route every 12 hours Can use of to 4 times daily as needed 5/10/21   Ema Amin DO   diclofenac sodium 1 % GEL APPLY 4GM TOPICALLY FOUR TIMES A DAY 12/6/18   Nani Alnais MD   carbidopa-levodopa (SINEMET)  MG per tablet Take 1 tablet by mouth in the morning and at bedtime     Historical Provider, MD   vitamin B-2 (RIBOFLAVIN) 25 MG TABS Take by mouth    Historical Provider, MD   diclofenac (PENNSAID) 2 % SOLN 2 pumps to the affected area 2 times a day [id number] 9/26/17   Nani Alanis MD   omeprazole (PRILOSEC) 20 MG delayed release capsule Take 20 mg by mouth 2 times daily    Historical Provider, MD   fluocinonide (LIDEX) 0.05 % cream Apply topically 2 times daily. 5/20/16   KATIE Smyth - CNP   ferrous sulfate 325 (65 FE) MG tablet Take 1 tablet by mouth 2 times daily (with meals)  Patient not taking: Reported on 8/2/2022 10/5/15   Jens Porter MD   zolpidem (AMBIEN) 10 MG tablet Take 10 mg by mouth nightly as needed. Historical Provider, MD   LORazepam (ATIVAN) 1 MG tablet Take 2 mg by mouth in the morning and at bedtime. Historical Provider, MD   Lacosamide (VIMPAT PO) Take 100 mg by mouth 2 times daily. Indications: take dos    Historical Provider, MD   paroxetine (PAXIL) 20 MG tablet Take 20 mg by mouth in the morning and at bedtime  12/28/10   Historical Provider, MD        CURRENT Medications:  No current facility-administered medications for this visit. Allergies:  Morphine, Codeine, Penicillins, and Plavix [clopidogrel bisulfate]           Review of Systems: All reviewed and refer to HPI  Constitutional: no unanticipated weight loss. There's been no change in energy level, sleep pattern, or activity level. No fevers, chills. Eyes: No visual changes or diplopia. No scleral icterus. ENT: No Headaches, hearing loss or vertigo. No mouth sores or sore throat. Cardiovascular: No Chest pain, tightness or discomfort. No Shortness of breath. No Dyspnea on exertion, Orthopnea, Paroxysmal nocturnal dyspnea or breathlessness at rest.  No Palpitations. No Syncope ('blackouts', 'faints', 'collapse') or dizziness. Respiratory: No cough or wheezing, no sputum production. No hematemesis. Gastrointestinal: No abdominal pain, appetite loss, blood in stools. No change in bowel or bladder habits. Genitourinary: No dysuria, trouble voiding, or hematuria. Musculoskeletal:  No gait disturbance, no joint complaints. Integumentary: No rash or pruritis. Neurological: No headache, diplopia, change in muscle strength, numbness or tingling.    Psychiatric: No anxiety or depression. Endocrine: No temperature intolerance. No excessive thirst, fluid intake, or urination. No tremor. Hematologic/Lymphatic: No abnormal bruising or bleeding, blood clots or swollen lymph nodes. Allergic/Immunologic: No nasal congestion or hives. Objective: all reveiwed      PHYSICAL EXAM:      There were no vitals filed for this visit. General Appearance:  Alert, cooperative, no distress, appears stated age. Head:  Normocephalic, without obvious abnormality, atraumatic. Eyes:  Pupils equal and round. No scleral icterus. Mouth: Moist mucosa, no pharyngeal erythema. Nose: Nares normal. No drainage or sinus tenderness. Neck: Supple, symmetrical, trachea midline. No adenopathy. No tenderness/mass/nodules. No carotid bruit or elevated JVD. Lungs:   Respiratory Effort: Normal   Auscultation: Clear to auscultation bilaterally, respirations unlabored. No wheeze, rales   Chest Wall:  No tenderness or deformity. Cardiovascular:    Pulses  Palpation: normal   Ascultation: Regular rate, S1/ S2 normal. No murmur, rub, or gallop. 2+ radial and pedal pulses, symmetric  Carotid  Femoral   Abdomen and Gastrointestinal:   Soft, non-tender, bowel sounds active. Liver and Spleen  Masses   Musculoskeletal: No muscle wasting  Back  Gait   Extremities: Extremities normal, atraumatic. No cyanosis or edema. No cyanosis clubbing       Skin: Inspection and palpation performed, no rashes or lesions. Pysch: Normal mood and affect.  Alert and oriented to time place person   Neurologic: Normal gross motor and sensory exam.       Labs: all labs have been reviewed      Lab Results   Component Value Date/Time    WBC 6.5 07/17/2022 11:30 AM    RBC 4.07 07/17/2022 11:30 AM    RBC 4.84 01/13/2017 11:05 AM    HGB 12.8 07/17/2022 11:30 AM    HCT 38.9 07/17/2022 11:30 AM    MCV 95.6 07/17/2022 11:30 AM    RDW 13.7 07/17/2022 11:30 AM     07/17/2022 11:30 AM     Lab Results   Component Value alternative to oral anticoagulant therapy for stroke prophylaxis and to reduce their long term risk of incidence of bleeding. Discussed Watchman LAAC at length with patient, heart model, device model and video. We gave educational materials. We discussed pre-procedure workup, timing of restarting AC, AC length, procedure and post procedure follow up/management. No Iodine Allergy. No Nickel/Titanium Allergy. He/She is agreeable to short term AC, proceeding with DONG, 2nd opinion/shared decision making  and will follow up with me post workup to discussing timing of the procedure. I had a detail discussion with the patient and family regarding the risk and benefit of the procedures. I explained to them the details of the procedure. I explained to them that the procedure will be performed under general anesthesia. I explained any risk of bleeding, pericardial effusion and tamponade, perforation of the vessel, stroke, myocardial infarction or death. The patient and family understood the risk and benefits and the details of procedure and would like to go ahead with the procedure. The patient gave informed consent. All questions and concerns answered at this time. Confirmed per patient before leaving the office. Reviewed issues with oral blood thinner and why patient was referred for Watchman  Aspirin for life post implant  Follow up with Dr. Gloria Mejia for shared decision consult      I have also asked her to call the office and speak with the [facility] Coordinator with any questions or concerns moving forward. Scribe's attestation: This note was scribed in the presence of Dr. Antoinette Trejo MD by Santa Aase, LPN    Thank you for allowing us to participate in the care of Segundo Blackburn. Please do not hesitate to contact me if you have any questions.     Antoinette Trejo MD, MPH    [facility], [address] [place] [address]  Ph: [phone]  Fax: [phone]    Physician Attestation:  The scribes documentation has been prepared under my direction and personally reviewed by me in its entirety. I confirm that the note above accurately reflects all work, treatment, procedures, and medical decision making performed by me.

## 2022-09-02 NOTE — TELEPHONE ENCOUNTER
Spoke with patient and his wife today regarding Watchman procedure. Patient was shown video on Watchman and given educational material.  Patient states interest and would like to proceed. Will set up an appointment with Dr. Audrey Mahmood for shared decision.  10/4/2022 10:30

## 2022-09-07 ENCOUNTER — HOSPITAL ENCOUNTER (OUTPATIENT)
Age: 77
Discharge: HOME | End: 2022-09-07
Payer: MEDICARE

## 2022-09-07 ENCOUNTER — HOSPITAL ENCOUNTER (OUTPATIENT)
Dept: CT IMAGING | Age: 77
Discharge: HOME | End: 2022-09-07
Payer: MEDICARE

## 2022-09-07 DIAGNOSIS — C34.01 MALIGNANT NEOPLASM OF RIGHT MAIN BRONCHUS (HCC): ICD-10-CM

## 2022-09-07 DIAGNOSIS — C77.1 SECONDARY AND UNSPECIFIED MALIGNANT NEOPLASM OF INTRATHORACIC LYMPH NODES (HCC): ICD-10-CM

## 2022-09-07 LAB — BUN BLDV-MCNC: 20 MG/DL (ref 7–20)

## 2022-09-07 PROCEDURE — 71260 CT THORAX DX C+: CPT

## 2022-09-07 PROCEDURE — 6360000004 HC RX CONTRAST MEDICATION: Performed by: INTERNAL MEDICINE

## 2022-09-07 PROCEDURE — 84520 ASSAY OF UREA NITROGEN: CPT

## 2022-09-07 PROCEDURE — 36415 COLL VENOUS BLD VENIPUNCTURE: CPT

## 2022-09-07 RX ADMIN — IOPAMIDOL 75 ML: 755 INJECTION, SOLUTION INTRAVENOUS at 10:40

## 2022-09-27 NOTE — PROGRESS NOTES
CARDIOLOGY FOLLOW UP         Patient Name: Darrell Starr  Primary Care physician: Fay Marmolejo MD    Reason for Referral/Chief Complaint: Darrell Starr is a 68 y.o. patient who is referred to cardiology clinic today for evaluation and treatment of hospital follow up . History of Present Illness:   Darrell Starr 68 y.o. with a PMH notable for of PAF, lung cancer and is on chemotherapy s/p  VATS, s/p thoracotomy, parkinson's, syncope, hx ablation, seizures, CAD and porphyria cutanea tarda. s/p PCI to LAD 3/2017, PAF- s/p RFCA of AF 9/2014, S/P loop recorder implant 2014 with reimplant 2018- (device at [facility]), 2014 he had an RFCA of atrial fibrillation, loop recorder implant and was on sotalol. Showed an EF of 55%. Cardiac CTA showed diffuse coronary calcification. In 6/2016, he complained of chest pain and had a normal stress test.  In 3/2017, he had a PCI to LAD. In 5/2022, he was admitted with excessive weakness, recurrent falls and near syncope. Orthostatic blood pressures were positive. Pradaxa was stopped and he was referred for watchman evaluation given recurrent falls and thrombocytopenia. In 6/2022, he was evaluated in the ED at [facility]. Heart rates were noted to be in the 30s. Digoxin was stopped due to digoxin toxicity. In 7/17/2022 - 07/19/2022, patient was admitted to [facility] for a subsequent fall. He was found to have COVID. EKG at that time showed rate controlled atrial fibrillation. On 08/02/2022 followed up with EP Do not resume Pradaxa given recurrent falls. The risk of bleeding/injury currently outweighs the risk of stroke protection. 09/02/2022 follow up with Dr. Joseph [facility] for [facility]. Today, ****      The patient denies chest pain, shortness of breath at rest and dyspnea with exertion. Denies palpitations, dizziness, near-syncope or mack syncope.  Denies paroxysmal nocturnal dyspnea, orthopnea, bendopnea, increasing lower extremity edema or weight gain. The patient endorses highest level of activity as ***. Past Medical History:   has a past medical history of MIRNA (acute kidney injury) ([facility] [address] Anxiety, Arthritis, Bradycardia, Cancer ([facility] [address] Coronary artery disease involving native coronary artery of native heart without angina pectoris, Hemoptysis, Irregular heart  beats, 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[facility] [address] S/P drug eluting coronary stent placement, Seizure disorder ([facility] [address] [facility] Seizures ([facility] [address] [facility] Shortness of breath, and Total knee replacement status. Surgical History:   has a past surgical history that includes Carpal tunnel release; Femur Surgery; knee surgery (1-12-11 R total knee replacement with femoral nerve block for pain control); Cervical disc surgery (2010); other surgical history (Bilateral); ablation of dysrhythmic focus (9/2014); other surgical history (9/2014); Colonoscopy (09/26/2016); Upper gastrointestinal endoscopy (09/26/2016); Coronary angioplasty with stent; and Lung removal, partial (Right). Social History:   reports that he quit smoking about 29 years ago. His smoking use included cigarettes. He has a 80.00 pack-year smoking history. He has never been exposed to tobacco smoke. He has never used smokeless tobacco. He reports that he does not drink alcohol and does not use drugs. Family History:  family history includes Arthritis in his brother, father, maternal grandfather, maternal grandmother, mother, paternal grandfather, paternal grandmother, and sister; Cancer in his sister; Depression in his sister; Diabetes in his paternal grandmother; Heart Disease in his father and mother; High Blood Pressure in his mother; [place] Plump / [place] in his mother; Stroke in his mother. Home Medications:  Were reviewed and are listed in nursing record and/or below  Prior to Admission medications    Medication Sig Start Date End Date Taking?  Authorizing Provider   escitalopram (LEXAPRO) 20 MG tablet 10 mg 8/8/22   Historical Provider, MD   memantine (NAMENDA) 10 MG tablet Take 10 mg by mouth 2 times daily    Historical Provider, MD   pantoprazole (PROTONIX) 20 MG tablet Take 20 mg by mouth in the morning. Historical Provider, MD   atorvastatin (LIPITOR) 40 MG tablet Take 0.5 tablets by mouth in the morning. 7/19/22   Kenneth Espinoza MD   finasteride (PROSCAR) 5 MG tablet Take 5 mg by mouth daily  Patient not taking: No sig reported    Historical Provider, MD   ipratropium (ATROVENT) 0.03 % nasal spray 2 sprays by Each Nostril route every 12 hours Can use of to 4 times daily as needed 5/10/21   Carlene Amin,    diclofenac sodium 1 % GEL APPLY 4GM TOPICALLY FOUR TIMES A DAY 12/6/18   Gus Agosto MD   carbidopa-levodopa (SINEMET)  MG per tablet Take 1 tablet by mouth in the morning and at bedtime     Historical Provider, MD   vitamin B-2 (RIBOFLAVIN) 25 MG TABS Take by mouth  Patient not taking: Reported on 9/2/2022    Historical Provider, MD   diclofenac (PENNSAID) 2 % SOLN 2 pumps to the affected area 2 times a day [id number] 9/26/17   Gus Agosto MD   omeprazole (PRILOSEC) 20 MG delayed release capsule Take 20 mg by mouth 2 times daily  Patient not taking: Reported on 9/2/2022    Historical Provider, MD   fluocinonide (LIDEX) 0.05 % cream Apply topically 2 times daily. 5/20/16   KATIE Yates - CNP   ferrous sulfate 325 (65 FE) MG tablet Take 1 tablet by mouth 2 times daily (with meals)  Patient not taking: No sig reported 10/5/15   Roger Gutierrez MD   zolpidem (AMBIEN) 10 MG tablet Take 10 mg by mouth nightly as needed. Historical Provider, MD   LORazepam (ATIVAN) 1 MG tablet Take 2 mg by mouth in the morning and at bedtime. Historical Provider, MD   Lacosamide (VIMPAT PO) Take 100 mg by mouth 2 times daily. Indications: take dos    Historical Provider, MD        CURRENT Medications:  No current facility-administered medications for this visit.       Allergies:  Morphine, Codeine, Penicillins, and Plavix [clopidogrel bisulfate]     Review of Systems:   A 14 point review of symptoms completed. Pertinent positives identified in the HPI, all other review of symptoms negative as below. Objective: There were no vitals filed for this visit. PHYSICAL EXAM:    ***  General:  Alert, cooperative, no distress, appears stated age   Head:  Normocephalic, atraumatic   Eyes:  Conjunctiva/corneas clear, anicteric sclerae    Nose: Nares normal, no drainage or sinus tenderness   Throat: No abnormalities of the lips, oral mucosa or tongue. Neck: Trachea midline. Neck supple with no lymphadenopathy, thyroid not enlarged, symmetric, no tenderness/mass/nodules, no Jugular venous pressure elevation    Lungs:   Clear to auscultation bilaterally, no wheezes, no rales, no respiratory distress   Chest Wall:  No deformity or tenderness to palpation   Heart:  Regular rate and rhythm, normal S1, normal S2, no murmur, no rub, no S3/S4, PMI non-displaced. Abdomen:   Soft, non-tender, with normoactive bowel sounds. No masses, no hepatosplenomegaly   Extremities: No cyanosis, clubbing or pitting edema. Vascular: 2+ radial, brachial, femoral, dorsalis pedis and posterior tibial pulses bilaterally. Brisk carotid upstrokes without carotid bruit. Skin: Skin color, texture, turgor are normal with no rashes or ulceration. Pysch: Euthymic mood, appropriate affect   Neurologic: Oriented to person, place and time. No slurred speech or facial asymmetry. No motor or sensory deficits on gross examination.          Labs:   CBC:   Lab Results   Component Value Date/Time    WBC 6.5 07/17/2022 11:30 AM    RBC 4.07 07/17/2022 11:30 AM    RBC 4.84 01/13/2017 11:05 AM    HGB 12.8 07/17/2022 11:30 AM    HCT 38.9 07/17/2022 11:30 AM    MCV 95.6 07/17/2022 11:30 AM    RDW 13.7 07/17/2022 11:30 AM     07/17/2022 11:30 AM     CMP:  Lab Results   Component Value Date/Time     07/17/2022 11:30 AM    K 4.1 2022 11:30 AM     2022 11:30 AM    CO2 25 2022 11:30 AM    BUN 20 2022 09:51 AM    CREATININE 0.9 2022 11:30 AM    GFRAA >60 2022 11:30 AM    GFRAA >60 2012 07:07 AM    AGRATIO 1.1 2022 11:30 AM    LABGLOM >60 2022 11:30 AM    GLUCOSE 99 2022 11:30 AM    GLUCOSE 100 2016 09:48 AM    PROT 8.2 2022 11:30 AM    PROT 7.6 2016 09:48 AM    CALCIUM 9.9 2022 11:30 AM    BILITOT 0.7 2022 11:30 AM    ALKPHOS 128 2022 11:30 AM    AST 32 2022 11:30 AM    ALT 21 2022 11:30 AM     PT/INR:  No results found for: PTINR  HgBA1c:  Lab Results   Component Value Date    LABA1C 5.6 2011     Lab Results   Component Value Date    TROPONINI <0.01 2022         Cardiac Data:     EK2022  Atrial flutter    ABNORMAL RHYTHM      Echo: 2022  Summary  Normal left ventricular systolic function with ejection fraction of 55-60%. No regional wall motion abnormalites are seen. Normal left ventricular size with mild concentric left ventricular hypertrophy. Normal left ventricular systolic function with ejection fraction of 55-60%. No regional wall motion abnormalites are seen. Mild mitral regurgitation. The right atrium is mildly dilated    Stress Echo: 2020  Interpetation Summary:        o Non-diagnostic due to inability to achieve target heart rate ECG for        ischemia with graded exercise test.      o Non-diagnostic stress echo due to inability to obtain target heart rate. Stress Test: 2020  Interpetation Summary:   The LV EF is 81%   The left ventricular wall motion is normal.   There is a small sized, fixed defect in the inferolateral wall consistent with   mild infarct.        o Non-diagnostic ECG for ischemia with pharmocologic stress. o No significant areas of ischemia identified with pharmocologic stress. Stress Test: 2014     Summary    Normal sinus rhythm. Normal (negative) response to exercise. No arrhythmias observed. Pt. did not reach target heart rate. Pt. was very shaky at start of test and    could not continue walking on treadmill. Nondiagnostic stress test due to failure to reach target heart rate. Cardiac catheterization: 02/03/2009      Additional studies:   US Aorta for aneurysm: 06/03/2019  The abdominal aorta is normal in course and caliber. Mild atherosclerosis   involves the distal abdominal aorta. The proximal abdominal aorta measures   1.8 x 2.1 cm, the mid abdominal aorta measures 1.6 x 2.0 cm and the distal   abdominal aorta measures 2.0 x 2.1 cm. No evidence of abdominal aortic aneurysm. Impression and Plan:      1. Patient Active Problem List   Diagnosis    Dizziness    Syncope and collapse    Paroxysmal atrial fibrillation (HCC)    Contusion of knee, right    Dysphagia    Encounter for electronic analysis of reveal event recorder    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[facility] [address] [address]    History of nonmelanoma skin cancer    Ischemic chest pain ([address]    Left hand pain    Hand pain, right    Arthritis of carpometacarpal (CMC) joints of both thumbs    Radial styloid tenosynovitis (de quervain)    Left elbow fracture, closed, initial encounter    Frequent falls    General weakness    Thrombocytopenia (HCC)    Malignant neoplasm of lung (HCC)    Orthostasis    Hypotension    Chest pain    Abnormal finding on EKG    Digoxin toxicity, accidental or unintentional, initial encounter    Atrial fibrillation with slow ventricular response ([address]    COVID-19 virus infection    COVID    Rib pain on right side    Coronary artery disease involving native coronary artery of native heart without angina pectoris       PLAN:  1. Follow up with me    ***    ***    I will address the patient's cardiac risk factors and adjusted pharmacologic treatment as needed. In addition, I have reinforced the need for patient directed risk factor modification.   All questions and concerns were addressed to the patient/family. Alternatives to my treatment were discussed. Thank you for allowing us to participate in the care of Dali Ortiz. Please call me with any questions [phone].     Magali Zhao MD, [facility]  Cardiovascular Disease  [facility]  [phone] [facility]  [phone] [address] [place] [address]  9/27/2022 10:04 AM

## 2022-10-04 ENCOUNTER — OFFICE VISIT (OUTPATIENT)
Dept: CARDIOLOGY CLINIC | Age: 77
End: 2022-10-04
Payer: MEDICARE

## 2022-10-04 ENCOUNTER — TELEPHONE (OUTPATIENT)
Dept: CARDIOLOGY CLINIC | Age: 77
End: 2022-10-04

## 2022-10-04 VITALS
HEART RATE: 80 BPM | WEIGHT: 166.31 LBS | SYSTOLIC BLOOD PRESSURE: 130 MMHG | HEIGHT: 70 IN | DIASTOLIC BLOOD PRESSURE: 66 MMHG | BODY MASS INDEX: 23.81 KG/M2 | OXYGEN SATURATION: 98 %

## 2022-10-04 DIAGNOSIS — Z95.818 PRESENCE OF WATCHMAN LEFT ATRIAL APPENDAGE CLOSURE DEVICE: ICD-10-CM

## 2022-10-04 DIAGNOSIS — I48.0 PAROXYSMAL A-FIB (HCC): Primary | ICD-10-CM

## 2022-10-04 DIAGNOSIS — Z01.818 PRE-OP TESTING: ICD-10-CM

## 2022-10-04 DIAGNOSIS — I48.19 PERSISTENT ATRIAL FIBRILLATION (HCC): Primary | ICD-10-CM

## 2022-10-04 PROCEDURE — 1123F ACP DISCUSS/DSCN MKR DOCD: CPT | Performed by: INTERNAL MEDICINE

## 2022-10-04 PROCEDURE — 99214 OFFICE O/P EST MOD 30 MIN: CPT | Performed by: INTERNAL MEDICINE

## 2022-10-04 NOTE — LETTER
[address] [facility] ([facility])        Dear, Marcial Tarango     Your Watchman procedure has been scheduled for Monday 11/14/2022 at [facility] with Dr. Brit Holbrook MD.  Please arrive directly to the Cath Lab at 6:45 am.     You will need to make arrangements to have a responsible adult drive you home after your procedure and someone needs to stay with you for 24 hours. Procedure labs Procedure labs will need to be completed at [facility] Wednesday 11/7/2022 the week before your procedure. Please check in with Registration in the main lobby of the hospital. The test to perform [address] LAB NOT OUTPATIENT LAB. are as follows BMP, CBC, Urine test and Type and Screen. Pre-Procedure Instructions:  Hira May You will need to FAST for at least 8 hours prior to your procedure, nothing to eat or drink after midnight.  NO caffeine the morning of your procedure.  You need to wash your body with a soap called HIBICLENS the evening before or the   morning of your scheduled procedure from the neck down. A prescription has been sent to your pharmacy. If your insurance does not cover the soap, you can pick it up over the counter, ask your pharmacy for help. Tran Delvalle Take 1 Baby Aspirin the day of the procedure.  Hold any Diabetic medication the day of the procedure.  Hold Ativan the day of the procedure.  Hold multi. Vitamins the day of procedure.  ALL other medications can be taken in the morning with sips of water   Do not use any lotions, creams or perfume the morning of procedure.  You will be going home the same day but if unable to you may stay overnight at [facility] after your procedure so, please pack an overnight bag if needed. Pre-certification  Our office will be in contact with your insurance company regarding pre-certification.  If for some reason, your procedure is still pending the day before your scheduled procedure, it must be moved or delayed until we have authorization to proceed. If you have Medicare, no pre-cert is required. Co-Payment  If you have a copay or a deposit due that is your responsibility to pay at the time of service, the Cardiac Cath lab will accept your payment on the day of your procedure. Please bring with you a form of payment. Any questions regarding your copay, please contact your insurance company. Please bring your photo ID, insurance cards and copayment if you have one. (Cash, checks & credit cards are accepted)        On the day of your procedure      Please report directly to  The [address], [address], [place], [address]     You should park in the lot directly in front of the hospital, you are able to [facility] for free from (6AM-4:30PM). The MAIN entrance that will be the Revolving Door. When you come into the hospital ask the  to direct you to the Cardiac Cath Lab. I will be in contact with you the week before your procedure. You may reach me at [phone] in the meantime.      Sincerely,     Yeison Harmon RN,  Watchman Coordinator

## 2022-10-04 NOTE — PROGRESS NOTES
CARDIOLOGY CONSULTATION         Patient Name: Mirna Alexandra  Primary Care physician: Sera Meyers MD    Reason for Referral/Chief Complaint: Mirna Alexandra is a 68 y.o. patient who is referred to cardiology clinic today for evaluation for candidacy for Watchman Procedure. History of Present Illness:   Mirna Alexandra 68 y.o. with a prior medical history  notable for of paroxsymal atrial fibrillation s/p RFCA of AF 9/2014, lung cancer s/p thoracotomy, parkinson's disease, syncope, hx ablation, porphyria cutanea tarda and coronary artery disease s/p PCI to LAD 3/2017, syncope s/p loop recorder implant 2014 with reimplant 2018- (device at [facility]), who presents today to re-establish cardiac care and for [facility] evaluation. Most recent walking Светлана-Rell showed no ischemia. Most recent Echo 5/12/2022 showed an EF of 55-60%. In 5/2022, he was admitted with excessive weakness, recurrent falls and near syncope. Orthostatic blood pressures were positive. Pradaxa was stopped and he was referred for watchman evaluation given recurrent falls and thrombocytopenia. In 6/2022, he was evaluated in the ED at [facility]. Heart rates were noted to be in the 30s. Digoxin toxicity suspected and this was stopped. In 7/17/2022 - 07/19/2022, patient was admitted to [facility] for a subsequent fall. He was found to have COVID. EKG at that time showed rate controlled atrial fibrillation. On 08/02/2022 followed up with EP with conclusion: Do not resume Pradaxa given recurrent falls. The risk of bleeding/injury currently outweighs the risk of stroke protection. Today, presents with cane and says is doing better with physical therapy. Leg weakness improving. No falls in last 4-5 weeks. He denies any evidence of hematemesis, hemoptysis, melena, hematochezia or hematuria with blood thinner previously. History of hemoptysis per chart - unclear if in setting of lung ca prior to treatment.   The patient denies chest pain, shortness of breath at rest.  Denies palpitations, dizziness, near-syncope or syncope. Denies paroxysmal nocturnal dyspnea, orthopnea, increasing lower extremity edema or weight gain. The patient is compliant with medications. Cost of medications is affordable. No endorsed side effects. Past Medical History:   has a past medical history of MIRNA (acute kidney injury) ([facility] Anxiety, Arthritis, Bradycardia, Cancer ([facility] Coronary artery disease involving native coronary artery of native heart without angina pectoris, Hemoptysis, Irregular heart  beats, 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[facility] S/P drug eluting coronary stent placement, Seizure disorder ([facility] [facility]izures [facility] Shortness of breath, and Total knee replacement status. Surgical History:   has a past surgical history that includes Carpal tunnel release; Femur Surgery; knee surgery (1-12-11 R total knee replacement with femoral nerve block for pain control); Cervical disc surgery (2010); other surgical history (Bilateral); ablation of dysrhythmic focus (9/2014); other surgical history (9/2014); Colonoscopy (09/26/2016); Upper gastrointestinal endoscopy (09/26/2016); Coronary angioplasty with stent; and Lung removal, partial (Right). Social History:   reports that he quit smoking about 29 years ago. His smoking use included cigarettes. He has a 80.00 pack-year smoking history. He has never been exposed to tobacco smoke. He has never used smokeless tobacco. He reports that he does not drink alcohol and does not use drugs. He smoked for 18 years two packs a day.      Family History:  family history includes Arthritis in his brother, father, maternal grandfather, maternal grandmother, mother, paternal grandfather, paternal grandmother, and sister; Cancer in his sister; Depression in his sister; Diabetes in his paternal grandmother; Heart Disease in his father and mother; High Blood Pressure in his mother; [employer] [place] in his mother; Stroke in his mother. Home Medications:  Were reviewed and are listed in nursing record and/or below  Prior to Admission medications    Medication Sig Start Date End Date Taking? Authorizing Provider   escitalopram (LEXAPRO) 20 MG tablet 10 mg 8/8/22  Yes Historical Provider, MD   memantine (NAMENDA) 10 MG tablet Take 10 mg by mouth 2 times daily   Yes Historical Provider, MD   pantoprazole (PROTONIX) 20 MG tablet Take 20 mg by mouth in the morning. Yes Historical Provider, MD   atorvastatin (LIPITOR) 40 MG tablet Take 0.5 tablets by mouth in the morning. 7/19/22  Yes Meghan North MD   ipratropium (ATROVENT) 0.03 % nasal spray 2 sprays by Each Nostril route every 12 hours Can use of to 4 times daily as needed 5/10/21  Yes Javy Amin,    diclofenac sodium 1 % GEL APPLY 4GM TOPICALLY FOUR TIMES A DAY 12/6/18  Yes Sandhya Nino MD   carbidopa-levodopa (SINEMET)  MG per tablet Take 1 tablet by mouth in the morning and at bedtime    Yes Historical Provider, MD   vitamin B-2 (RIBOFLAVIN) 25 MG TABS Take by mouth   Yes Historical Provider, MD   diclofenac (PENNSAID) 2 % SOLN 2 pumps to the affected area 2 times a day [id number] 9/26/17  Yes Sandhya Nino MD   fluocinonide (LIDEX) 0.05 % cream Apply topically 2 times daily. 5/20/16  Yes KATIE Chapman - CNP   ferrous sulfate 325 (65 FE) MG tablet Take 1 tablet by mouth 2 times daily (with meals) 10/5/15  Yes Sonal Vidal MD   zolpidem (AMBIEN) 10 MG tablet Take 10 mg by mouth nightly as needed. Yes Historical Provider, MD   LORazepam (ATIVAN) 1 MG tablet Take 2 mg by mouth in the morning and at bedtime. Yes Historical Provider, MD   Lacosamide (VIMPAT PO) Take 100 mg by mouth 2 times daily.  Indications: take dos   Yes Historical Provider, MD   finasteride (PROSCAR) 5 MG tablet Take 5 mg by mouth daily  Patient not taking: No sig reported    Historical Provider, MD   omeprazole (PRILOSEC) 20 MG delayed release capsule Take 20 mg by mouth 2 times daily  Patient not taking: No sig reported    Historical Provider, MD        CURRENT Medications:  No current facility-administered medications for this visit. Allergies:  Morphine, Codeine, Penicillins, and Plavix [clopidogrel bisulfate]     Review of Systems:   A 14 point review of symptoms completed. Pertinent positives identified in the HPI, all other review of symptoms negative as below. Objective:     Vitals:    10/04/22 0947   BP: 130/66   Pulse: 80   SpO2: 98%    Weight: 166 lb 5 oz (75.4 kg)       PHYSICAL EXAM:    General:  Elderly man, no acute distress    Head:  Normocephalic, atraumatic   Eyes:  Conjunctiva/corneas clear, anicteric sclerae    Nose: Nares normal, no drainage or sinus tenderness   Throat: No abnormalities of the lips, oral mucosa or tongue. Neck: Trachea midline. Neck supple with no lymphadenopathy, thyroid not enlarged, symmetric, no tenderness/mass/nodules   Lungs:   Clear to auscultation bilaterally, no wheezes, no rales, no respiratory distress   Chest Wall:  No deformity or tenderness to palpation   Heart:  Irregularly irregular, rate controlled, variable S1, normal S2, no murmur, no rub, no S3/S4, PMI non-displaced. Abdomen:   Soft, non-tender, with normoactive bowel sounds. No masses, no hepatosplenomegaly   Extremities: No cyanosis, clubbing or pitting edema. Vascular: 2+ radial, dorsalis pedis and posterior tibial pulses bilaterally. Brisk carotid upstrokes without carotid bruit. Skin: Skin color, texture, turgor are normal with no rashes or ulceration. Pysch: Euthymic mood, appropriate affect   Neurologic: Oriented to person, place and time. No slurred speech or facial asymmetry. No motor or sensory deficits on gross examination.          Labs:   CBC:   Lab Results   Component Value Date/Time    WBC 6.5 07/17/2022 11:30 AM    RBC 4.07 07/17/2022 11:30 AM    RBC 4.84 01/13/2017 11:05 AM    HGB 12.8 07/17/2022 11:30 AM HCT 38.9 07/17/2022 11:30 AM    MCV 95.6 07/17/2022 11:30 AM    RDW 13.7 07/17/2022 11:30 AM     07/17/2022 11:30 AM     CMP:  Lab Results   Component Value Date/Time     07/17/2022 11:30 AM    K 4.1 07/17/2022 11:30 AM     07/17/2022 11:30 AM    CO2 25 07/17/2022 11:30 AM    BUN 20 09/07/2022 09:51 AM    CREATININE 0.9 07/17/2022 11:30 AM    GFRAA >60 07/17/2022 11:30 AM    GFRAA >60 06/07/2012 07:07 AM    AGRATIO 1.1 07/17/2022 11:30 AM    LABGLOM >60 07/17/2022 11:30 AM    GLUCOSE 99 07/17/2022 11:30 AM    GLUCOSE 100 06/03/2016 09:48 AM    PROT 8.2 07/17/2022 11:30 AM    PROT 7.6 06/03/2016 09:48 AM    CALCIUM 9.9 07/17/2022 11:30 AM    BILITOT 0.7 07/17/2022 11:30 AM    ALKPHOS 128 07/17/2022 11:30 AM    AST 32 07/17/2022 11:30 AM    ALT 21 07/17/2022 11:30 AM     PT/INR:  No results found for: PTINR  HgBA1c:  Lab Results   Component Value Date    LABA1C 5.6 01/13/2011     Lab Results   Component Value Date    TROPONINI <0.01 07/17/2022         Cardiac Data:      Echo 2014  Summary    Left ventricular size is normal .    Global ejection fraction is normal and estimated from 55% to 60%. No regional wall motion abnormalities are noted. Normal diastolic filling pattern for age. Systolic pulmonary artery pressure (SPAP) is estimated at 35 mmHg (RA    pressure 3 mm Hg). DONG 9/4/2014  Summary   Overall left ventricular function is normal.   Ejection fraction is visually estimated to be 55-60 %. Mitral valve is structurally normal.   Mild mitral regurgitation is present. There is no evidence of mass or thrombus in the left atrium or appendage. Redundancy of the interatrial septum. No evidence of atrial septal defect. Walking Светлана-Rell 7/14/2016  Conclusions    Summary  Normal myocardial perfusion study with normal left ventricular function,  size, and wall motion. The estimated left ventricular function is 72%. CATH: 3/6/17  IMPRESSION:  1.   Successful percutaneous coronary intervention of high-grade distal LAD  lesion with placement of 2.25 mm x 18 mm Xience Alpine drug-eluting stent. 2.  Mild circumflex and right coronary artery disease. 3.  Normal left ventricular systolic function. 4.  Mildly elevated left ventricular filling pressure, from hypertension. [id number] [email or web address]  Impression No evidence of abdominal aortic aneurysm. Echo: 2022  Conclusions      Summary   Normal left ventricular systolic function with ejection fraction of 55-60%. No regional wall motion abnormalites are seen. Normal left ventricular size with mild concentric left ventricular   hypertrophy. Normal left ventricular systolic function with ejection fraction of 55-60%. No regional wall motion abnormalites are seen. Mild mitral regurgitation. The right atrium is mildly dilated. Impression and Plan:   Kiley Freeman is being referred for Left Atrial Appendage Closure with WATCHMAN device for management of stroke risk resulting from non-valvular atrial fibrillation. Based on their past history, it has been determined that they are poor candidates for long-term oral-anticoagulation, however may be tolerant of short term treatment with anti-thrombotic therapy as necessary. Patient is high risk for stroke or systemic thromboembolism. Patient [id number]  is calculated:      Risk   Factors  Component Value   C CHF No 0   H HTN No 0   A2 Age >= 76 Yes,  (79 y.o.) 2   D DM No 0   S2 Prior Stroke/TIA No 0   V Vascular Disease No 0   A Age 74-69 No,  (79 y.o.) 0   Sc Sex male 0    [id number]  Score  2       Specifically regarding risk of anticoagulation they have demonstrated:  History of bleeding (eg.  Intracerebral, subdural, GI, retro-peritoneal)  Intolerance oral anticoagulation  X Increased bleeding risk (e.g. Thrombocytopenia, anemia)  X High risk of recurrent falls  Occupation related high bleeding risk  Need for prolonged dual anti platelet therapy  Severe renal failure  Poor compliance with anticoagulant therapy        HAS-BLED Score                            Risk Factors      Points   Hypertension  Uncontrolled, >749 mmHg systolic   No=0   Renal disease  Dialysis, transplant, Cr>2.26 mg/dL or >200 µmol/L   No=0   Liver disease   Cirrhosis or bilirubin >2x normal with AST/ALT/AP >3x normal   No=0   Stroke history   No=0   Prior major bleeding or predisposition to bleeding     Yes=1   Labile  INR  Unstable/high INRs, time in therapeutic range <60%   No=0   Age >71   Yes=1   Medication usage predisposing to bleeding   Aspirin, clopidogrel, NSAIDs   Yes=1   Alcohol use   >7 drinks/week   No=0       HAS-BLED Score:    3:  Risk was 5.8% in one validation study and 3.72 bleeds per 100 patient-years in another validation study. We have discussed their unique stroke and bleeding risk both on and off oral-anticoagulation, and the rationale for this referral.  Based on both stroke and bleeding risk, a shared decision has been made to pursue closure of the left atrial appendage as an alternative to oral anticoagulant therapy for stroke prophylaxis and to reduce their long term risk of incidence of bleeding.     Signed:    Patient Active Problem List   Diagnosis    Dizziness    Syncope and collapse    Paroxysmal atrial fibrillation (HCC)    Contusion of knee, right    Dysphagia    Encounter for electronic analysis of reveal event recorder    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[facility] [facility]    History of nonmelanoma skin cancer    Ischemic chest pain ([facility] [address]    Left hand pain    Hand pain, right    Arthritis of carpometacarpal (CMC) joints of both thumbs    Radial styloid tenosynovitis (de quervain)    Left elbow fracture, closed, initial encounter    Frequent falls    General weakness    Thrombocytopenia (HCC)    Malignant neoplasm of lung (HCC)    Orthostasis    Hypotension    Chest pain    Abnormal finding on EKG    Digoxin toxicity, accidental or unintentional, initial encounter    Atrial fibrillation with slow ventricular response ([facility] [facility]    COVID-19 virus infection    COVID    Rib pain on right side    Coronary artery disease involving native coronary artery of native heart without angina pectoris         PLAN:  Continue your current medications  Proceed with WATCHMAN evaluation. Follow up with me in 6 months at [facility]; Follow up with EP as scheduled. I will address the patient's cardiac risk factors and adjusted pharmacologic treatment as needed. In addition, I have reinforced the need for patient directed risk factor modification. All questions and concerns were addressed to the patient/family. Alternatives to my treatment were discussed. Thank you for allowing us to participate in the care of Lencho Andrade. Please call me with any questions [phone].     Yas Jennings MD, [facility]  Cardiovascular Disease  [address]  [phone] [facility]  [phone] [address] [place] [address]  10/4/2022 10:23 AM

## 2022-10-04 NOTE — PATIENT INSTRUCTIONS
PLAN:  Continue your current medications  Ok to proceed with watchman next steps. You should hear from bindu bernard for your next steps.      Follow up with me in 6 months king

## 2022-10-12 NOTE — TELEPHONE ENCOUNTER
Spoke with patient today and informed him of the procedure that is being scheduled on 11/14/2022 at 6:45 AM arrival.  Lab work has been order instructed to have done at [facility] on 11/7/2022. Also informed to get Hibiclens bath at their Pharmacy. All procedure instructions were e-mailed to patient.  Instructed to contact me with any questions prior to procedure

## 2022-10-26 ENCOUNTER — HOSPITAL ENCOUNTER (OUTPATIENT)
Age: 77
Discharge: HOME | End: 2022-10-26
Payer: MEDICARE

## 2022-10-26 LAB
A/G RATIO: 1.5 (ref 1.1–2.2)
ALBUMIN SERPL-MCNC: 4.7 G/DL (ref 3.4–5)
ALP BLD-CCNC: 150 U/L (ref 40–129)
ALT SERPL-CCNC: 6 U/L (ref 10–40)
ANION GAP SERPL CALCULATED.3IONS-SCNC: 10 MMOL/L (ref 3–16)
AST SERPL-CCNC: 21 U/L (ref 15–37)
BASOPHILS ABSOLUTE: 0.1 K/UL (ref 0–0.2)
BASOPHILS RELATIVE PERCENT: 0.7 %
BILIRUB SERPL-MCNC: 1 MG/DL (ref 0–1)
BUN BLDV-MCNC: 20 MG/DL (ref 7–20)
CALCIUM SERPL-MCNC: 9.5 MG/DL (ref 8.3–10.6)
CHLORIDE BLD-SCNC: 103 MMOL/L (ref 99–110)
CO2: 28 MMOL/L (ref 21–32)
CREAT SERPL-MCNC: 0.9 MG/DL (ref 0.8–1.3)
EOSINOPHILS ABSOLUTE: 0.2 K/UL (ref 0–0.6)
EOSINOPHILS RELATIVE PERCENT: 2.5 %
GFR SERPL CREATININE-BSD FRML MDRD: >60 ML/MIN/{1.73_M2}
GLUCOSE BLD-MCNC: 95 MG/DL (ref 70–99)
HCT VFR BLD CALC: 39.3 % (ref 40.5–52.5)
HEMOGLOBIN: 13.4 G/DL (ref 13.5–17.5)
LIPASE: 31 U/L (ref 13–60)
LYMPHOCYTES ABSOLUTE: 2.7 K/UL (ref 1–5.1)
LYMPHOCYTES RELATIVE PERCENT: 33 %
MCH RBC QN AUTO: 30.8 PG (ref 26–34)
MCHC RBC AUTO-ENTMCNC: 34.2 G/DL (ref 31–36)
MCV RBC AUTO: 90.2 FL (ref 80–100)
MONOCYTES ABSOLUTE: 0.9 K/UL (ref 0–1.3)
MONOCYTES RELATIVE PERCENT: 11 %
NEUTROPHILS ABSOLUTE: 4.3 K/UL (ref 1.7–7.7)
NEUTROPHILS RELATIVE PERCENT: 52.8 %
PDW BLD-RTO: 15.1 % (ref 12.4–15.4)
PLATELET # BLD: 159 K/UL (ref 135–450)
PMV BLD AUTO: 8.2 FL (ref 5–10.5)
POTASSIUM SERPL-SCNC: 3.8 MMOL/L (ref 3.5–5.1)
RBC # BLD: 4.35 M/UL (ref 4.2–5.9)
SODIUM BLD-SCNC: 141 MMOL/L (ref 136–145)
TOTAL PROTEIN: 7.8 G/DL (ref 6.4–8.2)
WBC # BLD: 8.2 K/UL (ref 4–11)

## 2022-10-26 PROCEDURE — 80053 COMPREHEN METABOLIC PANEL: CPT

## 2022-10-26 PROCEDURE — 85025 COMPLETE CBC W/AUTO DIFF WBC: CPT

## 2022-10-26 PROCEDURE — 83690 ASSAY OF LIPASE: CPT

## 2022-10-26 RX ORDER — CHLORHEXIDINE GLUCONATE 213 G/1000ML
SOLUTION TOPICAL
Qty: 1 EACH | Refills: 0 | Status: ON HOLD
Start: 2022-11-07 | End: 2022-11-14 | Stop reason: HOSPADM

## 2022-10-26 NOTE — TELEPHONE ENCOUNTER
Jayashree Hall MD 5/9/2022 referred for Watchman procedure.   Dr. Bisi Enciso consulted on 09/02/2022  Dr. Braydon Damian shared decision on 10/4/2022  DONG to be done the day of the procedure  Insurance to be approved per [facility]

## 2022-11-07 ENCOUNTER — HOSPITAL ENCOUNTER (OUTPATIENT)
Age: 77
Discharge: HOME | End: 2022-11-07
Payer: MEDICARE

## 2022-11-07 DIAGNOSIS — Z01.818 PRE-OP TESTING: ICD-10-CM

## 2022-11-07 DIAGNOSIS — I48.0 PAROXYSMAL A-FIB (HCC): ICD-10-CM

## 2022-11-07 LAB
ABO/RH: NORMAL
ANION GAP SERPL CALCULATED.3IONS-SCNC: 11 MMOL/L (ref 3–16)
ANTIBODY SCREEN: NORMAL
BACTERIA: NORMAL /HPF
BILIRUBIN URINE: ABNORMAL
BLOOD, URINE: NEGATIVE
BUN BLDV-MCNC: 22 MG/DL (ref 7–20)
CALCIUM SERPL-MCNC: 9.8 MG/DL (ref 8.3–10.6)
CHLORIDE BLD-SCNC: 102 MMOL/L (ref 99–110)
CLARITY: CLEAR
CO2: 26 MMOL/L (ref 21–32)
COLOR: ABNORMAL
CREAT SERPL-MCNC: 0.9 MG/DL (ref 0.8–1.3)
EPITHELIAL CELLS, UA: 2 /HPF (ref 0–5)
GFR SERPL CREATININE-BSD FRML MDRD: >60 ML/MIN/{1.73_M2}
GLUCOSE BLD-MCNC: 90 MG/DL (ref 70–99)
GLUCOSE URINE: NEGATIVE MG/DL
HCT VFR BLD CALC: 42.3 % (ref 40.5–52.5)
HEMOGLOBIN: 13.9 G/DL (ref 13.5–17.5)
HYALINE CASTS: 2 /LPF (ref 0–8)
KETONES, URINE: ABNORMAL MG/DL
LEUKOCYTE ESTERASE, URINE: NEGATIVE
MCH RBC QN AUTO: 30.5 PG (ref 26–34)
MCHC RBC AUTO-ENTMCNC: 32.8 G/DL (ref 31–36)
MCV RBC AUTO: 92.9 FL (ref 80–100)
MICROSCOPIC EXAMINATION: YES
NITRITE, URINE: NEGATIVE
PDW BLD-RTO: 14.7 % (ref 12.4–15.4)
PH UA: 5.5 (ref 5–8)
PLATELET # BLD: 155 K/UL (ref 135–450)
PMV BLD AUTO: 8.4 FL (ref 5–10.5)
POTASSIUM SERPL-SCNC: 5.1 MMOL/L (ref 3.5–5.1)
PROTEIN UA: 30 MG/DL
RBC # BLD: 4.55 M/UL (ref 4.2–5.9)
RBC UA: 1 /HPF (ref 0–4)
SODIUM BLD-SCNC: 139 MMOL/L (ref 136–145)
SPECIFIC GRAVITY UA: 1.02 (ref 1–1.03)
URINE TYPE: ABNORMAL
UROBILINOGEN, URINE: 0.2 E.U./DL
WBC # BLD: 7.7 K/UL (ref 4–11)
WBC UA: 2 /HPF (ref 0–5)

## 2022-11-07 PROCEDURE — 86850 RBC ANTIBODY SCREEN: CPT

## 2022-11-07 PROCEDURE — 80048 BASIC METABOLIC PNL TOTAL CA: CPT

## 2022-11-07 PROCEDURE — 81001 URINALYSIS AUTO W/SCOPE: CPT

## 2022-11-07 PROCEDURE — 36415 COLL VENOUS BLD VENIPUNCTURE: CPT

## 2022-11-07 PROCEDURE — 86900 BLOOD TYPING SEROLOGIC ABO: CPT

## 2022-11-07 PROCEDURE — 86901 BLOOD TYPING SEROLOGIC RH(D): CPT

## 2022-11-07 PROCEDURE — 85027 COMPLETE CBC AUTOMATED: CPT

## 2022-11-09 RX ORDER — METHYLPREDNISOLONE SODIUM SUCCINATE 125 MG/2ML
125 INJECTION, POWDER, LYOPHILIZED, FOR SOLUTION INTRAMUSCULAR; INTRAVENOUS ONCE
Status: DISCONTINUED | OUTPATIENT
Start: 2022-11-14 | End: 2022-11-14 | Stop reason: HOSPADM

## 2022-11-09 RX ORDER — METHYLPREDNISOLONE SODIUM SUCCINATE 125 MG/2ML
125 INJECTION, POWDER, LYOPHILIZED, FOR SOLUTION INTRAMUSCULAR; INTRAVENOUS ONCE
Status: DISCONTINUED | OUTPATIENT
Start: 2022-11-14 | End: 2022-11-09

## 2022-11-10 ENCOUNTER — HOSPITAL ENCOUNTER (OUTPATIENT)
Dept: CT IMAGING | Age: 77
Discharge: HOME | End: 2022-11-10
Payer: MEDICARE

## 2022-11-10 ENCOUNTER — HOSPITAL ENCOUNTER (OUTPATIENT)
Dept: ULTRASOUND IMAGING | Age: 77
Discharge: HOME | End: 2022-11-10
Payer: MEDICARE

## 2022-11-10 DIAGNOSIS — R10.9 ABDOMINAL PAIN, UNSPECIFIED ABDOMINAL LOCATION: ICD-10-CM

## 2022-11-10 DIAGNOSIS — C34.01 MALIGNANT NEOPLASM OF RIGHT MAIN BRONCHUS (HCC): ICD-10-CM

## 2022-11-10 PROCEDURE — 76700 US EXAM ABDOM COMPLETE: CPT

## 2022-11-10 PROCEDURE — 71260 CT THORAX DX C+: CPT

## 2022-11-10 PROCEDURE — 6360000004 HC RX CONTRAST MEDICATION: Performed by: INTERNAL MEDICINE

## 2022-11-10 RX ADMIN — IOPAMIDOL 75 ML: 755 INJECTION, SOLUTION INTRAVENOUS at 10:01

## 2022-11-10 RX ADMIN — DIATRIZOATE MEGLUMINE AND DIATRIZOATE SODIUM 12 ML: 660; 100 LIQUID ORAL; RECTAL at 10:01

## 2022-11-14 ENCOUNTER — ANESTHESIA EVENT (OUTPATIENT)
Dept: CARDIAC CATH/INVASIVE PROCEDURES | Age: 77
DRG: 274 | End: 2022-11-14
Payer: MEDICARE

## 2022-11-14 ENCOUNTER — HOSPITAL ENCOUNTER (INPATIENT)
Dept: CARDIAC CATH/INVASIVE PROCEDURES | Age: 77
LOS: 1 days | Discharge: HOME | DRG: 274 | End: 2022-11-14
Attending: INTERNAL MEDICINE | Admitting: INTERNAL MEDICINE
Payer: MEDICARE

## 2022-11-14 ENCOUNTER — ANESTHESIA (OUTPATIENT)
Dept: CARDIAC CATH/INVASIVE PROCEDURES | Age: 77
DRG: 274 | End: 2022-11-14
Payer: MEDICARE

## 2022-11-14 VITALS
DIASTOLIC BLOOD PRESSURE: 84 MMHG | RESPIRATION RATE: 16 BRPM | OXYGEN SATURATION: 100 % | BODY MASS INDEX: 23.77 KG/M2 | HEART RATE: 64 BPM | HEIGHT: 70 IN | SYSTOLIC BLOOD PRESSURE: 143 MMHG | TEMPERATURE: 98.5 F | WEIGHT: 166 LBS

## 2022-11-14 PROBLEM — I48.19 ATRIAL FIBRILLATION, PERSISTENT (HCC): Status: ACTIVE | Noted: 2022-11-14

## 2022-11-14 PROBLEM — I48.19 PERSISTENT ATRIAL FIBRILLATION (HCC): Status: ACTIVE | Noted: 2022-06-15

## 2022-11-14 LAB
ABO/RH: NORMAL
ANION GAP SERPL CALCULATED.3IONS-SCNC: 4 MMOL/L (ref 3–16)
ANTIBODY SCREEN: NORMAL
BUN BLDV-MCNC: 23 MG/DL (ref 7–20)
CALCIUM SERPL-MCNC: 8 MG/DL (ref 8.3–10.6)
CHLORIDE BLD-SCNC: 111 MMOL/L (ref 99–110)
CO2: 29 MMOL/L (ref 21–32)
CREAT SERPL-MCNC: 1.1 MG/DL (ref 0.8–1.3)
GFR SERPL CREATININE-BSD FRML MDRD: >60 ML/MIN/{1.73_M2}
GLUCOSE BLD-MCNC: 88 MG/DL (ref 70–99)
HCT VFR BLD CALC: 31.4 % (ref 40.5–52.5)
HEMOGLOBIN: 10.4 G/DL (ref 13.5–17.5)
LV EF: 55 %
LVEF MODALITY: NORMAL
MCH RBC QN AUTO: 30.9 PG (ref 26–34)
MCHC RBC AUTO-ENTMCNC: 33.2 G/DL (ref 31–36)
MCV RBC AUTO: 92.9 FL (ref 80–100)
PDW BLD-RTO: 14.6 % (ref 12.4–15.4)
PLATELET # BLD: 141 K/UL (ref 135–450)
PMV BLD AUTO: 8 FL (ref 5–10.5)
POC ACT LR: 237 SEC
POC ACT LR: 353 SEC
POC ACT LR: 375 SEC
POTASSIUM SERPL-SCNC: 4.5 MMOL/L (ref 3.5–5.1)
RBC # BLD: 3.38 M/UL (ref 4.2–5.9)
SODIUM BLD-SCNC: 144 MMOL/L (ref 136–145)
WBC # BLD: 6.3 K/UL (ref 4–11)

## 2022-11-14 PROCEDURE — 86901 BLOOD TYPING SEROLOGIC RH(D): CPT

## 2022-11-14 PROCEDURE — 85347 COAGULATION TIME ACTIVATED: CPT

## 2022-11-14 PROCEDURE — 3700000000 HC ANESTHESIA ATTENDED CARE

## 2022-11-14 PROCEDURE — C1889 IMPLANT/INSERT DEVICE, NOC: HCPCS

## 2022-11-14 PROCEDURE — 33340 PERQ CLSR TCAT L ATR APNDGE: CPT | Performed by: INTERNAL MEDICINE

## 2022-11-14 PROCEDURE — 33340 PERQ CLSR TCAT L ATR APNDGE: CPT

## 2022-11-14 PROCEDURE — 2720000010 HC SURG SUPPLY STERILE

## 2022-11-14 PROCEDURE — 7100000000 HC PACU RECOVERY - FIRST 15 MIN

## 2022-11-14 PROCEDURE — 86900 BLOOD TYPING SEROLOGIC ABO: CPT

## 2022-11-14 PROCEDURE — 6360000004 HC RX CONTRAST MEDICATION: Performed by: INTERNAL MEDICINE

## 2022-11-14 PROCEDURE — 85027 COMPLETE CBC AUTOMATED: CPT

## 2022-11-14 PROCEDURE — 02L73DK OCCLUSION OF LEFT ATRIAL APPENDAGE WITH INTRALUMINAL DEVICE, PERCUTANEOUS APPROACH: ICD-10-PCS | Performed by: INTERNAL MEDICINE

## 2022-11-14 PROCEDURE — 7100000011 HC PHASE II RECOVERY - ADDTL 15 MIN

## 2022-11-14 PROCEDURE — B24BZZ4 ULTRASONOGRAPHY OF HEART WITH AORTA, TRANSESOPHAGEAL: ICD-10-PCS | Performed by: INTERNAL MEDICINE

## 2022-11-14 PROCEDURE — C1893 INTRO/SHEATH, FIXED,NON-PEEL: HCPCS

## 2022-11-14 PROCEDURE — 7100000010 HC PHASE II RECOVERY - FIRST 15 MIN

## 2022-11-14 PROCEDURE — 93005 ELECTROCARDIOGRAM TRACING: CPT | Performed by: INTERNAL MEDICINE

## 2022-11-14 PROCEDURE — 93355 ECHO TRANSESOPHAGEAL (TEE): CPT

## 2022-11-14 PROCEDURE — 86850 RBC ANTIBODY SCREEN: CPT

## 2022-11-14 PROCEDURE — C1894 INTRO/SHEATH, NON-LASER: HCPCS

## 2022-11-14 PROCEDURE — 2580000003 HC RX 258: Performed by: NURSE ANESTHETIST, CERTIFIED REGISTERED

## 2022-11-14 PROCEDURE — 80048 BASIC METABOLIC PNL TOTAL CA: CPT

## 2022-11-14 PROCEDURE — 1200000000 HC SEMI PRIVATE

## 2022-11-14 PROCEDURE — 3700000001 HC ADD 15 MINUTES (ANESTHESIA)

## 2022-11-14 PROCEDURE — 93308 TTE F-UP OR LMTD: CPT

## 2022-11-14 PROCEDURE — 2709999900 HC NON-CHARGEABLE SUPPLY

## 2022-11-14 PROCEDURE — 36415 COLL VENOUS BLD VENIPUNCTURE: CPT

## 2022-11-14 PROCEDURE — 6360000002 HC RX W HCPCS

## 2022-11-14 PROCEDURE — 2500000003 HC RX 250 WO HCPCS

## 2022-11-14 PROCEDURE — 7100000001 HC PACU RECOVERY - ADDTL 15 MIN

## 2022-11-14 PROCEDURE — 2580000003 HC RX 258

## 2022-11-14 PROCEDURE — C1760 CLOSURE DEV, VASC: HCPCS

## 2022-11-14 PROCEDURE — 6360000002 HC RX W HCPCS: Performed by: NURSE ANESTHETIST, CERTIFIED REGISTERED

## 2022-11-14 PROCEDURE — 2500000003 HC RX 250 WO HCPCS: Performed by: NURSE ANESTHETIST, CERTIFIED REGISTERED

## 2022-11-14 RX ORDER — SODIUM CHLORIDE 0.9 % (FLUSH) 0.9 %
5-40 SYRINGE (ML) INJECTION PRN
Status: DISCONTINUED | OUTPATIENT
Start: 2022-11-14 | End: 2022-11-14 | Stop reason: HOSPADM

## 2022-11-14 RX ORDER — CLOPIDOGREL BISULFATE 75 MG/1
75 TABLET ORAL DAILY
Qty: 30 TABLET | Refills: 3 | Status: SHIPPED | OUTPATIENT
Start: 2022-11-14 | End: 2022-11-14 | Stop reason: HOSPADM

## 2022-11-14 RX ORDER — PROTAMINE SULFATE 10 MG/ML
INJECTION, SOLUTION INTRAVENOUS PRN
Status: DISCONTINUED | OUTPATIENT
Start: 2022-11-14 | End: 2022-11-14 | Stop reason: SDUPTHER

## 2022-11-14 RX ORDER — DEXAMETHASONE SODIUM PHOSPHATE 4 MG/ML
INJECTION, SOLUTION INTRA-ARTICULAR; INTRALESIONAL; INTRAMUSCULAR; INTRAVENOUS; SOFT TISSUE PRN
Status: DISCONTINUED | OUTPATIENT
Start: 2022-11-14 | End: 2022-11-14 | Stop reason: SDUPTHER

## 2022-11-14 RX ORDER — ASPIRIN 81 MG/1
81 TABLET, CHEWABLE ORAL DAILY
Status: DISCONTINUED | OUTPATIENT
Start: 2022-11-15 | End: 2022-11-14 | Stop reason: HOSPADM

## 2022-11-14 RX ORDER — FINASTERIDE 5 MG/1
5 TABLET, FILM COATED ORAL DAILY
Status: DISCONTINUED | OUTPATIENT
Start: 2022-11-14 | End: 2022-11-14 | Stop reason: ALTCHOICE

## 2022-11-14 RX ORDER — SUCCINYLCHOLINE/SOD CL,ISO/PF 200MG/10ML
SYRINGE (ML) INTRAVENOUS PRN
Status: DISCONTINUED | OUTPATIENT
Start: 2022-11-14 | End: 2022-11-14 | Stop reason: SDUPTHER

## 2022-11-14 RX ORDER — LIDOCAINE HYDROCHLORIDE 20 MG/ML
INJECTION, SOLUTION EPIDURAL; INFILTRATION; INTRACAUDAL; PERINEURAL PRN
Status: DISCONTINUED | OUTPATIENT
Start: 2022-11-14 | End: 2022-11-14 | Stop reason: SDUPTHER

## 2022-11-14 RX ORDER — DIPHENHYDRAMINE HYDROCHLORIDE 50 MG/ML
25 INJECTION INTRAMUSCULAR; INTRAVENOUS ONCE
Status: DISCONTINUED | OUTPATIENT
Start: 2022-11-14 | End: 2022-11-14 | Stop reason: HOSPADM

## 2022-11-14 RX ORDER — SODIUM CHLORIDE 0.9 % (FLUSH) 0.9 %
5-40 SYRINGE (ML) INJECTION EVERY 12 HOURS SCHEDULED
Status: DISCONTINUED | OUTPATIENT
Start: 2022-11-14 | End: 2022-11-14 | Stop reason: HOSPADM

## 2022-11-14 RX ORDER — CLOPIDOGREL BISULFATE 75 MG/1
75 TABLET ORAL DAILY
Status: DISCONTINUED | OUTPATIENT
Start: 2022-11-15 | End: 2022-11-14 | Stop reason: HOSPADM

## 2022-11-14 RX ORDER — IPRATROPIUM BROMIDE 21 UG/1
2 SPRAY, METERED NASAL EVERY 12 HOURS
Status: DISCONTINUED | OUTPATIENT
Start: 2022-11-14 | End: 2022-11-14 | Stop reason: HOSPADM

## 2022-11-14 RX ORDER — FENTANYL CITRATE 50 UG/ML
INJECTION, SOLUTION INTRAMUSCULAR; INTRAVENOUS PRN
Status: DISCONTINUED | OUTPATIENT
Start: 2022-11-14 | End: 2022-11-14 | Stop reason: SDUPTHER

## 2022-11-14 RX ORDER — SODIUM CHLORIDE 9 MG/ML
INJECTION, SOLUTION INTRAVENOUS PRN
Status: DISCONTINUED | OUTPATIENT
Start: 2022-11-14 | End: 2022-11-14 | Stop reason: HOSPADM

## 2022-11-14 RX ORDER — HEPARIN SODIUM 1000 [USP'U]/ML
INJECTION, SOLUTION INTRAVENOUS; SUBCUTANEOUS PRN
Status: DISCONTINUED | OUTPATIENT
Start: 2022-11-14 | End: 2022-11-14 | Stop reason: SDUPTHER

## 2022-11-14 RX ORDER — ROCURONIUM BROMIDE 10 MG/ML
INJECTION, SOLUTION INTRAVENOUS PRN
Status: DISCONTINUED | OUTPATIENT
Start: 2022-11-14 | End: 2022-11-14 | Stop reason: SDUPTHER

## 2022-11-14 RX ORDER — PROPOFOL 10 MG/ML
INJECTION, EMULSION INTRAVENOUS PRN
Status: DISCONTINUED | OUTPATIENT
Start: 2022-11-14 | End: 2022-11-14 | Stop reason: SDUPTHER

## 2022-11-14 RX ORDER — SODIUM CHLORIDE 9 MG/ML
INJECTION, SOLUTION INTRAVENOUS CONTINUOUS PRN
Status: DISCONTINUED | OUTPATIENT
Start: 2022-11-14 | End: 2022-11-14 | Stop reason: SDUPTHER

## 2022-11-14 RX ORDER — ONDANSETRON 2 MG/ML
INJECTION INTRAMUSCULAR; INTRAVENOUS PRN
Status: DISCONTINUED | OUTPATIENT
Start: 2022-11-14 | End: 2022-11-14 | Stop reason: SDUPTHER

## 2022-11-14 RX ORDER — ACETAMINOPHEN 325 MG/1
650 TABLET ORAL EVERY 4 HOURS PRN
Status: DISCONTINUED | OUTPATIENT
Start: 2022-11-14 | End: 2022-11-14 | Stop reason: HOSPADM

## 2022-11-14 RX ORDER — ASPIRIN 81 MG/1
81 TABLET ORAL DAILY
Qty: 90 TABLET | Refills: 1 | Status: SHIPPED | OUTPATIENT
Start: 2022-11-14

## 2022-11-14 RX ORDER — SODIUM CHLORIDE 9 MG/ML
INJECTION, SOLUTION INTRAVENOUS CONTINUOUS
Status: DISCONTINUED | OUTPATIENT
Start: 2022-11-14 | End: 2022-11-14 | Stop reason: HOSPADM

## 2022-11-14 RX ADMIN — HEPARIN SODIUM 4000 UNITS: 1000 INJECTION INTRAVENOUS; SUBCUTANEOUS at 08:10

## 2022-11-14 RX ADMIN — FENTANYL CITRATE 50 MCG: 50 INJECTION, SOLUTION INTRAMUSCULAR; INTRAVENOUS at 08:09

## 2022-11-14 RX ADMIN — ROCURONIUM BROMIDE 10 MG: 10 INJECTION, SOLUTION INTRAVENOUS at 07:37

## 2022-11-14 RX ADMIN — IOPAMIDOL 25 ML: 755 INJECTION, SOLUTION INTRAVENOUS at 08:17

## 2022-11-14 RX ADMIN — SODIUM CHLORIDE: 9 INJECTION, SOLUTION INTRAVENOUS at 07:28

## 2022-11-14 RX ADMIN — Medication 160 MG: at 07:37

## 2022-11-14 RX ADMIN — PHENYLEPHRINE HYDROCHLORIDE 100 MCG: 10 INJECTION INTRAVENOUS at 08:21

## 2022-11-14 RX ADMIN — PROPOFOL 120 MG: 10 INJECTION, EMULSION INTRAVENOUS at 07:37

## 2022-11-14 RX ADMIN — PROTAMINE SULFATE 20 MG: 10 INJECTION, SOLUTION INTRAVENOUS at 08:35

## 2022-11-14 RX ADMIN — SODIUM CHLORIDE: 9 INJECTION, SOLUTION INTRAVENOUS at 08:40

## 2022-11-14 RX ADMIN — HEPARIN SODIUM 2000 UNITS: 1000 INJECTION INTRAVENOUS; SUBCUTANEOUS at 08:15

## 2022-11-14 RX ADMIN — HEPARIN SODIUM 4000 UNITS: 1000 INJECTION INTRAVENOUS; SUBCUTANEOUS at 08:01

## 2022-11-14 RX ADMIN — ROCURONIUM BROMIDE 40 MG: 10 INJECTION, SOLUTION INTRAVENOUS at 07:48

## 2022-11-14 RX ADMIN — LIDOCAINE HYDROCHLORIDE 100 MG: 20 INJECTION, SOLUTION EPIDURAL; INFILTRATION; INTRACAUDAL; PERINEURAL at 07:37

## 2022-11-14 RX ADMIN — PHENYLEPHRINE HYDROCHLORIDE 200 MCG: 10 INJECTION INTRAVENOUS at 07:56

## 2022-11-14 RX ADMIN — PHENYLEPHRINE HYDROCHLORIDE 200 MCG: 10 INJECTION INTRAVENOUS at 08:07

## 2022-11-14 RX ADMIN — DEXAMETHASONE SODIUM PHOSPHATE 8 MG: 4 INJECTION, SOLUTION INTRAMUSCULAR; INTRAVENOUS at 08:08

## 2022-11-14 RX ADMIN — PHENYLEPHRINE HYDROCHLORIDE 200 MCG: 10 INJECTION INTRAVENOUS at 07:45

## 2022-11-14 RX ADMIN — SUGAMMADEX 200 MG: 100 INJECTION, SOLUTION INTRAVENOUS at 08:40

## 2022-11-14 RX ADMIN — ONDANSETRON 4 MG: 2 INJECTION INTRAMUSCULAR; INTRAVENOUS at 08:08

## 2022-11-14 RX ADMIN — FENTANYL CITRATE 50 MCG: 50 INJECTION, SOLUTION INTRAMUSCULAR; INTRAVENOUS at 07:37

## 2022-11-14 ASSESSMENT — PAIN - FUNCTIONAL ASSESSMENT: PAIN_FUNCTIONAL_ASSESSMENT: NONE - DENIES PAIN

## 2022-11-14 NOTE — PROCEDURES
[address]   Procedure Note      Procedure Performed by: Yeny Adams MD      Procedures performed:     Percutaneous transcatheter closure of the left atrial appendage with endocardial implant   Transeptal Puncture  DONG      Indication of procedure:  atrial fibrillation, CVA,  High Risk for bleeding  Bleeding Episodes    Patient has been seen by General cardiology and shared decision making has been made for pursuing ELIEL closure. Patient here for ELIEL closure with Watchman device. Details of procedure: The patient was brought to the electrophysiology laboratory in stable condition. The patient was in a fasting and non-sedated state. The risks, benefits and alternatives of the procedure were discussed with the patient. The risks including, but not limited to, the risks of vascular injury, bleeding, infection, device malfunction, lead dislodgement, radiation exposure, injury to cardiac and surrounding structures (including pneumothorax), stroke, myocardial infarction and death were discussed in detail. The patient opted to proceed with the device implantation. Written informed consent was signed and placed in the chart. Prophylactic antibiotic was given. The patient was prepped and draped in a sterile fashion. A timeout protocol was completed to identify the patient and the procedure being performed. Patient underwent general anesthesia by anesthesiology team.       Transesophageal echocardiography was performed and measurements of left atrial appendage, including ostium size and depth were obtained for selection of appropriate watchman device. Decision was made to use a size 27 Watchman device based on DONG measurement. Both groins were prepped in a sterile fashion. We gained access in both femoral veins. Right femoral access was obtained using modified using modified seldinger technique. Patient received a bolus of heparin prior transseptal puncture.  Transseptal punctures through intact septum were done using DONG guidance as well as pressure monitoring , and fluoroscopy in Kiswahili and YANEZ projections. We placed one SL-1 Sheaths inside the left atrium. A long wire was placed into the left superior pulmonary vein. Then the SL sheath was exchanged over the wire with double curve watchman access sheath. Then a pigtail catheter was inserted into the access sheath and was placed inside the left atrial appendage. Angiography of ELIEL was performed. Pigtail catheter was removed. Then Watchman delivery sheath was inserted into the access sheath. Using fluoroscopy and live DONG the anterior lobe of the appendage was located and delivery sheath was advanced into this lobe. Then device was implanted into the appendage under fluoroscopy and live DONG imaging. It was noted that the device had a bit of shoulder in the inferior portion. ATug test was done multiple times to insure device stability   DONG guidance and 3D guidance revealed device to be well seated did not correct the position. After deployment a tug test was done and stability of device was checked. Position of device was checked. Measurement of device showed appropriate compression of the device. Using color Doppler, no leak around the was noted. PASS criteria for releasing of device were met and device was released. A figure of 8 was used to achieve hemostasis. Patient was extubated and transferred to the floor. No immediate complications noted. Assessment and Summary:    successful deployment of left atrial appendage occlusion device with no complication    WATCHMAN device used 27 mm size     Angiogram revealed good seal and no thrombus     DONG confirmed placement and seal    EBL Less than 30 mL  No complications        Plan:     The patient will be admitted and have usual post care  Start anticoagulation  Start ASA  Echocardiogram tomorrow   Patient is participating in the left atrial appendage occlusion/closure registry.  [facility] Registry is approved by the Centers for Medicare and Medicaid Services (CMS) to meet the registry requirements outlined in the national coverage decisions for Percutaneous Left Atrial Appendage Closure     Thank you for letting me participate in this patient's care and please do not hesitate to call me with any questions or concerns.      Carmen Marie MD, MPH     [facility], [address] [place], [place] [address] [address]  Ph: [phone]  Fax: [phone]

## 2022-11-14 NOTE — DISCHARGE SUMMARY
[address] [place] [address] SUMMARY      Patient ID:  Fallon Tillman  [id number] 58 y.o. 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[facility]    History of nonmelanoma skin cancer    Ischemic chest pain ([facility]    Left hand pain    Hand pain, right    Arthritis of carpometacarpal (CMC) joints of both thumbs    Radial styloid tenosynovitis (de quervain)    Left elbow fracture, closed, initial encounter    Frequent falls    General weakness    Thrombocytopenia (HCC)    Malignant neoplasm of lung (HCC)    Orthostasis    Hypotension    Chest pain    Abnormal finding on EKG    Digoxin toxicity, accidental or unintentional, initial encounter    Persistent atrial fibrillation ([facility]    COVID-19 virus infection    COVID    Rib pain on right side    Coronary artery disease involving native coronary artery of native heart without angina pectoris        Discharged Condition: good    Hospital Course: Fallon Tillman was admitted left atrial appendage closure    Successful closure of left atrial appendage  At discharge,         Objective:     /73   Pulse 72   Temp 97.2 °F (36.2 °C) (Temporal)   Resp 10   Ht 5' 10\" (1.778 m)   Wt 166 lb (75.3 kg)   SpO2 100%   BMI 23.82 kg/m²      Intake/Output Summary (Last 24 hours) at 11/14/2022 1001  Last data filed at 11/14/2022 0845  Gross per 24 hour   Intake 1000 ml   Output 5 ml   Net 995 ml       Physical Exam:  General:  Awake, alert, NAD  Skin:  Warm and dry  Neck:  JVD<8, no bruit  Chest:  Clear to auscultation, no wheezes/rhonchi/rales  Cardiovascular:  RRR S1S2  Abdomen:  Soft, nontender, +bowel sounds  Extremities:      Significant Diagnostic Studies:     Echocardiography:     Stress test:     Labs:   Lab Results   Component Value Date    CREATININE 1.1 11/14/2022    BUN 23 (H) 11/14/2022     11/14/2022    K 4.5 11/14/2022     (H) 11/14/2022    CO2 29 11/14/2022      Lab Results   Component Value Date    WBC 6.3 11/14/2022    HGB 10.4 (L) 11/14/2022    HCT 31.4 (L) 11/14/2022    MCV 92.9 11/14/2022     11/14/2022      Lab Results   Component Value Date    INR 1.45 (H) 05/09/2022    PROTIME 16.6 (H) 05/09/2022      Lab Results   Component Value Date    BNP 99 04/09/2012         Disposition: home    Patient Instructions:     MEDICATIONS ON DISCHARGE      Patient Instructions:      Medication List        START taking these medications      * apixaban 2.5 MG Tabs tablet  Commonly known as: Eliquis  Take 1 tablet by mouth 2 times daily     * apixaban 2.5 MG Tabs tablet  Commonly known as: Eliquis  Take 1 tablet by mouth 2 times daily     aspirin EC 81 MG EC tablet  Take 1 tablet by mouth daily           * This list has 2 medication(s) that are the same as other medications prescribed for you. Read the directions carefully, and ask your doctor or other care provider to review them with you. CONTINUE taking these medications      atorvastatin 40 MG tablet  Commonly known as: LIPITOR  Take 0.5 tablets by mouth in the morning.      carbidopa-levodopa  MG per tablet  Commonly known as: SINEMET     * diclofenac 2 % Soln  Commonly known as: Pennsaid  2 pumps to the affected area 2 times a day [phone]     * diclofenac sodium 1 % Gel  Commonly known as: VOLTAREN  APPLY 4GM TOPICALLY FOUR TIMES A DAY     escitalopram 20 MG tablet  Commonly known as: LEXAPRO     ferrous sulfate 325 (65 Fe) MG tablet  Commonly known as: IRON 325  Take 1 tablet by mouth 2 times daily (with meals)     fluocinonide 0.05 % cream  Commonly known as: LIDEX  Apply topically 2 times daily. ipratropium 0.03 % nasal spray  Commonly known as: ATROVENT  2 sprays by Each Nostril route every 12 hours Can use of to 4 times daily as needed     LORazepam 1 MG tablet  Commonly known as: ATIVAN     memantine 10 MG tablet  Commonly known as: NAMENDA     pantoprazole 20 MG tablet  Commonly known as: PROTONIX     VIMPAT PO     vitamin B-2 25 MG Tabs  Commonly known as: RIBOFLAVIN     zolpidem 10 MG tablet  Commonly known as: AMBIEN           * This list has 2 medication(s) that are the same as other medications prescribed for you. Read the directions carefully, and ask your doctor or other care provider to review them with you. STOP taking these medications      Hibiclens 4 % external liquid  Generic drug: chlorhexidine            ASK your doctor about these medications      finasteride 5 MG tablet  Commonly known as: PROSCAR     omeprazole 20 MG delayed release capsule  Commonly known as: PRILOSEC               Where to Get Your Medications        These medications were sent to [facility] [id number] - [facility], [address]  [address], [address] [address]      Phone: [phone]   apixaban 2.5 MG Tabs tablet       These medications were sent to [facility], [address] [place] [address]      Phone: [phone]   apixaban 2.5 MG Tabs tablet  aspirin EC 81 MG EC tablet           1. Overall the patient is stable from CV standpoint      The patient verbalizes understanding not to stop medications without discussing with us. Cardiac diet and physical activity discussed        Signed:   Aranza Ruelas MD, MPH

## 2022-11-14 NOTE — DISCHARGE INSTRUCTIONS
Watchman Discharge Instruction    Care of your puncture site:  Remove bandage 24 hours after the procedure. May shower in 24 hours but do not sit in a bathtub/pool of water for 5 days or until the wound is healed. Gently clean groin using soap and water. Dry thoroughly and apply a Band-Aid that covers the entire site. Use Band-Aid until skin heals over in about 3-5 days. Do not apply powder or lotion. Limit walking and stair climbing today. Normal Observations:  Soreness or tenderness which may last one week. Mild oozing from the incision site. Possible bruising that could last 2 weeks. Activity:  You may resume normal activity in 5 days or after the wound heals. Avoid lifting more than 10 pounds for 5 days or until the wound heals. Avoid strenuous exercise or activity for 1 week. You may return to work in 1 week  without restrictions       Call your doctor immediately if your condition worsens, for any other concerns, for a follow-up appointment or if you experience any of the following:  Increased swelling on the groin or leg. Unusual pain, numbness, or tingling of the groin or down the leg. Any signs of infection such as: redness, yellow drainage at the site, swelling or pain. IF GROIN STARTS BLEEDING SIGNIFICANTLY:   LAY FLAT, HOLD FIRM DIRECT PRESSURE, AND CALL 911    Antibiotic prophylaxis (amoxicillin 2 g once 60 minutes prior to procedure) for 6 months for:  a. Dental procedures including cleanings  b. Gastrointestinal procedures  c.     Genitourinary procedures  d. Respiratory procedures involving incision or biopsy  e. Procedures on infected skin or muscle. (This is only if you have one of these procedures within 6 months of having your Watchman procedure)            SEDATION DISCHARGE INSTRUCTIONS    8/29/2022     Wear your seatbelt home. You are under the influence of drugs.  Do not drink alcohol, drive, operate machinery, or make any important decisions or sign any legal documents for 24 hours  A responsible adult needs to be with you for 24 hours. You may experience lightheadedness, dizziness, nausea, heightened emotions and/or sleepiness following surgery. Rest at home today- increase activity as tolerated. Progress slowly to a regular diet and drink plenty of fluids unless your physician has instructed you otherwise. If nausea becomes a problem, call your physician. Coughing, sore throat, and muscle aches are other side effects of anesthesia and should improve with time. Do not drive or operate machinery while taking narcotics.

## 2022-11-14 NOTE — TELEPHONE ENCOUNTER
11/14/2022 s/p SUCCESSFUL WATCHMAN LAAC PROCEDURE PER DR. Yeny Adams deployment of left atrial appendage occlusion device with no complication, WATCHMAN device used 27 mm size. --1-week f/u with Manda Max CNP on 11/22/2022 at 9:15 AM  --6-month f/u with Dr. Chris Burgess on 5/15/2023 at 12:45 PM  --1-year f/u with Manda Max CNP on 11/20/2023 at 9:15 AM  All will print on discharge after visit summary. Nkechi, Please schedule DONG as close to the 1/12/2023 date as possible. Post procedure DONG to be completed 45-59 days post procedure (12/29/2022-1/12/2023) Order placed. Thanks.    Jay Pressley RN, BSN   Watchman Coordinator

## 2022-11-14 NOTE — PROGRESS NOTES
Pt resting quietly in bed with eyes closed, awakens to voice, denies pain. VSS, O2 sats 100% on room air. Pt remains in afib with HR in 60s. Right groin soft, dressing dry. Right pedal and posterior tibial pulses palpable, foot warm. Echo completed and resulted, post-op labs also resulted. Pt seen by anesthesia, phase 1 criteria met. Will transfer pt to same day for discharge.

## 2022-11-14 NOTE — PROGRESS NOTES
Pt arrived from cath lab to PACU, arouses to stimuli. VSS, O2 sats 100% on 6 L simple mask. Pt in afib with HR in 70s. Dressing to right groin dry and intact, groin soft. Right pedal and posterior tibial pulses palpable, foot warm. Will monitor.

## 2022-11-14 NOTE — PROGRESS NOTES
Pt groin remains soft with no bleeding. Pt getting dressed now with assist of family. Discharged instructions reviewed prior with other nurse. Dr. Shirley Rodriguez spoke to patient.

## 2022-11-14 NOTE — H&P
Reason for Referral: Left atrial appendage     CC: Falls and bleeding complications        Subjective:      History of Present Illness:     Puja Vergara is a 68 y.o. patient with a PMH significant for paroxsymal atrial fibrillation, lung cancer s/p thoracotmy, syncope and porphyria cutanea tarda. In 5/2022, he was admitted with excessive weakness, recurrent falls and near syncope. Orthostatic blood pressures were positive. Pradaxa was stopped and he was referred for watchman evaluation given recurrent falls and thrombocytopenia. Patient is being referred for Left Atrial Appendage Closure with WATCHMAN device for management of stroke risk resulting from non-valvular atrial fibrillation. Based on their past history, it has been determined that they are poor candidates for long-term oral-anticoagulation, however may be tolerant of short term treatment with warfarin as necessary. 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 Surgical History:   has a past surgical history that includes Carpal tunnel release; Femur Surgery; knee surgery (1-12-11 R total knee replacement with femoral nerve block for pain control); Cervical disc surgery (2010); other surgical history (Bilateral); ablation of dysrhythmic focus (9/2014); other surgical history (9/2014); Colonoscopy (09/26/2016); Upper gastrointestinal endoscopy (09/26/2016); Coronary angioplasty with stent; and Lung removal, partial (Right). Social History:   reports that he quit smoking about 29 years ago. His smoking use included cigarettes.  He has a 80.00 pack-year smoking history. He has never been exposed to tobacco smoke. He has never used smokeless tobacco. He reports that he does not drink alcohol and does not use drugs. Family History:  family history includes Arthritis in his brother, father, maternal grandfather, maternal grandmother, mother, paternal grandfather, paternal grandmother, and sister; Cancer in his sister; Depression in his sister; Diabetes in his paternal grandmother; Heart Disease in his father and mother; High Blood Pressure in his mother; Henrietta Kil / [place] in his mother; Stroke in his mother. Home Medications:  Were reviewed and are listed in nursing record and/or below  Home Medications           Prior to Admission medications    Medication Sig Start Date End Date Taking? Authorizing Provider   pantoprazole (PROTONIX) 20 MG tablet Take 20 mg by mouth in the morning. Historical Provider, MD   atorvastatin (LIPITOR) 40 MG tablet Take 0.5 tablets by mouth in the morning. 7/19/22     Collins Rodriguez MD   finasteride (PROSCAR) 5 MG tablet Take 5 mg by mouth daily  Patient not taking: Reported on 8/2/2022       Historical Provider, MD   ipratropium (ATROVENT) 0.03 % nasal spray 2 sprays by Each Nostril route every 12 hours Can use of to 4 times daily as needed 5/10/21     [place], DO   diclofenac sodium 1 % GEL APPLY 4GM TOPICALLY FOUR TIMES A DAY 12/6/18     Daisy Pickett MD   carbidopa-levodopa (SINEMET)  MG per tablet Take 1 tablet by mouth in the morning and at bedtime        Historical Provider, MD   vitamin B-2 (RIBOFLAVIN) 25 MG TABS Take by mouth       Historical Provider, MD   diclofenac (PENNSAID) 2 % SOLN 2 pumps to the affected area 2 times a day [phone] 9/26/17     Daisy Pickett MD   omeprazole (PRILOSEC) 20 MG delayed release capsule Take 20 mg by mouth 2 times daily       Historical Provider, MD   fluocinonide (LIDEX) 0.05 % cream Apply topically 2 times daily.  5/20/16 Ana Villalobos, APRN - CNP   ferrous sulfate 325 (65 FE) MG tablet Take 1 tablet by mouth 2 times daily (with meals)  Patient not taking: Reported on 8/2/2022 10/5/15     Bernadette Ryan MD   zolpidem (AMBIEN) 10 MG tablet Take 10 mg by mouth nightly as needed. Historical Provider, MD   LORazepam (ATIVAN) 1 MG tablet Take 2 mg by mouth in the morning and at bedtime. Historical Provider, MD   Lacosamide (VIMPAT PO) Take 100 mg by mouth 2 times daily. Indications: take dos       Historical Provider, MD   paroxetine (PAXIL) 20 MG tablet Take 20 mg by mouth in the morning and at bedtime  12/28/10     Historical Provider, MD            CURRENT Medications:    Current Meds Link used for Sign Out Report   No current facility-administered medications for this visit. Allergies:  Morphine, Codeine, Penicillins, and Plavix [clopidogrel bisulfate]               Review of Systems: All reviewed and refer to HPI  Constitutional: no unanticipated weight loss. There's been no change in energy level, sleep pattern, or activity level. No fevers, chills. Eyes: No visual changes or diplopia. No scleral icterus. ENT: No Headaches, hearing loss or vertigo. No mouth sores or sore throat. Cardiovascular: No Chest pain, tightness or discomfort. No Shortness of breath. No Dyspnea on exertion, Orthopnea, Paroxysmal nocturnal dyspnea or breathlessness at rest.  No Palpitations. No Syncope ('blackouts', 'faints', 'collapse') or dizziness. Respiratory: No cough or wheezing, no sputum production. No hematemesis. Gastrointestinal: No abdominal pain, appetite loss, blood in stools. No change in bowel or bladder habits. Genitourinary: No dysuria, trouble voiding, or hematuria. Musculoskeletal:  No gait disturbance, no joint complaints. Integumentary: No rash or pruritis. Neurological: No headache, diplopia, change in muscle strength, numbness or tingling. Psychiatric: No anxiety or depression.   Endocrine: No temperature intolerance. No excessive thirst, fluid intake, or urination. No tremor. Hematologic/Lymphatic: No abnormal bruising or bleeding, blood clots or swollen lymph nodes. Allergic/Immunologic: No nasal congestion or hives. Objective: all reveiwed       PHYSICAL EXAM:       There were no vitals filed for this visit. General Appearance:  Alert, cooperative, no distress, appears stated age. Head:  Normocephalic, without obvious abnormality, atraumatic. Eyes:  Pupils equal and round. No scleral icterus. Mouth: Moist mucosa, no pharyngeal erythema. Nose: Nares normal. No drainage or sinus tenderness. Neck: Supple, symmetrical, trachea midline. No adenopathy. No tenderness/mass/nodules. No carotid bruit or elevated JVD. Lungs:   Respiratory Effort: Normal   Auscultation: Clear to auscultation bilaterally, respirations unlabored. No wheeze, rales   Chest Wall:  No tenderness or deformity. Cardiovascular:     Pulses  Palpation: normal   Ascultation: Regular rate, S1/ S2 normal. No murmur, rub, or gallop. 2+ radial and pedal pulses, symmetric  Carotid  Femoral   Abdomen and Gastrointestinal:   Soft, non-tender, bowel sounds active. Liver and Spleen  Masses   Musculoskeletal: No muscle wasting  Back  Gait   Extremities: Extremities normal, atraumatic. No cyanosis or edema. No cyanosis clubbing         Skin: Inspection and palpation performed, no rashes or lesions. Pysch: Normal mood and affect.  Alert and oriented to time place person   Neurologic: Normal gross motor and sensory exam.       Labs: all labs have been reviewed             Lab Results   Component Value Date/Time     WBC 6.5 07/17/2022 11:30 AM     RBC 4.07 07/17/2022 11:30 AM     RBC 4.84 01/13/2017 11:05 AM     HGB 12.8 07/17/2022 11:30 AM     HCT 38.9 07/17/2022 11:30 AM     MCV 95.6 07/17/2022 11:30 AM     RDW 13.7 07/17/2022 11:30 AM      07/17/2022 11:30 AM            Lab Results   Component Value Date/Time  07/17/2022 11:30 AM     K 4.1 07/17/2022 11:30 AM      07/17/2022 11:30 AM     CO2 25 07/17/2022 11:30 AM     BUN 12 07/17/2022 11:30 AM     CREATININE 0.9 07/17/2022 11:30 AM     GFRAA >60 07/17/2022 11:30 AM     GFRAA >60 06/07/2012 07:07 AM     AGRATIO 1.1 07/17/2022 11:30 AM     LABGLOM >60 07/17/2022 11:30 AM     GLUCOSE 99 07/17/2022 11:30 AM     GLUCOSE 100 06/03/2016 09:48 AM     PROT 8.2 07/17/2022 11:30 AM     PROT 7.6 06/03/2016 09:48 AM     CALCIUM 9.9 07/17/2022 11:30 AM     BILITOT 0.7 07/17/2022 11:30 AM     ALKPHOS 128 07/17/2022 11:30 AM     AST 32 07/17/2022 11:30 AM     ALT 21 07/17/2022 11:30 AM      No results found for: PTINR        Lab Results   Component Value Date     LABA1C 5.6 01/13/2011            Lab Results   Component Value Date     TROPONINI <0.01 07/17/2022         Cardiac, Vascular and Imaging Data: All Personally Reviewed in Detail by Myself       EKG:      Echocardiogram:   ECHO 5/12/2022  Summary  Normal left ventricular systolic function with ejection fraction of 55-60%. No regional wall motion abnormalites are seen. Normal left ventricular size with mild concentric left ventricular  hypertrophy. Normal left ventricular systolic function with ejection fraction of 55-60%. No regional wall motion abnormalites are seen. Mild mitral regurgitation. The right atrium is mildly dilated. Stress Test:      Cath:      Other imaging:      Assessment and Plan      Atrial Fibrillation, persistent     Primary Cardiologist:   Referring Physician:   Referring Reason: Falls/thrombocytopenia    Dual antiplatelet therapy postprocedure     CHADSvasc is at least 3   HASbled is at least 2      Specifically regarding risk of anticoagulation they have demonstrated:  Multiple falls and bleeding risk     We have discussed their unique stroke and bleeding risk both on and off oral-anticoagulation, and the rationale for this referral.  Based on both stroke and bleeding risk, a shared decision has been made to pursue closure of the left atrial appendage as an alternative to oral anticoagulant therapy for stroke prophylaxis and to reduce their long term risk of incidence of bleeding. Discussed Watchman LAAC at length with patient, heart model, device model and video. We gave educational materials. We discussed pre-procedure workup, timing of restarting AC, AC length, procedure and post procedure follow up/management. No Iodine Allergy. No Nickel/Titanium Allergy. He/She is agreeable to short term AC, proceeding with DONG, 2nd opinion/shared decision making  and will follow up with me post workup to discussing timing of the procedure. I had a detail discussion with the patient and family regarding the risk and benefit of the procedures. I explained to them the details of the procedure. I explained to them that the procedure will be performed under general anesthesia. I explained any risk of bleeding, pericardial effusion and tamponade, perforation of the vessel, stroke, myocardial infarction or death. The patient and family understood the risk and benefits and the details of procedure and would like to go ahead with the procedure. The patient gave informed consent. All questions and concerns answered at this time. Confirmed per patient before leaving the office. Reviewed issues with oral blood thinner and why patient was referred for Watchman  Aspirin for life post implant  Follow up with Dr. Jose Mcclure for shared decision consult         Thank you for allowing us to participate in the care of Medford Soulier. Please do not hesitate to contact me if you have any questions.      Jennifer Pugh MD, MPH

## 2022-11-14 NOTE — PROGRESS NOTES
Discharge instructions reviewed with pt and spouse. All questions will be answered by Dr. Mayito Blackburn.  R groin site remains CDI pt denies any pain VSS

## 2022-11-14 NOTE — PROGRESS NOTES
[address]   Electrophysiology Outpatient Note              Date:  November 22, 2022  Patient name: Raymundo Schaumann  YOB: 1945    Primary Care physician: Dennis Heart MD    HISTORY OF PRESENT ILLNESS: The patient is a 68 y.o.  male with a history of PAF, lung cancer s/p thoracotmy, syncope and porphyria cutanea tarda   In 2014, he had an RFCA of atrial fibrillation, loop recorder implant and was on sotalol. Showed an EF of 55%. Cardiac CTA showed diffuse coronary calcification. In 6/2016, he complained of chest pain and had a normal stress test.  In 3/2017, he had a PCI to LAD. In 5/2022, he was admitted with excessive weakness, recurrent falls and near syncope. Orthostatic blood pressures were positive. Pradaxa was stopped and he was referred for watchman evaluation given recurrent falls and thrombocytopenia. In 6/2022, he was evaluated in the ED at [facility]. Heart rates were noted to be in the 30s. Digoxin was stopped due to digoxin toxicity. In 7/2022, patient was admitted to [facility] for a subsequent fall. He was found to have COVID. EKG at that time showed rate controlled atrial fibrillation. On 11/14/2022, he had a Watchman device implanted. He has an allergy to Plavix and was started on low dose Eliquis and baby ASA. He had side effects with Eliquis and was started on Xarelto. Today he is being seen for AF. He is feeling well. Denies chest pain, palpitations, shortness of breath, and dizziness.        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 Past Surgical History:   has a past surgical history that includes Carpal tunnel release; Femur Surgery; knee surgery (1-12-11 R total knee replacement with femoral nerve block for pain control); Cervical disc surgery (2010); other surgical history (Bilateral); ablation of dysrhythmic focus (9/2014); other surgical history (9/2014); Colonoscopy (09/26/2016); Upper gastrointestinal endoscopy (09/26/2016); Coronary angioplasty with stent; and Lung removal, partial (Right). Home Medications:    Prior to Admission medications    Medication Sig Start Date End Date Taking? Authorizing Provider   rivaroxaban (XARELTO) 20 MG TABS tablet Take 1 tablet by mouth daily (with breakfast) 11/21/22  Yes Archie Garcia MD   aspirin EC 81 MG EC tablet Take 1 tablet by mouth daily 11/14/22  Yes Archie Garcia MD   escitalopram (LEXAPRO) 20 MG tablet 10 mg 8/8/22  Yes Historical Provider, MD   memantine (NAMENDA) 10 MG tablet Take 10 mg by mouth 2 times daily   Yes Historical Provider, MD   pantoprazole (PROTONIX) 20 MG tablet Take 20 mg by mouth in the morning. Yes Historical Provider, MD   atorvastatin (LIPITOR) 40 MG tablet Take 0.5 tablets by mouth in the morning. 7/19/22  Yes Lg Kaminski MD   ipratropium (ATROVENT) 0.03 % nasal spray 2 sprays by Each Nostril route every 12 hours Can use of to 4 times daily as needed 5/10/21  Yes Javy Amin DO   diclofenac sodium 1 % GEL APPLY 4GM TOPICALLY FOUR TIMES A DAY 12/6/18  Yes Wali Andujar MD   carbidopa-levodopa (SINEMET)  MG per tablet Take 1 tablet by mouth in the morning and at bedtime    Yes Historical Provider, MD   vitamin B-2 (RIBOFLAVIN) 25 MG TABS Take by mouth   Yes Historical Provider, MD   diclofenac (PENNSAID) 2 % SOLN 2 pumps to the affected area 2 times a day [phone] 9/26/17  Yes Wali Andujar MD   fluocinonide (LIDEX) 0.05 % cream Apply topically 2 times daily.  5/20/16  Yes KATIE Vasquez - CNP   ferrous sulfate 325 (65 FE) MG tablet Take 1 tablet by mouth 2 times daily (with meals) 10/5/15  Yes Mihir Ruiz MD   zolpidem (AMBIEN) 10 MG tablet Take 10 mg by mouth nightly as needed. Yes Historical Provider, MD   LORazepam (ATIVAN) 1 MG tablet Take 2 mg by mouth in the morning and at bedtime. Yes Historical Provider, MD   Lacosamide (VIMPAT PO) Take 100 mg by mouth 2 times daily. Indications: take dos   Yes Historical Provider, MD   finasteride (PROSCAR) 5 MG tablet Take 5 mg by mouth daily  Patient not taking: No sig reported    Historical Provider, MD   omeprazole (PRILOSEC) 20 MG delayed release capsule Take 20 mg by mouth 2 times daily  Patient not taking: No sig reported    Historical Provider, MD       Allergies:  Morphine, Codeine, Penicillins, and Plavix [clopidogrel bisulfate]    Social History:   reports that he quit smoking about 29 years ago. His smoking use included cigarettes. He has a 80.00 pack-year smoking history. He has never been exposed to tobacco smoke. He has never used smokeless tobacco. He reports that he does not drink alcohol and does not use drugs. Family History: family history includes Arthritis in his brother, father, maternal grandfather, maternal grandmother, mother, paternal grandfather, paternal grandmother, and sister; Cancer in his sister; Depression in his sister; Diabetes in his paternal grandmother; Heart Disease in his father and mother; High Blood Pressure in his mother; [place] / [place] in his mother; Stroke in his mother. All 14 point review of systems are completed and pertinent positives are mentioned in the history of present illness. Other systems are reviewed and are negative. PHYSICAL EXAM:    Vital signs:    /70   Pulse 83   Ht 5' 10\" (1.778 m)   Wt 163 lb (73.9 kg)   SpO2 98%   BMI 23.39 kg/m²      Constitutional and general appearance: alert, cooperative, no distress, and appears stated age  HEENT: PERRL, no cervical lymphadenopathy.  No masses palpable. Normal oral mucosa  Respiratory:  Normal excursion and expansion without use of accessory muscles  Resp auscultation: Normal breath sounds without wheezing, rhonchi, and rales  Cardiovascular: The apical impulse is not displaced  Heart tones are crisp and normal. irregular S1 and S2.  Jugular venous pulsation Normal  The carotid upstroke is normal in amplitude and contour without delay or bruit  Peripheral pulses are symmetrical and full   Abdomen:  No masses or tenderness  Bowel sounds present  Extremities:   No cyanosis or clubbing   No lower extremity edema   Skin: warm and dry  Neurological:  Alert and oriented  Moves all extremities well  No abnormalities of mood, affect, memory, mentation, or behavior are noted    DATA:    ECG 8/2/2022:  AFL 83 bpm     Stress 12/2020: Interpetation Summary:   The LV EF is 81%   The left ventricular wall motion is normal.   There is a small sized, fixed defect in the inferolateral wall consistent with   mild infarct.        o Non-diagnostic ECG for ischemia with pharmocologic stress. o No significant areas of ischemia identified with pharmocologic stress. Stress echo 11/2020:    Baseline Echo Findings: The LV ejection fraction at rest is 55-60%. The LV   chamber size is normal. There is mild LVH. Post Stress Echo Findings: The LV ejection fraction post stress is 60-65%. Normal increase in contractillity of all segments was observed. Echo 9/2014:      Conclusions      Summary   No significant pericardial effusion noted. Overall left ventricular function is normal.   Ejection fraction is visually estimated to be 55-60 %       All labs and testing reviewed. CARDIOLOGY LABS:   CBC:   No results for input(s): WBC, HGB, HCT, PLT in the last 72 hours. BMP:   No results for input(s): NA, K, CO2, BUN, CREATININE, LABGLOM, GLUCOSE in the last 72 hours. PT/INR: No results for input(s): PROTIME, INR in the last 72 hours.   APTT:No results for input(s): APTT in the last 72 hours. FASTING LIPID PANEL:  Lab Results   Component Value Date/Time    HDL 45 03/06/2017 07:47 AM    LDLCALC 93 03/06/2017 07:47 AM    TRIG 235 03/06/2017 07:47 AM     LIVER PROFILE:No results for input(s): AST, ALT, ALB in the last 72 hours.     IMPRESSION:    Patient Active Problem List   Diagnosis    Dizziness    Syncope and collapse    Paroxysmal atrial fibrillation (HCC)    Contusion of knee, right    Dysphagia    Encounter for electronic analysis of reveal event recorder    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[facility]    History of nonmelanoma skin cancer    Ischemic chest pain ([facility]    Left hand pain    Hand pain, right    Arthritis of carpometacarpal (CMC) joints of both thumbs    Radial styloid tenosynovitis (de quervain)    Left elbow fracture, closed, initial encounter    Frequent falls    General weakness    Thrombocytopenia (HCC)    Malignant neoplasm of lung (HCC)    Orthostasis    Hypotension    Chest pain    Abnormal finding on EKG    Digoxin toxicity, accidental or unintentional, initial encounter    Persistent atrial fibrillation ([facility]    COVID-19 virus infection    COVID    Rib pain on right side    Coronary artery disease involving native coronary artery of native heart without angina pectoris    Atrial fibrillation, persistent (HCC)       Assessment:   Persistent atrial arrhythmias (A/AFL): stable   -s/p Watchman implant 11/14/2022              -s/p RFCA of AF 9/2014              -[id number] score 3 (age, CAD)  Recurrent falls/syncope: stable              -orthostatic hypotension noted 5/2022  S/P loop recorder implant 2014 with reimplant 2018              -device at [facility]  CAD s/p PCI to LAD 3/2017  Non-small cell lung cancer s/p thoracotomy              -undergoing second round of chemotherapy  Parkinson's syndrome   Normocytic anemia  Thrombocytopenia  Shortness of breath      Plan:   Continue Xarelto and ASA per Watchman team   Office to arrange for post Watchman DONG  Annual labs up to date (due 11/2023)  Monitor BP and HR at home and call if consistently out of goal ranges  Follow up in 6 months     CHANDA adler       [address] [facility], APRN-CNP  [facility]  [phone]

## 2022-11-14 NOTE — ANESTHESIA POSTPROCEDURE EVALUATION
Department of Anesthesiology  Postprocedure Note    Patient: Bhupendra Rodriguez  MRN: [id number]  YOB: 1945  Date of evaluation: 11/14/2022      Procedure Summary     Date: 11/14/22 Room / Location: [facility] Cath Lab; [facility] Echocardiography    Anesthesia Start: 0730 Anesthesia Stop: 4132    Procedures:       ECHO DNOG IN CARDIAC CATH      Procedure Not Yet Scheduled Diagnosis: Paroxysmal atrial fibrillation    Scheduled Providers:  Responsible Provider: Patt Vyas MD    Anesthesia Type: general ASA Status: 3          Anesthesia Type: No value filed.     Lorenzo Phase I: Lorenzo Score: 8    Lorenzo Phase II:        Anesthesia Post Evaluation    Patient location during evaluation: PACU  Patient participation: complete - patient participated  Level of consciousness: awake and alert  Airway patency: patent  Nausea & Vomiting: no nausea and no vomiting  Complications: no  Cardiovascular status: blood pressure returned to baseline  Respiratory status: acceptable  Hydration status: euvolemic  Multimodal analgesia pain management approach

## 2022-11-14 NOTE — TELEPHONE ENCOUNTER
Patient allergy to Plavix (RASH) Patient will be discharged on low dose Eliquis 2.5 mg BID and 81 mg Baby Aspirin. Patient will have Post Watchman DONG done closer to the the 59 day requirement. Spoke with Jamal Fofana and she will make appointment for Dr. Josh Baez accordingly.

## 2022-11-14 NOTE — ANESTHESIA PRE PROCEDURE
Department of Anesthesiology  Preprocedure Note       Name:  Anne Marie Barakat   Age:  68 y.o.  :  1945                                          MRN:  [id number]         Date:  2022      Surgeon: * No surgeons listed *    Procedure: * No procedures listed *    Medications prior to admission:   Prior to Admission medications    Medication Sig Start Date End Date Taking? Authorizing Provider   chlorhexidine (HIBICLENS) 4 % external liquid Wash from neck down the night before or morning of the procedure 22  Yeny Adams MD   escitalopram (LEXAPRO) 20 MG tablet 10 mg 22   Historical Provider, MD   memantine (NAMENDA) 10 MG tablet Take 10 mg by mouth 2 times daily    Historical Provider, MD   pantoprazole (PROTONIX) 20 MG tablet Take 20 mg by mouth in the morning. Historical Provider, MD   atorvastatin (LIPITOR) 40 MG tablet Take 0.5 tablets by mouth in the morning. 22   Johnathon Joseph MD   finasteride (PROSCAR) 5 MG tablet Take 5 mg by mouth daily  Patient not taking: No sig reported    Historical Provider, MD   ipratropium (ATROVENT) 0.03 % nasal spray 2 sprays by Each Nostril route every 12 hours Can use of to 4 times daily as needed 5/10/21   Tico Amin DO   diclofenac sodium 1 % GEL APPLY 4GM TOPICALLY FOUR TIMES A DAY 18   Jo Ann Avitia MD   carbidopa-levodopa (SINEMET)  MG per tablet Take 1 tablet by mouth in the morning and at bedtime     Historical Provider, MD   vitamin B-2 (RIBOFLAVIN) 25 MG TABS Take by mouth    Historical Provider, MD   diclofenac (PENNSAID) 2 % SOLN 2 pumps to the affected area 2 times a day [phone] 17   Jo Ann Avitia MD   omeprazole (PRILOSEC) 20 MG delayed release capsule Take 20 mg by mouth 2 times daily  Patient not taking: No sig reported    Historical Provider, MD   fluocinonide (LIDEX) 0.05 % cream Apply topically 2 times daily.  16   KATIE Ramsey - CNP   ferrous sulfate 325 (65 FE) MG tablet Take 1 tablet by mouth 2 times daily (with meals) 10/5/15   Joyce Archuleta MD   zolpidem (AMBIEN) 10 MG tablet Take 10 mg by mouth nightly as needed. Historical Provider, MD   LORazepam (ATIVAN) 1 MG tablet Take 2 mg by mouth in the morning and at bedtime. Historical Provider, MD   Lacosamide (VIMPAT PO) Take 100 mg by mouth 2 times daily. Indications: take dos    Historical Provider, MD       Current medications:    Current Facility-Administered Medications   Medication Dose Route Frequency Provider Last Rate Last Admin    0.9 % sodium chloride infusion   IntraVENous Continuous Cachorro Antunez MD        diphenhydrAMINE (BENADRYL) injection 25 mg  25 mg IntraVENous Once Cachorro Antunez MD        methylPREDNISolone sodium (SOLU-MEDROL) injection 125 mg  125 mg IntraVENous Once Cachorro Antunez MD        vancomycin 1000 mg IVPB in 250 mL D5W addavial  1,000 mg IntraVENous Q12H Cachorro Antunez MD           Allergies: Allergies   Allergen Reactions    Morphine      Bad sweats    Codeine      dizzy    Penicillins Nausea And Vomiting    Plavix [Clopidogrel Bisulfate] Rash       Problem List:    Patient Active Problem List   Diagnosis Code    Dizziness R42    Syncope and collapse R55    Paroxysmal atrial fibrillation (HCC) I48.0    Contusion of knee, right S80. 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[facility] E80.1    History of nonmelanoma skin cancer Z85.828    Ischemic chest pain (HCC) I20.9    Left hand pain M79.642    Hand pain, right M79.641    Arthritis of carpometacarpal (CMC) joints of both thumbs M18.0    Radial styloid tenosynovitis (de quervain) M65.4    Left elbow fracture, closed, initial encounter S42.402A    Frequent falls R29.6    General weakness R53.1    Thrombocytopenia (HCC) D69.6    Malignant neoplasm of lung (HCC) C34.90    Orthostasis I95.1    Hypotension I95.9    Chest pain R07.9    Abnormal finding on EKG R94.31    Digoxin toxicity, accidental or unintentional, initial encounter T46.0X1A    Atrial fibrillation with slow ventricular response (HCC) I48.91    COVID-19 virus infection U07.1    COVID U07.1    Rib pain on right side R07.81    Coronary artery disease involving native coronary artery of native heart without angina pectoris I25.10       Past Medical History:        Diagnosis Date    MIRNA (acute kidney injury) ([facility] 3/31/2022    Anxiety     Arthritis     OA    Bradycardia 4/19/2014    Cancer ([facility]     skin cancer-forearm right , face    Coronary artery disease involving native coronary artery of native heart without angina pectoris 7/19/2022    Hemoptysis 10/16/2014    Since Ablation    Irregular heart  beats     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[facility] 12/10/2014    S/P drug eluting coronary stent placement 03/06/2017    2.25 x 18 [employer] [facility]RI[facility]N - Distal LAD    Seizure disorder ([facility]     4 weeks ago black out, pt states seizure but may be due to Heart Rate changes    Seizures (HCC)     Shortness of breath 9/4/2014    Total knee replacement status 1/12/2011       Past Surgical History:        Procedure Laterality Date    ABLATION OF DYSRHYTHMIC FOCUS  9/2014    CARPAL TUNNEL RELEASE      CERVICAL [address] SURGERY  2010    COLONOSCOPY  09/26/2016    normal    CORONARY ANGIOPLASTY WITH STENT PLACEMENT      FEMUR SURGERY      left    KNEE SURGERY  1-12-11 R total knee replacement with femoral nerve block for pain control    LUNG REMOVAL, PARTIAL Right     20%    OTHER SURGICAL HISTORY Bilateral     excisions of lesions on bilat. neck and back    OTHER SURGICAL HISTORY  9/2014    Reveal Loop recorder placed in Cath Lab    UPPER GASTROINTESTINAL ENDOSCOPY  09/26/2016    gastric and esophogeal biopsy       Social History:    Social History     Tobacco Use    Smoking status: Former     Packs/day: 2.00     Years: 40.00     Pack years: 80.00     Types: Cigarettes     Quit date: 6/1/1993 Years since quittin.4     Passive exposure: Never    Smokeless tobacco: Never    Tobacco comments:     20 yrs   Substance Use Topics    Alcohol use: No                                Counseling given: Not Answered  Tobacco comments: 20 yrs      Vital Signs (Current):   Vitals:    22 0645   BP: 134/71   Pulse: 83   Resp: 16   Temp: 98.5 °F (36.9 °C)   Weight: 166 lb (75.3 kg)   Height: 5' 10\" (1.778 m)                                              BP Readings from Last 3 Encounters:   22 134/71   10/04/22 130/66   22 124/76       NPO Status:                                                                                 BMI:   Wt Readings from Last 3 Encounters:   22 166 lb (75.3 kg)   10/04/22 166 lb 5 oz (75.4 kg)   22 166 lb 8 oz (75.5 kg)     Body mass index is 23.82 kg/m².     CBC:   Lab Results   Component Value Date/Time    WBC 7.7 2022 12:02 PM    RBC 4.55 2022 12:02 PM    RBC 4.84 2017 11:05 AM    HGB 13.9 2022 12:02 PM    HCT 42.3 2022 12:02 PM    MCV 92.9 2022 12:02 PM    RDW 14.7 2022 12:02 PM     2022 12:02 PM       CMP:   Lab Results   Component Value Date/Time     2022 12:02 PM    K 5.1 2022 12:02 PM    K 4.1 2022 11:30 AM     2022 12:02 PM    CO2 26 2022 12:02 PM    BUN 22 2022 12:02 PM    CREATININE 0.9 2022 12:02 PM    GFRAA >60 2022 11:30 AM    GFRAA >60 2012 07:07 AM    AGRATIO 1.5 10/26/2022 09:10 AM    LABGLOM >60 2022 12:02 PM    GLUCOSE 90 2022 12:02 PM    GLUCOSE 100 2016 09:48 AM    PROT 7.8 10/26/2022 09:10 AM    PROT 7.6 2016 09:48 AM    CALCIUM 9.8 2022 12:02 PM    BILITOT 1.0 10/26/2022 09:10 AM    ALKPHOS 150 10/26/2022 09:10 AM    AST 21 10/26/2022 09:10 AM    ALT 6 10/26/2022 09:10 AM       POC Tests: No results for input(s): POCGLU, POCNA, POCK, POCCL, POCBUN, POCHEMO, POCHCT in the last 72 hours.    Coags:   Lab Results   Component Value Date/Time    PROTIME 16.6 05/09/2022 12:32 PM    INR 1.45 05/09/2022 12:32 PM    APTT 37.6 09/26/2014 07:30 AM       HCG (If Applicable): No results found for: PREGTESTUR, PREGSERUM, HCG, HCGQUANT     ABGs: No results found for: PHART, PO2ART, YJW9CYL, UUB9POT, BEART, L8KKQBFG     Type & Screen (If Applicable):  Lab Results   Component Value Date    LABABO A 06/01/2012    [address] Positive 06/01/2012       Drug/Infectious Status (If Applicable):  Lab Results   Component Value Date/Time    HEPCAB Non-Reactive (Negative) 06/04/2012 03:43 PM       COVID-19 Screening (If Applicable):   Lab Results   Component Value Date/Time    COVID19 DETECTED 07/17/2022 03:00 PM           Anesthesia Evaluation  Patient summary reviewed no history of anesthetic complications:   Airway: Mallampati: II       Mouth opening: > = 3 FB   Dental:          Pulmonary:                              Cardiovascular:    (+) CAD:, dysrhythmias: atrial fibrillation,                   Neuro/Psych:   (+) neuromuscular disease: Parkinson's disease,             GI/Hepatic/Renal:   (+) liver disease:,           Endo/Other:    (+) blood dyscrasia: thrombocytopenia:., .                 Abdominal:             Vascular: Other Findings:           Anesthesia Plan      general     ASA 3     (EF 55-60%. )  Induction: intravenous. Anesthetic plan and risks discussed with patient. Plan discussed with CRNA.                     Satya Hill MD   11/14/2022

## 2022-11-15 LAB
EKG ATRIAL RATE: 227 BPM
EKG DIAGNOSIS: NORMAL
EKG Q-T INTERVAL: 388 MS
EKG QRS DURATION: 86 MS
EKG QTC CALCULATION (BAZETT): 455 MS
EKG R AXIS: 55 DEGREES
EKG T AXIS: 40 DEGREES
EKG VENTRICULAR RATE: 83 BPM

## 2022-11-15 PROCEDURE — 93010 ELECTROCARDIOGRAM REPORT: CPT | Performed by: INTERNAL MEDICINE

## 2022-11-21 ENCOUNTER — TELEPHONE (OUTPATIENT)
Dept: CARDIOLOGY CLINIC | Age: 77
End: 2022-11-21

## 2022-11-21 DIAGNOSIS — I48.0 PAROXYSMAL A-FIB (HCC): Primary | ICD-10-CM

## 2022-11-21 NOTE — TELEPHONE ENCOUNTER
Medication Question/Concern      Was put on Eliquis after is Ruth done 11/14/22    What is the name of the medication you need to speak with someone about? Eliquis     Dosage of the medication:  2.5 mg     How are you taking this medication:  Take 1 tablet by mouth 2 times daily    What issues/concerns are you having with this medication:  some SOB and sweating a lot. States he can not take this medication. If there is another medication he can take he would like it to be sent to   [facility] and the phone number is [phone] AND not the [facility] on [address] in [place] [address].

## 2022-11-21 NOTE — TELEPHONE ENCOUNTER
Spoke with patient complained that Eliquis was causing hot flashes and stomach pain and he is peeing more than usual.  I spoke with Rebecca Jones and instructed me to put patient on Xarelto and stop the Eliquis.   Patient has been informed

## 2022-11-22 ENCOUNTER — OFFICE VISIT (OUTPATIENT)
Dept: CARDIOLOGY CLINIC | Age: 77
End: 2022-11-22
Payer: MEDICARE

## 2022-11-22 VITALS
HEART RATE: 83 BPM | DIASTOLIC BLOOD PRESSURE: 70 MMHG | SYSTOLIC BLOOD PRESSURE: 122 MMHG | BODY MASS INDEX: 23.34 KG/M2 | WEIGHT: 163 LBS | HEIGHT: 70 IN | OXYGEN SATURATION: 98 %

## 2022-11-22 DIAGNOSIS — Z95.818 PRESENCE OF WATCHMAN LEFT ATRIAL APPENDAGE CLOSURE DEVICE: ICD-10-CM

## 2022-11-22 DIAGNOSIS — I48.19 PERSISTENT ATRIAL FIBRILLATION (HCC): Primary | ICD-10-CM

## 2022-11-22 DIAGNOSIS — I95.1 ORTHOSTASIS: ICD-10-CM

## 2022-11-22 PROCEDURE — 1123F ACP DISCUSS/DSCN MKR DOCD: CPT | Performed by: NURSE PRACTITIONER

## 2022-11-22 PROCEDURE — 99214 OFFICE O/P EST MOD 30 MIN: CPT | Performed by: NURSE PRACTITIONER

## 2022-11-22 NOTE — PATIENT INSTRUCTIONS
No changes today     Continue Xarelto and aspirin      Office will call you to arrange post Watchman implant ultrasound     Follow up with me in 6 months

## 2022-12-06 ENCOUNTER — TELEPHONE (OUTPATIENT)
Dept: CARDIOLOGY CLINIC | Age: 77
End: 2022-12-06

## 2022-12-06 NOTE — TELEPHONE ENCOUNTER
Spoke with Ashtyn about patient needing a DONG and informed her the DONG needs to be done in January/2023

## 2022-12-06 NOTE — TELEPHONE ENCOUNTER
Spoke with patient. Patient is scheduled with Dr. Franklyn Glaser for [address] with anesthesia on 1/12/22 at [facility], arrival time of 6:30am to the Cath Lab. Please have patient arrive to the main entrance of [facility] and check in with the registration desk. Please call patient regarding medication instructions. Remind patient to be NPO after midnight (8 hours prior). Do not apply lotions/creams on skin the day of procedure. COVID testing - pt will get tested by him and bring result. Instructions mailed per request.    Insurance will be changing for the 1st of 2023. Patient will call with update.

## 2022-12-13 ENCOUNTER — INITIAL CONSULT (OUTPATIENT)
Dept: SURGERY | Age: 77
End: 2022-12-13
Payer: MEDICARE

## 2022-12-13 VITALS
WEIGHT: 169 LBS | HEIGHT: 70 IN | DIASTOLIC BLOOD PRESSURE: 76 MMHG | BODY MASS INDEX: 24.2 KG/M2 | SYSTOLIC BLOOD PRESSURE: 124 MMHG

## 2022-12-13 DIAGNOSIS — R07.89 RIGHT-SIDED CHEST WALL PAIN: Primary | ICD-10-CM

## 2022-12-13 PROCEDURE — 99203 OFFICE O/P NEW LOW 30 MIN: CPT | Performed by: SURGERY

## 2022-12-13 PROCEDURE — 1123F ACP DISCUSS/DSCN MKR DOCD: CPT | Performed by: SURGERY

## 2022-12-13 NOTE — PROGRESS NOTES
New Patient Via Ryder Landrum MD    [address], [address] [facility]  [place], [facility]  [phone]    Torrie Chase   YOB: 1945    Date of Visit:  12/13/2022    Ani Fraga MD    Chief Complaint: Abdominal pain    HPI: Patient presents for evaluation of right upper quadrant abdominal pain. This starts as a numbness and burning pain in his right chest and radiates over to his abdomen. It started not long after right thoracotomy. He denies any nausea or vomiting. Eating does not affect his pain    Allergies   Allergen Reactions    Morphine      Bad sweats    Codeine      dizzy    Penicillins Nausea And Vomiting    Plavix [Clopidogrel Bisulfate] Rash     Outpatient Medications Marked as Taking for the 12/13/22 encounter (Initial consult) with Pallavi Santamaria MD   Medication Sig Dispense Refill    rivaroxaban (XARELTO) 20 MG TABS tablet Take 1 tablet by mouth daily (with breakfast) 90 tablet 1    aspirin EC 81 MG EC tablet Take 1 tablet by mouth daily 90 tablet 1    escitalopram (LEXAPRO) 20 MG tablet 10 mg      memantine (NAMENDA) 10 MG tablet Take 10 mg by mouth 2 times daily      pantoprazole (PROTONIX) 20 MG tablet Take 20 mg by mouth in the morning. atorvastatin (LIPITOR) 40 MG tablet Take 0.5 tablets by mouth in the morning.  30 tablet 1    finasteride (PROSCAR) 5 MG tablet Take 5 mg by mouth daily      ipratropium (ATROVENT) 0.03 % nasal spray 2 sprays by Each Nostril route every 12 hours Can use of to 4 times daily as needed 1 Bottle 3    diclofenac sodium 1 % GEL APPLY 4GM TOPICALLY FOUR TIMES A DAY 5 Tube 0    carbidopa-levodopa (SINEMET)  MG per tablet Take 1 tablet by mouth in the morning and at bedtime       vitamin B-2 (RIBOFLAVIN) 25 MG TABS Take by mouth      diclofenac (PENNSAID) 2 % SOLN 2 pumps to the affected area 2 times a day [phone] 1 Bottle 0    omeprazole (PRILOSEC) 20 MG delayed release capsule Take 20 mg by mouth 2 times daily      fluocinonide (LIDEX) 0.05 % cream Apply topically 2 times daily. 1 Tube 0    ferrous sulfate 325 (65 FE) MG tablet Take 1 tablet by mouth 2 times daily (with meals) 180 tablet 3    zolpidem (AMBIEN) 10 MG tablet Take 10 mg by mouth nightly as needed. LORazepam (ATIVAN) 1 MG tablet Take 2 mg by mouth in the morning and at bedtime. Lacosamide (VIMPAT PO) Take 100 mg by mouth 2 times daily. 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    S/P drug eluting coronary stent placement 03/06/2017    2.25 x 18 Xience Alpine ADRIÁN - Distal LAD    Seizure disorder ([address]     4 weeks ago black out, pt states seizure but may be due to Heart Rate changes    Seizures ([address]     Shortness of breath 9/4/2014    Total knee replacement status 1/12/2011     Past Surgical History:   Procedure Laterality Date    ABLATION OF DYSRHYTHMIC FOCUS  9/2014    CARPAL TUNNEL RELEASE      CERVICAL [address] SURGERY  2010    COLONOSCOPY  09/26/2016    normal    CORONARY ANGIOPLASTY WITH STENT PLACEMENT      FEMUR SURGERY      left    KNEE SURGERY  1-12-11 R total knee replacement with femoral nerve block for pain control    LUNG REMOVAL, PARTIAL Right     20%    OTHER SURGICAL HISTORY Bilateral     excisions of lesions on bilat. neck and back    OTHER SURGICAL HISTORY  9/2014    Reveal Loop recorder placed in Cath Lab    UPPER GASTROINTESTINAL ENDOSCOPY  09/26/2016    gastric and esophogeal biopsy     Family History   Problem Relation Age of Onset    Heart Disease Mother     Arthritis Mother High Blood Pressure Mother     Miscarriages / [place] Mother     Stroke Mother     Arthritis Father     Heart Disease Father     Cancer Sister     Arthritis Sister     Depression Sister     Arthritis Brother     Arthritis Maternal Grandmother     Arthritis Maternal Grandfather     Arthritis Paternal Grandmother     Diabetes Paternal Grandmother     Arthritis Paternal Grandfather      Social History     Socioeconomic History    Marital status:      Spouse name: Not on file    Number of children: Not on file    Years of education: Not on file    Highest education level: Not on file   Occupational History    Not on file   Tobacco Use    Smoking status: Former     Packs/day: 2.00     Years: 40.00     Pack years: 80.00     Types: Cigarettes     Quit date: 1993     Years since quittin.5     Passive exposure: Never    Smokeless tobacco: Never    Tobacco comments:     20 yrs   Vaping Use    Vaping Use: Never used   Substance and Sexual Activity    Alcohol use: No    Drug use: No    Sexual activity: Not Currently     Partners: Female   Other Topics Concern    Not on file   Social History Narrative    Not on file     Social Determinants of Health     Financial Resource Strain: Not on file   Food Insecurity: Not on file   Transportation Needs: Not on file   Physical Activity: Not on file   Stress: Not on file   Social Connections: Not on file   Intimate Partner Violence: Not on file   Housing Stability: Not on file          Vitals:    22 0809   BP: 124/76   Site: Left Upper Arm   Position: Sitting   Cuff Size: Large Adult   Weight: 169 lb (76.7 kg)   Height: 5' 10\" (1.778 m)     Body mass index is 24.25 kg/m².      Wt Readings from Last 3 Encounters:   22 169 lb (76.7 kg)   22 163 lb (73.9 kg)   22 166 lb (75.3 kg)     BP Readings from Last 3 Encounters:   22 124/76   22 122/70   22 (!) 143/84        REVIEW OF SYSTEMS:  CONSTITUTIONAL:  negative  HEENT: Negative  RESPIRATORY:  negative  CARDIOVASCULAR:  negative  GASTROINTESTINAL:  negative  GENITOURINARY:  negative  HEMATOLOGIC/LYMPHATIC:  negative  ENDOCRINE:  Negative  NEUROLOGICAL:  Negative  * All other ROS reviewed and negative. PE:  Constitutional:  Well developed, well nourished, no acute distress, non-toxic appearance   Eyes:  PERRL, conjunctiva normal   HENT:  Atraumatic, external ears normal, nose normal. Neck- normal range of motion, no tenderness, supple   Respiratory:  No respiratory distress, normal breath sounds, no rales, no wheezing   Cardiovascular:  Normal rate, normal rhythm  GI: Bowel sounds positive, soft, minimal tenderness in the right upper quadrant with more tenderness along the right inferior rib cage radiating around to the back towards his incision  :  No costovertebral angle tenderness   Integument:  Well hydrated, no rash   Lymphatic:  No lymphadenopathy noted   Neurologic:  Alert & oriented x 3, no focal deficits noted   Psychiatric:  Speech and behavior appropriate       DATA:  Radiology Review: CT and ultrasound images were reviewed and show no significant abnormality within the abdomen or pelvis      Assessment:  1. Right-sided chest wall pain        Plan: I suspect he has postoperative pain related to some nerve injury after his thoracotomy. There are no indications for abdominal surgical intervention. Consideration could be given to the use of some form of Neurontin to help with his pain.   He can follow-up with me as needed

## 2022-12-16 NOTE — TELEPHONE ENCOUNTER
Spoke with patient regarding medications prior to procedure in January. Medications instructions   Take Xarelto, ASA, Memantine, Lexapro Proscar with a small sip of water    Hole over the counter medications and supplements.  Hold Ativan

## 2023-01-12 ENCOUNTER — HOSPITAL ENCOUNTER (OUTPATIENT)
Dept: CARDIAC CATH/INVASIVE PROCEDURES | Age: 78
Discharge: HOME | End: 2023-01-12
Attending: INTERNAL MEDICINE | Admitting: INTERNAL MEDICINE
Payer: MEDICARE

## 2023-01-12 ENCOUNTER — ANESTHESIA (OUTPATIENT)
Dept: NON INVASIVE DIAGNOSTICS | Age: 78
End: 2023-01-12
Payer: MEDICARE

## 2023-01-12 ENCOUNTER — HOSPITAL ENCOUNTER (OUTPATIENT)
Dept: NON INVASIVE DIAGNOSTICS | Age: 78
End: 2023-01-12
Attending: INTERNAL MEDICINE
Payer: MEDICARE

## 2023-01-12 ENCOUNTER — ANESTHESIA EVENT (OUTPATIENT)
Dept: NON INVASIVE DIAGNOSTICS | Age: 78
End: 2023-01-12
Payer: MEDICARE

## 2023-01-12 ENCOUNTER — TELEPHONE (OUTPATIENT)
Dept: CARDIOLOGY CLINIC | Age: 78
End: 2023-01-12

## 2023-01-12 VITALS — HEIGHT: 70 IN | BODY MASS INDEX: 24.05 KG/M2 | WEIGHT: 168 LBS

## 2023-01-12 DIAGNOSIS — Z95.818 PRESENCE OF WATCHMAN LEFT ATRIAL APPENDAGE CLOSURE DEVICE: ICD-10-CM

## 2023-01-12 DIAGNOSIS — I48.0 PAROXYSMAL A-FIB (HCC): ICD-10-CM

## 2023-01-12 LAB
ANION GAP SERPL CALCULATED.3IONS-SCNC: 8 MMOL/L (ref 3–16)
BUN BLDV-MCNC: 35 MG/DL (ref 7–20)
CALCIUM SERPL-MCNC: 9.2 MG/DL (ref 8.3–10.6)
CHLORIDE BLD-SCNC: 106 MMOL/L (ref 99–110)
CO2: 27 MMOL/L (ref 21–32)
CREAT SERPL-MCNC: 1 MG/DL (ref 0.8–1.3)
EKG ATRIAL RATE: 63 BPM
EKG DIAGNOSIS: NORMAL
EKG Q-T INTERVAL: 444 MS
EKG QRS DURATION: 86 MS
EKG QTC CALCULATION (BAZETT): 444 MS
EKG R AXIS: -20 DEGREES
EKG T AXIS: 11 DEGREES
EKG VENTRICULAR RATE: 60 BPM
GFR SERPL CREATININE-BSD FRML MDRD: >60 ML/MIN/{1.73_M2}
GLUCOSE BLD-MCNC: 95 MG/DL (ref 70–99)
HCT VFR BLD CALC: 36.3 % (ref 40.5–52.5)
HEMOGLOBIN: 12.2 G/DL (ref 13.5–17.5)
INR BLD: 2.21 (ref 0.87–1.14)
LV EF: 55 %
LVEF MODALITY: NORMAL
MCH RBC QN AUTO: 31.3 PG (ref 26–34)
MCHC RBC AUTO-ENTMCNC: 33.5 G/DL (ref 31–36)
MCV RBC AUTO: 93.3 FL (ref 80–100)
PDW BLD-RTO: 14.3 % (ref 12.4–15.4)
PLATELET # BLD: 147 K/UL (ref 135–450)
PMV BLD AUTO: 7.3 FL (ref 5–10.5)
POTASSIUM REFLEX MAGNESIUM: 4.4 MMOL/L (ref 3.5–5.1)
PROTHROMBIN TIME: 24.6 SEC (ref 11.7–14.5)
RBC # BLD: 3.89 M/UL (ref 4.2–5.9)
SODIUM BLD-SCNC: 141 MMOL/L (ref 136–145)
WBC # BLD: 6 K/UL (ref 4–11)

## 2023-01-12 PROCEDURE — 93325 DOPPLER ECHO COLOR FLOW MAPG: CPT

## 2023-01-12 PROCEDURE — 93010 ELECTROCARDIOGRAM REPORT: CPT | Performed by: INTERNAL MEDICINE

## 2023-01-12 PROCEDURE — 3700000000 HC ANESTHESIA ATTENDED CARE

## 2023-01-12 PROCEDURE — 85027 COMPLETE CBC AUTOMATED: CPT

## 2023-01-12 PROCEDURE — 93312 ECHO TRANSESOPHAGEAL: CPT | Performed by: INTERNAL MEDICINE

## 2023-01-12 PROCEDURE — 3700000001 HC ADD 15 MINUTES (ANESTHESIA)

## 2023-01-12 PROCEDURE — 80048 BASIC METABOLIC PNL TOTAL CA: CPT

## 2023-01-12 PROCEDURE — 93312 ECHO TRANSESOPHAGEAL: CPT

## 2023-01-12 PROCEDURE — 93320 DOPPLER ECHO COMPLETE: CPT

## 2023-01-12 PROCEDURE — 2580000003 HC RX 258

## 2023-01-12 PROCEDURE — 93325 DOPPLER ECHO COLOR FLOW MAPG: CPT | Performed by: INTERNAL MEDICINE

## 2023-01-12 PROCEDURE — 93005 ELECTROCARDIOGRAM TRACING: CPT | Performed by: INTERNAL MEDICINE

## 2023-01-12 PROCEDURE — 85610 PROTHROMBIN TIME: CPT

## 2023-01-12 PROCEDURE — 2500000003 HC RX 250 WO HCPCS

## 2023-01-12 PROCEDURE — 2500000003 HC RX 250 WO HCPCS: Performed by: ANESTHESIOLOGY

## 2023-01-12 PROCEDURE — 6360000002 HC RX W HCPCS

## 2023-01-12 RX ORDER — SODIUM CHLORIDE 0.9 % (FLUSH) 0.9 %
5-40 SYRINGE (ML) INJECTION EVERY 12 HOURS SCHEDULED
Status: DISCONTINUED | OUTPATIENT
Start: 2023-01-12 | End: 2023-01-12 | Stop reason: HOSPADM

## 2023-01-12 RX ORDER — SODIUM CHLORIDE 9 MG/ML
INJECTION, SOLUTION INTRAVENOUS PRN
Status: DISCONTINUED | OUTPATIENT
Start: 2023-01-12 | End: 2023-01-12 | Stop reason: HOSPADM

## 2023-01-12 RX ORDER — SODIUM CHLORIDE 0.9 % (FLUSH) 0.9 %
5-40 SYRINGE (ML) INJECTION PRN
Status: DISCONTINUED | OUTPATIENT
Start: 2023-01-12 | End: 2023-01-12 | Stop reason: HOSPADM

## 2023-01-12 RX ORDER — LORAZEPAM 0.5 MG/1
0.5 TABLET ORAL
Status: DISCONTINUED | OUTPATIENT
Start: 2023-01-12 | End: 2023-01-12 | Stop reason: HOSPADM

## 2023-01-12 RX ORDER — LIDOCAINE HYDROCHLORIDE 20 MG/ML
INJECTION, SOLUTION INFILTRATION; PERINEURAL PRN
Status: DISCONTINUED | OUTPATIENT
Start: 2023-01-12 | End: 2023-01-12 | Stop reason: SDUPTHER

## 2023-01-12 RX ORDER — SODIUM CHLORIDE 9 MG/ML
INJECTION, SOLUTION INTRAVENOUS CONTINUOUS PRN
Status: DISCONTINUED | OUTPATIENT
Start: 2023-01-12 | End: 2023-01-12 | Stop reason: SDUPTHER

## 2023-01-12 RX ORDER — PROPOFOL 10 MG/ML
INJECTION, EMULSION INTRAVENOUS PRN
Status: DISCONTINUED | OUTPATIENT
Start: 2023-01-12 | End: 2023-01-12 | Stop reason: SDUPTHER

## 2023-01-12 RX ORDER — SODIUM CHLORIDE, SODIUM LACTATE, POTASSIUM CHLORIDE, CALCIUM CHLORIDE 600; 310; 30; 20 MG/100ML; MG/100ML; MG/100ML; MG/100ML
INJECTION, SOLUTION INTRAVENOUS CONTINUOUS
Status: DISCONTINUED | OUTPATIENT
Start: 2023-01-12 | End: 2023-01-12 | Stop reason: HOSPADM

## 2023-01-12 RX ORDER — ONDANSETRON 2 MG/ML
4 INJECTION INTRAMUSCULAR; INTRAVENOUS EVERY 6 HOURS PRN
Status: DISCONTINUED | OUTPATIENT
Start: 2023-01-12 | End: 2023-01-12 | Stop reason: HOSPADM

## 2023-01-12 RX ORDER — LIDOCAINE HYDROCHLORIDE 10 MG/ML
1 INJECTION, SOLUTION EPIDURAL; INFILTRATION; INTRACAUDAL; PERINEURAL
Status: DISCONTINUED | OUTPATIENT
Start: 2023-01-12 | End: 2023-01-12 | Stop reason: HOSPADM

## 2023-01-12 RX ORDER — ASPIRIN 325 MG
325 TABLET ORAL ONCE
Status: DISCONTINUED | OUTPATIENT
Start: 2023-01-12 | End: 2023-01-12 | Stop reason: HOSPADM

## 2023-01-12 RX ADMIN — PROPOFOL 30 MG: 10 INJECTION, EMULSION INTRAVENOUS at 08:18

## 2023-01-12 RX ADMIN — PROPOFOL 20 MG: 10 INJECTION, EMULSION INTRAVENOUS at 08:21

## 2023-01-12 RX ADMIN — SODIUM CHLORIDE: 9 INJECTION, SOLUTION INTRAVENOUS at 08:04

## 2023-01-12 RX ADMIN — PROPOFOL 50 MG: 10 INJECTION, EMULSION INTRAVENOUS at 08:15

## 2023-01-12 RX ADMIN — LIDOCAINE HYDROCHLORIDE 60 MG: 20 INJECTION, SOLUTION INFILTRATION; PERINEURAL at 08:12

## 2023-01-12 RX ADMIN — PROPOFOL 20 MG: 10 INJECTION, EMULSION INTRAVENOUS at 08:25

## 2023-01-12 RX ADMIN — PROPOFOL 50 MG: 10 INJECTION, EMULSION INTRAVENOUS at 08:12

## 2023-01-12 RX ADMIN — PROPOFOL 30 MG: 10 INJECTION, EMULSION INTRAVENOUS at 08:30

## 2023-01-12 ASSESSMENT — ENCOUNTER SYMPTOMS: SHORTNESS OF BREATH: 1

## 2023-01-12 NOTE — DISCHARGE INSTRUCTIONS
Cath Labs at  [facility]                        1/12/2023  Puja Vergara   Date of Birth 1945     TRANSESOPHAGEAL ECHOCARDIOGRAM DISCHARGE INSTRUCTIONS      You have been given sedation for your procedure so you may not drive, operate any machinery, or sign any legal documentation for 24 hours. Do not drink or eat for at least 2 hours AND until your gag reflex returns. Check by touching the back of the tongue to be sure you can gag. Do not drink any alcohol today. Eat soft foods at first then gradually return to your normal diet. CALL YOUR DOCTOR If you should develop a fever of 101, cough up teaspoon amounts of blood, develop severe shortness of breath or chest pain. If symptoms are severe, go to the emergency room. Contact your doctor for a follow-up visit and/or results of your procedure. SEDATION DISCHARGE INSTRUCTION:  For the next 24 hours do not drive a car, operate machinery, power tools or kitchen appliances. Do not drink alcohol; including beer or wine. Do not make any important decisions or sign any important papers. For the next 24 hours you can expect drowsiness, light-headed or dizziness, nausea/ vomiting, inability to concentrate, fatigue and desire to sleep. We strongly suggest that a responsible adult be with for the next 24 hours, for your protection and safety. You are not allowed to drive yourself home, or take any type of public transportation. If any questions, please call your doctor. If you cannot reach your Doctor then call the Emergency Department at  [phone]. Explain to the Emergency Department the procedure you had performed and they will be able to assist you. If you seek Emergency Care, bring this form with you. If your condition worsens or if you have any concerns, call your doctor or seek emergency medical services as needed. If you have any of the following symptoms/conditions, call your doctor.

## 2023-01-12 NOTE — TELEPHONE ENCOUNTER
Spoke with Dr. Vladimir Barriga and reviewed Post DONG that Dr. Jose Luis Rai did on 1/12/2023. Patient has allergy to Plavix so Dr. Vladimir Barriga stopped Xarelto and Baby Aspirin and would like patient to start a Full strength Aspirin 325 mg everyday. I informed patient and states understanding regarding changes to medication. I reminded patient as well to go to his 6 month follow up as scheduled on 5/5/2023.

## 2023-01-12 NOTE — ANESTHESIA POSTPROCEDURE EVALUATION
Department of Anesthesiology  Postprocedure Note    Patient: Mirna Alexandra  MRN: [id number]  YOB: 1945  Date of evaluation: 1/12/2023      Procedure Summary     Date: 01/12/23 Room / Location: [facility] Echocardiogram; [facility] Cardiac Cath Lab    Anesthesia Start: 0810 Anesthesia Stop: 0836    Procedures:       ECHOCARDIOGRAM TRANSESOPHAGEAL      TRANSESOPHAGEAL ECHOCARDIOGRAM Diagnosis:       Paroxysmal A-fib (HCC)      Presence of Watchman left atrial appendage closure device      Paroxysmal atrial fibrillation      Paroxysmal atrial fibrillation      Paroxysmal atrial fibrillation      Paroxysmal atrial fibrillation      (Watchman procedure Left Atrial Appendage Closure Device)    Scheduled Providers:  Responsible Provider: Kassy Castellanos MD    Anesthesia Type: MAC ASA Status: 3          Anesthesia Type: No value filed. Lorenzo Phase I:      Lorenzo Phase II:        Anesthesia Post Evaluation    Patient location during evaluation: PACU  Patient participation: complete - patient participated  Level of consciousness: awake and alert  Airway patency: patent  Nausea & Vomiting: no nausea and no vomiting  Complications: no  Cardiovascular status: hemodynamically stable  Respiratory status: acceptable, room air and spontaneous ventilation  Hydration status: stable  Comments: There were no vitals filed for this visit.

## 2023-01-12 NOTE — ANESTHESIA PRE PROCEDURE
Department of Anesthesiology  Preprocedure Note       Name:  Kiley Freeman   Age:  68 y.o.  :  1945                                          MRN:  [id number]         Date:  2023      Surgeon: * No surgeons listed *    Procedure: * No procedures listed *    Medications prior to admission:   Prior to Admission medications    Medication Sig Start Date End Date Taking? Authorizing Provider   rivaroxaban (XARELTO) 20 MG TABS tablet Take 1 tablet by mouth daily (with breakfast) 22   Cristobal Manley MD   aspirin EC 81 MG EC tablet Take 1 tablet by mouth daily 22   Cristobal Manley MD   escitalopram (LEXAPRO) 20 MG tablet 10 mg 22   Historical Provider, MD   memantine (NAMENDA) 10 MG tablet Take 10 mg by mouth 2 times daily    Historical Provider, MD   pantoprazole (PROTONIX) 20 MG tablet Take 20 mg by mouth in the morning. Historical Provider, MD   atorvastatin (LIPITOR) 40 MG tablet Take 0.5 tablets by mouth in the morning. 22   Presley Lucero MD   finasteride (PROSCAR) 5 MG tablet Take 5 mg by mouth daily    Historical Provider, MD   ipratropium (ATROVENT) 0.03 % nasal spray 2 sprays by Each Nostril route every 12 hours Can use of to 4 times daily as needed 5/10/21   Blake Amin DO   diclofenac sodium 1 % GEL APPLY 4GM TOPICALLY FOUR TIMES A DAY 18   Ricky Gross MD   carbidopa-levodopa (SINEMET)  MG per tablet Take 1 tablet by mouth in the morning and at bedtime     Historical Provider, MD   vitamin B-2 (RIBOFLAVIN) 25 MG TABS Take by mouth    Historical Provider, MD   diclofenac (PENNSAID) 2 % SOLN 2 pumps to the affected area 2 times a day [phone] 17   Ricky Gross MD   omeprazole (PRILOSEC) 20 MG delayed release capsule Take 20 mg by mouth 2 times daily    Historical Provider, MD   fluocinonide (LIDEX) 0.05 % cream Apply topically 2 times daily.  16   KATIE Magallanes - CNP   ferrous sulfate 325 (65 FE) MG tablet Take 1 tablet by mouth 2 times daily (with meals) 10/5/15   David Valdivia MD   zolpidem (AMBIEN) 10 MG tablet Take 10 mg by mouth nightly as needed. Historical Provider, MD   LORazepam (ATIVAN) 1 MG tablet Take 2 mg by mouth in the morning and at bedtime. Historical Provider, MD   Lacosamide (VIMPAT PO) Take 100 mg by mouth 2 times daily. Indications: take dos    Historical Provider, MD       Current medications:    No current facility-administered medications for this visit. No current outpatient medications on file. Facility-Administered Medications Ordered in Other Visits   Medication Dose Route Frequency Provider Last Rate Last Admin    sodium chloride flush 0.9 % injection 5-40 mL  5-40 mL IntraVENous 2 times per day Ubaldo Cortes MD        sodium chloride flush 0.9 % injection 5-40 mL  5-40 mL IntraVENous PRN Ubaldo Cortes MD        0.9 % sodium chloride infusion   IntraVENous PRN Ubaldo Cortes MD        aspirin tablet 325 mg  325 mg Oral Once Ubaldo Cortes MD        [facility]) injection 4 mg  4 mg IntraVENous Q6H PRN Ubaldo Cortes MD        LORazepam (ATIVAN) tablet 0.5 mg  0.5 mg Oral Once PRN Ubaldo Cortes MD        lidocaine PF 1 % injection 1 mL  1 mL IntraDERmal Once PRN Nuris Gutierrez MD        lactated ringers infusion   IntraVENous Continuous Nuris Gutierrez MD        sodium chloride flush 0.9 % injection 5-40 mL  5-40 mL IntraVENous 2 times per day Nuris Gutierrez MD        sodium chloride flush 0.9 % injection 5-40 mL  5-40 mL IntraVENous PRN Nuirs Gutierrez MD        0.9 % sodium chloride infusion   IntraVENous PRN Nuris Gutierrez MD           Allergies:     Allergies   Allergen Reactions    Morphine      Bad sweats    Codeine      dizzy    Penicillins Nausea And Vomiting    Plavix [Clopidogrel Bisulfate] Rash       Problem List:    Patient Active Problem List   Diagnosis Code    Dizziness R42    Syncope and collapse R55    Paroxysmal atrial fibrillation (HCC) I48.0    Contusion of knee, right S80. 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[facility] E80.1    History of nonmelanoma skin cancer Z85.828    Ischemic chest pain (HCC) I20.9    Left hand pain M79.642    Hand pain, right M79.641    Arthritis of carpometacarpal (CMC) joints of both thumbs M18.0    Radial styloid tenosynovitis (de quervain) M65.4    Left elbow fracture, closed, initial encounter S42.402A    Frequent falls R29.6    General weakness R53.1    Thrombocytopenia (HCC) D69.6    Malignant neoplasm of lung (HCC) C34.90    Orthostasis I95.1    Hypotension I95.9    Chest pain R07.9    Abnormal finding on EKG R94.31    Digoxin toxicity, accidental or unintentional, initial encounter T46.0X1A    Persistent atrial fibrillation (HCC) I48.19    COVID-19 virus infection U07.1    COVID U07.1    Rib pain on right side R07.81    Coronary artery disease involving native coronary artery of native heart without angina pectoris I25.10    Atrial fibrillation, persistent (HCC) I48.19       Past Medical History:        Diagnosis Date    MIRNA (acute kidney injury) ([facility] 3/31/2022    Anxiety     Arthritis     OA    Bradycardia 4/19/2014    Cancer ([facility]     skin cancer-forearm right , face    Coronary artery disease involving native coronary artery of native heart without angina pectoris 7/19/2022    Hemoptysis 10/16/2014    Since Ablation    Irregular heart  beats     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    S/P drug eluting coronary stent placement 03/06/2017    2.25 x 18 Xience Alpine ADRIÁN - Distal LAD    Seizure disorder ([facility]     4 weeks ago black out, pt states seizure but may be due to Heart Rate changes    Se[facility]ures [facility]     Shortness of breath 9/4/2014    Total knee replacement status 1/12/2011       Past Surgical History:        Procedure Laterality Date    ABLATION OF DYSRHYTHMIC FOCUS  9/2014    CARPAL TUNNEL RELEASE [facility] [facility] SURGERY  2010    COLONOSCOPY  2016    normal    CORONARY ANGIOPLASTY WITH STENT PLACEMENT      FEMUR SURGERY      left    KNEE SURGERY  11 R total knee replacement with femoral nerve block for pain control    LUNG REMOVAL, PARTIAL Right     20%    OTHER SURGICAL HISTORY Bilateral     excisions of lesions on bilat. neck and back    OTHER SURGICAL HISTORY  2014    Reveal Loop recorder placed in Cath Lab    UPPER GASTROINTESTINAL ENDOSCOPY  2016    gastric and esophogeal biopsy       Social History:    Social History     Tobacco Use    Smoking status: Former     Packs/day: 2.00     Years: 40.00     Pack years: 80.00     Types: Cigarettes     Quit date: 1993     Years since quittin.6     Passive exposure: Never    Smokeless tobacco: Never    Tobacco comments:     20 yrs   Substance Use Topics    Alcohol use: No                                Counseling given: Not Answered  Tobacco comments: 20 yrs      Vital Signs (Current): There were no vitals filed for this visit.                                            BP Readings from Last 3 Encounters:   22 124/76   22 122/70   22 (!) 143/84       NPO Status:                                                                                 BMI:   Wt Readings from Last 3 Encounters:   23 168 lb (76.2 kg)   22 169 lb (76.7 kg)   22 163 lb (73.9 kg)     There is no height or weight on file to calculate BMI.    CBC:   Lab Results   Component Value Date/Time    WBC 6.0 2023 07:07 AM    RBC 3.89 2023 07:07 AM    RBC 4.84 2017 11:05 AM    HGB 12.2 2023 07:07 AM    HCT 36.3 2023 07:07 AM    MCV 93.3 2023 07:07 AM    RDW 14.3 2023 07:07 AM     2023 07:07 AM       CMP:   Lab Results   Component Value Date/Time     2023 07:07 AM    K 4.4 2023 07:07 AM     2023 07:07 AM    CO2 27 2023 07:07 AM    BUN 35 01/12/2023 07:07 AM    CREATININE 1.0 01/12/2023 07:07 AM    GFRAA >60 07/17/2022 11:30 AM    GFRAA >60 06/07/2012 07:07 AM    AGRATIO 1.5 10/26/2022 09:10 AM    LABGLOM >60 01/12/2023 07:07 AM    GLUCOSE 95 01/12/2023 07:07 AM    GLUCOSE 100 06/03/2016 09:48 AM    PROT 7.8 10/26/2022 09:10 AM    PROT 7.6 06/03/2016 09:48 AM    CALCIUM 9.2 01/12/2023 07:07 AM    BILITOT 1.0 10/26/2022 09:10 AM    ALKPHOS 150 10/26/2022 09:10 AM    AST 21 10/26/2022 09:10 AM    ALT 6 10/26/2022 09:10 AM       POC Tests: No results for input(s): POCGLU, POCNA, POCK, POCCL, POCBUN, POCHEMO, POCHCT in the last 72 hours.     Coags:   Lab Results   Component Value Date/Time    PROTIME 24.6 01/12/2023 07:07 AM    INR 2.21 01/12/2023 07:07 AM    APTT 37.6 09/26/2014 07:30 AM       HCG (If Applicable): No results found for: PREGTESTUR, PREGSERUM, HCG, HCGQUANT     ABGs: No results found for: PHART, PO2ART, DVW7RRS, YTK8FRS, BEART, J5LYZBSF     Type & Screen (If Applicable):  Lab Results   Component Value Date    LABABO A 06/01/2012    [address] Positive 06/01/2012       Drug/Infectious Status (If Applicable):  Lab Results   Component Value Date/Time    HEPCAB Non-Reactive (Negative) 06/04/2012 03:43 PM       COVID-19 Screening (If Applicable):   Lab Results   Component Value Date/Time    COVID19 DETECTED 07/17/2022 03:00 PM           Anesthesia Evaluation  Patient summary reviewed no history of anesthetic complications:   Airway: Mallampati: II  TM distance: >3 FB   Neck ROM: full  Mouth opening: > = 3 FB   Dental:    (+) upper dentures and lower dentures      Pulmonary:normal exam  breath sounds clear to auscultation  (+) shortness of breath: no interval change,                             Cardiovascular:    (+) CAD:, dysrhythmias: atrial fibrillation,         Rhythm: irregular  Rate: normal                 ROS comment: Orthostatic hypotension  S/p watchman 11/2022     Neuro/Psych:               GI/Hepatic/Renal:            ROS comment: Porphyria cutanea tarda. Endo/Other:    (+) blood dyscrasia: thrombocytopenia:., .                 Abdominal:             Vascular: Other Findings:             Anesthesia Plan      MAC     ASA 3     (Mac vs general as needed)  Induction: intravenous. Anesthetic plan and risks discussed with patient. Plan discussed with CRNA.     Attending anesthesiologist reviewed and agrees with Preprocedure content                Cheyenne Yap MD   1/12/2023

## 2023-01-12 NOTE — PROCEDURES
1/12/2023    PROCEDURE PERFORMED:     1. Transesophageal echocardiogram.      INDICATIONS: Atrial fibrillation, status post WATCHMAN    PROCEDURE: The patient was brought to the lab in fasting state. The patient received adequate sedation with the assistance of anesthesia for the case. After ensuring adequate sedation, the esophagus was intubated and a complete transesophageal echocardiogram was completed. There were no complications related to the study. CONCLUSION:  Patient is status post 27 mm watchman device which appears well-seated. There appears to be small peridevice leak. No device associated thrombus. No significant pericardial effusion  Small residual atrial level shunt. See formal report for full details    PLAN:   1. Continue systemic anticoagulation. Noted patient has intolerance to Plavix. Will contact [address] [place] [address] to review images for next best steps.       Vee Llanes MD, [address] [place] [address]   [phone] [facility] ED office   [phone] [facility]

## 2023-01-12 NOTE — PROCEDURES
Brief Pre-Op Note/Sedation Assessment      Medford Soulier  1945  Cath Pool Rm/NONE      [id number]  7:55 AM    Planned Procedure: DONG    Post Procedure Plan: Return to same level of care    Consent: I have discussed with the patient and/or the patient representative the indication, alternatives, and the possible risks and/or complications of the planned procedure and the anesthesia methods. The patient and/or patient representative appear to understand and agree to proceed. Chief Complaint: Atrial fibrillation, post watchman     Pt is status post #27 mm WATCHMAN device placed 11/14/22. Presents today for post-WATCHMAN DONG. Vital Signs:  Ht 5' 10\" (1.778 m)   Wt 168 lb (76.2 kg)   BMI 24.11 kg/m²     Allergies:   Allergies   Allergen Reactions    Morphine      Bad sweats    Codeine      dizzy    Penicillins Nausea And Vomiting    Plavix [Clopidogrel Bisulfate] Rash       Past Medical History:  Past Medical History:   Diagnosis Date    MIRNA (acute kidney injury) ([facility] [address] 3/31/2022    Anxiety     Arthritis     OA    Bradycardia 4/19/2014    Cancer ([facility] [address]     skin cancer-forearm right , face    Coronary artery disease involving native coronary artery of native heart without angina pectoris 7/19/2022    Hemoptysis 10/16/2014    Since Ablation    Irregular heart  beats     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[facility] [address] 12/10/2014    S/P drug eluting coronary stent placement 03/06/2017    2.25 x 18 Xience Alpine ADRIÁN - Distal LAD    Seizure disorder ([facility] [address]     4 weeks ago black out, pt states seizure but may be due to Heart Rate changes    Seizures (HCC)     Shortness of breath 9/4/2014    Total knee replacement status 1/12/2011         Surgical History:  Past Surgical History:   Procedure Laterality Date    ABLATION OF DYSRHYTHMIC FOCUS  9/2014    CARPAL TUNNEL RELEASE      CERVICAL [address] SURGERY  2010    COLONOSCOPY  09/26/2016    normal    CORONARY ANGIOPLASTY WITH STENT PLACEMENT      FEMUR SURGERY      left    KNEE SURGERY 1-12-11 R total knee replacement with femoral nerve block for pain control    LUNG REMOVAL, PARTIAL Right     20%    OTHER SURGICAL HISTORY Bilateral     excisions of lesions on bilat. neck and back    OTHER SURGICAL HISTORY  9/2014    Reveal Loop recorder placed in Cath Lab    UPPER GASTROINTESTINAL ENDOSCOPY  09/26/2016    gastric and esophogeal biopsy         Medications:  Current Facility-Administered Medications   Medication Dose Route Frequency Provider Last Rate Last Admin    sodium chloride flush 0.9 % injection 5-40 mL  5-40 mL IntraVENous 2 times per day Wing Balbir MD        sodium chloride flush 0.9 % injection 5-40 mL  5-40 mL IntraVENous PRN Wing Balbir MD        0.9 % sodium chloride infusion   IntraVENous PRN Wing Balbir MD        aspirin tablet 325 mg  325 mg Oral Once Wing Balbir MD        ondansetron [facility]) injection 4 mg  4 mg IntraVENous Q6H PRN Wing Balbir MD        LORazepam (ATIVAN) tablet 0.5 mg  0.5 mg Oral Once PRN Wing Balbir MD        lidocaine PF 1 % injection 1 mL  1 mL IntraDERmal Once PRN Shayy Ingram MD        lactated ringers infusion   IntraVENous Continuous Shayy Ingram MD        sodium chloride flush 0.9 % injection 5-40 mL  5-40 mL IntraVENous 2 times per day Shayy Ingram MD        sodium chloride flush 0.9 % injection 5-40 mL  5-40 mL IntraVENous PRN Shayy Ingram MD        0.9 % sodium chloride infusion   IntraVENous PRN Shayy Ingram MD               Pre-Sedation:    Pre-Sedation Documentation and Exam:  I have personally completed a history, physical exam & review of systems for this patient (see notes). Prior History of Anesthesia Complications:   none    Modified Mallampati:  II (soft palate, uvula, fauces visible)    ASA Classification:  Class 3 - A patient with severe systemic disease that limits activity but is not incapacitating      Lorenzo Scale:   Activity:  2 - Able to move 4 extremities voluntarily on command  Respiration:  2 - Able to breathe deeply and cough freely  Circulation:  2 - BP+/- 20mmHg of normal  Consciousness:  2 - Fully awake  Oxygen Saturation (color):  2 - Able to maintain oxygen saturation >92% on room air    Sedation/Anesthesia Plan:  Guard the patient's safety and welfare. Minimize physical discomfort and pain. Minimize negative psychological responses to treatment by providing sedation and analgesia and maximize the potential amnesia. Patient to meet pre-procedure discharge plan.     Medication Planned:  Per anesthesia     Patient is an appropriate candidate for plan of sedation: yes      Electronically signed by Heather Crowe MD on 1/12/2023 at 7:55 AM

## 2023-01-13 NOTE — H&P
Brief Pre-Op Note/Sedation Assessment        Dali Ortiz  1945  Cath Pool Rm/NONE                                        [id number]  7:55 AM     Planned Procedure: DONG     Post Procedure Plan: Return to same level of care     Consent: I have discussed with the patient and/or the patient representative the indication, alternatives, and the possible risks and/or complications of the planned procedure and the anesthesia methods. The patient and/or patient representative appear to understand and agree to proceed. Chief Complaint: Atrial fibrillation, post watchman      Pt is status post #27 mm WATCHMAN device placed 11/14/22. Presents today for post-WATCHMAN DONG. Vital Signs:  Ht 5' 10\" (1.778 m)   Wt 168 lb (76.2 kg)   BMI 24.11 kg/m²      Allergies:         Allergies   Allergen Reactions    Morphine         Bad sweats    Codeine         dizzy    Penicillins Nausea And Vomiting    Plavix [Clopidogrel Bisulfate] Rash         Past Medical History:  Past Medical History        Past Medical History:   Diagnosis Date    MIRNA (acute kidney injury) ([facility] [address] 3/31/2022    Anxiety      Arthritis       OA    Bradycardia 4/19/2014    Cancer ([address]       skin cancer-forearm right , face    Coronary artery disease involving native coronary artery of native heart without angina pectoris 7/19/2022    Hemoptysis 10/16/2014     Since Ablation    Irregular heart  beats      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[address] 12/10/2014    S/P drug eluting coronary stent placement 03/06/2017     2.25 x 18 Xience Alpine ADRIÁN - Distal LAD    Seizure disorder ([address]       4 weeks ago black out, pt states seizure but may be due to Heart Rate changes    Seizures (HCC)      Shortness of breath 9/4/2014    Total knee replacement status 1/12/2011               Surgical History:  Past Surgical History         Past Surgical History:   Procedure Laterality Date    ABLATION OF DYSRHYTHMIC FOCUS   9/2014    CARPAL TUNNEL RELEASE        CERVICAL DIS[facility] SURGERY 2010    COLONOSCOPY   09/26/2016     normal    CORONARY ANGIOPLASTY WITH STENT PLACEMENT        FEMUR SURGERY         left    KNEE SURGERY   1-12-11 R total knee replacement with femoral nerve block for pain control    LUNG REMOVAL, PARTIAL Right       20%    OTHER SURGICAL HISTORY Bilateral       excisions of lesions on bilat. neck and back    OTHER SURGICAL HISTORY   9/2014     Reveal Loop recorder placed in Cath Lab    UPPER GASTROINTESTINAL ENDOSCOPY   09/26/2016     gastric and esophogeal biopsy               Medications:  Current Facility-Administered Medications             Current Facility-Administered Medications   Medication Dose Route Frequency Provider Last Rate Last Admin    sodium chloride flush 0.9 % injection 5-40 mL  5-40 mL IntraVENous 2 times per day Charles Bhagat MD        sodium chloride flush 0.9 % injection 5-40 mL  5-40 mL IntraVENous PRN Charles Bhagat MD        0.9 % sodium chloride infusion   IntraVENous PRN Charles Bhagat MD        aspirin tablet 325 mg  325 mg Oral Once Charles Bhagat MD        ondansetron [facility]) injection 4 mg  4 mg IntraVENous Q6H PRN Charles Bhagat MD        LORazepam (ATIVAN) tablet 0.5 mg  0.5 mg Oral Once PRN Charles Bhagat MD        lidocaine PF 1 % injection 1 mL  1 mL IntraDERmal Once PRN Kaila Mahajan MD        lactated ringers infusion   IntraVENous Continuous Kaila Mahajan MD        sodium chloride flush 0.9 % injection 5-40 mL  5-40 mL IntraVENous 2 times per day Kaila Mahajan MD        sodium chloride flush 0.9 % injection 5-40 mL  5-40 mL IntraVENous PRN Kaila Mahajan MD        0.9 % sodium chloride infusion   IntraVENous PRN Kaila Mahajan MD                      Pre-Sedation:     Pre-Sedation Documentation and Exam:  I have personally completed a history, physical exam & review of systems for this patient (see notes).      Prior History of Anesthesia Complications:   none     Modified Mallampati:  II (soft palate, uvula, fauces visible)     ASA Classification:  Class 3 - A patient with severe systemic disease that limits activity but is not incapacitating        Lorenzo Scale: Activity:  2 - Able to move 4 extremities voluntarily on command  Respiration:  2 - Able to breathe deeply and cough freely  Circulation:  2 - BP+/- 20mmHg of normal  Consciousness:  2 - Fully awake  Oxygen Saturation (color):  2 - Able to maintain oxygen saturation >92% on room air     Sedation/Anesthesia Plan:  Guard the patient's safety and welfare. Minimize physical discomfort and pain. Minimize negative psychological responses to treatment by providing sedation and analgesia and maximize the potential amnesia. Patient to meet pre-procedure discharge plan.      Medication Planned:  Per anesthesia      Patient is an appropriate candidate for plan of sedation: yes        Electronically signed by Konrad Cao MD on 1/12/2023 at 7:55 AM - late entry

## 2023-01-23 ENCOUNTER — HOSPITAL ENCOUNTER (OUTPATIENT)
Age: 78
Discharge: HOME | End: 2023-01-23
Payer: MEDICARE

## 2023-01-23 ENCOUNTER — HOSPITAL ENCOUNTER (OUTPATIENT)
Dept: CT IMAGING | Age: 78
Discharge: HOME | End: 2023-01-23
Payer: MEDICARE

## 2023-01-23 DIAGNOSIS — C34.01 MALIGNANT NEOPLASM OF RIGHT MAIN BRONCHUS (HCC): ICD-10-CM

## 2023-01-23 LAB
BUN BLDV-MCNC: 24 MG/DL (ref 7–20)
CREAT SERPL-MCNC: 1.1 MG/DL (ref 0.8–1.3)
GFR SERPL CREATININE-BSD FRML MDRD: >60 ML/MIN/{1.73_M2}

## 2023-01-23 PROCEDURE — 6360000004 HC RX CONTRAST MEDICATION: Performed by: INTERNAL MEDICINE

## 2023-01-23 PROCEDURE — 82565 ASSAY OF CREATININE: CPT

## 2023-01-23 PROCEDURE — 74177 CT ABD & PELVIS W/CONTRAST: CPT

## 2023-01-23 PROCEDURE — 84520 ASSAY OF UREA NITROGEN: CPT

## 2023-01-23 PROCEDURE — 36415 COLL VENOUS BLD VENIPUNCTURE: CPT

## 2023-01-23 RX ADMIN — IOPAMIDOL 75 ML: 755 INJECTION, SOLUTION INTRAVENOUS at 13:06

## 2023-01-23 RX ADMIN — DIATRIZOATE MEGLUMINE AND DIATRIZOATE SODIUM 12 ML: 660; 100 LIQUID ORAL; RECTAL at 13:16

## 2023-01-31 ENCOUNTER — TELEPHONE (OUTPATIENT)
Dept: CARDIOLOGY CLINIC | Age: 78
End: 2023-01-31

## 2023-01-31 NOTE — TELEPHONE ENCOUNTER
Pt calling stating he had a CT-chest/abd/pelvis done on 1.13.23    He would like LEAH to look @ (result in epic). Per pt S/P watchman-but CT shows that he still has a leakage.     Next ov with leah is on 5.15.23

## 2023-01-31 NOTE — TELEPHONE ENCOUNTER
I really cannot comment on that modality for this purpose. I have noted the Radiologist's read however. 2-3 mm small leak noted by last DONG. Hopeful that this will close over time and it is too early to expect it to have closed relative to the TTE performed earlier this month. Let's ask WATCHMAN group for any further opinion.  Thank you    ISABELLA

## 2023-02-02 NOTE — TELEPHONE ENCOUNTER
DR. Lucas Heads recommends with this small leak to continue DAPT only at this time. No further action. Pls let pt know.  TY

## 2023-02-09 NOTE — TELEPHONE ENCOUNTER
Patient called office back states phone had been disconnected. Relayed ISABELLA message. He reports he is taking aspirin 81 mg daily. Reviewed Telephone encounter 01/12/23  instructed patient to stop taking aspirin 81 mg and Xarelto. He instructed patient to start aspirin 325 mg once daily due to Plavix intolerance. Relayed instructions to patient per zeus Madison.  Reminded of appt with ISABELLA at [facility] on 05/15/23

## 2023-02-23 NOTE — PROGRESS NOTES
[facility]      Patient Name: Francisco Garza Record Number:  [id number]  Primary Care Physician:  Dennis Heart MD  Date of Consultation: 2/24/2023    Chief Complaint:   Chief Complaint   Patient presents with    Sinus Problem     Patient is here today for sinus issues and a constant runny nose. Patient states for the past 3-4 months his nose just runs constantly. Patient has a history of sinus issues but no surgeries. Patient has also been getting headaches. He state his right ear hurt all the time on and off for years. Sometimes he has ringing in his ear. Light drainage from right ear as well. HISTORY OF PRESENT ILLNESS  Carolynn Ríos is a(n) 68 y.o. male who persistent for bilateral earaches. The drainage of his nose has improved with Atrovent. He has stated he has had bilateral ear pain for the last 2 weeks. He does use his hearing aids and has known hearing loss. He also complains of a sore on the right lower mandible underneath of his dentures. He states that he had some drainage from the right ear and the right ear hurts a bit worse than the left. He has not tried anything for his ear infection at this point time. Interval history 2/24/2023  Carolynn Ríos continues to have a runny nose. He states that he uses Atrovent in the morning and at night but the nose continues to run throughout the day. He also complains of right-sided ear pain and ringing.   No recent audiogram    Patient Active Problem List   Diagnosis    Dizziness    Syncope and collapse    Paroxysmal A-fib (HCC)    Contusion of knee, right    Dysphagia    Encounter for electronic analysis of reveal event recorder    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[facility] [address]    History of nonmelanoma skin cancer    Ischemic chest pain ([facility] [address]    Left hand pain    Hand pain, right    Arthritis of carpometacarpal (CMC) joints of both thumbs    Radial styloid tenosynovitis (de quervain)    Left elbow fracture, closed, initial encounter    Frequent falls    General weakness    Thrombocytopenia (HCC)    Malignant neoplasm of lung (HCC)    Orthostasis    Hypotension    Chest pain    Abnormal finding on EKG    Digoxin toxicity, accidental or unintentional, initial encounter    Persistent atrial fibrillation ([address]    COVID-19 virus infection    COVID    Rib pain on right side    Coronary artery disease involving native coronary artery of native heart without angina pectoris    Atrial fibrillation, persistent ([address]    Presence of Watchman left atrial appendage closure device     Past Surgical History:   Procedure Laterality Date    ABLATION OF DYSRHYTHMIC FOCUS  9/2014    CARPAL TUNNEL RELEASE      CERVICAL [address] SURGERY  2010    COLONOSCOPY  09/26/2016    normal    CORONARY ANGIOPLASTY WITH STENT PLACEMENT      FEMUR SURGERY      left    KNEE SURGERY  1-12-11 R total knee replacement with femoral nerve block for pain control    LUNG REMOVAL, PARTIAL Right     20%    OTHER SURGICAL HISTORY Bilateral     excisions of lesions on bilat. neck and back    OTHER SURGICAL HISTORY  9/2014    Reveal Loop recorder placed in Cath Lab    UPPER GASTROINTESTINAL ENDOSCOPY  09/26/2016    gastric and esophogeal biopsy     Family History   Problem Relation Age of Onset    Heart Disease Mother     Arthritis Mother     High Blood Pressure Mother     Miscarriages / Stillbirths Mother     Stroke Mother     Arthritis Father     Heart Disease Father     Cancer Sister     Arthritis Sister     Depression Sister     Arthritis Brother     Arthritis Maternal Grandmother     Arthritis Maternal Grandfather     Arthritis Paternal Grandmother     Diabetes Paternal Grandmother     Arthritis Paternal Grandfather      Social History     Socioeconomic History    Marital status:      Spouse name: Not on file    Number of children: Not on file    Years of education: Not on file    Highest education level: Not on file   Occupational History    Not on file   Tobacco Use    Smoking status: Former     Packs/day: 2.00     Years: 40.00     Pack years: 80.00     Types: Cigarettes     Quit date: 1993     Years since quittin.7     Passive exposure: Never    Smokeless tobacco: Never    Tobacco comments:     20 yrs   Vaping Use    Vaping Use: Never used   Substance and Sexual Activity    Alcohol use: No    Drug use: No    Sexual activity: Not Currently     Partners: Female   Other Topics Concern    Not on file   Social History Narrative    Not on file     Social Determinants of Health     Financial Resource Strain: Not on file   Food Insecurity: Not on file   Transportation Needs: Not on file   Physical Activity: Not on file   Stress: Not on file   Social Connections: Not on file   Intimate Partner Violence: Not on file   Housing Stability: Not on file     DRUG/FOOD ALLERGIES: Morphine, Codeine, Penicillins, and Plavix [clopidogrel bisulfate]    CURRENT MEDICATIONS  Prior to Admission medications    Medication Sig Start Date End Date Taking? Authorizing Provider   ipratropium (ATROVENT) 0.03 % nasal spray 2 sprays by Each Nostril route 4 times daily Can use of to 4 times daily as needed 23  Yes Javy Amin DO   rivaroxaban (XARELTO) 20 MG TABS tablet Take 1 tablet by mouth daily (with breakfast) 22  Yes Jennifer Pugh MD   aspirin EC 81 MG EC tablet Take 1 tablet by mouth daily 22  Yes Jennifer Pugh MD   escitalopram (LEXAPRO) 20 MG tablet 10 mg 22  Yes Historical Provider, MD   memantine (NAMENDA) 10 MG tablet Take 10 mg by mouth 2 times daily   Yes Historical Provider, MD   pantoprazole (PROTONIX) 20 MG tablet Take 20 mg by mouth in the morning. Yes Historical Provider, MD   atorvastatin (LIPITOR) 40 MG tablet Take 0.5 tablets by mouth in the morning.  22  Yes Collins Rodriguez MD   finasteride (PROSCAR) 5 MG tablet Take 5 mg by mouth daily   Yes Historical Provider, MD   diclofenac sodium 1 % GEL APPLY 4GM TOPICALLY FOUR TIMES A DAY 12/6/18  Yes Sandra Kebede MD   carbidopa-levodopa (SINEMET)  MG per tablet Take 1 tablet by mouth in the morning and at bedtime    Yes Historical Provider, MD   vitamin B-2 (RIBOFLAVIN) 25 MG TABS Take by mouth   Yes Historical Provider, MD   diclofenac (PENNSAID) 2 % SOLN 2 pumps to the affected area 2 times a day [id number] 9/26/17  Yes Sandra Kebede MD   omeprazole (PRILOSEC) 20 MG delayed release capsule Take 20 mg by mouth 2 times daily   Yes Historical Provider, MD   fluocinonide (LIDEX) 0.05 % cream Apply topically 2 times daily. 5/20/16  Yes KATIE Lemon CNP   ferrous sulfate 325 (65 FE) MG tablet Take 1 tablet by mouth 2 times daily (with meals) 10/5/15  Yes Tal Goddard MD   zolpidem (AMBIEN) 10 MG tablet Take 10 mg by mouth nightly as needed. Yes Historical Provider, MD   LORazepam (ATIVAN) 1 MG tablet Take 2 mg by mouth in the morning and at bedtime. Yes Historical Provider, MD   Lacosamide (VIMPAT PO) Take 100 mg by mouth 2 times daily. Indications: take dos   Yes Historical Provider, MD     REVIEW OF SYSTEMS  The following systems were reviewed and revealed the following in addition to any already discussed in the HPI:    Review of Systems   Constitutional:  Negative for fatigue and fever. HENT:  Positive for ear pain, hearing loss, rhinorrhea and tinnitus. Negative for congestion, ear discharge, postnasal drip and sneezing. Eyes:  Negative for pain and visual disturbance. Respiratory:  Negative for cough and shortness of breath. Cardiovascular:  Negative for chest pain. Gastrointestinal:  Negative for nausea and vomiting. Endocrine: Negative. Genitourinary: Negative. Musculoskeletal:  Negative for neck pain and neck stiffness. Skin:  Negative for rash. Neurological:  Negative for dizziness and headaches.       PHYSICAL EXAM  /76   Pulse 83   Temp 98.5 °F (36.9 °C) (Infrared)   Ht 5' 10\" (1.778 m)   Wt 168 lb (76.2 kg)   SpO2 98% BMI 24.11 kg/m²     GENERAL: No Acute Distress, Alert and Oriented, no hoarseness  EYES: EOMI, Anti-icteric  NOSE: No epistaxis, nasal mucosa within normal limits, no purulent drainage  EARS: Normal external canal appearance, EAC patent bilaterally, TMs intact bilaterally with no evidence of effusions  FACE: 1/6 House-Brackmann Scale, symmetric, sensation equal bilaterally  ORAL CAVITY: No masses or lesions palpated, uvula is midline, moist mucous membranes, post surgical UPPP  NECK: Normal range of motion, no thyromegaly, trachea is midline, no lymphadenopathy, no neck masses, no crepitus  CHEST: Normal respiratory effort, no retractions, breathing comfortably  SKIN: No rashes, normal appearing skin, no evidence of skin lesions/tumors    RADIOLOGY  Summary of findings:      PROCEDURE  Nasal Endoscopy    Was performed due to chronic rhinosinusitis    Verbal consent was received. After topical anesthesia and decongestion had been obtained using aerosolized 1% lidocaine and oxymetazoline, a 45 degree rigid endoscope was placed into both nares with the patient in a sitting position.  The following was observed:    Right Nasal Cavity and Paranasal Sinuses:  Polyp score = 0 (0 = no polyps, 1 = small polyps in middle meatus not reaching below the inferior border of the middle ej, 2 = polyps reaching below the middle border of the middle turbinate, 3= large polyps reaching the lower border of the inferior turbinate or polyps medial to the middle ej, 4= large polyps causing almost complete congestion/obstruction of the interior meatus)  Edema score = 1 (0 = absent, 1 = mild, 2 = severe)  Discharge score = 0 (0 = no discharge, 1 = clear thin discharge, 2 = thick purulent discharge)    Left Nasal Cavity and Paranasal Sinuses:    Polyp score = 0 (0 = no polyps, 1 = small polyps in middle meatus not reaching below the inferior border of the middle ej, 2 = polyps reaching below the middle border of the middle turbinate, 3= large polyps reaching the lower border of the inferior turbinate or polyps medial to the middle ej, 4= large polyps causing almost complete congestion/obstruction of the interior meatus)  Edema score = 1 (0 = absent, 1 = mild, 2 = severe)  Discharge score = 0 (0 = no discharge, 1 = clear thin discharge, 2 = thick purulent discharge)    Septum: intact and deviated to the right  Other: The inferior and middle turbinates were examined. There were no complications. ASSESSMENT/PLAN  Rosa Isela Mar is a very pleasant 68 y.o. male with     1. Vasomotor rhinitis  We will increase his Atrovent nasal spray to 4 times a day to see if this controls his nasal drainage adequately. - ipratropium (ATROVENT) 0.03 % nasal spray; 2 sprays by Each Nostril route 4 times daily Can use of to 4 times daily as needed  Dispense: 30 mL; Refill: 3  - [place] NASAL ENDOSCOPY DIAGNOSTIC UNI/BI SPX    2. Bilateral temporomandibular joint pain  I believe the pain in the right ear is from his TMJ    3. Tinnitus of right ear  We discussed that the ringing in the right ear is likely from hearing loss and that a hearing test would be beneficial.  He does not wish to have this done at this point time. We will follow-up in 3 months or sooner if needed. Medical Decision Making:   The following items were considered in medical decision making:  Independent review of images  Review / order clinical lab tests  Review / order radiology tests  Decision to obtain old records

## 2023-02-24 ENCOUNTER — OFFICE VISIT (OUTPATIENT)
Dept: ENT CLINIC | Age: 78
End: 2023-02-24

## 2023-02-24 VITALS
WEIGHT: 168 LBS | BODY MASS INDEX: 24.05 KG/M2 | HEIGHT: 70 IN | TEMPERATURE: 98.5 F | DIASTOLIC BLOOD PRESSURE: 76 MMHG | HEART RATE: 83 BPM | OXYGEN SATURATION: 98 % | SYSTOLIC BLOOD PRESSURE: 124 MMHG

## 2023-02-24 DIAGNOSIS — M26.623 BILATERAL TEMPOROMANDIBULAR JOINT PAIN: ICD-10-CM

## 2023-02-24 DIAGNOSIS — J30.0 VASOMOTOR RHINITIS: Primary | ICD-10-CM

## 2023-02-24 DIAGNOSIS — H93.11 TINNITUS OF RIGHT EAR: ICD-10-CM

## 2023-02-24 RX ORDER — IPRATROPIUM BROMIDE 21 UG/1
2 SPRAY, METERED NASAL 4 TIMES DAILY
Qty: 30 ML | Refills: 3 | Status: SHIPPED | OUTPATIENT
Start: 2023-02-24

## 2023-02-24 ASSESSMENT — ENCOUNTER SYMPTOMS
NAUSEA: 0
VOMITING: 0
COUGH: 0
SHORTNESS OF BREATH: 0
EYE PAIN: 0
RHINORRHEA: 1

## 2023-03-17 ENCOUNTER — OFFICE VISIT (OUTPATIENT)
Dept: FAMILY MEDICINE CLINIC | Age: 78
End: 2023-03-17
Payer: MEDICARE

## 2023-03-17 VITALS — WEIGHT: 177 LBS | BODY MASS INDEX: 25.4 KG/M2 | SYSTOLIC BLOOD PRESSURE: 122 MMHG | DIASTOLIC BLOOD PRESSURE: 80 MMHG

## 2023-03-17 DIAGNOSIS — R29.6 FREQUENT FALLS: ICD-10-CM

## 2023-03-17 DIAGNOSIS — Z91.09 ENVIRONMENTAL ALLERGIES: ICD-10-CM

## 2023-03-17 DIAGNOSIS — I25.10 CORONARY ARTERY DISEASE INVOLVING NATIVE CORONARY ARTERY OF NATIVE HEART WITHOUT ANGINA PECTORIS: ICD-10-CM

## 2023-03-17 DIAGNOSIS — E78.2 MIXED HYPERLIPIDEMIA: ICD-10-CM

## 2023-03-17 DIAGNOSIS — I48.19 ATRIAL FIBRILLATION, PERSISTENT (HCC): ICD-10-CM

## 2023-03-17 DIAGNOSIS — C34.11 MALIGNANT NEOPLASM OF UPPER LOBE OF RIGHT LUNG (HCC): ICD-10-CM

## 2023-03-17 DIAGNOSIS — Z79.899 POLYPHARMACY: ICD-10-CM

## 2023-03-17 DIAGNOSIS — R73.01 IMPAIRED FASTING GLUCOSE: ICD-10-CM

## 2023-03-17 DIAGNOSIS — Z95.818 PRESENCE OF WATCHMAN LEFT ATRIAL APPENDAGE CLOSURE DEVICE: ICD-10-CM

## 2023-03-17 DIAGNOSIS — G25.0 ESSENTIAL TREMOR: ICD-10-CM

## 2023-03-17 DIAGNOSIS — R41.3 MEMORY LOSS: ICD-10-CM

## 2023-03-17 DIAGNOSIS — J34.89 RHINORRHEA: Primary | ICD-10-CM

## 2023-03-17 DIAGNOSIS — Z76.89 ENCOUNTER TO ESTABLISH CARE: ICD-10-CM

## 2023-03-17 LAB
ALBUMIN SERPL-MCNC: 4.3 G/DL (ref 3.4–5)
ALBUMIN/GLOB SERPL: 1.3 {RATIO} (ref 1.1–2.2)
ALP SERPL-CCNC: 127 U/L (ref 40–129)
ALT SERPL-CCNC: <5 U/L (ref 10–40)
ANION GAP SERPL CALCULATED.3IONS-SCNC: 13 MMOL/L (ref 3–16)
AST SERPL-CCNC: 20 U/L (ref 15–37)
BASOPHILS # BLD: 0 K/UL (ref 0–0.2)
BASOPHILS NFR BLD: 0.4 %
BILIRUB SERPL-MCNC: 0.6 MG/DL (ref 0–1)
BUN SERPL-MCNC: 30 MG/DL (ref 7–20)
CALCIUM SERPL-MCNC: 9.2 MG/DL (ref 8.3–10.6)
CHLORIDE SERPL-SCNC: 103 MMOL/L (ref 99–110)
CHOLEST SERPL-MCNC: 150 MG/DL (ref 0–199)
CO2 SERPL-SCNC: 25 MMOL/L (ref 21–32)
CREAT SERPL-MCNC: 1 MG/DL (ref 0.8–1.3)
DEPRECATED RDW RBC AUTO: 14.8 % (ref 12.4–15.4)
EOSINOPHIL # BLD: 0.2 K/UL (ref 0–0.6)
EOSINOPHIL NFR BLD: 2.3 %
GFR SERPLBLD CREATININE-BSD FMLA CKD-EPI: >60 ML/MIN/{1.73_M2}
GLUCOSE SERPL-MCNC: 94 MG/DL (ref 70–99)
HCT VFR BLD AUTO: 39 % (ref 40.5–52.5)
HDLC SERPL-MCNC: 57 MG/DL (ref 40–60)
HGB BLD-MCNC: 12.9 G/DL (ref 13.5–17.5)
LDLC SERPL CALC-MCNC: 75 MG/DL
LYMPHOCYTES # BLD: 2.1 K/UL (ref 1–5.1)
LYMPHOCYTES NFR BLD: 28.2 %
MCH RBC QN AUTO: 30 PG (ref 26–34)
MCHC RBC AUTO-ENTMCNC: 33 G/DL (ref 31–36)
MCV RBC AUTO: 91 FL (ref 80–100)
MONOCYTES # BLD: 0.7 K/UL (ref 0–1.3)
MONOCYTES NFR BLD: 9.4 %
NEUTROPHILS # BLD: 4.5 K/UL (ref 1.7–7.7)
NEUTROPHILS NFR BLD: 59.7 %
PLATELET # BLD AUTO: 161 K/UL (ref 135–450)
PMV BLD AUTO: 8.5 FL (ref 5–10.5)
POTASSIUM SERPL-SCNC: 4.3 MMOL/L (ref 3.5–5.1)
PROT SERPL-MCNC: 7.5 G/DL (ref 6.4–8.2)
RBC # BLD AUTO: 4.28 M/UL (ref 4.2–5.9)
SODIUM SERPL-SCNC: 141 MMOL/L (ref 136–145)
TRIGL SERPL-MCNC: 90 MG/DL (ref 0–150)
TSH SERPL DL<=0.005 MIU/L-ACNC: 3.65 UIU/ML (ref 0.27–4.2)
VIT B12 SERPL-MCNC: 1242 PG/ML (ref 211–911)
VLDLC SERPL CALC-MCNC: 18 MG/DL
WBC # BLD AUTO: 7.6 K/UL (ref 4–11)

## 2023-03-17 PROCEDURE — 36415 COLL VENOUS BLD VENIPUNCTURE: CPT

## 2023-03-17 PROCEDURE — 1036F TOBACCO NON-USER: CPT

## 2023-03-17 PROCEDURE — G8427 DOCREV CUR MEDS BY ELIG CLIN: HCPCS

## 2023-03-17 PROCEDURE — 99205 OFFICE O/P NEW HI 60 MIN: CPT

## 2023-03-17 PROCEDURE — G8484 FLU IMMUNIZE NO ADMIN: HCPCS

## 2023-03-17 PROCEDURE — 1123F ACP DISCUSS/DSCN MKR DOCD: CPT

## 2023-03-17 PROCEDURE — G8417 CALC BMI ABV UP PARAM F/U: HCPCS

## 2023-03-17 RX ORDER — HYDROCODONE BITARTRATE AND ACETAMINOPHEN 5; 325 MG/1; MG/1
TABLET ORAL
COMMUNITY
Start: 2023-03-10 | End: 2023-03-17 | Stop reason: ALTCHOICE

## 2023-03-17 RX ORDER — MONTELUKAST SODIUM 10 MG/1
10 TABLET ORAL DAILY
Qty: 30 TABLET | Refills: 3 | Status: SHIPPED | OUTPATIENT
Start: 2023-03-17

## 2023-03-17 SDOH — ECONOMIC STABILITY: FOOD INSECURITY: WITHIN THE PAST 12 MONTHS, YOU WORRIED THAT YOUR FOOD WOULD RUN OUT BEFORE YOU GOT MONEY TO BUY MORE.: NEVER TRUE

## 2023-03-17 SDOH — ECONOMIC STABILITY: INCOME INSECURITY: HOW HARD IS IT FOR YOU TO PAY FOR THE VERY BASICS LIKE FOOD, HOUSING, MEDICAL CARE, AND HEATING?: NOT HARD AT ALL

## 2023-03-17 SDOH — ECONOMIC STABILITY: HOUSING INSECURITY
IN THE LAST 12 MONTHS, WAS THERE A TIME WHEN YOU DID NOT HAVE A STEADY PLACE TO SLEEP OR SLEPT IN A SHELTER (INCLUDING NOW)?: NO

## 2023-03-17 SDOH — ECONOMIC STABILITY: FOOD INSECURITY: WITHIN THE PAST 12 MONTHS, THE FOOD YOU BOUGHT JUST DIDN'T LAST AND YOU DIDN'T HAVE MONEY TO GET MORE.: NEVER TRUE

## 2023-03-17 ASSESSMENT — ENCOUNTER SYMPTOMS
CONSTIPATION: 0
SORE THROAT: 0
DIARRHEA: 0
SHORTNESS OF BREATH: 0
EYE DISCHARGE: 0
EYE PAIN: 0
CHEST TIGHTNESS: 0
ABDOMINAL PAIN: 0
COLOR CHANGE: 0
ABDOMINAL DISTENTION: 0
RHINORRHEA: 1
COUGH: 0

## 2023-03-17 ASSESSMENT — PATIENT HEALTH QUESTIONNAIRE - PHQ9
5. POOR APPETITE OR OVEREATING: 0
1. LITTLE INTEREST OR PLEASURE IN DOING THINGS: 3
SUM OF ALL RESPONSES TO PHQ9 QUESTIONS 1 & 2: 4
4. FEELING TIRED OR HAVING LITTLE ENERGY: 1
3. TROUBLE FALLING OR STAYING ASLEEP: 0
SUM OF ALL RESPONSES TO PHQ QUESTIONS 1-9: 6
SUM OF ALL RESPONSES TO PHQ QUESTIONS 1-9: 6
8. MOVING OR SPEAKING SO SLOWLY THAT OTHER PEOPLE COULD HAVE NOTICED. OR THE OPPOSITE, BEING SO FIGETY OR RESTLESS THAT YOU HAVE BEEN MOVING AROUND A LOT MORE THAN USUAL: 0
SUM OF ALL RESPONSES TO PHQ QUESTIONS 1-9: 6
SUM OF ALL RESPONSES TO PHQ QUESTIONS 1-9: 6
9. THOUGHTS THAT YOU WOULD BE BETTER OFF DEAD, OR OF HURTING YOURSELF: 0
2. FEELING DOWN, DEPRESSED OR HOPELESS: 1
10. IF YOU CHECKED OFF ANY PROBLEMS, HOW DIFFICULT HAVE THESE PROBLEMS MADE IT FOR YOU TO DO YOUR WORK, TAKE CARE OF THINGS AT HOME, OR GET ALONG WITH OTHER PEOPLE: 1
6. FEELING BAD ABOUT YOURSELF - OR THAT YOU ARE A FAILURE OR HAVE LET YOURSELF OR YOUR FAMILY DOWN: 0
7. TROUBLE CONCENTRATING ON THINGS, SUCH AS READING THE NEWSPAPER OR WATCHING TELEVISION: 1

## 2023-03-17 NOTE — PROGRESS NOTES
[facility] Medicine  Establish care visit   3/17/2023    Rajendra Nicholson (:  1945) is a 68 y.o. male, here to establish care. Chief Complaint   Patient presents with    Establish Care     Former pcp was     Sinus Problem     On going         ASSESSMENT/ PLAN  1. Rhinorrhea  -Likely secondary to environmental allergies  -Add Singulair  - montelukast (SINGULAIR) 10 MG tablet; Take 1 tablet by mouth daily  Dispense: 30 tablet; Refill: 3    2. Environmental allergies  -Add Singulair  - montelukast (SINGULAIR) 10 MG tablet; Take 1 tablet by mouth daily  Dispense: 30 tablet; Refill: 3    3. Malignant neoplasm of upper lobe of right lung [facility])  -Status post right upper lobe lobectomy in   -Doing well  -CT abdomen, chest and pelvis in 2023 does not show metastatic disease  -Follow-up with Dr. Romy Trimble of oncology    4. Polypharmacy  -Question whether polypharmacy could be contributing to to his issues of memory loss, frequent falls and anxiety. He has been on Ambien and lorazepam for several years. We will attempt to wean lorazepam.  He was instructed to take half a tablet nightly for the next couple months and follow-up with me. He is also being treated with Namenda for memory loss. I suspect that we could potentially wean off zolpidem and Ativan with improvement in memory. Attempting to obtain records from neurology    5. Mixed hyperlipidemia  -Recheck lipids  -Continue Lipitor 40 mg  - Lipid Panel  - Hemoglobin A1C  - CBC with Auto Differential    6. Presence of Watchman left atrial appendage closure device  -Recent placement of Watchman device  -DONG in January did show good position  -Continue to follow with cardiology, Dr. Stephanie Crowe    7. Coronary artery disease involving native coronary artery of native heart without angina pectoris  -Previous drug-eluting stent placed in 2017  -Following with cardiology, Dr. Stephanie Crowe    8.  Atrial fibrillation, persistent ([address]  -Status post left atrial appendage Watchman device  -No longer on NOAC or anticoagulation    9. Essential tremor  -Improved with carbidopa levodopa  -Continue  -Continue to follow with neurology  - TSH with Reflex to FT4    10. Memory loss  -Suspect that this could be secondary to polypharmacy with administration of 10 mg zolpidem and 1 mg lorazepam  -Attempt to wean lorazepam to half tablet nightly  -Currently on Namenda, okay to continue  -Continue to follow-up with neurology  - METHYLMALONIC ACID, SERUM  - VITAMIN B1  - Vitamin B12  - TSH with Reflex to FT4    11. Frequent falls  -Could be secondary to polypharmacy. See above  -Encouraged assistive device    12. Impaired fasting glucose  -Previous impaired fasting glucose and lab work. Check A1c  - Hemoglobin A1C    13. Encounter to establish care  -Patient is here to establish care  - Comprehensive Metabolic Panel  - Hemoglobin A1C         I have personally spent 65 minutes reviewing chart, patient education, clinical care with patient and education to patient and family, as well as consulting with other team members and clinical references. Preventative Care:  LABS    Imaging:    CT chest abdomen and pelvis 1/23/2023    Impression   Chest       Interval placement of watch man device in the left atrial appendage. There   is contrast enhancement in the left atrial appendage suggesting leakage of   the watch man device. .  Recommend cardiology follow-up       Post lobectomy changes are seen on the right. Chronic pleural effusion is   seen on the right. No suspicious pulmonary nodule       Abdomen and pelvis:       Stable CT. No definite metastatic disease       Transesophageal echo 1/12/2023    Summary   Normal left ventricular systolic function. Ejection fraction is visually   estimated at 55%. A #27 mm Watchman device appears well seated within the left atrial   appendage. There appears to be small wilfredo-device leak measuring up to 2-3   mm.  There is no device associated thrombus. A small residual atrial-level shunt is noted. Trivial aortic regurgitation. Trivial mitral regurgitation. Mild tricuspid regurgitation. Trivial pulmonic regurgitation. No pericardial effusion noted. Echo 11/14/22     Summary   Limited exam S/P Watchman Procedure to evaluate for pericardial effusion. Normal left ventricle size, wall thickness, and systolic function with an   estimated ejection fraction of 55%   No pericardial effusion noted. No pleural effusion. Transesophageal echo 11/14/2022    Findings      Left Atrium   The left atrial appendage was fully interrogated and appeared free of any   thrombus formation. The left atrial appendage measured as follows. 0 Degrees 2.37 cm. 54 Degrees 1.69 cm. 104 Degrees 2.07 cm. Maximum depth of 2.40 cm. A 27 mm Watchman Device was deployed and appears well seated, compressed   with minimal shoulder. No evidence of leak with Color Doppler interrogation. Angiogram 3/10/2017    CORONARY ANGIOGRAPHY:  LEFT MAIN:  Was a large caliber vessel that bifurcated. It was normal.     LEFT CIRCUMFLEX:  Was a medium caliber vessel that gave off small first obtuse  marginal branch, a very small second obtuse marginal branch, and a large third  obtuse marginal branch. Proximal circumflex had 20% stenosis. LAD:  Was a large caliber vessel that reached the apex. It was heavily  calcified in the proximal segment. It became a small caliber vessel in the  distal segment, where there was an 80% stenosis, which was reduced to 0% post  stent placement. The LAD gave off three small diagonal branches. They were  all free of significant disease. RIGHT CORONARY ARTERY:  Was medium caliber and dominant. It gave off small  right posterior descending artery. Mid right coronary artery 20% stenosis. IMPRESSION:  1.   Successful percutaneous coronary intervention of high-grade distal LAD  lesion with placement of 2.25 mm x 18 mm Xience Alpine drug-eluting stent. 2.  Mild circumflex and right coronary artery disease. 3.  Normal left ventricular systolic function. 4.  Mildly elevated left ventricular filling pressure, from hypertension    Return in about 2 months (around 5/17/2023) for Memory, ativan wean, regular follow up. Valeria MERAZ  Patient is here to establish care. Chief complaint of sinus problem with environmental trigger. Mainly rhinorrhea but no congestion. This has been ongoing for the past year. He states that he does not have trouble indoors. He does believe that it is secondary to environmental allergies and pollens. He recently has been increasing his activity working with [employer]. History of non-small cell lung cancer. Status post right upper lobe lobectomy in December 2022. He does follow with [facility], Dr. Irwin Cunningham. Recent CT of the abdomen, chest and pelvis did not show any acute metastatic disease. Other history of insomnia and anxiety. He has been treated with Lexapro, Ambien and lorazepam.  He has been on lorazepam and Ambien for several years. He is following with [facility] neurology for memory loss and essential tremor. He was started on carbidopa levodopa with improvement and essential tremor. He is also started on Namenda for memory loss. He has never attempted to try to wean off zolpidem or Ativan. Recent history of Watchman device placed in January of 2023 by cardiology. He denies a history of persistent atrial fibrillation. Still has persistent atrial fibrillation. He is off Xarelto. Recent DONG did show good placement of Watchman device. He does follow with cardiology, Dr. Al Vazquez. Previous angiogram in 2017 did show 80% stenosis of LAD. He did have previous drug-eluting stent placed at that time. ROS  Review of Systems   Constitutional:  Negative for chills, fever and unexpected weight change. HENT:  Positive for rhinorrhea.  Negative for congestion, dental problem and sore throat. Eyes:  Negative for pain and discharge. Respiratory:  Negative for cough, chest tightness and shortness of breath. RUL pneumonectomy in 2021   Cardiovascular:  Negative for chest pain, palpitations and leg swelling. Persistent A-fib   Gastrointestinal:  Negative for abdominal distention, abdominal pain, constipation and diarrhea. Endocrine: Negative for polydipsia, polyphagia and polyuria. Genitourinary:  Negative for dysuria, flank pain, hematuria and urgency. Musculoskeletal:  Positive for arthralgias (broken left finger). Negative for joint swelling and myalgias. Skin:  Negative for color change and rash. Neurological:  Negative for dizziness, light-headedness, numbness and headaches. Psychiatric/Behavioral:  Positive for sleep disturbance. Negative for agitation and behavioral problems. The patient is nervous/anxious. HISTORIES  Current Outpatient Medications on File Prior to Visit   Medication Sig Dispense Refill    ipratropium (ATROVENT) 0.03 % nasal spray 2 sprays by Each Nostril route 4 times daily Can use of to 4 times daily as needed 30 mL 3    aspirin EC 81 MG EC tablet Take 1 tablet by mouth daily 90 tablet 1    escitalopram (LEXAPRO) 20 MG tablet 10 mg      memantine (NAMENDA) 10 MG tablet Take 10 mg by mouth 2 times daily      pantoprazole (PROTONIX) 20 MG tablet Take 20 mg by mouth in the morning. atorvastatin (LIPITOR) 40 MG tablet Take 0.5 tablets by mouth in the morning.  30 tablet 1    finasteride (PROSCAR) 5 MG tablet Take 5 mg by mouth daily      diclofenac sodium 1 % GEL APPLY 4GM TOPICALLY FOUR TIMES A DAY 5 Tube 0    carbidopa-levodopa (SINEMET)  MG per tablet Take 1 tablet by mouth in the morning and at bedtime       vitamin B-2 (RIBOFLAVIN) 25 MG TABS Take by mouth      diclofenac (PENNSAID) 2 % SOLN 2 pumps to the affected area 2 times a day [phone] 1 Bottle 0    fluocinonide (LIDEX) 0.05 % cream Apply topically 2 times daily. 1 Tube 0    ferrous sulfate 325 (65 FE) MG tablet Take 1 tablet by mouth 2 times daily (with meals) 180 tablet 3    zolpidem (AMBIEN) 10 MG tablet Take 10 mg by mouth nightly as needed. LORazepam (ATIVAN) 1 MG tablet Take 2 mg by mouth in the morning and at bedtime. No current facility-administered medications on file prior to visit.         Allergies   Allergen Reactions    Morphine      Bad sweats    Codeine      dizzy    Penicillins Nausea And Vomiting    Plavix [Clopidogrel Bisulfate] Rash       Past Medical History:   Diagnosis Date    MIRNA (acute kidney injury) ([facility] 3/31/2022    Anxiety     Arthritis     OA    Bradycardia 4/19/2014    Cancer ([facility] [facility]     skin cancer-forearm right , face    Coronary artery disease involving native coronary artery of native heart without angina pectoris 7/19/2022    Hemoptysis 10/16/2014    Since Ablation    Irregular heart  beats     Mixed hyperlipidemia 3/17/2023    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    S/P drug eluting coronary stent placement 03/06/2017    2.25 x 18 Xience Alpine ADRIÁN - Distal LAD    Seizure disorder ([facility]     4 weeks ago black out, pt states seizure but may be due to Heart Rate changes    Seizures (HCC)     Shortness of breath 9/4/2014    Total knee replacement status 1/12/2011       Patient Active Problem List   Diagnosis    Dizziness    Syncope and collapse    Contusion of knee, right    Dysphagia    Encounter for electronic analysis of reveal event recorder    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[facility]    History of nonmelanoma skin cancer    Ischemic chest pain ([facility]    Left hand pain    Hand pain, right    Arthritis of carpometacarpal (CMC) joints of both thumbs    Radial styloid tenosynovitis (de quervain)    Left elbow fracture, closed, initial encounter    Frequent falls    General weakness    Thrombocytopenia (HCC)    Malignant neoplasm of lung (HCC)    Orthostasis    Hypotension    Chest pain    Abnormal finding on EKG    Digoxin toxicity, accidental or unintentional, initial encounter    COVID-19 virus infection    COVID    Rib pain on right side    Coronary artery disease involving native coronary artery of native heart without angina pectoris    Atrial fibrillation, persistent ([address]    Presence of Watchman left atrial appendage closure device    Mixed hyperlipidemia       Past Surgical History:   Procedure Laterality Date    ABLATION OF DYSRHYTHMIC FOCUS  2014    CARPAL TUNNEL RELEASE      CERVICAL [address] SURGERY  2010    COLONOSCOPY  2016    normal    CORONARY ANGIOPLASTY WITH STENT PLACEMENT      FEMUR SURGERY      left    KNEE SURGERY  11 R total knee replacement with femoral nerve block for pain control    LUNG REMOVAL, PARTIAL Right     20%    OTHER SURGICAL HISTORY Bilateral     excisions of lesions on bilat. neck and back    OTHER SURGICAL HISTORY  2014    Reveal Loop recorder placed in Cath Lab    UPPER GASTROINTESTINAL ENDOSCOPY  2016    gastric and esophogeal biopsy       Social History     Socioeconomic History    Marital status:      Spouse name: Not on file    Number of children: Not on file    Years of education: Not on file    Highest education level: Not on file   Occupational History    Not on file   Tobacco Use    Smoking status: Former     Packs/day: 2.00     Years: 40.00     Pack years: 80.00     Types: Cigarettes     Quit date: 1993     Years since quittin.8     Passive exposure: Never    Smokeless tobacco: Never    Tobacco comments:     20 yrs   Vaping Use    Vaping Use: Never used   Substance and Sexual Activity    Alcohol use: No    Drug use: No    Sexual activity: Not Currently     Partners: Female   Other Topics Concern    Not on file   Social History Narrative    Not on file     Social Determinants of Health     Financial Resource Strain: Low Risk     Difficulty of Paying Living Expenses: Not hard at all   Food Insecurity: No Food Insecurity    Worried About [facility] in the Last Year: Never true    Ran Out of Food in the Last Year: Never true   Transportation Needs: Unknown    Lack of Transportation (Medical): Not on file    Lack of Transportation (Non-Medical): No   Physical Activity: Not on file   Stress: Not on file   Social Connections: Not on file   Intimate Partner Violence: Not on file   Housing Stability: Unknown    Unable to Pay for Housing in the Last Year: Not on file    Number of Places Lived in the Last Year: Not on file    Unstable Housing in the Last Year: No        Family History   Problem Relation Age of Onset    Heart Disease Mother     Arthritis Mother     High Blood Pressure Mother     Miscarriages / Stillbirths Mother     Stroke Mother     Arthritis Father     Heart Disease Father     Cancer Sister     Arthritis Sister     Depression Sister     Arthritis Brother     Arthritis Maternal Grandmother     Arthritis Maternal Grandfather     Arthritis Paternal Grandmother     Diabetes Paternal Grandmother     Arthritis Paternal Grandfather        PE  Vitals:    03/17/23 0836   BP: 122/80   Site: Left Upper Arm   Position: Sitting   Cuff Size: Medium Adult   Weight: 177 lb (80.3 kg)     Estimated body mass index is 25.4 kg/m² as calculated from the following:    Height as of 2/24/23: 5' 10\" (1.778 m). Weight as of this encounter: 177 lb (80.3 kg). Physical Exam  Constitutional:       General: He is not in acute distress. Appearance: Normal appearance. He is not ill-appearing. HENT:      Head: Normocephalic. Right Ear: Tympanic membrane and external ear normal.      Left Ear: Tympanic membrane and external ear normal.      Nose: No congestion or rhinorrhea. Mouth/Throat:      Mouth: Mucous membranes are moist.      Pharynx: Oropharynx is clear. No posterior oropharyngeal erythema. Eyes:      Extraocular Movements: Extraocular movements intact. Conjunctiva/sclera: Conjunctivae normal.      Pupils: Pupils are equal, round, and reactive to light. Cardiovascular:      Rate and Rhythm: Normal rate and regular rhythm. Pulses: Normal pulses. Heart sounds: Normal heart sounds. No murmur heard. Pulmonary:      Effort: Pulmonary effort is normal. No respiratory distress. Breath sounds: Examination of the right-upper field reveals decreased breath sounds. Decreased breath sounds (RUL lobectomy) present. No wheezing. Abdominal:      General: Abdomen is flat. Bowel sounds are normal.      Palpations: Abdomen is soft. Tenderness: There is no abdominal tenderness. Hernia: No hernia is present. Musculoskeletal:         General: No swelling. Left wrist: Decreased range of motion (brace in place). Left hand: Bony tenderness present. Decreased range of motion (brace in place). Cervical back: Normal range of motion and neck supple. No tenderness. Right lower leg: No edema. Left lower leg: No edema. Lymphadenopathy:      Cervical: No cervical adenopathy. Skin:     General: Skin is warm and dry. Capillary Refill: Capillary refill takes less than 2 seconds. Neurological:      General: No focal deficit present. Mental Status: He is alert and oriented to person, place, and time. Mental status is at baseline. Cranial Nerves: No cranial nerve deficit. Psychiatric:         Mood and Affect: Mood normal.         Behavior: Behavior normal.         Cognition and Memory: Memory is impaired.          Judgment: Judgment normal.       Immunization History   Administered Date(s) Administered    COVID-19, PFIZER Bivalent BOOSTER, DO NOT Dilute, (age 12y+), IM, 30 mcg/0.3 mL 10/17/2022    Influenza Virus Vaccine 09/27/2014       Health Maintenance   Topic Date Due    Depression Screen  Never done    DTaP/Tdap/Td vaccine (1 - Tdap) 01/02/2010    COVID-19 Vaccine (4 - Booster) 12/12/2022    Lipids  06/30/2023    Annual Wellness Visit (AWV)  07/01/2023    Flu vaccine  Completed    Shingles vaccine  Completed Pneumococcal 65+ years Vaccine  Completed    Hepatitis C screen  Completed    Hepatitis A vaccine  Aged Out    Hib vaccine  Aged Out    Meningococcal (ACWY) vaccine  Aged Out       PSH, PMH, SH and FH reviewed and noted. Recent and past labs, tests and consults also reviewed. Recent or new meds also reviewed. KATIE Lake - CNP    This dictation was generated by voice recognition computer software. Although all attempts are made to edit the dictation for accuracy, there may be errors in the transcription that are not intended.

## 2023-03-17 NOTE — PROGRESS NOTES
Blood drawn per order. Needle size: 23 g butterfly  Site: R Antecubital.  First attempt successful No    Second attempt yes    Pressure applied until bleeding stopped. Cotton ball and band aid applied. Patient informed to call office or return if bleeding reoccurs and unable to stop.     Tubes drawn: 1 purple     3 red

## 2023-03-18 LAB
EST. AVERAGE GLUCOSE BLD GHB EST-MCNC: 105.4 MG/DL
HBA1C MFR BLD: 5.3 %
REASON FOR REJECTION: NORMAL
REJECTED TEST: NORMAL

## 2023-03-21 LAB — METHYLMALONATE SERPL-SCNC: 0.24 UMOL/L (ref 0–0.4)

## 2023-03-27 ENCOUNTER — TELEPHONE (OUTPATIENT)
Dept: FAMILY MEDICINE CLINIC | Age: 78
End: 2023-03-27

## 2023-03-27 RX ORDER — CETIRIZINE HYDROCHLORIDE 5 MG/1
5 TABLET ORAL DAILY
Qty: 30 TABLET | Refills: 0 | Status: SHIPPED | OUTPATIENT
Start: 2023-03-27 | End: 2023-04-26

## 2023-03-27 NOTE — TELEPHONE ENCOUNTER
An order to fill zyrtec was sent to [facility] today.  Pharmacy told patient they have not received the request to fill

## 2023-03-27 NOTE — TELEPHONE ENCOUNTER
Pt is calling to let you know that the allergy medication that was called in for him, is not working and he would like something else if possible. Please advise.

## 2023-04-20 ENCOUNTER — TELEPHONE (OUTPATIENT)
Dept: CARDIOLOGY CLINIC | Age: 78
End: 2023-04-20

## 2023-04-24 ENCOUNTER — TELEPHONE (OUTPATIENT)
Dept: FAMILY MEDICINE CLINIC | Age: 78
End: 2023-04-24

## 2023-04-24 NOTE — TELEPHONE ENCOUNTER
04/24/23-Called & spoke with pt.  Pt is now scheduled for a lab appt on 04/25/23 @1:00pm to have an EKG & VS per [facility].

## 2023-04-24 NOTE — TELEPHONE ENCOUNTER
Pt is calling stating that since the lorazepam 1 mg was decreased to 1/2 mg once at night and once at night his life \"has been in shambles\" His anger is so bad that its alarming. He is wanting to know if there was something else he can take to level him out. He said he is unable to wait until 2 wks to figure this out.

## 2023-04-25 ENCOUNTER — NURSE ONLY (OUTPATIENT)
Dept: CARDIOLOGY CLINIC | Age: 78
End: 2023-04-25

## 2023-04-25 ENCOUNTER — OFFICE VISIT (OUTPATIENT)
Dept: CARDIOLOGY CLINIC | Age: 78
End: 2023-04-25
Payer: MEDICARE

## 2023-04-25 VITALS
DIASTOLIC BLOOD PRESSURE: 80 MMHG | WEIGHT: 177 LBS | SYSTOLIC BLOOD PRESSURE: 130 MMHG | HEART RATE: 65 BPM | HEIGHT: 70 IN | BODY MASS INDEX: 25.34 KG/M2

## 2023-04-25 VITALS
SYSTOLIC BLOOD PRESSURE: 116 MMHG | BODY MASS INDEX: 24.05 KG/M2 | DIASTOLIC BLOOD PRESSURE: 68 MMHG | HEIGHT: 70 IN | WEIGHT: 168 LBS

## 2023-04-25 DIAGNOSIS — I48.19 PERSISTENT ATRIAL FIBRILLATION (HCC): Primary | ICD-10-CM

## 2023-04-25 DIAGNOSIS — I48.19 ATRIAL FIBRILLATION, PERSISTENT (HCC): ICD-10-CM

## 2023-04-25 DIAGNOSIS — R53.83 FATIGUE, UNSPECIFIED TYPE: ICD-10-CM

## 2023-04-25 DIAGNOSIS — I25.10 CORONARY ARTERY DISEASE INVOLVING NATIVE CORONARY ARTERY OF NATIVE HEART WITHOUT ANGINA PECTORIS: ICD-10-CM

## 2023-04-25 DIAGNOSIS — I48.19 ATRIAL FIBRILLATION, PERSISTENT (HCC): Primary | ICD-10-CM

## 2023-04-25 PROCEDURE — 93000 ELECTROCARDIOGRAM COMPLETE: CPT | Performed by: NURSE PRACTITIONER

## 2023-04-25 PROCEDURE — 99214 OFFICE O/P EST MOD 30 MIN: CPT | Performed by: NURSE PRACTITIONER

## 2023-04-25 PROCEDURE — 1123F ACP DISCUSS/DSCN MKR DOCD: CPT | Performed by: NURSE PRACTITIONER

## 2023-04-25 PROCEDURE — G8427 DOCREV CUR MEDS BY ELIG CLIN: HCPCS | Performed by: NURSE PRACTITIONER

## 2023-04-25 PROCEDURE — 1036F TOBACCO NON-USER: CPT | Performed by: NURSE PRACTITIONER

## 2023-04-25 PROCEDURE — G8420 CALC BMI NORM PARAMETERS: HCPCS | Performed by: NURSE PRACTITIONER

## 2023-04-25 NOTE — PATIENT INSTRUCTIONS
Update stress test. Call [phone] to schedule. No medication changes. If chest pain is recurrent, please go to the ED.      Follow up as scheduled

## 2023-04-25 NOTE — PROGRESS NOTES
[facility]   Electrophysiology Outpatient Note              Date:  April 25, 2023  Patient name: Lalo Carlisle  YOB: 1945    Primary Care physician: KATIE Corbett CNP    HISTORY OF PRESENT ILLNESS: The patient is a 68 y.o.  male with a history of PAF, lung cancer s/p thoracotmy, syncope and porphyria cutanea tarda   In 2014, he had an RFCA of atrial fibrillation, loop recorder implant and was on sotalol. Showed an EF of 55%. Cardiac CTA showed diffuse coronary calcification. In 6/2016, he complained of chest pain and had a normal stress test.  In 3/2017, he had a PCI to LAD. In 5/2022, he was admitted with excessive weakness, recurrent falls and near syncope. Orthostatic blood pressures were positive. Pradaxa was stopped and he was referred for watchman evaluation given recurrent falls and thrombocytopenia. In 6/2022, he was evaluated in the ED at [facility]. Heart rates were noted to be in the 30s. Digoxin was stopped due to digoxin toxicity. In 7/2022, patient was admitted to [facility] for a subsequent fall. He was found to have COVID. EKG at that time showed rate controlled atrial fibrillation. On 11/14/2022, he had a Watchman device implanted. He has an allergy to Plavix and was started on low dose Eliquis and baby ASA. He had side effects with Eliquis and was started on Xarelto. In 1/2023, he had a DONG that showed an EF of 55% and well seated Watchman with 2-3 mm wilfredo-device leak. Today he is being seen as a same day add on for AF and shortness of breath. EKG shows AF with a HR of 63 bpm. He has not been feeling well for a little over a month. Reports fatigue, weakness and shortness of breath. He reports that he is too weak to carry a shovel around the house. He feels like he did prior to PCI in 2017. Denies weigh gain, swelling, palpitations, syncope or dizziness.          Past Medical History:   has a past medical history of MIRNA

## 2023-05-03 ENCOUNTER — OFFICE VISIT (OUTPATIENT)
Dept: FAMILY MEDICINE CLINIC | Age: 78
End: 2023-05-03
Payer: MEDICARE

## 2023-05-03 VITALS — SYSTOLIC BLOOD PRESSURE: 132 MMHG | WEIGHT: 169 LBS | BODY MASS INDEX: 24.25 KG/M2 | DIASTOLIC BLOOD PRESSURE: 74 MMHG

## 2023-05-03 DIAGNOSIS — J02.9 SORE THROAT: Primary | ICD-10-CM

## 2023-05-03 DIAGNOSIS — Z23 NEED FOR DIPHTHERIA-TETANUS-PERTUSSIS (TDAP) VACCINE: ICD-10-CM

## 2023-05-03 DIAGNOSIS — C34.11 MALIGNANT NEOPLASM OF UPPER LOBE OF RIGHT LUNG (HCC): ICD-10-CM

## 2023-05-03 DIAGNOSIS — H65.91 FLUID LEVEL BEHIND TYMPANIC MEMBRANE OF RIGHT EAR: ICD-10-CM

## 2023-05-03 LAB — S PYO AG THROAT QL: NORMAL

## 2023-05-03 PROCEDURE — 90471 IMMUNIZATION ADMIN: CPT

## 2023-05-03 PROCEDURE — 87880 STREP A ASSAY W/OPTIC: CPT

## 2023-05-03 PROCEDURE — G8420 CALC BMI NORM PARAMETERS: HCPCS

## 2023-05-03 PROCEDURE — G8427 DOCREV CUR MEDS BY ELIG CLIN: HCPCS

## 2023-05-03 PROCEDURE — 90715 TDAP VACCINE 7 YRS/> IM: CPT

## 2023-05-03 PROCEDURE — 1036F TOBACCO NON-USER: CPT

## 2023-05-03 PROCEDURE — 1123F ACP DISCUSS/DSCN MKR DOCD: CPT

## 2023-05-03 PROCEDURE — 99213 OFFICE O/P EST LOW 20 MIN: CPT

## 2023-05-03 RX ORDER — METHYLPREDNISOLONE 4 MG/1
TABLET ORAL
Qty: 1 KIT | Refills: 0 | Status: SHIPPED | OUTPATIENT
Start: 2023-05-03 | End: 2023-05-09

## 2023-05-03 RX ORDER — AZITHROMYCIN 250 MG/1
250 TABLET, FILM COATED ORAL SEE ADMIN INSTRUCTIONS
Qty: 6 TABLET | Refills: 0 | Status: SHIPPED | OUTPATIENT
Start: 2023-05-03 | End: 2023-05-08

## 2023-05-03 ASSESSMENT — ENCOUNTER SYMPTOMS
WHEEZING: 0
EYE REDNESS: 0
GASTROINTESTINAL NEGATIVE: 1
SINUS PAIN: 1
FACIAL SWELLING: 0
ABDOMINAL DISTENTION: 0
EYE PAIN: 0
NAUSEA: 0
SHORTNESS OF BREATH: 1
SINUS PRESSURE: 1
COUGH: 1
RHINORRHEA: 1
VOICE CHANGE: 0
TROUBLE SWALLOWING: 1
CHEST TIGHTNESS: 0
SORE THROAT: 1
EYE ITCHING: 0
ABDOMINAL PAIN: 0

## 2023-05-03 ASSESSMENT — PATIENT HEALTH QUESTIONNAIRE - PHQ9
10. IF YOU CHECKED OFF ANY PROBLEMS, HOW DIFFICULT HAVE THESE PROBLEMS MADE IT FOR YOU TO DO YOUR WORK, TAKE CARE OF THINGS AT HOME, OR GET ALONG WITH OTHER PEOPLE: 1
SUM OF ALL RESPONSES TO PHQ QUESTIONS 1-9: 15
SUM OF ALL RESPONSES TO PHQ QUESTIONS 1-9: 15
9. THOUGHTS THAT YOU WOULD BE BETTER OFF DEAD, OR OF HURTING YOURSELF: 0
SUM OF ALL RESPONSES TO PHQ QUESTIONS 1-9: 15
5. POOR APPETITE OR OVEREATING: 1
2. FEELING DOWN, DEPRESSED OR HOPELESS: 2
6. FEELING BAD ABOUT YOURSELF - OR THAT YOU ARE A FAILURE OR HAVE LET YOURSELF OR YOUR FAMILY DOWN: 0
8. MOVING OR SPEAKING SO SLOWLY THAT OTHER PEOPLE COULD HAVE NOTICED. OR THE OPPOSITE, BEING SO FIGETY OR RESTLESS THAT YOU HAVE BEEN MOVING AROUND A LOT MORE THAN USUAL: 2
SUM OF ALL RESPONSES TO PHQ9 QUESTIONS 1 & 2: 5
4. FEELING TIRED OR HAVING LITTLE ENERGY: 3
SUM OF ALL RESPONSES TO PHQ QUESTIONS 1-9: 15
3. TROUBLE FALLING OR STAYING ASLEEP: 3
7. TROUBLE CONCENTRATING ON THINGS, SUCH AS READING THE NEWSPAPER OR WATCHING TELEVISION: 1
1. LITTLE INTEREST OR PLEASURE IN DOING THINGS: 3

## 2023-05-03 NOTE — PROGRESS NOTES
[facility] Medicine  5/3/2023    [facility] (:  1945) is a 68 y.o. male, here for evaluation of the following medical concerns:    Chief Complaint   Patient presents with    Pharyngitis    Otalgia     Both sides         ASSESSMENT/ PLAN  1. Sore throat  -Occurring for 2 days now. Associated symptoms of sinus pain, body aches, fever max 100, and bilateral, as well as postnasal drip and a mild cough and headache.  -Rapid strep was negative  -He does not appear in any acute distress on exam.  -Given history of lung cancer we will treat with Z-Ryan.  -Medrol Dosepak to assist with symptoms  -He was encouraged to rest and stay hydrated. - POCT rapid strep A  - azithromycin (ZITHROMAX) 250 MG tablet; Take 1 tablet by mouth See Admin Instructions for 5 days 500mg on day 1 followed by 250mg on days 2 - 5  Dispense: 6 tablet; Refill: 0  - methylPREDNISolone (MEDROL DOSEPACK) 4 MG tablet; Take by mouth. Dispense: 1 kit; Refill: 0    2. Fluid level behind tympanic membrane of right ear  -Middle ear effusion noted on exam of right ear, slight erythema noted to left ear  -Medrol Dosepak to assist   symptoms  - azithromycin (ZITHROMAX) 250 MG tablet; Take 1 tablet by mouth See Admin Instructions for 5 days 500mg on day 1 followed by 250mg on days 2 - 5  Dispense: 6 tablet; Refill: 0  - methylPREDNISolone (MEDROL DOSEPACK) 4 MG tablet; Take by mouth. Dispense: 1 kit; Refill: 0    3. Malignant neoplasm of upper lobe of right lung ([address]  -Given history, will treat with antibiotic at this time. - azithromycin (ZITHROMAX) 250 MG tablet; Take 1 tablet by mouth See Admin Instructions for 5 days 500mg on day 1 followed by 250mg on days 2 - 5  Dispense: 6 tablet; Refill: 0    4.  Need for diphtheria-tetanus-pertussis (Tdap) vaccine  - Give tetanus booster today  - Tdap, BOOSTRIX, (age 8 yrs+), IM         Today I spent 25 minutes providing clinical care with patient completing physical exam, ROS and history as

## 2023-05-09 ENCOUNTER — OFFICE VISIT (OUTPATIENT)
Dept: CARDIOLOGY CLINIC | Age: 78
End: 2023-05-09

## 2023-05-09 VITALS
HEART RATE: 78 BPM | WEIGHT: 165 LBS | SYSTOLIC BLOOD PRESSURE: 138 MMHG | BODY MASS INDEX: 23.62 KG/M2 | OXYGEN SATURATION: 97 % | HEIGHT: 70 IN | DIASTOLIC BLOOD PRESSURE: 78 MMHG

## 2023-05-09 DIAGNOSIS — I48.0 PAROXYSMAL ATRIAL FIBRILLATION (HCC): ICD-10-CM

## 2023-05-09 DIAGNOSIS — I25.119 CORONARY ARTERY DISEASE INVOLVING NATIVE CORONARY ARTERY OF NATIVE HEART WITH ANGINA PECTORIS (HCC): Primary | ICD-10-CM

## 2023-05-10 ENCOUNTER — HOSPITAL ENCOUNTER (OUTPATIENT)
Dept: NON INVASIVE DIAGNOSTICS | Age: 78
Discharge: HOME | End: 2023-05-10
Payer: MEDICARE

## 2023-05-10 ENCOUNTER — HOSPITAL ENCOUNTER (OUTPATIENT)
Dept: NUCLEAR MEDICINE | Age: 78
Discharge: HOME | End: 2023-05-10
Payer: MEDICARE

## 2023-05-10 DIAGNOSIS — I25.10 CORONARY ARTERY DISEASE INVOLVING NATIVE CORONARY ARTERY OF NATIVE HEART WITHOUT ANGINA PECTORIS: ICD-10-CM

## 2023-05-10 LAB
LV EF: 70 %
LVEF MODALITY: NORMAL

## 2023-05-10 PROCEDURE — 78452 HT MUSCLE IMAGE SPECT MULT: CPT

## 2023-05-10 PROCEDURE — 3430000000 HC RX DIAGNOSTIC RADIOPHARMACEUTICAL: Performed by: NURSE PRACTITIONER

## 2023-05-10 PROCEDURE — A9502 TC99M TETROFOSMIN: HCPCS | Performed by: NURSE PRACTITIONER

## 2023-05-10 PROCEDURE — 6360000002 HC RX W HCPCS: Performed by: NURSE PRACTITIONER

## 2023-05-10 PROCEDURE — 93017 CV STRESS TEST TRACING ONLY: CPT

## 2023-05-10 RX ADMIN — TETROFOSMIN 11 MILLICURIE: 1.38 INJECTION, POWDER, LYOPHILIZED, FOR SOLUTION INTRAVENOUS at 07:35

## 2023-05-10 RX ADMIN — TETROFOSMIN 34 MILLICURIE: 1.38 INJECTION, POWDER, LYOPHILIZED, FOR SOLUTION INTRAVENOUS at 09:15

## 2023-05-10 RX ADMIN — REGADENOSON 0.4 MG: 0.08 INJECTION, SOLUTION INTRAVENOUS at 09:15

## 2023-05-10 NOTE — PROGRESS NOTES
Patient arrived to stress lab for Lexiscan/Myoview Stress test.  Patient was educated on procedure, all questions answered, and consent verified

## 2023-05-10 NOTE — PROGRESS NOTES
Pt completed stress portion of cardiac stress test. Pt complained of SOB after lexiscan, denied chest pain, all symptoms resolved in recovery. Pt is discharged to nuclear department for final scan. Nuclear tech will remove PIV. Discharge instructions given to pt. Pt verbalizes understanding to discharge instructions.

## 2023-05-12 ENCOUNTER — TELEPHONE (OUTPATIENT)
Dept: CARDIOLOGY CLINIC | Age: 78
End: 2023-05-12

## 2023-05-12 DIAGNOSIS — I48.19 PERSISTENT ATRIAL FIBRILLATION (HCC): ICD-10-CM

## 2023-05-12 DIAGNOSIS — R53.83 FATIGUE, UNSPECIFIED TYPE: ICD-10-CM

## 2023-05-12 DIAGNOSIS — R94.39 ABNORMAL STRESS TEST: ICD-10-CM

## 2023-05-12 DIAGNOSIS — R06.02 SHORTNESS OF BREATH: Primary | ICD-10-CM

## 2023-05-12 DIAGNOSIS — I25.10 CORONARY ARTERY DISEASE INVOLVING NATIVE CORONARY ARTERY OF NATIVE HEART WITHOUT ANGINA PECTORIS: ICD-10-CM

## 2023-05-12 NOTE — PROGRESS NOTES
Please let patient know stress test looks okay but there is artifact. Dr. Andres Unger is recommending echo. Please order. Note per AM NP    Patient called and message given and echo ordered.

## 2023-05-12 NOTE — RESULT ENCOUNTER NOTE
Please let patient know stress test looks okay but there is artifact. Dr. Jeanette Odonnell is recommending echo. Please order.

## 2023-05-12 NOTE — TELEPHONE ENCOUNTER
Called and spoke with patient. Relayed AM NP results and instructions. He is scheduled for echo 06/13/23.

## 2023-05-12 NOTE — TELEPHONE ENCOUNTER
----- Message from KATIE Hudson CNP sent at 5/12/2023  4:01 PM EDT -----  Please let patient know stress test looks okay but there is artifact. Dr. Anibal Conklin is recommending echo. Please order.

## 2023-05-17 ENCOUNTER — OFFICE VISIT (OUTPATIENT)
Dept: FAMILY MEDICINE CLINIC | Age: 78
End: 2023-05-17
Payer: MEDICARE

## 2023-05-17 VITALS — WEIGHT: 168 LBS | SYSTOLIC BLOOD PRESSURE: 122 MMHG | DIASTOLIC BLOOD PRESSURE: 70 MMHG | BODY MASS INDEX: 24.11 KG/M2

## 2023-05-17 DIAGNOSIS — Z91.09 ENVIRONMENTAL ALLERGIES: ICD-10-CM

## 2023-05-17 DIAGNOSIS — R41.3 MEMORY LOSS: ICD-10-CM

## 2023-05-17 DIAGNOSIS — J34.89 RHINORRHEA: ICD-10-CM

## 2023-05-17 DIAGNOSIS — Z79.899 POLYPHARMACY: Primary | ICD-10-CM

## 2023-05-17 PROCEDURE — 99213 OFFICE O/P EST LOW 20 MIN: CPT

## 2023-05-17 PROCEDURE — 1123F ACP DISCUSS/DSCN MKR DOCD: CPT

## 2023-05-17 RX ORDER — MONTELUKAST SODIUM 10 MG/1
10 TABLET ORAL DAILY
Qty: 30 TABLET | Refills: 3 | Status: SHIPPED | OUTPATIENT
Start: 2023-05-17

## 2023-05-17 ASSESSMENT — ENCOUNTER SYMPTOMS
CHEST TIGHTNESS: 0
COUGH: 0
COLOR CHANGE: 0
EYE PAIN: 0
CONSTIPATION: 0
DIARRHEA: 0
SHORTNESS OF BREATH: 0
SORE THROAT: 0
ABDOMINAL PAIN: 0
EYE DISCHARGE: 0
RHINORRHEA: 1
ABDOMINAL DISTENTION: 0

## 2023-05-17 NOTE — PROGRESS NOTES
[facility]    2023    Leslie Maldonado (:  1945) is a 68 y.o. male, here to follow up. Chief Complaint   Patient presents with    Medication Check     Ativan taper     Memory Loss        ASSESSMENT/ PLAN  1. Environmental allergies  -Stable on Singulair  -Continue  - montelukast (SINGULAIR) 10 MG tablet; Take 1 tablet by mouth daily  Dispense: 30 tablet; Refill: 3    2. Rhinorrhea  -See #1  - montelukast (SINGULAIR) 10 MG tablet; Take 1 tablet by mouth daily  Dispense: 30 tablet; Refill: 3    3. Polypharmacy  -Memory issues still likely secondary to polypharmacy. I encouraged him to take half a milligram of lorazepam every other night and then wean down to 0.5 mg nightly    4. Memory loss  -Likely secondary to polypharmacy  -Stable on Namenda      Imaging:    CT chest abdomen and pelvis 2023    Impression   Chest       Interval placement of watch man device in the left atrial appendage. There   is contrast enhancement in the left atrial appendage suggesting leakage of   the watch man device. .  Recommend cardiology follow-up       Post lobectomy changes are seen on the right. Chronic pleural effusion is   seen on the right. No suspicious pulmonary nodule       Abdomen and pelvis:       Stable CT. No definite metastatic disease       Transesophageal echo 2023    Summary   Normal left ventricular systolic function. Ejection fraction is visually   estimated at 55%. A #27 mm Watchman device appears well seated within the left atrial   appendage. There appears to be small wilfredo-device leak measuring up to 2-3   mm. There is no device associated thrombus. A small residual atrial-level shunt is noted. Trivial aortic regurgitation. Trivial mitral regurgitation. Mild tricuspid regurgitation. Trivial pulmonic regurgitation. No pericardial effusion noted. Echo 22     Summary   Limited exam S/P Watchman Procedure to evaluate for pericardial effusion.    Normal

## 2023-06-02 ENCOUNTER — HOSPITAL ENCOUNTER (OUTPATIENT)
Age: 78
Discharge: HOME | End: 2023-06-02
Payer: MEDICARE

## 2023-06-02 ENCOUNTER — HOSPITAL ENCOUNTER (OUTPATIENT)
Dept: CT IMAGING | Age: 78
Discharge: HOME | End: 2023-06-02
Payer: MEDICARE

## 2023-06-02 DIAGNOSIS — C34.01 MALIGNANT NEOPLASM OF RIGHT MAIN BRONCHUS (HCC): ICD-10-CM

## 2023-06-02 LAB
BUN SERPL-MCNC: 28 MG/DL (ref 7–20)
CREAT SERPL-MCNC: 0.9 MG/DL (ref 0.8–1.3)
GFR SERPLBLD CREATININE-BSD FMLA CKD-EPI: >60 ML/MIN/{1.73_M2}

## 2023-06-02 PROCEDURE — 36415 COLL VENOUS BLD VENIPUNCTURE: CPT

## 2023-06-02 PROCEDURE — 71260 CT THORAX DX C+: CPT

## 2023-06-02 PROCEDURE — 6360000004 HC RX CONTRAST MEDICATION: Performed by: NURSE PRACTITIONER

## 2023-06-02 PROCEDURE — 84520 ASSAY OF UREA NITROGEN: CPT

## 2023-06-02 PROCEDURE — 82565 ASSAY OF CREATININE: CPT

## 2023-06-02 RX ADMIN — DIATRIZOATE MEGLUMINE AND DIATRIZOATE SODIUM 12 ML: 660; 100 LIQUID ORAL; RECTAL at 11:27

## 2023-06-02 RX ADMIN — IOMEPROL INJECTION 75 ML: 714 INJECTION, SOLUTION INTRAVASCULAR at 11:33

## 2023-06-05 ENCOUNTER — TELEPHONE (OUTPATIENT)
Dept: ENT CLINIC | Age: 78
End: 2023-06-05

## 2023-06-30 ENCOUNTER — HOSPITAL ENCOUNTER (OUTPATIENT)
Dept: MRI IMAGING | Age: 78
Discharge: HOME | End: 2023-06-30
Attending: PHYSICAL MEDICINE & REHABILITATION
Payer: MEDICARE

## 2023-06-30 DIAGNOSIS — M43.22 FUSION OF SPINE OF CERVICAL REGION: ICD-10-CM

## 2023-06-30 DIAGNOSIS — S16.1XXA STRAIN OF NECK MUSCLE, INITIAL ENCOUNTER: ICD-10-CM

## 2023-06-30 DIAGNOSIS — M50.30 DDD (DEGENERATIVE DISC DISEASE), CERVICAL: ICD-10-CM

## 2023-06-30 PROCEDURE — 72141 MRI NECK SPINE W/O DYE: CPT

## 2023-08-23 ENCOUNTER — OFFICE VISIT (OUTPATIENT)
Dept: FAMILY MEDICINE CLINIC | Age: 78
End: 2023-08-23
Payer: MEDICARE

## 2023-08-23 VITALS — BODY MASS INDEX: 25.11 KG/M2 | WEIGHT: 175 LBS | SYSTOLIC BLOOD PRESSURE: 122 MMHG | DIASTOLIC BLOOD PRESSURE: 70 MMHG

## 2023-08-23 DIAGNOSIS — H92.01 OTALGIA OF RIGHT EAR: ICD-10-CM

## 2023-08-23 DIAGNOSIS — H66.001 ACUTE SUPPURATIVE OTITIS MEDIA OF RIGHT EAR WITHOUT SPONTANEOUS RUPTURE OF TYMPANIC MEMBRANE, RECURRENCE NOT SPECIFIED: Primary | ICD-10-CM

## 2023-08-23 DIAGNOSIS — R09.81 SINUS CONGESTION: ICD-10-CM

## 2023-08-23 DIAGNOSIS — H93.11 TINNITUS OF RIGHT EAR: ICD-10-CM

## 2023-08-23 PROCEDURE — 99213 OFFICE O/P EST LOW 20 MIN: CPT

## 2023-08-23 PROCEDURE — 1123F ACP DISCUSS/DSCN MKR DOCD: CPT

## 2023-08-23 RX ORDER — CIPROFLOXACIN AND DEXAMETHASONE 3; 1 MG/ML; MG/ML
4 SUSPENSION/ DROPS AURICULAR (OTIC) 2 TIMES DAILY
Qty: 7.5 ML | Refills: 0 | Status: SHIPPED | OUTPATIENT
Start: 2023-08-23 | End: 2023-08-30

## 2023-08-23 ASSESSMENT — ENCOUNTER SYMPTOMS
SORE THROAT: 1
GASTROINTESTINAL NEGATIVE: 1
RHINORRHEA: 1
COUGH: 0
RESPIRATORY NEGATIVE: 1
EYES NEGATIVE: 1
DIARRHEA: 0

## 2023-08-23 NOTE — PROGRESS NOTES
Thrombocytopenia (HCC)    Malignant neoplasm of lung (HCC)    Orthostasis    Hypotension    Chest pain    Abnormal finding on EKG    Digoxin toxicity, accidental or unintentional, initial encounter    COVID-19 virus infection    COVID    Rib pain on right side    Coronary artery disease involving native coronary artery of native heart without angina pectoris    Atrial fibrillation, persistent ([address])    Presence of Watchman left atrial appendage closure device    Mixed hyperlipidemia     Past Surgical History:   Procedure Laterality Date    ABLATION OF DYSRHYTHMIC FOCUS  2014    CARPAL TUNNEL RELEASE      CERVICAL [address] [facility] SURGERY  2010    COLONOSCOPY  2016    normal    CORONARY ANGIOPLASTY WITH STENT PLACEMENT      FEMUR SURGERY      left    KNEE SURGERY  11 R total knee replacement with femoral nerve block for pain control    LUNG REMOVAL, PARTIAL Right     20%    OTHER SURGICAL HISTORY Bilateral     excisions of lesions on bilat. neck and back    OTHER SURGICAL HISTORY  2014    Reveal Loop recorder placed in Cath Lab    UPPER GASTROINTESTINAL ENDOSCOPY  2016    gastric and esophogeal biopsy     Social History     Socioeconomic History    Marital status:      Spouse name: Not on file    Number of children: Not on file    Years of education: Not on file    Highest education level: Not on file   Occupational History    Not on file   Tobacco Use    Smoking status: Former     Packs/day: 2.00     Years: 40.00     Pack years: 80.00     Types: Cigarettes     Quit date: 1993     Years since quittin.2     Passive exposure: Never    Smokeless tobacco: Never    Tobacco comments:     20 yrs   Vaping Use    Vaping Use: Never used   Substance and Sexual Activity    Alcohol use: No    Drug use: No    Sexual activity: Not Currently     Partners: Female   Other Topics Concern    Not on file   Social History Narrative    Not on file     Social Determinants of Health     Financial Resource Strain: Low

## 2023-11-06 ENCOUNTER — OFFICE VISIT (OUTPATIENT)
Dept: FAMILY MEDICINE CLINIC | Age: 78
End: 2023-11-06
Payer: MEDICARE

## 2023-11-06 VITALS
RESPIRATION RATE: 16 BRPM | DIASTOLIC BLOOD PRESSURE: 74 MMHG | BODY MASS INDEX: 26.05 KG/M2 | SYSTOLIC BLOOD PRESSURE: 118 MMHG | WEIGHT: 182 LBS | HEIGHT: 70 IN | HEART RATE: 60 BPM | OXYGEN SATURATION: 95 %

## 2023-11-06 DIAGNOSIS — H66.001 ACUTE SUPPURATIVE OTITIS MEDIA OF RIGHT EAR WITHOUT SPONTANEOUS RUPTURE OF TYMPANIC MEMBRANE, RECURRENCE NOT SPECIFIED: Primary | ICD-10-CM

## 2023-11-06 DIAGNOSIS — R21 RASH OF NECK: ICD-10-CM

## 2023-11-06 PROCEDURE — 1123F ACP DISCUSS/DSCN MKR DOCD: CPT

## 2023-11-06 PROCEDURE — 99213 OFFICE O/P EST LOW 20 MIN: CPT

## 2023-11-06 RX ORDER — DIAPER,BRIEF,INFANT-TODD,DISP
EACH MISCELLANEOUS
Qty: 30 G | Refills: 1 | Status: SHIPPED | OUTPATIENT
Start: 2023-11-06 | End: 2023-11-13

## 2023-11-06 RX ORDER — CIPROFLOXACIN AND DEXAMETHASONE 3; 1 MG/ML; MG/ML
4 SUSPENSION/ DROPS AURICULAR (OTIC) 2 TIMES DAILY
Qty: 7.5 ML | Refills: 0 | Status: SHIPPED | OUTPATIENT
Start: 2023-11-06 | End: 2023-11-13

## 2023-11-06 ASSESSMENT — ENCOUNTER SYMPTOMS
FACIAL SWELLING: 0
RESPIRATORY NEGATIVE: 1
GASTROINTESTINAL NEGATIVE: 1
SORE THROAT: 1
EYES NEGATIVE: 1

## 2023-11-06 NOTE — PROGRESS NOTES
[facility]  2023    Luz Marina Villa (:  1945) is a 66 y.o. male, here for evaluation of the following medical concerns:    Chief Complaint   Patient presents with    Skin Problem        ASSESSMENT/ PLAN  1. Acute suppurative otitis media of right ear without spontaneous rupture of tympanic membrane, recurrence not specified  -Red and bulging tympanic membrane on exam.  Purulent drainage noted in right ear. -Ciprodex  - ciprofloxacin-dexamethasone (CIPRODEX) 0.3-0.1 % otic suspension; Place 4 drops into the right ear 2 times daily for 7 days  Dispense: 7.5 mL; Refill: 0    2. Rash of neck  -Trial of hydrocortisone cream  -No suspicion for shingles  - hydrocortisone (ALA-GALEN) 1 % cream; Apply topically 2 times daily. Dispense: 30 g; Refill: 1           Return in about 3 months (around 2024) for Routine. HPI  Patient seen in office today for chief complaint of right-sided ear pain with yellow drainage has been ongoing for the past week. Reports that he does have a sore throat on the right side of his throat. Denies any fever or sweats but does report chills. Other complaint today of a rash to the left side of his neck has been present for the past 6 weeks. Denies any skin lesions or drainage from rash. Denies any pain with reports mild itching. ROS  Review of Systems   Constitutional:  Positive for chills. Negative for diaphoresis, fever and unexpected weight change. HENT:  Positive for ear discharge, ear pain and sore throat (right sided). Negative for congestion, facial swelling and postnasal drip. Eyes: Negative. Respiratory: Negative. Cardiovascular: Negative. Gastrointestinal: Negative. Musculoskeletal: Negative. Skin:  Positive for rash (left neck). Hematological: Negative. Psychiatric/Behavioral: Negative.          HISTORIES  Current Outpatient Medications on File Prior to Visit   Medication Sig Dispense Refill    montelukast (SINGULAIR) 10

## 2023-11-15 ENCOUNTER — OFFICE VISIT (OUTPATIENT)
Dept: CARDIOLOGY CLINIC | Age: 78
End: 2023-11-15
Payer: MEDICARE

## 2023-11-15 VITALS
OXYGEN SATURATION: 93 % | BODY MASS INDEX: 25.88 KG/M2 | DIASTOLIC BLOOD PRESSURE: 74 MMHG | SYSTOLIC BLOOD PRESSURE: 124 MMHG | HEART RATE: 70 BPM | HEIGHT: 70 IN | WEIGHT: 180.8 LBS

## 2023-11-15 DIAGNOSIS — Z95.818 PRESENCE OF WATCHMAN LEFT ATRIAL APPENDAGE CLOSURE DEVICE: ICD-10-CM

## 2023-11-15 DIAGNOSIS — I48.19 ATRIAL FIBRILLATION, PERSISTENT (HCC): ICD-10-CM

## 2023-11-15 DIAGNOSIS — I20.8 CHRONIC STABLE ANGINA: Primary | ICD-10-CM

## 2023-11-15 PROCEDURE — 99214 OFFICE O/P EST MOD 30 MIN: CPT | Performed by: INTERNAL MEDICINE

## 2023-11-15 PROCEDURE — 1123F ACP DISCUSS/DSCN MKR DOCD: CPT | Performed by: INTERNAL MEDICINE

## 2023-11-15 RX ORDER — ATORVASTATIN CALCIUM 20 MG/1
20 TABLET, FILM COATED ORAL DAILY
Qty: 90 TABLET | Refills: 3 | Status: SHIPPED | OUTPATIENT
Start: 2023-11-15

## 2023-11-15 NOTE — PATIENT INSTRUCTIONS
PLAN:  Make sure to change positions slowly  No change in medications today. Continue taking ASA, atorvastatin as well as all other medications. We will send a new RX for the Atorvastatin (Lipitor) 20 mg tablet.    No cardiac testing warranted at this time

## 2023-11-20 ENCOUNTER — OFFICE VISIT (OUTPATIENT)
Dept: CARDIOLOGY CLINIC | Age: 78
End: 2023-11-20
Payer: MEDICARE

## 2023-11-20 VITALS
SYSTOLIC BLOOD PRESSURE: 138 MMHG | HEIGHT: 70 IN | BODY MASS INDEX: 26.69 KG/M2 | OXYGEN SATURATION: 96 % | WEIGHT: 186.4 LBS | DIASTOLIC BLOOD PRESSURE: 86 MMHG | HEART RATE: 73 BPM

## 2023-11-20 DIAGNOSIS — Z95.818 PRESENCE OF WATCHMAN LEFT ATRIAL APPENDAGE CLOSURE DEVICE: ICD-10-CM

## 2023-11-20 DIAGNOSIS — I49.9 IRREGULAR HEART RATE: ICD-10-CM

## 2023-11-20 DIAGNOSIS — I48.19 PERSISTENT ATRIAL FIBRILLATION (HCC): Primary | ICD-10-CM

## 2023-11-20 PROCEDURE — 1123F ACP DISCUSS/DSCN MKR DOCD: CPT | Performed by: NURSE PRACTITIONER

## 2023-11-20 PROCEDURE — 99214 OFFICE O/P EST MOD 30 MIN: CPT | Performed by: NURSE PRACTITIONER

## 2023-11-20 PROCEDURE — 93000 ELECTROCARDIOGRAM COMPLETE: CPT | Performed by: NURSE PRACTITIONER

## 2023-11-20 NOTE — PATIENT INSTRUCTIONS
No changes today    Monitor BP at home and call if consistently out of goal ranges    Follow up in one year or sooner if needed

## 2023-11-20 NOTE — PROGRESS NOTES
[facility]   Electrophysiology Outpatient Note              Date:  November 20, 2023  Patient name: Adela Payan  YOB: 1945    Primary Care physician: KATIE Watkins CNP    HISTORY OF PRESENT ILLNESS: The patient is a 66 y.o.  male with a history of PAF, lung cancer s/p thoracotmy, syncope and porphyria cutanea tarda   In 2014, he had an RFCA of atrial fibrillation, loop recorder implant and was on sotalol. Showed an EF of 55%. Cardiac CTA showed diffuse coronary calcification. In 6/2016, he complained of chest pain and had a normal stress test.  In 3/2017, he had a PCI to LAD. In 5/2022, he was admitted with excessive weakness, recurrent falls and near syncope. Orthostatic blood pressures were positive. Pradaxa was stopped and he was referred for watchman evaluation given recurrent falls and thrombocytopenia. In 6/2022, he was evaluated in the ED at [facility]. Heart rates were noted to be in the 30s. Digoxin was stopped due to digoxin toxicity. In 7/2022, patient was admitted to [facility] for a subsequent fall. He was found to have COVID. EKG at that time showed rate controlled atrial fibrillation. On 11/14/2022, he had a Watchman device implanted. He has an allergy to Plavix and was started on low dose Eliquis and baby ASA. He had side effects with Eliquis and was started on Xarelto. In 1/2023, he had a DONG that showed an EF of 55% and well seated Watchman with 2-3 mm wilfredo-device leak. Today he is being seen as a same day add on for AF and shortness of breath. EKG shows AF with a HR of 67 bpm. He is feeling well. Denies chest pain, palpitations, shortness of breath, and dizziness.          Past Medical History:   has a past medical history of MIRNA (acute kidney injury) ([address]), Anxiety, Arthritis, Bradycardia, Cancer ([address]), Coronary artery disease involving native coronary artery of native heart without angina pectoris, Hemoptysis,

## 2023-11-29 ENCOUNTER — OFFICE VISIT (OUTPATIENT)
Dept: FAMILY MEDICINE CLINIC | Age: 78
End: 2023-11-29
Payer: MEDICARE

## 2023-11-29 VITALS — SYSTOLIC BLOOD PRESSURE: 122 MMHG | WEIGHT: 184 LBS | DIASTOLIC BLOOD PRESSURE: 60 MMHG | BODY MASS INDEX: 26.4 KG/M2

## 2023-11-29 DIAGNOSIS — H66.004 RECURRENT ACUTE SUPPURATIVE OTITIS MEDIA OF RIGHT EAR WITHOUT SPONTANEOUS RUPTURE OF TYMPANIC MEMBRANE: Primary | ICD-10-CM

## 2023-11-29 DIAGNOSIS — H92.01 OTALGIA OF RIGHT EAR: ICD-10-CM

## 2023-11-29 PROCEDURE — 99213 OFFICE O/P EST LOW 20 MIN: CPT

## 2023-11-29 PROCEDURE — 1123F ACP DISCUSS/DSCN MKR DOCD: CPT

## 2023-11-29 ASSESSMENT — ENCOUNTER SYMPTOMS
SORE THROAT: 1
EYES NEGATIVE: 1
GASTROINTESTINAL NEGATIVE: 1
FACIAL SWELLING: 0
RESPIRATORY NEGATIVE: 1

## 2023-11-29 NOTE — PROGRESS NOTES
[facility]  2023    Roque Galvan (:  1945) is a 66 y.o. male, here for evaluation of the following medical concerns:    Chief Complaint   Patient presents with    Otalgia     Pt said it was bleeding yesterday   Right ear still        ASSESSMENT/ PLAN  1. Recurrent acute suppurative otitis media of right ear without spontaneous rupture of tympanic membrane  -Failed Ciprodex x2  -Still bulging tympanic membrane in the right ear. Small amount drainage in the canal.  Question whether he needs a myringotomy  -Referral to new ear nose and throat at his preference  - [facility] - Lynn Conley DO, Otolaryngology, [facility]    2. Otalgia of right ear  -See [address] DO Ilya, Otolaryngology, [facility]            Return if symptoms worsen or fail to improve. HPI  Patient seen in office today for chief complaint of right-sided ear pain with yellow drainage has been ongoing for the past week. Reports that he does have a sore throat on the right side of his throat. Denies any fever or sweats but does report chills. Other complaint today of a rash to the left side of his neck has been present for the past 6 weeks. Denies any skin lesions or drainage from rash. Denies any pain with reports mild itching. Interval hx 23:    Patient seen in office today for with now third recurrence of right middle ear infection. Reports he did notice some bloody drainage from right ear. Still having right ear pain. He has been treated with Ciprodex on 2 different occasions. Did not treat orally secondary to his allergies to antibiotics. ROS  Review of Systems   Constitutional:  Positive for chills. Negative for diaphoresis, fever and unexpected weight change. HENT:  Positive for ear discharge, ear pain and sore throat (right sided). Negative for congestion, facial swelling and postnasal drip. Eyes: Negative. Respiratory: Negative. Cardiovascular: Negative.

## 2023-11-30 ENCOUNTER — HOSPITAL ENCOUNTER (OUTPATIENT)
Dept: CT IMAGING | Age: 78
Discharge: HOME | End: 2023-11-30
Attending: INTERNAL MEDICINE
Payer: MEDICARE

## 2023-11-30 DIAGNOSIS — C34.01 MALIGNANT NEOPLASM OF RIGHT MAIN BRONCHUS (HCC): ICD-10-CM

## 2023-11-30 LAB
PERFORMED ON: NORMAL
POC CREATININE: 0.8 MG/DL (ref 0.8–1.3)
POC SAMPLE TYPE: NORMAL

## 2023-11-30 PROCEDURE — 82565 ASSAY OF CREATININE: CPT

## 2023-11-30 PROCEDURE — 71260 CT THORAX DX C+: CPT

## 2023-11-30 PROCEDURE — 6360000004 HC RX CONTRAST MEDICATION: Performed by: INTERNAL MEDICINE

## 2023-11-30 RX ADMIN — IOPAMIDOL 75 ML: 755 INJECTION, SOLUTION INTRAVENOUS at 14:00

## 2023-11-30 RX ADMIN — DIATRIZOATE MEGLUMINE AND DIATRIZOATE SODIUM 15 ML: 660; 100 LIQUID ORAL; RECTAL at 14:00

## 2023-12-12 ENCOUNTER — TELEPHONE (OUTPATIENT)
Dept: ENT CLINIC | Age: 78
End: 2023-12-12

## 2023-12-12 NOTE — TELEPHONE ENCOUNTER
I was notified by Dr. Roberta Austin that Mr. Adrianne Galvan came to his appointment today expecting to be seen by Dr. Rodriguez. He was referred to Dr. Rodriguez for his ear issue but he had seen Dr. Roberta Austin last June so he was schedule with Dr. Roberta Austin since he was not a new patient to ENT. Mr. Adrianne Galvan was very upset that her was not seeing Dr. Rodriguez and let the MAVitor Enriquez, know (he used several curse words) that Dr. Roberta Austin was not a good doctor and he was leaving. [facility] offered to schedule him with Dr. Rodriguez but he refused and walked out of the office. I called to speak to Mr. Adrianne Galvan and left a voicemail.

## 2023-12-13 ENCOUNTER — TELEPHONE (OUTPATIENT)
Dept: FAMILY MEDICINE CLINIC | Age: 78
End: 2023-12-13

## 2023-12-13 NOTE — TELEPHONE ENCOUNTER
Dr. Akbar Walsh office practice manager calling to report patient left without being seen. Became very upset due to scheduling error, PM did reach out to patient and left VM. Just an FYI.

## 2023-12-14 NOTE — TELEPHONE ENCOUNTER
I called the office of the referring physician, Arlene Angel on 12/13, to let them know that Mr. China Young left our office with being seen.

## 2024-01-29 ENCOUNTER — HOSPITAL ENCOUNTER (EMERGENCY)
Age: 79
Discharge: HOME | End: 2024-01-29
Attending: EMERGENCY MEDICINE
Payer: MEDICARE

## 2024-01-29 ENCOUNTER — APPOINTMENT (OUTPATIENT)
Dept: GENERAL RADIOLOGY | Age: 79
End: 2024-01-29
Payer: MEDICARE

## 2024-01-29 VITALS
RESPIRATION RATE: 16 BRPM | DIASTOLIC BLOOD PRESSURE: 75 MMHG | HEART RATE: 96 BPM | BODY MASS INDEX: 25.77 KG/M2 | WEIGHT: 180 LBS | SYSTOLIC BLOOD PRESSURE: 115 MMHG | OXYGEN SATURATION: 94 % | HEIGHT: 70 IN | TEMPERATURE: 98.1 F

## 2024-01-29 DIAGNOSIS — J20.9 ACUTE BRONCHITIS, UNSPECIFIED ORGANISM: Primary | ICD-10-CM

## 2024-01-29 LAB
FLUAV RNA UPPER RESP QL NAA+PROBE: NEGATIVE
FLUBV AG NPH QL: NEGATIVE
S PYO AG THROAT QL: NEGATIVE
SARS-COV-2 RDRP RESP QL NAA+PROBE: NOT DETECTED

## 2024-01-29 PROCEDURE — 87880 STREP A ASSAY W/OPTIC: CPT

## 2024-01-29 PROCEDURE — 6370000000 HC RX 637 (ALT 250 FOR IP): Performed by: EMERGENCY MEDICINE

## 2024-01-29 PROCEDURE — 71045 X-RAY EXAM CHEST 1 VIEW: CPT

## 2024-01-29 PROCEDURE — 99284 EMERGENCY DEPT VISIT MOD MDM: CPT

## 2024-01-29 PROCEDURE — 87081 CULTURE SCREEN ONLY: CPT

## 2024-01-29 PROCEDURE — 87635 SARS-COV-2 COVID-19 AMP PRB: CPT

## 2024-01-29 PROCEDURE — 87804 INFLUENZA ASSAY W/OPTIC: CPT

## 2024-01-29 RX ORDER — DOXYCYCLINE HYCLATE 100 MG
100 TABLET ORAL 2 TIMES DAILY
Qty: 20 TABLET | Refills: 0 | Status: SHIPPED | OUTPATIENT
Start: 2024-01-29 | End: 2024-02-08

## 2024-01-29 RX ORDER — IPRATROPIUM BROMIDE AND ALBUTEROL SULFATE 2.5; .5 MG/3ML; MG/3ML
3 SOLUTION RESPIRATORY (INHALATION) ONCE
Status: COMPLETED | OUTPATIENT
Start: 2024-01-29 | End: 2024-01-29

## 2024-01-29 RX ORDER — ALBUTEROL SULFATE 90 UG/1
2 AEROSOL, METERED RESPIRATORY (INHALATION) EVERY 4 HOURS PRN
Qty: 8 G | Refills: 0 | Status: SHIPPED | OUTPATIENT
Start: 2024-01-29

## 2024-01-29 RX ORDER — ACETAMINOPHEN 325 MG/1
650 TABLET ORAL ONCE
Status: COMPLETED | OUTPATIENT
Start: 2024-01-29 | End: 2024-01-29

## 2024-01-29 RX ADMIN — ACETAMINOPHEN 650 MG: 325 TABLET ORAL at 08:57

## 2024-01-29 RX ADMIN — IPRATROPIUM BROMIDE AND ALBUTEROL SULFATE 3 DOSE: 2.5; .5 SOLUTION RESPIRATORY (INHALATION) at 08:57

## 2024-01-29 ASSESSMENT — PAIN SCALES - GENERAL: PAINLEVEL_OUTOF10: 6

## 2024-01-29 ASSESSMENT — PAIN - FUNCTIONAL ASSESSMENT: PAIN_FUNCTIONAL_ASSESSMENT: 0-10

## 2024-01-29 NOTE — ED NOTES
Pt discharge instructions, follow up and rx x 2 reviewed with pt. Pt verbalized understanding. No further needs. Pt discharged at this time.

## 2024-01-29 NOTE — DISCHARGE INSTRUCTIONS
Please use your patients as prescribed.  If your symptoms progress despite treatment please come back to the ER for repeat evaluation

## 2024-01-29 NOTE — ED PROVIDER NOTES
Patient was given the following medications:  Medications   ipratropium 0.5 mg-albuterol 2.5 mg (DUONEB) nebulizer solution 3 Dose (3 Doses Inhalation Given 1/29/24 0857)   acetaminophen (TYLENOL) tablet 650 mg (650 mg Oral Given 1/29/24 0857)       ED Course as of 01/30/24 1404   Mon Jan 29, 2024   0925 Patient's lungs with faint end expiratory wheeze much improved from initial examination. [MP]      ED Course User Index  [MP] Erich Ramirez MD    Patient was reassessed multiple times while in the emergency department patient with moderate improvement symptoms time discharge    [unfilled]          I am the Primary Clinician of Record.    MDM  Number of Diagnoses or Management Options  Acute bronchitis, unspecified organism  Diagnosis management comments: Patient presentation keeping possible viral syndrome versus possible bronchitis versus pneumonia.  Will obtain x-ray, provide breathing treatments obtain viral swabs and disposition per findings.  Patient's vitals are unremarkable.  Patient's daughter has similar symptoms.    Patient with productive sputum.  Other chest x-ray does not show an obvious pneumonia.  Patient likely has significant bronchitis as he is coughing up green sputum.  Will provide doxycycline and albuterol.          CRITICAL CARE TIME       PROCEDURES:  Unless otherwise noted below, none     Procedures    FINAL IMPRESSION      1. Acute bronchitis, unspecified organism          DISPOSITION/PLAN   DISPOSITION Decision To Discharge 01/29/2024 09:25:29 AM      PATIENT REFERRED TO:  Carlton Leavitt P, APRN - CNP  [address] [place] [address]  [phone]    Schedule an appointment as soon as possible for a visit         DISCHARGE MEDICATIONS:  Discharge Medication List as of 1/29/2024  9:35 AM        START taking these medications    Details   albuterol sulfate HFA (PROVENTIL;VENTOLIN;PROAIR) 108 (90 Base) MCG/ACT inhaler Inhale 2 puffs into the lungs every 4 hours as needed

## 2024-01-30 ENCOUNTER — TELEPHONE (OUTPATIENT)
Dept: CARDIOLOGY CLINIC | Age: 79
End: 2024-01-30

## 2024-01-31 LAB — S PYO THROAT QL CULT: NORMAL

## 2024-02-02 ENCOUNTER — APPOINTMENT (OUTPATIENT)
Dept: GENERAL RADIOLOGY | Age: 79
End: 2024-02-02
Payer: MEDICARE

## 2024-02-02 ENCOUNTER — HOSPITAL ENCOUNTER (EMERGENCY)
Age: 79
Discharge: HOME | End: 2024-02-02
Attending: EMERGENCY MEDICINE
Payer: MEDICARE

## 2024-02-02 VITALS
OXYGEN SATURATION: 93 % | SYSTOLIC BLOOD PRESSURE: 116 MMHG | HEART RATE: 85 BPM | HEIGHT: 70 IN | DIASTOLIC BLOOD PRESSURE: 75 MMHG | RESPIRATION RATE: 18 BRPM | TEMPERATURE: 98.2 F | WEIGHT: 180 LBS | BODY MASS INDEX: 25.77 KG/M2

## 2024-02-02 DIAGNOSIS — R11.0 NAUSEA: ICD-10-CM

## 2024-02-02 DIAGNOSIS — J06.9 UPPER RESPIRATORY TRACT INFECTION, UNSPECIFIED TYPE: Primary | ICD-10-CM

## 2024-02-02 LAB
FLUAV RNA UPPER RESP QL NAA+PROBE: NEGATIVE
FLUBV AG NPH QL: NEGATIVE
SARS-COV-2 RDRP RESP QL NAA+PROBE: NOT DETECTED

## 2024-02-02 PROCEDURE — 87635 SARS-COV-2 COVID-19 AMP PRB: CPT

## 2024-02-02 PROCEDURE — 87804 INFLUENZA ASSAY W/OPTIC: CPT

## 2024-02-02 PROCEDURE — 99283 EMERGENCY DEPT VISIT LOW MDM: CPT

## 2024-02-02 PROCEDURE — 71045 X-RAY EXAM CHEST 1 VIEW: CPT

## 2024-02-02 PROCEDURE — 6370000000 HC RX 637 (ALT 250 FOR IP): Performed by: EMERGENCY MEDICINE

## 2024-02-02 RX ORDER — BENZONATATE 100 MG/1
100 CAPSULE ORAL ONCE
Status: COMPLETED | OUTPATIENT
Start: 2024-02-02 | End: 2024-02-02

## 2024-02-02 RX ORDER — ONDANSETRON 4 MG/1
4 TABLET, ORALLY DISINTEGRATING ORAL 3 TIMES DAILY PRN
Qty: 21 TABLET | Refills: 0 | Status: SHIPPED | OUTPATIENT
Start: 2024-02-02

## 2024-02-02 RX ORDER — BENZONATATE 200 MG/1
200 CAPSULE ORAL 3 TIMES DAILY PRN
Qty: 30 CAPSULE | Refills: 0 | Status: SHIPPED | OUTPATIENT
Start: 2024-02-02 | End: 2024-02-09

## 2024-02-02 RX ORDER — ONDANSETRON 4 MG/1
4 TABLET, ORALLY DISINTEGRATING ORAL ONCE
Status: COMPLETED | OUTPATIENT
Start: 2024-02-02 | End: 2024-02-02

## 2024-02-02 RX ADMIN — ONDANSETRON 4 MG: 4 TABLET, ORALLY DISINTEGRATING ORAL at 09:15

## 2024-02-02 RX ADMIN — BENZONATATE 100 MG: 100 CAPSULE ORAL at 09:15

## 2024-02-02 ASSESSMENT — ENCOUNTER SYMPTOMS
WHEEZING: 0
VOICE CHANGE: 0
COLOR CHANGE: 0
COUGH: 1
STRIDOR: 0
PHOTOPHOBIA: 0
TROUBLE SWALLOWING: 0
ABDOMINAL PAIN: 0
NAUSEA: 1
FACIAL SWELLING: 0
SHORTNESS OF BREATH: 0
BLOOD IN STOOL: 0
BACK PAIN: 0

## 2024-02-02 ASSESSMENT — PAIN - FUNCTIONAL ASSESSMENT: PAIN_FUNCTIONAL_ASSESSMENT: NONE - DENIES PAIN

## 2024-02-02 NOTE — ED PROVIDER NOTES
mis-transcribed.)    Erich Gr MD (electronically signed)  Attending Emergency Physician           Erich Gr MD  02/02/24 0977

## 2024-02-06 ENCOUNTER — TELEPHONE (OUTPATIENT)
Dept: FAMILY MEDICINE CLINIC | Age: 79
End: 2024-02-06

## 2024-02-06 NOTE — TELEPHONE ENCOUNTER
Patient missed an appointment today with Duke Leavitt CNP. Called him to reschedule. Patient says his wife is in the hospital and he will call back to reschedule

## 2024-02-08 ENCOUNTER — APPOINTMENT (OUTPATIENT)
Dept: CT IMAGING | Age: 79
End: 2024-02-08
Payer: MEDICARE

## 2024-02-08 ENCOUNTER — HOSPITAL ENCOUNTER (EMERGENCY)
Age: 79
Discharge: HOME | End: 2024-02-08
Attending: EMERGENCY MEDICINE
Payer: MEDICARE

## 2024-02-08 VITALS
OXYGEN SATURATION: 96 % | SYSTOLIC BLOOD PRESSURE: 126 MMHG | HEIGHT: 70 IN | RESPIRATION RATE: 18 BRPM | DIASTOLIC BLOOD PRESSURE: 81 MMHG | TEMPERATURE: 98 F | BODY MASS INDEX: 25.77 KG/M2 | HEART RATE: 80 BPM | WEIGHT: 180 LBS

## 2024-02-08 DIAGNOSIS — J98.4 PNEUMONITIS: Primary | ICD-10-CM

## 2024-02-08 DIAGNOSIS — R79.89 ELEVATED TROPONIN: ICD-10-CM

## 2024-02-08 DIAGNOSIS — I48.11 LONGSTANDING PERSISTENT ATRIAL FIBRILLATION (HCC): ICD-10-CM

## 2024-02-08 LAB
ALBUMIN SERPL-MCNC: 4 G/DL (ref 3.4–5)
ALBUMIN/GLOB SERPL: 1.2 {RATIO} (ref 1.1–2.2)
ALP SERPL-CCNC: 94 U/L (ref 40–129)
ALT SERPL-CCNC: <5 U/L (ref 10–40)
ANION GAP SERPL CALCULATED.3IONS-SCNC: 10 MMOL/L (ref 3–16)
AST SERPL-CCNC: 24 U/L (ref 15–37)
BASOPHILS # BLD: 0.1 K/UL (ref 0–0.2)
BASOPHILS NFR BLD: 0.7 %
BILIRUB SERPL-MCNC: 0.8 MG/DL (ref 0–1)
BUN SERPL-MCNC: 26 MG/DL (ref 7–20)
CALCIUM SERPL-MCNC: 9.6 MG/DL (ref 8.3–10.6)
CHLORIDE SERPL-SCNC: 109 MMOL/L (ref 99–110)
CO2 SERPL-SCNC: 26 MMOL/L (ref 21–32)
CREAT SERPL-MCNC: 1 MG/DL (ref 0.8–1.3)
DEPRECATED RDW RBC AUTO: 14.7 % (ref 12.4–15.4)
EKG ATRIAL RATE: 78 BPM
EKG DIAGNOSIS: NORMAL
EKG Q-T INTERVAL: 412 MS
EKG QRS DURATION: 84 MS
EKG QTC CALCULATION (BAZETT): 444 MS
EKG R AXIS: -16 DEGREES
EKG T AXIS: 37 DEGREES
EKG VENTRICULAR RATE: 70 BPM
EOSINOPHIL # BLD: 0.1 K/UL (ref 0–0.6)
EOSINOPHIL NFR BLD: 1.6 %
GFR SERPLBLD CREATININE-BSD FMLA CKD-EPI: >60 ML/MIN/{1.73_M2}
GLUCOSE SERPL-MCNC: 123 MG/DL (ref 70–99)
HCT VFR BLD AUTO: 38.4 % (ref 40.5–52.5)
HGB BLD-MCNC: 12.9 G/DL (ref 13.5–17.5)
LYMPHOCYTES # BLD: 1.5 K/UL (ref 1–5.1)
LYMPHOCYTES NFR BLD: 20.4 %
MCH RBC QN AUTO: 30.5 PG (ref 26–34)
MCHC RBC AUTO-ENTMCNC: 33.6 G/DL (ref 31–36)
MCV RBC AUTO: 90.9 FL (ref 80–100)
MONOCYTES # BLD: 0.6 K/UL (ref 0–1.3)
MONOCYTES NFR BLD: 8.6 %
NEUTROPHILS # BLD: 5 K/UL (ref 1.7–7.7)
NEUTROPHILS NFR BLD: 68.7 %
PLATELET # BLD AUTO: 210 K/UL (ref 135–450)
PMV BLD AUTO: 7.8 FL (ref 5–10.5)
POTASSIUM SERPL-SCNC: 4.2 MMOL/L (ref 3.5–5.1)
PROT SERPL-MCNC: 7.3 G/DL (ref 6.4–8.2)
RBC # BLD AUTO: 4.22 M/UL (ref 4.2–5.9)
SODIUM SERPL-SCNC: 145 MMOL/L (ref 136–145)
TROPONIN, HIGH SENSITIVITY: 25 NG/L (ref 0–22)
TROPONIN, HIGH SENSITIVITY: 30 NG/L (ref 0–22)
WBC # BLD AUTO: 7.2 K/UL (ref 4–11)

## 2024-02-08 PROCEDURE — 36415 COLL VENOUS BLD VENIPUNCTURE: CPT

## 2024-02-08 PROCEDURE — 80053 COMPREHEN METABOLIC PANEL: CPT

## 2024-02-08 PROCEDURE — 85025 COMPLETE CBC W/AUTO DIFF WBC: CPT

## 2024-02-08 PROCEDURE — 71250 CT THORAX DX C-: CPT

## 2024-02-08 PROCEDURE — 93010 ELECTROCARDIOGRAM REPORT: CPT | Performed by: INTERNAL MEDICINE

## 2024-02-08 PROCEDURE — 6370000000 HC RX 637 (ALT 250 FOR IP): Performed by: EMERGENCY MEDICINE

## 2024-02-08 PROCEDURE — 84484 ASSAY OF TROPONIN QUANT: CPT

## 2024-02-08 PROCEDURE — 99285 EMERGENCY DEPT VISIT HI MDM: CPT

## 2024-02-08 PROCEDURE — 2580000003 HC RX 258: Performed by: EMERGENCY MEDICINE

## 2024-02-08 PROCEDURE — 93005 ELECTROCARDIOGRAM TRACING: CPT | Performed by: EMERGENCY MEDICINE

## 2024-02-08 RX ORDER — IPRATROPIUM BROMIDE AND ALBUTEROL SULFATE 2.5; .5 MG/3ML; MG/3ML
1 SOLUTION RESPIRATORY (INHALATION) ONCE
Status: COMPLETED | OUTPATIENT
Start: 2024-02-08 | End: 2024-02-08

## 2024-02-08 RX ORDER — ALBUTEROL SULFATE 90 UG/1
2 AEROSOL, METERED RESPIRATORY (INHALATION) 4 TIMES DAILY PRN
Qty: 18 G | Refills: 0 | Status: SHIPPED | OUTPATIENT
Start: 2024-02-08 | End: 2024-02-08

## 2024-02-08 RX ORDER — ALBUTEROL SULFATE 90 UG/1
2 AEROSOL, METERED RESPIRATORY (INHALATION) 4 TIMES DAILY PRN
Qty: 18 G | Refills: 0 | Status: SHIPPED | OUTPATIENT
Start: 2024-02-08

## 2024-02-08 RX ORDER — PREDNISONE 10 MG/1
TABLET ORAL
Qty: 30 TABLET | Refills: 0 | Status: SHIPPED | OUTPATIENT
Start: 2024-02-08 | End: 2024-02-18

## 2024-02-08 RX ORDER — AZITHROMYCIN 250 MG/1
250 TABLET, FILM COATED ORAL SEE ADMIN INSTRUCTIONS
Qty: 6 TABLET | Refills: 0 | Status: SHIPPED | OUTPATIENT
Start: 2024-02-08 | End: 2024-02-13

## 2024-02-08 RX ORDER — PREDNISONE 20 MG/1
60 TABLET ORAL ONCE
Status: COMPLETED | OUTPATIENT
Start: 2024-02-08 | End: 2024-02-08

## 2024-02-08 RX ORDER — 0.9 % SODIUM CHLORIDE 0.9 %
1000 INTRAVENOUS SOLUTION INTRAVENOUS ONCE
Status: COMPLETED | OUTPATIENT
Start: 2024-02-08 | End: 2024-02-08

## 2024-02-08 RX ORDER — AZITHROMYCIN 250 MG/1
250 TABLET, FILM COATED ORAL SEE ADMIN INSTRUCTIONS
Qty: 6 TABLET | Refills: 0 | Status: SHIPPED | OUTPATIENT
Start: 2024-02-08 | End: 2024-02-08

## 2024-02-08 RX ORDER — 0.9 % SODIUM CHLORIDE 0.9 %
500 INTRAVENOUS SOLUTION INTRAVENOUS ONCE
Status: COMPLETED | OUTPATIENT
Start: 2024-02-08 | End: 2024-02-08

## 2024-02-08 RX ORDER — PREDNISONE 10 MG/1
TABLET ORAL
Qty: 30 TABLET | Refills: 0 | Status: SHIPPED | OUTPATIENT
Start: 2024-02-08 | End: 2024-02-08

## 2024-02-08 RX ORDER — BENZONATATE 100 MG/1
100 CAPSULE ORAL ONCE
Status: COMPLETED | OUTPATIENT
Start: 2024-02-08 | End: 2024-02-08

## 2024-02-08 RX ORDER — AZITHROMYCIN 250 MG/1
500 TABLET, FILM COATED ORAL ONCE
Status: COMPLETED | OUTPATIENT
Start: 2024-02-08 | End: 2024-02-08

## 2024-02-08 RX ADMIN — SODIUM CHLORIDE 500 ML: 9 INJECTION, SOLUTION INTRAVENOUS at 10:12

## 2024-02-08 RX ADMIN — PREDNISONE 60 MG: 20 TABLET ORAL at 12:17

## 2024-02-08 RX ADMIN — SODIUM CHLORIDE 1000 ML: 9 INJECTION, SOLUTION INTRAVENOUS at 09:32

## 2024-02-08 RX ADMIN — IPRATROPIUM BROMIDE AND ALBUTEROL SULFATE 1 DOSE: 2.5; .5 SOLUTION RESPIRATORY (INHALATION) at 09:33

## 2024-02-08 RX ADMIN — AZITHROMYCIN DIHYDRATE 500 MG: 250 TABLET ORAL at 12:17

## 2024-02-08 RX ADMIN — BENZONATATE 100 MG: 100 CAPSULE ORAL at 09:33

## 2024-02-08 ASSESSMENT — ENCOUNTER SYMPTOMS
VOICE CHANGE: 0
ABDOMINAL PAIN: 0
BLOOD IN STOOL: 0
FACIAL SWELLING: 0
TROUBLE SWALLOWING: 0
WHEEZING: 1
COUGH: 1
COLOR CHANGE: 0
BACK PAIN: 0
STRIDOR: 0
PHOTOPHOBIA: 0
VOMITING: 0
NAUSEA: 0
SHORTNESS OF BREATH: 1

## 2024-02-08 ASSESSMENT — PAIN - FUNCTIONAL ASSESSMENT: PAIN_FUNCTIONAL_ASSESSMENT: NONE - DENIES PAIN

## 2024-02-08 NOTE — ED PROVIDER NOTES
patient practitioner feels okay with following patient up close next week if I place him on azithromycin and a extended steroid taper.  I have discussed this plan with the patient.  He does express some reluctance to actually take the medications prescribed reporting medications that have been prescribed in the past \"did not work\" and at 1 point says he might just not take them.  At this point in time I have assessed the patient for capacity to make his own medical decisions.  Patient is alert and oriented to person place and time and situation.  He is able to discuss in detail risk, benefits, alternatives to plan the fact that if he does not take his medications his pneumonitis might get worse and cause severe respiratory problems.  I feel the patient does have capacity to make his own decision and he begrudgingly tells me that he will take the medications I prescribed them.  Medications prescribed and the importance of calling and making an appointment for early next week and showing up to it or discussed with him or coming back to the emergency department if he is unable to do this or worsens before his appointment.  The patient is aware that he must call the office and go to the appointment and that if he is unable to do so he should come back to the emergency department.  Patient expresses understanding of this plan and is discharged home.  Given continuous cardiac monitoring, large workup, and consults to both primary care doctor as well as [facility] supervising physician the patient does require critical care time.  I have considered pulm embolism at CTA chest with contrast however the patient does have a history of kidney injury, has normal work of breathing, denies any hemoptysis, has no signs of DVT, and I feel previous mission plan is appropriate at this point in time.    Critical Care    Performed by: Erich Gr MD  Authorized by: Erich Gr MD    Critical care provider statement:

## 2024-04-04 ENCOUNTER — HOSPITAL ENCOUNTER (INPATIENT)
Age: 79
LOS: 1 days | Discharge: HOME | End: 2024-04-05
Attending: EMERGENCY MEDICINE | Admitting: INTERNAL MEDICINE
Payer: MEDICARE

## 2024-04-04 ENCOUNTER — APPOINTMENT (OUTPATIENT)
Dept: CT IMAGING | Age: 79
End: 2024-04-04
Payer: MEDICARE

## 2024-04-04 ENCOUNTER — APPOINTMENT (OUTPATIENT)
Dept: GENERAL RADIOLOGY | Age: 79
End: 2024-04-04
Payer: MEDICARE

## 2024-04-04 ENCOUNTER — APPOINTMENT (OUTPATIENT)
Dept: MRI IMAGING | Age: 79
End: 2024-04-04
Payer: MEDICARE

## 2024-04-04 DIAGNOSIS — I63.9 CEREBROVASCULAR ACCIDENT (CVA), UNSPECIFIED MECHANISM (HCC): Primary | ICD-10-CM

## 2024-04-04 DIAGNOSIS — R41.82 ALTERED MENTAL STATUS, UNSPECIFIED ALTERED MENTAL STATUS TYPE: ICD-10-CM

## 2024-04-04 PROBLEM — I48.20 ATRIAL FIBRILLATION, CHRONIC (HCC): Status: ACTIVE | Noted: 2024-04-04

## 2024-04-04 PROBLEM — G20.A1 PARKINSON'S DISEASE WITHOUT DYSKINESIA OR FLUCTUATING MANIFESTATIONS (HCC): Status: ACTIVE | Noted: 2024-04-04

## 2024-04-04 PROBLEM — G45.9 TIA (TRANSIENT ISCHEMIC ATTACK): Status: ACTIVE | Noted: 2024-04-04

## 2024-04-04 LAB
ALBUMIN SERPL-MCNC: 3.7 G/DL (ref 3.4–5)
ALBUMIN/GLOB SERPL: 1.2 {RATIO} (ref 1.1–2.2)
ALP SERPL-CCNC: 160 U/L (ref 40–129)
ALT SERPL-CCNC: 15 U/L (ref 10–40)
ANION GAP SERPL CALCULATED.3IONS-SCNC: 9 MMOL/L (ref 3–16)
APTT BLD: 26.5 SEC (ref 22.1–36.4)
AST SERPL-CCNC: 22 U/L (ref 15–37)
BASOPHILS # BLD: 0 K/UL (ref 0–0.2)
BASOPHILS NFR BLD: 0.6 %
BILIRUB SERPL-MCNC: 0.6 MG/DL (ref 0–1)
BILIRUB UR QL STRIP.AUTO: NEGATIVE
BUN SERPL-MCNC: 14 MG/DL (ref 7–20)
CALCIUM SERPL-MCNC: 8.4 MG/DL (ref 8.3–10.6)
CHLORIDE SERPL-SCNC: 106 MMOL/L (ref 99–110)
CLARITY UR: CLEAR
CO2 SERPL-SCNC: 27 MMOL/L (ref 21–32)
COLOR UR: YELLOW
CREAT SERPL-MCNC: 0.9 MG/DL (ref 0.8–1.3)
DEPRECATED RDW RBC AUTO: 14.6 % (ref 12.4–15.4)
EKG ATRIAL RATE: 300 BPM
EKG DIAGNOSIS: NORMAL
EKG Q-T INTERVAL: 400 MS
EKG QRS DURATION: 92 MS
EKG QTC CALCULATION (BAZETT): 446 MS
EKG R AXIS: -63 DEGREES
EKG T AXIS: 43 DEGREES
EKG VENTRICULAR RATE: 75 BPM
EOSINOPHIL # BLD: 0.5 K/UL (ref 0–0.6)
EOSINOPHIL NFR BLD: 6.2 %
FLUAV RNA RESP QL NAA+PROBE: NOT DETECTED
FLUBV RNA RESP QL NAA+PROBE: NOT DETECTED
GFR SERPLBLD CREATININE-BSD FMLA CKD-EPI: 87 ML/MIN/{1.73_M2}
GLUCOSE SERPL-MCNC: 101 MG/DL (ref 70–99)
GLUCOSE UR STRIP.AUTO-MCNC: NEGATIVE MG/DL
HCT VFR BLD AUTO: 34.7 % (ref 40.5–52.5)
HGB BLD-MCNC: 11.7 G/DL (ref 13.5–17.5)
HGB UR QL STRIP.AUTO: NEGATIVE
INR PPP: 1.18 (ref 0.85–1.15)
KETONES UR STRIP.AUTO-MCNC: NEGATIVE MG/DL
LEUKOCYTE ESTERASE UR QL STRIP.AUTO: NEGATIVE
LYMPHOCYTES # BLD: 1.8 K/UL (ref 1–5.1)
LYMPHOCYTES NFR BLD: 21.5 %
MCH RBC QN AUTO: 30.7 PG (ref 26–34)
MCHC RBC AUTO-ENTMCNC: 33.8 G/DL (ref 31–36)
MCV RBC AUTO: 90.7 FL (ref 80–100)
MONOCYTES # BLD: 0.8 K/UL (ref 0–1.3)
MONOCYTES NFR BLD: 9.8 %
NEUTROPHILS # BLD: 5.1 K/UL (ref 1.7–7.7)
NEUTROPHILS NFR BLD: 61.9 %
NITRITE UR QL STRIP.AUTO: NEGATIVE
PH UR STRIP.AUTO: 6.5 [PH] (ref 5–8)
PLATELET # BLD AUTO: 183 K/UL (ref 135–450)
PMV BLD AUTO: 7.5 FL (ref 5–10.5)
POTASSIUM SERPL-SCNC: 4 MMOL/L (ref 3.5–5.1)
PROT SERPL-MCNC: 6.9 G/DL (ref 6.4–8.2)
PROT UR STRIP.AUTO-MCNC: NEGATIVE MG/DL
PROTHROMBIN TIME: 15.2 SEC (ref 11.9–14.9)
RBC # BLD AUTO: 3.82 M/UL (ref 4.2–5.9)
SARS-COV-2 RNA RESP QL NAA+PROBE: NOT DETECTED
SODIUM SERPL-SCNC: 142 MMOL/L (ref 136–145)
SP GR UR STRIP.AUTO: 1.01 (ref 1–1.03)
TROPONIN, HIGH SENSITIVITY: 24 NG/L (ref 0–22)
TROPONIN, HIGH SENSITIVITY: 28 NG/L (ref 0–22)
UA COMPLETE W REFLEX CULTURE PNL UR: NORMAL
UA DIPSTICK W REFLEX MICRO PNL UR: NORMAL
URN SPEC COLLECT METH UR: NORMAL
UROBILINOGEN UR STRIP-ACNC: 0.2 E.U./DL
WBC # BLD AUTO: 8.2 K/UL (ref 4–11)

## 2024-04-04 PROCEDURE — 84484 ASSAY OF TROPONIN QUANT: CPT

## 2024-04-04 PROCEDURE — 6360000004 HC RX CONTRAST MEDICATION: Performed by: EMERGENCY MEDICINE

## 2024-04-04 PROCEDURE — 99285 EMERGENCY DEPT VISIT HI MDM: CPT

## 2024-04-04 PROCEDURE — 70450 CT HEAD/BRAIN W/O DYE: CPT

## 2024-04-04 PROCEDURE — 87636 SARSCOV2 & INF A&B AMP PRB: CPT

## 2024-04-04 PROCEDURE — 97530 THERAPEUTIC ACTIVITIES: CPT

## 2024-04-04 PROCEDURE — 85730 THROMBOPLASTIN TIME PARTIAL: CPT

## 2024-04-04 PROCEDURE — 2580000003 HC RX 258: Performed by: INTERNAL MEDICINE

## 2024-04-04 PROCEDURE — 70551 MRI BRAIN STEM W/O DYE: CPT

## 2024-04-04 PROCEDURE — 92526 ORAL FUNCTION THERAPY: CPT

## 2024-04-04 PROCEDURE — 97166 OT EVAL MOD COMPLEX 45 MIN: CPT

## 2024-04-04 PROCEDURE — 81003 URINALYSIS AUTO W/O SCOPE: CPT

## 2024-04-04 PROCEDURE — 85610 PROTHROMBIN TIME: CPT

## 2024-04-04 PROCEDURE — 99223 1ST HOSP IP/OBS HIGH 75: CPT | Performed by: INTERNAL MEDICINE

## 2024-04-04 PROCEDURE — 71045 X-RAY EXAM CHEST 1 VIEW: CPT

## 2024-04-04 PROCEDURE — 1200000000 HC SEMI PRIVATE

## 2024-04-04 PROCEDURE — 93308 TTE F-UP OR LMTD: CPT

## 2024-04-04 PROCEDURE — 6370000000 HC RX 637 (ALT 250 FOR IP): Performed by: INTERNAL MEDICINE

## 2024-04-04 PROCEDURE — 93005 ELECTROCARDIOGRAM TRACING: CPT | Performed by: EMERGENCY MEDICINE

## 2024-04-04 PROCEDURE — 85025 COMPLETE CBC W/AUTO DIFF WBC: CPT

## 2024-04-04 PROCEDURE — 92610 EVALUATE SWALLOWING FUNCTION: CPT

## 2024-04-04 PROCEDURE — 36415 COLL VENOUS BLD VENIPUNCTURE: CPT

## 2024-04-04 PROCEDURE — 97110 THERAPEUTIC EXERCISES: CPT

## 2024-04-04 PROCEDURE — 99223 1ST HOSP IP/OBS HIGH 75: CPT | Performed by: PSYCHIATRY & NEUROLOGY

## 2024-04-04 PROCEDURE — 95816 EEG AWAKE AND DROWSY: CPT

## 2024-04-04 PROCEDURE — 80053 COMPREHEN METABOLIC PANEL: CPT

## 2024-04-04 PROCEDURE — 70498 CT ANGIOGRAPHY NECK: CPT

## 2024-04-04 PROCEDURE — 6360000002 HC RX W HCPCS: Performed by: INTERNAL MEDICINE

## 2024-04-04 PROCEDURE — 93010 ELECTROCARDIOGRAM REPORT: CPT | Performed by: INTERNAL MEDICINE

## 2024-04-04 RX ORDER — PANTOPRAZOLE SODIUM 20 MG/1
20 TABLET, DELAYED RELEASE ORAL DAILY
Status: DISCONTINUED | OUTPATIENT
Start: 2024-04-04 | End: 2024-04-05 | Stop reason: HOSPADM

## 2024-04-04 RX ORDER — LACOSAMIDE 50 MG/1
100 TABLET ORAL 2 TIMES DAILY
COMMUNITY

## 2024-04-04 RX ORDER — MONTELUKAST SODIUM 10 MG/1
10 TABLET ORAL NIGHTLY
Status: DISCONTINUED | OUTPATIENT
Start: 2024-04-04 | End: 2024-04-05 | Stop reason: HOSPADM

## 2024-04-04 RX ORDER — FINASTERIDE 5 MG/1
5 TABLET, FILM COATED ORAL DAILY
Status: DISCONTINUED | OUTPATIENT
Start: 2024-04-04 | End: 2024-04-05 | Stop reason: HOSPADM

## 2024-04-04 RX ORDER — ONDANSETRON 2 MG/ML
4 INJECTION INTRAMUSCULAR; INTRAVENOUS EVERY 6 HOURS PRN
Status: DISCONTINUED | OUTPATIENT
Start: 2024-04-04 | End: 2024-04-05 | Stop reason: HOSPADM

## 2024-04-04 RX ORDER — LABETALOL HYDROCHLORIDE 5 MG/ML
10 INJECTION, SOLUTION INTRAVENOUS EVERY 10 MIN PRN
Status: DISCONTINUED | OUTPATIENT
Start: 2024-04-04 | End: 2024-04-05 | Stop reason: HOSPADM

## 2024-04-04 RX ORDER — CETIRIZINE HYDROCHLORIDE 10 MG/1
10 TABLET ORAL
COMMUNITY
Start: 2024-03-13

## 2024-04-04 RX ORDER — ZOLPIDEM TARTRATE 5 MG/1
10 TABLET ORAL NIGHTLY PRN
Status: DISCONTINUED | OUTPATIENT
Start: 2024-04-04 | End: 2024-04-05 | Stop reason: HOSPADM

## 2024-04-04 RX ORDER — ASPIRIN 300 MG/1
300 SUPPOSITORY RECTAL DAILY
Status: DISCONTINUED | OUTPATIENT
Start: 2024-04-04 | End: 2024-04-05 | Stop reason: HOSPADM

## 2024-04-04 RX ORDER — ATORVASTATIN CALCIUM 40 MG/1
80 TABLET, FILM COATED ORAL NIGHTLY
Status: DISCONTINUED | OUTPATIENT
Start: 2024-04-04 | End: 2024-04-05 | Stop reason: HOSPADM

## 2024-04-04 RX ORDER — ONDANSETRON 4 MG/1
4 TABLET, ORALLY DISINTEGRATING ORAL EVERY 8 HOURS PRN
Status: DISCONTINUED | OUTPATIENT
Start: 2024-04-04 | End: 2024-04-05 | Stop reason: HOSPADM

## 2024-04-04 RX ORDER — POLYETHYLENE GLYCOL 3350 17 G/17G
17 POWDER, FOR SOLUTION ORAL DAILY PRN
Status: DISCONTINUED | OUTPATIENT
Start: 2024-04-04 | End: 2024-04-05 | Stop reason: HOSPADM

## 2024-04-04 RX ORDER — ATORVASTATIN CALCIUM 40 MG/1
40 TABLET, FILM COATED ORAL DAILY
Status: DISCONTINUED | OUTPATIENT
Start: 2024-04-04 | End: 2024-04-04 | Stop reason: DRUGHIGH

## 2024-04-04 RX ORDER — ESCITALOPRAM OXALATE 10 MG/1
10 TABLET ORAL DAILY
Status: DISCONTINUED | OUTPATIENT
Start: 2024-04-04 | End: 2024-04-05 | Stop reason: HOSPADM

## 2024-04-04 RX ORDER — MEMANTINE HYDROCHLORIDE 5 MG/1
10 TABLET ORAL DAILY
Status: DISCONTINUED | OUTPATIENT
Start: 2024-04-04 | End: 2024-04-05 | Stop reason: HOSPADM

## 2024-04-04 RX ORDER — ENOXAPARIN SODIUM 100 MG/ML
40 INJECTION SUBCUTANEOUS DAILY
Status: DISCONTINUED | OUTPATIENT
Start: 2024-04-04 | End: 2024-04-05 | Stop reason: HOSPADM

## 2024-04-04 RX ORDER — SODIUM CHLORIDE 0.9 % (FLUSH) 0.9 %
5-40 SYRINGE (ML) INJECTION EVERY 12 HOURS SCHEDULED
Status: DISCONTINUED | OUTPATIENT
Start: 2024-04-04 | End: 2024-04-05 | Stop reason: HOSPADM

## 2024-04-04 RX ORDER — ALBUTEROL SULFATE 90 UG/1
2 AEROSOL, METERED RESPIRATORY (INHALATION) EVERY 4 HOURS PRN
Status: DISCONTINUED | OUTPATIENT
Start: 2024-04-04 | End: 2024-04-05 | Stop reason: HOSPADM

## 2024-04-04 RX ORDER — ASPIRIN 325 MG
325 TABLET ORAL DAILY
Status: DISCONTINUED | OUTPATIENT
Start: 2024-04-04 | End: 2024-04-05 | Stop reason: HOSPADM

## 2024-04-04 RX ORDER — LORAZEPAM 2 MG/1
2 TABLET ORAL 2 TIMES DAILY
Status: DISCONTINUED | OUTPATIENT
Start: 2024-04-04 | End: 2024-04-05 | Stop reason: HOSPADM

## 2024-04-04 RX ORDER — LACOSAMIDE 50 MG/1
100 TABLET ORAL 2 TIMES DAILY
Status: DISCONTINUED | OUTPATIENT
Start: 2024-04-04 | End: 2024-04-05 | Stop reason: HOSPADM

## 2024-04-04 RX ORDER — ASPIRIN 81 MG/1
81 TABLET ORAL DAILY
Status: DISCONTINUED | OUTPATIENT
Start: 2024-04-04 | End: 2024-04-04 | Stop reason: DRUGHIGH

## 2024-04-04 RX ORDER — SODIUM CHLORIDE 9 MG/ML
INJECTION, SOLUTION INTRAVENOUS PRN
Status: DISCONTINUED | OUTPATIENT
Start: 2024-04-04 | End: 2024-04-05 | Stop reason: HOSPADM

## 2024-04-04 RX ORDER — SODIUM CHLORIDE 0.9 % (FLUSH) 0.9 %
5-40 SYRINGE (ML) INJECTION PRN
Status: DISCONTINUED | OUTPATIENT
Start: 2024-04-04 | End: 2024-04-05 | Stop reason: HOSPADM

## 2024-04-04 RX ADMIN — ENOXAPARIN SODIUM 40 MG: 100 INJECTION SUBCUTANEOUS at 11:50

## 2024-04-04 RX ADMIN — SODIUM CHLORIDE, PRESERVATIVE FREE 10 ML: 5 INJECTION INTRAVENOUS at 20:14

## 2024-04-04 RX ADMIN — ASPIRIN 325 MG: 325 TABLET ORAL at 11:50

## 2024-04-04 RX ADMIN — MONTELUKAST SODIUM 10 MG: 10 TABLET, COATED ORAL at 20:12

## 2024-04-04 RX ADMIN — LACOSAMIDE 100 MG: 50 TABLET, FILM COATED ORAL at 11:50

## 2024-04-04 RX ADMIN — FINASTERIDE 5 MG: 5 TABLET, FILM COATED ORAL at 11:50

## 2024-04-04 RX ADMIN — ATORVASTATIN CALCIUM 80 MG: 40 TABLET, FILM COATED ORAL at 20:12

## 2024-04-04 RX ADMIN — MEMANTINE HYDROCHLORIDE 10 MG: 5 TABLET ORAL at 11:50

## 2024-04-04 RX ADMIN — CARBIDOPA AND LEVODOPA 1 TABLET: 25; 100 TABLET ORAL at 20:12

## 2024-04-04 RX ADMIN — CARBIDOPA AND LEVODOPA 1 TABLET: 25; 100 TABLET ORAL at 16:49

## 2024-04-04 RX ADMIN — IOPAMIDOL 75 ML: 755 INJECTION, SOLUTION INTRAVENOUS at 00:20

## 2024-04-04 RX ADMIN — CARBIDOPA AND LEVODOPA 1 TABLET: 25; 100 TABLET ORAL at 11:50

## 2024-04-04 RX ADMIN — ESCITALOPRAM OXALATE 10 MG: 10 TABLET ORAL at 11:50

## 2024-04-04 RX ADMIN — PANTOPRAZOLE SODIUM 20 MG: 20 TABLET, DELAYED RELEASE ORAL at 11:50

## 2024-04-04 RX ADMIN — LORAZEPAM 2 MG: 2 TABLET ORAL at 11:50

## 2024-04-04 RX ADMIN — LORAZEPAM 2 MG: 2 TABLET ORAL at 20:12

## 2024-04-04 RX ADMIN — LACOSAMIDE 100 MG: 50 TABLET, FILM COATED ORAL at 20:12

## 2024-04-04 ASSESSMENT — LIFESTYLE VARIABLES
HOW MANY STANDARD DRINKS CONTAINING ALCOHOL DO YOU HAVE ON A TYPICAL DAY: PATIENT DOES NOT DRINK
HOW OFTEN DO YOU HAVE A DRINK CONTAINING ALCOHOL: NEVER

## 2024-04-04 NOTE — PROGRESS NOTES
Patient urinated at this time for urine sample. Urine sample accidentally flushed before sample was collected. Provider aware.

## 2024-04-04 NOTE — ED PROVIDER NOTES
[facility] TELEMETRY  EMERGENCY DEPARTMENT ENCOUNTER      Pt Name: Rodolfo Luna  MRN: [id number]  Birthdate 1945  Date of evaluation: 4/4/2024  Provider: Meghann Pete MD    CHIEF COMPLAINT       Chief Complaint   Patient presents with    Cerebrovascular Accident     Pt arrives via squad with c/o AMS, left drooping face, garbled speech, dizziness, headache, and left sided weakness. LKW 3 days ago per neighbors. Pt fell 2 times today and hit head, pt takes 1 aspirin per day.          HISTORY OF PRESENT ILLNESS   (Location/Symptom, Timing/Onset, Context/Setting, Quality, Duration, Modifying Factors, Severity)  Note limiting factors.   Rodolfo Luna is a 78 y.o. male who presents to the emergency department after 2 falls earlier in the day and then per EMS, they were called for altered mental status/confusion, garbled speech, but with last known well about 3 days ago.  Patient did complain of some dizziness before no current headache.  He is on aspirin but no other blood thinners.  No chest pain or shortness of breath.  Unknown if there was loss of consciousness or if he hit his head        Nursing Notes were reviewed.    REVIEW OF SYSTEMS    (2-9 systems for level 4, 10 or more for level 5)     As per HPI    Except as noted above the remainder of the review of systems was reviewed and negative.       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[facility]) 12/10/2014    S/P drug eluting coronary stent placement 03/06/2017    2.25 x 18 Xience Alpine ADRIÁN - Distal LAD    Seizure disorder ([facility])     4 weeks ago  aphasia  Dysarthria (10): Mild to moderate, slurs some words  Extinction and Inattention (11): No abnormality  Total: 3     Tala Coma Scale  Eye Opening: Spontaneous  Best Verbal Response: Confused  Best Motor Response: Obeys commands  Tala Coma Scale Score: 14                     [facility] Assessment  BP: (!) 153/85  Pulse: 77                 PHYSICAL EXAM    (up to 7 for level 4, 8 or more for level 5)     ED Triage Vitals   BP Temp Temp Source Pulse Respirations SpO2 Height Weight - Scale   04/04/24 0007 04/04/24 0007 04/04/24 0007 04/04/24 0007 04/04/24 0007 04/04/24 0007 -- 04/04/24 0321   (!) 153/77 97.7 °F (36.5 °C) Oral 79 16 94 %  78.8 kg (173 lb 12.8 oz)       GEN: Well nourished, well developed, no acute distress  HEAD: Normocephalic, atraumatic.  No step-offs or deformities  EYES: Pupils equally round and reactive to light.  Extraocular movements intact.  Anicteric sclera  ENT: No nasal discharge.  No visible trauma.  Pink moist mucous membranes..  No lip, throat, or tongue swelling  NECK: Supple.  Painless range of motion..  Trachea midline.  CHEST: No visible deformity  HEART: Regular rate and rhythm.  No murmurs, gallops, rubs  LUNGS: Clear to auscultation bilaterally.  No wheezes, rhonchi, or rubs  ABDOMEN: Soft, nontender, nondistended.  Negative Jose sign.  No tenderness over McBurney's point.  No rebound or guarding.  No definite masses noted.  EXTREMITIES: No clubbing, cyanosis, or edema.  No obvious deformities noted.  Calves appear equal and are nontender  SKIN: Warm, Dry, well-perfused.  No rash noted  NEURO: Alert and oriented x3.  Left lower mild facial droop.  Strength 5 out of 5 equal upper and lower extremities.  No visual changes.  Extraocular movements intact.  Tongue protrudes midline.  Well-coordinated  PSYCH: Appropriate, cooperative      DIAGNOSTIC RESULTS     EKG: All EKGs are interpreted by the Emergency Department Physician who either signs or Co-signs this chart in the

## 2024-04-04 NOTE — PROGRESS NOTES
HOSPITALIST  ADMIT ACCEPT NOTE    4/4/2024 2:27 AM    Patient Information: HARSHA DON   Date of Admit:  4/4/2024  Primary Care Physician:  Carlton Leavitt, KATIE - CNP    Chief complaint:    Chief Complaint   Patient presents with    Cerebrovascular Accident     Pt arrives via squad with c/o AMS, left drooping face, garbled speech, dizziness, headache, and left sided weakness. LKW 3 days ago per neighbors. Pt fell 2 times today and hit head, pt takes 1 aspirin per day.        History of Present Illness:  HARSHA DON is a 78 y.o. male on [employer] who was seen in ER for 4/4/2024 for TIA symptoms.    Plan:  MRI in the a.m.  Neuroassessments every 4 hours  Aspirin daily  Statin daily  Consult neurology        Harsha Fowler MD

## 2024-04-04 NOTE — CARE COORDINATION
Case Management Assessment  Initial Evaluation    Date/Time of Evaluation: 4/4/2024 10:24 AM  Assessment Completed by: Christine Luna    If patient is discharged prior to next notation, then this note serves as note for discharge by case management.    Patient Name: Rodolfo Luna                   YOB: 1945  Diagnosis: TIA (transient ischemic attack) [G45.9]  Altered mental status, unspecified altered mental status type [R41.82]  Cerebrovascular accident (CVA), unspecified mechanism (HCC) [I63.9]                   Date / Time: 4/4/2024 12:06 AM    Patient Admission Status: Inpatient   Readmission Risk (Low < 19, Mod (19-27), High > 27): Readmission Risk Score: 17.2    Current PCP: Carlton Leavitt, APRN - CNP  PCP verified by CM? Yes    Chart Reviewed: Yes      History Provided by: Patient  Patient Orientation: Alert and Oriented    Patient Cognition: Alert    Hospitalization in the last 30 days (Readmission):  No    If yes, Readmission Assessment in CM Navigator will be completed.    Advance Directives:      Code Status: Full Code   Patient's Primary Decision Maker is: Legal Next of Kin    Primary Decision Maker: Helen Luna A - Spouse - [phone]    Discharge Planning:    Patient lives with: Spouse/Significant Other Type of Home: House  Primary Care Giver: Self  Patient Support Systems include: Spouse/Significant Other   Current Financial resources: Medicare  Current community resources: None  Current services prior to admission: None            Current DME:              Type of Home Care services:  None    ADLS  Prior functional level: Independent in ADLs/IADLs  Current functional level: Independent in ADLs/IADLs    PT AM-PAC:   /24  OT AM-PAC:   /24    Family can provide assistance at DC: Yes  Would you like Case Management to discuss the discharge plan with any other family members/significant others, and if so, who? No  Plans to Return to Present Housing: Yes  Other Identified  dialogue that supports the patient's individualized plan of care/goals and shares the quality data associated with the providers was provided to:     Patient Representative Name:       The Patient and/or Patient Representative Agree with the Discharge Plan?      Christine Luna  Case Management Department  Ph: [phone] Fax: [phone]

## 2024-04-04 NOTE — ED NOTES
EMS called stroke alert to ANDREA Dominguez at 2356. Dr. Pete notified and Janelle charge nurse. Call also placed to Ct to notify of 5 minute ETA per EMS. EMS arrived at ED at 0003 Dr. Pete examined patient and called code stroke. Patient to CT at 0007. Dr. Portillo with stroke team returned call at 0009.

## 2024-04-04 NOTE — H&P
influenza B in nasopharyngeal  and nasal swab specimens for use under the FDA’s Emergency Use  Authorization (EUA) only.    Patient Fact Sheet:  https://www.fda.gov/media/078632/download  Provider Fact Sheet: https://www.fda.gov/media/747469/download  EUA: https://www.fda.gov/media/124631/download  IFU: https://www.fda.gov/media/402639/download    Methodology:  RT-PCR          INFLUENZA A NOT DETECTED     INFLUENZA B NOT DETECTED            EKG:  I have reviewed the EKG with the following interpretation:   Encounter Date: 04/04/24   EKG 12 Lead   Result Value    Ventricular Rate 75    Atrial Rate 300    QRS Duration 92    Q-T Interval 400    QTc Calculation (Bazett) 446    R Axis -63    T Axis 43    Diagnosis      Atrial flutter with variable A-V blockLeft anterior fascicular blockAbnormal ECGWhen compared with ECG of 08-FEB-2024 09:41,Atrial flutter has replaced Atrial fibrillation         RADIOLOGY    XR CHEST PORTABLE   Final Result   Small right pleural effusion with compressive atelectasis in the right lung   base.         CTA Head Neck W/Contrast   Final Result   1. No evidence of hemodynamically significant stenosis of the major arterial   vessels of the head and neck.   2. No acute intracranial abnormality.   3. 30% stenosis of the proximal right ICA.         CT HEAD WO CONTRAST   Preliminary Result   No acute intracranial abnormality.  No evidence of intracranial hemorrhage.      No change versus previous CT scan of head on 07/17/2022.         MRI brain without contrast    (Results Pending)       ECHO 1/12/23  Conclusions      Summary   Normal left ventricular systolic function. Ejection fraction is visually   estimated at 55%.   A #27 mm Watchman device appears well seated within the left atrial   appendage. There appears to be small wilfredo-device leak measuring up to 2-3   mm. There is no device associated thrombus.   A small residual atrial-level shunt is noted.   Trivial aortic regurgitation.   Trivial

## 2024-04-04 NOTE — PROGRESS NOTES
Current NIHSS 1    Nursing Core Measures for Stroke:   [x]   Education template documentation (STROKE/TIA). Please select only risk factors that are applicable to patient when selecting risk factors.  [x]   Care Plan template documentation (Physiologic Instability - Neurosensory). Selecting this will add care plan rows to the flowsheet under the Neuro section of Head to Toe.  [x]   Verified Swallow Screen completed prior to PO intake of food, drink, medications>          Please verify correct medication route prior to administration for intubated patients, patients who can not swallow or have alternative routes of intake (NG, OG, NV), etc  [x]   VTE Prophylaxis: SCDs ordered/addressed; SCDs: N/A Lovenox           (As a reminder, ASA, Plavix, and TPA/TNK are not VTE prophylaxis.)    Reviewed the Following Education with Patient and/or Family:   - Personalized risk factors for patient, along with changes, modifications that will help prevent stroke.  - Signs and Symptoms of Stroke: (Facial droop, weakness/numbness especially on one side, speech difficulty, sudden confusion, sudden loss of vision, sudden severe headache, sudden loss of balance or having difficulty walking, syncope, or seizure)  - How to activate EMS (911)   - Importance of Follow Up Appointments at Discharge   - Importance of Compliance with Medications Prescribed at Discharge  - Available community resources and stroke advocacy groups if needed    Patient and/or family member: verbalized understanding.     Stroke Education booklet given to patient/family (or verified, if given already), which reviews above information. yes         Electronically signed by Sharon Hdz RN on 4/4/2024 at 6:25 PM

## 2024-04-04 NOTE — PLAN OF CARE
Patient admitted to room 312 from ED. Patient oriented to room, call light, bed rails, phone, lights and bathroom. Patient instructed about the schedule of the day including: vital sign frequency, lab draws, possible tests, frequency of MD and staff rounds, daily weights, I &O's and prescribed diet. Telemetry box in place, patient aware of placement and reason. Bed locked, in lowest position, side rails up 2/4, call light within reach.        Recliner Assessment  Patient is able to demonstrate the ability to move from a reclining position to an upright position within the recliner.       4 Eyes Skin Assessment     NAME:  Rodolfo Luna  YOB: 1945  MEDICAL RECORD NUMBER:  [id number]    The patient is being assessed for  Admission    I agree that at least one RN has performed a thorough Head to Toe Skin Assessment on the patient. ALL assessment sites listed below have been assessed.      Areas assessed by both nurses:    Head, Face, Ears, Shoulders, Back, Chest, Arms, Elbows, Hands, Sacrum. Buttock, Coccyx, Ischium, and Legs. Feet and Heels  Has a small scrape above left eye from where he fell        Does the Patient have a Wound? No noted wound(s)       Jorge Prevention initiated by RN: No  Wound Care Orders initiated by RN: No    Pressure Injury (Stage 3,4, Unstageable, DTI, NWPT, and Complex wounds) if present, place Wound referral order by RN under : No    New Ostomies, if present place, Ostomy referral order under : No     Nurse 1 eSignature: Electronically signed by Talia Mahajan RN on 4/4/24 at 7:18 AM EDT    **SHARE this note so that the co-signing nurse can place an eSignature**    Nurse 2 eSignature: Electronically signed by Paulette Sanchez RN on 4/4/24 at 7:31 AM EDT

## 2024-04-04 NOTE — PROGRESS NOTES
RT Inhaler-Nebulizer Bronchodilator Protocol Note    There is a bronchodilator order in the chart from a provider indicating to follow the RT Bronchodilator Protocol and there is an “Initiate RT Inhaler-Nebulizer Bronchodilator Protocol” order as well (see protocol at bottom of note).    CXR Findings:  XR CHEST PORTABLE    Result Date: 4/4/2024  Small right pleural effusion with compressive atelectasis in the right lung base.       The findings from the last RT Protocol Assessment were as follows:   History Pulmonary Disease: Smoker 15 pack years or more  Respiratory Pattern: Regular pattern and RR 12-20 bpm  Breath Sounds: Clear breath sounds  Cough: Strong, spontaneous, non-productive  Indication for Bronchodilator Therapy: None  Bronchodilator Assessment Score: 1    Aerosolized bronchodilator medication orders have been revised according to the RT Inhaler-Nebulizer Bronchodilator Protocol below.    Respiratory Therapist to perform RT Therapy Protocol Assessment initially then follow the protocol.  Repeat RT Therapy Protocol Assessment PRN for score 0-3 or on second treatment, BID, and PRN for scores above 3.    No Indications - adjust the frequency to every 6 hours PRN wheezing or bronchospasm, if no treatments needed after 48 hours then discontinue using Per Protocol order mode.     If indication present, adjust the RT bronchodilator orders based on the Bronchodilator Assessment Score as indicated below.  Use Inhaler orders unless patient has one or more of the following: on home nebulizer, not able to hold breath for 10 seconds, is not alert and oriented, cannot activate and use MDI correctly, or respiratory rate 25 breaths per minute or more, then use the equivalent nebulizer order(s) with same Frequency and PRN reasons based on the score.  If a patient is on this medication at home then do not decrease Frequency below that used at home.    0-3 - enter or revise RT bronchodilator order(s) to equivalent RT  Bronchodilator order with Frequency of every 4 hours PRN for wheezing or increased work of breathing using Per Protocol order mode.        4-6 - enter or revise RT Bronchodilator order(s) to two equivalent RT bronchodilator orders with one order with BID Frequency and one order with Frequency of every 4 hours PRN wheezing or increased work of breathing using Per Protocol order mode.        7-10 - enter or revise RT Bronchodilator order(s) to two equivalent RT bronchodilator orders with one order with TID Frequency and one order with Frequency of every 4 hours PRN wheezing or increased work of breathing using Per Protocol order mode.       11-13 - enter or revise RT Bronchodilator order(s) to one equivalent RT bronchodilator order with QID Frequency and an Albuterol order with Frequency of every 4 hours PRN wheezing or increased work of breathing using Per Protocol order mode.      Greater than 13 - enter or revise RT Bronchodilator order(s) to one equivalent RT bronchodilator order with every 4 hours Frequency and an Albuterol order with Frequency of every 2 hours PRN wheezing or increased work of breathing using Per Protocol order mode.         Electronically signed by Mai Sommer RCP on 4/4/2024 at 3:41 AM

## 2024-04-04 NOTE — PROGRESS NOTES
Speech Language Pathology  Swallowing Disorders and Dysphagia  Clinical Bedside Swallow Assessment  Facility/Department: [facility] PCU TELEMETRY      Instrumentation: pt may benefit from completion of instrumental swallow study to correlate clinical findings-- will continue to monitor  Diet recommendation: IDDSI 5 Minced and moist Solids; IDDSI 0 Thin Liquids; Meds whole or crushed in puree  Risk management: upright for all intake, stay upright for at least 30 mins after intake, small bites/sips, close supervision, oral care 2-3x/day to reduce adverse affects in the event of aspiration, increase physical mobility as able, alternate bites/sips, slow rate of intake, general GERD precautions, general aspiration precautions, and hold PO and contact SLP if s/s of aspiration or worsening respiratory status develop.    Pt would benefit from completion of cognitive-linguistic evaluation; may complete evaluation in future tx session as pt is clinically appropriate (will require order for SLP Eval and Treat)      NAME:Rodolfo Luna  : 1945 (78 y.o.)   MRN: [id number]  ROOM: [place] [id number]-  ADMISSION DATE: 2024  PATIENT DIAGNOSIS(ES): TIA (transient ischemic attack) [G45.9]  Altered mental status, unspecified altered mental status type [R41.82]  Cerebrovascular accident (CVA), unspecified mechanism (HCC) [I63.9]  Chief Complaint   Patient presents with    Cerebrovascular Accident     Pt arrives via squad with c/o AMS, left drooping face, garbled speech, dizziness, headache, and left sided weakness. LKW 3 days ago per neighbors. Pt fell 2 times today and hit head, pt takes 1 aspirin per day.      Patient Active Problem List    Diagnosis Date Noted    Contusion of knee, right 2014    Syncope and collapse     Dizziness 2014    Mixed hyperlipidemia 2023    Presence of Watchman left atrial appendage closure device 2023    Atrial fibrillation, persistent (HCC) 2022    Rib pain on right side      Signature:  Paulette Cook M.S. CCC-SLP  Speech-language pathologist  [id number]

## 2024-04-04 NOTE — PROGRESS NOTES
Writer spoke with neighbor and spouse. Pateint reportedly had been \"sharp\" and \"well\" 4 days ago. Patient reported to have had a lot of falls recently. Two yesterday in the afternoon. Patient reported to have been walking through the door and fell forward hitting his head. Patient was delayed getting up, but then walked to his room where he \"collapsed\" on the recliner \"like his legs gave out\". Family is concerned for new onset weakness, confusion (he did not know family members name, or year, also stated he was 28 years old). Spouse also states he has been taking care of his own medication administration but lately has feared he is taking more then prescribed. Will update family in the morning with results of evaluation.

## 2024-04-04 NOTE — CONSULTS
Neurology consultation note    Patient name: Rodolfo Luna      Chief Complaint:  Confusion.    History of present illness:  This is a 78 years old right-handed male.  Unfortunately, the patient is not a good historian.  So history is obtained from medical record review.  The patient presented to the ER with confusion and slurred speech.  At that time, the patient also noticed left facial droop.  During my assessment, the patient also complained dizziness.  But the patient keeps talking about the dizziness back in 1900's.  The patient has no focal weakness or numbness during my assessment.  The patient also had high blood pressure upon admission.    The patient is also noted for underlying Parkinson disease and seizure disorder.  The patient is currently taking Sinemet and lacosamide.    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    S/P drug eluting coronary stent placement 03/06/2017    2.25 x 18 Xience Alpine ADRIÁN - Distal LAD    Seizure disorder ([facility])     4 weeks ago black out, pt states seizure but may be due to Heart Rate changes    Seizures ([facility])     Shortness of breath 09/04/2014    Total knee replacement status 01/12/2011       Past surgical history:    Past Surgical History:   Procedure Laterality Date    ABLATION OF DYSRHYTHMIC FOCUS  9/2014    CARPAL TUNNEL RELEASE      CERVICAL DISC SURGERY  2010    COLONOSCOPY  09/26/2016    normal    CORONARY ANGIOPLASTY WITH STENT PLACEMENT      FEMUR SURGERY      left    KNEE SURGERY  1-12-11 R total knee replacement with

## 2024-04-04 NOTE — PROGRESS NOTES
04/04/24 1037   Encounter Summary   Encounter Overview/Reason  Initial Encounter   Service Provided For: Patient   Referral/Consult From: Art   Last Encounter  04/04/24  ( briefly visited with patient)

## 2024-04-04 NOTE — PLAN OF CARE
Bedside swallow evaluation completed this date.    Paulette Cook M.S. CCC-SLP  Speech-language pathologist  [id number]

## 2024-04-04 NOTE — PROGRESS NOTES
Inpatient Occupational Therapy Evaluation and Treatment    Unit: PCU  Date:  4/4/2024  Patient Name:    Rodolfo Luna  Admitting diagnosis:  TIA (transient ischemic attack) [G45.9]  Altered mental status, unspecified altered mental status type [R41.82]  Cerebrovascular accident (CVA), unspecified mechanism (HCC) [I63.9]  Admit Date:  4/4/2024  Precautions/Restrictions/WB Status/ Lines/ Wounds/ Oxygen: Fall risk, Bed/chair alarm, Lines (IV), and Telemetry    Treatment Time:  12:42 -13:33  Treatment Number:  1  Timed Code Treatment Minutes: 41 minutes  Total Treatment Minutes: 51 minutes    Patient Goals for Therapy: \"to go home\"          Discharge Recommendations: Home with 24/7 supervision and Home OT  DME needs for discharge: Needs Met       Therapy recommendations for staff:   Assist of 1 for ambulation with use of No AD and gait belt to/from bathroom  within room    History of Present Illness: Per H&P  \"The patient is a 78 y.o. male with pmhx as below who presents to [facility] with altered mental status, garbled speech and left sided facial drooping. History obtained from the patient and review of EMR. Trouble with speech lasted over 3/12 hours. It is back to normal. No vision problems. He had right arm weakness. His left face was drooping. Symptoms better.  No prior history of CVA.  He has history of Parkinson's disease.     He has history of paroxysmal AF fib, lung cancer status postthoracotomy, porphyria cutanea tarda, diffuse coronary artery calcification, history of orthostatic blood pressures, history of thrombocytopenia, history of Watchman device implanted on November 14, 2022, on aspirin and Eliquis in the past and presently on only ASA.  Previous EF was 55%.  He has a history of coronary artery stent placement.  History of seizure disorder.\"    Home Health S4 Level Recommendation:  Level 1 Standard    AM-PAC Score: AM-PAC Inpatient Daily Activity Raw Score: 18     Subjective:  Patient lying  he was asked to sit EOB and he got back into bed and put the covers on. Patient is currently functioning below baseline as he was independent with bed mobility, functional transfer performance, functional mobility, ADL performance, and he currently requires some assistance with those tasks. Patient would benefit from skilled HHOT services upon discharge in order to return to PLOF. Patient would benefit from continued skilled acute care occupational therapy services during his length of stay to increase independence with ADL performance and functional mobility in order to maximize his functional potential in the acute care setting.      Recommending Home 24 hr supervision and with home OT upon discharge as patient functioning close to baseline level    Goal(s) :   To be met in 3 Visits:  Bed to toilet/BSC:       Independent  Pt will complete 3/3 CHF goals     N/A    To be met in 5 Visits:  Supine to/from Sit in preparation for ADL task:   Independent  Toileting        Independent  Grooming       Independent  Upper Body Dressing:      Independent  Lower Body Dressing:      Independent  Pt to demonstrate UE therapeutic exs x 15 reps with minimal cues    Rehabilitation Potential: Good  Strengths for achieving goals include: PLOF and Pt cooperative   Barriers to achieving goals include:  Complexity of condition    Plan:  To be seen 2-5 x/wk while in acute care setting for therapeutic exercises, bed mobility, transfers, family/patient education, ADL/IADL retraining, and energy conservation training.    Electronically signed by Marco La OT on 4/4/2024 at 2:32 PM      If patient discharges from this facility prior to next visit, this note will serve as the Discharge Summary

## 2024-04-05 VITALS
WEIGHT: 169.7 LBS | SYSTOLIC BLOOD PRESSURE: 129 MMHG | DIASTOLIC BLOOD PRESSURE: 77 MMHG | HEART RATE: 66 BPM | OXYGEN SATURATION: 96 % | BODY MASS INDEX: 24.35 KG/M2 | TEMPERATURE: 97.9 F | RESPIRATION RATE: 18 BRPM

## 2024-04-05 LAB
CHOLEST SERPL-MCNC: 111 MG/DL (ref 0–199)
DEPRECATED RDW RBC AUTO: 14.9 % (ref 12.4–15.4)
HCT VFR BLD AUTO: 35 % (ref 40.5–52.5)
HDLC SERPL-MCNC: 46 MG/DL (ref 40–60)
HGB BLD-MCNC: 12 G/DL (ref 13.5–17.5)
LDLC SERPL CALC-MCNC: 52 MG/DL
MCH RBC QN AUTO: 30.7 PG (ref 26–34)
MCHC RBC AUTO-ENTMCNC: 34.3 G/DL (ref 31–36)
MCV RBC AUTO: 89.4 FL (ref 80–100)
PLATELET # BLD AUTO: 185 K/UL (ref 135–450)
PMV BLD AUTO: 7.9 FL (ref 5–10.5)
RBC # BLD AUTO: 3.91 M/UL (ref 4.2–5.9)
TRIGL SERPL-MCNC: 67 MG/DL (ref 0–150)
VLDLC SERPL CALC-MCNC: 13 MG/DL
WBC # BLD AUTO: 6.9 K/UL (ref 4–11)

## 2024-04-05 PROCEDURE — 6360000002 HC RX W HCPCS: Performed by: INTERNAL MEDICINE

## 2024-04-05 PROCEDURE — 36415 COLL VENOUS BLD VENIPUNCTURE: CPT

## 2024-04-05 PROCEDURE — 2580000003 HC RX 258: Performed by: INTERNAL MEDICINE

## 2024-04-05 PROCEDURE — 6370000000 HC RX 637 (ALT 250 FOR IP): Performed by: INTERNAL MEDICINE

## 2024-04-05 PROCEDURE — 99233 SBSQ HOSP IP/OBS HIGH 50: CPT | Performed by: PSYCHIATRY & NEUROLOGY

## 2024-04-05 PROCEDURE — 83036 HEMOGLOBIN GLYCOSYLATED A1C: CPT

## 2024-04-05 PROCEDURE — 85027 COMPLETE CBC AUTOMATED: CPT

## 2024-04-05 PROCEDURE — 80061 LIPID PANEL: CPT

## 2024-04-05 PROCEDURE — 99239 HOSP IP/OBS DSCHRG MGMT >30: CPT | Performed by: INTERNAL MEDICINE

## 2024-04-05 RX ORDER — ATORVASTATIN CALCIUM 80 MG/1
80 TABLET, FILM COATED ORAL NIGHTLY
Qty: 30 TABLET | Refills: 3 | Status: SHIPPED | OUTPATIENT
Start: 2024-04-05

## 2024-04-05 RX ADMIN — SODIUM CHLORIDE, PRESERVATIVE FREE 10 ML: 5 INJECTION INTRAVENOUS at 08:37

## 2024-04-05 RX ADMIN — MEMANTINE HYDROCHLORIDE 10 MG: 5 TABLET ORAL at 08:36

## 2024-04-05 RX ADMIN — CARBIDOPA AND LEVODOPA 1 TABLET: 25; 100 TABLET ORAL at 08:36

## 2024-04-05 RX ADMIN — FINASTERIDE 5 MG: 5 TABLET, FILM COATED ORAL at 08:36

## 2024-04-05 RX ADMIN — ENOXAPARIN SODIUM 40 MG: 100 INJECTION SUBCUTANEOUS at 08:37

## 2024-04-05 RX ADMIN — ESCITALOPRAM OXALATE 10 MG: 10 TABLET ORAL at 08:36

## 2024-04-05 RX ADMIN — PANTOPRAZOLE SODIUM 20 MG: 20 TABLET, DELAYED RELEASE ORAL at 08:36

## 2024-04-05 RX ADMIN — ASPIRIN 325 MG: 325 TABLET ORAL at 08:36

## 2024-04-05 RX ADMIN — LORAZEPAM 2 MG: 2 TABLET ORAL at 08:36

## 2024-04-05 RX ADMIN — LACOSAMIDE 100 MG: 50 TABLET, FILM COATED ORAL at 08:36

## 2024-04-05 NOTE — PLAN OF CARE
Problem: Discharge Planning  Goal: Discharge to home or other facility with appropriate resources  4/5/2024 1311 by Venkatesh Kinsey RN  Outcome: Completed  4/5/2024 0832 by Venkatesh Kinsey RN  Outcome: Progressing     Problem: Safety - Adult  Goal: Free from fall injury  4/5/2024 1311 by Venkatesh Kinsey RN  Outcome: Completed  4/5/2024 0832 by Venkatesh Kinsey RN  Outcome: Progressing     Problem: Skin/Tissue Integrity  Goal: Absence of new skin breakdown  Description: 1.  Monitor for areas of redness and/or skin breakdown  2.  Assess vascular access sites hourly  3.  Every 4-6 hours minimum:  Change oxygen saturation probe site  4.  Every 4-6 hours:  If on nasal continuous positive airway pressure, respiratory therapy assess nares and determine need for appliance change or resting period.  4/5/2024 1311 by Venkatesh Kinsey RN  Outcome: Completed  4/5/2024 0832 by Venkatesh Kinsey RN  Outcome: Progressing     Problem: Confusion  Goal: Confusion, delirium, dementia, or psychosis is improved or at baseline  Description: INTERVENTIONS:  1. Assess for possible contributors to thought disturbance, including medications, impaired vision or hearing, underlying metabolic abnormalities, dehydration, psychiatric diagnoses, and notify attending LIP  2. [place] high risk fall precautions, as indicated  3. Provide frequent short contacts to provide reality reorientation, refocusing and direction  4. Decrease environmental stimuli, including noise as appropriate  5. Monitor and intervene to maintain adequate nutrition, hydration, elimination, sleep and activity  6. If unable to ensure safety without constant attention obtain sitter and review sitter guidelines with assigned personnel  7. Initiate Psychosocial CNS and Spiritual Care consult, as indicated  4/5/2024 1311 by Venkatesh Kinsey RN  Outcome: Completed  4/5/2024 0832 by Venkatesh Kinsey RN  Outcome: Progressing     Problem: Neurosensory -

## 2024-04-05 NOTE — CARE COORDINATION
DISCHARGE ORDER  Date/Time 2024 1:16 PM  Completed by: Christine Luna, Case Management    Patient Name: Rodolfo Luna      : 1945  Admitting Diagnosis: TIA (transient ischemic attack) [G45.9]  Altered mental status, unspecified altered mental status type [R41.82]  Cerebrovascular accident (CVA), unspecified mechanism (HCC) [I63.9]      Admit order Date and Status:24  (verify MD's last order for status of admission)      Noted discharge order.   If applicable PT/OT recommendation at Discharge: Home OT   DME recommendation by PT/OT:NA  Confirmed discharge plan  (): Yes  with whom patient _______________  If pt confirmed DC plan does family need to be contacted by CM No if yes who______  Discharge Plan: Pt to discharge home with wife and family support.  Pt refused HHC multiple times.  No DME needs at this time.      Date of Last IMM Given: 24    Reviewed chart.  Role of discharge planner explained and patient verbalized understanding. Discharge order is noted.    Has Home O2 in place on admit:  No  Informed of need to bring portable home O2 tank on day of discharge for nursing to connect prior to leaving:   Not Indicated  Verbalized agreement/Understanding:   Not Indicated  Pt is being d/c'd to home today. Pt's O2 sats are 96% on RA.    Discharge timeout done with CM/PT/RN. All discharge needs and concerns addressed.

## 2024-04-05 NOTE — PROCEDURES
INTERPRETATION:  This 25-minute, computer-assisted video EEG recording is abnormal.  The EEG showed rare epileptogenic potentials over the left temporal area and intermittent focal slowing activity over the same area.  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      CLASSIFICATION:  Dysrhythmia grade 3, left temporal sharp waves.  EKG channel.    DESCRIPTION:    BACKGROUND:  The awake recording revealed 9 Hz alpha activity over the posterior head region.  There was intermittent 2 to 4 Hz delta activity over the left temporal head area. During drowsiness, the EEG showed normal, and symmetric V-waves.  There was no significant change on the EEG background with photic stimulation.  Hyperventilation was omitted due to old age.    INTERICTAL DISCHARGES: Rare left mid temporal sharp waves, maximal over T3.    CLINICAL EVENTS:  None    The EKG channel was unremarkable.

## 2024-04-05 NOTE — FLOWSHEET NOTE
04/05/24 0703   Vital Signs   Temp 97.9 °F (36.6 °C)   Temp Source Oral   Pulse 78   Heart Rate Source Monitor   Respirations 17   BP (!) 159/84   MAP (Calculated) 109   BP Location Left upper arm   BP Method Automatic   Oxygen Therapy   SpO2 96 %   O2 Device None (Room air)     Pt. Resting in bed. Call light in reach. Shift assessment completed see flow sheet. Denies any needs at this time. Will continue to monitor.

## 2024-04-05 NOTE — PLAN OF CARE
Problem: Discharge Planning  Goal: Discharge to home or other facility with appropriate resources  4/5/2024 0832 by Venkatesh Kinsey RN  Outcome: Progressing  4/4/2024 2203 by Analisa Zapata RN  Outcome: Progressing     Problem: Safety - Adult  Goal: Free from fall injury  4/5/2024 0832 by Venkatesh Kinsey RN  Outcome: Progressing  4/4/2024 2203 by Analisa Zapata RN  Outcome: Progressing     Problem: Skin/Tissue Integrity  Goal: Absence of new skin breakdown  Description: 1.  Monitor for areas of redness and/or skin breakdown  2.  Assess vascular access sites hourly  3.  Every 4-6 hours minimum:  Change oxygen saturation probe site  4.  Every 4-6 hours:  If on nasal continuous positive airway pressure, respiratory therapy assess nares and determine need for appliance change or resting period.  4/5/2024 0832 by Venkatesh Kinsey RN  Outcome: Progressing  4/4/2024 2203 by Analisa Zapata RN  Outcome: Progressing     Problem: Confusion  Goal: Confusion, delirium, dementia, or psychosis is improved or at baseline  Description: INTERVENTIONS:  1. Assess for possible contributors to thought disturbance, including medications, impaired vision or hearing, underlying metabolic abnormalities, dehydration, psychiatric diagnoses, and notify attending LIP  2. [place] high risk fall precautions, as indicated  3. Provide frequent short contacts to provide reality reorientation, refocusing and direction  4. Decrease environmental stimuli, including noise as appropriate  5. Monitor and intervene to maintain adequate nutrition, hydration, elimination, sleep and activity  6. If unable to ensure safety without constant attention obtain sitter and review sitter guidelines with assigned personnel  7. Initiate Psychosocial CNS and Spiritual Care consult, as indicated  4/5/2024 0832 by Venkatesh Kinsey RN  Outcome: Progressing  4/4/2024 2203 by Analisa Zapata RN  Outcome: Progressing  Flowsheets (Taken  4/4/2024 2203)  Effect of thought disturbance (confusion, delirium, dementia, or psychosis) are managed with adequate functional status:   Assess for contributors to thought disturbance, including medications, impaired vision or hearing, underlying metabolic abnormalities, dehydration, psychiatric diagnoses, notify LIP   [place] high risk fall precautions, as indicated   Provide frequent short contacts to provide reality reorientation, refocusing and direction   Decrease environmental stimuli, including noise as appropriate     Problem: Neurosensory - Adult  Goal: Achieves stable or improved neurological status  4/5/2024 0832 by Venkatesh Kinsey RN  Outcome: Progressing  4/4/2024 2203 by Analisa Zapata RN  Outcome: Progressing  Goal: Absence of seizures  4/5/2024 0832 by Venkatesh Kinsey RN  Outcome: Progressing  4/4/2024 2203 by Analisa Zapata RN  Outcome: Progressing  Goal: Remains free of injury related to seizures activity  4/5/2024 0832 by Venkatesh Kinsey RN  Outcome: Progressing  4/4/2024 2203 by Analisa Zapata RN  Outcome: Progressing  Goal: Achieves maximal functionality and self care  4/5/2024 0832 by Venkatesh Kinsey RN  Outcome: Progressing  4/4/2024 2203 by Analisa Zapata RN  Outcome: Progressing     Problem: Chronic Conditions and Co-morbidities  Goal: Patient's chronic conditions and co-morbidity symptoms are monitored and maintained or improved  Outcome: Progressing

## 2024-04-05 NOTE — PROGRESS NOTES
Neurology Progress Note    ID: Rodolfo Luna is a 78 y.o. male    : 1945     LOS: 1 day     ASSESSMENT    Principal Problem:    TIA (transient ischemic attack)  Active Problems:    Altered mental status    Atrial fibrillation, chronic (HCC)    Parkinson's disease without dyskinesia or fluctuating manifestations (HCC)    Cerebrovascular accident (CVA) ([facility])  Resolved Problems:    * No resolved hospital problems. *    The patient has no focal deficit outside confusion.  CT brain showed no acute pathology.  CTA of head and neck showed 30% stenosis at right ICA.  The MRI brain showed acute left pontine infarction.  The EEG showed left temporal seizure focus but there is no active seizure.    From neurology perspective, the etiology of the stroke is likely small vessel disease.  The stroke is low risk for hemorrhagic conversion.  But the patient has a high risk for severe neurological deficit in the future if the patient develops another stroke.  The patient has history of clopidogrel allergies.  So if the patient has no significant risk of bleeding, neurology recommend short-term ticagrelor with aspirin.     Plan:     1.  Recommend ticagrelor 90 mg twice daily with aspirin for 3 weeks then aspirin alone.  2.  Target blood pressure below 140/90.  3.  Continue statin.  4.  PT/OT/ST.  5.  Cardiac telemetry.  6.  Continue Sinemet.  7.  Minimize sedation and anticholinergic medication.    The patient can be discharged home or rehab once blood pressure is stabilized.    Medications:  Scheduled Meds:    carbidopa-levodopa  1 tablet Oral TID    escitalopram  10 mg Oral Daily    finasteride  5 mg Oral Daily    lacosamide  100 mg Oral BID    LORazepam  2 mg Oral BID    memantine  10 mg Oral Daily    montelukast  10 mg Oral Nightly    pantoprazole  20 mg Oral Daily    sodium chloride flush  5-40 mL IntraVENous 2 times per day    enoxaparin  40 mg SubCUTAneous Daily    aspirin  325 mg Oral Daily    Or    aspirin  300 mg

## 2024-04-05 NOTE — PLAN OF CARE
Problem: Discharge Planning  Goal: Discharge to home or other facility with appropriate resources  Outcome: Progressing     Problem: Safety - Adult  Goal: Free from fall injury  4/4/2024 2203 by Analisa Zapata RN  Outcome: Progressing     Problem: Skin/Tissue Integrity  Goal: Absence of new skin breakdown  Description: 1.  Monitor for areas of redness and/or skin breakdown  2.  Assess vascular access sites hourly  3.  Every 4-6 hours minimum:  Change oxygen saturation probe site  4.  Every 4-6 hours:  If on nasal continuous positive airway pressure, respiratory therapy assess nares and determine need for appliance change or resting period.  Outcome: Progressing     Problem: Confusion  Goal: Confusion, delirium, dementia, or psychosis is improved or at baseline  Description: INTERVENTIONS:  1. Assess for possible contributors to thought disturbance, including medications, impaired vision or hearing, underlying metabolic abnormalities, dehydration, psychiatric diagnoses, and notify attending LIP  2. [place] high risk fall precautions, as indicated  3. Provide frequent short contacts to provide reality reorientation, refocusing and direction  4. Decrease environmental stimuli, including noise as appropriate  5. Monitor and intervene to maintain adequate nutrition, hydration, elimination, sleep and activity  6. If unable to ensure safety without constant attention obtain sitter and review sitter guidelines with assigned personnel  7. Initiate Psychosocial CNS and Spiritual Care consult, as indicated  Outcome: Progressing  Flowsheets (Taken 4/4/2024 2203)  Effect of thought disturbance (confusion, delirium, dementia, or psychosis) are managed with adequate functional status:   Assess for contributors to thought disturbance, including medications, impaired vision or hearing, underlying metabolic abnormalities, dehydration, psychiatric diagnoses, notify LIP   [place] high risk fall precautions, as indicated

## 2024-04-05 NOTE — DISCHARGE SUMMARY
Name:  Rodolfo Luna  Room:  /0312-01  MRN:    [id number]    Discharge Summary      This discharge summary is in conjunction with a complete physical exam done on the day of discharge.      Discharging Physician: MARTHA GRANADOS MD      Admit: 4/4/2024  Discharge:  4/5/2024     Diagnoses this Admission    Principal Problem:    Cerebrovascular accident (CVA) (HCC)  Active Problems:    TIA (transient ischemic attack)    Altered mental status    Atrial fibrillation, chronic (HCC)    Parkinson's disease without dyskinesia or fluctuating manifestations (HCC)  Resolved Problems:    * No resolved hospital problems. *      Procedures (Please Review Full Report for Details)    EEG 4/4/24  Signed         INTERPRETATION:  This 25-minute, computer-assisted video EEG recording is abnormal.  The EEG showed rare epileptogenic potentials over the left temporal area and intermittent focal slowing activity over the same area.  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       CLASSIFICATION:  Dysrhythmia grade 3, left temporal sharp waves.  EKG channel.     DESCRIPTION:     BACKGROUND:  The awake recording revealed 9 Hz alpha activity over the posterior head region.  There was intermittent 2 to 4 Hz delta activity over the left temporal head area. During drowsiness, the EEG showed normal, and symmetric V-waves.  There was no significant change on the EEG background with photic stimulation.  Hyperventilation was omitted due to old age.     INTERICTAL DISCHARGES: Rare left mid temporal sharp waves, maximal over T3.     CLINICAL EVENTS:  None     The EKG channel was unremarkable.             Consults    IP CONSULT TO HOSPITALIST  IP CONSULT TO NEUROLOGY      HPI:    The patient is a 78 y.o. male with pmhx as below who presents to [facility] with altered mental status, garbled speech and left sided facial drooping. History obtained from the patient and review of EMR. Trouble with speech lasted over 3/12

## 2024-04-06 LAB
EST. AVERAGE GLUCOSE BLD GHB EST-MCNC: 105.4 MG/DL
HBA1C MFR BLD: 5.3 %

## 2024-04-09 ENCOUNTER — TELEPHONE (OUTPATIENT)
Dept: FAMILY MEDICINE CLINIC | Age: 79
End: 2024-04-09

## 2024-04-09 NOTE — PROGRESS NOTES
Physician Progress Note      PATIENT:               HARSHA DON  CSN #:                  [id number]  :                       1945  ADMIT DATE:       2024 12:06 AM  DISCH DATE:        2024 3:12 PM  RESPONDING  PROVIDER #:        Christy Grover MD          QUERY TEXT:    Pt with elevated troponin, appears chronic. If possible, please document in   the progress notes and discharge summary if you are evaluating and/or treating   any of the following:    The medical record reflects the following:  Risk Factors: age, hld, paf, cad, htn  Clinical Indicators: troponin-24, 28, Elevated troponin, appears chronic -   denies chest pain - EKG without acute ischemic changes, bp-153/77  Treatment: ekg, tele, trend troponin    Thank you,  Dalila Paz RN,BSN,CCDS,CRCR  Options provided:  -- Non-ischemic myocardial injury due to hypertension  -- Non-ischemic myocardial injury due to other, Please specify cause.  -- Elevated troponin without myocardial injury  -- Other - I will add my own diagnosis  -- Disagree - Not applicable / Not valid  -- Disagree - Clinically unable to determine / Unknown  -- Refer to Clinical Documentation Reviewer    PROVIDER RESPONSE TEXT:    This patient has elevated troponin without myocardial injury.    Query created by: Dalila Paz on 2024 12:42 PM      Electronically signed by:  Christy Grover MD 2024 12:59 PM

## 2024-04-09 NOTE — TELEPHONE ENCOUNTER
Care Transitions Initial Follow Up Call    Outreach made within 2 business days of discharge: Yes    Patient: Rodolfo Luna Patient : 1945   MRN: [id number]  Reason for Admission: There are no discharge diagnoses documented for the most recent discharge.  Discharge Date: 24       Spoke with: LILLIE to call back     Discharge department/facility: [facility]       Scheduled appointment with PCP within 7-14 days    Follow Up  Future Appointments   Date Time Provider Department Center   2024  2:00 PM [facility] [place] MAIN [facility]

## 2024-04-10 NOTE — TELEPHONE ENCOUNTER
Care Transitions Initial Follow Up Call    Outreach made within 2 business days of discharge: Yes    Patient: Rodolfo Luna Patient : 1945   MRN: [id number]  Reason for Admission: There are no discharge diagnoses documented for the most recent discharge.  Discharge Date: 24       Spoke with: 2nd attempt - LMOR to call back       Scheduled appointment with PCP within 7-14 days    Follow Up  Future Appointments   Date Time Provider Department Center   2024  2:00 PM [facility] [place] [facility] [place] [facility]

## 2024-05-08 ENCOUNTER — TELEPHONE (OUTPATIENT)
Dept: FAMILY MEDICINE CLINIC | Age: 79
End: 2024-05-08

## 2024-05-08 NOTE — TELEPHONE ENCOUNTER
LVM for patient to return call.         Barriers to why patient continues to miss appointments?         Are we able to assist in future appts so that he does not No Show?        Discuss that any appointments that are missed either by No showing or failing to provide 24 hours notice to cancel/reschedule will result in Dismissal from the practice.

## 2024-05-09 ENCOUNTER — APPOINTMENT (OUTPATIENT)
Dept: GENERAL RADIOLOGY | Age: 79
End: 2024-05-09
Payer: MEDICARE

## 2024-05-09 ENCOUNTER — APPOINTMENT (OUTPATIENT)
Dept: CT IMAGING | Age: 79
End: 2024-05-09
Payer: MEDICARE

## 2024-05-09 ENCOUNTER — HOSPITAL ENCOUNTER (INPATIENT)
Age: 79
LOS: 2 days | Discharge: HOME | End: 2024-05-11
Attending: STUDENT IN AN ORGANIZED HEALTH CARE EDUCATION/TRAINING PROGRAM | Admitting: INTERNAL MEDICINE
Payer: MEDICARE

## 2024-05-09 DIAGNOSIS — R13.10 DYSPHAGIA, UNSPECIFIED TYPE: ICD-10-CM

## 2024-05-09 DIAGNOSIS — R41.0 INTERMITTENT CONFUSION: ICD-10-CM

## 2024-05-09 DIAGNOSIS — R29.6 MULTIPLE FALLS: Primary | ICD-10-CM

## 2024-05-09 PROBLEM — R53.1 GENERALIZED WEAKNESS: Status: ACTIVE | Noted: 2024-05-09

## 2024-05-09 LAB
ALBUMIN SERPL-MCNC: 4.3 G/DL (ref 3.4–5)
ALBUMIN/GLOB SERPL: 1.4 {RATIO} (ref 1.1–2.2)
ALP SERPL-CCNC: 173 U/L (ref 40–129)
ALT SERPL-CCNC: <5 U/L (ref 10–40)
ANION GAP SERPL CALCULATED.3IONS-SCNC: 10 MMOL/L (ref 3–16)
AST SERPL-CCNC: 19 U/L (ref 15–37)
BASOPHILS # BLD: 0 K/UL (ref 0–0.2)
BASOPHILS NFR BLD: 0.1 %
BILIRUB SERPL-MCNC: 1.1 MG/DL (ref 0–1)
BILIRUB UR QL STRIP.AUTO: NEGATIVE
BUN SERPL-MCNC: 29 MG/DL (ref 7–20)
CALCIUM SERPL-MCNC: 9.4 MG/DL (ref 8.3–10.6)
CHLORIDE SERPL-SCNC: 101 MMOL/L (ref 99–110)
CLARITY UR: CLEAR
CO2 SERPL-SCNC: 26 MMOL/L (ref 21–32)
COLOR UR: YELLOW
CREAT SERPL-MCNC: 1 MG/DL (ref 0.8–1.3)
DEPRECATED RDW RBC AUTO: 15.4 % (ref 12.4–15.4)
EKG DIAGNOSIS: NORMAL
EKG Q-T INTERVAL: 408 MS
EKG QRS DURATION: 90 MS
EKG QTC CALCULATION (BAZETT): 437 MS
EKG R AXIS: -83 DEGREES
EKG T AXIS: 38 DEGREES
EKG VENTRICULAR RATE: 69 BPM
EOSINOPHIL # BLD: 0 K/UL (ref 0–0.6)
EOSINOPHIL NFR BLD: 0 %
FLUAV RNA RESP QL NAA+PROBE: NOT DETECTED
FLUBV RNA RESP QL NAA+PROBE: NOT DETECTED
GFR SERPLBLD CREATININE-BSD FMLA CKD-EPI: 77 ML/MIN/{1.73_M2}
GLUCOSE SERPL-MCNC: 102 MG/DL (ref 70–99)
GLUCOSE UR STRIP.AUTO-MCNC: NEGATIVE MG/DL
HCT VFR BLD AUTO: 43.2 % (ref 40.5–52.5)
HGB BLD-MCNC: 14.4 G/DL (ref 13.5–17.5)
HGB UR QL STRIP.AUTO: NEGATIVE
HYALINE CASTS #/AREA URNS LPF: ABNORMAL /LPF (ref 0–2)
KETONES UR STRIP.AUTO-MCNC: ABNORMAL MG/DL
LEUKOCYTE ESTERASE UR QL STRIP.AUTO: NEGATIVE
LYMPHOCYTES # BLD: 1.2 K/UL (ref 1–5.1)
LYMPHOCYTES NFR BLD: 11.2 %
MCH RBC QN AUTO: 30.6 PG (ref 26–34)
MCHC RBC AUTO-ENTMCNC: 33.4 G/DL (ref 31–36)
MCV RBC AUTO: 91.7 FL (ref 80–100)
MONOCYTES # BLD: 0.6 K/UL (ref 0–1.3)
MONOCYTES NFR BLD: 5.6 %
MUCOUS THREADS #/AREA URNS LPF: ABNORMAL /LPF
NEUTROPHILS # BLD: 8.8 K/UL (ref 1.7–7.7)
NEUTROPHILS NFR BLD: 83.1 %
NITRITE UR QL STRIP.AUTO: NEGATIVE
PH UR STRIP.AUTO: 5.5 [PH] (ref 5–8)
PLATELET # BLD AUTO: 180 K/UL (ref 135–450)
PMV BLD AUTO: 8.1 FL (ref 5–10.5)
POTASSIUM SERPL-SCNC: 5.2 MMOL/L (ref 3.5–5.1)
PROT SERPL-MCNC: 7.4 G/DL (ref 6.4–8.2)
PROT UR STRIP.AUTO-MCNC: 30 MG/DL
RBC # BLD AUTO: 4.71 M/UL (ref 4.2–5.9)
RBC #/AREA URNS HPF: ABNORMAL /HPF (ref 0–4)
SARS-COV-2 RNA RESP QL NAA+PROBE: NOT DETECTED
SODIUM SERPL-SCNC: 137 MMOL/L (ref 136–145)
SP GR UR STRIP.AUTO: >=1.03 (ref 1–1.03)
TROPONIN, HIGH SENSITIVITY: 23 NG/L (ref 0–22)
TROPONIN, HIGH SENSITIVITY: 25 NG/L (ref 0–22)
UA DIPSTICK W REFLEX MICRO PNL UR: YES
URN SPEC COLLECT METH UR: ABNORMAL
UROBILINOGEN UR STRIP-ACNC: 0.2 E.U./DL
WBC # BLD AUTO: 10.6 K/UL (ref 4–11)
WBC #/AREA URNS HPF: ABNORMAL /HPF (ref 0–5)

## 2024-05-09 PROCEDURE — 87636 SARSCOV2 & INF A&B AMP PRB: CPT

## 2024-05-09 PROCEDURE — 84484 ASSAY OF TROPONIN QUANT: CPT

## 2024-05-09 PROCEDURE — 6360000002 HC RX W HCPCS: Performed by: NURSE PRACTITIONER

## 2024-05-09 PROCEDURE — 93010 ELECTROCARDIOGRAM REPORT: CPT | Performed by: INTERNAL MEDICINE

## 2024-05-09 PROCEDURE — 93005 ELECTROCARDIOGRAM TRACING: CPT | Performed by: STUDENT IN AN ORGANIZED HEALTH CARE EDUCATION/TRAINING PROGRAM

## 2024-05-09 PROCEDURE — 99285 EMERGENCY DEPT VISIT HI MDM: CPT

## 2024-05-09 PROCEDURE — 1200000000 HC SEMI PRIVATE

## 2024-05-09 PROCEDURE — 36415 COLL VENOUS BLD VENIPUNCTURE: CPT

## 2024-05-09 PROCEDURE — 85025 COMPLETE CBC W/AUTO DIFF WBC: CPT

## 2024-05-09 PROCEDURE — 81001 URINALYSIS AUTO W/SCOPE: CPT

## 2024-05-09 PROCEDURE — 6370000000 HC RX 637 (ALT 250 FOR IP): Performed by: NURSE PRACTITIONER

## 2024-05-09 PROCEDURE — 71045 X-RAY EXAM CHEST 1 VIEW: CPT

## 2024-05-09 PROCEDURE — 70450 CT HEAD/BRAIN W/O DYE: CPT

## 2024-05-09 PROCEDURE — 80053 COMPREHEN METABOLIC PANEL: CPT

## 2024-05-09 PROCEDURE — 2580000003 HC RX 258: Performed by: NURSE PRACTITIONER

## 2024-05-09 RX ORDER — SODIUM CHLORIDE 9 MG/ML
INJECTION, SOLUTION INTRAVENOUS PRN
Status: DISCONTINUED | OUTPATIENT
Start: 2024-05-09 | End: 2024-05-11 | Stop reason: HOSPADM

## 2024-05-09 RX ORDER — POTASSIUM CHLORIDE 7.45 MG/ML
10 INJECTION INTRAVENOUS PRN
Status: DISCONTINUED | OUTPATIENT
Start: 2024-05-09 | End: 2024-05-11 | Stop reason: HOSPADM

## 2024-05-09 RX ORDER — ONDANSETRON 2 MG/ML
4 INJECTION INTRAMUSCULAR; INTRAVENOUS EVERY 6 HOURS PRN
Status: DISCONTINUED | OUTPATIENT
Start: 2024-05-09 | End: 2024-05-11 | Stop reason: HOSPADM

## 2024-05-09 RX ORDER — PANTOPRAZOLE SODIUM 20 MG/1
20 TABLET, DELAYED RELEASE ORAL DAILY
Status: DISCONTINUED | OUTPATIENT
Start: 2024-05-09 | End: 2024-05-09

## 2024-05-09 RX ORDER — SODIUM CHLORIDE 0.9 % (FLUSH) 0.9 %
5-40 SYRINGE (ML) INJECTION PRN
Status: DISCONTINUED | OUTPATIENT
Start: 2024-05-09 | End: 2024-05-11 | Stop reason: HOSPADM

## 2024-05-09 RX ORDER — CETIRIZINE HYDROCHLORIDE 10 MG/1
5 TABLET ORAL DAILY
Status: DISCONTINUED | OUTPATIENT
Start: 2024-05-10 | End: 2024-05-11 | Stop reason: HOSPADM

## 2024-05-09 RX ORDER — LACOSAMIDE 50 MG/1
100 TABLET ORAL 2 TIMES DAILY
Status: DISCONTINUED | OUTPATIENT
Start: 2024-05-09 | End: 2024-05-11 | Stop reason: HOSPADM

## 2024-05-09 RX ORDER — MAGNESIUM SULFATE IN WATER 40 MG/ML
2000 INJECTION, SOLUTION INTRAVENOUS PRN
Status: DISCONTINUED | OUTPATIENT
Start: 2024-05-09 | End: 2024-05-11 | Stop reason: HOSPADM

## 2024-05-09 RX ORDER — MONTELUKAST SODIUM 10 MG/1
10 TABLET ORAL DAILY
Status: DISCONTINUED | OUTPATIENT
Start: 2024-05-09 | End: 2024-05-11 | Stop reason: HOSPADM

## 2024-05-09 RX ORDER — LORAZEPAM 0.5 MG/1
0.5 TABLET ORAL 2 TIMES DAILY
Status: DISCONTINUED | OUTPATIENT
Start: 2024-05-09 | End: 2024-05-11 | Stop reason: HOSPADM

## 2024-05-09 RX ORDER — SODIUM CHLORIDE 0.9 % (FLUSH) 0.9 %
5-40 SYRINGE (ML) INJECTION EVERY 12 HOURS SCHEDULED
Status: DISCONTINUED | OUTPATIENT
Start: 2024-05-09 | End: 2024-05-11 | Stop reason: HOSPADM

## 2024-05-09 RX ORDER — POTASSIUM CHLORIDE 20 MEQ/1
40 TABLET, EXTENDED RELEASE ORAL PRN
Status: DISCONTINUED | OUTPATIENT
Start: 2024-05-09 | End: 2024-05-11 | Stop reason: HOSPADM

## 2024-05-09 RX ORDER — POLYETHYLENE GLYCOL 3350 17 G/17G
17 POWDER, FOR SOLUTION ORAL DAILY PRN
Status: DISCONTINUED | OUTPATIENT
Start: 2024-05-09 | End: 2024-05-11 | Stop reason: HOSPADM

## 2024-05-09 RX ORDER — ATORVASTATIN CALCIUM 40 MG/1
80 TABLET, FILM COATED ORAL NIGHTLY
Status: DISCONTINUED | OUTPATIENT
Start: 2024-05-09 | End: 2024-05-11 | Stop reason: HOSPADM

## 2024-05-09 RX ORDER — ACETAMINOPHEN 650 MG/1
650 SUPPOSITORY RECTAL EVERY 6 HOURS PRN
Status: DISCONTINUED | OUTPATIENT
Start: 2024-05-09 | End: 2024-05-11 | Stop reason: HOSPADM

## 2024-05-09 RX ORDER — ENOXAPARIN SODIUM 100 MG/ML
40 INJECTION SUBCUTANEOUS DAILY
Status: DISCONTINUED | OUTPATIENT
Start: 2024-05-09 | End: 2024-05-11 | Stop reason: HOSPADM

## 2024-05-09 RX ORDER — MEMANTINE HYDROCHLORIDE 5 MG/1
10 TABLET ORAL 2 TIMES DAILY
Status: DISCONTINUED | OUTPATIENT
Start: 2024-05-09 | End: 2024-05-11 | Stop reason: HOSPADM

## 2024-05-09 RX ORDER — FERROUS SULFATE 325(65) MG
325 TABLET ORAL 2 TIMES DAILY WITH MEALS
Status: DISCONTINUED | OUTPATIENT
Start: 2024-05-09 | End: 2024-05-11 | Stop reason: HOSPADM

## 2024-05-09 RX ORDER — HYDRALAZINE HYDROCHLORIDE 20 MG/ML
10 INJECTION INTRAMUSCULAR; INTRAVENOUS ONCE
Status: DISCONTINUED | OUTPATIENT
Start: 2024-05-09 | End: 2024-05-10

## 2024-05-09 RX ORDER — FINASTERIDE 5 MG/1
5 TABLET, FILM COATED ORAL DAILY
Status: DISCONTINUED | OUTPATIENT
Start: 2024-05-09 | End: 2024-05-11 | Stop reason: HOSPADM

## 2024-05-09 RX ORDER — ASPIRIN 81 MG/1
81 TABLET ORAL DAILY
Status: DISCONTINUED | OUTPATIENT
Start: 2024-05-09 | End: 2024-05-11 | Stop reason: HOSPADM

## 2024-05-09 RX ORDER — ESCITALOPRAM OXALATE 10 MG/1
10 TABLET ORAL DAILY
Status: DISCONTINUED | OUTPATIENT
Start: 2024-05-09 | End: 2024-05-11 | Stop reason: HOSPADM

## 2024-05-09 RX ORDER — PANTOPRAZOLE SODIUM 20 MG/1
20 TABLET, DELAYED RELEASE ORAL DAILY
Status: DISCONTINUED | OUTPATIENT
Start: 2024-05-11 | End: 2024-05-10

## 2024-05-09 RX ORDER — ACETAMINOPHEN 325 MG/1
650 TABLET ORAL EVERY 6 HOURS PRN
Status: DISCONTINUED | OUTPATIENT
Start: 2024-05-09 | End: 2024-05-11 | Stop reason: HOSPADM

## 2024-05-09 RX ORDER — ONDANSETRON 4 MG/1
4 TABLET, ORALLY DISINTEGRATING ORAL EVERY 8 HOURS PRN
Status: DISCONTINUED | OUTPATIENT
Start: 2024-05-09 | End: 2024-05-11 | Stop reason: HOSPADM

## 2024-05-09 RX ADMIN — ENOXAPARIN SODIUM 40 MG: 100 INJECTION SUBCUTANEOUS at 22:50

## 2024-05-09 RX ADMIN — ATORVASTATIN CALCIUM 80 MG: 40 TABLET, FILM COATED ORAL at 22:49

## 2024-05-09 RX ADMIN — SODIUM CHLORIDE, PRESERVATIVE FREE 10 ML: 5 INJECTION INTRAVENOUS at 22:57

## 2024-05-09 ASSESSMENT — PAIN - FUNCTIONAL ASSESSMENT: PAIN_FUNCTIONAL_ASSESSMENT: NONE - DENIES PAIN

## 2024-05-09 NOTE — ED NOTES
Presenting Problem: Patient presents to ED voluntarily after having increased confusion. Pt was sent in by ambulance. Pt states he has been falling at home. Pt denies suicidal ideations, plan, and intent. Pt denies homicidal ideations. Pt denies audio/visual hallucinations.     Appearance/Hygiene:  well-appearing, hospital attire, lying in bed, good grooming, and good hygiene   Motor Behavior: WNL   Attitude: cooperative  Affect: normal affect   Speech: normal pitch and normal volume  Mood: within normal limits   Thought Processes: [place]  Perceptions: Absent   Thought content: future oriented    Orientation: A&Ox3  Memory: WNL  Concentration: Good    Insight/ judgement: normal insight and judgment      Psychosocial and contextual factors: Pt lives at home with his wife. Pt reports his appetite has been good. Pt reports his sleep is poor and states he gets about five hours of sleep a night and that it is restless. Pt reports he gets support from his wife.      C-SSRS flowsheet is  Complete.    Psychiatric History (including current outpatient provider and past inpatient admissions): Pt reports he has no prior psych admissions. Pt reports [facility]. Pt reports he is not connected with any out pt mental health treatment.     Access to Firearms: denies     ASSESSMENT FOR IMMINENT FUTURE DANGER:    RISK FACTORS:    [x]  Age <25 or >55   [x]  Male gender   []  Depressed mood   []  Active suicidal ideation   []  Suicide plan   []  Suicide attempt   []  Access to lethal means   []  Prior suicide attempt   []  Active substance abuse    []  Highly impulsive behaviors   []  Not attending to self-care/ADLs    []  Recent significant loss   []  Chronic pain or medical illness   []  Social isolation   []  History of violence   []  Active psychosis   []  Cognitive impairment    []  No outpatient services in place   []  Medication noncompliance   []  No collateral information to support safety  [] Self- injurious/

## 2024-05-09 NOTE — H&P
Hospital Medicine History & Physical      PCP: Carlton Leavitt, APRN - CNP    Date of Admission: 5/9/2024    Date of Service: Pt seen/examined on 05/09/24      Chief Complaint:    Chief Complaint   Patient presents with    Altered Mental Status     Increasing confusion over the past week. Reportedly takes Ativan and has been out for about a week as well.          History Of Present Illness:      The patient is a 78 y.o. male with PMH of recent CVA, parkinson's disease, orthstatic hypotension, atrial fibrillation, s/p watchman device, frequent falls, CAD, s/p ADRIÁN, seizures, insomnia who presents to [facility] with increasing confusion.  Pt resting in bed, family at bedside.  Wife has been sick and grandson has been helping them at home.  Pt stated he doesn't think he's confused. Wife stated that he drove to PCP office today and couldn't remember his name or how he arrived to PCP office.  Pt then remembered this happened. Grandson stated that he is under a lot of stress. Pt stated he has not ran out of his ativan although it was mentioned in his chief complaint.  He stated he takes it twice a day at home.  He also stated that he has been falling a lot, feet shuffled at times.  Denies any injuries. He denies hx of Parkinson's disease, stated he followed with neurology in the past and the kept him on Sinemet nightly for tremors. He also verbalized weight loss and dysphagia.  Stated he had his esophagus stretched in the past. Wife stated he coughs sometimes when he eats. He received a steroid injection to right hip a couple days ago and he stated it helped his pain.  History obtained from the patient and review of EMR.     Past Medical History:        Diagnosis Date    A-fib ([facility])     MIRNA (acute kidney injury) ([facility]) 03/31/2022    Anxiety     Arthritis     OA    Bradycardia 04/19/2014    Cancer ([facility])     skin cancer-forearm right , face    Coronary artery disease involving native coronary artery of native heart  Collected: 05/09/24 1030    Order Status: Completed Specimen: Nasopharyngeal Swab Updated: 05/09/24 1111     SARS-CoV-2 RNA, RT PCR NOT DETECTED     Comment: Not Detected results do not preclude SARS-CoV-2 infection and  should not be used as the sole basis for patient management  decisions.  Results must be combined with clinical observations,  patient history, and epidemiological information.  Testing was performed using KHANG BENITO SARS-CoV-2 and Influenza A/B  nucleic acid assay. This test is a multiplex Real-Time Reverse  Transcriptase Polymerase Chain Reaction (RT-PCR)-based in vitro  diagnostic test intended for the qualitative detection of nucleic  acids from SARS-CoV-2, influenza A, and influenza B in nasopharyngeal  and nasal swab specimens for use under the FDA’s Emergency Use  Authorization (EUA) only.    Patient Fact Sheet:  https://www.fda.gov/media/042588/download  Provider Fact Sheet: https://www.fda.gov/media/473483/download  EUA: https://www.fda.gov/media/639292/download  IFU: https://www.fda.gov/media/483240/download    Methodology:  RT-PCR          Influenza A NOT DETECTED     Influenza B NOT DETECTED             EKG:  I have reviewed the EKG with the following interpretation:   Encounter Date: 05/09/24   EKG 12 Lead   Result Value    Ventricular Rate 69    QRS Duration 90    Q-T Interval 408    QTc Calculation (Bazett) 437    R Axis -83    T Axis 38    Diagnosis      Atrial fibrillationLeft axis deviationCannot rule out Inferior infarct , age undeterminedAbnormal ECGWhen compared with ECG of 04-APR-2024 01:14,No significant change was foundConfirmed by TERESITA BE, MARNI ([id number]) on 5/9/2024 1:23:18 PM        RADIOLOGY  CT HEAD WO CONTRAST   Final Result   No acute intracranial abnormality.      Stable exam.         XR CHEST PORTABLE   Final Result   Unchanged small right pleural effusion and/or basilar atelectasis.            limited ECHO 4/4/24     Conclusions      Summary   Limited exam for  Ambien at home  - hold while admitted         Hx of lung cancer s/p thoracotomy  S/p VATS  - chronic right pleural effusion     BPH  - continue home regimen         Note above makes patient higher risk for morbidity and mortality requiring testing and treatment.        DVT Prophylaxis: Lovenox   Diet: Regular   Code Status: Full code - discussed with patient     KATIE Green - CNP

## 2024-05-09 NOTE — ED NOTES
Level of Care Disposition: discharge     Patient was seen by ED provider and Nursing/SW staff.  The case was presented to psychiatric provider on-call .  Based on the ED evaluation and information presented to the provider by this writer, it is the recommendation of the on call psychiatric provider that the patient be discharged from a psychiatric standpoint with the following referrals: [place] and [facility] out [facility] mental health referrals.         Cynthia Hagan MSW,LSW

## 2024-05-09 NOTE — ED PROVIDER NOTES
[facility] ED     EMERGENCY DEPARTMENT ENCOUNTER            Pt Name: Rodolfo Luna   MRN: [id number]   Birthdate 1945   Date of evaluation: 5/9/2024   Provider: Perla Sparrow MD   PCP: Carlton Leavitt, APRN - CNP   Note Started: 8:56 AM EDT 5/9/24          CHIEF COMPLAINT     Chief Complaint   Patient presents with    Altered Mental Status     Increasing confusion over the past week. Reportedly takes Ativan and has been out for about a week as well.       HISTORY OF PRESENT ILLNESS:   History from : Patient   Limitations to history : None     Rodolfo Luna is a 78 y.o. male who presents complaining of intermittent confusion, multiple falls.  Patient has had increasing falls at home.  He saw his primary care provider earlier today who was concerned that he was more confused than his baseline mental status and sent him to the ER to be evaluated.  Patient states that he has been falling more frequently which is confirmed by his family members at bedside but he denies any other complaints or concerns.    Nursing Notes were all reviewed and agreed with, or any disagreements were addressed in the HPI.     REVIEW OF SYSTEMS :    Positives and Pertinent negatives as per HPI.      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    Past Surgical History:   Procedure Laterality Date    ABLATION OF DYSRHYTHMIC FOCUS  9/2014    CARPAL TUNNEL RELEASE      CERVICAL DISC  10 MG TABLET    Take 1 tablet by mouth 2 times daily prn    MONTELUKAST (SINGULAIR) 10 MG TABLET    Take 1 tablet by mouth daily    ONDANSETRON (ZOFRAN-ODT) 4 MG DISINTEGRATING TABLET    Take 1 tablet by mouth 3 times daily as needed for Nausea or Vomiting    PANTOPRAZOLE (PROTONIX) 20 MG TABLET    Take 1 tablet by mouth daily    TICAGRELOR (BRILINTA) 90 MG TABS TABLET    Take 1 tablet by mouth 2 times daily for 21 days    VITAMIN B-2 (RIBOFLAVIN) 25 MG TABS    Take by mouth    ZOLPIDEM (AMBIEN) 10 MG TABLET    Take 1 tablet by mouth nightly as needed.      SCREENINGS          Tala Coma Scale  Eye Opening: Spontaneous  Best Verbal Response: Confused  Best Motor Response: Obeys commands  [place] Coma Scale Score: 14                CIWA Assessment  BP: (!) 158/92  Pulse: 65                  PHYSICAL EXAM :  ED Triage Vitals   BP Temp Temp src Pulse Resp SpO2 Height Weight   -- -- -- -- -- -- -- --      GENERAL APPEARANCE: Awake and alert. Cooperative. No acute distress.  HEAD: Normocephalic. Atraumatic.  EYES: PERRL. EOM's grossly intact.   ENT: Mucous membranes are moist.   NECK: Supple, trachea midline.   HEART: RRR. Normal S1, S2. No murmurs, rubs or gallops.   LUNGS: Respirations unlabored. CTAB. Good air exchange. No wheezes, rales, or rhonchi.  Speaking comfortably in full sentences.   ABDOMEN: Soft. Non-distended. Non-tender. No guarding or rebound.  BACK: No tenderness to palpation in the midline C, T, or L-spine.  No step-offs or obvious deformities.  EXTREMITIES: No peripheral edema. No acute deformities.  SKIN: Warm and dry. No acute rashes.   NEUROLOGICAL: Alert and oriented X 4. CN II-XII intact. No gross facial drooping.  Strength 5/5 in all extremities.  Sensation intact. No pronator drift. Normal coordination. Gait normal.      DIAGNOSTIC RESULTS     LABS:   Labs Reviewed   CBC WITH AUTO DIFFERENTIAL - Abnormal; Notable for the following components:       Result Value    Neutrophils Absolute 8.8 (*)

## 2024-05-09 NOTE — ED NOTES
Collateral Contact:  Name:Carol (wife) Reena ( sister in law)  Phone:Carol [phone], Reena [phone]  Relation to Patient: wife and sister in law   Last Contact with Patient: are with pt currently   Concerns: Carol and Reena report pt has been falling a lot. Carol reports pt is not sleeping. She reports he has been more confused at home and states he does not know half of what he is talking about. She reports they are concerned for his driving and states he drives all over the road. Carol reports pt is not eating at home. Carol and Reena report they really have no support. Carol reports most of their family lives out of state and it is just her and her . Carol reports she has her own health issues as well. Carol reports pt went to his primary care this morning and when he got there he did not know his name or where he was. Carol reports the doctor sent pt out by ambulance to the hospital. Carol and Reena report they do not know if pt is taking his medications. Carol reports pt will not let her help him with his medications. Reena reports she thinks pt might have Dementia. Carol and Reena reports pt will not tell doctors what is going on with him. Carol and Reena report pt is more confused and cannot remember things. Reena states pt has been passing out. Carol reports she is afraid for pt to come home from the hospital. Carol and Reena report pt needs mentally and psychically.         Cynthia Hagan MSW,LSW

## 2024-05-09 NOTE — PROGRESS NOTES
Patient admitted to room 206-2 via wheelchair with transport from ER.  Patient requested up in chair. No distress noted.

## 2024-05-09 NOTE — DISCHARGE INSTRUCTIONS
The crisis number for [facility] is 211 ([employer] Helpline.)  You can use this number at any time to access emergency mental health services.  The National Suicide and Crisis Hotline Number is 988.  You can call, chat, or text this number at any time to access emergency mental health services.      [facility] location:   [address]  [place], [place] [address] [phone]    Alzheimer's Association:   You can contact the Alzheimer's Association for support and more information about Dementia and Alzheimer's. Their 24 hr Helpline is 1-610.657.4099 (https://www.alz.org/)    Senior Services:    [facility]  Location: [address], [place] [id number]  Phone: [phone] ([email or web address])  Other:  They offer a variety of services such as in home personal care, care giver respite care, senior day program, caregiver support, and transportation to appointments. Call and make an appointment to discuss the specific programs and what you may qualify for.     [facility]  Location: [address] [place] [address]  Phone: [phone]  Other: Your source for information on services for seniors, age 60 and over, who live in [place], [place].    [facility]  Location: [address], [address], [place] [address]  Phone: [phone]  Other: Provides services to older adults to help them live independently, stay active and enjoy life. We serve all of [facility], including [place], as well as some surrounding areas.    [facility]'s Office of the [facility] advocates for people receiving home care, assisted living and nursing home care. Paid and volunteer staff work to resolve complaints about services, help people select a provider and offer information about benefits and consumer rights. You can find information about your local [facility] office at [phone]

## 2024-05-09 NOTE — PROGRESS NOTES
Admission assessment completed.  Patient up in chair, denies complaints. Unable to tell date and time. Call light in reach, and able to make needs known.     Vitals:    05/09/24 1723   BP: (!) 160/81   Pulse: 88   Resp: 16   Temp: 98.3 °F (36.8 °C)   SpO2: 96%

## 2024-05-09 NOTE — FLOWSHEET NOTE
05/09/24 1911   Handoff   Communication Given Shift Handoff   Handoff Given To Harman   Handoff Received From All   Handoff Communication Face to Face;At bedside   Time Handoff Given 1912   End of Shift Check Performed Yes

## 2024-05-09 NOTE — ED NOTES
EKG completed and handed to . Reviewed by her at this time. Pt is also on bedside cardiac monitor at this time.

## 2024-05-09 NOTE — PROGRESS NOTES
4 Eyes Skin Assessment     NAME:  Rodolfo Luna  YOB: 1945  MEDICAL RECORD NUMBER:  [id number]    The patient is being assessed for  Admission    I agree that at least one RN has performed a thorough Head to Toe Skin Assessment on the patient. ALL assessment sites listed below have been assessed.      Areas assessed by both nurses:    Head, Face, Ears, Shoulders, Back, Chest, Arms, Elbows, Hands, Sacrum. Buttock, Coccyx, Ischium, and Legs. Feet and Heels        Does the Patient have a Wound? No noted wound(s)       Jorge Prevention initiated by RN: No  Wound Care Orders initiated by RN: No    Pressure Injury (Stage 3,4, Unstageable, DTI, NWPT, and Complex wounds) if present, place Wound referral order by RN under : No    New Ostomies, if present place, Ostomy referral order under : No     Nurse 1 eSignature: Electronically signed by Christine Leon RN on 5/9/24 at 6:56 PM EDT    **SHARE this note so that the co-signing nurse can place an eSignature**    Nurse 2 eSignature: Electronically signed by Michaela Dorman RN on 5/9/24 at 7:13 PM EDT

## 2024-05-10 ENCOUNTER — APPOINTMENT (OUTPATIENT)
Dept: MRI IMAGING | Age: 79
End: 2024-05-10
Payer: MEDICARE

## 2024-05-10 ENCOUNTER — APPOINTMENT (OUTPATIENT)
Dept: GENERAL RADIOLOGY | Age: 79
End: 2024-05-10
Payer: MEDICARE

## 2024-05-10 PROBLEM — R41.0 INTERMITTENT CONFUSION: Status: ACTIVE | Noted: 2024-05-10

## 2024-05-10 LAB
ANION GAP SERPL CALCULATED.3IONS-SCNC: 11 MMOL/L (ref 3–16)
BASOPHILS # BLD: 0 K/UL (ref 0–0.2)
BASOPHILS NFR BLD: 0.4 %
BUN SERPL-MCNC: 32 MG/DL (ref 7–20)
CALCIUM SERPL-MCNC: 9.2 MG/DL (ref 8.3–10.6)
CHLORIDE SERPL-SCNC: 103 MMOL/L (ref 99–110)
CO2 SERPL-SCNC: 26 MMOL/L (ref 21–32)
CREAT SERPL-MCNC: 0.9 MG/DL (ref 0.8–1.3)
DEPRECATED RDW RBC AUTO: 15.5 % (ref 12.4–15.4)
EOSINOPHIL # BLD: 0 K/UL (ref 0–0.6)
EOSINOPHIL NFR BLD: 0 %
GFR SERPLBLD CREATININE-BSD FMLA CKD-EPI: 87 ML/MIN/{1.73_M2}
GLUCOSE BLD-MCNC: 108 MG/DL (ref 70–99)
GLUCOSE BLD-MCNC: 109 MG/DL (ref 70–99)
GLUCOSE BLD-MCNC: 112 MG/DL (ref 70–99)
GLUCOSE BLD-MCNC: 99 MG/DL (ref 70–99)
GLUCOSE SERPL-MCNC: 108 MG/DL (ref 70–99)
HCT VFR BLD AUTO: 45.4 % (ref 40.5–52.5)
HGB BLD-MCNC: 15.2 G/DL (ref 13.5–17.5)
LYMPHOCYTES # BLD: 1.4 K/UL (ref 1–5.1)
LYMPHOCYTES NFR BLD: 11.1 %
MCH RBC QN AUTO: 30.2 PG (ref 26–34)
MCHC RBC AUTO-ENTMCNC: 33.4 G/DL (ref 31–36)
MCV RBC AUTO: 90.6 FL (ref 80–100)
MONOCYTES # BLD: 0.7 K/UL (ref 0–1.3)
MONOCYTES NFR BLD: 5.8 %
NEUTROPHILS # BLD: 10.4 K/UL (ref 1.7–7.7)
NEUTROPHILS NFR BLD: 82.7 %
PERFORMED ON: ABNORMAL
PERFORMED ON: NORMAL
PLATELET # BLD AUTO: 193 K/UL (ref 135–450)
PMV BLD AUTO: 8.1 FL (ref 5–10.5)
POTASSIUM SERPL-SCNC: 4.4 MMOL/L (ref 3.5–5.1)
RBC # BLD AUTO: 5.01 M/UL (ref 4.2–5.9)
SODIUM SERPL-SCNC: 140 MMOL/L (ref 136–145)
WBC # BLD AUTO: 12.6 K/UL (ref 4–11)

## 2024-05-10 PROCEDURE — 97530 THERAPEUTIC ACTIVITIES: CPT

## 2024-05-10 PROCEDURE — 92610 EVALUATE SWALLOWING FUNCTION: CPT

## 2024-05-10 PROCEDURE — 97116 GAIT TRAINING THERAPY: CPT

## 2024-05-10 PROCEDURE — 97166 OT EVAL MOD COMPLEX 45 MIN: CPT

## 2024-05-10 PROCEDURE — 1200000000 HC SEMI PRIVATE

## 2024-05-10 PROCEDURE — 74230 X-RAY XM SWLNG FUNCJ C+: CPT

## 2024-05-10 PROCEDURE — 70551 MRI BRAIN STEM W/O DYE: CPT

## 2024-05-10 PROCEDURE — 99232 SBSQ HOSP IP/OBS MODERATE 35: CPT | Performed by: INTERNAL MEDICINE

## 2024-05-10 PROCEDURE — 36415 COLL VENOUS BLD VENIPUNCTURE: CPT

## 2024-05-10 PROCEDURE — 6360000002 HC RX W HCPCS: Performed by: NURSE PRACTITIONER

## 2024-05-10 PROCEDURE — 6370000000 HC RX 637 (ALT 250 FOR IP): Performed by: NURSE PRACTITIONER

## 2024-05-10 PROCEDURE — 97162 PT EVAL MOD COMPLEX 30 MIN: CPT

## 2024-05-10 PROCEDURE — 85025 COMPLETE CBC W/AUTO DIFF WBC: CPT

## 2024-05-10 PROCEDURE — 92526 ORAL FUNCTION THERAPY: CPT

## 2024-05-10 PROCEDURE — 80048 BASIC METABOLIC PNL TOTAL CA: CPT

## 2024-05-10 PROCEDURE — 92611 MOTION FLUOROSCOPY/SWALLOW: CPT

## 2024-05-10 PROCEDURE — 2580000003 HC RX 258: Performed by: NURSE PRACTITIONER

## 2024-05-10 RX ORDER — LORAZEPAM 2 MG/ML
0.5 INJECTION INTRAMUSCULAR 2 TIMES DAILY
Status: DISCONTINUED | OUTPATIENT
Start: 2024-05-10 | End: 2024-05-11 | Stop reason: HOSPADM

## 2024-05-10 RX ORDER — PANTOPRAZOLE SODIUM 40 MG/1
40 TABLET, DELAYED RELEASE ORAL DAILY
Status: DISCONTINUED | OUTPATIENT
Start: 2024-05-11 | End: 2024-05-11 | Stop reason: HOSPADM

## 2024-05-10 RX ORDER — LORAZEPAM 2 MG/ML
1 INJECTION INTRAMUSCULAR ONCE
Status: DISCONTINUED | OUTPATIENT
Start: 2024-05-10 | End: 2024-05-10

## 2024-05-10 RX ADMIN — SODIUM CHLORIDE, PRESERVATIVE FREE 10 ML: 5 INJECTION INTRAVENOUS at 23:00

## 2024-05-10 RX ADMIN — LORAZEPAM 0.5 MG: 2 INJECTION INTRAMUSCULAR; INTRAVENOUS at 14:36

## 2024-05-10 RX ADMIN — SODIUM CHLORIDE, PRESERVATIVE FREE 10 ML: 5 INJECTION INTRAVENOUS at 09:00

## 2024-05-10 RX ADMIN — LORAZEPAM 0.5 MG: 2 INJECTION INTRAMUSCULAR; INTRAVENOUS at 23:00

## 2024-05-10 RX ADMIN — LACOSAMIDE 100 MG: 50 TABLET, FILM COATED ORAL at 23:00

## 2024-05-10 RX ADMIN — MEMANTINE HYDROCHLORIDE 10 MG: 5 TABLET ORAL at 23:00

## 2024-05-10 NOTE — PROGRESS NOTES
Progress Note    Admit Date:  5/9/2024    Admitted for confusion and AMS  Hx of CVA  +dysphagia     Subjective:  Mr. Luna seen ambulating in room with unsteady gait  Remains forgetful about events but improved from yesterday     Failed SLP eval this am , failed MBS, recent pontine cva noted    Reports he takes ativan only once a day     Objective:   Patient Vitals for the past 4 hrs:   BP Temp Temp src Pulse Resp SpO2   05/10/24 1037 (!) 154/84 98.2 °F (36.8 °C) Oral 69 18 92 %        No intake or output data in the 24 hours ending 05/10/24 1109    Physical Exam:        General: elderly male, up in chair,   Awake, alert and fairly oriented. Appears to be not in any distress  Mucous Membranes:  Pink , anicteric  Neck: No JVD, no carotid bruit, no thyromegaly  Chest:  Clear to auscultation bilaterally, no added sounds  Cardiovascular:  RRR S1S2 heard, no murmurs or gallops  Abdomen:  Soft, undistended, non tender, no organomegaly, BS present  Extremities: No edema or cyanosis. Distal pulses well felt  Neurological : mild right UE tremor, no rigidity    grossly normal with unsteady gait        Scheduled Meds:   carbidopa-levodopa  1 tablet Oral TID AC    sodium chloride flush  5-40 mL IntraVENous 2 times per day    enoxaparin  40 mg SubCUTAneous Daily    aspirin EC  81 mg Oral Daily    atorvastatin  80 mg Oral Nightly    cetirizine  5 mg Oral Daily    escitalopram  10 mg Oral Daily    ferrous sulfate  325 mg Oral BID WC    finasteride  5 mg Oral Daily    lacosamide  100 mg Oral BID    LORazepam  0.5 mg Oral BID    memantine  10 mg Oral BID    montelukast  10 mg Oral Daily    [START ON 5/11/2024] pantoprazole  20 mg Oral Daily       Continuous Infusions:   sodium chloride         PRN Meds:  sodium chloride flush, sodium chloride, potassium chloride **OR** potassium alternative oral replacement **OR** potassium chloride, magnesium sulfate, ondansetron **OR** ondansetron, polyethylene glycol, acetaminophen **OR**  acetaminophen      Data:  CBC:   Recent Labs     05/09/24  1030 05/10/24  0618   WBC 10.6 12.6*   HGB 14.4 15.2   HCT 43.2 45.4   MCV 91.7 90.6    193     BMP:   Recent Labs     05/09/24  1030 05/10/24  0618    140   K 5.2* 4.4    103   CO2 26 26   BUN 29* 32*   CREATININE 1.0 0.9     LIVER PROFILE:   Recent Labs     05/09/24  1030   AST 19   ALT <5*   BILITOT 1.1*   ALKPHOS 173*     PT/INR: No results for input(s): \"PROTIME\", \"INR\" in the last 72 hours.    CULTURES  Results       Procedure Component Value Units Date/Time    COVID-19 & Influenza Combo [[id number]] Collected: 05/09/24 1030    Order Status: Completed Specimen: Nasopharyngeal Swab Updated: 05/09/24 1111     SARS-CoV-2 RNA, RT PCR NOT DETECTED     Comment: Not Detected results do not preclude SARS-CoV-2 infection and  should not be used as the sole basis for patient management  decisions.  Results must be combined with clinical observations,  patient history, and epidemiological information.  Testing was performed using KHANG BENITO SARS-CoV-2 and Influenza A/B  nucleic acid assay. This test is a multiplex Real-Time Reverse  Transcriptase Polymerase Chain Reaction (RT-PCR)-based in vitro  diagnostic test intended for the qualitative detection of nucleic  acids from SARS-CoV-2, influenza A, and influenza B in nasopharyngeal  and nasal swab specimens for use under the FDA’s Emergency Use  Authorization (EUA) only.    Patient Fact Sheet:  https://www.fda.gov/media/203308/download  Provider Fact Sheet: https://www.fda.gov/media/486219/download  EUA: https://www.fda.gov/media/135913/download  IFU: https://www.fda.gov/media/031093/download    Methodology:  RT-PCR          Influenza A NOT DETECTED     Influenza B NOT DETECTED                 Encounter Date: 05/09/24   EKG 12 Lead   Result Value    Ventricular Rate 69    QRS Duration 90    Q-T Interval 408    QTc Calculation (Bazett) 437    R Axis -83    T Axis 38    Diagnosis      Atrial

## 2024-05-10 NOTE — FLOWSHEET NOTE
05/09/24 1946   Vital Signs   Temp 98.4 °F (36.9 °C)   Temp Source Oral   Pulse 69   Heart Rate Source Monitor   Respirations 16   BP (!) 123/53   MAP (Calculated) 76   BP Location Left upper arm   BP Method Automatic   Patient Position Up in chair   Oxygen Therapy   SpO2 95 %   O2 Device None (Room air)     Shift assessment completed. Pt is alert. Vital signs are WNL. Respirations are even & easy. No complaints voiced. Pt denies needs at this time. SR up x 2 and bed in low position. Call light is within reach. Will monitor.

## 2024-05-10 NOTE — PROGRESS NOTES
Inpatient Physical Therapy Evaluation & Treatment    Unit: [facility]  Date:  5/10/2024  Patient Name:    Rodolfo Luna  Admitting diagnosis:  Generalized weakness [R53.1]  Multiple falls [R29.6]  Intermittent confusion [R41.0]  Admit Date:  5/9/2024  Precautions/Restrictions/WB Status/ Lines/ Wounds/ Oxygen: Fall risk, Bed/chair alarm, and Lines (IV)      Pt seen for cotreatment this date due to patient safety, patient endurance, complexity of condition, and acute illness/injury    Treatment Time:  10:19-10:40 & 14:04-14:22  Treatment Number:  1   Timed Code Treatment Minutes: 29 minutes  Total Treatment Minutes:  39  minutes    Patient Stated Goals for Therapy: \" to go home \"          Discharge Recommendations: Home with 24hr supervision and Home PT  DME needs for discharge: Shower Chair       Therapy recommendation for EMS Transport: can transport by wheelchair    Therapy recommendations for staff:   Assist of 1 for ambulation with use of gait belt to/from BSC  to/from chair  to/from bathroom  within room    History of Present Illness:   \"History of present illness: per Neuro:  This is a 78 years old right-handed male.  The patient was brought to the hospital yesterday.  After the patient has been confused since last week.  The patient is noted for underlying seizure disorder, Parkinson disease and recent CVA with underlying atrial fibrillation s/p Watchman device.  There was no seizure-like activity reported.  The patient is currently on lacosamide and Sinemet for seizure and Parkinson disease.  Upon admission, the patient had elevated blood pressure.  The patient denies any weakness, numbness or headache during my assessment.       Assessment:     1.  Altered mental status.  2.  Underlying Parkinson disease  3.  Underlying seizure disorder.  4.  Recent brainstem infarction.     The patient has mild degree of akinetic rigid syndrome and generalized muscular wasting.  Previous MRI brain showed left pontine  infarction and mild to moderate small vessel disease.  Previous EEG showed left temporal sharp waves.  Previous CTA of the head neck showed 30% stenosis of right ICA.  The current CT brain during this admission showed no acute pathology.     From neurology perspective, the etiology of confusion can be TIA/recurrent stroke or subclinical seizure.\"      Past medical history:     Past Medical History[]Expand by Default        Past Medical History:   Diagnosis Date    A-fib ([facility])      MIRNA (acute kidney injury) ([facility]) 03/31/2022    Anxiety      Arthritis       OA    Bradycardia 04/19/2014    Cancer ([facility])       skin cancer-forearm right , face    Coronary artery disease involving native coronary artery of native heart without angina pectoris 07/19/2022    Hemoptysis 10/16/2014     Since Ablation    Irregular heart  beats      Mixed hyperlipidemia 03/17/2023    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  Pt agreeable to this PT session.     Cognition    A&O Person, Place, Time, and Situation   Able to follow 2 step commands  Does not have recall of events preceding fall last week    Pain   No  Location:   Rating: NA /10  Pain Medicine Status: No request made    Preadmission Environment:   Pt. Lives                                             Spouse and grandson (staying there for about a month)  Home environment:                            one story home  Steps to enter first floor:                     1  steps to enter (step is 6 inches) and Hand rail unilateral  Steps to second floor/basement:        N/A  Laundry:                                              1st floor  Bathroom:                                           tub/shower unit, grab bars, hand held shower head, and comfort height toilet  Pt sleeps in a:                                     Flat bed  Equipment owned:                              RW, rollator, shower chair, and manual WC     Preadmission Status:  Pt. Able to drive:                                 Yes  Pt. Fully independent with ADLs:       Yes  Pt. Required assistance for:               Independent PTA  Pt. independent for functional transfers and utilized No Device for mobility in home and No Device out in community  History of falls:                                    Yes - patient reports 1 fall in the past 6 months   Home Health Services:                       None       Objective  Does this pt have an acute or acute on chronic diagnosis of CHF? No    Upper Extremity ROM/Strength  Please see OT evaluation.      Lower Extremity ROM / Strength   AROM WFL: Yes  ROM limitations:     BLE strength WFL, but not formally assessed with MMT.   Grossly 4/5 BLE    Lower Extremity Sensation    NT    Coordination  Diminished    Tone  Not Tested    Balance  Static Sitting:  Normal; Independent  Dynamic Sitting:  Good ; Supervision   Comments:     Static Standing: Good ; SBA  Dynamic Standing: Good ; SBA  Comments: no LOB ,no AD    Posture  Seated: Forward head and neck  Standing: Forward head and neck    Bed Mobility   Supine to Sit:    Not Tested  Sit to Supine:   Not Tested  Rolling:   Not Tested   Scooting in sitting: Independent   Scooting in supine:  Not Tested   Bridging:  Not Tested    Transfer Training     Sit to stand:   SBA   Stand to sit:   SBA   Bed to/from Chair:  Not Tested with use of N/A    Gait gait completed as indicated below  Distance:      50,200,50 ft  Deviations (firm

## 2024-05-10 NOTE — PROCEDURES
SPEECH/LANGUAGE PATHOLOGY  Swallowing Disorders and Dysphagia  VIDEOFLUOROSCOPIC STUDY OF SWALLOWING (VFSS) / Modified Barium Swallow Study (MBSS)  [facility] [address]    Diet Recommendation: IDDSI 5 Minced and moist Solids ; IDDSI 2 Mildly Thick Liquids; Meds crushed in puree as able  Risk Management: upright for all intake, stay upright for at least 30 mins after intake, small bites/sips, close supervision, oral care q4 hrs to reduce adverse affects in the event of aspiration, increase physical mobility as able, alternate bites/sips, slow rate of intake, general GERD precautions, STRICT aspiration precautions, and hold PO and contact SLP if s/s of aspiration or worsening respiratory status develop.  Specialist Referrals: GI and Neuro    PATIENT NAME:  Rodolfo Luna      :  1945    Room: 0206/0206-02   TODAY'S DATE:  5/10/2024    [x]The admitting diagnosis and active problem list, as listed below have been reviewed prior to initiation of this evaluation.     ADMITTING DIAGNOSIS: Generalized weakness [R53.1]  Multiple falls [R29.6]  Intermittent confusion [R41.0]     ACTIVE PROBLEM LIST:   Patient Active Problem List   Diagnosis    Dizziness    Syncope and collapse    Contusion of knee, right    Dysphagia    Encounter for electronic analysis of reveal event recorder    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HCC)    History of nonmelanoma skin cancer    Ischemic chest pain (HCC)    Left hand pain    Hand pain, right    Arthritis of carpometacarpal (CMC) joints of both thumbs    Radial styloid tenosynovitis (de quervain)    Left elbow fracture, closed, initial encounter    Multiple falls    General weakness    Thrombocytopenia (HCC)    Malignant neoplasm of lung (HCC)    Orthostasis    Hypotension    Chest pain    Abnormal finding on EKG    Digoxin toxicity, accidental or unintentional, initial encounter    COVID-19 virus infection    COVID    Rib pain on right side    Coronary artery  retention at the pyriforms, aspiration during the swallow of active bolus and retained thin contrast, episodes of aspiration x3 for single bolus. Delayed and ineffective cough noted 2/3 episodes. Significant post-swallow contrast rentention in the pyriforms, UES, and valleculae.    Attempted chin tuck per best practice with Parkinson's Disease. Pt not able to elicit despite cues. Ineffective.     Trial: Mildly Thick (Nectar) Cup  Posture: Head Neutral  Strategy: N/A-Normal Swallow  Results: Delayed epiglottic retraction, shallow vestibular penetration that does not clear, no aspiration, significant post-swallow contrast retention in the valleculae and pyriforms-not completely cleared despite multiple cued swallows.    Trial: Puree  Posture: Head Neutral  Strategy: N/A-Normal Swallow  Results:   Premature spillage into the pharynx to the superior tip of the epiglottis, delayed and incomplete epiglottic inversion, deep vestibular penetration to the cords that clears with laryngeal vestibule closure and occurs with the following clearance swallows,  significant post-swallow contrast retention in the valleculae and pyriforms-not completely cleared despite multiple cued swallows. Liquid wash minimally effective     Trial: Soft Solid  Posture: Head Neutral and Head Turn Right  Strategy: N/A-Normal Swallow  Results: Premature spillage over the base of tongue, delayed and incomplete epiglottic inversion,  significant post-swallow contrast retention in the valleculae and pyriforms-not completely cleared despite multiple cued swallows. Liquid wash minimally effective. Head turn right not effective in promoting clearance.          Oral Phase Severity: Mild  Oral Phase:  Poor oral bolus control   Reduced oral sensation / awareness        Pharyngeal Phase Severity: Moderate-Severe  Pharyngeal Phase:  Reduced lingual base retraction   Impaired / Inadequate anterior hyoid movement  Impaired / inadequate epiglottic  made to eject.     Assessment: moderate-severe oropharyngeal dysphagia, likely chronic related to Parkinson's Disease, Progressive nature of disease/condition, and the aging swallow Swallow safety is impaired; swallow efficiency is impaired Pt appears to be at high risk for aspiration PNA, and high risk for malnutrition/dehydration. Diet modification is warranted. Swallow prognosis is guarded given severity of laryngeal dysfunction, reduced physical mobility, and medical comorbidities. Pt appears to be a fair candidate for swallow rehabilitation    Patient presents as high aspiration risk and high PNA / adverse pulmonary event risk given the following factors via BOLUS Framework (WILLOW Alvarez. & Austin, A.H. (2021):  poor overall health / frail  Impaired respiratory status / weak cough  hx GERD / GI disease affecting oropharyngeal swallowing  Current smoker / chronic smoking hx         Diet Recommendation: IDDSI 5 Minced and moist Solids ; IDDSI 2 Mildly Thick Liquids; Meds crushed in puree as able  Risk Management: upright for all intake, stay upright for at least 30 mins after intake, small bites/sips, close supervision, oral care q4 hrs to reduce adverse affects in the event of aspiration, increase physical mobility as able, alternate bites/sips, slow rate of intake, general GERD precautions, STRICT aspiration precautions, and hold PO and contact SLP if s/s of aspiration or worsening respiratory status develop.  Specialist Referrals: GI and Neuro  Ancillary Tests: Defer to GI  Goals: Tolerate LRD  Follow-up exam: 3-5x/wk    Diagnosis Code: R13.12- oropharyngeal dysphagia    Education   SLP educated the patient re: Role of SLP, rationale for completion of assessment, anatomical components of swallow structures as they pertain to airway protection, results of assessment, recommendations, POC, and rationale for MBS  Response to education:   [] Verbalized Understanding  [] Demonstrated Understanding  [] No evidence

## 2024-05-10 NOTE — PROGRESS NOTES
Pt a/o. Am assessment completed see flow sheet. Pt denies any pain/ needs at this time. Call light within reach. Patient impulsive, fall risk. Bed and Chair alarm on for safety.  Patient and family aware of NPO status, explained to patient and spouse regarding failed barium swallow this am. Patient at risk for aspiration. GI consulted. Ativan given as ordered IV.  MRI completed.   Vitals:    05/10/24 1611   BP: (!) 149/93   Pulse: 72   Resp: 18   Temp: 98.8 °F (37.1 °C)   SpO2: 92%

## 2024-05-10 NOTE — CONSULTS
Neurology consultation note    Patient name: Rodolfo Luna      Chief Complaint:  Altered mental status.    History of present illness:  This is a 78 years old right-handed male.  The patient was brought to the hospital yesterday.  After the patient has been confused since last week.  The patient is noted for underlying seizure disorder, Parkinson disease and recent CVA with underlying atrial fibrillation s/p Watchman device.  There was no seizure-like activity reported.  The patient is currently on lacosamide and Sinemet for seizure and Parkinson disease.  Upon admission, the patient had elevated blood pressure.  The patient denies any weakness, numbness or headache during my assessment.    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    S/P drug eluting coronary stent placement 03/06/2017    2.25 x 18 Xience Alpine ADRIÁN - Distal LAD    Seizure disorder ([facility])     4 weeks ago black out, pt states seizure but may be due to Heart Rate changes    Seizures ([facility])     Shortness of breath 09/04/2014    Total knee replacement status 01/12/2011       Past surgical history:    Past Surgical History:   Procedure Laterality Date    ABLATION OF DYSRHYTHMIC FOCUS  9/2014    CARPAL TUNNEL RELEASE      CERVICAL DISC SURGERY  2010    COLONOSCOPY  09/26/2016    normal    CORONARY ANGIOPLASTY WITH STENT PLACEMENT      FEMUR SURGERY      left    KNEE SURGERY  1-12-11 R total knee replacement with femoral nerve block for pain control    LUNG REMOVAL, PARTIAL Right     20%    OTHER SURGICAL  Sister     Arthritis Brother     Arthritis Maternal Grandmother     Arthritis Maternal Grandfather     Arthritis Paternal Grandmother     Diabetes Paternal Grandmother     Arthritis Paternal Grandfather         Review of system:  No chest pain, shortness of breath, palpitation, cough, fever, abdominal pain, vomiting, diarrhea, dysuria, vertigo, joint pain, change in speech/vision or new onset of weakness/numbness. Remaining as per HPI.      BP (!) 165/93   Pulse 71   Temp 98.2 °F (36.8 °C) (Oral)   Resp 16   Ht 1.778 m (5' 10\")   Wt 75.2 kg (165 lb 11.2 oz)   SpO2 97%   BMI 23.78 kg/m²     Neurological examination:  MENTAL STATUS:  Alert and oriented to person.  LANG/SPEECH: No dysarthria.  CRANIAL NERVES: No facial asymmetry.  MOTOR: No pronator drift or leg drift.  REFLEXES: Generalized 2+.  SENSORY: Grossly intact.  COORD: Mild degree of akinetic rigid syndrome.    Generalized muscular wasting is noted.    Imaging, procedure, and laboratory data:   I reviewed the previous brain imagings.    Assessment:    1.  Altered mental status.  2.  Underlying Parkinson disease  3.  Underlying seizure disorder.  4.  Recent brainstem infarction.    The patient has mild degree of akinetic rigid syndrome and generalized muscular wasting.  Previous MRI brain showed left pontine infarction and mild to moderate small vessel disease.  Previous EEG showed left temporal sharp waves.  Previous CTA of the head neck showed 30% stenosis of right ICA.  The current CT brain during this admission showed no acute pathology.    From neurology perspective, the etiology of confusion can be TIA/recurrent stroke or subclinical seizure.    Plan:    1.  Continue aspirin and statin.  2.  The target blood pressure below 160/100.  3.  MRI brain without contrast.  4.  Increase Sinemet 3 times a day.  5.  If the MRI shows no new ischemic injury, neurology recommend to increase lacosamide to 150 mg twice daily.  6.  Seizure precaution.    Follow-up

## 2024-05-10 NOTE — CONSULTS
Gastroenterology Consult Note    Patient:   Rodolfo Luna   :    1945   Facility:   [facility]  Referring/PCP: Carlton Leavitt APRN - CNP  Date:     5/10/2024  Consultant:   KATIE Emmanuel CNP      Chief Complaint   Patient presents with    Altered Mental Status     Increasing confusion over the past week. Reportedly takes Ativan and has been out for about a week as well.         History of Present illness   Pt. Is a 77 yo male with pmx of CAD, HLD, lung cancer s/p VATS, Parkinson's disease, A.fib s/p Watchman, and recent CVA () who presented to ED  with c/o confusion. He was reportedly having frequent falls and altered mental status noted by family at home. He was brought into hospital due to recent CVA. He is not good historian. CT head this admission was negative. He is being evaluated by Neurology. He reports dysphagia with solids for the past 6 months. He also reports unintentional weight loss. He denies abdominal pain, nausea, vomiting, acid reflux, change in bowel habits, and melena. He reports requiring esophageal dilations in the past. His last EGD was 3/23 with Dr. Duran with presbyesophagus noted with dilation. His last colonoscopy was in . He takes ASA and pantoprazole.     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    S/P drug eluting coronary stent placement 2017    2.25 x 18 Xience Alpine ADRIÁN - Distal LAD    Seizure disorder ([facility])     4 weeks ago black out, pt states seizure but may be due to Heart Rate changes     Seizures (HCC)     Shortness of breath 2014    Total knee replacement status 2011     Past Surgical History:   Procedure Laterality Date    ABLATION OF DYSRHYTHMIC FOCUS  2014    CARPAL TUNNEL RELEASE      CERVICAL DISC SURGERY      COLONOSCOPY  2016    normal    CORONARY ANGIOPLASTY WITH STENT PLACEMENT      FEMUR SURGERY      left    KNEE SURGERY  11 R total knee replacement with femoral nerve block for pain control    LUNG REMOVAL, PARTIAL Right     20%    OTHER SURGICAL HISTORY Bilateral     excisions of lesions on bilat.neck and back    OTHER SURGICAL HISTORY  2014    Reveal Loop recorder placed in Cath Lab    UPPER GASTROINTESTINAL ENDOSCOPY  2016    gastric and esophogeal biopsy       Social:   Social History     Tobacco Use    Smoking status: Former     Current packs/day: 0.00     Average packs/day: 2.0 packs/day for 40.0 years (80.0 ttl pk-yrs)     Types: Cigarettes     Start date: 1953     Quit date: 1993     Years since quittin.9     Passive exposure: Never    Smokeless tobacco: Never    Tobacco comments:     20 yrs   Substance Use Topics    Alcohol use: No     Family:   Family History   Problem Relation Age of Onset    Heart Disease Mother     Arthritis Mother     High Blood Pressure Mother     Miscarriages / Stillbirths Mother     Stroke Mother     Arthritis Father     Heart Disease Father     Cancer Sister     Arthritis Sister     Depression Sister     Arthritis Brother     Arthritis Maternal Grandmother     Arthritis Maternal Grandfather     Arthritis Paternal Grandmother     Diabetes Paternal Grandmother     Arthritis Paternal Grandfather      No current facility-administered medications on file prior to encounter.     Current Outpatient Medications on File Prior to Encounter   Medication Sig Dispense Refill    aspirin EC 81 MG EC tablet Take 1 tablet by mouth daily 90 tablet 1    atorvastatin (LIPITOR) 80 MG tablet Take 1 tablet by mouth nightly 30  5/10  Tracheal aspiration with the thin liquids and laryngeal penetration with  nectar thick liquids.     Significant vallecular residue with poor inversion of the epiglottis.    Assessment:   77 yo male with pmx of CAD, HLD, lung cancer s/p VATS, Parkinson's disease, A.fib s/p Watchman, and recent CVA (4/24) admitted with confusion. Reported dysphagia likely secondary to esophageal dysmotility. MBS with tracheal aspiration. EGD 3/23 with noted presbyesophagus.     Plan:   Continue supportive care  Check barium esophagram for esophageal dysmotility   Aspiration precautions  PPI daily  Diet per SLP recs  Neurology consulted--MRI brain ordered    KATIE Emmanuel CNP  11:24 AM 5/10/2024

## 2024-05-10 NOTE — FLOWSHEET NOTE
05/10/24 1952   Handoff   Communication Given Shift Handoff   Handoff Given To Nadiya   Handoff Received From All   Handoff Communication Face to Face;At bedside   Time Handoff Given 1950   End of Shift Check Performed Yes

## 2024-05-10 NOTE — PROGRESS NOTES
Shift report given to All at bedside. Patient is stable. All end of shift needs have been met. No further assistance needed at this time.

## 2024-05-10 NOTE — PROGRESS NOTES
Inpatient Occupational Therapy Evaluation and Treatment    Unit: [address] [place]  Date:  5/10/2024  Patient Name:    Rodolfo Luna  Admitting diagnosis:  Generalized weakness [R53.1]  Multiple falls [R29.6]  Intermittent confusion [R41.0]  Admit Date:  5/9/2024  Precautions/Restrictions/WB Status/ Lines/ Wounds/ Oxygen: Fall risk, Bed/chair alarm, and Lines (IV)    Pt seen for cotreatment this date due to patient safety, patient endurance, and limited functional status information    Treatment Time:  10:19-10:40, 14:04 - 14:22  Treatment Number:  1  Timed Code Treatment Minutes: 29 minutes  Total Treatment Minutes: 39 minutes    Patient Goals for Therapy: \"to go home\"          Discharge Recommendations: Home with 24/7 supervision and Home OT  DME needs for discharge: Needs Met       Therapy recommendations for staff:   Assist of 1 for ambulation with use of No AD and gait belt to/from chair  to/from bathroom  within room    History of Present Illness: Per H&P  \"Patient is a 78 y.o. male with PMH of recent CVA, parkinson's disease, orthstatic hypotension, atrial fibrillation, s/p watchman device, frequent falls, CAD, s/p ADRIÁN, seizures, insomnia who presents to [facility] with increasing confusion.  Pt resting in bed, family at bedside.  Wife has been sick and grandson has been helping them at home.  Pt stated he doesn't think he's confused. Wife stated that he drove to PCP office today and couldn't remember his name or how he arrived to PCP office.  Pt then remembered this happened. Grandson stated that he is under a lot of stress. Pt stated he has not ran out of his ativan although it was mentioned in his chief complaint.  He stated he takes it twice a day at home.  He also stated that he has been falling a lot, feet shuffled at times.  Denies any injuries. He denies hx of Parkinson's disease, stated he followed with neurology in the past and the kept him on Sinemet nightly for tremors. He also verbalized weight loss  Extremity Strength:    Patient's bilateral shoulder flexion/extension is 4/5, elbow flexion/extension is 4/5, and  strength is 4/5    Upper Extremity Sensation:    WFL - denies numbness/tingling in BUEs    Upper Extremity Proprioception:  NT    Coordination:  WFL    Tone:  NT    Balance:  Sitting:    Supervision for static and dynamic sitting  Standing:    SBA for static and dynamic standing    Bed Mobility:   Supine to Sit:    Not Tested  Sit to Supine:   Not Tested  Rolling:   Not Tested  Scooting in sitting: Supervision  Scooting in supine:  Not Tested    Transfers:    Sit to stand:    SBA  Stand to sit:    SBA  Bed to chair:     Not Tested  Bed/ chair to standard toilet:  Not Tested  Bed/chair to BSC:   Not Tested  Functional Mobility:   SBA to ambulate with the use of a gait belt and no AD    See PT note for gait analysis.    ADLs:  Dressing:      UE:   Not Tested  LE:    Not Tested    Bathing:    UE:  Not Tested  LE:  Not Tested    Eating:   Not Tested    Toileting:  Not Tested    Grooming/hygiene: Not Tested    Activity Tolerance:   Pt completed therapy session with no adverse symptoms     BP (mmHg) HR (bpm) SpO2 (%) on  Comments   Supine at rest 162/88 85 96%    Seated in recliner 149/90    162/91       Following functional mobility   Standing       End of session         Positioning Needs:   Pt up in chair, alarm set, call light provided and all needs within reach .     Ther Ex / Activities Initiated:   N/A    Patient/Family Education:   Pt educated on role of inpatient OT, plan of care, importance of continued activity, DC recommendations and Calling for assist with mobility.    CHF Education  N/A    Assessment:  Pt seen for Occupational therapy evaluation in acute care setting.  Pt demonstrated decreased Activity tolerance, ADLs, Balance , Safety Awareness, and Transfers. Pt functioning below baseline and will likely benefit from skilled occupational therapy services to maximize safety and  independence.     Patient seen for skilled acute care occupational therapy evaluation. Patient required stand by assistance with functional transfer performance and stand by assistance with functional mobility during the session. Patient ambulated within room and out in the hallway with one episode of LOB noted, which he was able to self-correct. Patient is currently functioning below baseline as he was independent with bed mobility, functional transfer performance, functional mobility, ADL performance, and he currently requires some assistance with those tasks. Patient would benefit from skilled HHOT services upon discharge in order to return to PLOF. Patient would benefit from continued skilled acute care occupational therapy services during his length of stay to increase independence with ADL performance and functional mobility in order to maximize his functional potential in the acute care setting.      Recommending Home 24 hr supervision and with home OT upon discharge as patient functioning below baseline level and would benefit from continued therapy services    Goal(s) :   To be met in 3 Visits:  Bed to toilet/BSC:       Independent  Pt will complete 3/3 CHF goals     N/A    To be met in 5 Visits:  Supine to/from Sit in preparation for ADL task:   Independent  Toileting        Independent  Grooming       Independent  Upper Body Dressing:      Independent  Lower Body Dressing:      Independent  Pt to demonstrate UE therapeutic exs x 15 reps with minimal cues    Rehabilitation Potential: Good  Strengths for achieving goals include: Pt motivated, PLOF, and Pt cooperative   Barriers to achieving goals include:  Complexity of condition    Plan:  To be seen 2-3 x/wk  while in acute care setting for therapeutic exercises, bed mobility, transfers, family/patient education, ADL/IADL retraining, and energy conservation training.    Electronically signed by Marco La OT on 5/10/2024 at 7:57 AM      If patient

## 2024-05-10 NOTE — CARE COORDINATION
Case Management Assessment  Initial Evaluation    Date/Time of Evaluation: 5/10/2024 3:25 PM  Assessment Completed by: AGBBI Durbin    If patient is discharged prior to next notation, then this note serves as note for discharge by case management.    Patient Name: Rodolfo Luna                   YOB: 1945  Diagnosis: Generalized weakness [R53.1]  Multiple falls [R29.6]  Intermittent confusion [R41.0]                   Date / Time: 5/9/2024  8:56 AM    Patient Admission Status: Inpatient   Readmission Risk (Low < 19, Mod (19-27), High > 27): Readmission Risk Score: 16.6    Current PCP: Carlton Leavitt, KATIE - CNP  PCP verified by CM? (P) Yes    Chart Reviewed: Yes      History Provided by: (P) Patient, Spouse  Patient Orientation: (P) Alert and Oriented    Patient Cognition: (P) Alert    Hospitalization in the last 30 days (Readmission):  No    If yes, Readmission Assessment in CM Navigator will be completed.    Advance Directives:      Code Status: Full Code   Patient's Primary Decision Maker is: (P) Legal Next of Kin    Primary Decision Maker: Helen Luna A - Spouse - [phone]    Discharge Planning:    Patient lives with: (P) Spouse/Significant Other Type of Home: (P) House  Primary Care Giver: (P) Self  Patient Support Systems include: (P) Spouse/Significant Other, Children, Family Members   Current Financial resources: (P) Medicare  Current community resources: (P) None  Current services prior to admission: (P) Durable Medical Equipment            Current DME: (P) Wheelchair, Walker, Cane            Type of Home Care services:  (P) None    ADLS  Prior functional level: (P) Independent in ADLs/IADLs  Current functional level: (P) Independent in ADLs/IADLs    PT AM-PAC: 20 /24  OT AM-PAC: 18 /24    Family can provide assistance at DC: (P) Other (comment) (spouse is ill, grandson is injured so they are limited help)  Would you like Case Management to discuss the discharge plan with any  [R29.6]  Intermittent confusion [R41.0]    IF APPLICABLE: The Patient and/or patient representative Rodolfo and his family were provided with a choice of provider and agrees with the discharge plan. Freedom of choice list with basic dialogue that supports the patient's individualized plan of care/goals and shares the quality data associated with the providers was provided to:     Patient Representative Name:       The Patient and/or Patient Representative Agree with the Discharge Plan?      GABBI Durbin  Case Management Department  Ph: [phone] Fax:

## 2024-05-10 NOTE — PROGRESS NOTES
REMOVAL, PARTIAL Right     20%    OTHER SURGICAL HISTORY Bilateral     excisions of lesions on bilat.neck and back    OTHER SURGICAL HISTORY  9/2014    Reveal Loop recorder placed in Cath Lab    UPPER GASTROINTESTINAL ENDOSCOPY  09/26/2016    gastric and esophogeal biopsy         Problem Relation Age of Onset    Heart Disease Mother     Arthritis Mother     High Blood Pressure Mother     Miscarriages / Stillbirths Mother     Stroke Mother     Arthritis Father     Heart Disease Father     Cancer Sister     Arthritis Sister     Depression Sister     Arthritis Brother     Arthritis Maternal Grandmother     Arthritis Maternal Grandfather     Arthritis Paternal Grandmother     Diabetes Paternal Grandmother     Arthritis Paternal Grandfather      Allergies   Allergen Reactions    Apixaban      Other reaction(s): Other (See Comments), unknown/H&P    Cefuroxime Axetil      Other reaction(s): Hives    Lisinopril      Other reaction(s): Other (See Comments), unknown/H&P    Morphine      Bad sweats  Other reaction(s): GI upset, Other (See Comments), shaking  Bad sweats  Bad sweats      Other Other (See Comments)     Pt. States that there is another med that he is allergic to,does not know the name states that it makes him sweat  Other reaction(s): Throat irritation    Rivaroxaban      Other reaction(s): Coordination problem, Other (See Comments)    Clopidogrel Rash    Codeine Anxiety, Nausea Only and Rash     dizzy  Other reaction(s): Nausea/ Sweaty, Other (See Comments)  dizzy      Penicillins Nausea And Vomiting and Rash     Other reaction(s): Dizzy, Other (See Comments), shaking    Plavix [Clopidogrel Bisulfate] Rash       DATE ONSET: 5/9/2024    Date of Evaluation: 5/10/2024   Evaluating Therapist: Jessy Rehman, SLP    Chart Reviewed: : [x] Yes [] No     Pain: The patient complains of some lower sternal pain- RN notified.           Data Review:        Current Diet: Diet NPO    Lab Values:    CBC:  Lab Results    Component Value Date    WBC 12.6 (H) 05/10/2024    HGB 15.2 05/10/2024    HCT 45.4 05/10/2024    MCV 90.6 05/10/2024     05/10/2024         Basic Metabolic Panel  Lab Results   Component Value Date/Time     05/10/2024 06:18 AM     05/10/2024 06:18 AM    CO2 26 05/10/2024 06:18 AM    GLUCOSE 108 05/10/2024 06:18 AM    GLUCOSE 100 06/03/2016 09:48 AM    BUN 32 05/10/2024 06:18 AM    CREATININE 0.9 05/10/2024 06:18 AM       HEPATIC PANEL   Lab Results   Component Value Date/Time    AST 19 05/09/2024 10:30 AM    ALT <5 05/09/2024 10:30 AM       Lab Results   Component Value Date    TROPONINI <0.01 07/17/2022       Lab Results   Component Value Date/Time    INR 1.18 04/04/2024 12:17 AM    INR 2.21 01/12/2023 07:07 AM    INR 1.45 05/09/2022 12:32 PM       Magnesium:    Lab Results   Component Value Date/Time    MG 1.70 06/16/2022 05:29 AM       U/A:    Lab Results   Component Value Date/Time    NITRITE neg 05/31/2012 03:04 PM    COLORU Yellow 05/09/2024 10:30 AM    WBCUA 0-2 05/09/2024 10:30 AM    RBCUA None seen 05/09/2024 10:30 AM    MUCUS 1+ 05/09/2024 10:30 AM    BACTERIA None Seen 11/07/2022 12:07 PM    CLARITYU Clear 05/09/2024 10:30 AM    SPECGRAV >=1.030 05/31/2012 03:04 PM    LEUKOCYTESUR Negative 05/09/2024 10:30 AM    BLOODU Negative 05/09/2024 10:30 AM    GLUCOSEU Negative 05/09/2024 10:30 AM    GLUCOSEU NEGATIVE 05/31/2012 02:51 PM    AMORPHOUS Rare 06/14/2022 06:25 PM     ABG:  No results found for: \"EPV4OYY\", \"BEART\", \"P2XOEKCK\", \"PHART\", \"THGBART\", \"BFX2HYV\", \"PO2ART\", \"KOA4MUW\"      Results       Procedure Component Value Units Date/Time    COVID-19 & Influenza Combo [[id number]] Collected: 05/09/24 1030    Order Status: Completed Specimen: Nasopharyngeal Swab Updated: 05/09/24 1111     SARS-CoV-2 RNA, RT PCR NOT DETECTED     Comment: Not Detected results do not preclude SARS-CoV-2 infection and  should not be used as the sole basis for patient management  decisions.  Results must be    Swallow Eval/Tx Time  0850 0999 42 mins / 22 units     Signature:  Jessy Rehman M.A., CCC-SLP   Speech-Language Pathologist #[id number]  Speech Pathology and Swallowing Disorders  P: (inpatient): [phone] (speech desk): [phone]  E: [email or web address]  Certified to provide:  FEES, MBS, Vital Stim, and Mg Dysphagia Therapy Program (MDTP)

## 2024-05-11 VITALS
TEMPERATURE: 97.8 F | RESPIRATION RATE: 18 BRPM | HEIGHT: 70 IN | WEIGHT: 165.7 LBS | SYSTOLIC BLOOD PRESSURE: 158 MMHG | BODY MASS INDEX: 23.72 KG/M2 | OXYGEN SATURATION: 97 % | DIASTOLIC BLOOD PRESSURE: 87 MMHG | HEART RATE: 82 BPM

## 2024-05-11 LAB
ANION GAP SERPL CALCULATED.3IONS-SCNC: 12 MMOL/L (ref 3–16)
BUN SERPL-MCNC: 30 MG/DL (ref 7–20)
CALCIUM SERPL-MCNC: 9.1 MG/DL (ref 8.3–10.6)
CHLORIDE SERPL-SCNC: 103 MMOL/L (ref 99–110)
CO2 SERPL-SCNC: 24 MMOL/L (ref 21–32)
CREAT SERPL-MCNC: 0.7 MG/DL (ref 0.8–1.3)
GFR SERPLBLD CREATININE-BSD FMLA CKD-EPI: >90 ML/MIN/{1.73_M2}
GLUCOSE BLD-MCNC: 96 MG/DL (ref 70–99)
GLUCOSE BLD-MCNC: 97 MG/DL (ref 70–99)
GLUCOSE SERPL-MCNC: 96 MG/DL (ref 70–99)
PERFORMED ON: NORMAL
PERFORMED ON: NORMAL
POTASSIUM SERPL-SCNC: 4.8 MMOL/L (ref 3.5–5.1)
SODIUM SERPL-SCNC: 139 MMOL/L (ref 136–145)

## 2024-05-11 PROCEDURE — 97530 THERAPEUTIC ACTIVITIES: CPT

## 2024-05-11 PROCEDURE — 6360000002 HC RX W HCPCS: Performed by: NURSE PRACTITIONER

## 2024-05-11 PROCEDURE — 6370000000 HC RX 637 (ALT 250 FOR IP): Performed by: NURSE PRACTITIONER

## 2024-05-11 PROCEDURE — 6370000000 HC RX 637 (ALT 250 FOR IP): Performed by: PSYCHIATRY & NEUROLOGY

## 2024-05-11 PROCEDURE — 97110 THERAPEUTIC EXERCISES: CPT

## 2024-05-11 PROCEDURE — 99238 HOSP IP/OBS DSCHRG MGMT 30/<: CPT | Performed by: INTERNAL MEDICINE

## 2024-05-11 PROCEDURE — 36415 COLL VENOUS BLD VENIPUNCTURE: CPT

## 2024-05-11 PROCEDURE — 6370000000 HC RX 637 (ALT 250 FOR IP): Performed by: INTERNAL MEDICINE

## 2024-05-11 PROCEDURE — 80048 BASIC METABOLIC PNL TOTAL CA: CPT

## 2024-05-11 PROCEDURE — 2580000003 HC RX 258: Performed by: INTERNAL MEDICINE

## 2024-05-11 PROCEDURE — 2580000003 HC RX 258: Performed by: NURSE PRACTITIONER

## 2024-05-11 RX ORDER — AMLODIPINE BESYLATE 5 MG/1
5 TABLET ORAL DAILY
Qty: 30 TABLET | Refills: 0 | Status: SHIPPED | OUTPATIENT
Start: 2024-05-12

## 2024-05-11 RX ORDER — SODIUM CHLORIDE 9 MG/ML
INJECTION, SOLUTION INTRAVENOUS CONTINUOUS
Status: DISCONTINUED | OUTPATIENT
Start: 2024-05-11 | End: 2024-05-11 | Stop reason: HOSPADM

## 2024-05-11 RX ORDER — AMLODIPINE BESYLATE 5 MG/1
5 TABLET ORAL DAILY
Status: DISCONTINUED | OUTPATIENT
Start: 2024-05-11 | End: 2024-05-11 | Stop reason: HOSPADM

## 2024-05-11 RX ADMIN — ENOXAPARIN SODIUM 40 MG: 100 INJECTION SUBCUTANEOUS at 09:34

## 2024-05-11 RX ADMIN — MONTELUKAST SODIUM 10 MG: 10 TABLET, COATED ORAL at 09:26

## 2024-05-11 RX ADMIN — ASPIRIN 81 MG: 81 TABLET, COATED ORAL at 09:26

## 2024-05-11 RX ADMIN — ESCITALOPRAM OXALATE 10 MG: 10 TABLET ORAL at 09:26

## 2024-05-11 RX ADMIN — FERROUS SULFATE TAB 325 MG (65 MG ELEMENTAL FE) 325 MG: 325 (65 FE) TAB at 09:26

## 2024-05-11 RX ADMIN — LACOSAMIDE 100 MG: 50 TABLET, FILM COATED ORAL at 09:26

## 2024-05-11 RX ADMIN — CARBIDOPA AND LEVODOPA 1 TABLET: 25; 100 TABLET ORAL at 09:26

## 2024-05-11 RX ADMIN — MEMANTINE HYDROCHLORIDE 10 MG: 5 TABLET ORAL at 09:26

## 2024-05-11 RX ADMIN — CETIRIZINE HYDROCHLORIDE 5 MG: 10 TABLET ORAL at 09:26

## 2024-05-11 RX ADMIN — SODIUM CHLORIDE: 9 INJECTION, SOLUTION INTRAVENOUS at 09:24

## 2024-05-11 RX ADMIN — AMLODIPINE BESYLATE 5 MG: 5 TABLET ORAL at 09:26

## 2024-05-11 RX ADMIN — CARBIDOPA AND LEVODOPA 1 TABLET: 25; 100 TABLET ORAL at 13:46

## 2024-05-11 RX ADMIN — LORAZEPAM 0.5 MG: 0.5 TABLET ORAL at 09:26

## 2024-05-11 RX ADMIN — FINASTERIDE 5 MG: 5 TABLET, FILM COATED ORAL at 09:26

## 2024-05-11 RX ADMIN — SODIUM CHLORIDE, PRESERVATIVE FREE 10 ML: 5 INJECTION INTRAVENOUS at 09:26

## 2024-05-11 NOTE — CARE COORDINATION
Met with patient  and spouse to discuss recommendation for HHC. He declined services.   Discharge order noted. No CM needs.

## 2024-05-11 NOTE — DISCHARGE INSTR - ACTIVITY
Your information:  Name: Rodolfo Luna  : 1945    Your instructions:    Follow up with PCP  Follow up with neurology in 4 weeks        Do follow up outpatient EEG  Follow up with GI regarding esophogram (that was not done inpatient)    What to do after you leave the hospital:    Recommended diet: regular diet    Recommended activity: activity as tolerated        The following personal items were collected during your admission and were returned to you:    Belongings  Dental Appliances: Uppers, Lowers  Vision - Corrective Lenses: None  Hearing Aid: None  Clothing: Pants, Shirt, Footwear, Undergarments  Jewelry: Watch, Ring  Electronic Devices: None  Weapons (Notify Protective Services/Security): None  Other Valuables: Sent home  Home Medications: None  Valuables Given To: Patient  Provide Name(s) of Who Valuable(s) Were Given To: patient    Information obtained by:  By signing below, I understand that if any problems occur once I leave the hospital I am to contact MD.  I understand and acknowledge receipt of the instructions indicated above.

## 2024-05-11 NOTE — PROGRESS NOTES
Report received from ANDREA King.  Patient is confused; assisted back to bed; alarm in use.  Call light in reach; denies pain, sob, or any other issues at this time.

## 2024-05-11 NOTE — PROGRESS NOTES
IV removed and discharge instructions completed. All questions were answered to patients satisfaction.   Request for transport placed, all personal belongs packed. No further needs noted.

## 2024-05-11 NOTE — PROGRESS NOTES
Progress Note    Admit Date:  5/9/2024    Admitted for confusion and AMS  Hx of CVA  +dysphagia     Subjective:  Mr. Luna seen ambulating in room with unsteady gait  Remains forgetful about events but improved from yesterday     Failed SLP eval this am , failed MBS, recent pontine cva noted    Reports he takes ativan only once a day     Objective:   Patient Vitals for the past 4 hrs:   BP Temp Temp src Pulse Resp SpO2   05/11/24 0736 (!) 184/100 97.3 °F (36.3 °C) Oral 94 16 95 %            Intake/Output Summary (Last 24 hours) at 5/11/2024 0749  Last data filed at 5/10/2024 1939  Gross per 24 hour   Intake --   Output 1 ml   Net -1 ml       Physical Exam:        General: elderly male, up in chair,   Awake, alert and fairly oriented. Appears to be not in any distress  Mucous Membranes:  Pink , anicteric  Neck: No JVD, no carotid bruit, no thyromegaly  Chest:  Clear to auscultation bilaterally, no added sounds  Cardiovascular:  RRR S1S2 heard, no murmurs or gallops  Abdomen:  Soft, undistended, non tender, no organomegaly, BS present  Extremities: No edema or cyanosis. Distal pulses well felt  Neurological : mild right UE tremor, no rigidity    grossly normal with unsteady gait        Scheduled Meds:   carbidopa-levodopa  1 tablet Oral TID AC    LORazepam  0.5 mg IntraVENous BID    pantoprazole  40 mg Oral Daily    sodium chloride flush  5-40 mL IntraVENous 2 times per day    enoxaparin  40 mg SubCUTAneous Daily    aspirin EC  81 mg Oral Daily    atorvastatin  80 mg Oral Nightly    cetirizine  5 mg Oral Daily    escitalopram  10 mg Oral Daily    ferrous sulfate  325 mg Oral BID WC    finasteride  5 mg Oral Daily    lacosamide  100 mg Oral BID    LORazepam  0.5 mg Oral BID    memantine  10 mg Oral BID    montelukast  10 mg Oral Daily       Continuous Infusions:   sodium chloride         PRN Meds:  sodium chloride flush, sodium chloride, potassium chloride **OR** potassium alternative oral replacement **OR** potassium  chloride, magnesium sulfate, ondansetron **OR** ondansetron, polyethylene glycol, acetaminophen **OR** acetaminophen      Data:  CBC:   Recent Labs     05/09/24  1030 05/10/24  0618   WBC 10.6 12.6*   HGB 14.4 15.2   HCT 43.2 45.4   MCV 91.7 90.6    193       BMP:   Recent Labs     05/09/24  1030 05/10/24  0618 05/11/24  0615    140 139   K 5.2* 4.4 4.8    103 103   CO2 26 26 24   BUN 29* 32* 30*   CREATININE 1.0 0.9 0.7*       LIVER PROFILE:   Recent Labs     05/09/24  1030   AST 19   ALT <5*   BILITOT 1.1*   ALKPHOS 173*       PT/INR: No results for input(s): \"PROTIME\", \"INR\" in the last 72 hours.    CULTURES  Results       Procedure Component Value Units Date/Time    COVID-19 & Influenza Combo [[id number]] Collected: 05/09/24 1030    Order Status: Completed Specimen: Nasopharyngeal Swab Updated: 05/09/24 1111     SARS-CoV-2 RNA, RT PCR NOT DETECTED     Comment: Not Detected results do not preclude SARS-CoV-2 infection and  should not be used as the sole basis for patient management  decisions.  Results must be combined with clinical observations,  patient history, and epidemiological information.  Testing was performed using KHANG BENITO SARS-CoV-2 and Influenza A/B  nucleic acid assay. This test is a multiplex Real-Time Reverse  Transcriptase Polymerase Chain Reaction (RT-PCR)-based in vitro  diagnostic test intended for the qualitative detection of nucleic  acids from SARS-CoV-2, influenza A, and influenza B in nasopharyngeal  and nasal swab specimens for use under the FDA’s Emergency Use  Authorization (EUA) only.    Patient Fact Sheet:  https://www.fda.gov/media/399260/download  Provider Fact Sheet: https://www.fda.gov/media/643885/download  EUA: https://www.fda.gov/media/802500/download  IFU: https://www.fda.gov/media/750340/download    Methodology:  RT-PCR          Influenza A NOT DETECTED     Influenza B NOT DETECTED                 Encounter Date: 05/09/24   EKG 12 Lead   Result Value     Ventricular Rate 69    QRS Duration 90    Q-T Interval 408    QTc Calculation (Bazett) 437    R Axis -83    T Axis 38    Diagnosis      Atrial fibrillationLeft axis deviationCannot rule out Inferior infarct , age undeterminedAbnormal ECGWhen compared with ECG of 04-APR-2024 01:14,No significant change was foundConfirmed by MARNI LO MD ([id number]) on 5/9/2024 1:23:18 PM       RADIOLOGY    MRI BRAIN WO CONTRAST   Final Result   1. No acute infarct or acute intracranial process identified.   2. Stable mild chronic small vessel ischemic changes.         FL MODIFIED BARIUM SWALLOW W VIDEO   Final Result   Tracheal aspiration with the thin liquids and laryngeal penetration with   nectar thick liquids.      Significant vallecular residue with poor inversion of the epiglottis.      Please see separate speech pathology report for full discussion of findings   and recommendations.         CT HEAD WO CONTRAST   Final Result   No acute intracranial abnormality.      Stable exam.         XR CHEST PORTABLE   Final Result   Unchanged small right pleural effusion and/or basilar atelectasis.         FL ESOPHAGRAM    (Results Pending)        Assessment/Plan:      Generalized weakness  Falls  - recent lacunar infarct in keyshawn and hx of Parkinsons   - pt also on high doses of benzo - ativan 1 mg tid, ambien 10 mg  - pt stated gait shuffling at times  , recurrent falls, doesn't use walker or cane at home  - cut down sedative meds, start PT      HTN- not controlled  Hx of orthostatic hypotension   - target blood pressure 140/90  - elevated in the ED up to  193/109  - Pt not regimen at home- start norvasc ( NPO)  - PRN hydralazine, monitor and may need to start regimen although at risk for orthostatic hypotension with his Parkinson's disease   Add norvasc 5 mg daily      Recent CVA- left lacunar infarct in the keyshawn   Possible TIA  - CT of head on admission showed no acute intracranial abnormality  - neurology consulted  - pt was on

## 2024-05-11 NOTE — FLOWSHEET NOTE
05/11/24 0915   Vital Signs   Temp 97.5 °F (36.4 °C)   Temp Source Oral   Pulse 97   Heart Rate Source Monitor   Respirations 18   /80   MAP (Calculated) 93   BP Location Right upper arm   BP Method Automatic   Patient Position Up in chair   Pain Assessment   Pain Assessment None - Denies Pain   Opioid-Induced Sedation   POSS Score 1   RASS   Carter Agitation Sedation Scale (RASS) 0   Oxygen Therapy   SpO2 96 %   O2 Device None (Room air)     Alert and oriented, skin w/d, respirs e/e unlabored, meds given, VSS, nurse assessment completed, call light and overbed table within easy reach, no voiced concerns or needs at this time, see flow sheet and MAR.    Patient tolerating meds crushed in pudding

## 2024-05-11 NOTE — PROGRESS NOTES
PROGRESS NOTE  S:78 yrs Patient  admitted on 5/9/2024 with Generalized weakness [R53.1]  Multiple falls [R29.6]  Intermittent confusion [R41.0] .  Today he feels well. Tolerating diet. Denies dysphagia.     Exam:   Vitals:    05/11/24 0915   BP: 119/80   Pulse: 97   Resp: 18   Temp: 97.5 °F (36.4 °C)   SpO2: 96%      General appearance: alert, appears stated age, and no distress  HEENT: Sclera clear, anicteric  Neck: supple, symmetrical, trachea midline  Heart: regular rate and rhythm  Abdomen:  soft, NT, ND  Extremities:  no edema      Medications: Reviewed    Labs:  CBC:   Recent Labs     05/09/24  1030 05/10/24  0618   WBC 10.6 12.6*   HGB 14.4 15.2   HCT 43.2 45.4   MCV 91.7 90.6    193     BMP:   Recent Labs     05/09/24  1030 05/10/24  0618 05/11/24  0615    140 139   K 5.2* 4.4 4.8    103 103   CO2 26 26 24   BUN 29* 32* 30*   CREATININE 1.0 0.9 0.7*     LIVER PROFILE:   Recent Labs     05/09/24  1030   AST 19   ALT <5*   BILITOT 1.1*   ALKPHOS 173*       Impression:78 year old male with history of HTN, HLD, CAD, A fib s/p watchman, CVA, Parkinson's disease, lung ca s/p surgery admitted with altered mental status. Oropharyngeal dysphagia is likely neurologic in origin. Presbyesophagus and dysmotility may be contributing    Recommendation:  Continue supportive care  Speech therapy  Aspiration precautions  Upright position during and after meals  Neurology follow up  Ok to d/c from GI standpoint      Dc Elizondo MD, MD  12:40 PM 5/11/2024

## 2024-05-11 NOTE — PROGRESS NOTES
Patient wife and family request esophogram outpatient. MD aware    Per radiology, no MD for this procedure until Monday. Patient requesting to leave.  Diet order placed per SLP note, minced and moist with supervision thin liquids

## 2024-05-11 NOTE — PROGRESS NOTES
Patient pulled IV out.  Restarted IV in R ac and secured it well.  Patient is pleasantly confused; discussed NPO status and that  he may be having a PEG tube placed.  Patient voiced understanding.  PO meds given crushed in pudding; no apparent problems swallowing at this time.  See PM shift assess and vitals.  Call light in reach.

## 2024-05-11 NOTE — PROGRESS NOTES
Inpatient Occupational Therapy Treatment    Unit: [address] [facility]  Date:  5/11/2024  Patient Name:    Rodolfo Luna  Admitting diagnosis:  Generalized weakness [R53.1]  Multiple falls [R29.6]  Intermittent confusion [R41.0]  Admit Date:  5/9/2024  Precautions/Restrictions/WB Status/ Lines/ Wounds/ Oxygen: Fall risk, Bed/chair alarm, and Lines (IV)    Treatment Time: 08:12 - 08:52  Treatment Number:  2  Timed Code Treatment Minutes: 40 minutes  Total Treatment Minutes: 40 minutes    Patient Goals for Therapy: \"to get something to eat\"          Discharge Recommendations: Home with 24/7 supervision and Home OT  DME needs for discharge: Needs Met       Therapy recommendations for staff:   Assist of 1 for ambulation with use of No AD and gait belt to/from chair  to/from bathroom  within room    History of Present Illness: Per H&P  \"Patient is a 78 y.o. male with PMH of recent CVA, parkinson's disease, orthstatic hypotension, atrial fibrillation, s/p watchman device, frequent falls, CAD, s/p ADRIÁN, seizures, insomnia who presents to [facility] with increasing confusion.  Pt resting in bed, family at bedside.  Wife has been sick and grandson has been helping them at home.  Pt stated he doesn't think he's confused. Wife stated that he drove to PCP office today and couldn't remember his name or how he arrived to PCP office.  Pt then remembered this happened. Grandson stated that he is under a lot of stress. Pt stated he has not ran out of his ativan although it was mentioned in his chief complaint.  He stated he takes it twice a day at home.  He also stated that he has been falling a lot, feet shuffled at times.  Denies any injuries. He denies hx of Parkinson's disease, stated he followed with neurology in the past and the kept him on Sinemet nightly for tremors. He also verbalized weight loss and dysphagia.  Stated he had his esophagus stretched in the past. Wife stated he coughs sometimes when he eats. He received a steroid  Patient ambulated approximately 150 feet in the hallway with no LOB noted. Patient continues to function below baseline as he was independent with bed mobility, functional transfer performance, functional mobility, ADL performance, and he continues to require some assistance with those tasks. Patient would benefit from skilled HHOT services upon discharge in order to return to PLOF. Patient would continue to benefit from skilled acute care occupational therapy services during his length of stay to increase independence with ADL performance and functional mobility in order to maximize his functional potential in the acute care setting.      Recommending Home 24 hr supervision and with home OT upon discharge as patient functioning below baseline level and would benefit from continued therapy services    Goal(s) : all goals ongoing 5/11  To be met in 3 Visits:  Bed to toilet/BSC:       Independent  Pt will complete 3/3 CHF goals     N/A    To be met in 5 Visits:  Supine to/from Sit in preparation for ADL task:   Independent  Toileting        Independent  Grooming       Independent  Upper Body Dressing:      Independent  Lower Body Dressing:      Independent  Pt to demonstrate UE therapeutic exs x 15 reps with minimal cues    Rehabilitation Potential: Good  Strengths for achieving goals include: Pt motivated, PLOF, and Pt cooperative   Barriers to achieving goals include:  Complexity of condition    Plan:  To be seen 2-3 x/wk  while in acute care setting for therapeutic exercises, bed mobility, transfers, family/patient education, ADL/IADL retraining, and energy conservation training.    Electronically signed by Marco La OT on 5/11/2024 at 11:23 AM      If patient discharges from this facility prior to next visit, this note will serve as the Discharge Summary

## 2024-05-11 NOTE — DISCHARGE SUMMARY
Name:  Rodolfo Luna  Room:  0206/0206-02  MRN:    [id number]    Discharge Summary      This discharge summary is in conjunction with a complete physical exam done on the day of discharge.      Discharging Physician: PADMINI PARTIDA MD      Admit: 5/9/2024  Discharge:  5/11/2024     Diagnoses this Admission    Principal Problem:    Generalized weakness  Active Problems:    Multiple falls    Parkinson's disease (HCC)    Left pontine stroke (HCC)    Intermittent confusion  Resolved Problems:    * No resolved hospital problems. *          Procedures (Please Review Full Report for Details)      Consults    IP CONSULT TO NEUROLOGY  IP CONSULT TO GI      HPI:    The patient is a 78 y.o. male with PMH of recent CVA, parkinson's disease, orthstatic hypotension, atrial fibrillation, s/p watchman device, frequent falls, CAD, s/p ADRIÁN, seizures, insomnia who presents to [facility] with increasing confusion.  Pt resting in bed, family at bedside.  Wife has been sick and grandson has been helping them at home.  Pt stated he doesn't think he's confused. Wife stated that he drove to PCP office today and couldn't remember his name or how he arrived to PCP office.  Pt then remembered this happened. Grandson stated that he is under a lot of stress. Pt stated he has not ran out of his ativan although it was mentioned in his chief complaint.  He stated he takes it twice a day at home.  He also stated that he has been falling a lot, feet shuffled at times.  Denies any injuries. He denies hx of Parkinson's disease, stated he followed with neurology in the past and the kept him on Sinemet nightly for tremors. He also verbalized weight loss and dysphagia.  Stated he had his esophagus stretched in the past. Wife stated he coughs sometimes when he eats. He received a steroid injection to right hip a couple days ago and he stated it helped his pain.  History obtained from the patient and review of EMR.       Physical Exam at  loss  Per patient hx of dilatation   - GI consulted  - speech evaluation and treatment   - --aspiration noted on MBS   GI consult     Atrial Fibrillation - PAF  S/p Watchman procedure   S/p RFCA 2014  - follows with cardiology   - HR controlled- not on any rate controlled medication   - watchman, no anticoagulation   - monitor on telemetry      CAD s/p olive  Chronic elevated troponin  - EKG without acute changes  - troponin 25--23  - denies chest pain  - monitor on telemetry      Parkinson's disease-   - resume home regimen   - check orthostatic blood pressure   - neurology consulted   - verbalizes shuffling gait contributing to falls     Anxiety depression  - continue Lexapro  - is on high dose Ativan at home  - reduce ativan to 0.5 mg BID and monitor      Seizure Disorder  - continue lacosamide   - seizure precautions      Insomnia  - on Ambien at home  - hold while admitted      Hx of lung cancer s/p thoracotomy  S/p VATS  - chronic right pleural effusion          MRI BRAIN WO CONTRAST   Final Result   1. No acute infarct or acute intracranial process identified.   2. Stable mild chronic small vessel ischemic changes.         FL MODIFIED BARIUM SWALLOW W VIDEO   Final Result   Tracheal aspiration with the thin liquids and laryngeal penetration with   nectar thick liquids.      Significant vallecular residue with poor inversion of the epiglottis.      Please see separate speech pathology report for full discussion of findings   and recommendations.         CT HEAD WO CONTRAST   Final Result   No acute intracranial abnormality.      Stable exam.         XR CHEST PORTABLE   Final Result   Unchanged small right pleural effusion and/or basilar atelectasis.         FL ESOPHAGRAM    (Results Pending)   FL ESOPHAGRAM    (Results Pending)          Discharge Medications     Medication List        START taking these medications      amLODIPine 5 MG tablet  Commonly known as: NORVASC  Take 1 tablet by mouth daily  Start

## 2024-05-11 NOTE — PROGRESS NOTES

## 2024-05-13 ENCOUNTER — TELEPHONE (OUTPATIENT)
Age: 79
End: 2024-05-13

## 2024-05-13 ENCOUNTER — TELEPHONE (OUTPATIENT)
Dept: FAMILY MEDICINE CLINIC | Age: 79
End: 2024-05-13

## 2024-05-13 DIAGNOSIS — R55 SYNCOPE AND COLLAPSE: Primary | ICD-10-CM

## 2024-05-13 DIAGNOSIS — R42 DIZZINESS: ICD-10-CM

## 2024-05-13 NOTE — TELEPHONE ENCOUNTER
Pt's grandson called to schedule Mr. Luna for out patient EEG and hosp f/u in 4 weeks.  Routing to Dr. Walker to enter EEG order.  Will call Luiz back on Tues to schedule testing and hosp f/u.    Plan:     1.  Continue aspirin and statin.  2.  The target blood pressure below 160/100.  3.  MRI brain without contrast.  4.  Increase Sinemet 3 times a day.  5.  If the MRI shows no new ischemic injury, neurology recommend to increase lacosamide to 150 mg twice daily.  6.  Seizure precaution.     Follow-up with neurology in 4 weeks.  Plan to do follow-up EEG as an outpatient.

## 2024-05-13 NOTE — TELEPHONE ENCOUNTER
Care Transitions Initial Follow Up Call    Outreach made within 2 business days of discharge: Yes    Patient: Rodolfo Luna Patient : 1945   MRN: [id number]  Reason for Admission: There are no discharge diagnoses documented for the most recent discharge.  Discharge Date: 24       Spoke with: LILLIE to call back     Discharge department/facility: [facility]         Scheduled appointment with PCP within 7-14 days    Follow Up  Future Appointments   Date Time Provider Department Center   5/15/2024  9:45 AM Rodolfo Oneill MD [facility]   2024  2:00 PM [facility]

## 2024-05-14 NOTE — TELEPHONE ENCOUNTER
EEG order signed.  SADIA for Luiz to call back to schedule Mr. Luna for EEG whenever and hosp f/u in 4 wks.  Appts do not need to be on the same day.

## 2024-05-14 NOTE — PROGRESS NOTES
study with normal left ventricular function,  size, and wall motion. The estimated left ventricular function is 72%.     Cardiac catheterization:     CATH: 3/6/17  IMPRESSION:  1.  Successful percutaneous coronary intervention of high-grade distal LAD  lesion with placement of 2.25 mm x 18 mm Xience Alpine drug-eluting stent.    2.  Mild circumflex and right coronary artery disease.  3.  Normal left ventricular systolic function.  4.  Mildly elevated left ventricular filling pressure, from hypertension.    Additional studies:   AAA screenin2019  Impression No evidence of abdominal aortic aneurysm.      Impression and Plan:     Coronary artery disease with stable chronic angina  - prior PCI LAD   -reassuring stress test 2023.  Stable.     Atrial fibrillation, long time persistent    -status post WATCHMAN for recurrent falls  -s/p RFCA of AF 2014    Recent lacunar infarct 2024   -keyshawn by MRI.    -Rx with 21 days brilinta, ASA lifelong    -neuro follow up is planned including EEG    Lung cancer s/p thoracotomy  Parkinson's disease  Porphyria cutanea tarda     Patient Active Problem List   Diagnosis    Dizziness    Syncope and collapse    Contusion of knee, right    Dysphagia    Encounter for electronic analysis of reveal event recorder    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HCC)    History of nonmelanoma skin cancer    Ischemic chest pain (HCC)    Left hand pain    Hand pain, right    Arthritis of carpometacarpal (CMC) joints of both thumbs    Radial styloid tenosynovitis (de quervain)    Left elbow fracture, closed, initial encounter    Multiple falls    General weakness    Thrombocytopenia (HCC)    Malignant neoplasm of lung (HCC)    Orthostasis    Hypotension    Chest pain    Abnormal finding on EKG    Digoxin toxicity, accidental or unintentional, initial encounter    COVID-19 virus infection    COVID    Rib pain on right side    Coronary artery disease involving native coronary artery of native heart

## 2024-05-15 ENCOUNTER — TELEPHONE (OUTPATIENT)
Dept: CARDIOLOGY CLINIC | Age: 79
End: 2024-05-15

## 2024-05-15 ENCOUNTER — OFFICE VISIT (OUTPATIENT)
Dept: CARDIOLOGY CLINIC | Age: 79
End: 2024-05-15

## 2024-05-15 VITALS
HEART RATE: 76 BPM | HEIGHT: 69 IN | BODY MASS INDEX: 23.25 KG/M2 | SYSTOLIC BLOOD PRESSURE: 156 MMHG | DIASTOLIC BLOOD PRESSURE: 80 MMHG | OXYGEN SATURATION: 95 % | WEIGHT: 157 LBS

## 2024-05-15 DIAGNOSIS — I10 ESSENTIAL HYPERTENSION: ICD-10-CM

## 2024-05-15 DIAGNOSIS — Z86.73 HISTORY OF CVA (CEREBROVASCULAR ACCIDENT): ICD-10-CM

## 2024-05-15 DIAGNOSIS — I25.10 CORONARY ARTERY DISEASE INVOLVING NATIVE CORONARY ARTERY OF NATIVE HEART WITHOUT ANGINA PECTORIS: Primary | ICD-10-CM

## 2024-05-15 RX ORDER — AMLODIPINE BESYLATE 10 MG/1
10 TABLET ORAL DAILY
Qty: 90 TABLET | Refills: 1 | Status: ON HOLD
Start: 2024-05-15 | End: 2024-05-16

## 2024-05-15 NOTE — TELEPHONE ENCOUNTER
Kayce HILL spoke w/ patient while in office for his follow up with cardio - pt declines to have a TCM with his PCP at this time

## 2024-05-15 NOTE — TELEPHONE ENCOUNTER
Pts grandson Luiz called with an update on Amlodpine for pt. He stated that rjm increased to 10mg on 5/15. When they got home they noticed that pt has not been taking the Amlodipine. Luiz would like to know if pt should continue with 5mg or go ahead an increase to 10mg. Please advise.

## 2024-05-15 NOTE — PATIENT INSTRUCTIONS
PLAN:  When you get home, check your medication bottles with the list we give you today.    Call our office with any discrepancy in the   We will increase your AMLODIPINE to 10 mg daily.   You may take 2 of the 5 mg tablets that you have now.    Check you blood pressure daily.   Keep a log and bring this in at your next visit.   No cardiac testing warranted at this time

## 2024-05-16 ENCOUNTER — APPOINTMENT (OUTPATIENT)
Dept: GENERAL RADIOLOGY | Age: 79
DRG: 100 | End: 2024-05-16
Payer: MEDICARE

## 2024-05-16 ENCOUNTER — HOSPITAL ENCOUNTER (INPATIENT)
Age: 79
LOS: 5 days | Discharge: NURSING, REHAB OR LONG-TERM CARE | DRG: 100 | End: 2024-05-23
Attending: STUDENT IN AN ORGANIZED HEALTH CARE EDUCATION/TRAINING PROGRAM | Admitting: INTERNAL MEDICINE
Payer: MEDICARE

## 2024-05-16 ENCOUNTER — APPOINTMENT (OUTPATIENT)
Dept: CT IMAGING | Age: 79
DRG: 100 | End: 2024-05-16
Payer: MEDICARE

## 2024-05-16 DIAGNOSIS — T17.308A CHOKING, INITIAL ENCOUNTER: ICD-10-CM

## 2024-05-16 DIAGNOSIS — R13.10 DYSPHAGIA, UNSPECIFIED TYPE: ICD-10-CM

## 2024-05-16 DIAGNOSIS — R41.82 ALTERED MENTAL STATUS, UNSPECIFIED ALTERED MENTAL STATUS TYPE: Primary | ICD-10-CM

## 2024-05-16 DIAGNOSIS — G47.00 INSOMNIA, UNSPECIFIED TYPE: ICD-10-CM

## 2024-05-16 DIAGNOSIS — F41.9 ANXIETY: ICD-10-CM

## 2024-05-16 PROBLEM — Z86.73 HISTORY OF CVA (CEREBROVASCULAR ACCIDENT): Status: ACTIVE | Noted: 2024-05-16

## 2024-05-16 PROBLEM — R04.2 HEMOPTYSIS: Status: ACTIVE | Noted: 2024-05-16

## 2024-05-16 PROBLEM — I48.91 ATRIAL FIBRILLATION (HCC): Status: ACTIVE | Noted: 2024-04-04

## 2024-05-16 PROBLEM — G93.40 ACUTE ENCEPHALOPATHY: Status: ACTIVE | Noted: 2024-05-16

## 2024-05-16 LAB
ALBUMIN SERPL-MCNC: 4 G/DL (ref 3.4–5)
ALBUMIN/GLOB SERPL: 1.3 {RATIO} (ref 1.1–2.2)
ALP SERPL-CCNC: 132 U/L (ref 40–129)
ALT SERPL-CCNC: <5 U/L (ref 10–40)
AMMONIA PLAS-SCNC: 17 UMOL/L (ref 16–60)
AMPHETAMINES UR QL SCN>1000 NG/ML: NORMAL
ANION GAP SERPL CALCULATED.3IONS-SCNC: 9 MMOL/L (ref 3–16)
AST SERPL-CCNC: 20 U/L (ref 15–37)
BACTERIA URNS QL MICRO: ABNORMAL /HPF
BARBITURATES UR QL SCN>200 NG/ML: NORMAL
BASE EXCESS BLDV CALC-SCNC: -2.5 MMOL/L (ref -3–3)
BASOPHILS # BLD: 0 K/UL (ref 0–0.2)
BASOPHILS NFR BLD: 0.1 %
BENZODIAZ UR QL SCN>200 NG/ML: NORMAL
BILIRUB SERPL-MCNC: 1.3 MG/DL (ref 0–1)
BILIRUB UR QL STRIP.AUTO: NEGATIVE
BUN SERPL-MCNC: 30 MG/DL (ref 7–20)
CALCIUM SERPL-MCNC: 9.1 MG/DL (ref 8.3–10.6)
CANNABINOIDS UR QL SCN>50 NG/ML: NORMAL
CHLORIDE SERPL-SCNC: 106 MMOL/L (ref 99–110)
CLARITY UR: CLEAR
CO2 BLDV-SCNC: 25 MMOL/L
CO2 SERPL-SCNC: 26 MMOL/L (ref 21–32)
COCAINE UR QL SCN: NORMAL
COHGB MFR BLDV: 3.1 % (ref 0–1.5)
COLOR UR: ABNORMAL
CREAT SERPL-MCNC: 0.8 MG/DL (ref 0.8–1.3)
DEPRECATED RDW RBC AUTO: 15.4 % (ref 12.4–15.4)
DRUG SCREEN COMMENT UR-IMP: NORMAL
EKG ATRIAL RATE: 315 BPM
EKG DIAGNOSIS: NORMAL
EKG Q-T INTERVAL: 384 MS
EKG QRS DURATION: 88 MS
EKG QTC CALCULATION (BAZETT): 453 MS
EKG R AXIS: -88 DEGREES
EKG T AXIS: 65 DEGREES
EKG VENTRICULAR RATE: 84 BPM
EOSINOPHIL # BLD: 0 K/UL (ref 0–0.6)
EOSINOPHIL NFR BLD: 0 %
FENTANYL SCREEN, URINE: NORMAL
FLUAV RNA RESP QL NAA+PROBE: NOT DETECTED
FLUBV RNA RESP QL NAA+PROBE: NOT DETECTED
FOLATE SERPL-MCNC: 8.4 NG/ML (ref 4.78–24.2)
GFR SERPLBLD CREATININE-BSD FMLA CKD-EPI: >90 ML/MIN/{1.73_M2}
GLUCOSE BLD-MCNC: 131 MG/DL (ref 70–99)
GLUCOSE SERPL-MCNC: 152 MG/DL (ref 70–99)
GLUCOSE UR STRIP.AUTO-MCNC: NEGATIVE MG/DL
HCO3 BLDV-SCNC: 23.9 MMOL/L (ref 23–29)
HCT VFR BLD AUTO: 41.9 % (ref 40.5–52.5)
HCT VFR BLD AUTO: 44.4 % (ref 40.5–52.5)
HGB BLD-MCNC: 13.9 G/DL (ref 13.5–17.5)
HGB BLD-MCNC: 14.7 G/DL (ref 13.5–17.5)
HGB UR QL STRIP.AUTO: NEGATIVE
INR PPP: 1.22 (ref 0.85–1.15)
KETONES UR STRIP.AUTO-MCNC: ABNORMAL MG/DL
LACTATE BLDV-SCNC: 1.6 MMOL/L (ref 0.4–2)
LEUKOCYTE ESTERASE UR QL STRIP.AUTO: NEGATIVE
LYMPHOCYTES # BLD: 0.8 K/UL (ref 1–5.1)
LYMPHOCYTES NFR BLD: 6.4 %
MCH RBC QN AUTO: 30.3 PG (ref 26–34)
MCHC RBC AUTO-ENTMCNC: 33.2 G/DL (ref 31–36)
MCV RBC AUTO: 91.1 FL (ref 80–100)
METHADONE UR QL SCN>300 NG/ML: NORMAL
METHGB MFR BLDV: 0.3 %
MONOCYTES # BLD: 0.7 K/UL (ref 0–1.3)
MONOCYTES NFR BLD: 5.1 %
MUCOUS THREADS #/AREA URNS LPF: ABNORMAL /LPF
NEUTROPHILS # BLD: 11.7 K/UL (ref 1.7–7.7)
NEUTROPHILS NFR BLD: 88.4 %
NITRITE UR QL STRIP.AUTO: POSITIVE
NT-PROBNP SERPL-MCNC: 1112 PG/ML (ref 0–449)
O2 CT VFR BLDV CALC: 13 VOL %
O2 THERAPY: ABNORMAL
OPIATES UR QL SCN>300 NG/ML: NORMAL
OXYCODONE UR QL SCN: NORMAL
PCO2 BLDV: 47 MMHG (ref 40–50)
PCP UR QL SCN>25 NG/ML: NORMAL
PERFORMED ON: ABNORMAL
PH BLDV: 7.32 [PH] (ref 7.35–7.45)
PH UR STRIP.AUTO: 5.5 [PH] (ref 5–8)
PH UR STRIP: 4 [PH]
PLATELET # BLD AUTO: 137 K/UL (ref 135–450)
PMV BLD AUTO: 7.9 FL (ref 5–10.5)
PO2 BLDV: 31.6 MMHG (ref 25–40)
POTASSIUM SERPL-SCNC: 4.6 MMOL/L (ref 3.5–5.1)
PROCALCITONIN SERPL IA-MCNC: 0.05 NG/ML (ref 0–0.15)
PROT SERPL-MCNC: 7 G/DL (ref 6.4–8.2)
PROT UR STRIP.AUTO-MCNC: 30 MG/DL
PROTHROMBIN TIME: 15.6 SEC (ref 11.9–14.9)
RBC # BLD AUTO: 4.87 M/UL (ref 4.2–5.9)
RBC #/AREA URNS HPF: ABNORMAL /HPF (ref 0–4)
SAO2 % BLDV: 56 %
SARS-COV-2 RNA RESP QL NAA+PROBE: NOT DETECTED
SODIUM SERPL-SCNC: 141 MMOL/L (ref 136–145)
SP GR UR STRIP.AUTO: >=1.03 (ref 1–1.03)
TROPONIN, HIGH SENSITIVITY: 20 NG/L (ref 0–22)
TROPONIN, HIGH SENSITIVITY: 20 NG/L (ref 0–22)
TROPONIN, HIGH SENSITIVITY: 22 NG/L (ref 0–22)
UA COMPLETE W REFLEX CULTURE PNL UR: ABNORMAL
UA DIPSTICK W REFLEX MICRO PNL UR: YES
URN SPEC COLLECT METH UR: ABNORMAL
UROBILINOGEN UR STRIP-ACNC: 0.2 E.U./DL
VIT B12 SERPL-MCNC: >2000 PG/ML (ref 211–911)
WBC # BLD AUTO: 13.2 K/UL (ref 4–11)
WBC #/AREA URNS HPF: ABNORMAL /HPF (ref 0–5)

## 2024-05-16 PROCEDURE — C9113 INJ PANTOPRAZOLE SODIUM, VIA: HCPCS

## 2024-05-16 PROCEDURE — 6370000000 HC RX 637 (ALT 250 FOR IP)

## 2024-05-16 PROCEDURE — 74220 X-RAY XM ESOPHAGUS 1CNTRST: CPT

## 2024-05-16 PROCEDURE — 97530 THERAPEUTIC ACTIVITIES: CPT

## 2024-05-16 PROCEDURE — 80053 COMPREHEN METABOLIC PANEL: CPT

## 2024-05-16 PROCEDURE — 85610 PROTHROMBIN TIME: CPT

## 2024-05-16 PROCEDURE — 81001 URINALYSIS AUTO W/SCOPE: CPT

## 2024-05-16 PROCEDURE — 99222 1ST HOSP IP/OBS MODERATE 55: CPT | Performed by: INTERNAL MEDICINE

## 2024-05-16 PROCEDURE — 83880 ASSAY OF NATRIURETIC PEPTIDE: CPT

## 2024-05-16 PROCEDURE — 96365 THER/PROPH/DIAG IV INF INIT: CPT

## 2024-05-16 PROCEDURE — 99285 EMERGENCY DEPT VISIT HI MDM: CPT

## 2024-05-16 PROCEDURE — 97162 PT EVAL MOD COMPLEX 30 MIN: CPT

## 2024-05-16 PROCEDURE — 87086 URINE CULTURE/COLONY COUNT: CPT

## 2024-05-16 PROCEDURE — 6360000002 HC RX W HCPCS: Performed by: INTERNAL MEDICINE

## 2024-05-16 PROCEDURE — 36415 COLL VENOUS BLD VENIPUNCTURE: CPT

## 2024-05-16 PROCEDURE — G0378 HOSPITAL OBSERVATION PER HR: HCPCS

## 2024-05-16 PROCEDURE — 96375 TX/PRO/DX INJ NEW DRUG ADDON: CPT

## 2024-05-16 PROCEDURE — 70450 CT HEAD/BRAIN W/O DYE: CPT

## 2024-05-16 PROCEDURE — 92610 EVALUATE SWALLOWING FUNCTION: CPT

## 2024-05-16 PROCEDURE — 93005 ELECTROCARDIOGRAM TRACING: CPT | Performed by: STUDENT IN AN ORGANIZED HEALTH CARE EDUCATION/TRAINING PROGRAM

## 2024-05-16 PROCEDURE — 2580000003 HC RX 258

## 2024-05-16 PROCEDURE — 80307 DRUG TEST PRSMV CHEM ANLYZR: CPT

## 2024-05-16 PROCEDURE — 87636 SARSCOV2 & INF A&B AMP PRB: CPT

## 2024-05-16 PROCEDURE — 85014 HEMATOCRIT: CPT

## 2024-05-16 PROCEDURE — 84145 PROCALCITONIN (PCT): CPT

## 2024-05-16 PROCEDURE — 82803 BLOOD GASES ANY COMBINATION: CPT

## 2024-05-16 PROCEDURE — 6370000000 HC RX 637 (ALT 250 FOR IP): Performed by: INTERNAL MEDICINE

## 2024-05-16 PROCEDURE — 96376 TX/PRO/DX INJ SAME DRUG ADON: CPT

## 2024-05-16 PROCEDURE — 99223 1ST HOSP IP/OBS HIGH 75: CPT

## 2024-05-16 PROCEDURE — 93010 ELECTROCARDIOGRAM REPORT: CPT | Performed by: INTERNAL MEDICINE

## 2024-05-16 PROCEDURE — 84484 ASSAY OF TROPONIN QUANT: CPT

## 2024-05-16 PROCEDURE — 85018 HEMOGLOBIN: CPT

## 2024-05-16 PROCEDURE — 82607 VITAMIN B-12: CPT

## 2024-05-16 PROCEDURE — 92526 ORAL FUNCTION THERAPY: CPT

## 2024-05-16 PROCEDURE — 99223 1ST HOSP IP/OBS HIGH 75: CPT | Performed by: PSYCHIATRY & NEUROLOGY

## 2024-05-16 PROCEDURE — 2580000003 HC RX 258: Performed by: INTERNAL MEDICINE

## 2024-05-16 PROCEDURE — 6360000002 HC RX W HCPCS

## 2024-05-16 PROCEDURE — 71250 CT THORAX DX C-: CPT

## 2024-05-16 PROCEDURE — 85025 COMPLETE CBC W/AUTO DIFF WBC: CPT

## 2024-05-16 PROCEDURE — 2500000003 HC RX 250 WO HCPCS: Performed by: INTERNAL MEDICINE

## 2024-05-16 PROCEDURE — 83605 ASSAY OF LACTIC ACID: CPT

## 2024-05-16 PROCEDURE — 82140 ASSAY OF AMMONIA: CPT

## 2024-05-16 PROCEDURE — 82746 ASSAY OF FOLIC ACID SERUM: CPT

## 2024-05-16 PROCEDURE — 71045 X-RAY EXAM CHEST 1 VIEW: CPT

## 2024-05-16 RX ORDER — IPRATROPIUM BROMIDE 21 UG/1
2 SPRAY, METERED NASAL 4 TIMES DAILY
Status: DISCONTINUED | OUTPATIENT
Start: 2024-05-16 | End: 2024-05-20

## 2024-05-16 RX ORDER — ASPIRIN 300 MG/1
300 SUPPOSITORY RECTAL DAILY
Status: DISCONTINUED | OUTPATIENT
Start: 2024-05-16 | End: 2024-05-18

## 2024-05-16 RX ORDER — ALBUTEROL SULFATE 90 UG/1
2 AEROSOL, METERED RESPIRATORY (INHALATION) EVERY 4 HOURS PRN
Status: DISCONTINUED | OUTPATIENT
Start: 2024-05-16 | End: 2024-05-23 | Stop reason: HOSPADM

## 2024-05-16 RX ORDER — POTASSIUM CHLORIDE 7.45 MG/ML
10 INJECTION INTRAVENOUS PRN
Status: DISCONTINUED | OUTPATIENT
Start: 2024-05-16 | End: 2024-05-23 | Stop reason: HOSPADM

## 2024-05-16 RX ORDER — LANOLIN ALCOHOL/MO/W.PET/CERES
3 CREAM (GRAM) TOPICAL NIGHTLY
Status: DISCONTINUED | OUTPATIENT
Start: 2024-05-16 | End: 2024-05-23 | Stop reason: HOSPADM

## 2024-05-16 RX ORDER — NICOTINE 21 MG/24HR
1 PATCH, TRANSDERMAL 24 HOURS TRANSDERMAL DAILY
Status: DISCONTINUED | OUTPATIENT
Start: 2024-05-16 | End: 2024-05-20

## 2024-05-16 RX ORDER — CETIRIZINE HYDROCHLORIDE 10 MG/1
10 TABLET ORAL DAILY
Status: DISCONTINUED | OUTPATIENT
Start: 2024-05-16 | End: 2024-05-23 | Stop reason: HOSPADM

## 2024-05-16 RX ORDER — ASPIRIN 81 MG/1
81 TABLET, CHEWABLE ORAL DAILY
Status: DISCONTINUED | OUTPATIENT
Start: 2024-05-16 | End: 2024-05-18

## 2024-05-16 RX ORDER — POTASSIUM CHLORIDE 20 MEQ/1
40 TABLET, EXTENDED RELEASE ORAL PRN
Status: DISCONTINUED | OUTPATIENT
Start: 2024-05-16 | End: 2024-05-23 | Stop reason: HOSPADM

## 2024-05-16 RX ORDER — AMLODIPINE BESYLATE 5 MG/1
10 TABLET ORAL DAILY
Status: DISCONTINUED | OUTPATIENT
Start: 2024-05-16 | End: 2024-05-23 | Stop reason: HOSPADM

## 2024-05-16 RX ORDER — ACETAMINOPHEN 650 MG/1
650 SUPPOSITORY RECTAL EVERY 6 HOURS PRN
Status: DISCONTINUED | OUTPATIENT
Start: 2024-05-16 | End: 2024-05-23 | Stop reason: HOSPADM

## 2024-05-16 RX ORDER — LORAZEPAM 2 MG/1
2 TABLET ORAL 2 TIMES DAILY
Status: DISCONTINUED | OUTPATIENT
Start: 2024-05-16 | End: 2024-05-16

## 2024-05-16 RX ORDER — MAGNESIUM SULFATE IN WATER 40 MG/ML
2000 INJECTION, SOLUTION INTRAVENOUS PRN
Status: DISCONTINUED | OUTPATIENT
Start: 2024-05-16 | End: 2024-05-23 | Stop reason: HOSPADM

## 2024-05-16 RX ORDER — LACOSAMIDE 50 MG/1
100 TABLET ORAL 2 TIMES DAILY
Status: DISCONTINUED | OUTPATIENT
Start: 2024-05-16 | End: 2024-05-23 | Stop reason: HOSPADM

## 2024-05-16 RX ORDER — ACETAMINOPHEN 325 MG/1
650 TABLET ORAL EVERY 6 HOURS PRN
Status: DISCONTINUED | OUTPATIENT
Start: 2024-05-16 | End: 2024-05-23 | Stop reason: HOSPADM

## 2024-05-16 RX ORDER — PANTOPRAZOLE SODIUM 20 MG/1
20 TABLET, DELAYED RELEASE ORAL DAILY
Status: DISCONTINUED | OUTPATIENT
Start: 2024-05-16 | End: 2024-05-17

## 2024-05-16 RX ORDER — ESCITALOPRAM OXALATE 10 MG/1
10 TABLET ORAL DAILY
Status: DISCONTINUED | OUTPATIENT
Start: 2024-05-16 | End: 2024-05-23 | Stop reason: HOSPADM

## 2024-05-16 RX ORDER — POLYETHYLENE GLYCOL 3350 17 G/17G
17 POWDER, FOR SOLUTION ORAL DAILY PRN
Status: DISCONTINUED | OUTPATIENT
Start: 2024-05-16 | End: 2024-05-23 | Stop reason: HOSPADM

## 2024-05-16 RX ORDER — ENOXAPARIN SODIUM 100 MG/ML
40 INJECTION SUBCUTANEOUS DAILY
Status: DISCONTINUED | OUTPATIENT
Start: 2024-05-16 | End: 2024-05-23 | Stop reason: HOSPADM

## 2024-05-16 RX ORDER — LORAZEPAM 2 MG/1
2 TABLET ORAL NIGHTLY PRN
Status: DISCONTINUED | OUTPATIENT
Start: 2024-05-16 | End: 2024-05-23 | Stop reason: HOSPADM

## 2024-05-16 RX ORDER — MONTELUKAST SODIUM 10 MG/1
10 TABLET ORAL DAILY
Status: DISCONTINUED | OUTPATIENT
Start: 2024-05-16 | End: 2024-05-23 | Stop reason: HOSPADM

## 2024-05-16 RX ORDER — AMLODIPINE BESYLATE 5 MG/1
10 TABLET ORAL DAILY
Status: ON HOLD | COMMUNITY
End: 2024-05-21 | Stop reason: HOSPADM

## 2024-05-16 RX ORDER — LORAZEPAM 1 MG/1
1 TABLET ORAL DAILY PRN
Status: DISCONTINUED | OUTPATIENT
Start: 2024-05-16 | End: 2024-05-23 | Stop reason: HOSPADM

## 2024-05-16 RX ORDER — FINASTERIDE 5 MG/1
5 TABLET, FILM COATED ORAL DAILY
Status: DISCONTINUED | OUTPATIENT
Start: 2024-05-16 | End: 2024-05-23 | Stop reason: HOSPADM

## 2024-05-16 RX ORDER — ONDANSETRON 2 MG/ML
4 INJECTION INTRAMUSCULAR; INTRAVENOUS EVERY 6 HOURS PRN
Status: DISCONTINUED | OUTPATIENT
Start: 2024-05-16 | End: 2024-05-23 | Stop reason: HOSPADM

## 2024-05-16 RX ORDER — SODIUM CHLORIDE 9 MG/ML
INJECTION, SOLUTION INTRAVENOUS CONTINUOUS
Status: DISCONTINUED | OUTPATIENT
Start: 2024-05-16 | End: 2024-05-20

## 2024-05-16 RX ORDER — PROMETHAZINE HYDROCHLORIDE 25 MG/1
12.5 TABLET ORAL EVERY 6 HOURS PRN
Status: DISCONTINUED | OUTPATIENT
Start: 2024-05-16 | End: 2024-05-23 | Stop reason: HOSPADM

## 2024-05-16 RX ORDER — ATORVASTATIN CALCIUM 40 MG/1
80 TABLET, FILM COATED ORAL NIGHTLY
Status: DISCONTINUED | OUTPATIENT
Start: 2024-05-16 | End: 2024-05-23 | Stop reason: HOSPADM

## 2024-05-16 RX ORDER — ALBUTEROL SULFATE 90 UG/1
2 AEROSOL, METERED RESPIRATORY (INHALATION) 4 TIMES DAILY PRN
Status: DISCONTINUED | OUTPATIENT
Start: 2024-05-16 | End: 2024-05-23 | Stop reason: HOSPADM

## 2024-05-16 RX ORDER — LABETALOL HYDROCHLORIDE 5 MG/ML
10 INJECTION, SOLUTION INTRAVENOUS EVERY 10 MIN PRN
Status: DISCONTINUED | OUTPATIENT
Start: 2024-05-16 | End: 2024-05-23 | Stop reason: HOSPADM

## 2024-05-16 RX ORDER — PANTOPRAZOLE SODIUM 40 MG/10ML
40 INJECTION, POWDER, LYOPHILIZED, FOR SOLUTION INTRAVENOUS DAILY
Status: DISCONTINUED | OUTPATIENT
Start: 2024-05-16 | End: 2024-05-17 | Stop reason: ALTCHOICE

## 2024-05-16 RX ORDER — MEMANTINE HYDROCHLORIDE 5 MG/1
10 TABLET ORAL 2 TIMES DAILY
Status: DISCONTINUED | OUTPATIENT
Start: 2024-05-16 | End: 2024-05-23 | Stop reason: HOSPADM

## 2024-05-16 RX ADMIN — MEMANTINE HYDROCHLORIDE 10 MG: 5 TABLET ORAL at 20:58

## 2024-05-16 RX ADMIN — CARBIDOPA AND LEVODOPA 1 TABLET: 25; 100 TABLET ORAL at 20:58

## 2024-05-16 RX ADMIN — DOXYCYCLINE 100 MG: 100 INJECTION, POWDER, LYOPHILIZED, FOR SOLUTION INTRAVENOUS at 20:57

## 2024-05-16 RX ADMIN — PANTOPRAZOLE SODIUM 40 MG: 40 INJECTION, POWDER, FOR SOLUTION INTRAVENOUS at 13:58

## 2024-05-16 RX ADMIN — LORAZEPAM 1 MG: 1 TABLET ORAL at 17:52

## 2024-05-16 RX ADMIN — CETIRIZINE HYDROCHLORIDE 10 MG: 10 TABLET ORAL at 09:31

## 2024-05-16 RX ADMIN — PANTOPRAZOLE SODIUM 20 MG: 20 TABLET, DELAYED RELEASE ORAL at 09:31

## 2024-05-16 RX ADMIN — IPRATROPIUM BROMIDE 2 SPRAY: 21 SPRAY NASAL at 17:52

## 2024-05-16 RX ADMIN — ATORVASTATIN CALCIUM 80 MG: 40 TABLET, FILM COATED ORAL at 20:58

## 2024-05-16 RX ADMIN — CARBIDOPA AND LEVODOPA 1 TABLET: 25; 100 TABLET ORAL at 09:31

## 2024-05-16 RX ADMIN — ESCITALOPRAM OXALATE 10 MG: 10 TABLET ORAL at 09:31

## 2024-05-16 RX ADMIN — CARBIDOPA AND LEVODOPA 1 TABLET: 25; 100 TABLET ORAL at 13:58

## 2024-05-16 RX ADMIN — LABETALOL HYDROCHLORIDE 10 MG: 5 INJECTION, SOLUTION INTRAVENOUS at 15:35

## 2024-05-16 RX ADMIN — FINASTERIDE 5 MG: 5 TABLET, FILM COATED ORAL at 09:30

## 2024-05-16 RX ADMIN — LACOSAMIDE 100 MG: 50 TABLET, FILM COATED ORAL at 20:58

## 2024-05-16 RX ADMIN — IPRATROPIUM BROMIDE 2 SPRAY: 21 SPRAY NASAL at 20:58

## 2024-05-16 RX ADMIN — SODIUM CHLORIDE: 9 INJECTION, SOLUTION INTRAVENOUS at 10:04

## 2024-05-16 RX ADMIN — MONTELUKAST SODIUM 10 MG: 10 TABLET, COATED ORAL at 09:31

## 2024-05-16 RX ADMIN — IPRATROPIUM BROMIDE 2 SPRAY: 21 SPRAY NASAL at 13:59

## 2024-05-16 RX ADMIN — LACOSAMIDE 100 MG: 50 TABLET, FILM COATED ORAL at 09:31

## 2024-05-16 RX ADMIN — MEMANTINE HYDROCHLORIDE 10 MG: 5 TABLET ORAL at 09:31

## 2024-05-16 RX ADMIN — AMLODIPINE BESYLATE 10 MG: 5 TABLET ORAL at 09:31

## 2024-05-16 RX ADMIN — IPRATROPIUM BROMIDE 2 SPRAY: 21 SPRAY NASAL at 10:04

## 2024-05-16 RX ADMIN — Medication 3 MG: at 20:58

## 2024-05-16 NOTE — PLAN OF CARE
Problem: SLP Adult - Impaired Swallowing  Goal: By Discharge: Advance to least restrictive diet without signs or symptoms of aspiration for planned discharge setting.  See evaluation for individualized goals.  Note: SLP completed evaluation. Please refer to notes in EMR.     Faiza Ramos M.A., CF-SLP #[id number]  Speech-Language Pathologist  Desk: [phone]

## 2024-05-16 NOTE — CARE COORDINATION
Case Management Assessment  Initial Evaluation    Date/Time of Evaluation: 5/16/2024 4:42 PM  Assessment Completed by: Clementina Bacon RN    If patient is discharged prior to next notation, then this note serves as note for discharge by case management.    Patient Name: Rodolfo Luna                   YOB: 1945  Diagnosis: Acute encephalopathy [G93.40]  Choking, initial encounter [T17.308A]  Altered mental status, unspecified altered mental status type [R41.82]  Dysphagia, unspecified type [R13.10]                   Date / Time: 5/16/2024  3:37 AM    Patient Admission Status: Observation   Readmission Risk (Low < 19, Mod (19-27), High > 27): Readmission Risk Score: 16.2    Current PCP: Carlton Leavitt, KATIE - CNP  PCP verified by CM? Yes (CODY Leavitt)    Chart Reviewed: Yes      History Provided by: Spouse  Patient Orientation: Alert and Oriented, Person    Patient Cognition: Alert    Hospitalization in the last 30 days (Readmission):  Yes    If yes, Readmission Assessment in  Navigator will be completed.    Advance Directives:      Code Status: Full Code   Patient's Primary Decision Maker is: Legal Next of Kin    Primary Decision Maker: Helen Luna A - Spouse - [phone]    Discharge Planning:    Patient lives with: Spouse/Significant Other, Family Members Type of Home: House  Primary Care Giver: Self  Patient Support Systems include: Family Members, Children   Current Financial resources: Other (Comment) ([facility] Medicare)  Current community resources: None  Current services prior to admission: Durable Medical Equipment            Current DME: Wheelchair, Walker, Cane            Type of Home Care services:  None    ADLS  Prior functional level: Independent in ADLs/IADLs  Current functional level: Independent in ADLs/IADLs    PT AM-PAC:   /24  OT AM-PAC:   /24    Family can provide assistance at DC: Yes  Would you like Case Management to discuss the discharge plan with any other family  members/significant others, and if so, who? Yes (Wife)  Plans to Return to Present Housing: Unknown at present  Other Identified Issues/Barriers to RETURNING to current housing: May need rehab  Potential Assistance needed at discharge: Home Care            Potential DME:    Patient expects to discharge to: House  Plan for transportation at discharge:      Financial    Payor: [facility] MEDICARE / Plan: [facility] MEDICARE COMPLETE / Product Type: *No Product type* /     Does insurance require precert for SNF: Yes    Potential assistance Purchasing Medications: No  Meds-to-Beds request: Yes      [facility] - [facility] [address] [place] [address] - P [phone] - F [phone]  [address] [facility] [address]  Phone: [phone] Fax: [phone]    [facility] [id number] [address] [phone] - F [phone]  [address] [facility] [address]  Phone: [phone] Fax: [phone]    [place], [place] - [address] [facility] [phone] - F [phone]  [address] [facility] [address]  Phone: [phone] Fax: [phone]    [facility] [id number] - [place], [place] - [address] [facility] - P [phone] - F [phone]  [address] [facility] [address]  Phone: [phone] Fax: [phone]      Notes:    Factors facilitating achievement of predicted outcomes: Family support, Cooperative, and Pleasant    Barriers to discharge: Limited family support    Additional Case Management Notes: Reviewed chart and met with pt who is sl confused. Spoke with pt wife and grandson via phone with pt permission. Role of CM explained. Pt lives home with wife and grandson who assist as able. States pt typically Indep wit ADL's and self care. Family provides meals and assist with cleaning and laundry. Discussed poss reha bat dc and wife and grandson would like to come see pt prior to making decision. Per grandson they will be in this

## 2024-05-16 NOTE — CONSULTS
HISTORY Bilateral     excisions of lesions on bilat.neck and back    OTHER SURGICAL HISTORY  9/2014    Reveal Loop recorder placed in Cath Lab    UPPER GASTROINTESTINAL ENDOSCOPY  09/26/2016    gastric and esophogeal biopsy        Medication:    Current Facility-Administered Medications   Medication Dose Route Frequency Provider Last Rate Last Admin    albuterol sulfate HFA (PROVENTIL;VENTOLIN;PROAIR) 108 (90 Base) MCG/ACT inhaler 2 puff  2 puff Inhalation Q4H PRN Evens, Ahmad A, DO        amLODIPine (NORVASC) tablet 10 mg  10 mg Oral Daily Evens, Ahmad A, DO   10 mg at 05/16/24 0931    atorvastatin (LIPITOR) tablet 80 mg  80 mg Oral Nightly Evens, Ahmad A, DO        carbidopa-levodopa (SINEMET)  MG per tablet 1 tablet  1 tablet Oral TID Evens, Ahmad A, DO   1 tablet at 05/16/24 1358    cetirizine (ZYRTEC) tablet 10 mg  10 mg Oral Daily Evens, Ahmad A, DO   10 mg at 05/16/24 0931    escitalopram (LEXAPRO) tablet 10 mg  10 mg Oral Daily Evens, Ahmad A, DO   10 mg at 05/16/24 0931    finasteride (PROSCAR) tablet 5 mg  5 mg Oral Daily Evens, Ahmad A, DO   5 mg at 05/16/24 0930    ipratropium (ATROVENT) 0.03 % nasal spray 2 spray  2 spray Each Nostril 4x Daily Evens, Ahmad A, DO   2 spray at 05/16/24 1359    lacosamide (VIMPAT) tablet 100 mg  100 mg Oral BID Evens, Ahmad A, DO   100 mg at 05/16/24 0931    memantine (NAMENDA) tablet 10 mg  10 mg Oral BID Evens, Ahmad A, DO   10 mg at 05/16/24 0931    montelukast (SINGULAIR) tablet 10 mg  10 mg Oral Daily Evens, Ahmad A, DO   10 mg at 05/16/24 0931    pantoprazole (PROTONIX) tablet 20 mg  20 mg Oral Daily Evens, Ahmad A, DO   20 mg at 05/16/24 0931    potassium chloride (KLOR-CON M) extended release tablet 40 mEq  40 mEq Oral PRN Evens, Ahmad A, DO        Or    potassium bicarb-citric acid (EFFER-K) effervescent tablet 40 mEq  40 mEq Oral PRN Evens, Ahmad A, DO        Or    potassium chloride 10 mEq/100 mL IVPB (Peripheral Line)  10 mEq IntraVENous PRN  Reaction Noted    Apixaban  01/03/2019    Cefuroxime axetil  11/06/2023    Lisinopril  02/06/2017    Morphine  10/08/2013    Other Other (See Comments) 04/09/2012    Rivaroxaban  08/09/2022    Clopidogrel Rash 04/10/2017    Codeine Anxiety, Nausea Only, and Rash 01/31/2008    Penicillins Nausea And Vomiting and Rash 01/31/2008    Plavix [clopidogrel bisulfate] Rash 04/10/2017        Social history:     reports that he quit smoking about 30 years ago. His smoking use included cigarettes. He started smoking about 71 years ago. He has a 80.0 pack-year smoking history. He has never been exposed to tobacco smoke. He has never used smokeless tobacco. He reports that he does not drink alcohol and does not use drugs.     Family history:    Family History   Problem Relation Age of Onset    Heart Disease Mother     Arthritis Mother     High Blood Pressure Mother     Miscarriages / Stillbirths Mother     Stroke Mother     Arthritis Father     Heart Disease Father     Cancer Sister     Arthritis Sister     Depression Sister     Arthritis Brother     Arthritis Maternal Grandmother     Arthritis Maternal Grandfather     Arthritis Paternal Grandmother     Diabetes Paternal Grandmother     Arthritis Paternal Grandfather         Review of system:  No chest pain, shortness of breath, palpitation, cough, fever, abdominal pain, vomiting, diarrhea, dysuria, vertigo, joint pain, change in speech/vision or new onset of weakness/numbness. Remaining as per HPI.      BP (!) 177/87   Pulse 82   Temp 98.2 °F (36.8 °C) (Oral)   Resp 17   SpO2 97%     Neurological examination:  MENTAL STATUS:  Alert and oriented to person.  LANG/SPEECH: No dysarthria.  CRANIAL NERVES: No facial asymmetry.  MOTOR: No pronator drift or leg drift.  REFLEXES: Generalized 2+.  SENSORY: Grossly intact.  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    Imaging, procedure, and laboratory data:   I reviewed the brain imagings.    Assessment:    Principal

## 2024-05-16 NOTE — CONSULTS
Gastroenterology Consult Note    Patient:   Rodolfo Luna   :    1945   Facility:   [facility]  Referring/PCP: Carlton Leavitt APRN - CNP  Date:     2024  Consultant:   KATIE Emmanuel CNP      Chief Complaint   Patient presents with    Altered Mental Status     Patient brought in by EMS with reports of altered mental status.          History of Present illness   Pt. Is a 77 yo male with pmx of Parkinson's disease, recent CVA (), A.fib s/p watchman, and lung cancer s/p VATS who presented to ED  for altered mental status. Pt. Is poor historian. Per patient's wife, he has become increasingly confused at home. He reports difficulty swallowing solids and liquids for many months. He was recently hospitalized 5/10 for AMS. CT and MRI imaging with no acute findings. Speech therapy assessment revealed moderate to severe oropharyngeal dysphagia, likely secondary to Parkinson's disease. MBS 5/10 revealed tracheal aspiration of thins. Barium esophagram was ordered last admission, but family wished to complete this outpatient. His last EGD was 3/23 with Dr. Duran with dilation and noted presbyesophagus. His last colonoscopy was .     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

## 2024-05-16 NOTE — CONSULTS
Writer left  for pt's spouse for goc conversation. Writer spoke with pt's grandson,Luiz, and he states spouse is napping presently and he will provide her with PC phone #. Awaiting call back. Following.

## 2024-05-16 NOTE — PROGRESS NOTES
Consult has been called to Dr. ramos on 5/16/24. Spoke with jose. 9:27 AM    Sarha Ross  5/16/2024

## 2024-05-16 NOTE — TELEPHONE ENCOUNTER
I see the patient is currently admitted. Primary team to evaluate, adjust BP meds as necessary on IP basis to control BP to goal. We will see outcome but I will not make changes as he's at the hospital now. WANDA MASON

## 2024-05-16 NOTE — PROGRESS NOTES
Consult has been called to Dr. diez on 5/16/24. Spoke with dr diez via perfect serve. 8:42 AM    Sarah Ross  5/16/2024

## 2024-05-16 NOTE — CONSULTS
Pharmacy Consult  Per Dr. Santamaria    RE: Double-check Med Rec    Med Rec reviewed with the following changes:    Amlodipine changed from 10 mg to 5 mg daily;  Escitalopram changed from 20 mg to 10 mg daily.    FREDY AaronPh.5/16/20249:08 AM

## 2024-05-16 NOTE — PROGRESS NOTES
Occupational Therapy  Orders received, chart reviewed. Patient working with another provider and unavailable at this time. Will reattempt as patient status and therapy schedules allow.    Nava Cohen, OTR/L 0640

## 2024-05-16 NOTE — PROGRESS NOTES
Consult has been called to merry hilgeman rn  on 5/16/24. Spoke with salomon . 8:30 AM    Sarah Ross  5/16/2024

## 2024-05-16 NOTE — PROGRESS NOTES
Inpatient Physical Therapy Evaluation & Treatment    Unit: [address]  Date:  5/16/2024  Patient Name:    Rodolfo Luna  Admitting diagnosis:  Acute encephalopathy [G93.40]  Choking, initial encounter [T17.308A]  Altered mental status, unspecified altered mental status type [R41.82]  Dysphagia, unspecified type [R13.10]  Admit Date:  5/16/2024  Precautions/Restrictions/WB Status/ Lines/ Wounds/ Oxygen: Fall risk, Bed/chair alarm, and Lines (IV)    Treatment Time:  13:18-13:52  Treatment Number:  1   Timed Code Treatment Minutes: 25 minutes  Total Treatment Minutes:  35  minutes    Patient Stated Goals for Therapy: not stated. The patient indicates understanding of these issues and agrees with the plan.        Discharge Recommendations: SNF ; will need 24/7 assist if going home.  DME needs for discharge: Needs Met       Therapy recommendation for EMS Transport: can transport by wheelchair    Therapy recommendations for staff:   Assist of 1 for ambulation with use of rolling walker (RW) and gait belt within room    History of Present Illness:   Per Aarono H&P: \"78 y.o. male with pmhx of parkinsons, CVA, hx of lung cancer, atrial fibrillation, CAD who presented to [facility] ED with concern for altered mental status per family and coughing fit. ... Wife is also concerned that he has been getting more confused lately.\"     AM-PAC Mobility Score       AM-PAC Inpatient Mobility without Stair Climbing Raw Score : 17      Subjective  Patient lying reclined in bed with no family present.  Pt agreeable to this PT session.     Cognition    A&O Person and Place (knows he's in a hospital). States \"24th\" for date; given options, continued to state \"24th\" for month and for year.   Able to follow 1 step commands    Pain   No  Location: NA  Rating: NA /10  Pain Medicine Status: No request made    Preadmission Environment:   Questionable historian.   Pt lives with    with family (spouse and grandson)  Home environment:    one      Stair Training deferred, pt unsafe/ not appropriate to complete stairs at this time    Therapeutic Exercises Initiated  all completed bilaterally unless indicated  Supine:  Ankle Pumps x 15 reps  Hip abduction: x 10 reps  Shldr flexion: 10 reps  Shldr abduction: 10 reps    Seated:  N/A    Standing:  N/A    Activity Tolerance   During therapy session noted pt with no adverse symptoms to activity    Pt Position BP (mmHg) HR (bpm) SpO2 (%)   Comments   Supine at rest       Seated at /92 75     Standing       End of session         Positioning Needs   Pt reclined in chair, alarm set, positioned in proper neutral alignment and pressure relief provided.   Call light provided and all needs within reach  RN aware of pt position/status    Other Activities  None.    Patient/Family Education   Pt educated on role of inpatient PT, POC, importance of continued activity, calling for assist with mobility.      Assessment  Pt seen today for physical therapy Evaluation & Treatment. Patient is a limited/questionable historian at this time but states he is normally indep with ambulation and ADLs using either a cane or walker at all times. Upon evaluation gross strength appears WFL however he is fairly tremulous with all mobility, resulting in mild instability during ambulation but no overt LOB events.  Pt functioning below baseline and would benefit from skilled physical therapy to address current deficits mentioned above.     Recommending Home 24 hr assist upon discharge as patient functioning below baseline level. If reliable 24/7 assist is not available, he is appropriate for SNF.    Goals :   To be met in 3 visits:  1). Independent with LE Ex x 10 reps  2). Sit to/from stand: Supervision  3). Bed to chair: Supervision    To be met in 6 visits:  1).  Supine to/from sit: Independent  2).  Sit to/from stand: Modified Independent  3).  Bed to chair: Modified Independent  4).  Gait: Ambulate Community Distance  with Modified

## 2024-05-16 NOTE — PROGRESS NOTES
Consult has been called to Dr. leal on 5/16/24. Spoke with shelley. 10:28 AM    Sarah Ross  5/16/2024

## 2024-05-16 NOTE — PROGRESS NOTES
4 Eyes Skin Assessment     NAME:  Rodolfo Luna  YOB: 1945  MEDICAL RECORD NUMBER:  [id number]    The patient is being assessed for  Admission    I agree that at least one RN has performed a thorough Head to Toe Skin Assessment on the patient. ALL assessment sites listed below have been assessed.      Areas assessed by both nurses:    Head, Face, Ears, Shoulders, Back, Chest, Arms, Elbows, Hands, Sacrum. Buttock, Coccyx, Ischium, and Legs. Feet and Heels        Does the Patient have a Wound? No noted wound(s)       Jorge Prevention initiated by RN: No  Wound Care Orders initiated by RN: No    Pressure Injury (Stage 3,4, Unstageable, DTI, NWPT, and Complex wounds) if present, place Wound referral order by RN under : No    New Ostomies, if present place, Ostomy referral order under : No     Nurse 1 eSignature: Electronically signed by Arianna Wiseman RN on 5/16/24 at 10:21 AM EDT    **SHARE this note so that the co-signing nurse can place an eSignature**  Scabs noted to left hand, r side of face near ear, bilateral legs.  Bruising noted to bilateral arms, old surgery scar to right knee.   Nurse 2 eSignature: Electronically signed by Kevin Feliz RN on 5/16/24 at 11:02 AM EDT

## 2024-05-16 NOTE — CONSULTS
PULMONARY CONSULT NOTE  Rodolfo Luna is being seen at the request of Mariel Lizama PA-C for a consultation for hemoptysis and h/o     HISTORY OF PRESENT ILLNESS: Rodolfo Luna is a 78 y.o. male who is unknown to our group with a PMHx of Parkinson's (?) disease, lung cancer s/p VATS thoracotomy, CVA, who presented to the ED from home by EMS on 5/16/24 with altered mental status reported by family.  EMS however reported that they were called for shortness of breath and concern for possible hemoptysis.  Patient reported he had 2 episodes of bloody sputum but this was never witnessed.  It seems he awoke overnight with coughing, shortness of breath and sputum production all associated with some confusion/altered mental status.  NIH stroke scale was 0.  Patient unable to contribute significantly to HPI but has been alert and able to follow commands.  The ER physician did not notice any continuation of respiratory distress after arrival.    Of note, patient had admission 4/4/24 for altered mental status, speech deficits and left-sided facial droop and was found to have an acute left CVA by brain MRI.  He was then subsequently admitted with confusion (on a background of Parkinson's disease), weakness and multiple falls.  He was discharged just 4 days PTA of this ER visit.  Says he's been coughing up \"a little bit\" of blood, not usually.     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  UA:  Recent Labs     05/16/24  0424   COLORU DK YELLOW   PHUR 5.5   WBCUA 0-2   RBCUA None seen   MUCUS 1+*   BACTERIA Rare*   CLARITYU Clear   LEUKOCYTESUR Negative   UROBILINOGEN 0.2   BILIRUBINUR Negative   BLOODU Negative   GLUCOSEU Negative     No results for input(s): \"PHART\", \"PBU8ONX\", \"PO2ART\" in the last 72 hours.    PCT 0.05  Ammonia 17  proBNP 1.1K  WBC 13  VBG 7.3 2/47/32    Micro:   5/16/2024 SARS-CoV-2 and influenza not detected  UC was ordered    Imaging: Chest imaging was reviewed by me and showed:  CT CHEST 5/16/24  IMPRESSION:  1. Right pleural effusion.  2. Ground-glass nodularity in the right lower lobe.  This can be seen with  infection.  Close follow-up is recommended.    CXR 5/16/2024, C/W 5/9/2024:  Stable chest.  Stable postsurgical change on the right, with a right-sided   pleural effusion and adjacent right lung consolidation again noted..  There   is emphysema     CT head 5/16/2024, compared with 5/9/2024  Impression:  No acute intracranial abnormality.   Atrophy and small-vessel ischemic change, similar to prior     ASSESSMENT:  Acute metabolic encephalopathy  Hemoptysis - CT with ground glass infiltate, blood v atypical infection   COPD/emphysema on imaging   H/O mucous plugging   Very small chronic right pleural effusion, stable  Acute cystitis   Recent CVA April 2024, completed 21 days Brilinta  AFIB/flutter s/p watchman & RFCA 2014  CAD s/p PCI with ADRIÁN   Chronic benzodiazepine use for anxiety  Seizure disorder  Stage IIB NSCLC s/p VATS thoracotomy with RUL lobectomy 1/2022?  Previously f/w

## 2024-05-16 NOTE — PROGRESS NOTES
Consult has been called to Dr. diez on 5/16/24. Spoke with dr diez via perfect serve. 10:24 AM    Sarah Ross  5/16/2024

## 2024-05-16 NOTE — H&P
[facility] Medicine History & Physical      PCP: Carlton Leavitt, APRN - CNP    Date of Admission: 5/16/2024    Date of Service: Pt seen/examined on 5/16/2024     Chief Complaint:    Chief Complaint   Patient presents with    Altered Mental Status     Patient brought in by EMS with reports of altered mental status.           History Of Present Illness:      The patient is a 78 y.o. male with pmhx of parkinsons, CVA, hx of lung cancer, atrial fibrillation, CAD who presented to [facility] ED with concern for altered mental status per family and coughing fit.   Patient is poor historian and not really able to contribute to history. Did call wife to assist with history. She reports that he woke her up around 1 am and was in a coughing fit and could not stop. Also was confused and did not know who they were which is not baseline for him. Did not notice any bloody sputum but patient reports to me he had 2 episodes of bloody sputum. Denies chest pain or shortness of breath.   Wife is also concerned that he has been getting more confused lately.   Patient denies any other symptoms.   Is alert and only oriented to self.     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      Atrophy and small-vessel ischemic change, similar to prior         XR CHEST PORTABLE   Final Result   Stable chest.  Stable postsurgical change on the right, with a right-sided   pleural effusion and adjacent right lung consolidation again noted..  There   is emphysema               Pertinent previous results reviewed   Echocardiogram from 1/2023  Summary Normal left ventricular systolic function. Ejection fraction is visually estimated at 55%. A #27 mm Watchman device appears well seated within the left atrial appendage. There appears to be small wilfredo-device leak measuring up to 2-3 mm. There is no device associated thrombus. A small residual atrial-level shunt is noted. Trivial aortic regurgitation. Trivial mitral regurgitation. Mild tricuspid regurgitation. Trivial pulmonic regurgitation. No pericardial effusion noted.     ASSESSMENT/PLAN:  #Acute encephalopathy -likely dementia   #Parkinsons Disease  -on sinemet and namenda   -ativan prn   -patients wife did tell me that he does not have parkinsons but he is on sinemet...   -CT head negative for acute pathology   -ammonia negative  -B12, folate pending   -PT/OT   -swallow eval   -neurology consulted     #Hemoptysis  #Coughing  #Hx of dysphagia   -with hx of lung cancer, did consult pulmonology   -CXR negative for acute pathology  -Hgb stable, monitor  -hold lovenox for now  -swallow eval, NPO   -is scheduled for esophagram outpatient  -GI consulted     #Acute cystitis   #Mild leukocytosis  -urine culture pending  -procalc pending, LA negative     #Atrial fibrillation/flutter s/p watchman and RFCA 2014     #CAD s/p PCI   -ASA, statin     #HTN   -on norvasc     #Hx of CVA   -recent  -ASA, statin  -on brilinta     #Seizure disorder  -on vimpat     #Hx of lung cancer s/p thoracotomy   -s/p VATS  -chronic pleural effusion   -on RA

## 2024-05-16 NOTE — ED PROVIDER NOTES
[facility] ED     EMERGENCY DEPARTMENT ENCOUNTER         Pt Name: Rodolfo Luna   MRN: [id number]   Birthdate 1945   Date of evaluation: 5/16/2024   Provider: Gerson Rocha MD   PCP: Carlton Leavitt, KATIE - GEOVANNA   Note Started: 3:47 AM EDT 5/16/24       Chief Complaint     Altered Mental Status (Patient brought in by EMS with reports of altered mental status.  )      History of Present Illness     Rodolfo Luna is a 78 y.o. male who presents from home with altered mental status.  Per EMS they were called for concern for shortness of breath and possibly coughing up blood.  On their arrival the patient seemed in no distress but perhaps with some confusion and he was unable to contribute history    On arrival to the emergency department the patient is alert following commands but cannot contribute history.  He reports no pain or other complaints.    I was able to call the patient's family and they report that he seemed to be in his baseline health last night before bed.  At baseline he is ambulatory and interactive.  He seemed to awake overnight with some coughing and shortness of breath with some sputum production.  This prompted them to call for EMS and they agree that he seems to have an altered mental status.    Of note the patient was recently hospitalized under similar circumstances with discharge summary from May 11 reviewed.  His admitting diagnosis was confusion on a background of Parkinson's history of stroke atrial fibrillation currently off Anticoagulation with Watchman device, seizures CAD, long-term benzodiazepine use, lung cancer, seizure disorder, anxiety/depression, hypertension.  He reportedly had a MRI during that admission which showed no new findings.  The patient is on aspirin.    I have reviewed the nursing notes and agree unless otherwise noted.    Review of Systems     Positives and pertinent negatives as per HPI.    Past Medical, Surgical, Family, and Social

## 2024-05-16 NOTE — PROGRESS NOTES
be a fair candidate for swallow rehabilitation. Diet Recommendation: IDDSI 5 Minced and moist Solids ; IDDSI 2 Mildly Thick Liquids; Meds crushed in puree as able \"           Predisposing dysphagia risk factors: Hx of neurological disease , Parkinson's Disease, Age, Hx of dysphagia, and Chronic Smoking History, hx of lung cancer, hx of esophageal dysphagia   Clinical signs of possible chronic dysphagia: hx of dysphagia  Precipitating dysphagia risk factors: AMS    Code Status as listed in chart:  Full Code      Cranial nerve exam:   CN V (trigeminal): ophthalmic, maxillary, and mandibular facial sensation- WNL b/l  CN VII (facial): WNL b/l  CN IX/X (glossopharyngeal/vagus): MPT: WFL; pitch range: Reduced; vocal quality: wet; cough: Weak- perceptually and Congested  CN XII (hypoglossal): WFL      Laryngeal function exam:   Secretions:   Vocal quality: See CN exam above  MPT: See CN exam above  S/Z ratio:   Pitch range: See CN exam above  Cough: See CN exam above    Oral Care Status:    Upper dentures only  Lower dentures only    Oral Care Completed?   [x] Yes   [] No  Completed by pt with SLP supervision via pasted toothbrush    PO trials:   Baseline cough noted prior to PO trials? [x] Yes   [] No  Baseline SPO2%: 97  Baseline RR: 24  Supplemental O2 Status: RA     Wet vocal quality prior to PO trials. Pt cued to effortful swallow prior to PO trials.     IDDSI 2 (mildly thick): no anterior bolus loss , suspect functional A-P bolus transit, swallow timing subjectively appears timely, and no clinical s/s of aspiration in 3/6 trials. In 3/6 trials, pt had wet vocal quality with throat clearing after swallow. Pt cued to throat clear and hard swallow. Clear vocal quality after intervention.     IDDSI 4 (puree): no anterior bolus loss , suspect functional A-P bolus transit, swallow timing subjectively appears timely, and no clinical s/s of aspiration in 6/10 trials. In 4/10 trials, pt had wet vocal quality with throat  Short Term Goals: (5 days on 6/21/24)  Goal 1: The patient will tolerate recommended diet with no clinical s/s of aspiration 5/5  Goal 2: The patient will tolerate therapeutic diet upgrade trials with no clinical s/s of aspiration 5/5  Goal 3: The patient/caregiver will demonstrate understanding of compensatory swallow strategies, for improved swallow safety    Long Term Goals:   Timeframe for Long Term Goals: (7 days on 6/23/24)  Goal 1: The patient will tolerate least restrictive diet with no clinical s/s of aspiration or worsening respiratory/pulmonary status    Treatment:  Skilled instruction completed with patient re: evidenced based practice regarding recommendations and POC, importance of oral care to reduce adverse affects in the event of aspiration, and instruction of recommended compensatory strategies developed based upon clinical exam. Pt able to recall/demonstrate compensatory strategies with max cues.      Pt Education: SLP educated the patient re: Role of SLP, rationale for completion of assessment, anatomical components of swallow structures as they pertain to airway protection, results of assessment, recommendations, and POC  Pt Education Response: verbalized understanding, no evidence of learning, and RN aware    Duration/Frequency of Tx: 3-5x/wk    Individuals Consulted:   Patient   RN      Safety Devices / Report:   Bed alarm in place  call light in reach  Safety handoff completed with RN  Left in bed  Staff at bedside      Total Treatment Time / Charges       Time in Time out Total Time / units   Swallow Eval/Tx Time  1455 1535 40 minutes/ 2 units      Signature:  Faiza Ramos M.A., CF-SLP #[id number]  Speech-Language Pathologist  Desk: [phone]

## 2024-05-17 LAB
ALBUMIN SERPL-MCNC: 3.5 G/DL (ref 3.4–5)
ALBUMIN/GLOB SERPL: 1.3 {RATIO} (ref 1.1–2.2)
ALP SERPL-CCNC: 120 U/L (ref 40–129)
ALT SERPL-CCNC: 9 U/L (ref 10–40)
ANION GAP SERPL CALCULATED.3IONS-SCNC: 9 MMOL/L (ref 3–16)
AST SERPL-CCNC: 17 U/L (ref 15–37)
BACTERIA UR CULT: NORMAL
BASOPHILS # BLD: 0 K/UL (ref 0–0.2)
BASOPHILS NFR BLD: 0.2 %
BILIRUB SERPL-MCNC: 1.5 MG/DL (ref 0–1)
BUN SERPL-MCNC: 19 MG/DL (ref 7–20)
CALCIUM SERPL-MCNC: 8.6 MG/DL (ref 8.3–10.6)
CHLORIDE SERPL-SCNC: 105 MMOL/L (ref 99–110)
CHOLEST SERPL-MCNC: 130 MG/DL (ref 0–199)
CO2 SERPL-SCNC: 26 MMOL/L (ref 21–32)
CREAT SERPL-MCNC: 0.6 MG/DL (ref 0.8–1.3)
DEPRECATED RDW RBC AUTO: 15.1 % (ref 12.4–15.4)
EOSINOPHIL # BLD: 0 K/UL (ref 0–0.6)
EOSINOPHIL NFR BLD: 0.1 %
GFR SERPLBLD CREATININE-BSD FMLA CKD-EPI: >90 ML/MIN/{1.73_M2}
GLUCOSE SERPL-MCNC: 102 MG/DL (ref 70–99)
HCT VFR BLD AUTO: 41.2 % (ref 40.5–52.5)
HCT VFR BLD AUTO: 41.4 % (ref 40.5–52.5)
HCT VFR BLD AUTO: 44 % (ref 40.5–52.5)
HCT VFR BLD AUTO: 44 % (ref 40.5–52.5)
HDLC SERPL-MCNC: 51 MG/DL (ref 40–60)
HGB BLD-MCNC: 13.6 G/DL (ref 13.5–17.5)
HGB BLD-MCNC: 13.7 G/DL (ref 13.5–17.5)
HGB BLD-MCNC: 14.3 G/DL (ref 13.5–17.5)
HGB BLD-MCNC: 14.7 G/DL (ref 13.5–17.5)
LDLC SERPL CALC-MCNC: 67 MG/DL
LYMPHOCYTES # BLD: 1.2 K/UL (ref 1–5.1)
LYMPHOCYTES NFR BLD: 10.3 %
MCH RBC QN AUTO: 30.4 PG (ref 26–34)
MCHC RBC AUTO-ENTMCNC: 33.1 G/DL (ref 31–36)
MCV RBC AUTO: 91.7 FL (ref 80–100)
MONOCYTES # BLD: 0.8 K/UL (ref 0–1.3)
MONOCYTES NFR BLD: 6.4 %
NEUTROPHILS # BLD: 10 K/UL (ref 1.7–7.7)
NEUTROPHILS NFR BLD: 83 %
PLATELET # BLD AUTO: 122 K/UL (ref 135–450)
PMV BLD AUTO: 8.2 FL (ref 5–10.5)
POTASSIUM SERPL-SCNC: 4.6 MMOL/L (ref 3.5–5.1)
PROT SERPL-MCNC: 6.1 G/DL (ref 6.4–8.2)
RBC # BLD AUTO: 4.49 M/UL (ref 4.2–5.9)
SODIUM SERPL-SCNC: 140 MMOL/L (ref 136–145)
TRIGL SERPL-MCNC: 61 MG/DL (ref 0–150)
TROPONIN, HIGH SENSITIVITY: 22 NG/L (ref 0–22)
VLDLC SERPL CALC-MCNC: 12 MG/DL
WBC # BLD AUTO: 12 K/UL (ref 4–11)

## 2024-05-17 PROCEDURE — 2500000003 HC RX 250 WO HCPCS: Performed by: INTERNAL MEDICINE

## 2024-05-17 PROCEDURE — 85025 COMPLETE CBC W/AUTO DIFF WBC: CPT

## 2024-05-17 PROCEDURE — 97535 SELF CARE MNGMENT TRAINING: CPT

## 2024-05-17 PROCEDURE — 96366 THER/PROPH/DIAG IV INF ADDON: CPT

## 2024-05-17 PROCEDURE — 2580000003 HC RX 258

## 2024-05-17 PROCEDURE — 85018 HEMOGLOBIN: CPT

## 2024-05-17 PROCEDURE — 99233 SBSQ HOSP IP/OBS HIGH 50: CPT | Performed by: PSYCHIATRY & NEUROLOGY

## 2024-05-17 PROCEDURE — 99232 SBSQ HOSP IP/OBS MODERATE 35: CPT | Performed by: INTERNAL MEDICINE

## 2024-05-17 PROCEDURE — C9113 INJ PANTOPRAZOLE SODIUM, VIA: HCPCS

## 2024-05-17 PROCEDURE — 92526 ORAL FUNCTION THERAPY: CPT

## 2024-05-17 PROCEDURE — 6360000002 HC RX W HCPCS

## 2024-05-17 PROCEDURE — G0378 HOSPITAL OBSERVATION PER HR: HCPCS

## 2024-05-17 PROCEDURE — 83036 HEMOGLOBIN GLYCOSYLATED A1C: CPT

## 2024-05-17 PROCEDURE — 6370000000 HC RX 637 (ALT 250 FOR IP): Performed by: INTERNAL MEDICINE

## 2024-05-17 PROCEDURE — 97166 OT EVAL MOD COMPLEX 45 MIN: CPT

## 2024-05-17 PROCEDURE — 84484 ASSAY OF TROPONIN QUANT: CPT

## 2024-05-17 PROCEDURE — 80061 LIPID PANEL: CPT

## 2024-05-17 PROCEDURE — 80053 COMPREHEN METABOLIC PANEL: CPT

## 2024-05-17 PROCEDURE — 36415 COLL VENOUS BLD VENIPUNCTURE: CPT

## 2024-05-17 PROCEDURE — 2580000003 HC RX 258: Performed by: INTERNAL MEDICINE

## 2024-05-17 PROCEDURE — 96376 TX/PRO/DX INJ SAME DRUG ADON: CPT

## 2024-05-17 PROCEDURE — 85014 HEMATOCRIT: CPT

## 2024-05-17 PROCEDURE — 97530 THERAPEUTIC ACTIVITIES: CPT

## 2024-05-17 RX ORDER — ARIPIPRAZOLE 10 MG/1
5 TABLET ORAL DAILY
Status: DISCONTINUED | OUTPATIENT
Start: 2024-05-17 | End: 2024-05-23 | Stop reason: HOSPADM

## 2024-05-17 RX ORDER — PANTOPRAZOLE SODIUM 40 MG/1
40 TABLET, DELAYED RELEASE ORAL
Status: DISCONTINUED | OUTPATIENT
Start: 2024-05-18 | End: 2024-05-23 | Stop reason: HOSPADM

## 2024-05-17 RX ADMIN — LACOSAMIDE 100 MG: 50 TABLET, FILM COATED ORAL at 20:31

## 2024-05-17 RX ADMIN — MEMANTINE HYDROCHLORIDE 10 MG: 5 TABLET ORAL at 09:15

## 2024-05-17 RX ADMIN — LACOSAMIDE 100 MG: 50 TABLET, FILM COATED ORAL at 09:14

## 2024-05-17 RX ADMIN — CETIRIZINE HYDROCHLORIDE 10 MG: 10 TABLET ORAL at 09:15

## 2024-05-17 RX ADMIN — ESCITALOPRAM OXALATE 10 MG: 10 TABLET ORAL at 09:15

## 2024-05-17 RX ADMIN — ATORVASTATIN CALCIUM 80 MG: 40 TABLET, FILM COATED ORAL at 20:31

## 2024-05-17 RX ADMIN — CARBIDOPA AND LEVODOPA 1 TABLET: 25; 100 TABLET ORAL at 13:56

## 2024-05-17 RX ADMIN — DOXYCYCLINE 100 MG: 100 INJECTION, POWDER, LYOPHILIZED, FOR SOLUTION INTRAVENOUS at 09:26

## 2024-05-17 RX ADMIN — ARIPIPRAZOLE 5 MG: 10 TABLET ORAL at 16:47

## 2024-05-17 RX ADMIN — MEMANTINE HYDROCHLORIDE 10 MG: 5 TABLET ORAL at 20:31

## 2024-05-17 RX ADMIN — CARBIDOPA AND LEVODOPA 1 TABLET: 25; 100 TABLET ORAL at 20:31

## 2024-05-17 RX ADMIN — CARBIDOPA AND LEVODOPA 1 TABLET: 25; 100 TABLET ORAL at 09:15

## 2024-05-17 RX ADMIN — IPRATROPIUM BROMIDE 2 SPRAY: 21 SPRAY NASAL at 20:32

## 2024-05-17 RX ADMIN — Medication 3 MG: at 20:32

## 2024-05-17 RX ADMIN — PANTOPRAZOLE SODIUM 40 MG: 40 INJECTION, POWDER, FOR SOLUTION INTRAVENOUS at 09:14

## 2024-05-17 RX ADMIN — FINASTERIDE 5 MG: 5 TABLET, FILM COATED ORAL at 09:14

## 2024-05-17 RX ADMIN — MONTELUKAST SODIUM 10 MG: 10 TABLET, COATED ORAL at 09:14

## 2024-05-17 RX ADMIN — SODIUM CHLORIDE: 9 INJECTION, SOLUTION INTRAVENOUS at 04:22

## 2024-05-17 RX ADMIN — DOXYCYCLINE 100 MG: 100 INJECTION, POWDER, LYOPHILIZED, FOR SOLUTION INTRAVENOUS at 20:30

## 2024-05-17 RX ADMIN — AMLODIPINE BESYLATE 10 MG: 5 TABLET ORAL at 09:14

## 2024-05-17 NOTE — PROGRESS NOTES
/76   Pulse 70   Temp 97.6 °F (36.4 °C) (Oral)   Resp 16   SpO2 96%     Pt awake in bed. Pt alert and oriented to person only. Assessment complete. Meds passed. Pt denies needs at this time.        Bedside Mobility Assessment Tool (BMAT):     Assessment Level 1- Sit and Shake    1. From a semi-reclined position, ask patient to sit up and rotate to a seated position at the side of the bed. Can use the bedrail.    2. Ask patient to reach out and grab your hand and shake making sure patient reaches across his/her midline.   Pass- Patient is able to come to a seated position, maintain core strength. Maintains seated balance while reaching across midline. Move on to Assessment Level 2.     Assessment Level 2- Stretch and Point   1. With patient in seated position at the side of the bed, have patient place both feet on the floor (or stool) with knees no higher than hips.    2. Ask patient to stretch one leg and straighten the knee, then bend the ankle/flex and point the toes. If appropriate, repeat with the other leg.   Pass- Patient is able to demonstrate appropriate quad strength on intended weight bearing limb(s). Move onto Assessment Level 3.     Assessment Level 3- Stand   1. Ask patient to elevate off the bed or chair (seated to standing) using an assistive device (cane, bedrail).    2. Patient should be able to raise buttocks off be and hold for a count of five. May repeat once.   Pass- Patient maintains standing stability for at least 5 seconds, proceed to assessment level 4.    Assessment Level 4- Walk   1. Ask patient to march in place at bedside.    2. Then ask patient to advance step and return each foot. Some medical conditions may render a patient from stepping backwards, use your best clinical judgement.   Fail- Patient not able to complete tasks OR requires use of assistive device. Patient is MOBILITY LEVEL 3.       Mobility Level- 3

## 2024-05-17 NOTE — PLAN OF CARE
Problem: Discharge Planning  Goal: Discharge to home or other facility with appropriate resources  5/16/2024 2101 by Susi Hilton RN  Outcome: Progressing  5/16/2024 1042 by Arianna Wiseman RN  Outcome: Progressing     Problem: Safety - Adult  Goal: Free from fall injury  5/16/2024 2101 by Susi Hiltno RN  Outcome: Progressing  5/16/2024 1042 by Arianna Wiseman RN  Outcome: Progressing     Problem: Skin/Tissue Integrity  Goal: Absence of new skin breakdown  Description: 1.  Monitor for areas of redness and/or skin breakdown  2.  Assess vascular access sites hourly  3.  Every 4-6 hours minimum:  Change oxygen saturation probe site  4.  Every 4-6 hours:  If on nasal continuous positive airway pressure, respiratory therapy assess nares and determine need for appliance change or resting period.  Outcome: Progressing

## 2024-05-17 NOTE — PLAN OF CARE
Problem: Discharge Planning  Goal: Discharge to home or other facility with appropriate resources  5/11/2024 1451 by Mariel Kerr RN  Outcome: Adequate for Discharge  5/11/2024 1449 by Mariel Kerr RN  Outcome: Progressing     Problem: Safety - Adult  Goal: Free from fall injury  5/11/2024 1451 by Mariel Kerr RN  Outcome: Adequate for Discharge  5/11/2024 1449 by Mariel Kerr RN  Outcome: Progressing  5/11/2024 0217 by Nadiya Langford RN  Outcome: Progressing  5/11/2024 0216 by Nadiya Langford RN  Outcome: Progressing     Problem: Chronic Conditions and Co-morbidities  Goal: Patient's chronic conditions and co-morbidity symptoms are monitored and maintained or improved  5/11/2024 1451 by Mariel Kerr RN  Outcome: Adequate for Discharge  5/11/2024 1449 by Mariel Kerr RN  Outcome: Progressing     Problem: Gastrointestinal - Adult  Goal: Minimal or absence of nausea and vomiting  5/11/2024 1451 by Mariel Kerr RN  Outcome: Adequate for Discharge  5/11/2024 1449 by Mariel Kerr RN  Outcome: Progressing  Goal: Maintains or returns to baseline bowel function  5/11/2024 1451 by Mariel Kerr RN  Outcome: Adequate for Discharge  5/11/2024 1449 by Mariel Kerr RN  Outcome: Progressing  Goal: Maintains adequate nutritional intake  5/11/2024 1451 by Mariel Kerr RN  Outcome: Adequate for Discharge  5/11/2024 1449 by Mariel Kerr RN  Outcome: Progressing  Goal: Establish and maintain optimal ostomy function  5/11/2024 1451 by Mariel Kerr RN  Outcome: Adequate for Discharge  5/11/2024 1449 by Mariel Kerr RN  Outcome: Progressing     
  Problem: Discharge Planning  Goal: Discharge to home or other facility with appropriate resources  Outcome: Progressing     Problem: Safety - Adult  Goal: Free from fall injury  5/11/2024 1449 by Mariel Kerr RN  Outcome: Progressing  5/11/2024 0217 by Nadiya Langford RN  Outcome: Progressing  5/11/2024 0216 by Nadiya Langford RN  Outcome: Progressing     Problem: Chronic Conditions and Co-morbidities  Goal: Patient's chronic conditions and co-morbidity symptoms are monitored and maintained or improved  Outcome: Progressing     Problem: Gastrointestinal - Adult  Goal: Minimal or absence of nausea and vomiting  Outcome: Progressing  Goal: Maintains or returns to baseline bowel function  Outcome: Progressing  Goal: Maintains adequate nutritional intake  Outcome: Progressing  Goal: Establish and maintain optimal ostomy function  Outcome: Progressing     
  Problem: Discharge Planning  Goal: Discharge to home or other facility with appropriate resources  Outcome: Progressing     Problem: Safety - Adult  Goal: Free from fall injury  Outcome: Progressing     
  Problem: SLP Adult - Impaired Swallowing  Goal: By Discharge: Advance to least restrictive diet without signs or symptoms of aspiration for planned discharge setting.  See evaluation for individualized goals.  Note: SLP completed evaluation. Please refer to notes in EMR.    Jessy Rehman M.A., CCC-SLP   Speech-Language Pathologist #[id number]  Speech Pathology and Swallowing Disorders  P: (inpatient): [id number] (speech desk): [phone]  E: [email or web address]  Certified to provide:  FEES, MBS, Vital Stim, and [facility] Dysphagia Therapy Program (MDTP)     
  Problem: Safety - Adult  Goal: Free from fall injury  5/11/2024 0217 by Nadiya Langford, RN  Outcome: Progressing  5/11/2024 0216 by Nadiya Langford, RN  Outcome: Progressing     
5

## 2024-05-17 NOTE — PROGRESS NOTES
5/16/2024    Pain: The patient does not complain of pain       Current Diet: Diet NPO    Diet Tolerance: Pt currently NPO since 5/16      Dysphagia Treatment and Impressions:  Subjective: Pt seen in room at bedside with RN (Bing) permission  RN Report/Chart Review: Pt currently NPO despite diet recommendations for MM and NTL following BSE 5/16- RN unsure why pt has remained NPO  Patient tolerance to current diet and treatment: Pt currently NPO despite SLP recs for MM and NTL 5/16    Respiratory Status: Pt with SPO2% of 96 on RA with RR of 16  Oral Care Status: dentures    Liquid PO Trials:   IDDSI 2 Mildly Thick (nectar):  Assessed via cup: no anterior bolus loss , swallow timing subjectively appears timely, delayed throat clearing x1, and vitals stable    Solid PO Trials  IDDSI 4 Puree:   no anterior bolus loss , suspect functional A-P bolus transit, swallow timing subjectively appears timely, oral clearance grossly WFL, no clinical s/s of aspiration, and vitals stable  IDDSI 5 Minced and Moist:   no anterior bolus loss , suspect functional A-P bolus transit, swallow timing subjectively appears timely, oral clearance grossly WFL, no clinical s/s of aspiration, and vitals stable  IDDSI 6 Soft and Bite Sized: no anterior bolus loss , suspect functional A-P bolus transit, swallow timing subjectively appears timely, slightly prolonged mastication, oral clearance grossly WFL, no clinical s/s of aspiration, and vitals stable    Education: SLP edu pt re: Role of SLP, Rationale for dysphagia tx, Recommended compensatory strategies, Aspiration precautions, Risks of not following recommendations, Risks of not following recommended diet, and Importance of oral care to reduce adverse affects in the event of aspiration. Pt would benefit from ongoing education and RN aware of recommendations  Assessment: Pt currently NPO despite SLP recommendations for minced and moist solids and nectar thick liquids following BSE 5/16. Pt  with delayed throat clear x3 on soft and bite size trials, delayed throat clear x1 on consecutive sips nectar thick liquids. No overt s/s aspiration on small single sips NTL, puree, or minced and moist trial. Improved bolus cohesion on minced and moist solid vs. Soft and bite sized. Pt benefits from cues to reduce rate of intake.     Recommendations: SLP recommends to advance to IDDSI 5 Minced and moist Solids; IDDSI 2 Mildly Thick Liquids via cup only; Meds crushed in puree as able  Risk Management: upright for all intake, stay upright for at least 30 mins after intake, small bites/sips, 1:1 supervision with intake, oral care 2-3x/day to reduce adverse affects in the event of aspiration, increase physical mobility as able, alternate bites/sips, slow rate of intake, STRICT aspiration precautions, hold PO and contact SLP if s/s of aspiration or worsening respiratory status develop., and cup sips only.     Dysphagia Goals:  Short Term Goals:  Timeframe for Short Term Goals: (5 days on 6/21/24)  Goal 1: The patient will tolerate recommended diet with no clinical s/s of aspiration 5/5 5/17/2024 : Ongoing, progressing.   Goal 2: The patient will tolerate therapeutic diet upgrade trials with no clinical s/s of aspiration 5/5 5/17/2024 : Ongoing, progressing.   Goal 3: The patient/caregiver will demonstrate understanding of compensatory swallow strategies, for improved swallow safety 5/17/2024 : Ongoing, progressing.      Long Term Goals:   Timeframe for Long Term Goals: (7 days on 6/23/24)  Goal 1: The patient will tolerate least restrictive diet with no clinical s/s of aspiration or worsening respiratory/pulmonary status 5/17/2024 : Ongoing, progressing.     Speech/Language/Cog Goals: N/A        Recommendations:  Solid Consistency: IDDSI 5 Minced and moist Solids  Liquid Consistency: IDDSI 2 Mildly Thick Liquids via cup only  Medication: Meds crushed in puree as able    Plan:    Continued Dysphagia treatment with goals

## 2024-05-17 NOTE — CARE COORDINATION
INTERDISCIPLINARY PLAN OF CARE CONFERENCE    Date/Time: 5/17/2024 4:48 PM  Completed by: Clementina Bacon RN, Case Management      Patient Name:  Rodolfo Luna  YOB: 1945  Admitting Diagnosis: Acute encephalopathy [G93.40]  Choking, initial encounter [T17.308A]  Altered mental status, unspecified altered mental status type [R41.82]  Dysphagia, unspecified type [R13.10]     Admit Date/Time:  5/16/2024  3:37 AM    Chart reviewed. Interdisciplinary team contacted or reviewed plan related to patient progress and discharge plans.   Disciplines included Case Management, Nursing, and Dietitian.    Current Status:Stable  PT/OT recommendation for discharge plan of care: SNF    Expected D/C Disposition:  Home  Confirmed plan with patient and/or family Yes confirmed with: (name) Wife  Met with:patient and wife  Discharge Plan Comments: Reviewed chart and met with pt and wife. Discussed need for rehab. Wife is agreeable pt pt adamant he will not go. Pt is agreeable to home with [facility]. Will cont to follow.    Home O2 in place on admit: No  Pt informed of need to bring portable home O2 tank on day of discharge for nursing to connect prior to leaving:  Not Indicated  Verbalized agreement/Understanding:  Not Indicated

## 2024-05-17 NOTE — PROGRESS NOTES
PROGRESS NOTE  S:78 yrs Patient  admitted on 5/16/2024 with Acute encephalopathy [G93.40]  Choking, initial encounter [T17.308A]  Altered mental status, unspecified altered mental status type [R41.82]  Dysphagia, unspecified type [R13.10] .  Today, he is alert and interactive. He denies abdominal pain and nausea.    Exam:   Vitals:    05/17/24 0911   BP: (!) 148/65   Pulse: 86   Resp: 16   Temp: 98.8 °F (37.1 °C)   SpO2: 96%   Generalized: alert, no acute distress  HEENT: sclera clear, anicteric  Neck supple, trachea midline  Heart nsr  Abdomen soft, NT, ND  Extremities; No edema     Medications: Reviewed    Labs:  CBC:   Recent Labs     05/16/24  0340 05/16/24  1822 05/17/24  0204 05/17/24  0609 05/17/24  1026   WBC 13.2*  --   --  12.0*  --    HGB 14.7   < > 13.7 13.6 14.3   HCT 44.4   < > 41.4 41.2 44.0   MCV 91.1  --   --  91.7  --      --   --  122*  --     < > = values in this interval not displayed.     BMP:   Recent Labs     05/16/24  0340 05/17/24  0609    140   K 4.6 4.6    105   CO2 26 26   BUN 30* 19   CREATININE 0.8 0.6*     LIVER PROFILE:   Recent Labs     05/16/24  0340 05/17/24  0609   AST 20 17   ALT <5* 9*   BILITOT 1.3* 1.5*   ALKPHOS 132* 120     Imaging  Barium esophagram, 5/16  The exam is limited by lack of mobility.  Contrast flows freely into the  stomach.  There is mild esophageal dysmotility.     However, contrast opacifies the right main bronchus due to aspiration.  Therefore, the exam was terminated prematurely.     Assessment   77 yo male with pmx of Parkinson's disease, recent CVA (4/24), A.fib sp watchman, and lung cancer s/p VATS admitted with altered mental status. Oropharyngeal dysphagia likely secondary to neurologic condition. MBS with tracheal aspiration. Barium esophagram aborted yesterday due to aspiration into lung. Recent EGD with noted presbyesophagus.      Plan   Continue supportive care  PPI daily  Diet per

## 2024-05-17 NOTE — PROGRESS NOTES
PULMONARY PROGRESS NOTE  CC: Reported small volume hemoptysis, h/o lung cancer.  Awoke with SOB, cough & confusion. (2 recent admissions w/ confusion)     Subjective:   SpO2 remains stable on RA  Feels good     IV line: Peripheral    PHYSICAL EXAM:   /66   Pulse 65   Temp 98.1 °F (36.7 °C) (Oral)   Resp 16   Wt 70.9 kg (156 lb 3.2 oz)   SpO2 97%   BMI 23.06 kg/m² ' on RA  Constitutional:  No acute distress   HEENT:  No scleral icterus  Neck:  No tracheal deviation present.    Cardiovascular:  Normal heart sounds.   Pulmonary/Chest:  No accessory muscle usage. No decreased breath sounds.  No wheezes. No rhonchi. No rales.   Abdominal:  Soft.   Musculoskeletal:  No cyanosis. No clubbing.  Skin:  Skin is warm and dry.   Neuro: awake and alert   [Boston Crouch MD on 5/17/24 at 12:22 PM EDT] *     Scheduled Meds:   amLODIPine  10 mg Oral Daily    atorvastatin  80 mg Oral Nightly    carbidopa-levodopa  1 tablet Oral TID    cetirizine  10 mg Oral Daily    escitalopram  10 mg Oral Daily    finasteride  5 mg Oral Daily    ipratropium  2 spray Each Nostril 4x Daily    lacosamide  100 mg Oral BID    memantine  10 mg Oral BID    montelukast  10 mg Oral Daily    pantoprazole  20 mg Oral Daily    [Held by provider] enoxaparin  40 mg SubCUTAneous Daily    melatonin  3 mg Oral Nightly    nicotine  1 patch TransDERmal Daily    [Held by provider] aspirin  81 mg Oral Daily    Or    [Held by provider] aspirin  300 mg Rectal Daily    pantoprazole  40 mg IntraVENous Daily    doxycycline (VIBRAMYCIN) IV  100 mg IntraVENous Q12H     Continuous Infusions:   sodium chloride 75 mL/hr at 05/17/24 0422     PRN Meds:  albuterol sulfate HFA, potassium chloride **OR** potassium alternative oral replacement **OR** potassium chloride, magnesium sulfate, promethazine **OR** ondansetron, acetaminophen **OR** acetaminophen, perflutren lipid microspheres, polyethylene glycol, labetalol, albuterol sulfate HFA, LORazepam,  total of 10 minutes on this encounter personally obtaining the patient history, reviewing labs, imaging and other data.  I independently performed the above physical exam and contributed to the patient's history and plan of care.  Boston Crouch MD on 5/17/24 at 12:22 PM EDT    I spent a total of 7 minutes on this encounter; I contributed to the patient's history, plan of care and updated this encounter note as needed.   Elda Lopez PA-C on 5/17/24 at 7:02 AM EDT

## 2024-05-17 NOTE — PROGRESS NOTES
Dr. Huitron made aware that SLP seen patient and recommends minced and moist diet with nectar thick liquids

## 2024-05-17 NOTE — PROGRESS NOTES
second floor/basement:        N/A  Laundry:                                              unknown  Bathroom:                                           walk in shower, shower chair , and standard height toilet  Pt sleeps in a:                                     Flat bed  Equipment owned:                              RW, SPC, manual WC, and shower chair/bench    Preadmission Status:  Pt able to drive: Unknown  Pt fully independent with ADLs: Yes  Pt receives assistance from family for: Family completes IADLs  Pt independent for functional transfers and utilized RW or SPC (interchangeably) for mobility in home and out in community  History of falls:            No  Home Health Services:None       Objective:  Does this pt have an acute or acute on chronic diagnosis of CHF? No    Upper Extremity ROM:    WFL,  pt able to perform all bed mobility, transfers, and gait without ROM limitation.    Dominant Hand: Right    Upper Extremity Strength:    Strength Assessment (measured on a 0-5 scale): BUEs: 4/5    Upper Extremity Sensation:    NT    Upper Extremity Proprioception:  NT    Coordination:  WFL; pt required unilateral UE testing to complete fine motor coordination; requiring verbal cueing and visual demonstrations for participation     Tone:  NT    Balance:  Sitting:    SBA static at EOB   Standing:    CGA static with RW and gait belt    Bed Mobility:   Supine to Sit:    Modified Independent; use of bed rails and with HOB elevated   Sit to Supine:   Not Tested  Rolling:   Not Tested  Scooting in sitting: Supervision  Scooting in supine:  Not Tested    Transfers:    Sit to stand:    CGA with RW and gait belt   Stand to sit:    CGA with RW and gait belt to recliner with verbal cueing for proper hand placement to reach back   Bed to chair:     Not Tested  Bed/ chair to standard toilet:  Not Tested  Bed/chair to BSC:   Not Tested  Functional Mobility:   CGA with RW and gait belt with slight LOB 2x with one instance pt raising  LLE mid-ambulation; noted to be bumping into items with pt able to self-correct requiring max verbal cueing for direction and awareness of IV tubing.       See PT note for gait analysis.    ADLs:  Dressing:      UE:   Not Tested  LE:    Supervision; pt doffed/donned R sock with figure-4 position while seated in chair demonstrating good trunk control.     Bathing:    UE:  Not Tested  LE:  Not Tested    Eating:   Not Tested    Toileting:  Not Tested    Grooming/hygiene: Independent; combing hair; SBA for oral hygiene requiring occasional verbal cueing for sequencing of activity    Activity Tolerance:   Pt completed therapy session with SOB/WARE  fatigue     BP (mmHg) HR (bpm) SpO2 (%) on RA Comments   Supine at rest  85 100    Seated at EOB       After ambulation  88-95 93-95    End of session         Positioning Needs:   Pt up in chair, alarm set, call light provided and all needs within reach .     Ther Ex / Activities Initiated:   N/A    Patient/Family Education:   Pt educated on role of inpatient OT, plan of care, importance of continued activity, Functional transfer/mobility safety, Safety awareness, Transfer techniques, and Calling for assist with mobility.    CHF Education  N/A    Assessment:  Pt seen for Occupational therapy evaluation in acute care setting.  Pt demonstrated decreased Activity tolerance, ADLs, Balance , Bathing, Bed mobility, Dressing, and Safety Awareness. Pt functioning below baseline and will likely benefit from skilled occupational therapy services to maximize safety and independence.     Recommending SNF upon discharge as patient functioning well below baseline, demonstrates good rehab potential and unable to return home due to burden of care beyond caregiver ability and limited safety awareness. If pt discharges to home with spouse and grandson, recommend 24 hr assist due to current losses of balance during mobility.    Goal(s) :   To be met in 3 Visits:  Bed to toilet/BSC:

## 2024-05-17 NOTE — PROGRESS NOTES
Neurology Progress Note    ID: Rodolfo Luna is a 78 y.o. male    : 1945     LOS: 0 days     ASSESSMENT    Principal Problem:    Acute encephalopathy  Active Problems:    Atrial fibrillation (HCC)    Choking    History of CVA (cerebrovascular accident)    Hemoptysis  Resolved Problems:    * No resolved hospital problems. 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  The patient is alert but disoriented to time and place.  CT brain showed significant brain atrophy with moderate small vessel disease.     From neurology perspective, this is metabolic/toxic encephalopathy based on the history.  The patient never had outpatient evaluation by neurology.  However, the patient may have a certain degree of dementia.    24: The patient is alert and oriented.  The patient remains free of mild degree of akinetic rigid syndrome.     Plan:     1.  Continue Sinemet.  2.  Continue lacosamide.  3.  Minimize sedation and anticholinergic medication.  4.  Restart aspirin once the patient is stabilized.  The patient is high risk for recurrent stroke.  5.  Continue memantine.  6.  Hemoptysis management per primary team.  7.  PT/OT/ST.  8.  If the patient continues having behavioral disturbance, neurology recommend to try aripiprazole 5 mg daily.    The patient can be discharged if there is no other concern.  Follow-up with neurology as an outpatient.    Medications:  Scheduled Meds:    amLODIPine  10 mg Oral Daily    atorvastatin  80 mg Oral Nightly    carbidopa-levodopa  1 tablet Oral TID    cetirizine  10 mg Oral Daily    escitalopram  10 mg Oral Daily    finasteride  5 mg Oral Daily    ipratropium  2 spray Each Nostril 4x Daily    lacosamide  100 mg Oral BID    memantine  10 mg Oral BID    montelukast  10 mg Oral Daily    pantoprazole  20 mg Oral Daily    [Held by provider] enoxaparin  40 mg SubCUTAneous Daily    melatonin  3 mg Oral Nightly    nicotine  1 patch TransDERmal Daily    [Held by provider]

## 2024-05-17 NOTE — FLOWSHEET NOTE
05/17/24 0100   Vital Signs   Temp 98.1 °F (36.7 °C)   Temp Source Oral   Pulse 65   Heart Rate Source Monitor   Respirations 16   /66   MAP (Calculated) 85   BP Location Left upper arm   BP Method Automatic   Patient Position High fowlers   Pain Assessment   Pain Assessment None - Denies Pain   Opioid-Induced Sedation   POSS Score 1   Oxygen Therapy   SpO2 97 %   O2 Device None (Room air)   Height and Weight   Weight - Scale 70.9 kg (156 lb 3.2 oz)   Weight Method Bed scale   BMI (Calculated) 0     Pt VS as shown above.

## 2024-05-17 NOTE — PROGRESS NOTES
Consult has been called to giancarlo doty rn  on 5/17/24. Spoke with d/c planning . 9:03 AM    Sarah Ross  5/17/2024

## 2024-05-17 NOTE — PROGRESS NOTES
[place] [facility]    Daily Progress Note for 2024 5:55 PM 0212/0212-01  Rodolfo Luna : 1945 Age: 78 y.o. Sex: male  Length of Stay:  0    Interval History:      CC: F/U Altered Mental Status (Patient brought in by EMS with reports of altered mental status.  )    Subjective:       Mr Luna is currently declining SNF, is mildly agitated and saying he does not want to go home with his family either. Discussed SLP recs, also esophageal dysmotility.    Objective:     Vitals:    24 2048 24 0100 24 0911 24 1355   BP: 138/76 124/66 (!) 148/65 (!) 145/77   Pulse: 70 65 86 76   Resp: 16    Temp: 97.6 °F (36.4 °C) 98.1 °F (36.7 °C) 98.8 °F (37.1 °C) 98.5 °F (36.9 °C)   TempSrc: Oral Oral Oral Oral   SpO2: 96% 97% 96% 98%   Weight:  70.9 kg (156 lb 3.2 oz)            Intake/Output Summary (Last 24 hours) at 2024 1755  Last data filed at 2024 1528  Gross per 24 hour   Intake 0 ml   Output 1250 ml   Net -1250 ml     Body mass index is 23.06 kg/m².    Physical Exam:  General: Cooperative, pleasant/Ill appearing, on  Nasal cannula  HEENT:  Head: normocephalic,atraumatic, anicteric sclera, clear conjunctiva  Neck: Normal size, Jugular venous pulsations: normal  Respiratory:unlabored breathing, clear to auscultation with no crackles, wheezes rhonchi  Heart: Regular rate and rhythm, S1, S2-normal, No murmurs  Abdomen: soft, nondistended, nontender, normoactive bowel sounds,  Neurological/Psych: Alert and oriented times three, no focal neurological deficits, +memory loss  Skin: No obvious rashes    Extremities:  no edema, Pedal pulses 2+ bilaterally    Scheduled Medications:  [START ON 2024] pantoprazole, 40 mg, QAM AC  ARIPiprazole, 5 mg, Daily  amLODIPine, 10 mg, Daily  atorvastatin, 80 mg, Nightly  carbidopa-levodopa, 1 tablet, TID  cetirizine, 10 mg, Daily  escitalopram, 10 mg, Daily  finasteride, 5 mg, Daily  ipratropium, 2 spray, 4x   Close follow-up is recommended.         FL ESOPHAGRAM   Final Result   1. Aspiration.         CT Head W/O Contrast   Final Result   No acute intracranial abnormality.      Atrophy and small-vessel ischemic change, similar to prior         XR CHEST PORTABLE   Final Result   Stable chest.  Stable postsurgical change on the right, with a right-sided   pleural effusion and adjacent right lung consolidation again noted..  There   is emphysema             Lab Results   Component Value Date    LABA1C 5.3 04/05/2024      Body mass index is 23.06 kg/m².       Impression/Plan:      #Acute encephalopathy -likely dementia   #Parkinsons Disease  -on sinemet and namenda although has not seen neurology? Wife told my colleague he does not have Parkinsons.   -ativan prn   -CT head negative for acute pathology, shows significant brain atrophy with moderate small vessel disease   -ammonia negative  -B12, folate pending   -PT/OT   -SLP eval -> minced and moist mildly thick liquids, needs SLP treatment at discharge. [facility] [place]al aspiration of thin liquids  -neurology consulted   -MRI brain no stroke or acute findings  -Added Abilify 5 mg     #Hemoptysis  #Coughing  -with hx of lung cancer, did consult pulmonology   -CXR negative for acute pathology  -Hgb stable, monitor    #Hx of dysphagia   -GI consulted   -Barium esophagram aborted yesterday due to aspiration into lung      #Acute cystitis   #Mild leukocytosis  -urine culture negative  -procalc pending, LA negative      #Atrial fibrillation/flutter s/p watchman and RFCA 2014      #CAD s/p PCI   -ASA, statin      #HTN   -on norvasc      #Hx of CVA   -recent  -ASA, statin  -on brilinta      #Seizure disorder  -on vimpat      #Hx of lung cancer s/p thoracotomy   -s/p VATS  -chronic pleural effusion   -on RA      #Anxiety   -on ativan prn      #Porphyria cutanea tarda     DVT Prophylaxis: SCDs    Management discussed with RN,     Palliative care was consulted  SNF

## 2024-05-18 ENCOUNTER — APPOINTMENT (OUTPATIENT)
Dept: CT IMAGING | Age: 79
DRG: 100 | End: 2024-05-18
Payer: MEDICARE

## 2024-05-18 PROBLEM — G93.40 ENCEPHALOPATHY ACUTE: Status: ACTIVE | Noted: 2024-05-18

## 2024-05-18 LAB
ALBUMIN SERPL-MCNC: 3.7 G/DL (ref 3.4–5)
ALBUMIN/GLOB SERPL: 1.3 {RATIO} (ref 1.1–2.2)
ALP SERPL-CCNC: 130 U/L (ref 40–129)
ALT SERPL-CCNC: <5 U/L (ref 10–40)
ANION GAP SERPL CALCULATED.3IONS-SCNC: 13 MMOL/L (ref 3–16)
ANION GAP SERPL CALCULATED.3IONS-SCNC: 13 MMOL/L (ref 3–16)
AST SERPL-CCNC: 18 U/L (ref 15–37)
BASOPHILS # BLD: 0 K/UL (ref 0–0.2)
BASOPHILS NFR BLD: 0.3 %
BILIRUB SERPL-MCNC: 1.7 MG/DL (ref 0–1)
BUN SERPL-MCNC: 21 MG/DL (ref 7–20)
BUN SERPL-MCNC: 23 MG/DL (ref 7–20)
CALCIUM SERPL-MCNC: 8.8 MG/DL (ref 8.3–10.6)
CALCIUM SERPL-MCNC: 8.9 MG/DL (ref 8.3–10.6)
CHLORIDE SERPL-SCNC: 103 MMOL/L (ref 99–110)
CHLORIDE SERPL-SCNC: 104 MMOL/L (ref 99–110)
CO2 SERPL-SCNC: 21 MMOL/L (ref 21–32)
CO2 SERPL-SCNC: 23 MMOL/L (ref 21–32)
CREAT SERPL-MCNC: 0.7 MG/DL (ref 0.8–1.3)
CREAT SERPL-MCNC: 0.7 MG/DL (ref 0.8–1.3)
DEPRECATED RDW RBC AUTO: 15 % (ref 12.4–15.4)
EOSINOPHIL # BLD: 0 K/UL (ref 0–0.6)
EOSINOPHIL NFR BLD: 0.1 %
EST. AVERAGE GLUCOSE BLD GHB EST-MCNC: 102.5 MG/DL
GFR SERPLBLD CREATININE-BSD FMLA CKD-EPI: >90 ML/MIN/{1.73_M2}
GFR SERPLBLD CREATININE-BSD FMLA CKD-EPI: >90 ML/MIN/{1.73_M2}
GLUCOSE BLD-MCNC: 117 MG/DL (ref 70–99)
GLUCOSE SERPL-MCNC: 111 MG/DL (ref 70–99)
GLUCOSE SERPL-MCNC: 122 MG/DL (ref 70–99)
HBA1C MFR BLD: 5.2 %
HCT VFR BLD AUTO: 43 % (ref 40.5–52.5)
HCT VFR BLD AUTO: 43.5 % (ref 40.5–52.5)
HCT VFR BLD AUTO: 44.9 % (ref 40.5–52.5)
HGB BLD-MCNC: 14.1 G/DL (ref 13.5–17.5)
HGB BLD-MCNC: 14.2 G/DL (ref 13.5–17.5)
HGB BLD-MCNC: 14.5 G/DL (ref 13.5–17.5)
LYMPHOCYTES # BLD: 1.5 K/UL (ref 1–5.1)
LYMPHOCYTES NFR BLD: 10.3 %
MCH RBC QN AUTO: 30.2 PG (ref 26–34)
MCHC RBC AUTO-ENTMCNC: 33 G/DL (ref 31–36)
MCV RBC AUTO: 91.5 FL (ref 80–100)
MONOCYTES # BLD: 1 K/UL (ref 0–1.3)
MONOCYTES NFR BLD: 6.8 %
NEUTROPHILS # BLD: 11.7 K/UL (ref 1.7–7.7)
NEUTROPHILS NFR BLD: 82.5 %
PERFORMED ON: ABNORMAL
PLATELET # BLD AUTO: 131 K/UL (ref 135–450)
PMV BLD AUTO: 8 FL (ref 5–10.5)
POTASSIUM SERPL-SCNC: 4 MMOL/L (ref 3.5–5.1)
POTASSIUM SERPL-SCNC: 4.2 MMOL/L (ref 3.5–5.1)
PROT SERPL-MCNC: 6.6 G/DL (ref 6.4–8.2)
RBC # BLD AUTO: 4.7 M/UL (ref 4.2–5.9)
SODIUM SERPL-SCNC: 138 MMOL/L (ref 136–145)
SODIUM SERPL-SCNC: 139 MMOL/L (ref 136–145)
TROPONIN, HIGH SENSITIVITY: 24 NG/L (ref 0–22)
WBC # BLD AUTO: 14.2 K/UL (ref 4–11)

## 2024-05-18 PROCEDURE — 85025 COMPLETE CBC W/AUTO DIFF WBC: CPT

## 2024-05-18 PROCEDURE — 99232 SBSQ HOSP IP/OBS MODERATE 35: CPT | Performed by: INTERNAL MEDICINE

## 2024-05-18 PROCEDURE — 93005 ELECTROCARDIOGRAM TRACING: CPT | Performed by: INTERNAL MEDICINE

## 2024-05-18 PROCEDURE — 85014 HEMATOCRIT: CPT

## 2024-05-18 PROCEDURE — 80053 COMPREHEN METABOLIC PANEL: CPT

## 2024-05-18 PROCEDURE — 2580000003 HC RX 258

## 2024-05-18 PROCEDURE — 2500000003 HC RX 250 WO HCPCS: Performed by: INTERNAL MEDICINE

## 2024-05-18 PROCEDURE — 6370000000 HC RX 637 (ALT 250 FOR IP): Performed by: INTERNAL MEDICINE

## 2024-05-18 PROCEDURE — 85018 HEMOGLOBIN: CPT

## 2024-05-18 PROCEDURE — 36415 COLL VENOUS BLD VENIPUNCTURE: CPT

## 2024-05-18 PROCEDURE — 97535 SELF CARE MNGMENT TRAINING: CPT

## 2024-05-18 PROCEDURE — 84484 ASSAY OF TROPONIN QUANT: CPT

## 2024-05-18 PROCEDURE — 99233 SBSQ HOSP IP/OBS HIGH 50: CPT | Performed by: INTERNAL MEDICINE

## 2024-05-18 PROCEDURE — 97530 THERAPEUTIC ACTIVITIES: CPT

## 2024-05-18 PROCEDURE — 96366 THER/PROPH/DIAG IV INF ADDON: CPT

## 2024-05-18 PROCEDURE — 1200000000 HC SEMI PRIVATE

## 2024-05-18 PROCEDURE — 70450 CT HEAD/BRAIN W/O DYE: CPT

## 2024-05-18 PROCEDURE — 97110 THERAPEUTIC EXERCISES: CPT

## 2024-05-18 PROCEDURE — 2580000003 HC RX 258: Performed by: INTERNAL MEDICINE

## 2024-05-18 RX ORDER — SODIUM CHLORIDE 9 MG/ML
INJECTION, SOLUTION INTRAVENOUS CONTINUOUS
Status: DISCONTINUED | OUTPATIENT
Start: 2024-05-18 | End: 2024-05-18 | Stop reason: SDUPTHER

## 2024-05-18 RX ORDER — ASPIRIN 81 MG/1
81 TABLET, CHEWABLE ORAL DAILY
Status: DISCONTINUED | OUTPATIENT
Start: 2024-05-18 | End: 2024-05-23 | Stop reason: HOSPADM

## 2024-05-18 RX ADMIN — SODIUM CHLORIDE: 9 INJECTION, SOLUTION INTRAVENOUS at 02:47

## 2024-05-18 RX ADMIN — CARBIDOPA AND LEVODOPA 1 TABLET: 25; 100 TABLET ORAL at 13:42

## 2024-05-18 RX ADMIN — FINASTERIDE 5 MG: 5 TABLET, FILM COATED ORAL at 08:21

## 2024-05-18 RX ADMIN — AMLODIPINE BESYLATE 10 MG: 5 TABLET ORAL at 08:21

## 2024-05-18 RX ADMIN — Medication 3 MG: at 20:52

## 2024-05-18 RX ADMIN — DOXYCYCLINE 100 MG: 100 INJECTION, POWDER, LYOPHILIZED, FOR SOLUTION INTRAVENOUS at 08:34

## 2024-05-18 RX ADMIN — SODIUM CHLORIDE: 9 INJECTION, SOLUTION INTRAVENOUS at 17:27

## 2024-05-18 RX ADMIN — ARIPIPRAZOLE 5 MG: 10 TABLET ORAL at 08:22

## 2024-05-18 RX ADMIN — MEMANTINE HYDROCHLORIDE 10 MG: 5 TABLET ORAL at 08:21

## 2024-05-18 RX ADMIN — MONTELUKAST SODIUM 10 MG: 10 TABLET, COATED ORAL at 08:21

## 2024-05-18 RX ADMIN — CARBIDOPA AND LEVODOPA 1 TABLET: 25; 100 TABLET ORAL at 20:53

## 2024-05-18 RX ADMIN — PANTOPRAZOLE SODIUM 40 MG: 40 TABLET, DELAYED RELEASE ORAL at 06:27

## 2024-05-18 RX ADMIN — LACOSAMIDE 100 MG: 50 TABLET, FILM COATED ORAL at 08:22

## 2024-05-18 RX ADMIN — LACOSAMIDE 100 MG: 50 TABLET, FILM COATED ORAL at 20:52

## 2024-05-18 RX ADMIN — CARBIDOPA AND LEVODOPA 1 TABLET: 25; 100 TABLET ORAL at 08:22

## 2024-05-18 RX ADMIN — CETIRIZINE HYDROCHLORIDE 10 MG: 10 TABLET ORAL at 08:21

## 2024-05-18 RX ADMIN — MEMANTINE HYDROCHLORIDE 10 MG: 5 TABLET ORAL at 20:53

## 2024-05-18 RX ADMIN — ASPIRIN 81 MG: 81 TABLET, CHEWABLE ORAL at 19:11

## 2024-05-18 RX ADMIN — ESCITALOPRAM OXALATE 10 MG: 10 TABLET ORAL at 08:21

## 2024-05-18 RX ADMIN — ATORVASTATIN CALCIUM 80 MG: 40 TABLET, FILM COATED ORAL at 20:52

## 2024-05-18 NOTE — SIGNIFICANT EVENT
Code stroke called for dizziness, numbness/tingling of left finger tips, heaviness of left fingertips, headache.     /68  HR 71    Code stroke call  NIH 0    BG  BMP  EKG    CT head     No TNK due to history of hemoptysis   Stroke team agreeable to resuming asa since hemoptysis has resolved and only CT head at this time.     Also discussed with my attending. Clinical RN updated, plan of care discussed.     Alistair Rodriguez, KATIE - CNP  05/18/24  7:03 PM

## 2024-05-18 NOTE — PROGRESS NOTES
When entering patients room patient was stating that he was dizzy and couldn't open his eyes.  /68, pulse 102, afib on tele.  Patient hot and sweating.  Dr. Tan aware.     1837-Dr. Tan aware that order placed for STAT EKG.    1840-Dr. Holloway now made aware that patient is saying his fingers on his left hand feel numb and his continuous pulse ox is heavy.  Patient is saying that he is trying to wake up.  No facial droop, slurred speech, no arm or leg drift. Rapid response called.      1843-provider present at bedside and clinical supervisor. Stroke team paged.

## 2024-05-18 NOTE — PLAN OF CARE
Problem: Discharge Planning  Goal: Discharge to home or other facility with appropriate resources  5/17/2024 2033 by Susi Hilton RN  Outcome: Progressing  5/17/2024 1105 by Bing Hernandez RN  Outcome: Progressing     Problem: Safety - Adult  Goal: Free from fall injury  5/17/2024 2033 by Susi Hilton RN  Outcome: Progressing  5/17/2024 1105 by Bing Hernandez, RN  Outcome: Progressing     Problem: Skin/Tissue Integrity  Goal: Absence of new skin breakdown  Description: 1.  Monitor for areas of redness and/or skin breakdown  2.  Assess vascular access sites hourly  3.  Every 4-6 hours minimum:  Change oxygen saturation probe site  4.  Every 4-6 hours:  If on nasal continuous positive airway pressure, respiratory therapy assess nares and determine need for appliance change or resting period.  Outcome: Progressing     Problem: Chronic Conditions and Co-morbidities  Goal: Patient's chronic conditions and co-morbidity symptoms are monitored and maintained or improved  Outcome: Progressing

## 2024-05-18 NOTE — FLOWSHEET NOTE
05/18/24 0204   Vital Signs   Temp 98.2 °F (36.8 °C)   Temp Source Oral   Pulse 95   Heart Rate Source Monitor   Respirations 18   BP (!) 155/88   MAP (Calculated) 110   BP Location Right upper arm   BP Method Automatic   Patient Position Semi fowlers   Pain Assessment   Pain Assessment None - Denies Pain   Opioid-Induced Sedation   POSS Score 1   Oxygen Therapy   SpO2 99 %   O2 Device None (Room air)     Pt VS as shown above.

## 2024-05-18 NOTE — PROGRESS NOTES
Patient currently resting in chair at bedside. On room air and in no distress. Pleasant and cooperative with care. Alert and oriented x 4. Afib on tele. Chair alarm on for patient safety. Using urinal at bedside. Also ambulating to bathroom with standby assist. Nurse will continue to monitor/reassess. Call light within reach.

## 2024-05-18 NOTE — PROGRESS NOTES
PULMONARY PROGRESS NOTE  CC: Reported small volume hemoptysis, h/o lung cancer.  Awoke with SOB, cough & confusion. (2 recent admissions w/ confusion)     Subjective:   Worked with PT OT.  SpO2 remains stable on RA.    IV line: Peripheral    PHYSICAL EXAM:   BP (!) 155/88   Pulse 95   Temp 98.2 °F (36.8 °C) (Oral)   Resp 18   Wt 69.5 kg (153 lb 2 oz)   SpO2 99%   BMI 22.60 kg/m² ' on RA  Constitutional:  No acute distress   HEENT:  No scleral icterus  Neck:  No tracheal deviation present.    Cardiovascular:  Normal heart sounds.   Pulmonary/Chest:  No accessory muscle usage. No decreased breath sounds.  No wheezes. No rhonchi. No rales.   Abdominal:  Soft.   Musculoskeletal:  No cyanosis. No clubbing.  Skin:  Skin is warm and dry.   Neuro: awake and alert   [Boston Crouch MD on 5/18/24 at 1:29 PM EDT] *     Scheduled Meds:   pantoprazole  40 mg Oral QAM AC    ARIPiprazole  5 mg Oral Daily    amLODIPine  10 mg Oral Daily    atorvastatin  80 mg Oral Nightly    carbidopa-levodopa  1 tablet Oral TID    cetirizine  10 mg Oral Daily    escitalopram  10 mg Oral Daily    finasteride  5 mg Oral Daily    ipratropium  2 spray Each Nostril 4x Daily    lacosamide  100 mg Oral BID    memantine  10 mg Oral BID    montelukast  10 mg Oral Daily    [Held by provider] enoxaparin  40 mg SubCUTAneous Daily    melatonin  3 mg Oral Nightly    nicotine  1 patch TransDERmal Daily    [Held by provider] aspirin  81 mg Oral Daily    Or    [Held by provider] aspirin  300 mg Rectal Daily    doxycycline (VIBRAMYCIN) IV  100 mg IntraVENous Q12H     Continuous Infusions:   sodium chloride 75 mL/hr at 05/18/24 0247     PRN Meds:  albuterol sulfate HFA, potassium chloride **OR** potassium alternative oral replacement **OR** potassium chloride, magnesium sulfate, promethazine **OR** ondansetron, acetaminophen **OR** acetaminophen, perflutren lipid microspheres, polyethylene glycol, labetalol, albuterol sulfate HFA, LORazepam,

## 2024-05-18 NOTE — PROGRESS NOTES
Inpatient Occupational Therapy Evaluation and Treatment    Unit: 2 [place]  Date:  2024  Patient Name:    Rodolfo Luna  Admitting diagnosis:  Acute encephalopathy [G93.40]  Choking, initial encounter [T17.308A]  Altered mental status, unspecified altered mental status type [R41.82]  Dysphagia, unspecified type [R13.10]  Admit Date:  2024  Precautions/Restrictions/WB Status/ Lines/ Wounds/ Oxygen: Fall risk, Bed/chair alarm, Lines (IV), Telemetry, and Continuous pulse oximetry      Treatment Time:    Treatment Number:  1  Timed Code Treatment Minutes: 25 minutes  Total Treatment Minutes:  25  minutes    Patient Goals for Therapy: \"none stated\"          Discharge Recommendations: Home with  assist  and Home OT   DME needs for discharge: Needs Met       Therapy recommendations for staff:   Assist of 1 for transfers with use of rolling walker (RW) and gait belt to/from chair  to/from bathroom    History of Present Illness: [facility]: \"78 y.o. male with pmhx of parkinsons, CVA, hx of lung cancer, atrial fibrillation, CAD who presented to [facility] ED with concern for altered mental status per family and coughing fit. ... Wife is also concerned that he has been getting more confused lately.   CT brain showed significant brain atrophy with moderate small vessel disease.     AM-PAC Score: AM-PAC Inpatient Daily Activity Raw Score: 17     Subjective:  Patient lying reclined in bed with no family present.   Pt agreeable to this OT session.     Cognition:    A&O Person and Place   Able to follow 2 step commands    Pain:   Yes  Location: R side   Ratin /10  Pain Medicine Status: No request made    Preadmission Environment:   Questionable historian.   Pt lives with                                         with family (spouse and grandson)  Home environment:                            one story home  Steps to enter first floor:                     1 steps to enter through doorway  Steps to  second floor/basement:        N/A  Laundry:                                              unknown  Bathroom:                                           walk in shower, shower chair , and standard height toilet  Pt sleeps in a:                                     Flat bed  Equipment owned:                              RW, SPC, manual WC, and shower chair/bench    Preadmission Status:  Pt able to drive: Unknown  Pt fully independent with ADLs: Yes  Pt receives assistance from family for: Family completes IADLs  Pt independent for functional transfers and utilized RW or SPC (interchangeably) for mobility in home and out in community  History of falls:            No  Home Health Services:None       Objective:  Does this pt have an acute or acute on chronic diagnosis of CHF? No    Upper Extremity ROM:    WFL,  pt able to perform all bed mobility, transfers, and gait without ROM limitation.    Dominant Hand: Right    Upper Extremity Strength:    Strength Assessment (measured on a 0-5 scale): BUEs: 4/5    Upper Extremity Sensation:    NT    Upper Extremity Proprioception:  NT    Coordination:  WFL; pt required unilateral UE testing to complete fine motor coordination; requiring verbal cueing and visual demonstrations for participation     Tone:  NT    Balance:  Sitting:    SBA   Standing:    CGA static and dynamic with RW and gait belt    Bed Mobility:   Supine to Sit:    Not Tested  Sit to Supine:   Not Tested  Rolling:   Not Tested  Scooting in sitting: Supervision  Scooting in supine:  Not Tested    Transfers:    Sit to stand:    CGA with RW and gait belt   Stand to sit:    CGA with RW and gait belt - independent to place hands this date   Bed to chair:     SBA and With RW and gait belt  Bed/ chair to standard toilet:  CGA progressing to Supervision   Practiced toilet transfer from low toilet 4 times with RW. Progressed to independent hand placement. Pt demonstrated controlled descent for all trials.   Bed/chair to

## 2024-05-18 NOTE — PLAN OF CARE
Problem: Discharge Planning  Goal: Discharge to home or other facility with appropriate resources  5/18/2024 1018 by Bing Hernandez, RN  Outcome: Progressing  5/17/2024 2033 by Susi Hilton RN  Outcome: Progressing     Problem: Safety - Adult  Goal: Free from fall injury  5/18/2024 1018 by Bing Hernandez, RN  Outcome: Progressing  5/17/2024 2033 by Susi Hilton RN  Outcome: Progressing

## 2024-05-18 NOTE — PROGRESS NOTES
Inpatient Physical Therapy Treatment    Unit: 2 [place]  Date:  5/18/2024  Patient Name:    Rodolfo Luna  Admitting diagnosis:  Acute encephalopathy [G93.40]  Choking, initial encounter [T17.308A]  Altered mental status, unspecified altered mental status type [R41.82]  Dysphagia, unspecified type [R13.10]  Admit Date:  5/16/2024  Precautions/Restrictions/WB Status/ Lines/ Wounds/ Oxygen: Fall risk, Bed/chair alarm, and Lines (IV)    Treatment Time:  10:20-10:45  Treatment Number:  2   Timed Code Treatment Minutes: 25 minutes  Total Treatment Minutes:  25  minutes    Patient Stated Goals for Therapy: not stated. The patient indicates understanding of these issues and agrees with the plan.        Discharge Recommendations: SNF ; will need 24/7 assist if going home.  DME needs for discharge: Needs Met       Therapy recommendation for EMS Transport: can transport by wheelchair    Therapy recommendations for staff:   Assist of 1 for ambulation with use of rolling walker (RW) and gait belt within room    History of Present Illness:   Per [facility] H&P: \"78 y.o. male with pmhx of parkinsons, CVA, hx of lung cancer, atrial fibrillation, CAD who presented to [facility] ED with concern for altered mental status per family and coughing fit. ... Wife is also concerned that he has been getting more confused lately.\"     AM-PAC Mobility Score       AM-PAC Inpatient Mobility without Stair Climbing Raw Score : 17      Subjective  Patient sitting up in chair with no family present.  Pt agreeable to this PT session.     Cognition    A&O Person and Place   Able to follow 1 step commands    Pain   No  Location: NA  Rating: NA /10  Pain Medicine Status: No request made    Preadmission Environment:   Questionable historian.   Pt lives with    with family (spouse and grandson)  Home environment:    one story home  Steps to enter first floor:   1 steps to enter through doorway  Steps to second floor/basement: N/A  Laundry:

## 2024-05-18 NOTE — PROGRESS NOTES
IM Progress Note    Daily Progress Note for 2024 4:23 PM 0212/0212-01  Rodolfo Luna : 1945 Age: 78 y.o. Sex: male  Length of Stay:  0    Interval History:      CC: F/U Altered Mental Status (Patient brought in by EMS with reports of altered mental status.  )    Subjective:     Patient seen and examined   sitting up on the chair   tolerating a diet  Getting better.   Declined SNF. Plan for DC home with [facility] .     Dysphagia - seen by SLP, GI     Objective:     Vitals:    24 0204 24 0600 24 0817 24 1340   BP: (!) 155/88  130/70 (!) 147/73   Pulse: 95  88 93   Resp: 18  16 16   Temp: 98.2 °F (36.8 °C)  98.1 °F (36.7 °C) 98.1 °F (36.7 °C)   TempSrc: Oral  Oral Oral   SpO2: 99%  98% 98%   Weight:  69.5 kg (153 lb 2 oz)            Intake/Output Summary (Last 24 hours) at 2024 1623  Last data filed at 2024 0632  Gross per 24 hour   Intake --   Output 625 ml   Net -625 ml       Body mass index is 22.6 kg/m².    Physical Exam:  General: Cooperative, pleasant/Ill appearing  HEENT:  Head: normocephalic,atraumatic, anicteric sclera, clear conjunctiva  Neck: Normal size, Jugular venous pulsations: normal  Respiratory:unlabored breathing, clear to auscultation with no crackles, wheezes rhonchi  Heart: Regular rate and rhythm, S1, S2-normal, No murmurs  Abdomen: soft, nondistended, nontender, normoactive bowel sounds,  Neurological/Psych: Alert and oriented times three, no focal neurological deficits, +memory loss  Skin: No obvious rashes    Extremities:  no edema, Pedal pulses 2+ bilaterally    Scheduled Medications:  pantoprazole, 40 mg, QAM AC  ARIPiprazole, 5 mg, Daily  amLODIPine, 10 mg, Daily  atorvastatin, 80 mg, Nightly  carbidopa-levodopa, 1 tablet, TID  cetirizine, 10 mg, Daily  escitalopram, 10 mg, Daily  finasteride, 5 mg, Daily  ipratropium, 2 spray, 4x Daily  lacosamide, 100 mg, BID  memantine, 10 mg, BID  montelukast, 10 mg, Daily  [Held by provider] enoxaparin, 40 mg,  Reverse  Transcriptase Polymerase Chain Reaction (RT-PCR)-based in vitro  diagnostic test intended for the qualitative detection of nucleic  acids from SARS-CoV-2, influenza A, and influenza B in nasopharyngeal  and nasal swab specimens for use under the FDA’s Emergency Use  Authorization (EUA) only.    Patient Fact Sheet:  https://www.fda.gov/media/483137/download  Provider Fact Sheet: https://www.fda.gov/media/049356/download  EUA: https://www.fda.gov/media/180650/download  IFU: https://www.fda.gov/media/289173/download    Methodology:  RT-PCR          Influenza A NOT DETECTED     Influenza B NOT DETECTED                Radiology reviewed:     CT CHEST WO CONTRAST   Final Result   1. Right pleural effusion.   2. Ground-glass nodularity in the right lower lobe.  This can be seen with   infection.  Close follow-up is recommended.         FL ESOPHAGRAM   Final Result   1. Aspiration.         CT Head W/O Contrast   Final Result   No acute intracranial abnormality.      Atrophy and small-vessel ischemic change, similar to prior         XR CHEST PORTABLE   Final Result   Stable chest.  Stable postsurgical change on the right, with a right-sided   pleural effusion and adjacent right lung consolidation again noted..  There   is emphysema             Lab Results   Component Value Date    LABA1C 5.2 05/17/2024      Body mass index is 22.6 kg/m².       Impression/Plan:      #Acute encephalopathy -likely dementia   #Parkinsons Disease  -on sinemet and namenda although has not seen neurology? Wife told my colleague he does not have Parkinsons.   -ativan prn   -CT head negative for acute pathology, shows significant brain atrophy with moderate small vessel disease   -ammonia negative  -B12, folate pending   -PT/OT   -SLP eval -> minced and moist mildly thick liquids, needs SLP treatment at discharge. MBS last admission with tracheal aspiration of thin liquids  -neurology consulted   -MRI brain no stroke or acute

## 2024-05-18 NOTE — PROGRESS NOTES
PROGRESS NOTE    HPI: Rodolfo Luna is a(n)78 y.o. male admitted for work-up and treatment for Acute encephalopathy [G93.40]  Choking, initial encounter [T17.308A]  Altered mental status, unspecified altered mental status type [R41.82]  Dysphagia, unspecified type [R13.10].     We are following for dysphagia.    Subjective:     Tolerating diet per speech therapy. Eating more slowly. No new GI complaints.      Objective:     I/O last 3 completed shifts:  In: 0   Out: 1875 [Urine:1875]      BP (!) 147/73   Pulse 93   Temp 98.1 °F (36.7 °C) (Oral)   Resp 16   Wt 69.5 kg (153 lb 2 oz)   SpO2 98%   BMI 22.60 kg/m²     Physical Exam:  HEENT: anicteric sclera, oropharyngeal membranes pink and moist.  Cor: RRR  Lungs: non-labored, no respiratory distress  Abdomen: soft,  NT. No ascites.  No hepatomegaly or splenomegaly  Extremities: no edema  Neuro: alert and oriented x 3      Results:   Lab Results   Component Value Date    ALT <5 (L) 05/18/2024    AST 18 05/18/2024    ALKPHOS 130 (H) 05/18/2024    BILIDIR 0.20 04/19/2014    PROT 7.6 06/03/2016    INR 1.22 (H) 05/16/2024    LIPASE 31.0 10/26/2022     Lab Results   Component Value Date    WBC 14.2 (H) 05/18/2024    HGB 14.5 05/18/2024    HCT 44.9 05/18/2024    MCV 91.5 05/18/2024     (L) 05/18/2024     BUN/Cr/glu/ALT/AST/amyl/lip:  21/0.7/--/<5/18/--/-- (05/18 0211)  CT CHEST WO CONTRAST    Result Date: 5/16/2024  1. Right pleural effusion. 2. Ground-glass nodularity in the right lower lobe.  This can be seen with infection.  Close follow-up is recommended.     FL ESOPHAGRAM    Result Date: 5/16/2024  1. Aspiration.     CT Head W/O Contrast    Result Date: 5/16/2024  No acute intracranial abnormality. Atrophy and small-vessel ischemic change, similar to prior     XR CHEST PORTABLE    Result Date: 5/16/2024  Stable chest.  Stable postsurgical change on the right, with a right-sided pleural effusion and

## 2024-05-18 NOTE — PROGRESS NOTES
BP (!) 154/86   Pulse 77   Temp 98.4 °F (36.9 °C) (Oral)   Resp 18   Wt 70.9 kg (156 lb 3.2 oz)   SpO2 99%   BMI 23.06 kg/m²     Pt awake in bed. Pt is alert and orientedX4. Pt denies pain. Assessment complete. Meds passed. Pt denies needs at this time.        Bedside Mobility Assessment Tool (BMAT):     Assessment Level 1- Sit and Shake    1. From a semi-reclined position, ask patient to sit up and rotate to a seated position at the side of the bed. Can use the bedrail.    2. Ask patient to reach out and grab your hand and shake making sure patient reaches across his/her midline.   Pass- Patient is able to come to a seated position, maintain core strength. Maintains seated balance while reaching across midline. Move on to Assessment Level 2.     Assessment Level 2- Stretch and Point   1. With patient in seated position at the side of the bed, have patient place both feet on the floor (or stool) with knees no higher than hips.    2. Ask patient to stretch one leg and straighten the knee, then bend the ankle/flex and point the toes. If appropriate, repeat with the other leg.   Pass- Patient is able to demonstrate appropriate quad strength on intended weight bearing limb(s). Move onto Assessment Level 3.     Assessment Level 3- Stand   1. Ask patient to elevate off the bed or chair (seated to standing) using an assistive device (cane, bedrail).    2. Patient should be able to raise buttocks off be and hold for a count of five. May repeat once.   Pass- Patient maintains standing stability for at least 5 seconds, proceed to assessment level 4.    Assessment Level 4- Walk   1. Ask patient to march in place at bedside.    2. Then ask patient to advance step and return each foot. Some medical conditions may render a patient from stepping backwards, use your best clinical judgement.   Fail- Patient not able to complete tasks OR requires use of assistive device. Patient is MOBILITY LEVEL 3.       Mobility Level- 3

## 2024-05-19 LAB
ALBUMIN SERPL-MCNC: 3.1 G/DL (ref 3.4–5)
ALBUMIN/GLOB SERPL: 1.1 {RATIO} (ref 1.1–2.2)
ALP SERPL-CCNC: 115 U/L (ref 40–129)
ALT SERPL-CCNC: 6 U/L (ref 10–40)
ANION GAP SERPL CALCULATED.3IONS-SCNC: 9 MMOL/L (ref 3–16)
AST SERPL-CCNC: 20 U/L (ref 15–37)
BASOPHILS # BLD: 0 K/UL (ref 0–0.2)
BASOPHILS NFR BLD: 0.2 %
BILIRUB SERPL-MCNC: 1.4 MG/DL (ref 0–1)
BUN SERPL-MCNC: 21 MG/DL (ref 7–20)
CALCIUM SERPL-MCNC: 8.5 MG/DL (ref 8.3–10.6)
CHLORIDE SERPL-SCNC: 102 MMOL/L (ref 99–110)
CO2 SERPL-SCNC: 21 MMOL/L (ref 21–32)
CREAT SERPL-MCNC: <0.5 MG/DL (ref 0.8–1.3)
DEPRECATED RDW RBC AUTO: 15.1 % (ref 12.4–15.4)
EKG ATRIAL RATE: 96 BPM
EKG DIAGNOSIS: NORMAL
EKG Q-T INTERVAL: 374 MS
EKG QRS DURATION: 86 MS
EKG QTC CALCULATION (BAZETT): 462 MS
EKG R AXIS: -15 DEGREES
EKG T AXIS: 36 DEGREES
EKG VENTRICULAR RATE: 92 BPM
EOSINOPHIL # BLD: 0 K/UL (ref 0–0.6)
EOSINOPHIL NFR BLD: 0.1 %
GFR SERPLBLD CREATININE-BSD FMLA CKD-EPI: >90 ML/MIN/{1.73_M2}
GLUCOSE SERPL-MCNC: 92 MG/DL (ref 70–99)
HCT VFR BLD AUTO: 38.8 % (ref 40.5–52.5)
HGB BLD-MCNC: 13.2 G/DL (ref 13.5–17.5)
LYMPHOCYTES # BLD: 1.2 K/UL (ref 1–5.1)
LYMPHOCYTES NFR BLD: 11.6 %
MCH RBC QN AUTO: 31 PG (ref 26–34)
MCHC RBC AUTO-ENTMCNC: 33.9 G/DL (ref 31–36)
MCV RBC AUTO: 91.2 FL (ref 80–100)
MONOCYTES # BLD: 0.6 K/UL (ref 0–1.3)
MONOCYTES NFR BLD: 6.2 %
NEUTROPHILS # BLD: 8.4 K/UL (ref 1.7–7.7)
NEUTROPHILS NFR BLD: 81.9 %
PLATELET # BLD AUTO: 36 K/UL (ref 135–450)
PLATELET BLD QL SMEAR: ABNORMAL
PMV BLD AUTO: 9.7 FL (ref 5–10.5)
POTASSIUM SERPL-SCNC: 4.1 MMOL/L (ref 3.5–5.1)
PROT SERPL-MCNC: 5.9 G/DL (ref 6.4–8.2)
RBC # BLD AUTO: 4.25 M/UL (ref 4.2–5.9)
SLIDE REVIEW: ABNORMAL
SODIUM SERPL-SCNC: 132 MMOL/L (ref 136–145)
WBC # BLD AUTO: 10.2 K/UL (ref 4–11)

## 2024-05-19 PROCEDURE — 2500000003 HC RX 250 WO HCPCS: Performed by: INTERNAL MEDICINE

## 2024-05-19 PROCEDURE — 6370000000 HC RX 637 (ALT 250 FOR IP): Performed by: INTERNAL MEDICINE

## 2024-05-19 PROCEDURE — 2580000003 HC RX 258: Performed by: INTERNAL MEDICINE

## 2024-05-19 PROCEDURE — 99232 SBSQ HOSP IP/OBS MODERATE 35: CPT | Performed by: INTERNAL MEDICINE

## 2024-05-19 PROCEDURE — 2580000003 HC RX 258

## 2024-05-19 PROCEDURE — 1200000000 HC SEMI PRIVATE

## 2024-05-19 PROCEDURE — 36415 COLL VENOUS BLD VENIPUNCTURE: CPT

## 2024-05-19 PROCEDURE — 97530 THERAPEUTIC ACTIVITIES: CPT

## 2024-05-19 PROCEDURE — 85025 COMPLETE CBC W/AUTO DIFF WBC: CPT

## 2024-05-19 PROCEDURE — 80053 COMPREHEN METABOLIC PANEL: CPT

## 2024-05-19 PROCEDURE — 97110 THERAPEUTIC EXERCISES: CPT

## 2024-05-19 PROCEDURE — 93010 ELECTROCARDIOGRAM REPORT: CPT | Performed by: INTERNAL MEDICINE

## 2024-05-19 RX ADMIN — FINASTERIDE 5 MG: 5 TABLET, FILM COATED ORAL at 08:42

## 2024-05-19 RX ADMIN — CARBIDOPA AND LEVODOPA 1 TABLET: 25; 100 TABLET ORAL at 14:32

## 2024-05-19 RX ADMIN — MONTELUKAST SODIUM 10 MG: 10 TABLET, COATED ORAL at 08:42

## 2024-05-19 RX ADMIN — DOXYCYCLINE 100 MG: 100 INJECTION, POWDER, LYOPHILIZED, FOR SOLUTION INTRAVENOUS at 00:43

## 2024-05-19 RX ADMIN — ASPIRIN 81 MG: 81 TABLET, CHEWABLE ORAL at 08:42

## 2024-05-19 RX ADMIN — SODIUM CHLORIDE: 9 INJECTION, SOLUTION INTRAVENOUS at 11:43

## 2024-05-19 RX ADMIN — LACOSAMIDE 100 MG: 50 TABLET, FILM COATED ORAL at 20:50

## 2024-05-19 RX ADMIN — ARIPIPRAZOLE 5 MG: 10 TABLET ORAL at 08:42

## 2024-05-19 RX ADMIN — CARBIDOPA AND LEVODOPA 1 TABLET: 25; 100 TABLET ORAL at 20:50

## 2024-05-19 RX ADMIN — MEMANTINE HYDROCHLORIDE 10 MG: 5 TABLET ORAL at 20:50

## 2024-05-19 RX ADMIN — LACOSAMIDE 100 MG: 50 TABLET, FILM COATED ORAL at 08:42

## 2024-05-19 RX ADMIN — DOXYCYCLINE 100 MG: 100 INJECTION, POWDER, LYOPHILIZED, FOR SOLUTION INTRAVENOUS at 20:51

## 2024-05-19 RX ADMIN — PANTOPRAZOLE SODIUM 40 MG: 40 TABLET, DELAYED RELEASE ORAL at 05:20

## 2024-05-19 RX ADMIN — ESCITALOPRAM OXALATE 10 MG: 10 TABLET ORAL at 08:42

## 2024-05-19 RX ADMIN — DOXYCYCLINE 100 MG: 100 INJECTION, POWDER, LYOPHILIZED, FOR SOLUTION INTRAVENOUS at 08:42

## 2024-05-19 RX ADMIN — AMLODIPINE BESYLATE 10 MG: 5 TABLET ORAL at 08:42

## 2024-05-19 RX ADMIN — Medication 3 MG: at 20:50

## 2024-05-19 RX ADMIN — ATORVASTATIN CALCIUM 80 MG: 40 TABLET, FILM COATED ORAL at 20:50

## 2024-05-19 RX ADMIN — CETIRIZINE HYDROCHLORIDE 10 MG: 10 TABLET ORAL at 08:42

## 2024-05-19 RX ADMIN — SODIUM CHLORIDE: 9 INJECTION, SOLUTION INTRAVENOUS at 20:48

## 2024-05-19 RX ADMIN — CARBIDOPA AND LEVODOPA 1 TABLET: 25; 100 TABLET ORAL at 08:42

## 2024-05-19 RX ADMIN — MEMANTINE HYDROCHLORIDE 10 MG: 5 TABLET ORAL at 08:42

## 2024-05-19 ASSESSMENT — PAIN DESCRIPTION - ORIENTATION
ORIENTATION: LOWER
ORIENTATION: RIGHT

## 2024-05-19 ASSESSMENT — PAIN DESCRIPTION - DESCRIPTORS
DESCRIPTORS: ACHING
DESCRIPTORS: STABBING

## 2024-05-19 ASSESSMENT — PAIN - FUNCTIONAL ASSESSMENT: PAIN_FUNCTIONAL_ASSESSMENT: ACTIVITIES ARE NOT PREVENTED

## 2024-05-19 ASSESSMENT — PAIN SCALES - GENERAL
PAINLEVEL_OUTOF10: 6
PAINLEVEL_OUTOF10: 3

## 2024-05-19 ASSESSMENT — PAIN DESCRIPTION - ONSET: ONSET: ON-GOING

## 2024-05-19 ASSESSMENT — PAIN DESCRIPTION - LOCATION
LOCATION: BACK
LOCATION: SHOULDER

## 2024-05-19 ASSESSMENT — PAIN DESCRIPTION - PAIN TYPE: TYPE: CHRONIC PAIN

## 2024-05-19 ASSESSMENT — PAIN DESCRIPTION - FREQUENCY: FREQUENCY: CONTINUOUS

## 2024-05-19 NOTE — PROGRESS NOTES
TODAY'S DATE:  5/19/2024      Current NIHSS 0      Discussed personal risk factors for Stroke/TIA with patient/family, and ways to reduce the risk for a recurrent stroke.     Patient's personal risk factors which were identified are:   []   Alcohol Abuse: check with your physician before any alcohol consumption.   [x]   Atrial fibrillation: may cause blood clots  []   Drug Abuse: Seek help, talk with your doctor  []   Clotting Disorder  []   Diabetes  []   Family history of stroke or heart disease  []   High Blood Pressure/Hypertension: work with your physician  []   High cholesterol: monitor cholesterol levels with your physician  []   Overweight/Obesity: work with your physician for your ideal body weight  [x]   Physical Inactivity: get regular exercise as directed by your physician  []   Personal history of previous TIA or stroke  []   Poor Diet: decrease salt (sodium) in your diet, follow diet directed by physician  []   Smoking: stop smoking      Reviewed the Following Education with Patient and/or Family:   - Signs and Symptoms of Stroke  - Personal risk factors   - How to activate EMS (911)   - Importance of Follow Up Appointments at Discharge   - Importance of Compliance with Medications Prescribed at Discharge  - Available community resources and stroke advocacy groups if needed    Patient and/or family member: education needs reinforcement.     Stroke Education booklet given to patient/family (or verified, if given already), which reviews above information. Already given at admission.         Electronically signed by FRANCISCA CORBIN RN on 5/19/2024 at 2:54 AM

## 2024-05-19 NOTE — PROGRESS NOTES
BP (!) 156/80   Pulse 96   Temp 98 °F (36.7 °C) (Oral)   Resp 16   Wt 69.5 kg (153 lb 2 oz)   SpO2 98%   BMI 22.60 kg/m²     Pt awake in bed. Pt is alert and oriented to person and time only. Assessment complete. Meds passed. Pt denies needs at this time.        Bedside Mobility Assessment Tool (BMAT):     Assessment Level 1- Sit and Shake    1. From a semi-reclined position, ask patient to sit up and rotate to a seated position at the side of the bed. Can use the bedrail.    2. Ask patient to reach out and grab your hand and shake making sure patient reaches across his/her midline.   Pass- Patient is able to come to a seated position, maintain core strength. Maintains seated balance while reaching across midline. Move on to Assessment Level 2.     Assessment Level 2- Stretch and Point   1. With patient in seated position at the side of the bed, have patient place both feet on the floor (or stool) with knees no higher than hips.    2. Ask patient to stretch one leg and straighten the knee, then bend the ankle/flex and point the toes. If appropriate, repeat with the other leg.   Pass- Patient is able to demonstrate appropriate quad strength on intended weight bearing limb(s). Move onto Assessment Level 3.     Assessment Level 3- Stand   1. Ask patient to elevate off the bed or chair (seated to standing) using an assistive device (cane, bedrail).    2. Patient should be able to raise buttocks off be and hold for a count of five. May repeat once.   Pass- Patient maintains standing stability for at least 5 seconds, proceed to assessment level 4.    Assessment Level 4- Walk   1. Ask patient to march in place at bedside.    2. Then ask patient to advance step and return each foot. Some medical conditions may render a patient from stepping backwards, use your best clinical judgement.   Fail- Patient not able to complete tasks OR requires use of assistive device. Patient is MOBILITY LEVEL 3.       Mobility Level- 3

## 2024-05-19 NOTE — PLAN OF CARE
Problem: Discharge Planning  Goal: Discharge to home or other facility with appropriate resources  5/18/2024 2110 by Susi Hilton RN  Outcome: Progressing  5/18/2024 1018 by Bing Henrandez RN  Outcome: Progressing     Problem: Safety - Adult  Goal: Free from fall injury  5/18/2024 2110 by Susi Hilton RN  Outcome: Progressing  5/18/2024 1018 by Bing Hernandez, RN  Outcome: Progressing     Problem: Skin/Tissue Integrity  Goal: Absence of new skin breakdown  Description: 1.  Monitor for areas of redness and/or skin breakdown  2.  Assess vascular access sites hourly  3.  Every 4-6 hours minimum:  Change oxygen saturation probe site  4.  Every 4-6 hours:  If on nasal continuous positive airway pressure, respiratory therapy assess nares and determine need for appliance change or resting period.  Outcome: Progressing     Problem: Chronic Conditions and Co-morbidities  Goal: Patient's chronic conditions and co-morbidity symptoms are monitored and maintained or improved  Outcome: Progressing

## 2024-05-19 NOTE — PROGRESS NOTES
Inpatient Occupational Therapy Treatment    Unit: 2 [place]  Date:  5/19/2024  Patient Name:    Rodolfo Luna  Admitting diagnosis:  Acute encephalopathy [G93.40]  Choking, initial encounter [T17.308A]  Altered mental status, unspecified altered mental status type [R41.82]  Dysphagia, unspecified type [R13.10]  Encephalopathy acute [G93.40]  Admit Date:  5/16/2024  Precautions/Restrictions/WB Status/ Lines/ Wounds/ Oxygen: Fall risk, Bed/chair alarm, Lines (IV), Telemetry, and Continuous pulse oximetry      Treatment Time: 09:07 - 09:47  Treatment Number:  3  Timed Code Treatment Minutes: 40 minutes  Total Treatment Minutes: 40 minutes    Patient Goals for Therapy: none stated         Discharge Recommendations: SNF - if refuses will require 24/7 assistance and HHOT   DME needs for discharge: Defer to facility       Therapy recommendations for staff:   Assist of 1 for transfers with use of rolling walker (RW) and gait belt to/from chair  to/from bathroom    History of Present Illness: Per [facility] H&P: \"78 y.o. male with pmhx of parkinsons, CVA, hx of lung cancer, atrial fibrillation, CAD who presented to [facility] ED with concern for altered mental status per family and coughing fit. ... Wife is also concerned that he has been getting more confused lately.   CT brain showed significant brain atrophy with moderate small vessel disease.     AM-PAC Score: AM-PAC Inpatient Daily Activity Raw Score: 17     Subjective:  Patient sitting up in chair with family present (sister in law).   Pt agreeable to this OT session.     Cognition:    A&O x4   Able to follow 2 step commands    Pain:   No  Location: n/a  Rating: NA /10  Pain Medicine Status: No request made    Preadmission Environment:   Questionable historian.   Pt lives with                                         with family (spouse and grandson)  Home environment:                            one story home  Steps to enter first floor:                     1 steps to

## 2024-05-19 NOTE — PROGRESS NOTES
Patient currently resting in chair at bedside. On room air and in no distress. Pleasant and cooperative with care. Alert and oriented x 2.  A-fib on tele. Using urinal at bedside and ambulating to bathroom with standby assist. Chair alarm on for patient safety. Nurse will continue to monitor/reassess. Call light within reach.

## 2024-05-19 NOTE — FLOWSHEET NOTE
05/19/24 0244   Vital Signs   Temp 98.5 °F (36.9 °C)   Temp Source Oral   Pulse 76   Heart Rate Source Monitor   Respirations 16   BP (!) 157/82   MAP (Calculated) 107   BP Location Left upper arm   BP Method Automatic   Patient Position Semi fowlers   Pain Assessment   Pain Assessment None - Denies Pain   Opioid-Induced Sedation   POSS Score 1   Oxygen Therapy   SpO2 95 %   O2 Device None (Room air)     Pt VS as shown above.

## 2024-05-19 NOTE — PROGRESS NOTES
Pt awake in bed watching TV. Pt is confused. Pt denies any needs at this time. Pt bed is in the lowest position, bed alarm is on, and call light is within reach.

## 2024-05-19 NOTE — PLAN OF CARE
Problem: Discharge Planning  Goal: Discharge to home or other facility with appropriate resources  5/19/2024 1031 by Bing Hernandez, RN  Outcome: Progressing  5/18/2024 2110 by Susi Hilton RN  Outcome: Progressing     Problem: Safety - Adult  Goal: Free from fall injury  5/19/2024 1031 by Bing Hernandez, RN  Outcome: Progressing  5/18/2024 2110 by Susi Hilton RN  Outcome: Progressing

## 2024-05-19 NOTE — PROGRESS NOTES
IM Progress Note    Daily Progress Note for 2024 12:40 PM 0212/0212-01  Rodolfo Luna : 1945 Age: 78 y.o. Sex: male  Length of Stay:  1    Interval History:      CC: F/U Altered Mental Status (Patient brought in by EMS with reports of altered mental status.  )    Subjective:       Patient seen and examined   sitting up on the chair   tolerating a diet  Getting better.   Declined SNF. Plan for DC home with [facility] .     Dysphagia - seen by SLP, GI         Patient continues to have intermittent confusion   currently sitting up on the chair   awake alert and oriented tolerating a soft diet  Spoke to patient and patient's sister-in-law at bedside   patient again refusing SNF.   Episode of transient dizziness yesterday. CT head neg . Started on IVF     Objective:     Vitals:    24 2046 24 0244 24 0506 24 0757   BP: (!) 156/80 (!) 157/82  (!) 157/85   Pulse: 96 76  89   Resp: 16 16  16   Temp: 98 °F (36.7 °C) 98.5 °F (36.9 °C)  97.6 °F (36.4 °C)   TempSrc: Oral Oral  Oral   SpO2: 98% 95%  95%   Weight:   69.5 kg (153 lb 3.2 oz)           Intake/Output Summary (Last 24 hours) at 2024 1240  Last data filed at 2024 0858  Gross per 24 hour   Intake 120 ml   Output 875 ml   Net -755 ml       Body mass index is 22.61 kg/m².    Physical Exam:  General: Cooperative, awake, alert, oriented X3  HEENT:  Head: normocephalic,atraumatic, anicteric sclera, clear conjunctiva  Neck: Normal size, Jugular venous pulsations: normal  Respiratory:unlabored breathing, clear to auscultation with no crackles, wheezes rhonchi  Heart: Regular rate and rhythm, S1, S2-normal, No murmurs  Abdomen: soft, nondistended, nontender, normoactive bowel sounds,  Neurological/Psych: Alert and oriented times three, no focal neurological deficits, +memory loss  Skin: No obvious rashes    Extremities:  no edema, Pedal pulses 2+ bilaterally    Scheduled Medications:  aspirin, 81 mg, Daily  pantoprazole, 40 mg,

## 2024-05-20 ENCOUNTER — APPOINTMENT (OUTPATIENT)
Dept: CT IMAGING | Age: 79
DRG: 100 | End: 2024-05-20
Payer: MEDICARE

## 2024-05-20 ENCOUNTER — TELEPHONE (OUTPATIENT)
Dept: PULMONOLOGY | Age: 79
End: 2024-05-20

## 2024-05-20 PROBLEM — G40.909 SEIZURE DISORDER (HCC): Status: ACTIVE | Noted: 2024-05-18

## 2024-05-20 LAB
GLUCOSE BLD-MCNC: 100 MG/DL (ref 70–99)
PERFORMED ON: ABNORMAL

## 2024-05-20 PROCEDURE — 99233 SBSQ HOSP IP/OBS HIGH 50: CPT | Performed by: PSYCHIATRY & NEUROLOGY

## 2024-05-20 PROCEDURE — 97535 SELF CARE MNGMENT TRAINING: CPT

## 2024-05-20 PROCEDURE — 95816 EEG AWAKE AND DROWSY: CPT

## 2024-05-20 PROCEDURE — 70450 CT HEAD/BRAIN W/O DYE: CPT

## 2024-05-20 PROCEDURE — 6370000000 HC RX 637 (ALT 250 FOR IP): Performed by: INTERNAL MEDICINE

## 2024-05-20 PROCEDURE — 1200000000 HC SEMI PRIVATE

## 2024-05-20 PROCEDURE — 95816 EEG AWAKE AND DROWSY: CPT | Performed by: PSYCHIATRY & NEUROLOGY

## 2024-05-20 PROCEDURE — 2500000003 HC RX 250 WO HCPCS: Performed by: INTERNAL MEDICINE

## 2024-05-20 PROCEDURE — 6360000002 HC RX W HCPCS: Performed by: PSYCHIATRY & NEUROLOGY

## 2024-05-20 PROCEDURE — 99233 SBSQ HOSP IP/OBS HIGH 50: CPT | Performed by: INTERNAL MEDICINE

## 2024-05-20 PROCEDURE — 6370000000 HC RX 637 (ALT 250 FOR IP): Performed by: PSYCHIATRY & NEUROLOGY

## 2024-05-20 PROCEDURE — 2580000003 HC RX 258: Performed by: INTERNAL MEDICINE

## 2024-05-20 PROCEDURE — 6360000002 HC RX W HCPCS: Performed by: INTERNAL MEDICINE

## 2024-05-20 PROCEDURE — 97530 THERAPEUTIC ACTIVITIES: CPT

## 2024-05-20 RX ORDER — LEVETIRACETAM 500 MG/5ML
1000 INJECTION, SOLUTION, CONCENTRATE INTRAVENOUS ONCE
Status: COMPLETED | OUTPATIENT
Start: 2024-05-20 | End: 2024-05-20

## 2024-05-20 RX ORDER — FERROUS SULFATE 325(65) MG
325 TABLET ORAL 2 TIMES DAILY WITH MEALS
Status: DISCONTINUED | OUTPATIENT
Start: 2024-05-20 | End: 2024-05-23 | Stop reason: HOSPADM

## 2024-05-20 RX ORDER — FLUTICASONE PROPIONATE 50 MCG
1 SPRAY, SUSPENSION (ML) NASAL DAILY PRN
Status: ON HOLD | COMMUNITY
End: 2024-05-21 | Stop reason: HOSPADM

## 2024-05-20 RX ORDER — ZOLPIDEM TARTRATE 5 MG/1
10 TABLET ORAL NIGHTLY
Status: DISCONTINUED | OUTPATIENT
Start: 2024-05-20 | End: 2024-05-23 | Stop reason: HOSPADM

## 2024-05-20 RX ORDER — LEVETIRACETAM 500 MG/1
500 TABLET ORAL 2 TIMES DAILY
Status: DISCONTINUED | OUTPATIENT
Start: 2024-05-20 | End: 2024-05-23 | Stop reason: HOSPADM

## 2024-05-20 RX ORDER — IPRATROPIUM BROMIDE 21 UG/1
2 SPRAY, METERED NASAL 4 TIMES DAILY PRN
Status: DISCONTINUED | OUTPATIENT
Start: 2024-05-20 | End: 2024-05-23 | Stop reason: HOSPADM

## 2024-05-20 RX ORDER — LORAZEPAM 2 MG/ML
0.5 INJECTION INTRAMUSCULAR ONCE
Status: COMPLETED | OUTPATIENT
Start: 2024-05-20 | End: 2024-05-20

## 2024-05-20 RX ORDER — DOXYCYCLINE HYCLATE 100 MG
100 TABLET ORAL EVERY 12 HOURS SCHEDULED
Status: COMPLETED | OUTPATIENT
Start: 2024-05-20 | End: 2024-05-21

## 2024-05-20 RX ORDER — B-COMPLEX WITH VITAMIN C
1 TABLET ORAL
Status: DISCONTINUED | OUTPATIENT
Start: 2024-05-20 | End: 2024-05-20 | Stop reason: CLARIF

## 2024-05-20 RX ADMIN — ZOLPIDEM TARTRATE 10 MG: 5 TABLET ORAL at 20:21

## 2024-05-20 RX ADMIN — CARBIDOPA AND LEVODOPA 1 TABLET: 25; 100 TABLET ORAL at 08:49

## 2024-05-20 RX ADMIN — LACOSAMIDE 100 MG: 50 TABLET, FILM COATED ORAL at 08:50

## 2024-05-20 RX ADMIN — DOXYCYCLINE HYCLATE 100 MG: 100 TABLET, COATED ORAL at 20:20

## 2024-05-20 RX ADMIN — MEMANTINE HYDROCHLORIDE 10 MG: 5 TABLET ORAL at 08:48

## 2024-05-20 RX ADMIN — CARBIDOPA AND LEVODOPA 1 TABLET: 25; 100 TABLET ORAL at 20:20

## 2024-05-20 RX ADMIN — MONTELUKAST SODIUM 10 MG: 10 TABLET, COATED ORAL at 08:49

## 2024-05-20 RX ADMIN — CARBIDOPA AND LEVODOPA 1 TABLET: 25; 100 TABLET ORAL at 14:32

## 2024-05-20 RX ADMIN — ATORVASTATIN CALCIUM 80 MG: 40 TABLET, FILM COATED ORAL at 20:20

## 2024-05-20 RX ADMIN — LORAZEPAM 0.5 MG: 2 INJECTION INTRAMUSCULAR; INTRAVENOUS at 11:55

## 2024-05-20 RX ADMIN — FERROUS SULFATE TAB 325 MG (65 MG ELEMENTAL FE) 325 MG: 325 (65 FE) TAB at 11:56

## 2024-05-20 RX ADMIN — ASPIRIN 81 MG: 81 TABLET, CHEWABLE ORAL at 08:47

## 2024-05-20 RX ADMIN — DOXYCYCLINE 100 MG: 100 INJECTION, POWDER, LYOPHILIZED, FOR SOLUTION INTRAVENOUS at 08:52

## 2024-05-20 RX ADMIN — ARIPIPRAZOLE 5 MG: 10 TABLET ORAL at 08:48

## 2024-05-20 RX ADMIN — FERROUS SULFATE TAB 325 MG (65 MG ELEMENTAL FE) 325 MG: 325 (65 FE) TAB at 17:40

## 2024-05-20 RX ADMIN — LEVETIRACETAM 500 MG: 500 TABLET, FILM COATED ORAL at 20:21

## 2024-05-20 RX ADMIN — LACOSAMIDE 100 MG: 50 TABLET, FILM COATED ORAL at 20:21

## 2024-05-20 RX ADMIN — LEVETIRACETAM 1000 MG: 100 INJECTION, SOLUTION INTRAVENOUS at 12:35

## 2024-05-20 RX ADMIN — CETIRIZINE HYDROCHLORIDE 10 MG: 10 TABLET ORAL at 08:48

## 2024-05-20 RX ADMIN — ESCITALOPRAM OXALATE 10 MG: 10 TABLET ORAL at 08:47

## 2024-05-20 RX ADMIN — FINASTERIDE 5 MG: 5 TABLET, FILM COATED ORAL at 08:49

## 2024-05-20 RX ADMIN — Medication 3 MG: at 20:21

## 2024-05-20 RX ADMIN — AMLODIPINE BESYLATE 10 MG: 5 TABLET ORAL at 08:48

## 2024-05-20 RX ADMIN — MEMANTINE HYDROCHLORIDE 10 MG: 5 TABLET ORAL at 20:21

## 2024-05-20 ASSESSMENT — PAIN SCALES - GENERAL
PAINLEVEL_OUTOF10: 0

## 2024-05-20 NOTE — FLOWSHEET NOTE
05/20/24 1040   Vital Signs   Pulse 91   Respirations 18   BP (!) 159/67   MAP (Calculated) 98   BP Location Left upper arm   BP Method Automatic   Patient Position Up in chair;Sitting   Pain Assessment   Pain Assessment None - Denies Pain   Oxygen Therapy   SpO2 97 %   O2 Device None (Room air)       Patients family member came out to nurses station stating patient having episode again where he cannot speak, blank facial expression, worsening tremor of BUE. RN assessing patient. MD called to bed side. VSS. BS WNL. STAT EEG ordered. Neuro contacted by Dr Tan.

## 2024-05-20 NOTE — PROGRESS NOTES
Inpatient Occupational Therapy Treatment    Unit: [address] [place]  Date:  5/20/2024  Patient Name:    Rodolfo Luna  Admitting diagnosis:  Acute encephalopathy [G93.40]  Choking, initial encounter [T17.308A]  Altered mental status, unspecified altered mental status type [R41.82]  Dysphagia, unspecified type [R13.10]  Encephalopathy acute [G93.40]  Admit Date:  5/16/2024  Precautions/Restrictions/WB Status/ Lines/ Wounds/ Oxygen: Fall risk, Bed/chair alarm, Lines (IV), Telemetry, and Continuous pulse oximetry      Treatment Time: 14:10-14:33  Treatment Number:  4  Timed Code Treatment Minutes: 23 minutes  Total Treatment Minutes: 23 minutes    Patient Goals for Therapy: none stated         Discharge Recommendations: SNF   DME needs for discharge: Defer to facility       Therapy recommendations for staff:   Assist of 1 for transfers with use of rolling walker (RW) and gait belt to/from chair  to/from bathroom    History of Present Illness: Per [facility] H&P: \"78 y.o. male with pmhx of parkinsons, CVA, hx of lung cancer, atrial fibrillation, CAD who presented to [facility] ED with concern for altered mental status per family and coughing fit. ... Wife is also concerned that he has been getting more confused lately.   CT brain showed significant brain atrophy with moderate small vessel disease.     AM-PAC Score: AM-PAC Inpatient Daily Activity Raw Score: 17     Subjective:  Patient sitting up in chair with family present (sister in law).   Pt agreeable to this OT session.     Cognition:    A&O Person and Place   Able to follow 2 step commands    Pain:   No  Location: n/a  Rating: NA /10  Pain Medicine Status: No request made    Preadmission Environment:   Questionable historian.   Pt lives with                                         with family (spouse and grandson)  Home environment:                            one story home  Steps to enter first floor:                     1 steps to enter through doorway  Steps to second

## 2024-05-20 NOTE — PROGRESS NOTES
Pt's chair alarm going off, went to room pt on floor in front of chair, pt confused at baseline stated he does not know if he fell or slipped out of chair, assisted pt to bed, vitals taken-see flow sheet, no apparent injuries noted, placed tele sitter in room and notified MD, charge nurse and clinical.

## 2024-05-20 NOTE — CARE COORDINATION
INTERDISCIPLINARY PLAN OF CARE CONFERENCE    Date/Time: 5/20/2024 3:30 PM  Completed by: Clementina Bacon RN, Case Management      Patient Name:  Rodolfo Luna  YOB: 1945  Admitting Diagnosis: Acute encephalopathy [G93.40]  Choking, initial encounter [T17.308A]  Altered mental status, unspecified altered mental status type [R41.82]  Dysphagia, unspecified type [R13.10]  Encephalopathy acute [G93.40]     Admit Date/Time:  5/16/2024  3:37 AM    Chart reviewed. Interdisciplinary team contacted or reviewed plan related to patient progress and discharge plans.   Disciplines included Case Management, Nursing, and Dietitian.    Current Status:Stable  PT/OT recommendation for discharge plan of care: SNF    Expected D/C Disposition:  Rehab  Confirmed plan with patient and/or family Yes confirmed with: (name) wife  Met with:patient and wife  Discharge Plan Comments: Reviewed chart and spoke with pt and wife and grandson at bedside. Pt is now agreeable to rehab and chooses [facility] and [facility] 2[facility]. List declines. Left VM referral for Franca at [facility] re: rehab. Await return call. Will cont to follow.    Home O2 in place on admit: No  Pt informed of need to bring portable home O2 tank on day of discharge for nursing to connect prior to leaving:  Not Indicated  Verbalized agreement/Understanding:  Not Indicated

## 2024-05-20 NOTE — PROGRESS NOTES
Bed side report given to ANDREA Huerta. Assisted patient from chair, to restroom, and back to bed. Pt denies further needs. Call light within reach. Bed alarm on. Tele sitter in place.

## 2024-05-20 NOTE — CONSULTS
[facility]  Palliative Medicine Consultation Note      Date Of Admission:5/16/2024  Date of consult: 05/20/24  Seen by PC in the past:  No    Recommendations:        Reviewed chart. Writer spoke with pt's spouse,Carol, via TC for goals of care conversation. Pt continues to have intermittent confusion and alert to self. Palliative/hospice options introduced to pt's spouse for symptom management and added support in the home. Per spouse she would like to talk further in person and plans to arrive at [facility] in the next half hour.     Writer met with pt's spouse,Carol and grandvarshaLuiz at bedside. Hospice list provided to the family and pt's spouse requests referral to [facility]. Referral called to Grisel with [facility]; awaiting meeting time.    Writer spoke with Grisel from [facility] and she met with the pt's spouse and grandson at bedside. Per spouse she would like time to consider options comfort care vrs continuing with treatments. PC will follow up in the AM.    ELMER Mcrae aware of above plan.    1. Goals of Care/Advanced Care planning/Code status: Full Code  2. Pain: managed per care team  3. SOB: na  4. Disposition: home with spouse and home health care    Reason for Consult:         [x]  Goals of Care  []  Code Status Discussion/Advanced Care Planning   []  Psychosocial/Family Support  []  Symptom Management  []  Other (Specify)    Requesting Physician: Dr. Horner    CHIEF COMPLAINT:  Acute encephalopathy,choking,AMS    History Obtained From:  patient    History of Present Illness:         Rodolfo Luna is a 78 y.o. male with PMH of (see below list) who presented with Acute encephalopathy,Choking,Altered mental status, dysphagia and aspiration. Pt with pmh of A-fib,acute kidney injury,CAD,seizure disorder, lung cancer s/p thoracotomy,and parkinson's disease.     Subjective:         Past Medical History:        Diagnosis Date    A-fib (HCC)     MIRNA (acute kidney injury) (HCC)

## 2024-05-20 NOTE — TELEPHONE ENCOUNTER
Inpatient Notes  Received: 3 days ago  Boston Crouch MD Headrick, Alicia, MA  Next available pulmonary appt, will need f/u CT imaging in about 8 weeks.    LVM for pt to call clinic to get scheduled with pulm and for CT in 8 weeks

## 2024-05-20 NOTE — PROGRESS NOTES
Herbal and Nutritional Product Restrictions      The following herbal, alternative, and/or nutritional/dietary supplement product(s) has been discontinued per [employer] approved policy:    Riboflavin 25 mg     Please reorder upon discharge if appropriate.    Thank you,  Manda Iglesias [facility]  5/20/2024 11:42 AM

## 2024-05-20 NOTE — PLAN OF CARE
Problem: Skin/Tissue Integrity  Goal: Absence of new skin breakdown  Description: 1.  Monitor for areas of redness and/or skin breakdown  2.  Assess vascular access sites hourly  3.  Every 4-6 hours minimum:  Change oxygen saturation probe site  4.  Every 4-6 hours:  If on nasal continuous positive airway pressure, respiratory therapy assess nares and determine need for appliance change or resting period.  Outcome: Progressing     Problem: Chronic Conditions and Co-morbidities  Goal: Patient's chronic conditions and co-morbidity symptoms are monitored and maintained or improved  Outcome: Progressing  Flowsheets (Taken 5/19/2024 2041)  Care Plan - Patient's Chronic Conditions and Co-Morbidity Symptoms are Monitored and Maintained or Improved:   Monitor and assess patient's chronic conditions and comorbid symptoms for stability, deterioration, or improvement   Collaborate with multidisciplinary team to address chronic and comorbid conditions and prevent exacerbation or deterioration   Update acute care plan with appropriate goals if chronic or comorbid symptoms are exacerbated and prevent overall improvement and discharge     Problem: Pain  Goal: Verbalizes/displays adequate comfort level or baseline comfort level  Outcome: Progressing  Flowsheets (Taken 5/19/2024 2040)  Verbalizes/displays adequate comfort level or baseline comfort level:   Encourage patient to monitor pain and request assistance   Assess pain using appropriate pain scale   Administer analgesics based on type and severity of pain and evaluate response   Implement non-pharmacological measures as appropriate and evaluate response   Consider cultural and social influences on pain and pain management   Notify Licensed Independent Practitioner if interventions unsuccessful or patient reports new pain

## 2024-05-20 NOTE — PROGRESS NOTES
Shift assessment complete. VSS. Patient A/OX4. Denies pain or N/V/D. Scheduled meds given. See MAR. Pt denies further needs. Call light within reach. Bed alarm on. Tele sitter in place.

## 2024-05-20 NOTE — PROCEDURES
PROCEDURE NOTE  Date: 5/20/2024   Name: Rodolfo Luna  YOB: 1945    Procedures    INTERPRETATION:  This 25-minute, computer-assisted video EEG recording is abnormal.  It showed mild to moderate degree of generalized slowing background activity.  No potentially epileptiform activity was present during the recording.       The EEG findings were consistent with mild to moderate degree of generalized non-specific cerebral dysfunction.     CLASSIFICATION:  Dysrhythmia grade 2, generalized.  Sleep - unsuccessful.  EKG channel.     DESCRIPTION:     BACKGROUND:  The awake recording revealed 9 Hz alpha activity over the posterior head region.  There were increased 2 to 7 Hz theta/delta activity into the EEG background.  Given the extensive study, the patient still did not sleep.  The EEG showed normal V waves during drowsiness.  There was no significant change on the EEG background with photic stimulation.  Hyperventilation was omitted due to old age.     INTERICTAL DISCHARGES: None     CLINICAL EVENTS:  None     The EKG channel was unremarkable.

## 2024-05-20 NOTE — FLOWSHEET NOTE
05/19/24 2040   Vital Signs   Temp 97.8 °F (36.6 °C)   Temp Source Oral   Pulse 100   Heart Rate Source Monitor   Respirations 18   BP (!) 151/95   MAP (Calculated) 114   BP Location Left upper arm   BP Method Automatic   Patient Position Sitting   Pain Assessment   Pain Assessment 0-10   Pain Level 3   Patient's Stated Pain Goal 0 - No pain   Pain Location Back   Pain Orientation Lower   Pain Descriptors Aching   Functional Pain Assessment Activities are not prevented   Pain Type Chronic pain   Pain Radiating Towards none   Pain Frequency Continuous   Pain Onset On-going   Non-Pharmaceutical Pain Intervention(s) Rest   Care Plan - Pain Goals   Verbalizes/displays adequate comfort level or baseline comfort level Encourage patient to monitor pain and request assistance;Assess pain using appropriate pain scale;Administer analgesics based on type and severity of pain and evaluate response;Implement non-pharmacological measures as appropriate and evaluate response;Consider cultural and social influences on pain and pain management;Notify Licensed Independent Practitioner if interventions unsuccessful or patient reports new pain   Opioid-Induced Sedation   POSS Score 1   RASS   Carter Agitation Sedation Scale (RASS) 0   Oxygen Therapy   SpO2 97 %   O2 Device None (Room air)      Pt alert to self, awake sitting up in chair. Shift assessment completed, vss, PM medications given crushed in applesauce, NIHSS 0. Call light in reach, bed alarm on, chair locked, will continue to monitor.

## 2024-05-20 NOTE — PROGRESS NOTES
Bedside Mobility Assessment Tool (BMAT):     Assessment Level 1- Sit and Shake    1. From a semi-reclined position, ask patient to sit up and rotate to a seated position at the side of the bed. Can use the bedrail.    2. Ask patient to reach out and grab your hand and shake making sure patient reaches across his/her midline.   Pass- Patient is able to come to a seated position, maintain core strength. Maintains seated balance while reaching across midline. Move on to Assessment Level 2.     Assessment Level 2- Stretch and Point   1. With patient in seated position at the side of the bed, have patient place both feet on the floor (or stool) with knees no higher than hips.    2. Ask patient to stretch one leg and straighten the knee, then bend the ankle/flex and point the toes. If appropriate, repeat with the other leg.   Pass- Patient is able to demonstrate appropriate quad strength on intended weight bearing limb(s). Move onto Assessment Level 3.     Assessment Level 3- Stand   1. Ask patient to elevate off the bed or chair (seated to standing) using an assistive device (cane, bedrail).    2. Patient should be able to raise buttocks off be and hold for a count of five. May repeat once.   Fail- Patient unable to demonstrate standing stability. Patient is MOBILITY LEVEL 3.     Assessment Level 4- Walk   1. Ask patient to march in place at bedside.    2. Then ask patient to advance step and return each foot. Some medical conditions may render a patient from stepping backwards, use your best clinical judgement.   Fail- Patient not able to complete tasks OR requires use of assistive device. Patient is MOBILITY LEVEL 3.       Mobility Level- 3     Patient is able to demonstrate the ability to move from a reclining position to an upright position within the recliner.

## 2024-05-20 NOTE — PROGRESS NOTES
Pt transported to CT, pt back in room at this time. Pt alert to self, no c/o pain, tele sitter in room, bed alarm on, call light in reach.

## 2024-05-20 NOTE — PROGRESS NOTES
Neurology Progress Note    ID: Rodolfo Luna is a 78 y.o. male    : 1945     LOS: 2 days     ASSESSMENT    Principal Problem:    Acute encephalopathy  Active Problems:    Atrial fibrillation (HCC)    Choking    History of CVA (cerebrovascular accident)    Hemoptysis    Encephalopathy acute  Resolved Problems:    * No resolved hospital problems. 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  The patient is alert but disoriented to time and place.  CT brain showed significant brain atrophy with moderate small vessel disease.     From neurology perspective, this is metabolic/toxic encephalopathy based on the history.  The patient never had outpatient evaluation by neurology.  However, the patient may have a certain degree of dementia.    24: The patient is alert and oriented.  The patient remains free of mild degree of akinetic rigid syndrome.    24: The patient has been reported intermittent confusion with inability to speak over the weekend.  The patient had another 2 episodes this morning.  There was no focal weakness or numbness with this episode.  The patient had CT brain this morning which showed no acute ischemic injury.  His blood pressure was slightly elevated.    Overall, the patient is suspicious to have clinical breakthrough seizure.     Plan:     1.  Continue Sinemet.  2.  Continue lacosamide.  3.  Minimize sedation and anticholinergic medication.  4.  Restart aspirin once the patient is stabilized.  The patient is high risk for recurrent stroke.  5.  Continue memantine.  6.  Hemoptysis management per primary team.  7.  PT/OT/ST.  8.  Levetiracetam 1000 mg IV loading.  9.  Levetiracetam 500 mg twice daily.  10.  EEG.    Medications:  Scheduled Meds:    doxycycline hyclate  100 mg Oral 2 times per day    ferrous sulfate  325 mg Oral BID WC    zolpidem  10 mg Oral Nightly    levETIRAcetam  500 mg Oral BID    aspirin  81 mg Oral Daily    pantoprazole  40 mg Oral QAM AC

## 2024-05-20 NOTE — PROGRESS NOTES
06/03/2016 09:48 AM    BUN 21 05/19/2024 04:48 AM    CREATININE <0.5 05/19/2024 04:48 AM       HEPATIC PANEL   Lab Results   Component Value Date/Time    AST 20 05/19/2024 04:48 AM    ALT 6 05/19/2024 04:48 AM       Lab Results   Component Value Date    TROPONINI <0.01 07/17/2022       Lab Results   Component Value Date/Time    INR 1.22 05/16/2024 03:40 AM    INR 1.18 04/04/2024 12:17 AM    INR 2.21 01/12/2023 07:07 AM       Cultures   Results       Procedure Component Value Units Date/Time    COVID-19 & Influenza Combo [[id number]] Collected: 05/16/24 0615    Order Status: Completed Specimen: Nasopharyngeal Swab Updated: 05/16/24 0644     SARS-CoV-2 RNA, RT PCR NOT DETECTED     Comment: Not Detected results do not preclude SARS-CoV-2 infection and  should not be used as the sole basis for patient management  decisions.  Results must be combined with clinical observations,  patient history, and epidemiological information.  Testing was performed using [employer] SARS-CoV-2 and Influenza A/B  nucleic acid assay. This test is a multiplex Real-Time Reverse  Transcriptase Polymerase Chain Reaction (RT-PCR)-based in vitro  diagnostic test intended for the qualitative detection of nucleic  acids from SARS-CoV-2, influenza A, and influenza B in nasopharyngeal  and nasal swab specimens for use under the FDA’s Emergency Use  Authorization (EUA) only.    Patient Fact Sheet:  https://www.fda.gov/media/895175/download  Provider Fact Sheet: https://www.fda.gov/media/515643/download  EUA: https://www.fda.gov/media/368969/download  IFU: https://www.fda.gov/media/818540/download    Methodology:  RT-PCR          Influenza A NOT DETECTED     Influenza B NOT DETECTED    Culture, Urine [[id number]] Collected: 05/16/24 0424    Order Status: Completed Specimen: Urine, clean catch Updated: 05/17/24 0633     Urine Culture, Routine No growth at 18 to 36 hours    Narrative:      ORDER#: [id number]                          ORDERED BY:  Contrast   Final Result   No acute intracranial abnormality.      Atrophy and small-vessel ischemic change, similar to prior         XR CHEST PORTABLE   Final Result   Stable chest.  Stable postsurgical change on the right, with a right-sided   pleural effusion and adjacent right lung consolidation again noted..  There   is emphysema             Lab Results   Component Value Date    LABA1C 5.2 05/17/2024      Body mass index is 22.76 kg/m².       Last EEG in April 2024  INTERPRETATION:  This 25-minute, computer-assisted video EEG recording is abnormal.  The EEG showed rare epileptogenic potentials over the left temporal area and intermittent focal slowing activity over the same area.  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 Wife told my colleague he does not have Parkinsons.  Patient seen by neurology and continued on Sinemet and Namenda.  Abilify added  -ativan prn   -CT head negative for acute pathology, shows significant brain atrophy with moderate small vessel disease .   CT head repeated on 5/18 for episode of dizziness, arm numbness- no acute abnormality   CT head repeated on 5/20 -no acute abnormality  - Neurology following   -ammonia negative  -B12, folate WNL  -Recurrent intermittent confusion concern for seizure management as below.     # Focal seizure disorder  -Last EEG from April 2024 reviewed.  -Will repeat EEG today.  -Discussed with neurology.  -Patient on Vimpat-continue  -Patient will be started on Keppra.  -Ativan as needed.  -Also concern for benzodiazepine withdrawal as patient was taking Ambien     #Hemoptysis  #Coughing  -with hx of lung cancer,

## 2024-05-21 LAB
ALBUMIN SERPL-MCNC: 3.2 G/DL (ref 3.4–5)
ALBUMIN/GLOB SERPL: 1.1 {RATIO} (ref 1.1–2.2)
ALP SERPL-CCNC: 117 U/L (ref 40–129)
ALT SERPL-CCNC: 14 U/L (ref 10–40)
ANION GAP SERPL CALCULATED.3IONS-SCNC: 10 MMOL/L (ref 3–16)
AST SERPL-CCNC: 27 U/L (ref 15–37)
BILIRUB SERPL-MCNC: 1.1 MG/DL (ref 0–1)
BUN SERPL-MCNC: 21 MG/DL (ref 7–20)
CALCIUM SERPL-MCNC: 8.5 MG/DL (ref 8.3–10.6)
CHLORIDE SERPL-SCNC: 107 MMOL/L (ref 99–110)
CO2 SERPL-SCNC: 22 MMOL/L (ref 21–32)
CREAT SERPL-MCNC: <0.5 MG/DL (ref 0.8–1.3)
DEPRECATED RDW RBC AUTO: 15.3 % (ref 12.4–15.4)
GFR SERPLBLD CREATININE-BSD FMLA CKD-EPI: >90 ML/MIN/{1.73_M2}
GLUCOSE SERPL-MCNC: 91 MG/DL (ref 70–99)
HCT VFR BLD AUTO: 41.4 % (ref 40.5–52.5)
HGB BLD-MCNC: 13.7 G/DL (ref 13.5–17.5)
MCH RBC QN AUTO: 31 PG (ref 26–34)
MCHC RBC AUTO-ENTMCNC: 33.2 G/DL (ref 31–36)
MCV RBC AUTO: 93.3 FL (ref 80–100)
PLATELET # BLD AUTO: 107 K/UL (ref 135–450)
PMV BLD AUTO: 8.3 FL (ref 5–10.5)
POTASSIUM SERPL-SCNC: 4.7 MMOL/L (ref 3.5–5.1)
PROT SERPL-MCNC: 6 G/DL (ref 6.4–8.2)
RBC # BLD AUTO: 4.43 M/UL (ref 4.2–5.9)
SODIUM SERPL-SCNC: 139 MMOL/L (ref 136–145)
WBC # BLD AUTO: 8.2 K/UL (ref 4–11)

## 2024-05-21 PROCEDURE — 99233 SBSQ HOSP IP/OBS HIGH 50: CPT | Performed by: INTERNAL MEDICINE

## 2024-05-21 PROCEDURE — 97530 THERAPEUTIC ACTIVITIES: CPT

## 2024-05-21 PROCEDURE — 97110 THERAPEUTIC EXERCISES: CPT

## 2024-05-21 PROCEDURE — 97535 SELF CARE MNGMENT TRAINING: CPT

## 2024-05-21 PROCEDURE — 6370000000 HC RX 637 (ALT 250 FOR IP): Performed by: PSYCHIATRY & NEUROLOGY

## 2024-05-21 PROCEDURE — 6370000000 HC RX 637 (ALT 250 FOR IP): Performed by: INTERNAL MEDICINE

## 2024-05-21 PROCEDURE — 99233 SBSQ HOSP IP/OBS HIGH 50: CPT | Performed by: PSYCHIATRY & NEUROLOGY

## 2024-05-21 PROCEDURE — 97116 GAIT TRAINING THERAPY: CPT

## 2024-05-21 PROCEDURE — 1200000000 HC SEMI PRIVATE

## 2024-05-21 PROCEDURE — 85027 COMPLETE CBC AUTOMATED: CPT

## 2024-05-21 PROCEDURE — 92526 ORAL FUNCTION THERAPY: CPT

## 2024-05-21 PROCEDURE — 80053 COMPREHEN METABOLIC PANEL: CPT

## 2024-05-21 PROCEDURE — 6370000000 HC RX 637 (ALT 250 FOR IP)

## 2024-05-21 PROCEDURE — 36415 COLL VENOUS BLD VENIPUNCTURE: CPT

## 2024-05-21 RX ORDER — AMLODIPINE BESYLATE 10 MG/1
10 TABLET ORAL DAILY
Status: ON HOLD | DISCHARGE
Start: 2024-05-22 | End: 2024-05-26 | Stop reason: HOSPADM

## 2024-05-21 RX ORDER — LORAZEPAM 1 MG/1
TABLET ORAL
Qty: 3 TABLET | Refills: 0 | Status: SHIPPED | OUTPATIENT
Start: 2024-05-21 | End: 2024-05-23

## 2024-05-21 RX ORDER — ZOLPIDEM TARTRATE 10 MG/1
10 TABLET ORAL NIGHTLY
Qty: 4 TABLET | Refills: 0 | Status: ON HOLD | OUTPATIENT
Start: 2024-05-21 | End: 2024-05-26 | Stop reason: HOSPADM

## 2024-05-21 RX ORDER — LEVETIRACETAM 500 MG/1
500 TABLET ORAL 2 TIMES DAILY
Status: ON HOLD | DISCHARGE
Start: 2024-05-21 | End: 2024-05-26 | Stop reason: HOSPADM

## 2024-05-21 RX ORDER — POLYETHYLENE GLYCOL 3350 17 G/17G
17 POWDER, FOR SOLUTION ORAL DAILY PRN
DISCHARGE
Start: 2024-05-21

## 2024-05-21 RX ORDER — LANOLIN ALCOHOL/MO/W.PET/CERES
3 CREAM (GRAM) TOPICAL NIGHTLY
DISCHARGE
Start: 2024-05-21

## 2024-05-21 RX ORDER — ESCITALOPRAM OXALATE 10 MG/1
10 TABLET ORAL DAILY
DISCHARGE
Start: 2024-05-22

## 2024-05-21 RX ORDER — ARIPIPRAZOLE 5 MG/1
5 TABLET ORAL DAILY
DISCHARGE
Start: 2024-05-22

## 2024-05-21 RX ADMIN — Medication 3 MG: at 20:35

## 2024-05-21 RX ADMIN — MEMANTINE HYDROCHLORIDE 10 MG: 5 TABLET ORAL at 20:36

## 2024-05-21 RX ADMIN — ASPIRIN 81 MG: 81 TABLET, CHEWABLE ORAL at 08:16

## 2024-05-21 RX ADMIN — ARIPIPRAZOLE 5 MG: 10 TABLET ORAL at 08:16

## 2024-05-21 RX ADMIN — CARBIDOPA AND LEVODOPA 1 TABLET: 25; 100 TABLET ORAL at 08:15

## 2024-05-21 RX ADMIN — MONTELUKAST SODIUM 10 MG: 10 TABLET, COATED ORAL at 08:16

## 2024-05-21 RX ADMIN — ESCITALOPRAM OXALATE 10 MG: 10 TABLET ORAL at 08:15

## 2024-05-21 RX ADMIN — ATORVASTATIN CALCIUM 80 MG: 40 TABLET, FILM COATED ORAL at 20:36

## 2024-05-21 RX ADMIN — LACOSAMIDE 100 MG: 50 TABLET, FILM COATED ORAL at 20:35

## 2024-05-21 RX ADMIN — LACOSAMIDE 100 MG: 50 TABLET, FILM COATED ORAL at 08:15

## 2024-05-21 RX ADMIN — FINASTERIDE 5 MG: 5 TABLET, FILM COATED ORAL at 08:16

## 2024-05-21 RX ADMIN — FERROUS SULFATE TAB 325 MG (65 MG ELEMENTAL FE) 325 MG: 325 (65 FE) TAB at 17:33

## 2024-05-21 RX ADMIN — CARBIDOPA AND LEVODOPA 1 TABLET: 25; 100 TABLET ORAL at 14:22

## 2024-05-21 RX ADMIN — AMLODIPINE BESYLATE 10 MG: 5 TABLET ORAL at 08:15

## 2024-05-21 RX ADMIN — LEVETIRACETAM 500 MG: 500 TABLET, FILM COATED ORAL at 08:16

## 2024-05-21 RX ADMIN — LORAZEPAM 2 MG: 2 TABLET ORAL at 20:34

## 2024-05-21 RX ADMIN — DOXYCYCLINE HYCLATE 100 MG: 100 TABLET, COATED ORAL at 08:15

## 2024-05-21 RX ADMIN — ACETAMINOPHEN 650 MG: 325 TABLET ORAL at 20:35

## 2024-05-21 RX ADMIN — PANTOPRAZOLE SODIUM 40 MG: 40 TABLET, DELAYED RELEASE ORAL at 05:26

## 2024-05-21 RX ADMIN — CARBIDOPA AND LEVODOPA 1 TABLET: 25; 100 TABLET ORAL at 20:35

## 2024-05-21 RX ADMIN — ZOLPIDEM TARTRATE 10 MG: 5 TABLET ORAL at 20:36

## 2024-05-21 RX ADMIN — LEVETIRACETAM 500 MG: 500 TABLET, FILM COATED ORAL at 20:35

## 2024-05-21 RX ADMIN — MEMANTINE HYDROCHLORIDE 10 MG: 5 TABLET ORAL at 08:16

## 2024-05-21 RX ADMIN — CETIRIZINE HYDROCHLORIDE 10 MG: 10 TABLET ORAL at 08:16

## 2024-05-21 RX ADMIN — FERROUS SULFATE TAB 325 MG (65 MG ELEMENTAL FE) 325 MG: 325 (65 FE) TAB at 08:15

## 2024-05-21 ASSESSMENT — PAIN DESCRIPTION - PAIN TYPE: TYPE: ACUTE PAIN

## 2024-05-21 ASSESSMENT — PAIN DESCRIPTION - DESCRIPTORS
DESCRIPTORS: ACHING
DESCRIPTORS: BURNING

## 2024-05-21 ASSESSMENT — PAIN SCALES - GENERAL
PAINLEVEL_OUTOF10: 6
PAINLEVEL_OUTOF10: 2
PAINLEVEL_OUTOF10: 2
PAINLEVEL_OUTOF10: 0
PAINLEVEL_OUTOF10: 5

## 2024-05-21 ASSESSMENT — PAIN DESCRIPTION - FREQUENCY: FREQUENCY: INTERMITTENT

## 2024-05-21 ASSESSMENT — PAIN DESCRIPTION - ONSET: ONSET: ON-GOING

## 2024-05-21 ASSESSMENT — PAIN DESCRIPTION - ORIENTATION
ORIENTATION: RIGHT
ORIENTATION: RIGHT

## 2024-05-21 ASSESSMENT — PAIN - FUNCTIONAL ASSESSMENT: PAIN_FUNCTIONAL_ASSESSMENT: ACTIVITIES ARE NOT PREVENTED

## 2024-05-21 ASSESSMENT — PAIN DESCRIPTION - LOCATION
LOCATION: KNEE
LOCATION: KNEE

## 2024-05-21 ASSESSMENT — PAIN DESCRIPTION - DIRECTION: RADIATING_TOWARDS: DENIES

## 2024-05-21 NOTE — PROGRESS NOTES
Physician Progress Note      PATIENT:               HARSHA DON  [facility] #:                  [id number]  :                       1945  ADMIT DATE:       2024 3:37 AM  DISCH DATE:  RESPONDING  PROVIDER #:        Jose Miguel Tan MD          QUERY TEXT:    Patient admitted with altered mental status, noted to have \"CT with ground   glass infiltrate, blood v atypical infection\" by pulmonology . If   possible, please document in progress notes and discharge summary if you are   evaluating and/or treating any of the following:    The medical record reflects the following:  Risk Factors: 78 year old male  Clinical Indicators: CTchest : Right pleural effusion. 2. Ground-glass   nodularity in the right lower lobe.  This can be seen with  infection.  Pulmonology : \"Hemoptysis - CT with ground glass infiltrates, blood v   atypical infection. Will treat for atypical infection.\"  IM : \"CT chest showed right pleural effusion with groundglass   nodularity-possible infection. Treated with doxycycline.\"  Treatment: IV Doxycycline to PO Doxycycline, CTChest, pulmonology consult.  Options provided:  -- This patient is being treated for atypical pneumonia, present on admission  -- This patient is being treated with doxycycline for ##please specify   infection, please specify infection.  -- Other - I will add my own diagnosis  -- Disagree - Not applicable / Not valid  -- Disagree - Clinically unable to determine / Unknown  -- Refer to Clinical Documentation Reviewer    PROVIDER RESPONSE TEXT:    This patient is being treated for atypical pneumonia, present on admission.    Query created by: Merline Martinez on 2024 1:43 PM      Electronically signed by:  Jose Miguel Tan MD 2024 3:11 PM

## 2024-05-21 NOTE — PROGRESS NOTES
IM Progress Note    Daily Progress Note for 2024 2:49 PM 0212/0212-01  Rodolfo Luna : 1945 Age: 78 y.o. Sex: male  Length of Stay:  3    Interval History:      CC: F/U Altered Mental Status (Patient brought in by EMS with reports of altered mental status.  )    Subjective:        Patient continues to have recurrent episodes of altered mentation;  he has brief episodes where he stops talking or responding and then responds   right away;  he had a similar episode a few days ago   - Patient has had at least 2  CT scans of the head over the weekend which are negative;  patient had a CT scan earlier in the admission which was negative. MRI of the brain cannot be done as he has a watchman in place.  -He did have an EEG last month which showed focal seizure activity ; he was supposed to get an outpatient repeat EEG next month.    Patient witnessed to have multiple recurrent transient episodes of altered mentation today  Patient's presentation appears to be recurrent focal seizures with some  postictal confusion    Blood sugars were checked which was normal at 100    Vital signs otherwise stable    Home medications also reviewed; patient does take Ambien every night he has not been getting it here for the last couple of days       Patient seen ambulating with therapy, walking with walker.   Mental status is better.  He feels much better today.   He did get Ambien last night and was started on Keppra.    No more altered mental status or seizure episodes noted    Objective:     Vitals:    24 0209 24 0800 24 1230   BP: 139/76 (!) 142/78 (!) 148/82 108/69   Pulse: 84 74 90 85   Resp: 18 16  16   Temp: 97.8 °F (36.6 °C) 97.6 °F (36.4 °C) 98.8 °F (37.1 °C) 97.5 °F (36.4 °C)   TempSrc: Oral Oral Oral Oral   SpO2: 94% 98% 99% 97%   Weight:              Intake/Output Summary (Last 24 hours) at 2024 1449  Last data filed at 2024 0841  Gross per 24 hour   Intake 450 ml  CONTRAST   Final Result   1. Right pleural effusion.   2. Ground-glass nodularity in the right lower lobe.  This can be seen with   infection.  Close follow-up is recommended.         FL ESOPHAGRAM   Final Result   1. Aspiration.         CT Head W/O Contrast   Final Result   No acute intracranial abnormality.      Atrophy and small-vessel ischemic change, similar to prior         XR CHEST PORTABLE   Final Result   Stable chest.  Stable postsurgical change on the right, with a right-sided   pleural effusion and adjacent right lung consolidation again noted..  There   is emphysema             Lab Results   Component Value Date    LABA1C 5.2 05/17/2024      Body mass index is 22.76 kg/m².       Last EEG in April 2024  INTERPRETATION:  This 25-minute, computer-assisted video EEG recording is abnormal.  The EEG showed rare epileptogenic potentials over the left temporal area and intermittent focal slowing activity over the same area.  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           EEG repeated on 5/20/2024  INTERPRETATION:  This 25-minute, computer-assisted video EEG recording is abnormal.  It showed mild to moderate degree of generalized slowing background activity.  No potentially epileptiform activity was present during the recording.       The EEG findings were consistent with mild to moderate degree of generalized non-specific cerebral dysfunction.      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 Wife told my colleague he does not have Parkinsons.  Patient seen by neurology and continued on Sinemet and Namenda.  Abilify added  -ativan prn   -CT head negative for acute pathology, shows significant brain atrophy with

## 2024-05-21 NOTE — CARE COORDINATION
Spoke with Nava at [facility] who can accept at [facility]. Pre cert started.    Pre cert remains Pending and likely will go to MD review.     Pre cert started by [facility] CM dept.

## 2024-05-21 NOTE — PROGRESS NOTES
Pt A&Ox4, awake in bed, NIHSS 0-, vss, shift assessment completed-see flow sheet, PM medications given crushed in applesauce, gave pt ice water thickened with nectar thick. Tele sitter in room for safety, bed alarm on call light in reach.

## 2024-05-21 NOTE — PLAN OF CARE
Problem: Discharge Planning  Goal: Discharge to home or other facility with appropriate resources  5/21/2024 0949 by Sarah Lozoya RN  Outcome: Progressing  5/21/2024 0948 by Sarah Lozoya RN  Outcome: Progressing  Flowsheets (Taken 5/20/2024 2016 by Deanna Link RN)  Discharge to home or other facility with appropriate resources: Identify barriers to discharge with patient and caregiver     Problem: Safety - Adult  Goal: Free from fall injury  5/21/2024 0949 by Sarah Lozoya RN  Outcome: Progressing  5/21/2024 0948 by Sarah Lozoya RN  Outcome: Progressing  5/20/2024 2204 by Deanna Link RN  Outcome: Progressing     Problem: Skin/Tissue Integrity  Goal: Absence of new skin breakdown  Description: 1.  Monitor for areas of redness and/or skin breakdown  2.  Assess vascular access sites hourly  3.  Every 4-6 hours minimum:  Change oxygen saturation probe site  4.  Every 4-6 hours:  If on nasal continuous positive airway pressure, respiratory therapy assess nares and determine need for appliance change or resting period.  5/21/2024 0949 by Sarah Lozoya RN  Outcome: Progressing  5/21/2024 0948 by Sarah Lozoya RN  Outcome: Progressing  5/20/2024 2204 by Deanna Link RN  Outcome: Progressing     Problem: Chronic Conditions and Co-morbidities  Goal: Patient's chronic conditions and co-morbidity symptoms are monitored and maintained or improved  5/21/2024 0949 by Sarah Lozoya RN  Outcome: Progressing  5/21/2024 0948 by Sarah Lozoya RN  Outcome: Progressing  5/20/2024 2204 by Deanna Link RN  Outcome: Progressing  Flowsheets (Taken 5/20/2024 2016)  Care Plan - Patient's Chronic Conditions and Co-Morbidity Symptoms are Monitored and Maintained or Improved:   Monitor and assess patient's chronic conditions and comorbid symptoms for stability, deterioration, or improvement   Collaborate with multidisciplinary team to address chronic and comorbid conditions and prevent exacerbation or deterioration   Update acute

## 2024-05-21 NOTE — DISCHARGE INSTR - COC
Continuity of Care Form    Patient Name: Rodolfo Luna   :  1945  MRN:  [id number]    Admit date:  2024  Discharge date:  2024    Code Status Order: Full Code   Advance Directives:     Admitting Physician:  Jose Miguel Tan MD  PCP: Carlton Leavitt, APRN - CNP    Discharging Nurse: ANDREA Alexis  Discharging Hospital Unit/Room#: 0212/0212-01  Discharging Unit Phone Number: [phone]    Emergency Contact:   Extended Emergency Contact Information  Primary Emergency Contact: Helen Luna  Address: [address] [place] [address]  Home Phone: [phone]  Mobile Phone: [phone]  Relation: Spouse   needed? No  Secondary Emergency Contact: page burgos  Mobile Phone: [phone]  Relation: Grandchild   needed? No    Past Surgical History:  Past Surgical History:   Procedure Laterality Date    ABLATION OF DYSRHYTHMIC FOCUS  2014    CARPAL TUNNEL RELEASE      CERVICAL DISC SURGERY  2010    COLONOSCOPY  2016    normal    CORONARY ANGIOPLASTY WITH STENT PLACEMENT      FEMUR SURGERY      left    KNEE SURGERY  11 R total knee replacement with femoral nerve block for pain control    LUNG REMOVAL, PARTIAL Right     20%    OTHER SURGICAL HISTORY Bilateral     excisions of lesions on bilat.neck and back    OTHER SURGICAL HISTORY  2014    Reveal Loop recorder placed in Cath Lab    UPPER GASTROINTESTINAL ENDOSCOPY  2016    gastric and esophogeal biopsy       Immunization History:   Immunization History   Administered Date(s) Administered    COVID-19, PFIZER Bivalent, DO NOT Dilute, (age 12y+), IM, 30 mcg/0.3 mL 10/17/2022    Influenza Virus Vaccine 2014    TDaP, ADACEL (age 10y-64y), BOOSTRIX (age 10y+), IM, 0.5mL 2023       Active Problems:  Patient Active Problem List   Diagnosis Code    Dizziness R42    Syncope and collapse R55    Contusion of knee, right S80.01XA    Dysphagia R13.10    Encounter for  97.5 °F (36.4 °C) (Oral)   Resp 16   Wt 69.9 kg (154 lb 3.2 oz)   SpO2 97%   BMI 22.76 kg/m²     Last documented pain score (0-10 scale): Pain Level: 0  Last Weight:   Wt Readings from Last 1 Encounters:   05/20/24 69.9 kg (154 lb 3.2 oz)     Mental Status:  oriented and alert    IV Access:  - None    Nursing Mobility/ADLs:  Walking   Assisted  Transfer  Assisted  Bathing  Assisted  Dressing  Assisted  Toileting  Assisted  Feeding  Independent  Med Admin  Independent  Med Delivery   crushed and prefers mixed with pudding    Wound Care Documentation and Therapy:        Elimination:  Continence:   Bowel: Yes  Bladder: Yes  Urinary Catheter: None   Colostomy/Ileostomy/Ileal Conduit: No       Date of Last BM: 05/23/2024    Intake/Output Summary (Last 24 hours) at 5/21/2024 1455  Last data filed at 5/21/2024 0841  Gross per 24 hour   Intake 450 ml   Output 200 ml   Net 250 ml     I/O last 3 completed shifts:  In: 490 [P.O.:490]  Out: 1150 [Urine:1150]    Safety Concerns:     At Risk for Falls    Impairments/Disabilities:      None    Nutrition Therapy:  Current Nutrition Therapy:   - Oral Diet:  Dysphagia - Minced and Moist    Routes of Feeding: Oral  Liquids: Nectar Thick Liquids  Daily Fluid Restriction: no  Last Modified Barium Swallow with Video (Video Swallowing Test): not done    Treatments at the Time of Hospital Discharge:   Respiratory Treatments:   Oxygen Therapy:  is not on home oxygen therapy.  Ventilator:    - No ventilator support    Rehab Therapies: Physical Therapy and Occupational Therapy  Weight Bearing Status/Restrictions: No weight bearing restrictions  Other Medical Equipment (for information only, NOT a DME order):  walker  Other Treatments:     Patient's personal belongings (please select all that are sent with patient):  None    RN SIGNATURE:  Electronically signed by Vanesa Sanders RN on 5/23/24 at 12:26 PM EDT    CASE MANAGEMENT/SOCIAL WORK SECTION    Inpatient Status Date:

## 2024-05-21 NOTE — CONSULTS
Palliative Care Chart Review  and Check in Note:     NAME:  Rodolfo Luna  Admit Date: 5/16/2024  Hospital Day:  Hospital Day: 6   Current Code status: Full Code    Palliative care is continuing to following Mr. Luna for symptom management,  and goals of care discussion as needed. Patient's chart reviewed today 5/21/24.        The following are the currently established goals/code status, and Symptom management.     Goals of care: plan continues for pt to go to rehab facility and then home with family.     Code status: Full Code    Discharge plan: per ELMER [facility] awaiting pre-cert. [facility] to follow up with the pt's family in the next 1-2 weeks. CM following.      Chastity East RN  05/21/24  3:37 PM

## 2024-05-21 NOTE — PROGRESS NOTES
Speech Language Pathology  Swallowing Disorders and Dysphagia    Dysphagia Treatment/Follow-Up Note  Facility/Department: [address] MEDICAL-SURGICAL    Recommendations: SLP recommends to continue with IDDSI 5 Minced and moist Solids; IDDSI 2 Mildly Thick Liquids; Meds crushed in puree as able  Risk Management: upright for all intake, stay upright for at least 30 mins after intake, small bites/sips, assist feed, close supervision, oral care q4 hrs to reduce adverse affects in the event of aspiration, increase physical mobility as able, slow rate of intake, STRICT aspiration precautions, and hold PO and contact SLP if s/s of aspiration or worsening respiratory status develop.    NAME:Rodolfo Luna  : 1945 (78 y.o.)   MRN: [id number]  ROOM: [facility]  ADMISSION DATE: 2024  PATIENT DIAGNOSIS(ES): Acute encephalopathy [G93.40]  Choking, initial encounter [T17.308A]  Altered mental status, unspecified altered mental status type [R41.82]  Dysphagia, unspecified type [R13.10]  Encephalopathy acute [G93.40]  Allergies   Allergen Reactions    Apixaban      Other reaction(s): Other (See Comments), unknown/H&P    Cefuroxime Axetil      Other reaction(s): Hives    Lisinopril      Other reaction(s): Other (See Comments), unknown/H&P    Morphine      Bad sweats  Other reaction(s): GI upset, Other (See Comments), shaking  Bad sweats  Bad sweats      Other Other (See Comments)     Pt. States that there is another med that he is allergic to,does not know the name states that it makes him sweat  Other reaction(s): Throat irritation    Rivaroxaban      Other reaction(s): Coordination problem, Other (See Comments)    Clopidogrel Rash    Codeine Anxiety, Nausea Only and Rash     dizzy  Other reaction(s): Nausea/ Sweaty, Other (See Comments)  dizzy      Penicillins Nausea And Vomiting and Rash     Other reaction(s): Dizzy, Other (See Comments), shaking    Plavix [Clopidogrel Bisulfate] Rash       DATE ONSET:

## 2024-05-21 NOTE — PROGRESS NOTES
Perfect served GI and Dominick in regards to patient had an esophagram scheduled for today to see if still needs completed.

## 2024-05-21 NOTE — PLAN OF CARE
TODAY'S DATE:  5/21/2024      Current NIHSS 0      Discussed personal risk factors for Stroke/TIA with patient/family, and ways to reduce the risk for a recurrent stroke.     Patient's personal risk factors which were identified are:   []   Alcohol Abuse: check with your physician before any alcohol consumption.   [x]   Atrial fibrillation: may cause blood clots  []   Drug Abuse: Seek help, talk with your doctor  []   Clotting Disorder  []   Diabetes  []   Family history of stroke or heart disease  []   High Blood Pressure/Hypertension: work with your physician  []   High cholesterol: monitor cholesterol levels with your physician  []   Overweight/Obesity: work with your physician for your ideal body weight  []   Physical Inactivity: get regular exercise as directed by your physician  [x]   Personal history of previous TIA or stroke  []   Poor Diet: decrease salt (sodium) in your diet, follow diet directed by physician  []   Smoking: stop smoking      Reviewed the Following Education with Patient and/or Family:   - Signs and Symptoms of Stroke  - Personal risk factors   - How to activate EMS (911)   - Importance of Follow Up Appointments at Discharge   - Importance of Compliance with Medications Prescribed at Discharge  - Available community resources and stroke advocacy groups if needed    Patient and/or family member: verbalized understanding.     Stroke Education booklet given to patient/family (or verified, if given already), which reviews above information. yes         Electronically signed by Sarah Lozoya RN on 5/21/2024 at 10:14 AM

## 2024-05-21 NOTE — DISCHARGE SUMMARY
Name:  Rodolfo Luna  Room:  0212/0212-01  MRN:    [id number]    Discharge Summary      This discharge summary is in conjunction with a complete physical exam done on the day of discharge.    Discharging Physician: Dr. Tan       Admit: 5/16/2024  Discharge:  5/23/2024    HPI taken from admission H&P:    The patient is a 78 y.o. male with pmhx of parkinsons, CVA, hx of lung cancer, atrial fibrillation, CAD who presented to [facility] ED with concern for altered mental status per family and coughing fit.   Patient is poor historian and not really able to contribute to history. Did call wife to assist with history. She reports that he woke her up around 1 am and was in a coughing fit and could not stop. Also was confused and did not know who they were which is not baseline for him. Did not notice any bloody sputum but patient reports to me he had 2 episodes of bloody sputum. Denies chest pain or shortness of breath.   Wife is also concerned that he has been getting more confused lately.   Patient denies any other symptoms.   Is alert and only oriented to self.      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 Wife told my colleague he does not have Parkinsons.  Patient seen by neurology and continued on Sinemet and Namenda.  Abilify added  -ativan prn   -CT head negative for acute pathology, shows significant brain atrophy with moderate small vessel disease .   CT head repeated on 5/18 for episode of dizziness, arm numbness- no acute abnormality   CT head repeated on 5/20 -no acute abnormality  - Neurology following   -ammonia negative  -B12, folate WNL  -Recurrent intermittent confusion- concern for seizure  case management    Patient now started on Keppra in addition to Vimpat.  No recurrent seizure activity.  Stable.  Still weak -needs SNF.  PT OT evaluated the patient.      He can be discharged to SNF       Total time today 40 minutes. > 50 % time dominated by coordination of care         Procedures (Please Review Full Report for Details)  EEG    Consults    Palliative Care  Neurology   GI   Pulmonology      Physical Exam at Discharge:    /78   Pulse 84   Temp 98.2 °F (36.8 °C) (Oral)   Resp 17   Wt 69.9 kg (154 lb 3.2 oz)   SpO2 97%   BMI 22.76 kg/m²   General: Cooperative, awake, alert, oriented X3  HEENT:  Head: normocephalic,atraumatic, anicteric sclera, clear conjunctiva  Neck: Normal size, Jugular venous pulsations: normal  Respiratory:unlabored breathing, clear to auscultation with no crackles, wheezes rhonchi  Heart: Regular rate and rhythm, S1, S2-normal, No murmurs  Abdomen: soft, nondistended, nontender, normoactive bowel sounds,  Neurological/Psych: Alert and oriented times three, no focal neurological deficits, +memory loss  Skin: No obvious rashes    Extremities:  no edema, Pedal pulses 2+ bilaterally    Lab Results   Component Value Date    WBC 9.5 05/23/2024    HGB 14.6 05/23/2024    HCT 43.4 05/23/2024    MCV 90.6 05/23/2024     (L) 05/23/2024     Lab Results   Component Value Date     05/23/2024    K 4.2 05/23/2024     05/23/2024    CO2 26 05/23/2024    BUN 17 05/23/2024    CREATININE 0.6 (L) 05/23/2024    GLUCOSE 90 05/23/2024    CALCIUM 8.8 05/23/2024    PROT 7.6 06/03/2016    BILITOT 1.3 (H) 05/23/2024    ALKPHOS 127 05/23/2024    AST 26 05/23/2024    ALT 9 (L) 05/23/2024    LABGLOM >90 05/23/2024    GFRAA >60 07/17/2022    AGRATIO 1.2 05/23/2024    GLOB 3.2 03/06/2017       CULTURES  Results       Procedure Component Value Units Date/Time    COVID-19 & Influenza Combo [[id number]] Collected: 05/16/24 0615    Order Status: Completed Specimen: Nasopharyngeal Swab

## 2024-05-21 NOTE — PROGRESS NOTES
Inpatient Physical Therapy Treatment    Unit: 2 [place]  Date:  5/21/2024  Patient Name:    Rodolfo Luna  Admitting diagnosis:  Acute encephalopathy [G93.40]  Choking, initial encounter [T17.308A]  Altered mental status, unspecified altered mental status type [R41.82]  Dysphagia, unspecified type [R13.10]  Encephalopathy acute [G93.40]  Admit Date:  5/16/2024  Precautions/Restrictions/WB Status/ Lines/ Wounds/ Oxygen: Fall risk, Bed/chair alarm, Lines (IV), and Telesitter    Treatment Time:  13:55 - 14:19  Treatment Number:  3   Timed Code Treatment Minutes: 24 minutes  Total Treatment Minutes:  24  minutes    Patient Stated Goals for Therapy: not stated. The patient indicates understanding of these issues and agrees with the plan.        Discharge Recommendations: SNF ; will need 24/7 assist if going home.  DME needs for discharge: Needs Met       Therapy recommendation for EMS Transport: can transport by wheelchair    Therapy recommendations for staff:   Assist of 1 for ambulation with use of rolling walker (RW) and gait belt within room    History of Present Illness:   Per [facility] H&P: \"78 y.o. male with pmhx of parkinsons, CVA, hx of lung cancer, atrial fibrillation, CAD who presented to [facility] ED with concern for altered mental status per family and coughing fit. ... Wife is also concerned that he has been getting more confused lately.\"     AM-PAC Mobility Score       AM-PAC Inpatient Mobility without Stair Climbing Raw Score : 17      Subjective  Patient sitting up in chair with family present (spouse and grandson).  Pt agreeable to this PT session.     Cognition    A&O Person and Place   Able to follow 1 step commands    Pain   Yes  Location: low back  Rating: NA /10  Pain Medicine Status: No request made    Preadmission Environment:   Questionable historian.   Pt lives with    with family (spouse and grandson)  Home environment:    one story home  Steps to enter first floor:   1 steps to enter  through doorway  Steps to second floor/basement: N/A  Laundry:     unknown  Bathroom:     walk in shower, shower chair , and standard height toilet  Pt sleeps in a:    Flat bed  Equipment owned:    RW, SPC, manual WC, and shower chair/bench    Preadmission Status:  Pt able to drive: Yes  Pt fully independent with ADLs: Yes  Pt receives assistance from family for: Independent PTA  Pt independent for functional transfers and utilized RW or SPC (interchangeably) for mobility in home and out in community  History of falls: No  Home Health Services:None    Objective    Balance  Static Sitting:  Good ; Supervision  Dynamic Sitting:  Good ; Supervision   Comments:     Static Standing: Good - ; SBA  Dynamic Standing: Fair +; CGA   Comments: with walker    Posture  Seated: WNL  Standing: WNL    Bed Mobility   Supine to Sit:    Not Tested  Sit to Supine:   Not Tested  Rolling:   Not Tested   Scooting in sitting: Supervision   Scooting in supine:  Not Tested   Bridging:  Not Tested    Transfer Training     Sit to stand:   SBA from EOB with walker, VC's for hand placement  Stand to sit:   SBA to recliner, VC's for hand placement  Bed to/from Chair:  Not Tested with use of N/A    Gait gait completed as indicated below  Distance:      110 ft + 30 ft  Deviations (firm surface/linoleum):  decreased chico, decreased foot clearance bilaterally, and externally rotated feet bilaterally ; slow with turns.  Assistive Device Used:    rolling walker (RW)  Level of Assist:    CGA   Comment: CGA for safety with balance, needs cues for RW navigation with turning    Stair Training deferred, pt unsafe/ not appropriate to complete stairs at this time    Therapeutic Exercises Initiated  all completed bilaterally unless indicated  Supine:  N/A    Seated:  Marching: 15 reps  LAQ: 15 reps    Standing:  N/A  Heel raises: 15 reps   Hip abduction: 15 reps  Marching: 15 reps    Activity Tolerance   During therapy session noted pt with no adverse

## 2024-05-21 NOTE — PROGRESS NOTES
Inpatient Occupational Therapy Treatment    Unit: 2 [place]  Date:  2024  Patient Name:    Rodolfo uLna  Admitting diagnosis:  Acute encephalopathy [G93.40]  Choking, initial encounter [T17.308A]  Altered mental status, unspecified altered mental status type [R41.82]  Dysphagia, unspecified type [R13.10]  Encephalopathy acute [G93.40]  Admit Date:  2024  Precautions/Restrictions/WB Status/ Lines/ Wounds/ Oxygen: Fall risk, Bed/chair alarm, Lines (IV), Telemetry, and Continuous pulse oximetry      Treatment Time: :-:43  Treatment Number:  4  Timed Code Treatment Minutes: 23 minutes  Total Treatment Minutes: 23 minutes    Patient Goals for Therapy: none stated         Discharge Recommendations: SNF   DME needs for discharge: Defer to facility       Therapy recommendations for staff:   Assist of 1 for transfers with use of rolling walker (RW) and gait belt to/from chair  to/from bathroom    [facility] Present Illness: [facility] \"78 y.o. male with pmhx of parkinsons, CVA, hx of lung cancer, atrial fibrillation, CAD who presented to [facility] ED with concern for altered mental status per family and coughing fit. ... Wife is also concerned that he has been getting more confused lately.   CT brain showed significant brain atrophy with moderate small vessel disease.     AM-PAC Score: AM-PAC Inpatient Daily Activity Raw Score: 17     Subjective:  Patient up in bathroom with hospital staff in room.   Pt agreeable to this OT session.     Cognition:    A&O Person and Place   Able to follow 2 step commands    Pain:   Yes  Location: back   Ratin /10  Pain Medicine Status: No request made    Preadmission Environment:   Questionable historian.   Pt lives with                                         with family (spouse and grandson)  Home environment:                            one story home  Steps to enter first floor:                     1 steps to enter through doorway  Steps to second  floor/basement:        N/A  Laundry:                                              unknown  Bathroom:                                           walk in shower, shower chair , and standard height toilet  Pt sleeps in a:                                     Flat bed  Equipment owned:                              RW, SPC, manual WC, and shower chair/bench    Preadmission Status:  Pt able to drive: Unknown  Pt fully independent with ADLs: Yes  Pt receives assistance from family for: Family completes IADLs  Pt independent for functional transfers and utilized RW or SPC (interchangeably) for mobility in home and out in community  History of falls:            No  Home Health Services:None    Balance:  Sitting:    SBA for static and dynamic sitting  Standing:    CGA for static and dynamic standing    Bed Mobility:   Supine to Sit:    Not Tested  Sit to Supine:   Not Tested  Rolling:   Not Tested  Scooting in sitting: SBA  Scooting in supine:  Not Tested    Transfers:    Sit to stand:    CGA  Stand to sit:    CGA  Bed to chair:     Not Tested  Bed/ chair to standard toilet:  CGA and With RW and gait belt  Bed/chair to BSC:   Not Tested  Functional Mobility:   CGA with the use of a gait belt and RW, needing min A guiding walker ran into wall and doorway          ADLs:  Dressing:      UE:   Not Tested  LE:    Min A  don pull up, CGA pull up in standing    Bathing:    UE:  Not Tested  LE:  Not Tested    Eating:   Supervision and With setup provided    Toileting:  SBA hygiene in sitting     Grooming/hygiene: CGA to wash hands standing at sink     Activity Tolerance:   Pt completed therapy session with fatigue     BP (mmHg) HR (bpm) SpO2 (%) on RA Comments   Supine at rest       Seated in recliner       After ambulation  91  98 99%  98%    End of session         Positioning Needs:   Pt up in chair, alarm set, call light provided and all needs within reach .     Ther Ex / Activities Initiated:   N/A      Patient/Family Education:

## 2024-05-21 NOTE — PLAN OF CARE
Problem: Discharge Planning  Goal: Discharge to home or other facility with appropriate resources  Recent Flowsheet Documentation  Taken 5/20/2024 2016 by Deanna Link RN  Discharge to home or other facility with appropriate resources: Identify barriers to discharge with patient and caregiver  5/20/2024 1803 by Virgen Russell RN  Outcome: Progressing     Problem: Safety - Adult  Goal: Free from fall injury  5/20/2024 2204 by Deanna Link RN  Outcome: Progressing  5/20/2024 1803 by Virgen Russell RN  Outcome: Progressing     Problem: Skin/Tissue Integrity  Goal: Absence of new skin breakdown  Description: 1.  Monitor for areas of redness and/or skin breakdown  2.  Assess vascular access sites hourly  3.  Every 4-6 hours minimum:  Change oxygen saturation probe site  4.  Every 4-6 hours:  If on nasal continuous positive airway pressure, respiratory therapy assess nares and determine need for appliance change or resting period.  5/20/2024 2204 by Deanna Link RN  Outcome: Progressing  5/20/2024 1803 by Virgen Russell RN  Outcome: Progressing     Problem: Chronic Conditions and Co-morbidities  Goal: Patient's chronic conditions and co-morbidity symptoms are monitored and maintained or improved  5/20/2024 2204 by Deanna Link RN  Outcome: Progressing  Flowsheets (Taken 5/20/2024 2016)  Care Plan - Patient's Chronic Conditions and Co-Morbidity Symptoms are Monitored and Maintained or Improved:   Monitor and assess patient's chronic conditions and comorbid symptoms for stability, deterioration, or improvement   Collaborate with multidisciplinary team to address chronic and comorbid conditions and prevent exacerbation or deterioration   Update acute care plan with appropriate goals if chronic or comorbid symptoms are exacerbated and prevent overall improvement and discharge  5/20/2024 1803 by Virgen Russell RN  Outcome: Progressing     Problem: Pain  Goal: Verbalizes/displays adequate

## 2024-05-21 NOTE — PROGRESS NOTES
Oral QAM AC    ARIPiprazole  5 mg Oral Daily    amLODIPine  10 mg Oral Daily    atorvastatin  80 mg Oral Nightly    carbidopa-levodopa  1 tablet Oral TID    cetirizine  10 mg Oral Daily    escitalopram  10 mg Oral Daily    finasteride  5 mg Oral Daily    lacosamide  100 mg Oral BID    memantine  10 mg Oral BID    montelukast  10 mg Oral Daily    [Held by provider] enoxaparin  40 mg SubCUTAneous Daily    melatonin  3 mg Oral Nightly     Continuous Infusions:       PRN Meds: ipratropium, albuterol sulfate HFA, potassium chloride **OR** potassium alternative oral replacement **OR** potassium chloride, magnesium sulfate, promethazine **OR** ondansetron, acetaminophen **OR** acetaminophen, perflutren lipid microspheres, polyethylene glycol, labetalol, albuterol sulfate HFA, LORazepam, LORazepam    OBJECTIVE  Vital signs in the last 24 hours:  Vitals:    05/21/24 1230   BP: 108/69   Pulse: 85   Resp: 16   Temp: 97.5 °F (36.4 °C)   SpO2: 97%       Intake/Output last 3 shifts:  I/O last 3 completed shifts:  In: 490 [P.O.:490]  Out: 1150 [Urine:1150]    Intake/Output this shift:  I/O this shift:  In: 240 [P.O.:240]  Out: -     Yesterday's Weight:  Wt Readings from Last 1 Encounters:   05/20/24 69.9 kg (154 lb 3.2 oz)       SUBJECTIVE  There was no acute event overnight.    ROS:  Negative except in subjective.    Neurological examination:  MENTAL STATUS:  Alert and oriented to person.  LANG/SPEECH: No dysarthria.  CRANIAL NERVES: No facial asymmetry.  MOTOR: No pronator drift or leg drift.  REFLEXES: Generalized 2+.  SENSORY: Grossly intact.  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    Labs and Imaging:  I reviewed labs and brain imaging.    50 minutes were spent with the patient with greater than 50% of the time spent in counseling and coordination of care.    Maryjane Zayas MD

## 2024-05-21 NOTE — FLOWSHEET NOTE
05/21/24 0800   Vital Signs   Temp 98.8 °F (37.1 °C)   Temp Source Oral   Pulse 90   Heart Rate Source Monitor   BP (!) 148/82   MAP (Calculated) 104   BP Location Right upper arm   BP Method Automatic   Patient Position High fowlers   Pain Assessment   Pain Assessment None - Denies Pain   Opioid-Induced Sedation   POSS Score 1   RASS   Carter Agitation Sedation Scale (RASS) 0   Oxygen Therapy   SpO2 99 %   O2 Device None (Room air)     Alert and oriented. Skin w/d. Resp ee unlabored. NIHSS score 0.  Telesitter in place. Telemetry in place per order.  Nursing asmt completed. Meds given. No s/s distress noted. See flow sheet and MAR. Call light in easy reach. Bed alarm on.

## 2024-05-22 LAB
ALBUMIN SERPL-MCNC: 3.2 G/DL (ref 3.4–5)
ALBUMIN/GLOB SERPL: 1.2 {RATIO} (ref 1.1–2.2)
ALP SERPL-CCNC: 116 U/L (ref 40–129)
ALT SERPL-CCNC: 13 U/L (ref 10–40)
ANION GAP SERPL CALCULATED.3IONS-SCNC: 10 MMOL/L (ref 3–16)
AST SERPL-CCNC: 25 U/L (ref 15–37)
BILIRUB SERPL-MCNC: 1.1 MG/DL (ref 0–1)
BUN SERPL-MCNC: 20 MG/DL (ref 7–20)
CALCIUM SERPL-MCNC: 8.4 MG/DL (ref 8.3–10.6)
CHLORIDE SERPL-SCNC: 105 MMOL/L (ref 99–110)
CO2 SERPL-SCNC: 24 MMOL/L (ref 21–32)
CREAT SERPL-MCNC: 0.6 MG/DL (ref 0.8–1.3)
DEPRECATED RDW RBC AUTO: 15.1 % (ref 12.4–15.4)
GFR SERPLBLD CREATININE-BSD FMLA CKD-EPI: >90 ML/MIN/{1.73_M2}
GLUCOSE SERPL-MCNC: 105 MG/DL (ref 70–99)
HCT VFR BLD AUTO: 41.5 % (ref 40.5–52.5)
HGB BLD-MCNC: 13.7 G/DL (ref 13.5–17.5)
MCH RBC QN AUTO: 30.5 PG (ref 26–34)
MCHC RBC AUTO-ENTMCNC: 33.1 G/DL (ref 31–36)
MCV RBC AUTO: 92.1 FL (ref 80–100)
PLATELET # BLD AUTO: 127 K/UL (ref 135–450)
PMV BLD AUTO: 7.9 FL (ref 5–10.5)
POTASSIUM SERPL-SCNC: 4.2 MMOL/L (ref 3.5–5.1)
PROT SERPL-MCNC: 5.9 G/DL (ref 6.4–8.2)
RBC # BLD AUTO: 4.51 M/UL (ref 4.2–5.9)
SODIUM SERPL-SCNC: 139 MMOL/L (ref 136–145)
WBC # BLD AUTO: 7.6 K/UL (ref 4–11)

## 2024-05-22 PROCEDURE — 6370000000 HC RX 637 (ALT 250 FOR IP): Performed by: PSYCHIATRY & NEUROLOGY

## 2024-05-22 PROCEDURE — 1200000000 HC SEMI PRIVATE

## 2024-05-22 PROCEDURE — 6370000000 HC RX 637 (ALT 250 FOR IP): Performed by: INTERNAL MEDICINE

## 2024-05-22 PROCEDURE — 85027 COMPLETE CBC AUTOMATED: CPT

## 2024-05-22 PROCEDURE — 92526 ORAL FUNCTION THERAPY: CPT

## 2024-05-22 PROCEDURE — 80053 COMPREHEN METABOLIC PANEL: CPT

## 2024-05-22 PROCEDURE — 36415 COLL VENOUS BLD VENIPUNCTURE: CPT

## 2024-05-22 PROCEDURE — 99232 SBSQ HOSP IP/OBS MODERATE 35: CPT | Performed by: INTERNAL MEDICINE

## 2024-05-22 RX ADMIN — CETIRIZINE HYDROCHLORIDE 10 MG: 10 TABLET ORAL at 08:59

## 2024-05-22 RX ADMIN — FERROUS SULFATE TAB 325 MG (65 MG ELEMENTAL FE) 325 MG: 325 (65 FE) TAB at 16:30

## 2024-05-22 RX ADMIN — MONTELUKAST SODIUM 10 MG: 10 TABLET, COATED ORAL at 08:59

## 2024-05-22 RX ADMIN — FERROUS SULFATE TAB 325 MG (65 MG ELEMENTAL FE) 325 MG: 325 (65 FE) TAB at 08:59

## 2024-05-22 RX ADMIN — CARBIDOPA AND LEVODOPA 1 TABLET: 25; 100 TABLET ORAL at 08:59

## 2024-05-22 RX ADMIN — LACOSAMIDE 100 MG: 50 TABLET, FILM COATED ORAL at 21:12

## 2024-05-22 RX ADMIN — ATORVASTATIN CALCIUM 80 MG: 40 TABLET, FILM COATED ORAL at 21:12

## 2024-05-22 RX ADMIN — LACOSAMIDE 100 MG: 50 TABLET, FILM COATED ORAL at 08:58

## 2024-05-22 RX ADMIN — AMLODIPINE BESYLATE 10 MG: 5 TABLET ORAL at 08:58

## 2024-05-22 RX ADMIN — PANTOPRAZOLE SODIUM 40 MG: 40 TABLET, DELAYED RELEASE ORAL at 05:22

## 2024-05-22 RX ADMIN — LEVETIRACETAM 500 MG: 500 TABLET, FILM COATED ORAL at 08:59

## 2024-05-22 RX ADMIN — CARBIDOPA AND LEVODOPA 1 TABLET: 25; 100 TABLET ORAL at 21:12

## 2024-05-22 RX ADMIN — LEVETIRACETAM 500 MG: 500 TABLET, FILM COATED ORAL at 21:13

## 2024-05-22 RX ADMIN — MEMANTINE HYDROCHLORIDE 10 MG: 5 TABLET ORAL at 08:59

## 2024-05-22 RX ADMIN — FINASTERIDE 5 MG: 5 TABLET, FILM COATED ORAL at 08:58

## 2024-05-22 RX ADMIN — MEMANTINE HYDROCHLORIDE 10 MG: 5 TABLET ORAL at 21:12

## 2024-05-22 RX ADMIN — ASPIRIN 81 MG: 81 TABLET, CHEWABLE ORAL at 08:59

## 2024-05-22 RX ADMIN — ARIPIPRAZOLE 5 MG: 10 TABLET ORAL at 08:59

## 2024-05-22 RX ADMIN — ZOLPIDEM TARTRATE 10 MG: 5 TABLET ORAL at 21:12

## 2024-05-22 RX ADMIN — CARBIDOPA AND LEVODOPA 1 TABLET: 25; 100 TABLET ORAL at 13:46

## 2024-05-22 RX ADMIN — Medication 3 MG: at 21:13

## 2024-05-22 RX ADMIN — ESCITALOPRAM OXALATE 10 MG: 10 TABLET ORAL at 08:59

## 2024-05-22 NOTE — PROGRESS NOTES
Speech Language Pathology  Swallowing Disorders and Dysphagia    Dysphagia Treatment/Follow-Up Note  Facility/Department: [address] MEDICAL-SURGICAL    Recommendations: SLP recommends to continue with IDDSI 5 Minced and moist Solids; IDDSI 2 Mildly Thick Liquids; Meds crushed in puree as able  Risk Management: upright for all intake, stay upright for at least 30 mins after intake, small bites/sips, assist feed, close supervision, oral care q4 hrs to reduce adverse affects in the event of aspiration, increase physical mobility as able, slow rate of intake, STRICT aspiration precautions, and hold PO and contact SLP if s/s of aspiration or worsening respiratory status develop.    NAME:Rodolfo Luna  : 1945 (78 y.o.)   MRN: [id number]  [facility]  ADMISSION DATE: 2024  PATIENT DIAGNOSIS(ES): Acute encephalopathy [G93.40]  Choking, initial encounter [T17.308A]  Altered mental status, unspecified altered mental status type [R41.82]  Dysphagia, unspecified type [R13.10]  Encephalopathy acute [G93.40]  Allergies   Allergen Reactions    Apixaban      Other reaction(s): Other (See Comments), unknown/H&P    Cefuroxime Axetil      Other reaction(s): Hives    Lisinopril      Other reaction(s): Other (See Comments), unknown/H&P    Morphine      Bad sweats  Other reaction(s): GI upset, Other (See Comments), shaking  Bad sweats  Bad sweats      Other Other (See Comments)     Pt. States that there is another med that he is allergic to,does not know the name states that it makes him sweat  Other reaction(s): Throat irritation    Rivaroxaban      Other reaction(s): Coordination problem, Other (See Comments)    Clopidogrel Rash    Codeine Anxiety, Nausea Only and Rash     dizzy  Other reaction(s): Nausea/ Sweaty, Other (See Comments)  dizzy      Penicillins Nausea And Vomiting and Rash     Other reaction(s): Dizzy, Other (See Comments), shaking    Plavix [Clopidogrel Bisulfate] Rash       DATE ONSET:

## 2024-05-22 NOTE — CARE COORDINATION
Approved to go to [facility] -  called [facility] and they won't have a bed until 5/23. Dr. Tan made aware to remove discharge order until tomorrow  [facility] approved  Reference: [id number]  Plan auth  #[id number]

## 2024-05-22 NOTE — PROGRESS NOTES
pathology, shows significant brain atrophy with moderate small vessel disease .   CT head repeated on 5/18 for episode of dizziness, arm numbness- no acute abnormality   CT head repeated on 5/20 -no acute abnormality  - Neurology following   -ammonia negative  -B12, folate WNL  -Recurrent intermittent confusion concern for seizure management as below.  Mental status changes have resolved now     # Focal seizure disorder  -Last EEG from April 2024 reviewed. repeat EEG done on 5/20/2024 showed slowing mild to moderate degree of generalized nonspecific cerebral dysfunction.  -Patient has been seen by neurology  -Continued on home dose of Vimpat  -Started on Keppra now, continue the same.  -On Ativan as needed.  -Also concern for benzodiazepine withdrawal as patient was taking Ambien-this was resumed.  No more recurrent seizures or altered mental status noted    #Hemoptysis  #Coughing  -with hx of lung cancer, did consult pulmonology   -CXR negative for acute pathology  -Hgb stable, monitor  - ASA has been on hold -> resumed   -CT chest showed right pleural effusion with groundglass nodularity-possible infection.  Treated with doxycycline-completed course  -Pulmonologist has recommended follow-up CT chest in 2 to 3 months    #Hx of dysphagia   -SLP eval -> minced and moist ;mildly thick liquids, needs SLP treatment at discharge. MBS last admission with tracheal aspiration of thin liquids  -GI consulted   -Barium esophagram aborted due to aspiration into lung   - has H/O presbyesophagus . GI recommending outpt follow up      #Acute cystitis   #Mild leukocytosis  -urine culture negative  -procalc pending, LA negative      #Atrial fibrillation/flutter s/p watchman and RFCA 2014      #CAD s/p PCI   -ASA, statin      #HTN   -on norvasc      #Hx of CVA   -recent  -ASA, statin. ASA has been on hold due to hemoptysis-> resumed now    -Reviewed with patient he states he does not take Brilinta on a .      #Hx of lung cancer s/p

## 2024-05-22 NOTE — FLOWSHEET NOTE
05/21/24 2015   Vital Signs   Temp 98.2 °F (36.8 °C)   Temp Source Oral   Pulse 90   Heart Rate Source Monitor   Respirations 17   BP (!) 151/78   MAP (Calculated) 102   BP Location Left upper arm   BP Method Automatic   Patient Position Up in chair   Pain Assessment   Pain Assessment 0-10   Pain Level 6   Patient's Stated Pain Goal 0 - No pain   Pain Location Knee   Pain Orientation Right   Pain Descriptors Burning   Functional Pain Assessment Activities are not prevented   Pain Type Acute pain   Pain Radiating Towards denies   Pain Frequency Intermittent   Pain Onset On-going   Non-Pharmaceutical Pain Intervention(s) Heat applied   Opioid-Induced Sedation   POSS Score 1   RASS   Carter Agitation Sedation Scale (RASS) 0   Oxygen Therapy   SpO2 99 %   O2 Device None (Room air)     PM assessment complete. Medications given as ordered. Vital signs stable. Pt alert and oriented but confused at times prior. Sitting up in chair currently with chair alarm in place. PRN ativan given per pt request as well as Prn tylenol for pain to right knee (heat pack in place). Neurovasc intact to RLE, denies numbness or tingling, ca refill brisk. Full movement. Call light within reach. Plan of care reviewed. No other needs identified at this time.

## 2024-05-22 NOTE — PROGRESS NOTES
Patient currently resting in bed. On room air and in no distress. Alert and oriented, but has periods of confusion. A-fib on tele. Up ambulating with standby assist and tolerating well. Takes pills crushed in applesauce and tolerates well.  Bed alarm on for patient safety. Nurse will continue to monitor/reassess. Call light within reach.

## 2024-05-23 VITALS
RESPIRATION RATE: 16 BRPM | HEART RATE: 78 BPM | DIASTOLIC BLOOD PRESSURE: 67 MMHG | WEIGHT: 154.2 LBS | SYSTOLIC BLOOD PRESSURE: 129 MMHG | TEMPERATURE: 97.9 F | BODY MASS INDEX: 22.76 KG/M2 | OXYGEN SATURATION: 98 %

## 2024-05-23 LAB
ALBUMIN SERPL-MCNC: 3.5 G/DL (ref 3.4–5)
ALBUMIN/GLOB SERPL: 1.2 {RATIO} (ref 1.1–2.2)
ALP SERPL-CCNC: 127 U/L (ref 40–129)
ALT SERPL-CCNC: 9 U/L (ref 10–40)
ANION GAP SERPL CALCULATED.3IONS-SCNC: 9 MMOL/L (ref 3–16)
AST SERPL-CCNC: 26 U/L (ref 15–37)
BILIRUB SERPL-MCNC: 1.3 MG/DL (ref 0–1)
BUN SERPL-MCNC: 17 MG/DL (ref 7–20)
CALCIUM SERPL-MCNC: 8.8 MG/DL (ref 8.3–10.6)
CHLORIDE SERPL-SCNC: 102 MMOL/L (ref 99–110)
CO2 SERPL-SCNC: 26 MMOL/L (ref 21–32)
CREAT SERPL-MCNC: 0.6 MG/DL (ref 0.8–1.3)
DEPRECATED RDW RBC AUTO: 14.9 % (ref 12.4–15.4)
GFR SERPLBLD CREATININE-BSD FMLA CKD-EPI: >90 ML/MIN/{1.73_M2}
GLUCOSE SERPL-MCNC: 90 MG/DL (ref 70–99)
HCT VFR BLD AUTO: 43.4 % (ref 40.5–52.5)
HGB BLD-MCNC: 14.6 G/DL (ref 13.5–17.5)
MCH RBC QN AUTO: 30.5 PG (ref 26–34)
MCHC RBC AUTO-ENTMCNC: 33.6 G/DL (ref 31–36)
MCV RBC AUTO: 90.6 FL (ref 80–100)
PLATELET # BLD AUTO: 133 K/UL (ref 135–450)
PMV BLD AUTO: 7.9 FL (ref 5–10.5)
POTASSIUM SERPL-SCNC: 4.2 MMOL/L (ref 3.5–5.1)
PROT SERPL-MCNC: 6.4 G/DL (ref 6.4–8.2)
RBC # BLD AUTO: 4.79 M/UL (ref 4.2–5.9)
SODIUM SERPL-SCNC: 137 MMOL/L (ref 136–145)
WBC # BLD AUTO: 9.5 K/UL (ref 4–11)

## 2024-05-23 PROCEDURE — 6370000000 HC RX 637 (ALT 250 FOR IP): Performed by: PSYCHIATRY & NEUROLOGY

## 2024-05-23 PROCEDURE — 36415 COLL VENOUS BLD VENIPUNCTURE: CPT

## 2024-05-23 PROCEDURE — 80053 COMPREHEN METABOLIC PANEL: CPT

## 2024-05-23 PROCEDURE — 6370000000 HC RX 637 (ALT 250 FOR IP): Performed by: INTERNAL MEDICINE

## 2024-05-23 PROCEDURE — 99239 HOSP IP/OBS DSCHRG MGMT >30: CPT | Performed by: INTERNAL MEDICINE

## 2024-05-23 PROCEDURE — 85027 COMPLETE CBC AUTOMATED: CPT

## 2024-05-23 RX ADMIN — CETIRIZINE HYDROCHLORIDE 10 MG: 10 TABLET ORAL at 08:12

## 2024-05-23 RX ADMIN — AMLODIPINE BESYLATE 10 MG: 5 TABLET ORAL at 08:12

## 2024-05-23 RX ADMIN — PANTOPRAZOLE SODIUM 40 MG: 40 TABLET, DELAYED RELEASE ORAL at 06:25

## 2024-05-23 RX ADMIN — ESCITALOPRAM OXALATE 10 MG: 10 TABLET ORAL at 08:12

## 2024-05-23 RX ADMIN — FERROUS SULFATE TAB 325 MG (65 MG ELEMENTAL FE) 325 MG: 325 (65 FE) TAB at 16:58

## 2024-05-23 RX ADMIN — MONTELUKAST SODIUM 10 MG: 10 TABLET, COATED ORAL at 08:12

## 2024-05-23 RX ADMIN — FINASTERIDE 5 MG: 5 TABLET, FILM COATED ORAL at 08:13

## 2024-05-23 RX ADMIN — LEVETIRACETAM 500 MG: 500 TABLET, FILM COATED ORAL at 08:31

## 2024-05-23 RX ADMIN — LACOSAMIDE 100 MG: 50 TABLET, FILM COATED ORAL at 08:31

## 2024-05-23 RX ADMIN — CARBIDOPA AND LEVODOPA 1 TABLET: 25; 100 TABLET ORAL at 08:31

## 2024-05-23 RX ADMIN — ARIPIPRAZOLE 5 MG: 10 TABLET ORAL at 08:12

## 2024-05-23 RX ADMIN — MEMANTINE HYDROCHLORIDE 10 MG: 5 TABLET ORAL at 08:11

## 2024-05-23 RX ADMIN — FERROUS SULFATE TAB 325 MG (65 MG ELEMENTAL FE) 325 MG: 325 (65 FE) TAB at 08:12

## 2024-05-23 RX ADMIN — CARBIDOPA AND LEVODOPA 1 TABLET: 25; 100 TABLET ORAL at 13:30

## 2024-05-23 RX ADMIN — ASPIRIN 81 MG: 81 TABLET, CHEWABLE ORAL at 08:12

## 2024-05-23 NOTE — FLOWSHEET NOTE
05/23/24 0800   Vital Signs   Temp 98.2 °F (36.8 °C)   Temp Source Oral   Pulse 84   Heart Rate Source Monitor   Respirations 17   /78   MAP (Calculated) 97   BP Location Right upper arm   BP Method Automatic   Patient Position High fowlers   Pain Assessment   Pain Assessment None - Denies Pain   Opioid-Induced Sedation   POSS Score 1   Oxygen Therapy   SpO2 97 %   O2 Device None (Room air)     AM assessment completed, see flow sheet. Pt is alert and oriented. Vital signs are WNL. Respirations are even & easy. No complaints voiced. Pt denies needs at this time. SR up x 2, and bed in low position. Call light is within reach.

## 2024-05-23 NOTE — FLOWSHEET NOTE
05/22/24 2111   Vital Signs   Temp 98.3 °F (36.8 °C)   Temp Source Oral   Pulse 70   Heart Rate Source Monitor   Respirations 18   /81   MAP (Calculated) 100   BP Location Right upper arm   BP Method Automatic   Patient Position Semi tiffaniewlers   Pain Assessment   Pain Assessment None - Denies Pain   Opioid-Induced Sedation   POSS Score 1   Oxygen Therapy   SpO2 99 %   O2 Device None (Room air)     Assessment complete and morning medications administered

## 2024-05-23 NOTE — CARE COORDINATION
DISCHARGE ORDER  Date/Time 2024 4:34 PM  Completed by: Clementina Bacon RN, Case Management    Patient Name: Rodolfo Luna    : 1945      Admit order Date and Status:24  Noted discharge order. (verify MD's last order for status of admission/Traditional Medicare 3 [place] Inpatient qualifying stay required for SNF)    Confirmed discharge plan with:              Patient:  Yes              When pt confirms DC plan does any support person need to be contacted by CM Yes if yes who wife                      Discharge to Facility: [facility]   Facility phone number for staff giving report: [phone]  Pre-certification completed: Yes   Hospital Exemption Notification (HENS) completed: Yes   Discharge orders and Continuity of Care faxed to facility:  Yes      Transportation:               Medical Transport explained with choice list offered to pt/family.                Choice:(no preference)  [facility]   time:   17:45      Pt/family/Nursing/Facility aware of  time:   Yes Names: Keyanna charge  Ambulance form completed:  Yes via round trip:      Date Last IMM Given: 24    Comments:Order for dc noted. Spoke with Franca at [facility] who states can accept after 17:00 today. Spoke with pt and wife who cont plan for [facility] for rehab. Chart reviewed and no other dc needs identified.    Pt is being d/c'd to Skilled Nursing Facility (SNF)  today. Pt's O2 sats are 98% on RA.    Discharge timeout done with nsg, CM and pt. All discharge needs and concerns addressed.    Discharging nurse to complete JOANIE, reconcile AVS, and place final copy with patient's discharge packet. Discharging RN to ensure that written prescriptions for  Level II medications are sent with patient to the facility as per protocol.

## 2024-05-23 NOTE — PLAN OF CARE
Problem: Discharge Planning  Goal: Discharge to home or other facility with appropriate resources  5/23/2024 1200 by Vanesa Sanders RN  Outcome: Adequate for Discharge  5/23/2024 0951 by Vanesa Sanders RN  Outcome: Progressing  5/23/2024 0003 by Michaela Dorman RN  Outcome: Progressing     Problem: Safety - Adult  Goal: Free from fall injury  5/23/2024 1200 by Vanesa Sanders RN  Outcome: Adequate for Discharge  5/23/2024 0951 by Vanesa Sanders RN  Outcome: Progressing  5/23/2024 0003 by Michaela Dorman RN  Outcome: Progressing     Problem: Skin/Tissue Integrity  Goal: Absence of new skin breakdown  Description: 1.  Monitor for areas of redness and/or skin breakdown  2.  Assess vascular access sites hourly  3.  Every 4-6 hours minimum:  Change oxygen saturation probe site  4.  Every 4-6 hours:  If on nasal continuous positive airway pressure, respiratory therapy assess nares and determine need for appliance change or resting period.  5/23/2024 1200 by Vanesa Sanders RN  Outcome: Adequate for Discharge  5/23/2024 0951 by Vanesa Sanders RN  Outcome: Progressing  5/23/2024 0003 by Michaela Dorman RN  Outcome: Progressing     Problem: Chronic Conditions and Co-morbidities  Goal: Patient's chronic conditions and co-morbidity symptoms are monitored and maintained or improved  5/23/2024 1200 by Vanesa Sanders RN  Outcome: Adequate for Discharge  5/23/2024 0951 by Vanesa Sanders RN  Outcome: Progressing  5/23/2024 0003 by Michaela Dorman RN  Outcome: Progressing     Problem: Pain  Goal: Verbalizes/displays adequate comfort level or baseline comfort level  5/23/2024 1200 by Vanesa Sanders RN  Outcome: Adequate for Discharge  5/23/2024 0951 by Vanesa Sanders RN  Outcome: Progressing  5/23/2024 0003 by Michaela Dorman RN  Outcome: Progressing     Problem: Neurosensory - Adult  Goal: Achieves stable or improved neurological status  5/23/2024 1200 by Vanesa Sanders RN  Outcome: Adequate for

## 2024-05-23 NOTE — DISCHARGE INSTR - DIET

## 2024-05-23 NOTE — PLAN OF CARE
Problem: Discharge Planning  Goal: Discharge to home or other facility with appropriate resources  5/23/2024 0951 by Vanesa Sanders RN  Outcome: Progressing  5/23/2024 0003 by Michaela Dorman RN  Outcome: Progressing     Problem: Safety - Adult  Goal: Free from fall injury  5/23/2024 0951 by Vanesa Sanders RN  Outcome: Progressing  5/23/2024 0003 by Michaela Dorman RN  Outcome: Progressing     Problem: Skin/Tissue Integrity  Goal: Absence of new skin breakdown  Description: 1.  Monitor for areas of redness and/or skin breakdown  2.  Assess vascular access sites hourly  3.  Every 4-6 hours minimum:  Change oxygen saturation probe site  4.  Every 4-6 hours:  If on nasal continuous positive airway pressure, respiratory therapy assess nares and determine need for appliance change or resting period.  5/23/2024 0951 by Vanesa Sanders RN  Outcome: Progressing  5/23/2024 0003 by Michaela Dorman RN  Outcome: Progressing     Problem: Chronic Conditions and Co-morbidities  Goal: Patient's chronic conditions and co-morbidity symptoms are monitored and maintained or improved  5/23/2024 0951 by Vanesa Sanders RN  Outcome: Progressing  5/23/2024 0003 by Michaela Dorman RN  Outcome: Progressing     Problem: Pain  Goal: Verbalizes/displays adequate comfort level or baseline comfort level  5/23/2024 0951 by Vanesa Sanders RN  Outcome: Progressing  5/23/2024 0003 by Michaela Dorman RN  Outcome: Progressing     Problem: Neurosensory - Adult  Goal: Achieves stable or improved neurological status  5/23/2024 0951 by Vanesa Sanders RN  Outcome: Progressing  5/23/2024 0003 by Michaela Dorman RN  Outcome: Progressing  Goal: Absence of seizures  5/23/2024 0951 by Vanesa Sanders RN  Outcome: Progressing  5/23/2024 0003 by Michaela Dorman RN  Outcome: Progressing  Goal: Remains free of injury related to seizures activity  5/23/2024 0951 by Vanesa Sanders RN  Outcome: Progressing  5/23/2024 0003 by Michaela Dorman RN  Outcome:  Progressing  Goal: Achieves maximal functionality and self care  5/23/2024 0951 by Vanesa Sanders, RN  Outcome: Progressing  5/23/2024 0003 by Michaela Dorman, RN  Outcome: Progressing

## 2024-05-23 NOTE — PROGRESS NOTES
4 Eyes Skin Assessment     NAME:  Rodolfo Luna  YOB: 1945  MEDICAL RECORD NUMBER:  [id number]    The patient is being assessed for  Other SNF    I agree that at least one RN has performed a thorough Head to Toe Skin Assessment on the patient. ALL assessment sites listed below have been assessed.      Areas assessed by both nurses:    Head, Face, Ears, Shoulders, Back, Chest, Arms, Elbows, Hands, Sacrum. Buttock, Coccyx, Ischium, Legs. Feet and Heels, and Under Medical Devices   Scattered ecchymosis and abrasions      Does the Patient have a Wound? No noted wound(s)       Jorge Prevention initiated by RN: No  Wound Care Orders initiated by RN: No    Pressure Injury (Stage 3,4, Unstageable, DTI, NWPT, and Complex wounds) if present, place Wound referral order by RN under : No    New Ostomies, if present place, Ostomy referral order under : No     Nurse 1 eSignature: Electronically signed by Vanesa Sanders RN on 5/23/24 at 5:32 PM EDT    **SHARE this note so that the co-signing nurse can place an eSignature**    Nurse 2 eSignature: Electronically signed by Carline Dyer RN on 5/23/24 at 5:34 PM EDT

## 2024-05-23 NOTE — CARE COORDINATION
CM delivered second IMM to patient. Verbal explanation provided of 4 hours to review notice. Patient voiced understanding and is agreeable to discharge.

## 2024-05-24 ENCOUNTER — APPOINTMENT (OUTPATIENT)
Dept: CT IMAGING | Age: 79
End: 2024-05-24
Payer: MEDICARE

## 2024-05-24 ENCOUNTER — HOSPITAL ENCOUNTER (INPATIENT)
Age: 79
LOS: 4 days | Discharge: NURSING, REHAB OR LONG-TERM CARE | DRG: 101 | End: 2024-05-28
Attending: STUDENT IN AN ORGANIZED HEALTH CARE EDUCATION/TRAINING PROGRAM | Admitting: STUDENT IN AN ORGANIZED HEALTH CARE EDUCATION/TRAINING PROGRAM
Payer: MEDICARE

## 2024-05-24 ENCOUNTER — HOSPITAL ENCOUNTER (EMERGENCY)
Age: 79
Discharge: HOME | End: 2024-05-24
Attending: STUDENT IN AN ORGANIZED HEALTH CARE EDUCATION/TRAINING PROGRAM
Payer: MEDICARE

## 2024-05-24 ENCOUNTER — HOSPITAL ENCOUNTER (EMERGENCY)
Age: 79
Discharge: ANOTHER HOSPITAL | End: 2024-05-24
Attending: EMERGENCY MEDICINE
Payer: MEDICARE

## 2024-05-24 VITALS
RESPIRATION RATE: 17 BRPM | TEMPERATURE: 97.5 F | SYSTOLIC BLOOD PRESSURE: 139 MMHG | DIASTOLIC BLOOD PRESSURE: 85 MMHG | OXYGEN SATURATION: 98 % | HEART RATE: 98 BPM

## 2024-05-24 VITALS
OXYGEN SATURATION: 97 % | TEMPERATURE: 97.9 F | DIASTOLIC BLOOD PRESSURE: 69 MMHG | SYSTOLIC BLOOD PRESSURE: 115 MMHG | HEART RATE: 82 BPM | RESPIRATION RATE: 20 BRPM

## 2024-05-24 DIAGNOSIS — R56.9 SEIZURE (HCC): Primary | ICD-10-CM

## 2024-05-24 DIAGNOSIS — N39.0 URINARY TRACT INFECTION IN MALE: ICD-10-CM

## 2024-05-24 DIAGNOSIS — N30.00 ACUTE CYSTITIS WITHOUT HEMATURIA: ICD-10-CM

## 2024-05-24 DIAGNOSIS — R10.31 RIGHT LOWER QUADRANT ABDOMINAL PAIN: Primary | ICD-10-CM

## 2024-05-24 LAB
ALBUMIN SERPL-MCNC: 4.1 G/DL (ref 3.4–5)
ALBUMIN/GLOB SERPL: 1.3 {RATIO} (ref 1.1–2.2)
ALP SERPL-CCNC: 141 U/L (ref 40–129)
ALT SERPL-CCNC: 21 U/L (ref 10–40)
ANION GAP SERPL CALCULATED.3IONS-SCNC: 16 MMOL/L (ref 3–16)
AST SERPL-CCNC: 33 U/L (ref 15–37)
BASOPHILS # BLD: 0.1 K/UL (ref 0–0.2)
BASOPHILS NFR BLD: 0.4 %
BILIRUB SERPL-MCNC: 1.6 MG/DL (ref 0–1)
BILIRUB UR QL STRIP.AUTO: NEGATIVE
BUN SERPL-MCNC: 19 MG/DL (ref 7–20)
CALCIUM SERPL-MCNC: 9.2 MG/DL (ref 8.3–10.6)
CHLORIDE SERPL-SCNC: 98 MMOL/L (ref 99–110)
CLARITY UR: CLEAR
CO2 SERPL-SCNC: 23 MMOL/L (ref 21–32)
COLOR UR: YELLOW
CREAT SERPL-MCNC: 0.7 MG/DL (ref 0.8–1.3)
DEPRECATED RDW RBC AUTO: 14.9 % (ref 12.4–15.4)
EKG ATRIAL RATE: 101 BPM
EKG DIAGNOSIS: NORMAL
EKG Q-T INTERVAL: 372 MS
EKG QRS DURATION: 76 MS
EKG QTC CALCULATION (BAZETT): 452 MS
EKG R AXIS: -86 DEGREES
EKG T AXIS: 37 DEGREES
EKG VENTRICULAR RATE: 89 BPM
EOSINOPHIL # BLD: 0 K/UL (ref 0–0.6)
EOSINOPHIL NFR BLD: 0.1 %
EPI CELLS #/AREA URNS HPF: ABNORMAL /HPF (ref 0–5)
GFR SERPLBLD CREATININE-BSD FMLA CKD-EPI: >90 ML/MIN/{1.73_M2}
GLUCOSE SERPL-MCNC: 111 MG/DL (ref 70–99)
GLUCOSE UR STRIP.AUTO-MCNC: NEGATIVE MG/DL
HCT VFR BLD AUTO: 49.7 % (ref 40.5–52.5)
HGB BLD-MCNC: 16.5 G/DL (ref 13.5–17.5)
HGB UR QL STRIP.AUTO: NEGATIVE
KETONES UR STRIP.AUTO-MCNC: NEGATIVE MG/DL
LACTATE BLDV-SCNC: 1.8 MMOL/L (ref 0.4–1.9)
LEUKOCYTE ESTERASE UR QL STRIP.AUTO: ABNORMAL
LEVETIRACETAM SERPL-MCNC: >100 UG/ML (ref 6–46)
LIPASE SERPL-CCNC: 29 U/L (ref 13–60)
LYMPHOCYTES # BLD: 1.7 K/UL (ref 1–5.1)
LYMPHOCYTES NFR BLD: 12.4 %
MCH RBC QN AUTO: 30.5 PG (ref 26–34)
MCHC RBC AUTO-ENTMCNC: 33.2 G/DL (ref 31–36)
MCV RBC AUTO: 91.9 FL (ref 80–100)
MEDICATION DOSE-MCNC: ABNORMAL
MONOCYTES # BLD: 0.7 K/UL (ref 0–1.3)
MONOCYTES NFR BLD: 5.2 %
MUCOUS THREADS #/AREA URNS LPF: ABNORMAL /LPF
NEUTROPHILS # BLD: 11.4 K/UL (ref 1.7–7.7)
NEUTROPHILS NFR BLD: 81.9 %
NITRITE UR QL STRIP.AUTO: NEGATIVE
PH UR STRIP.AUTO: 6 [PH] (ref 5–8)
PLATELET # BLD AUTO: 161 K/UL (ref 135–450)
PMV BLD AUTO: 7.8 FL (ref 5–10.5)
POTASSIUM SERPL-SCNC: 4.5 MMOL/L (ref 3.5–5.1)
PROT SERPL-MCNC: 7.3 G/DL (ref 6.4–8.2)
PROT UR STRIP.AUTO-MCNC: NEGATIVE MG/DL
RBC # BLD AUTO: 5.41 M/UL (ref 4.2–5.9)
RBC #/AREA URNS HPF: ABNORMAL /HPF (ref 0–4)
SODIUM SERPL-SCNC: 137 MMOL/L (ref 136–145)
SP GR UR STRIP.AUTO: 1.01 (ref 1–1.03)
UA COMPLETE W REFLEX CULTURE PNL UR: YES
UA DIPSTICK W REFLEX MICRO PNL UR: YES
URN SPEC COLLECT METH UR: ABNORMAL
UROBILINOGEN UR STRIP-ACNC: 0.2 E.U./DL
WBC # BLD AUTO: 13.9 K/UL (ref 4–11)
WBC #/AREA URNS HPF: ABNORMAL /HPF (ref 0–5)

## 2024-05-24 PROCEDURE — 6360000002 HC RX W HCPCS

## 2024-05-24 PROCEDURE — 96375 TX/PRO/DX INJ NEW DRUG ADDON: CPT

## 2024-05-24 PROCEDURE — 36415 COLL VENOUS BLD VENIPUNCTURE: CPT

## 2024-05-24 PROCEDURE — 83690 ASSAY OF LIPASE: CPT

## 2024-05-24 PROCEDURE — 6360000002 HC RX W HCPCS: Performed by: FAMILY MEDICINE

## 2024-05-24 PROCEDURE — 87040 BLOOD CULTURE FOR BACTERIA: CPT

## 2024-05-24 PROCEDURE — 6360000004 HC RX CONTRAST MEDICATION: Performed by: EMERGENCY MEDICINE

## 2024-05-24 PROCEDURE — 80235 DRUG ASSAY LACOSAMIDE: CPT

## 2024-05-24 PROCEDURE — 70450 CT HEAD/BRAIN W/O DYE: CPT

## 2024-05-24 PROCEDURE — 99285 EMERGENCY DEPT VISIT HI MDM: CPT

## 2024-05-24 PROCEDURE — 93010 ELECTROCARDIOGRAM REPORT: CPT | Performed by: INTERNAL MEDICINE

## 2024-05-24 PROCEDURE — 87186 SC STD MICRODIL/AGAR DIL: CPT

## 2024-05-24 PROCEDURE — 70496 CT ANGIOGRAPHY HEAD: CPT

## 2024-05-24 PROCEDURE — 6360000004 HC RX CONTRAST MEDICATION: Performed by: STUDENT IN AN ORGANIZED HEALTH CARE EDUCATION/TRAINING PROGRAM

## 2024-05-24 PROCEDURE — 81001 URINALYSIS AUTO W/SCOPE: CPT

## 2024-05-24 PROCEDURE — 80053 COMPREHEN METABOLIC PANEL: CPT

## 2024-05-24 PROCEDURE — 93005 ELECTROCARDIOGRAM TRACING: CPT | Performed by: EMERGENCY MEDICINE

## 2024-05-24 PROCEDURE — 87077 CULTURE AEROBIC IDENTIFY: CPT

## 2024-05-24 PROCEDURE — 6370000000 HC RX 637 (ALT 250 FOR IP): Performed by: EMERGENCY MEDICINE

## 2024-05-24 PROCEDURE — 96365 THER/PROPH/DIAG IV INF INIT: CPT

## 2024-05-24 PROCEDURE — 6360000002 HC RX W HCPCS: Performed by: EMERGENCY MEDICINE

## 2024-05-24 PROCEDURE — 83605 ASSAY OF LACTIC ACID: CPT

## 2024-05-24 PROCEDURE — 2580000003 HC RX 258: Performed by: FAMILY MEDICINE

## 2024-05-24 PROCEDURE — 99223 1ST HOSP IP/OBS HIGH 75: CPT | Performed by: FAMILY MEDICINE

## 2024-05-24 PROCEDURE — 1200000000 HC SEMI PRIVATE

## 2024-05-24 PROCEDURE — 87086 URINE CULTURE/COLONY COUNT: CPT

## 2024-05-24 PROCEDURE — 2580000003 HC RX 258: Performed by: EMERGENCY MEDICINE

## 2024-05-24 PROCEDURE — C9254 INJECTION, LACOSAMIDE: HCPCS

## 2024-05-24 PROCEDURE — 85025 COMPLETE CBC W/AUTO DIFF WBC: CPT

## 2024-05-24 PROCEDURE — 80177 DRUG SCRN QUAN LEVETIRACETAM: CPT

## 2024-05-24 PROCEDURE — 74177 CT ABD & PELVIS W/CONTRAST: CPT

## 2024-05-24 PROCEDURE — 6370000000 HC RX 637 (ALT 250 FOR IP): Performed by: STUDENT IN AN ORGANIZED HEALTH CARE EDUCATION/TRAINING PROGRAM

## 2024-05-24 RX ORDER — SODIUM CHLORIDE 9 MG/ML
INJECTION, SOLUTION INTRAVENOUS PRN
Status: DISCONTINUED | OUTPATIENT
Start: 2024-05-24 | End: 2024-05-28 | Stop reason: HOSPADM

## 2024-05-24 RX ORDER — LORAZEPAM 2 MG/ML
INJECTION INTRAMUSCULAR
Status: COMPLETED
Start: 2024-05-24 | End: 2024-05-24

## 2024-05-24 RX ORDER — ENOXAPARIN SODIUM 100 MG/ML
40 INJECTION SUBCUTANEOUS DAILY
Status: DISCONTINUED | OUTPATIENT
Start: 2024-05-25 | End: 2024-05-28 | Stop reason: HOSPADM

## 2024-05-24 RX ORDER — NITROFURANTOIN 25; 75 MG/1; MG/1
100 CAPSULE ORAL ONCE
Status: COMPLETED | OUTPATIENT
Start: 2024-05-24 | End: 2024-05-24

## 2024-05-24 RX ORDER — LORAZEPAM 2 MG/ML
4 INJECTION INTRAMUSCULAR EVERY 5 MIN PRN
Status: DISCONTINUED | OUTPATIENT
Start: 2024-05-24 | End: 2024-05-28 | Stop reason: HOSPADM

## 2024-05-24 RX ORDER — ACETAMINOPHEN 325 MG/1
650 TABLET ORAL EVERY 6 HOURS PRN
Status: DISCONTINUED | OUTPATIENT
Start: 2024-05-24 | End: 2024-05-28 | Stop reason: HOSPADM

## 2024-05-24 RX ORDER — NITROFURANTOIN 25; 75 MG/1; MG/1
100 CAPSULE ORAL 2 TIMES DAILY
Qty: 20 CAPSULE | Refills: 0 | Status: ON HOLD | OUTPATIENT
Start: 2024-05-24 | End: 2024-05-26 | Stop reason: HOSPADM

## 2024-05-24 RX ORDER — MAGNESIUM SULFATE IN WATER 40 MG/ML
2000 INJECTION, SOLUTION INTRAVENOUS PRN
Status: DISCONTINUED | OUTPATIENT
Start: 2024-05-24 | End: 2024-05-25

## 2024-05-24 RX ORDER — LORAZEPAM 2 MG/ML
2 INJECTION INTRAMUSCULAR ONCE
Status: COMPLETED | OUTPATIENT
Start: 2024-05-24 | End: 2024-05-24

## 2024-05-24 RX ORDER — SODIUM CHLORIDE 0.9 % (FLUSH) 0.9 %
5-40 SYRINGE (ML) INJECTION EVERY 12 HOURS SCHEDULED
Status: DISCONTINUED | OUTPATIENT
Start: 2024-05-24 | End: 2024-05-28 | Stop reason: HOSPADM

## 2024-05-24 RX ORDER — POTASSIUM CHLORIDE 7.45 MG/ML
10 INJECTION INTRAVENOUS PRN
Status: DISCONTINUED | OUTPATIENT
Start: 2024-05-24 | End: 2024-05-25

## 2024-05-24 RX ORDER — LACOSAMIDE 50 MG/1
100 TABLET ORAL ONCE
Status: COMPLETED | OUTPATIENT
Start: 2024-05-24 | End: 2024-05-24

## 2024-05-24 RX ORDER — POLYETHYLENE GLYCOL 3350 17 G/17G
17 POWDER, FOR SOLUTION ORAL DAILY PRN
Status: DISCONTINUED | OUTPATIENT
Start: 2024-05-24 | End: 2024-05-28 | Stop reason: HOSPADM

## 2024-05-24 RX ORDER — LEVOFLOXACIN 5 MG/ML
750 INJECTION, SOLUTION INTRAVENOUS ONCE
Status: COMPLETED | OUTPATIENT
Start: 2024-05-24 | End: 2024-05-24

## 2024-05-24 RX ORDER — LEVOFLOXACIN 5 MG/ML
750 INJECTION, SOLUTION INTRAVENOUS EVERY 24 HOURS
Status: COMPLETED | OUTPATIENT
Start: 2024-05-24 | End: 2024-05-26

## 2024-05-24 RX ORDER — ACETAMINOPHEN 650 MG/1
650 SUPPOSITORY RECTAL EVERY 6 HOURS PRN
Status: DISCONTINUED | OUTPATIENT
Start: 2024-05-24 | End: 2024-05-28 | Stop reason: HOSPADM

## 2024-05-24 RX ORDER — LACOSAMIDE 10 MG/ML
100 INJECTION, SOLUTION INTRAVENOUS EVERY 12 HOURS
Status: DISCONTINUED | OUTPATIENT
Start: 2024-05-24 | End: 2024-05-28 | Stop reason: HOSPADM

## 2024-05-24 RX ORDER — DICYCLOMINE HYDROCHLORIDE 10 MG/1
10 CAPSULE ORAL EVERY 6 HOURS PRN
Qty: 20 CAPSULE | Refills: 0 | Status: ON HOLD | OUTPATIENT
Start: 2024-05-24 | End: 2024-05-29

## 2024-05-24 RX ORDER — SODIUM CHLORIDE 9 MG/ML
INJECTION, SOLUTION INTRAVENOUS CONTINUOUS
Status: ACTIVE | OUTPATIENT
Start: 2024-05-24 | End: 2024-05-26

## 2024-05-24 RX ORDER — POTASSIUM CHLORIDE 20 MEQ/1
40 TABLET, EXTENDED RELEASE ORAL PRN
Status: DISCONTINUED | OUTPATIENT
Start: 2024-05-24 | End: 2024-05-25

## 2024-05-24 RX ORDER — SODIUM CHLORIDE 0.9 % (FLUSH) 0.9 %
5-40 SYRINGE (ML) INJECTION PRN
Status: DISCONTINUED | OUTPATIENT
Start: 2024-05-24 | End: 2024-05-28 | Stop reason: HOSPADM

## 2024-05-24 RX ADMIN — LORAZEPAM 2 MG: 2 INJECTION INTRAMUSCULAR; INTRAVENOUS at 10:05

## 2024-05-24 RX ADMIN — LEVETIRACETAM 1000 MG: 100 INJECTION, SOLUTION INTRAVENOUS at 09:55

## 2024-05-24 RX ADMIN — IOPAMIDOL 75 ML: 755 INJECTION, SOLUTION INTRAVENOUS at 06:16

## 2024-05-24 RX ADMIN — LEVOFLOXACIN 750 MG: 5 INJECTION, SOLUTION INTRAVENOUS at 23:58

## 2024-05-24 RX ADMIN — LACOSAMIDE 100 MG: 10 INJECTION INTRAVENOUS at 23:57

## 2024-05-24 RX ADMIN — LEVOFLOXACIN 750 MG: 5 INJECTION, SOLUTION INTRAVENOUS at 11:56

## 2024-05-24 RX ADMIN — NITROFURANTOIN MONOHYDRATE/MACROCRYSTALS 100 MG: 75; 25 CAPSULE ORAL at 08:30

## 2024-05-24 RX ADMIN — LACOSAMIDE 100 MG: 50 TABLET, FILM COATED ORAL at 12:59

## 2024-05-24 RX ADMIN — SODIUM CHLORIDE: 9 INJECTION, SOLUTION INTRAVENOUS at 22:01

## 2024-05-24 RX ADMIN — LORAZEPAM 2 MG: 2 INJECTION INTRAMUSCULAR at 10:05

## 2024-05-24 RX ADMIN — IOPAMIDOL 75 ML: 755 INJECTION, SOLUTION INTRAVENOUS at 10:36

## 2024-05-24 ASSESSMENT — PAIN SCALES - PAIN ASSESSMENT IN ADVANCED DEMENTIA (PAINAD)
FACIALEXPRESSION: FACIAL GRIMACING
BREATHING: NORMAL
NEGVOCALIZATION: OCCASIONAL MOAN/GROAN, LOW SPEECH, NEGATIVE/DISAPPROVING QUALITY
CONSOLABILITY: DISTRACTED OR REASSURED BY VOICE/TOUCH
BODYLANGUAGE: RELAXED
TOTALSCORE: 4

## 2024-05-24 ASSESSMENT — PAIN - FUNCTIONAL ASSESSMENT
PAIN_FUNCTIONAL_ASSESSMENT: NONE - DENIES PAIN
PAIN_FUNCTIONAL_ASSESSMENT: PAIN ASSESSMENT IN ADVANCED DEMENTIA (PAINAD)

## 2024-05-24 NOTE — ED NOTES
[facility] Hospitalist Consult  Paged [facility] Hospitalist @[phone], per Dr. Wright  RE: [facility], focal partial seizures, UTI  Re-paged @[phone]  Dr. Shultz returned page @[phone], transferred to Dr. Wright

## 2024-05-24 NOTE — ED PROVIDER NOTES
rate 12, SpO2 97 %.    Patient was given scripts for the following medications. I counseled patient how to take these medications.   New Prescriptions    No medications on file     Modified Medications    No medications on file       Disposition referral (if applicable):  No follow-up provider specified.    I, Duarte Wright, am the primary attending of record and contributed the majority of evaluation and treatment of emergent care for this encounter.     Total critical care time is 40 minutes, which excludes separately billable procedures and updating family. Time spent is specifically for management of the presenting complaint and symptoms initially, direct bedside care, reevaluation, review of records, and consultation.  There was a high probability of clinically significant life-threatening deterioration in the patient's condition, which required my urgent intervention.     This chart was generated in part by using Dragon Dictation system and may contain errors related to that system including errors in grammar, punctuation, and spelling, as well as words and phrases that may be inappropriate. If there are any questions or concerns please feel free to contact the dictating provider for clarification.     Duarte Wright MD   [employer]        Duarte Wright MD  05/24/24 [id number]

## 2024-05-24 NOTE — PROGRESS NOTES
[facility] [place]  Consult received.  Case reviewed with ER physician  Dr. Wright    Full note to follow.  78-year-old gentleman past medical history of Parkinson's and known focal seizure disorder who presented for evaluation of altered mental status.  Is on Vimpat at home. Patient was seen earlier in the ER and diagnosed with UTI.  Was discharged on antibiotic therapy.  Was headed back to nursing facility where he had a witnessed episode of unresponsiveness, garbled speech and blank stare.  Patient had some subsequent confusion and slow recovery.  In the ER patient was back to baseline.  Patient had another episode of confusion in the ER where patient woke and decreased motor control on the left side and shaking in the right side.  Symptoms resolved immediately after Ativan.  Patient was loaded with Keppra and also received IV Levaquin for UTI.  Plan to admit for evaluation of seizures.    Carlton Leavitt P, APRN - CNP    Thanks  Julio Cesar Shultz MD  [facility] Medicine  [facility]

## 2024-05-24 NOTE — ED PROVIDER NOTES
Stability: Low Risk  (5/16/2024)    Housing Stability Vital Sign     Unable to Pay for Housing in the Last Year: No     Number of Places Lived in the Last Year: 1     Unstable Housing in the Last Year: No         Differential diagnosis includes but is not limited to: diverticulitis, appendicitis, AMI    WORKUP INTERPRETATION:  ED Course as of 05/24/24 0558   Fri May 24, 2024   0526 4d of RLQ pain, intermittent, no pal or prov factors  No diarrhea, constipation, vomiting, fever, dysuria   [ER]   0548 Leukocytosis to 13.9.  No anemia or thrombocytopenia [ER]   0548 Lipase within normal limits, low suspicion for pancreatitis [ER]   0548 Hypochloremia 98, no other electrolyte abnormalities or evidence of kidney dysfunction [ER]   0548 Liver function testing shows elevated total bilirubin to 1.6, alkaline phosphatase 141, otherwise unremarkable.  Patient did have mildly elevated bilirubin during previous admission [ER]      ED Course User Index  [ER] Philly Magaña MD          CONSULTS:   None    SCREENINGS:       [place] Coma Scale  Eye Opening: Spontaneous  Best Verbal Response: Confused  Best Motor Response: Obeys commands  [place] Coma Scale Score: 14                CIWA Assessment  BP: (!) 148/90  Pulse: 98             TREATMENT:  During the patient's ED course, the patient was given:  Medications - No data to display     Patient was given scripts for the following medications. I counseled patient how to take these medications.   New Prescriptions    No medications on file         REASSESSMENT:  On reassessment, abdominal CT pending. Dispo depending on results of CT imaging if overall reassuring workup    Vitals:    Vitals:    05/24/24 0500 05/24/24 0502   BP: (!) 148/90    Pulse: 98    Resp: 14    Temp:  97.5 °F (36.4 °C)   TempSrc:  Oral   SpO2:  97%       Mercedes Roe, MS4  [facility]      DISCLAIMER: This chart was created using Dragon dictation software.  Efforts were made by me to 
bedside monitoring of interventions, collecting and interpreting tests and discussion with consultants but excluding time spent performing procedures, treating other patients and teaching time.                   REASSESSMENT:  On reassessment, patient is well-appearing, nontoxic, no acute distress.  Workup remarkable for signs of urinary tract infection.  CT scan unremarkable.  Will treat with Macrobid. Work-up overall reassuring.  At this time, feel the patient is appropriate for discharge to follow-up with a primary care doctor.  Shared decision making with patient was employed and they are comfortable with discharge at this time.  Patient was provided with prescriptions for Macrobid and Bentyl.  Return precautions given.  Encouraged PCP follow-up .  Patient discharged in stable condition.    I estimate there is LOW risk for ACUTE APPENDICITIS, BOWEL OBSTRUCTION, ACUTE CHOLECYSTITIS, RUPTURED DIVERTICULITIS, INCARCERATED or STRANGULATED HERNIA, HEMMORHAGIC PANCREATITIS, PERFORATED BOWEL/ULCER, ECTOPIC PREGNANCY, OVARIAN TORSION or TUBO-OVARIAN ABSCESS thus I consider the discharge disposition reasonable. [unfilled] and I have discussed the diagnosis and risks, and we agree with discharging home with close follow-up. We also discussed returning to the Emergency Department immediately if new or worsening symptoms occur. We have discussed the symptoms which are most concerning that necessitate immediate return.    Vitals:    Vitals:    05/24/24 0638 05/24/24 0701 05/24/24 0807 05/24/24 0830   BP: (!) 133/95  139/85    Pulse: 94 97 95 98   Resp: 14 18 16 17   Temp:       TempSrc:       SpO2: 97% 96% 98%      DISCLAIMER: This chart was created using Dragon dictation software.  Efforts were made by me to ensure accuracy, however some errors may be present due to limitations of this technology and occasionally words are not transcribed correctly.       Philly Magaña MD  05/25/24 0050

## 2024-05-25 LAB
ALBUMIN SERPL-MCNC: 3.6 G/DL (ref 3.4–5)
ALBUMIN/GLOB SERPL: 1.3 {RATIO} (ref 1.1–2.2)
ALP SERPL-CCNC: 125 U/L (ref 40–129)
ALT SERPL-CCNC: 25 U/L (ref 10–40)
ANION GAP SERPL CALCULATED.3IONS-SCNC: 12 MMOL/L (ref 3–16)
AST SERPL-CCNC: 28 U/L (ref 15–37)
BASOPHILS # BLD: 0 K/UL (ref 0–0.2)
BASOPHILS NFR BLD: 0.2 %
BILIRUB SERPL-MCNC: 1.1 MG/DL (ref 0–1)
CALCIUM SERPL-MCNC: 8.6 MG/DL (ref 8.3–10.6)
CHLORIDE SERPL-SCNC: 104 MMOL/L (ref 99–110)
CO2 SERPL-SCNC: 24 MMOL/L (ref 21–32)
CREAT SERPL-MCNC: 0.8 MG/DL (ref 0.8–1.3)
DEPRECATED RDW RBC AUTO: 14.6 % (ref 12.4–15.4)
EOSINOPHIL # BLD: 0.1 K/UL (ref 0–0.6)
EOSINOPHIL NFR BLD: 0.5 %
GFR SERPLBLD CREATININE-BSD FMLA CKD-EPI: >90 ML/MIN/{1.73_M2}
GLUCOSE SERPL-MCNC: 83 MG/DL (ref 70–99)
HCT VFR BLD AUTO: 41.9 % (ref 40.5–52.5)
HGB BLD-MCNC: 13.8 G/DL (ref 13.5–17.5)
LYMPHOCYTES # BLD: 2.2 K/UL (ref 1–5.1)
LYMPHOCYTES NFR BLD: 19.8 %
MCH RBC QN AUTO: 30 PG (ref 26–34)
MCHC RBC AUTO-ENTMCNC: 33 G/DL (ref 31–36)
MCV RBC AUTO: 90.9 FL (ref 80–100)
MONOCYTES # BLD: 0.7 K/UL (ref 0–1.3)
MONOCYTES NFR BLD: 6.1 %
NEUTROPHILS # BLD: 8.2 K/UL (ref 1.7–7.7)
NEUTROPHILS NFR BLD: 73.4 %
PLATELET # BLD AUTO: 142 K/UL (ref 135–450)
PMV BLD AUTO: 8.1 FL (ref 5–10.5)
POTASSIUM SERPL-SCNC: 4.3 MMOL/L (ref 3.5–5.1)
PROT SERPL-MCNC: 6.4 G/DL (ref 6.4–8.2)
RBC # BLD AUTO: 4.61 M/UL (ref 4.2–5.9)
SODIUM SERPL-SCNC: 140 MMOL/L (ref 136–145)
WBC # BLD AUTO: 11.1 K/UL (ref 4–11)

## 2024-05-25 PROCEDURE — 6370000000 HC RX 637 (ALT 250 FOR IP): Performed by: STUDENT IN AN ORGANIZED HEALTH CARE EDUCATION/TRAINING PROGRAM

## 2024-05-25 PROCEDURE — 6360000002 HC RX W HCPCS: Performed by: FAMILY MEDICINE

## 2024-05-25 PROCEDURE — 36415 COLL VENOUS BLD VENIPUNCTURE: CPT

## 2024-05-25 PROCEDURE — 85025 COMPLETE CBC W/AUTO DIFF WBC: CPT

## 2024-05-25 PROCEDURE — 6360000002 HC RX W HCPCS

## 2024-05-25 PROCEDURE — 6370000000 HC RX 637 (ALT 250 FOR IP): Performed by: FAMILY MEDICINE

## 2024-05-25 PROCEDURE — 80053 COMPREHEN METABOLIC PANEL: CPT

## 2024-05-25 PROCEDURE — 2580000003 HC RX 258: Performed by: FAMILY MEDICINE

## 2024-05-25 PROCEDURE — 2060000000 HC ICU INTERMEDIATE R&B

## 2024-05-25 PROCEDURE — C9254 INJECTION, LACOSAMIDE: HCPCS

## 2024-05-25 RX ORDER — FERROUS SULFATE 325(65) MG
325 TABLET ORAL 2 TIMES DAILY WITH MEALS
Status: DISCONTINUED | OUTPATIENT
Start: 2024-05-25 | End: 2024-05-28 | Stop reason: HOSPADM

## 2024-05-25 RX ORDER — ALBUTEROL SULFATE 90 UG/1
2 AEROSOL, METERED RESPIRATORY (INHALATION) 4 TIMES DAILY PRN
Status: DISCONTINUED | OUTPATIENT
Start: 2024-05-25 | End: 2024-05-28 | Stop reason: HOSPADM

## 2024-05-25 RX ORDER — ALBUTEROL SULFATE 2.5 MG/3ML
2.5 SOLUTION RESPIRATORY (INHALATION) EVERY 4 HOURS PRN
Status: DISCONTINUED | OUTPATIENT
Start: 2024-05-25 | End: 2024-05-28 | Stop reason: HOSPADM

## 2024-05-25 RX ORDER — LEVETIRACETAM 500 MG/1
500 TABLET ORAL 2 TIMES DAILY
Status: DISCONTINUED | OUTPATIENT
Start: 2024-05-25 | End: 2024-05-25

## 2024-05-25 RX ORDER — ASPIRIN 81 MG/1
81 TABLET ORAL DAILY
Status: DISCONTINUED | OUTPATIENT
Start: 2024-05-25 | End: 2024-05-28 | Stop reason: HOSPADM

## 2024-05-25 RX ORDER — PANTOPRAZOLE SODIUM 20 MG/1
20 TABLET, DELAYED RELEASE ORAL DAILY
Status: DISCONTINUED | OUTPATIENT
Start: 2024-05-25 | End: 2024-05-28 | Stop reason: HOSPADM

## 2024-05-25 RX ORDER — MONTELUKAST SODIUM 10 MG/1
10 TABLET ORAL DAILY
Status: DISCONTINUED | OUTPATIENT
Start: 2024-05-25 | End: 2024-05-28 | Stop reason: HOSPADM

## 2024-05-25 RX ORDER — LACOSAMIDE 50 MG/1
100 TABLET ORAL 2 TIMES DAILY
Status: DISCONTINUED | OUTPATIENT
Start: 2024-05-25 | End: 2024-05-28 | Stop reason: HOSPADM

## 2024-05-25 RX ORDER — MEMANTINE HYDROCHLORIDE 10 MG/1
10 TABLET ORAL 2 TIMES DAILY
Status: DISCONTINUED | OUTPATIENT
Start: 2024-05-25 | End: 2024-05-28 | Stop reason: HOSPADM

## 2024-05-25 RX ORDER — LEVETIRACETAM 750 MG/1
750 TABLET ORAL 2 TIMES DAILY
Status: DISCONTINUED | OUTPATIENT
Start: 2024-05-25 | End: 2024-05-28 | Stop reason: HOSPADM

## 2024-05-25 RX ORDER — ARIPIPRAZOLE 5 MG/1
5 TABLET ORAL DAILY
Status: DISCONTINUED | OUTPATIENT
Start: 2024-05-25 | End: 2024-05-28 | Stop reason: HOSPADM

## 2024-05-25 RX ORDER — ATORVASTATIN CALCIUM 80 MG/1
80 TABLET, FILM COATED ORAL NIGHTLY
Status: DISCONTINUED | OUTPATIENT
Start: 2024-05-25 | End: 2024-05-28 | Stop reason: HOSPADM

## 2024-05-25 RX ORDER — FINASTERIDE 5 MG/1
5 TABLET, FILM COATED ORAL DAILY
Status: DISCONTINUED | OUTPATIENT
Start: 2024-05-25 | End: 2024-05-28 | Stop reason: HOSPADM

## 2024-05-25 RX ORDER — DICYCLOMINE HYDROCHLORIDE 10 MG/1
10 CAPSULE ORAL EVERY 6 HOURS PRN
Status: DISCONTINUED | OUTPATIENT
Start: 2024-05-25 | End: 2024-05-28 | Stop reason: HOSPADM

## 2024-05-25 RX ORDER — B-COMPLEX WITH VITAMIN C
400 TABLET ORAL DAILY
Status: DISCONTINUED | OUTPATIENT
Start: 2024-05-25 | End: 2024-05-28 | Stop reason: HOSPADM

## 2024-05-25 RX ORDER — ESCITALOPRAM OXALATE 10 MG/1
10 TABLET ORAL DAILY
Status: DISCONTINUED | OUTPATIENT
Start: 2024-05-25 | End: 2024-05-28 | Stop reason: HOSPADM

## 2024-05-25 RX ADMIN — LEVOFLOXACIN 750 MG: 5 INJECTION, SOLUTION INTRAVENOUS at 20:42

## 2024-05-25 RX ADMIN — ASPIRIN 81 MG: 81 TABLET, COATED ORAL at 08:38

## 2024-05-25 RX ADMIN — MEMANTINE HYDROCHLORIDE 10 MG: 10 TABLET ORAL at 08:38

## 2024-05-25 RX ADMIN — LEVETIRACETAM 500 MG: 500 TABLET, FILM COATED ORAL at 08:38

## 2024-05-25 RX ADMIN — CARBIDOPA AND LEVODOPA 1 TABLET: 25; 100 TABLET ORAL at 08:38

## 2024-05-25 RX ADMIN — ATORVASTATIN CALCIUM 80 MG: 80 TABLET, FILM COATED ORAL at 20:32

## 2024-05-25 RX ADMIN — LACOSAMIDE 100 MG: 10 INJECTION INTRAVENOUS at 23:31

## 2024-05-25 RX ADMIN — MONTELUKAST 10 MG: 10 TABLET, FILM COATED ORAL at 16:00

## 2024-05-25 RX ADMIN — ENOXAPARIN SODIUM 40 MG: 100 INJECTION SUBCUTANEOUS at 08:38

## 2024-05-25 RX ADMIN — SODIUM CHLORIDE, PRESERVATIVE FREE 10 ML: 5 INJECTION INTRAVENOUS at 20:33

## 2024-05-25 RX ADMIN — FERROUS SULFATE TAB 325 MG (65 MG ELEMENTAL FE) 325 MG: 325 (65 FE) TAB at 16:00

## 2024-05-25 RX ADMIN — MEMANTINE HYDROCHLORIDE 10 MG: 10 TABLET ORAL at 20:32

## 2024-05-25 RX ADMIN — PANTOPRAZOLE SODIUM 20 MG: 20 TABLET, DELAYED RELEASE ORAL at 08:38

## 2024-05-25 RX ADMIN — CARBIDOPA AND LEVODOPA 1 TABLET: 25; 100 TABLET ORAL at 13:57

## 2024-05-25 RX ADMIN — CARBIDOPA AND LEVODOPA 1 TABLET: 25; 100 TABLET ORAL at 20:33

## 2024-05-25 RX ADMIN — LEVETIRACETAM 750 MG: 750 TABLET, FILM COATED ORAL at 20:32

## 2024-05-25 RX ADMIN — ARIPIPRAZOLE 5 MG: 5 TABLET ORAL at 08:38

## 2024-05-25 RX ADMIN — LACOSAMIDE 100 MG: 10 INJECTION INTRAVENOUS at 11:36

## 2024-05-25 RX ADMIN — FINASTERIDE 5 MG: 5 TABLET, FILM COATED ORAL at 16:00

## 2024-05-25 RX ADMIN — ESCITALOPRAM OXALATE 10 MG: 10 TABLET ORAL at 08:38

## 2024-05-25 ASSESSMENT — PAIN SCALES - GENERAL: PAINLEVEL_OUTOF10: 0

## 2024-05-25 NOTE — PROGRESS NOTES
Patient admitted to unit, VSS, denies pain. No neuro deficits other than generalized weakness which is baseline per patient. No seizure activity. Seizure precautions in place. Patient is on a minced and moist diet with thickened liquids. Takes pills crushed in applesauce. Given NPO orders at midnight. Skin issues documented. Patient x1 pivot, x2 with walker for long distances. Lungs clear and bowel sounds active. MRI checklist completed. Afib on tele, has hx of. Fall precautions in place.

## 2024-05-25 NOTE — PLAN OF CARE
Problem: Safety - Adult  Goal: Free from fall injury  5/25/2024 1941 by Tere Purvis RN  Outcome: Progressing  Note: All fall precautions in place, call light within reach, free from fall injury.      Problem: Discharge Planning  Goal: Discharge to home or other facility with appropriate resources  5/25/2024 1941 by Tere Purvis RN  Outcome: Progressing  Note: CM is following for discharge.      Problem: Pain  Goal: Verbalizes/displays adequate comfort level or baseline comfort level  5/25/2024 1941 by Tere Purvis RN  Outcome: Progressing     Problem: Skin/Tissue Integrity  Goal: Absence of new skin breakdown  Description: 1.  Monitor for areas of redness and/or skin breakdown  2.  Assess vascular access sites hourly  3.  Every 4-6 hours minimum:  Change oxygen saturation probe site  4.  Every 4-6 hours:  If on nasal continuous positive airway pressure, respiratory therapy assess nares and determine need for appliance change or resting period.  5/25/2024 1941 by Tere Purvis RN  Outcome: Progressing

## 2024-05-25 NOTE — PROGRESS NOTES
Hospital Medicine Progress Note      Date of Admission: 5/24/2024  Hospital Day: 2    Chief Admission Complaint: Breakthrough seizure     Subjective: Patient was seen and evaluated at bedside.  Does not have any active complaints.  Did not have any events overnight.    Presenting Admission History:       78-year-old gentleman who ambulates with a walker at baseline with a past medical history of paroxysmal A-fib status post Watchman, lung cancer status post thoracotomy, Parkinson's disease, porphyria cutanea tarda, coronary artery disease status post PCI, ischemic stroke, depression/anxiety, seizure disorder who was brought to the ER for evaluation of lower abdominal pain ongoing for 4 days.  Patient was diagnosed with UTI and sent home on oral antibiotic therapy.  Patient presented again for altered mental status, unresponsiveness and garbled speech with blank stare.    Patient returned to baseline in the ER.   CT head, CT angiogram of the head and neck showed no acute abnormality, global parenchymal volume loss, chronic microvascular ischemic changes moderate stenosis at the origin of left vertebral artery.  Atherosclerosis with 35% of proximal right cervical ICA stenosis.  Patient reported being compliant to his seizure medications.  Was loaded up with Keppra in the ER and transferred to our hospital for neurology evaluation.      Assessment/Plan:      Current Principal Problem:  Seizures (HCC)    Breakthrough seizure/Parkinson's disease  Proteus UTI  History of stroke/paroxysmal A-fib status post Watchman device/coronary artery disease status post PCI  Depression/anxiety/BPH    Plan:  *Concern for breakthrough seizure with underlying seizure disorder in the setting of UTI  Reports compliance with AEDs  Had MRI brain On 5/9/2024 which showed microvascular ischemic changes  Discussed with Dr. Wiseman from neurology who does not recommend repeat MRI  Continue patient's lacosamide  Adjusted Keppra dose to 750 mg

## 2024-05-25 NOTE — PLAN OF CARE
Problem: Skin/Tissue Integrity  Goal: Absence of new skin breakdown  Description: 1.  Monitor for areas of redness and/or skin breakdown  2.  Assess vascular access sites hourly  3.  Every 4-6 hours minimum:  Change oxygen saturation probe site  4.  Every 4-6 hours:  If on nasal continuous positive airway pressure, respiratory therapy assess nares and determine need for appliance change or resting period.  Outcome: Progressing     Problem: Safety - Adult  Goal: Free from fall injury  5/25/2024 0735 by Tanisha Dodge RN  Outcome: Progressing   All fall precautions in place. Bed locked and in lowest position with alarm on. Overbed table and personal belonings within reach. Call light within reach and patient instructed to use call light for assistance. Non-skid socks on.

## 2024-05-25 NOTE — CONSULTS
Bilirubin 1.6 (H) 0.0 - 1.0 mg/dL    Alkaline Phosphatase 141 (H) 40 - 129 U/L    ALT 21 10 - 40 U/L    AST 33 15 - 37 U/L   Lipase    Collection Time: 05/24/24  5:11 AM   Result Value Ref Range    Lipase 29.0 13.0 - 60.0 U/L   Urinalysis with Reflex to Culture    Collection Time: 05/24/24  5:40 AM    Specimen: Urine   Result Value Ref Range    Color, UA Yellow Straw/Yellow    Clarity, UA Clear Clear    Glucose, Ur Negative Negative mg/dL    Bilirubin, Urine Negative Negative    Ketones, Urine Negative Negative mg/dL    Specific Gravity, UA 1.010 1.005 - 1.030    Blood, Urine Negative Negative    pH, Urine 6.0 5.0 - 8.0    Protein, UA Negative Negative mg/dL    Urobilinogen, Urine 0.2 <2.0 E.U./dL    Nitrite, Urine Negative Negative    Leukocyte Esterase, Urine SMALL (A) Negative    Microscopic Examination YES     Urine Type NotGiven     Urine Reflex to Culture Yes    Lactate, Sepsis    Collection Time: 05/24/24  5:40 AM   Result Value Ref Range    Lactic Acid, Sepsis 1.8 0.4 - 1.9 mmol/L   Microscopic Urinalysis    Collection Time: 05/24/24  5:40 AM   Result Value Ref Range    Mucus, UA Rare (A) None Seen /LPF    WBC, UA 10-20 (A) 0 - 5 /HPF    RBC, UA None seen 0 - 4 /HPF    Epithelial Cells, UA 2-5 0 - 5 /HPF   EKG 12 Lead    Collection Time: 05/24/24  9:39 AM   Result Value Ref Range    Ventricular Rate 89 BPM    Atrial Rate 101 BPM    QRS Duration 76 ms    Q-T Interval 372 ms    QTc Calculation (Bazett) 452 ms    R Axis -86 degrees    T Axis 37 degrees    Diagnosis       Atrial fibrillationLeft axis deviationNonspecific ST and T wave abnormalityAbnormal ECGConfirmed by JAZMYNE CRONIN ([id number]) on 5/24/2024 11:07:23 PM   Levetiracetam Level    Collection Time: 05/24/24 10:06 AM   Result Value Ref Range    Levetiracetam Lvl >100.0 (H) 6.0 - 46.0 ug/mL    KEPPRA Dose Amt Unknown    Comprehensive Metabolic Panel w/ Reflex to MG    Collection Time: 05/25/24  6:09 AM   Result Value Ref Range    Sodium 140 136 - 145

## 2024-05-25 NOTE — PLAN OF CARE
Problem: Safety - Adult  Goal: Free from fall injury  Outcome: Progressing   Patient has remained free of falls. 2/4 bed rails up, bed locked and in lowest position, call light within reach. Patient instructed on use of call light and uses appropriately. Bed alarm on. Non-skid footwear and fall band on.       Problem: Pain  Goal: Verbalizes/displays adequate comfort level or baseline comfort level  Outcome: Progressing   Patient denies pain at this time.

## 2024-05-25 NOTE — PROGRESS NOTES
A&Ox4. VSS on room air. Denies pain. No seizure activity noted this shift. On bedrest at this time. Voiding adequately via urinal. NPO this shift. Standard safety measures in place. Denies needs. Plan of care continues.

## 2024-05-25 NOTE — PROGRESS NOTES
4 Eyes Skin Assessment     NAME:  Rodolfo Luna  YOB: 1945  MEDICAL RECORD NUMBER:  [id number]    The patient is being assessed for  Admission    I agree that at least one RN has performed a thorough Head to Toe Skin Assessment on the patient. ALL assessment sites listed below have been assessed.      Areas assessed by both nurses:    Head, Face, Ears, Shoulders, Back, Chest, Arms, Elbows, Hands, Sacrum. Buttock, Coccyx, Ischium, Legs. Feet and Heels, and Under Medical Devices         Does the Patient have a Wound? Scattered bruising, blanchable redness to buttocks       Jorge Prevention initiated by RN: No  Wound Care Orders initiated by RN: No    Pressure Injury (Stage 3,4, Unstageable, DTI, NWPT, and Complex wounds) if present, place Wound referral order by RN under : No    New Ostomies, if present place, Ostomy referral order under : No     Nurse 1 eSignature: Electronically signed by Carmen Nolan RN on 5/25/24 at 2:23 AM EDT    **SHARE this note so that the co-signing nurse can place an eSignature**    Nurse 2 eSignature: Electronically signed by Arron Meyer RN on 5/25/24 at 2:34 AM EDT

## 2024-05-25 NOTE — H&P
mouth 2 times daily (with meals) 10/5/15   John Hernandez MD       Labs: Personally reviewed and interpreted for clinical significance.   Recent Labs     05/22/24  0546 05/23/24  0531 05/24/24  0511   WBC 7.6 9.5 13.9*   HGB 13.7 14.6 16.5   HCT 41.5 43.4 49.7   * 133* 161     Recent Labs     05/22/24  0546 05/23/24  0531 05/24/24  0511    137 137   K 4.2 4.2 4.5    102 98*   CO2 24 26 23   BUN 20 17 19   CREATININE 0.6* 0.6* 0.7*   CALCIUM 8.4 8.8 9.2     No results for input(s): \"PROBNP\", \"TROPHS\" in the last 72 hours.  No results for input(s): \"LABA1C\" in the last 72 hours.  Recent Labs     05/22/24  0546 05/23/24  0531 05/24/24  0511   AST 25 26 33   ALT 13 9* 21   BILITOT 1.1* 1.3* 1.6*   ALKPHOS 116 127 141*     No results for input(s): \"INR\", \"LACTA\", \"TSH\" in the last 72 hours.     Antonio Ackerman MD

## 2024-05-26 PROBLEM — R56.9 SEIZURES (HCC): Status: RESOLVED | Noted: 2024-05-24 | Resolved: 2024-05-26

## 2024-05-26 LAB
ALBUMIN SERPL-MCNC: 3.4 G/DL (ref 3.4–5)
ALBUMIN/GLOB SERPL: 1.3 {RATIO} (ref 1.1–2.2)
ALP SERPL-CCNC: 112 U/L (ref 40–129)
ALT SERPL-CCNC: 10 U/L (ref 10–40)
ANION GAP SERPL CALCULATED.3IONS-SCNC: 11 MMOL/L (ref 3–16)
AST SERPL-CCNC: 25 U/L (ref 15–37)
BACTERIA UR CULT: ABNORMAL
BASOPHILS # BLD: 0.1 K/UL (ref 0–0.2)
BASOPHILS NFR BLD: 0.7 %
BILIRUB SERPL-MCNC: 1.2 MG/DL (ref 0–1)
BUN SERPL-MCNC: 15 MG/DL (ref 7–20)
CALCIUM SERPL-MCNC: 8.7 MG/DL (ref 8.3–10.6)
CHLORIDE SERPL-SCNC: 103 MMOL/L (ref 99–110)
CO2 SERPL-SCNC: 27 MMOL/L (ref 21–32)
CREAT SERPL-MCNC: 0.8 MG/DL (ref 0.8–1.3)
DEPRECATED RDW RBC AUTO: 14.8 % (ref 12.4–15.4)
EOSINOPHIL # BLD: 0 K/UL (ref 0–0.6)
EOSINOPHIL NFR BLD: 0.4 %
GFR SERPLBLD CREATININE-BSD FMLA CKD-EPI: >90 ML/MIN/{1.73_M2}
GLUCOSE SERPL-MCNC: 96 MG/DL (ref 70–99)
HCT VFR BLD AUTO: 39.9 % (ref 40.5–52.5)
HGB BLD-MCNC: 13.6 G/DL (ref 13.5–17.5)
LYMPHOCYTES # BLD: 1.8 K/UL (ref 1–5.1)
LYMPHOCYTES NFR BLD: 20.5 %
MCH RBC QN AUTO: 30.5 PG (ref 26–34)
MCHC RBC AUTO-ENTMCNC: 34 G/DL (ref 31–36)
MCV RBC AUTO: 89.7 FL (ref 80–100)
MONOCYTES # BLD: 0.5 K/UL (ref 0–1.3)
MONOCYTES NFR BLD: 5.7 %
NEUTROPHILS # BLD: 6.4 K/UL (ref 1.7–7.7)
NEUTROPHILS NFR BLD: 72.7 %
ORGANISM: ABNORMAL
PLATELET # BLD AUTO: 118 K/UL (ref 135–450)
PMV BLD AUTO: 7.6 FL (ref 5–10.5)
POTASSIUM SERPL-SCNC: 4.4 MMOL/L (ref 3.5–5.1)
PROT SERPL-MCNC: 6 G/DL (ref 6.4–8.2)
RBC # BLD AUTO: 4.44 M/UL (ref 4.2–5.9)
SODIUM SERPL-SCNC: 141 MMOL/L (ref 136–145)
WBC # BLD AUTO: 8.8 K/UL (ref 4–11)

## 2024-05-26 PROCEDURE — 6360000002 HC RX W HCPCS

## 2024-05-26 PROCEDURE — 6360000002 HC RX W HCPCS: Performed by: FAMILY MEDICINE

## 2024-05-26 PROCEDURE — 2580000003 HC RX 258: Performed by: FAMILY MEDICINE

## 2024-05-26 PROCEDURE — 99232 SBSQ HOSP IP/OBS MODERATE 35: CPT

## 2024-05-26 PROCEDURE — 6370000000 HC RX 637 (ALT 250 FOR IP): Performed by: STUDENT IN AN ORGANIZED HEALTH CARE EDUCATION/TRAINING PROGRAM

## 2024-05-26 PROCEDURE — 2060000000 HC ICU INTERMEDIATE R&B

## 2024-05-26 PROCEDURE — 85025 COMPLETE CBC W/AUTO DIFF WBC: CPT

## 2024-05-26 PROCEDURE — C9254 INJECTION, LACOSAMIDE: HCPCS

## 2024-05-26 PROCEDURE — 6370000000 HC RX 637 (ALT 250 FOR IP): Performed by: NURSE PRACTITIONER

## 2024-05-26 PROCEDURE — 36415 COLL VENOUS BLD VENIPUNCTURE: CPT

## 2024-05-26 PROCEDURE — 6370000000 HC RX 637 (ALT 250 FOR IP): Performed by: FAMILY MEDICINE

## 2024-05-26 PROCEDURE — 80053 COMPREHEN METABOLIC PANEL: CPT

## 2024-05-26 RX ORDER — MECOBALAMIN 5000 MCG
5 TABLET,DISINTEGRATING ORAL NIGHTLY PRN
Status: DISCONTINUED | OUTPATIENT
Start: 2024-05-26 | End: 2024-05-27

## 2024-05-26 RX ORDER — LEVOFLOXACIN 750 MG/1
750 TABLET, FILM COATED ORAL DAILY
Qty: 4 TABLET | Refills: 0 | Status: SHIPPED | OUTPATIENT
Start: 2024-05-26 | End: 2024-05-30

## 2024-05-26 RX ORDER — LEVETIRACETAM 750 MG/1
750 TABLET ORAL 2 TIMES DAILY
Qty: 60 TABLET | Refills: 3 | Status: SHIPPED | OUTPATIENT
Start: 2024-05-26

## 2024-05-26 RX ADMIN — ESCITALOPRAM OXALATE 10 MG: 10 TABLET ORAL at 08:52

## 2024-05-26 RX ADMIN — MEMANTINE HYDROCHLORIDE 10 MG: 10 TABLET ORAL at 08:52

## 2024-05-26 RX ADMIN — CARBIDOPA AND LEVODOPA 1 TABLET: 25; 100 TABLET ORAL at 08:52

## 2024-05-26 RX ADMIN — MONTELUKAST 10 MG: 10 TABLET, FILM COATED ORAL at 08:53

## 2024-05-26 RX ADMIN — Medication 5 MG: at 21:14

## 2024-05-26 RX ADMIN — SODIUM CHLORIDE, PRESERVATIVE FREE 10 ML: 5 INJECTION INTRAVENOUS at 09:00

## 2024-05-26 RX ADMIN — ASPIRIN 81 MG: 81 TABLET, COATED ORAL at 08:53

## 2024-05-26 RX ADMIN — ATORVASTATIN CALCIUM 80 MG: 80 TABLET, FILM COATED ORAL at 21:14

## 2024-05-26 RX ADMIN — CARBIDOPA AND LEVODOPA 1 TABLET: 25; 100 TABLET ORAL at 13:52

## 2024-05-26 RX ADMIN — CARBIDOPA AND LEVODOPA 1 TABLET: 25; 100 TABLET ORAL at 21:14

## 2024-05-26 RX ADMIN — FERROUS SULFATE TAB 325 MG (65 MG ELEMENTAL FE) 325 MG: 325 (65 FE) TAB at 17:48

## 2024-05-26 RX ADMIN — ARIPIPRAZOLE 5 MG: 5 TABLET ORAL at 08:52

## 2024-05-26 RX ADMIN — PANTOPRAZOLE SODIUM 20 MG: 20 TABLET, DELAYED RELEASE ORAL at 08:53

## 2024-05-26 RX ADMIN — LACOSAMIDE 100 MG: 10 INJECTION INTRAVENOUS at 11:37

## 2024-05-26 RX ADMIN — FINASTERIDE 5 MG: 5 TABLET, FILM COATED ORAL at 08:52

## 2024-05-26 RX ADMIN — LEVETIRACETAM 750 MG: 750 TABLET, FILM COATED ORAL at 21:14

## 2024-05-26 RX ADMIN — MEMANTINE HYDROCHLORIDE 10 MG: 10 TABLET ORAL at 21:14

## 2024-05-26 RX ADMIN — ENOXAPARIN SODIUM 40 MG: 100 INJECTION SUBCUTANEOUS at 08:59

## 2024-05-26 RX ADMIN — SODIUM CHLORIDE, PRESERVATIVE FREE 10 ML: 5 INJECTION INTRAVENOUS at 21:14

## 2024-05-26 RX ADMIN — LEVOFLOXACIN 750 MG: 5 INJECTION, SOLUTION INTRAVENOUS at 22:03

## 2024-05-26 RX ADMIN — LACOSAMIDE 100 MG: 10 INJECTION INTRAVENOUS at 23:55

## 2024-05-26 RX ADMIN — FERROUS SULFATE TAB 325 MG (65 MG ELEMENTAL FE) 325 MG: 325 (65 FE) TAB at 08:53

## 2024-05-26 RX ADMIN — LEVETIRACETAM 750 MG: 750 TABLET, FILM COATED ORAL at 08:52

## 2024-05-26 NOTE — PROGRESS NOTES
NEUROCRITICAL CARE PROGRESS NOTE       Patient Name: Rodolfo Luna YOB: 1945   Sex: Male Age: 78 yrs     CC / Reason for Consult: Seizures    Changes over last 24 hours:   -No further seizure activity   -Tolerating increased dose of Keppra  -Exam stable    ROS: Denies any acute complaints      ASSESSMENT & RECOMMENDATIONS   Assessment:  Breakthrough seizure in a patient with EEG confirmed epileptiform findings in the setting of an active UTI. Currently on lacosamide 100mg BID and keppra 500mg BID and he reports adherence (but not the best historian). Recent MRI brain done 05/09/2024 showed microvascular findings. The patient feels he is currently at his cognitive baseline.     Suspect this is a breakthrough seizure in setting of UTI. Patient reports he is adherent to AEDs, but he is not the best historian and so unclear if this is accurate. However, under assumption he has been taking AEDs will increase his keppra to 750mg BID. His keppra level was > 100 but this appears to be after a loading dose so less reliable. As he recovers from the UTI could consider weaning back down to 500mg BID keppra as an outpatient. There is also room to increase lacosamide as well if needed.     Plan:  -Continue Keppra at increased dose of 750mg BID  -Continue Lacosamide at 100mg BID  -Repeat EEG unlikely to    -Repeat MRI likely not to  as he just had one on 5/10.  -Continue home Sinemet -- unclear if he has PD or drug induced parkinsonism, this would need to be addressed with outpatient neurology as difficult to assess during a hospitalization   -Follow up with Dr. Zayas in one month  -We will sign off at this time. Please do not hesitate to reach out with any questions or concerns     Above case and plan discussed with Dr. Susie Bone, APRN - CNP   Neurocritical Care   5/26/2024 9:46 AM  [facility]: [facility]  [phone]  HISTORY

## 2024-05-26 NOTE — DISCHARGE SUMMARY
V2.0  Discharge Summary    Name:  Rodolfo Luna /Age/Sex: 1945 (78 y.o. male)   Admit Date: 2024  Discharge Date: 24    MRN & CSN:  [id number] & [id number] Encounter Date and Time 24 8:55 AM EDT    Attending:  Julio Cesar Shultz MD Discharging Provider: Julio Cesar Shultz MD       Hospital Course:     Brief HPI: 78-year-old gentleman who ambulates with a walker at baseline with a past medical history of paroxysmal A-fib status post Watchman, lung cancer status post thoracotomy, Parkinson's disease, porphyria cutanea tarda, coronary artery disease status post PCI, ischemic stroke, depression/anxiety, seizure disorder who was brought to the ER for evaluation of lower abdominal pain ongoing for 4 days.  Patient was diagnosed with UTI and sent home on oral antibiotic therapy.  Patient presented again for altered mental status, unresponsiveness and garbled speech with blank stare.    Patient returned to baseline in the ER.   CT head, CT angiogram of the head and neck showed no acute abnormality, global parenchymal volume loss, chronic microvascular ischemic changes moderate stenosis at the origin of left vertebral artery.  Atherosclerosis with 35% of proximal right cervical ICA stenosis.  Patient reported being compliant to his seizure medications.  Was loaded up with Keppra in the ER and transferred to our hospital for neurology evaluation.    Brief Problem Based Course:   Breakthrough seizure/Parkinson's disease  Proteus UTI  History of stroke/paroxysmal A-fib status post Watchman device/coronary artery disease status post PCI  Depression/anxiety/BPH     Plan:  *Concern for breakthrough seizure with underlying seizure disorder in the setting of UTI  Reports compliance with AEDs  Had MRI brain On 2024 which showed microvascular ischemic changes  Neurology does not recommend repeat MRI  Continue patient's lacosamide  Adjusted Keppra dose to 750 mg twice daily  Outpatient follow-up with neurology

## 2024-05-26 NOTE — PLAN OF CARE
Problem: Skin/Tissue Integrity  Goal: Absence of new skin breakdown  Description: 1.  Monitor for areas of redness and/or skin breakdown  2.  Assess vascular access sites hourly  3.  Every 4-6 hours minimum:  Change oxygen saturation probe site  4.  Every 4-6 hours:  If on nasal continuous positive airway pressure, respiratory therapy assess nares and determine need for appliance change or resting period.  5/26/2024 0723 by Tanisha Dodge, RN  Outcome: Progressing     Problem: Safety - Adult  Goal: Free from fall injury  5/26/2024 0723 by Tanisha Dodge, RN  Outcome: Progressing   All fall precautions in place. Bed locked and in lowest position with alarm on. Overbed table and personal belonings within reach. Call light within reach and patient instructed to use call light for assistance. Non-skid socks on.

## 2024-05-26 NOTE — PROGRESS NOTES
Pt is alert and oriented X4. VS taken and recorded. SPO2 maintained at RA. Pt denies pain. No acute changes overnight. Is on oral diet and tolerating well. Pt voiding well via BRP. No BM this shift. All fall precautions in place, call light within reach, free from fall injury. Plan of care ongoing.

## 2024-05-26 NOTE — CARE COORDINATION
Case Management Assessment  Initial Evaluation    Date/Time of Evaluation: 5/26/2024 12:33 PM  Assessment Completed by: MAURA Yeager    If patient is discharged prior to next notation, then this note serves as note for discharge by case management.    Patient Name: Rodolfo Luna                   YOB: 1945  Diagnosis: Seizures (HCC) [R56.9]                   Date / Time: 5/24/2024  8:55 PM    Patient Admission Status: Inpatient   Readmission Risk (Low < 19, Mod (19-27), High > 27): Readmission Risk Score: 24.3    Current PCP: Carlton Leavitt APRN - CNP  PCP verified by CM? Yes    Chart Reviewed: Yes      History Provided by: Patient  Patient Orientation: Alert and Oriented    Patient Cognition: Alert    Hospitalization in the last 30 days (Readmission):  Yes    If yes, Readmission Assessment in CM Navigator will be completed.    Advance Directives:      Code Status: Full Code   Patient's Primary Decision Maker is: Legal Next of Kin    Primary Decision Maker: Helen Luna A - Spouse - [phone]    Discharge Planning:    Patient lives with: Spouse/Significant Other Type of Home: House  Primary Care Giver: Self  Patient Support Systems include: Spouse/Significant Other   Current Financial resources: Medicare  Current community resources: Other (Comment) (Pt was at SNF - [facility])  Current services prior to admission: Durable Medical Equipment            Current DME: Walker Cane            Type of Home Care services:  None    ADLS  Prior functional level: Independent in ADLs/IADLs  Current functional level: Assistance with the following:, Bathing, Dressing, Toileting, Feeding, Housework, Cooking, Shopping, Mobility    PT AM-PAC:   /24  OT AM-PAC:   /24    Family can provide assistance at DC: No  Would you like Case Management to discuss the discharge plan with any other family members/significant others, and if so, who? Yes (Spouse)  Plans to Return to Present Housing: Yes  Other

## 2024-05-26 NOTE — DISCHARGE INSTR - COC
°F (36.7 °C) (Oral)   Resp 18   Ht 1.778 m (5' 10\")   Wt 67.4 kg (148 lb 9.4 oz)   SpO2 98%   BMI 21.32 kg/m²     Last documented pain score (0-10 scale): Pain Level: 0  Last Weight:   Wt Readings from Last 1 Encounters:   05/26/24 67.4 kg (148 lb 9.4 oz)     Mental Status:  oriented and alert    IV Access:  - None    Nursing Mobility/ADLs:  Walking   Assisted  Transfer  Assisted  Bathing  Assisted  Dressing  Assisted  Toileting  Assisted  Feeding  Assisted  Med Admin  Assisted  Med Delivery   crushed    Wound Care Documentation and Therapy:        Elimination:  Continence:   Bowel: Yes  Bladder: Yes  Urinary Catheter: None   Colostomy/Ileostomy/Ileal Conduit: No       Date of Last BM: 5/27/24    Intake/Output Summary (Last 24 hours) at 5/26/2024 0855  Last data filed at 5/26/2024 0659  Gross per 24 hour   Intake 205 ml   Output 400 ml   Net -195 ml     I/O last 3 completed shifts:  In: 495 [P.O.:490; I.V.:5]  Out: 1400 [Urine:1400]    Safety Concerns:     At Risk for Falls and History of Seizures    Impairments/Disabilities:      None    Nutrition Therapy:  Current Nutrition Therapy:   - Oral Diet:  Dysphagia - Minced and Moist    Routes of Feeding: Oral  Liquids: Nectar Thick Liquids  Daily Fluid Restriction: no  Last Modified Barium Swallow with Video (Video Swallowing Test): not done    Treatments at the Time of Hospital Discharge:   Respiratory Treatments: na  Oxygen Therapy:  is not on home oxygen therapy.  Ventilator:    - No ventilator support    Rehab Therapies: Physical Therapy, Occupational Therapy, and Speech/Language Therapy  Weight Bearing Status/Restrictions: No weight bearing restrictions  Other Medical Equipment (for information only, NOT a DME order):  walker and bath bench  Other Treatments: na    Patient's personal belongings (please select all that are sent with patient):  Packed with pt     RN SIGNATURE:  Electronically signed by Senait Leo RN on 5/28/24 at 3:02 PM EDT    CASE

## 2024-05-26 NOTE — PROGRESS NOTES
A&Ox4. VSS on room air. Denies pain. No seizure activity this shift. Up as tolerated, x1 GB walker. Able to sit in chair this shift. Voiding adequately via BRP. Tolerating PO diet and fluids. Denies needs. Wife updated via phone call. Standard safety and seizure precautions in place. Plan of care continues.

## 2024-05-27 LAB
ALBUMIN SERPL-MCNC: 3.3 G/DL (ref 3.4–5)
ALBUMIN/GLOB SERPL: 1.2 {RATIO} (ref 1.1–2.2)
ALP SERPL-CCNC: 113 U/L (ref 40–129)
ALT SERPL-CCNC: 9 U/L (ref 10–40)
ANION GAP SERPL CALCULATED.3IONS-SCNC: 12 MMOL/L (ref 3–16)
AST SERPL-CCNC: 27 U/L (ref 15–37)
BASOPHILS # BLD: 0 K/UL (ref 0–0.2)
BASOPHILS NFR BLD: 0.1 %
BILIRUB SERPL-MCNC: 1.1 MG/DL (ref 0–1)
BUN SERPL-MCNC: 18 MG/DL (ref 7–20)
CALCIUM SERPL-MCNC: 8.7 MG/DL (ref 8.3–10.6)
CHLORIDE SERPL-SCNC: 105 MMOL/L (ref 99–110)
CO2 SERPL-SCNC: 21 MMOL/L (ref 21–32)
CREAT SERPL-MCNC: 0.7 MG/DL (ref 0.8–1.3)
DEPRECATED RDW RBC AUTO: 14.8 % (ref 12.4–15.4)
EOSINOPHIL # BLD: 0.1 K/UL (ref 0–0.6)
EOSINOPHIL NFR BLD: 0.7 %
GFR SERPLBLD CREATININE-BSD FMLA CKD-EPI: >90 ML/MIN/{1.73_M2}
GLUCOSE SERPL-MCNC: 91 MG/DL (ref 70–99)
HCT VFR BLD AUTO: 42.4 % (ref 40.5–52.5)
HGB BLD-MCNC: 14.4 G/DL (ref 13.5–17.5)
LACOSAMIDE SERPL-MCNC: 4.5 UG/ML (ref 1–10)
LYMPHOCYTES # BLD: 2 K/UL (ref 1–5.1)
LYMPHOCYTES NFR BLD: 22.9 %
MCH RBC QN AUTO: 30.9 PG (ref 26–34)
MCHC RBC AUTO-ENTMCNC: 33.9 G/DL (ref 31–36)
MCV RBC AUTO: 90.9 FL (ref 80–100)
MONOCYTES # BLD: 0.5 K/UL (ref 0–1.3)
MONOCYTES NFR BLD: 5.7 %
NEUTROPHILS # BLD: 6.3 K/UL (ref 1.7–7.7)
NEUTROPHILS NFR BLD: 70.6 %
PLATELET # BLD AUTO: 126 K/UL (ref 135–450)
PMV BLD AUTO: 7.8 FL (ref 5–10.5)
POTASSIUM SERPL-SCNC: 4.7 MMOL/L (ref 3.5–5.1)
PROT SERPL-MCNC: 6 G/DL (ref 6.4–8.2)
RBC # BLD AUTO: 4.66 M/UL (ref 4.2–5.9)
SODIUM SERPL-SCNC: 138 MMOL/L (ref 136–145)
WBC # BLD AUTO: 8.9 K/UL (ref 4–11)

## 2024-05-27 PROCEDURE — 6360000002 HC RX W HCPCS

## 2024-05-27 PROCEDURE — 36415 COLL VENOUS BLD VENIPUNCTURE: CPT

## 2024-05-27 PROCEDURE — C9254 INJECTION, LACOSAMIDE: HCPCS

## 2024-05-27 PROCEDURE — 2580000003 HC RX 258: Performed by: FAMILY MEDICINE

## 2024-05-27 PROCEDURE — 97116 GAIT TRAINING THERAPY: CPT

## 2024-05-27 PROCEDURE — 80053 COMPREHEN METABOLIC PANEL: CPT

## 2024-05-27 PROCEDURE — 97166 OT EVAL MOD COMPLEX 45 MIN: CPT

## 2024-05-27 PROCEDURE — 6370000000 HC RX 637 (ALT 250 FOR IP): Performed by: FAMILY MEDICINE

## 2024-05-27 PROCEDURE — 6360000002 HC RX W HCPCS: Performed by: FAMILY MEDICINE

## 2024-05-27 PROCEDURE — 85025 COMPLETE CBC W/AUTO DIFF WBC: CPT

## 2024-05-27 PROCEDURE — 6370000000 HC RX 637 (ALT 250 FOR IP): Performed by: STUDENT IN AN ORGANIZED HEALTH CARE EDUCATION/TRAINING PROGRAM

## 2024-05-27 PROCEDURE — 97161 PT EVAL LOW COMPLEX 20 MIN: CPT

## 2024-05-27 PROCEDURE — 2060000000 HC ICU INTERMEDIATE R&B

## 2024-05-27 PROCEDURE — 97535 SELF CARE MNGMENT TRAINING: CPT

## 2024-05-27 RX ORDER — MECOBALAMIN 5000 MCG
10 TABLET,DISINTEGRATING ORAL NIGHTLY PRN
Status: DISCONTINUED | OUTPATIENT
Start: 2024-05-27 | End: 2024-05-28 | Stop reason: HOSPADM

## 2024-05-27 RX ADMIN — LEVETIRACETAM 750 MG: 750 TABLET, FILM COATED ORAL at 20:45

## 2024-05-27 RX ADMIN — PANTOPRAZOLE SODIUM 20 MG: 20 TABLET, DELAYED RELEASE ORAL at 08:39

## 2024-05-27 RX ADMIN — MAGNESIUM HYDROXIDE 30 ML: 400 SUSPENSION ORAL at 13:08

## 2024-05-27 RX ADMIN — CARBIDOPA AND LEVODOPA 1 TABLET: 25; 100 TABLET ORAL at 20:45

## 2024-05-27 RX ADMIN — ARIPIPRAZOLE 5 MG: 5 TABLET ORAL at 08:39

## 2024-05-27 RX ADMIN — CARBIDOPA AND LEVODOPA 1 TABLET: 25; 100 TABLET ORAL at 08:39

## 2024-05-27 RX ADMIN — LACOSAMIDE 100 MG: 10 INJECTION INTRAVENOUS at 13:08

## 2024-05-27 RX ADMIN — ESCITALOPRAM OXALATE 10 MG: 10 TABLET ORAL at 08:39

## 2024-05-27 RX ADMIN — LEVETIRACETAM 750 MG: 750 TABLET, FILM COATED ORAL at 08:39

## 2024-05-27 RX ADMIN — FERROUS SULFATE TAB 325 MG (65 MG ELEMENTAL FE) 325 MG: 325 (65 FE) TAB at 08:39

## 2024-05-27 RX ADMIN — ATORVASTATIN CALCIUM 80 MG: 80 TABLET, FILM COATED ORAL at 20:45

## 2024-05-27 RX ADMIN — ASPIRIN 81 MG: 81 TABLET, COATED ORAL at 08:39

## 2024-05-27 RX ADMIN — SODIUM CHLORIDE, PRESERVATIVE FREE 10 ML: 5 INJECTION INTRAVENOUS at 20:45

## 2024-05-27 RX ADMIN — MEMANTINE HYDROCHLORIDE 10 MG: 10 TABLET ORAL at 08:39

## 2024-05-27 RX ADMIN — FERROUS SULFATE TAB 325 MG (65 MG ELEMENTAL FE) 325 MG: 325 (65 FE) TAB at 18:06

## 2024-05-27 RX ADMIN — MONTELUKAST 10 MG: 10 TABLET, FILM COATED ORAL at 08:39

## 2024-05-27 RX ADMIN — ENOXAPARIN SODIUM 40 MG: 100 INJECTION SUBCUTANEOUS at 08:39

## 2024-05-27 RX ADMIN — SODIUM CHLORIDE, PRESERVATIVE FREE 10 ML: 5 INJECTION INTRAVENOUS at 08:44

## 2024-05-27 RX ADMIN — FINASTERIDE 5 MG: 5 TABLET, FILM COATED ORAL at 08:39

## 2024-05-27 RX ADMIN — LACOSAMIDE 100 MG: 10 INJECTION INTRAVENOUS at 23:53

## 2024-05-27 RX ADMIN — CARBIDOPA AND LEVODOPA 1 TABLET: 25; 100 TABLET ORAL at 13:10

## 2024-05-27 RX ADMIN — MEMANTINE HYDROCHLORIDE 10 MG: 10 TABLET ORAL at 20:45

## 2024-05-27 ASSESSMENT — PAIN SCALES - PAIN ASSESSMENT IN ADVANCED DEMENTIA (PAINAD)
FACIALEXPRESSION: FACIAL GRIMACING
BREATHING: NORMAL
NEGVOCALIZATION: OCCASIONAL MOAN/GROAN, LOW SPEECH, NEGATIVE/DISAPPROVING QUALITY
TOTALSCORE: 4
BODYLANGUAGE: RELAXED
CONSOLABILITY: DISTRACTED OR REASSURED BY VOICE/TOUCH

## 2024-05-27 NOTE — PROGRESS NOTES
Physician Progress Note      PATIENT:               HARSHA DON  CSN #:                  [id number]  :                       1945  ADMIT DATE:       2024 8:56 AM  DISCH DATE:        2024 4:04 PM  RESPONDING  PROVIDER #:        Valery Loza MD          QUERY TEXT:    Pt admitted with weakness. Pt noted to have recent Left Pontine CVA,   Parkinsons and seizures. If possible, please document in progress notes and   discharge summary the relationship, if any, between weakness and Left Pontine   CVA, Parkinsons and seizures.    The medical record reflects the following:  Risk Factors: recent Left Pontine CVA, Parkinsons and seizures HTN A fib  Clinical Indicators: per Neurology consult \"From neurology perspective, the   etiology of confusion can be TIA/recurrent stroke or subclinical seizure.\"   OT/PT \"Patient is currently functioning below baseline as he was independent   with bed mobility, functional transfer performance, functional mobility, ADL   performance, and he currently requires some assistance with those tasks.\"  Treatment: CT, MRI, Neurology consult and OT/PT, Sinemet, Vimpat  Options provided:  -- Weakness due to Left Pontine CVA, Parkinsons and or seizures  -- Weakness unrelated to Left Pontine CVA, Parkinsons and or seizures  -- Age Related Physical Debility  -- Frailty  -- Other - I will add my own diagnosis  -- Disagree - Not applicable / Not valid  -- Disagree - Clinically unable to determine / Unknown  -- Refer to Clinical Documentation Reviewer    PROVIDER RESPONSE TEXT:    This patient has weakness due to Left Pontine CVA, Parkinsons and or seizures.    Query created by: Mariangel Segura on 2024 11:15 AM      Electronically signed by:  Valery Loza MD 2024 11:24 AM

## 2024-05-27 NOTE — PLAN OF CARE
Problem: Safety - Adult  Goal: Free from fall injury  Outcome: Progressing  Note: Patient will remain fall free during stay by implementing and following all safety precautions.       Problem: Discharge Planning  Goal: Discharge to home or other facility with appropriate resources  Outcome: Progressing  Flowsheets  Taken 5/27/2024 1416 by Irena Sanchez RN  Discharge to home or other facility with appropriate resources:   Identify barriers to discharge with patient and caregiver   Arrange for needed discharge resources and transportation as appropriate   Identify discharge learning needs (meds, wound care, etc)  Taken 5/27/2024 0347 by Tere Purvis RN  Discharge to home or other facility with appropriate resources:   Identify barriers to discharge with patient and caregiver   Arrange for needed discharge resources and transportation as appropriate   Identify discharge learning needs (meds, wound care, etc)  Note: Patient will have all needs met prior to discharge

## 2024-05-27 NOTE — PLAN OF CARE
Problem: Safety - Adult  Goal: Free from fall injury  Outcome: Progressing  Note: All fall precautions in place, call light within reach, free from fall injury.      Problem: Discharge Planning  Goal: Discharge to home or other facility with appropriate resources  Outcome: Progressing  Note: CM is following for discharge.   Flowsheets (Taken 5/26/2024 2000)  Discharge to home or other facility with appropriate resources:   Identify barriers to discharge with patient and caregiver   Arrange for needed discharge resources and transportation as appropriate   Identify discharge learning needs (meds, wound care, etc)     Problem: Pain  Goal: Verbalizes/displays adequate comfort level or baseline comfort level  Outcome: Progressing     Problem: Skin/Tissue Integrity  Goal: Absence of new skin breakdown  Description: 1.  Monitor for areas of redness and/or skin breakdown  2.  Assess vascular access sites hourly  3.  Every 4-6 hours minimum:  Change oxygen saturation probe site  4.  Every 4-6 hours:  If on nasal continuous positive airway pressure, respiratory therapy assess nares and determine need for appliance change or resting period.  Outcome: Progressing

## 2024-05-27 NOTE — PROGRESS NOTES
Occupational Therapy  Facility/Department: [facility] 5T ORTHO/NEURO  Occupational Therapy Initial Assessment and Treatment    Name: Rodolfo Luna  : 1945  MRN: [id number]  Date of Service: 2024    Discharge Recommendations:  Subacute/Skilled Nursing Facility  OT Equipment Recommendations  Equipment Needed: No  Other: defer    Patient Diagnosis(es): There were no encounter diagnoses.  Past Medical History:  has a past medical history of A-fib (HCC), MIRNA (acute kidney injury) (HCC), Anxiety, Arthritis, Bradycardia, Cancer (HCC), Coronary artery disease involving native coronary artery of native heart without angina pectoris, Hemoptysis, Irregular heart  beats, Mixed hyperlipidemia, 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HCC), S/P drug eluting coronary stent placement, Seizure disorder (HCC), Seizures (HCC), Shortness of breath, and Total knee replacement status.  Past Surgical History:  has a past surgical history that includes Carpal tunnel release; Femur Surgery; knee surgery (-11 R total knee replacement with femoral nerve block for pain control); Cervical disc surgery (); other surgical history (Bilateral); ablation of dysrhythmic focus (2014); other surgical history (2014); Colonoscopy (2016); Upper gastrointestinal endoscopy (2016); Coronary angioplasty with stent; and Lung removal, partial (Right).    Treatment Diagnosis: decreased ability to perform ADL 2/2 Sz in setting of UTI    Assessment   Performance deficits / Impairments: Decreased functional mobility ;Decreased ADL status;Decreased strength;Decreased endurance;Decreased balance;Decreased high-level IADLs;Decreased posture  After evaluation and treatment, pt with Sz activity and UTI is found to be presenting with the above mentioned deficits. Pt would benefit from continued skilled occupational therapy to address these deficits, increasing safety and independence with ADL and functional mobility. He is functioning below baseline,

## 2024-05-27 NOTE — PROGRESS NOTES
Patient seen and evaluated at bedside.  Reported some constipation.  Evaluation ordered.  Awaiting pre-CERT for discharge.  Was discharged on 5/26/2024.    Julio Cesar Suhltz MD  [facility] Medicine  [facility]

## 2024-05-27 NOTE — PROGRESS NOTES
Pt is alert and oriented X4. VS taken and recorded. SPO2 maintained at RA. Pt denies pain. No acute changes overnight. Is on oral diet and tolerating well. Pt voiding well via BRP and using urinal. No BM this shift. All fall precautions in place, call light within reach, free from fall injury. Plan of care ongoing

## 2024-05-27 NOTE — PROGRESS NOTES
Physical Therapy  Facility/Department: [facility] 5T ORTHO/NEURO  Physical Therapy Initial Assessment and treatment    Name: Rodolfo Luna  : 1945  MRN: [id number]  Date of Service: 2024    Discharge Recommendations:  Subacute/Skilled Nursing Facility   PT Equipment Recommendations  Equipment Needed: No (defer to next level of care)      Patient Diagnosis(es): There were no encounter diagnoses.  Past Medical History:  has a past medical history of A-fib (HCC), MIRNA (acute kidney injury) (HCC), Anxiety, Arthritis, Bradycardia, Cancer (HCC), Coronary artery disease involving native coronary artery of native heart without angina pectoris, Hemoptysis, Irregular heart  beats, Mixed hyperlipidemia, 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HCC), S/P drug eluting coronary stent placement, Seizure disorder (HCC), Seizures (HCC), Shortness of breath, and Total knee replacement status.  Past Surgical History:  has a past surgical history that includes Carpal tunnel release; Femur Surgery; knee surgery (-12-11 R total knee replacement with femoral nerve block for pain control); Cervical disc surgery (); other surgical history (Bilateral); ablation of dysrhythmic focus (2014); other surgical history (2014); Colonoscopy (2016); Upper gastrointestinal endoscopy (2016); Coronary angioplasty with stent; and Lung removal, partial (Right).    Assessment   Body Structures, Functions, Activity Limitations Requiring Skilled Therapeutic Intervention: Decreased functional mobility ;Decreased endurance;Decreased balance;Increased pain;Decreased safe awareness  Assessment: Patient tolerated session well, transferring and ambulating in room and hallways as above with fairly steady gait using FWW.  Patient needs cues for safe use of walker to maintain all four points of contact on ground and shows overall fatigue with mobility.  Patient admitted from SNF where he discharged to following recent hospitalization but did not get to

## 2024-05-28 VITALS
TEMPERATURE: 98 F | BODY MASS INDEX: 22.19 KG/M2 | RESPIRATION RATE: 16 BRPM | HEART RATE: 80 BPM | WEIGHT: 154.98 LBS | SYSTOLIC BLOOD PRESSURE: 113 MMHG | OXYGEN SATURATION: 93 % | DIASTOLIC BLOOD PRESSURE: 71 MMHG | HEIGHT: 70 IN

## 2024-05-28 LAB
ALBUMIN SERPL-MCNC: 3.5 G/DL (ref 3.4–5)
ALBUMIN/GLOB SERPL: 1.5 {RATIO} (ref 1.1–2.2)
ALP SERPL-CCNC: 110 U/L (ref 40–129)
ALT SERPL-CCNC: 9 U/L (ref 10–40)
ANION GAP SERPL CALCULATED.3IONS-SCNC: 9 MMOL/L (ref 3–16)
AST SERPL-CCNC: 20 U/L (ref 15–37)
BACTERIA BLD CULT ORG #2: NORMAL
BACTERIA BLD CULT: NORMAL
BASOPHILS # BLD: 0 K/UL (ref 0–0.2)
BASOPHILS NFR BLD: 0.1 %
BILIRUB SERPL-MCNC: 0.9 MG/DL (ref 0–1)
BUN SERPL-MCNC: 17 MG/DL (ref 7–20)
CALCIUM SERPL-MCNC: 8.7 MG/DL (ref 8.3–10.6)
CHLORIDE SERPL-SCNC: 106 MMOL/L (ref 99–110)
CO2 SERPL-SCNC: 29 MMOL/L (ref 21–32)
CREAT SERPL-MCNC: 0.8 MG/DL (ref 0.8–1.3)
DEPRECATED RDW RBC AUTO: 14.8 % (ref 12.4–15.4)
EOSINOPHIL # BLD: 0.1 K/UL (ref 0–0.6)
EOSINOPHIL NFR BLD: 0.8 %
GFR SERPLBLD CREATININE-BSD FMLA CKD-EPI: >90 ML/MIN/{1.73_M2}
GLUCOSE SERPL-MCNC: 93 MG/DL (ref 70–99)
HCT VFR BLD AUTO: 41.4 % (ref 40.5–52.5)
HGB BLD-MCNC: 13.9 G/DL (ref 13.5–17.5)
LYMPHOCYTES # BLD: 1.6 K/UL (ref 1–5.1)
LYMPHOCYTES NFR BLD: 20.2 %
MCH RBC QN AUTO: 30.5 PG (ref 26–34)
MCHC RBC AUTO-ENTMCNC: 33.7 G/DL (ref 31–36)
MCV RBC AUTO: 90.7 FL (ref 80–100)
MONOCYTES # BLD: 0.5 K/UL (ref 0–1.3)
MONOCYTES NFR BLD: 5.9 %
NEUTROPHILS # BLD: 5.8 K/UL (ref 1.7–7.7)
NEUTROPHILS NFR BLD: 73 %
PLATELET # BLD AUTO: 130 K/UL (ref 135–450)
PMV BLD AUTO: 7.9 FL (ref 5–10.5)
POTASSIUM SERPL-SCNC: 3.9 MMOL/L (ref 3.5–5.1)
PROT SERPL-MCNC: 5.9 G/DL (ref 6.4–8.2)
RBC # BLD AUTO: 4.57 M/UL (ref 4.2–5.9)
SODIUM SERPL-SCNC: 144 MMOL/L (ref 136–145)
WBC # BLD AUTO: 8 K/UL (ref 4–11)

## 2024-05-28 PROCEDURE — 2580000003 HC RX 258: Performed by: FAMILY MEDICINE

## 2024-05-28 PROCEDURE — 85025 COMPLETE CBC W/AUTO DIFF WBC: CPT

## 2024-05-28 PROCEDURE — 80053 COMPREHEN METABOLIC PANEL: CPT

## 2024-05-28 PROCEDURE — 6360000002 HC RX W HCPCS: Performed by: FAMILY MEDICINE

## 2024-05-28 PROCEDURE — C9254 INJECTION, LACOSAMIDE: HCPCS

## 2024-05-28 PROCEDURE — 6370000000 HC RX 637 (ALT 250 FOR IP): Performed by: FAMILY MEDICINE

## 2024-05-28 PROCEDURE — 6370000000 HC RX 637 (ALT 250 FOR IP): Performed by: STUDENT IN AN ORGANIZED HEALTH CARE EDUCATION/TRAINING PROGRAM

## 2024-05-28 PROCEDURE — 6360000002 HC RX W HCPCS

## 2024-05-28 PROCEDURE — 36415 COLL VENOUS BLD VENIPUNCTURE: CPT

## 2024-05-28 RX ADMIN — CARBIDOPA AND LEVODOPA 1 TABLET: 25; 100 TABLET ORAL at 09:50

## 2024-05-28 RX ADMIN — FERROUS SULFATE TAB 325 MG (65 MG ELEMENTAL FE) 325 MG: 325 (65 FE) TAB at 09:49

## 2024-05-28 RX ADMIN — LACOSAMIDE 100 MG: 10 INJECTION INTRAVENOUS at 11:41

## 2024-05-28 RX ADMIN — MONTELUKAST 10 MG: 10 TABLET, FILM COATED ORAL at 09:51

## 2024-05-28 RX ADMIN — LEVETIRACETAM 750 MG: 750 TABLET, FILM COATED ORAL at 09:49

## 2024-05-28 RX ADMIN — Medication 10 MG: at 03:28

## 2024-05-28 RX ADMIN — SODIUM CHLORIDE, PRESERVATIVE FREE 10 ML: 5 INJECTION INTRAVENOUS at 09:53

## 2024-05-28 RX ADMIN — FINASTERIDE 5 MG: 5 TABLET, FILM COATED ORAL at 09:50

## 2024-05-28 RX ADMIN — ASPIRIN 81 MG: 81 TABLET, COATED ORAL at 09:49

## 2024-05-28 RX ADMIN — ESCITALOPRAM OXALATE 10 MG: 10 TABLET ORAL at 09:48

## 2024-05-28 RX ADMIN — PANTOPRAZOLE SODIUM 20 MG: 20 TABLET, DELAYED RELEASE ORAL at 09:50

## 2024-05-28 RX ADMIN — ARIPIPRAZOLE 5 MG: 5 TABLET ORAL at 09:48

## 2024-05-28 RX ADMIN — ENOXAPARIN SODIUM 40 MG: 100 INJECTION SUBCUTANEOUS at 09:52

## 2024-05-28 RX ADMIN — CARBIDOPA AND LEVODOPA 1 TABLET: 25; 100 TABLET ORAL at 13:55

## 2024-05-28 RX ADMIN — MEMANTINE HYDROCHLORIDE 10 MG: 10 TABLET ORAL at 09:48

## 2024-05-28 ASSESSMENT — PAIN SCALES - GENERAL
PAINLEVEL_OUTOF10: 0

## 2024-05-28 NOTE — PROGRESS NOTES
Patient is alert and oriented x4. VSS on room air. Medications given per MAR, no side effects noted. Patient ambulating x1 with gait belt and walker. No complaints of pain, continuing to monitor and manage per MAR. Voiding well via BRP and urinal, x1 bm this shift.      Patient is currently resting in bed with bed alarm on for safety. Call light within reach and all fall precautions in place. Plan of care continues.    Electronically signed by Britney Beckman RN on 5/28/2024 at 2:40 AM

## 2024-05-28 NOTE — PLAN OF CARE
Problem: Safety - Adult  Goal: Free from fall injury  5/27/2024 2004 by Britney Beckman, RN  Outcome: Progressing   All fall precautions in place. Bed locked and in lowest position with alarm on. Overbed table and personal belonings within reach. Call light within reach and patient instructed to use call light for assistance. Non-skid socks on.    Problem: Pain  Goal: Verbalizes/displays adequate comfort level or baseline comfort level  Outcome: Progressing   No complaints of pain at this time. Continuing to monitor and manage per MAR.

## 2024-05-28 NOTE — CARE COORDINATION
Case Management Assessment            Discharge Note                    Date / Time of Note: 5/28/2024 1:20 PM                  Discharge Note Completed by: Sharyn Bautista RN    Patient Name: Rodolfo Luna   YOB: 1945  Diagnosis: Seizures (HCC) [R56.9]   Date / Time: 5/24/2024  8:55 PM    Current PCP: Carlton Leavitt, KATIE - CNP  Clinic patient: No    Hospitalization in the last 30 days: Yes  Readmission Assessment  Number of Days since last admission?: 1-7 days  Previous Disposition: SNF  Who is being Interviewed: Patient  What was the patient's/caregiver's perception as to why they think they needed to return back to the hospital?: Other (Comment) (seizure)  Did you visit your Primary Care Physician after you left the hospital, before you returned this time?: No  Why weren't you able to visit your PCP?: Did not have an appointment  Did you see a specialist, such as Cardiac, Pulmonary, Orthopedic Physician, etc. after you left the hospital?: No  Who advised the patient to return to the hospital?: Other (Comment) (SNF staff)  Does the patient report anything that got in the way of taking their medications?: No  In our efforts to provide the best possible care to you and others like you, can you think of anything that we could have done to help you after you left the hospital the first time, so that you might not have needed to return so soon?: Other (Comment) (wishes to return to [facility] to complete rehab)    Advance Directives:  Code Status: Full Code  [place] DNR form completed and on chart: Not Indicated    Financial:  Payor: [facility] MEDICARE / Plan: [facility] MEDICARE COMPLETE / Product Type: *No Product type* /      Pharmacy:    [facility] [address] [place] [address] [phone] F [phone]  [address] [facility] [address]  Phone: [phone] Fax: [phone]    [facility] [id number] [address] [phone] - F

## 2024-05-28 NOTE — PROGRESS NOTES
1 PIV removed. Report called to [facility]. Pt belongings packed by pt. Pt to be transported to facility by EMS. Continuation of care.

## 2024-05-28 NOTE — PROGRESS NOTES
Patient already discharged on 5/26/2024.  Was awaiting placement.  No active complaints.  No events overnight    Julio Cesar Shultz MD  [facility] Medicine  [facility]

## 2024-05-29 ENCOUNTER — TELEPHONE (OUTPATIENT)
Dept: FAMILY MEDICINE CLINIC | Age: 79
End: 2024-05-29

## 2024-05-29 NOTE — TELEPHONE ENCOUNTER
Care Transitions Initial Follow Up Call    Outreach made within 2 business days of discharge: Yes    Patient: Rodolfo Luna Patient : 1945   MRN: [id number]  Reason for Admission: There are no discharge diagnoses documented for the most recent discharge.  Discharge Date: 24       Spoke with: Wife     Discharge department/facility: [facility] Patient Contact:  Was patient able to fill all prescriptions: Yes  Was patient instructed to bring all medications to the follow-up visit: Yes  Is patient taking all medications as directed in the discharge summary? Yes  Does patient understand their discharge instructions: Yes  Does patient have questions or concerns that need addressed prior to 7-14 day follow up office visit: no    Scheduled appointment with PCP within 7-14 days    Patient sent to SNF for rehab will schedule follow up upon discharge from facility     Follow Up  Future Appointments   Date Time Provider Department Center   2024  4:00 PM MHC CT MAIN [facility] CT [facility]   2024  9:30 AM [facility] EEG ROOM 1 [facility] EEG [facility]   2024 11:00 AM Maryjane Zayas MD [facility] NEURO Neurology -   2024  8:30 AM Rodolfo Oneill MD [facility]ton

## 2024-05-30 LAB
GLUCOSE BLD-MCNC: 112 MG/DL (ref 70–99)
PERFORMED ON: ABNORMAL

## 2024-06-07 ENCOUNTER — HOSPITAL ENCOUNTER (OUTPATIENT)
Dept: CT IMAGING | Age: 79
Discharge: HOME | End: 2024-06-07
Payer: MEDICARE

## 2024-06-07 DIAGNOSIS — C34.01 MALIGNANT NEOPLASM OF RIGHT MAIN BRONCHUS (HCC): ICD-10-CM

## 2024-06-07 PROCEDURE — 6360000004 HC RX CONTRAST MEDICATION

## 2024-06-07 PROCEDURE — 71260 CT THORAX DX C+: CPT

## 2024-06-07 RX ADMIN — DIATRIZOATE MEGLUMINE AND DIATRIZOATE SODIUM 15 ML: 660; 100 LIQUID ORAL; RECTAL at 16:19

## 2024-06-07 RX ADMIN — IOPAMIDOL 75 ML: 755 INJECTION, SOLUTION INTRAVENOUS at 16:19

## 2024-06-11 ENCOUNTER — HOSPITAL ENCOUNTER (EMERGENCY)
Age: 79
Discharge: HOME | End: 2024-06-11
Attending: EMERGENCY MEDICINE
Payer: MEDICARE

## 2024-06-11 VITALS
SYSTOLIC BLOOD PRESSURE: 140 MMHG | HEIGHT: 69 IN | DIASTOLIC BLOOD PRESSURE: 93 MMHG | BODY MASS INDEX: 22.22 KG/M2 | OXYGEN SATURATION: 99 % | TEMPERATURE: 97.6 F | RESPIRATION RATE: 16 BRPM | WEIGHT: 150 LBS | HEART RATE: 81 BPM

## 2024-06-11 DIAGNOSIS — T82.539A LEAKAGE OF WATCHMAN LEFT ATRIAL APPENDAGE CLOSURE DEVICE: Primary | ICD-10-CM

## 2024-06-11 DIAGNOSIS — I26.99 SUBACUTE PULMONARY EMBOLISM (HCC): ICD-10-CM

## 2024-06-11 LAB
ALBUMIN SERPL-MCNC: 3.7 G/DL (ref 3.4–5)
ALBUMIN/GLOB SERPL: 1.5 {RATIO} (ref 1.1–2.2)
ALP SERPL-CCNC: 115 U/L (ref 40–129)
ALT SERPL-CCNC: <5 U/L (ref 10–40)
ANION GAP SERPL CALCULATED.3IONS-SCNC: 10 MMOL/L (ref 3–16)
APTT BLD: 24.6 SEC (ref 22.1–36.4)
AST SERPL-CCNC: 23 U/L (ref 15–37)
BASOPHILS # BLD: 0 K/UL (ref 0–0.2)
BASOPHILS NFR BLD: 0.3 %
BILIRUB SERPL-MCNC: 0.6 MG/DL (ref 0–1)
BUN SERPL-MCNC: 16 MG/DL (ref 7–20)
CALCIUM SERPL-MCNC: 9 MG/DL (ref 8.3–10.6)
CHLORIDE SERPL-SCNC: 105 MMOL/L (ref 99–110)
CO2 SERPL-SCNC: 29 MMOL/L (ref 21–32)
CREAT SERPL-MCNC: 1 MG/DL (ref 0.8–1.3)
DEPRECATED RDW RBC AUTO: 15.3 % (ref 12.4–15.4)
EOSINOPHIL # BLD: 0.1 K/UL (ref 0–0.6)
EOSINOPHIL NFR BLD: 0.8 %
GFR SERPLBLD CREATININE-BSD FMLA CKD-EPI: 77 ML/MIN/{1.73_M2}
GLUCOSE SERPL-MCNC: 102 MG/DL (ref 70–99)
HCT VFR BLD AUTO: 40.7 % (ref 40.5–52.5)
HGB BLD-MCNC: 13.5 G/DL (ref 13.5–17.5)
INR PPP: 1.12 (ref 0.85–1.15)
LYMPHOCYTES # BLD: 2.1 K/UL (ref 1–5.1)
LYMPHOCYTES NFR BLD: 24.9 %
MCH RBC QN AUTO: 30.2 PG (ref 26–34)
MCHC RBC AUTO-ENTMCNC: 33.2 G/DL (ref 31–36)
MCV RBC AUTO: 90.8 FL (ref 80–100)
MONOCYTES # BLD: 0.6 K/UL (ref 0–1.3)
MONOCYTES NFR BLD: 7.1 %
NEUTROPHILS # BLD: 5.6 K/UL (ref 1.7–7.7)
NEUTROPHILS NFR BLD: 66.9 %
PLATELET # BLD AUTO: 175 K/UL (ref 135–450)
PMV BLD AUTO: 8.1 FL (ref 5–10.5)
POTASSIUM SERPL-SCNC: 4.2 MMOL/L (ref 3.5–5.1)
PROT SERPL-MCNC: 6.2 G/DL (ref 6.4–8.2)
PROTHROMBIN TIME: 14.6 SEC (ref 11.9–14.9)
RBC # BLD AUTO: 4.49 M/UL (ref 4.2–5.9)
SODIUM SERPL-SCNC: 144 MMOL/L (ref 136–145)
TROPONIN, HIGH SENSITIVITY: 36 NG/L (ref 0–22)
TROPONIN, HIGH SENSITIVITY: 42 NG/L (ref 0–22)
WBC # BLD AUTO: 8.4 K/UL (ref 4–11)

## 2024-06-11 PROCEDURE — 85610 PROTHROMBIN TIME: CPT

## 2024-06-11 PROCEDURE — 36415 COLL VENOUS BLD VENIPUNCTURE: CPT

## 2024-06-11 PROCEDURE — 85730 THROMBOPLASTIN TIME PARTIAL: CPT

## 2024-06-11 PROCEDURE — 99284 EMERGENCY DEPT VISIT MOD MDM: CPT

## 2024-06-11 PROCEDURE — 84484 ASSAY OF TROPONIN QUANT: CPT

## 2024-06-11 PROCEDURE — 6370000000 HC RX 637 (ALT 250 FOR IP): Performed by: EMERGENCY MEDICINE

## 2024-06-11 PROCEDURE — 80053 COMPREHEN METABOLIC PANEL: CPT

## 2024-06-11 PROCEDURE — 85025 COMPLETE CBC W/AUTO DIFF WBC: CPT

## 2024-06-11 RX ADMIN — APIXABAN 5 MG: 5 TABLET, FILM COATED ORAL at 18:57

## 2024-06-11 ASSESSMENT — PAIN - FUNCTIONAL ASSESSMENT: PAIN_FUNCTIONAL_ASSESSMENT: NONE - DENIES PAIN

## 2024-06-11 NOTE — DISCHARGE INSTRUCTIONS
Your lab work today was reassuring.  We reviewed your CAT scan with our on-call cardiologist and he recommended restarting Eliquis and following up with Dr. Oneill as soon as possible.  The blood clot in your lungs appears to be subacute to chronic and is not causing a blockage in the blood vessel.  You were given a first dose of Eliquis this evening.  Your prescription was sent to the pharmacy and you should take your next dose tomorrow morning.  Please call Dr. Oneill for an appointment as soon as possible.  Be cautious while taking blood thinners.  If you were to fall or have any trauma you could have life-threatening bleeding.  Be very cautious.

## 2024-06-11 NOTE — ED NOTES
Grandson, who accompanies patient gave the number from the person who called from [facility] with the information. She left a number to contact for more information. [phone], Natalia

## 2024-06-12 ENCOUNTER — TELEPHONE (OUTPATIENT)
Dept: CARDIOLOGY CLINIC | Age: 79
End: 2024-06-12

## 2024-06-12 NOTE — TELEPHONE ENCOUNTER
Pts soham Dee stated that pt was in the [place] er on 6/11 for a leakage of watchman left atrial appendage. They were advised to schedule with rjm asap. Pts preferred office is [facility] but is ok with [facility] as well. Are we able to offer an overbook date and time? Please call [phone] or [phone]

## 2024-06-13 NOTE — TELEPHONE ENCOUNTER
Small peridevice leak is noted by the prior DONG, and the current contrast by CT reflects this known finding.  There is not a urgent need to be seen in cardiac clinic for this.  Follow-up routine.  He does appear to have possibly new clot in the lungs and we will see what ER's advice for this is.  Thank you for bringing this to my attention.

## 2024-06-14 NOTE — ED PROVIDER NOTES
[facility] Emergency Department      CHIEF COMPLAINT  watchman leaking (Patient states that he was \"called by the hospital\" because his Watchman Devise is leaking and he has a blood clot in his lung. Patient denies any s/s.)      HISTORY OF PRESENT ILLNESS  Rodolfo Luna is a 78 y.o. male with a history of atrial fibrillation with a watchman in place presents after he had an outpatient CT scan that showed a leak around his watchman and a potential PE.  He has no symptoms.  He has a history of lung cancer and was having a screening routine CT done.  He was called and told he needed to come into the ER.  He denies chest pain or shortness of breath.  He states he feels well..   No other complaints, modifying factors or associated symptoms.     History obtained from the patient.    I have reviewed the following from the nursing documentation.    Past Medical History:   Diagnosis Date    A-fib (formerly [facility])     MIRNA (acute kidney injury) (formerly [facility]) 03/31/2022    Anxiety     Arthritis     OA    Bradycardia 04/19/2014    Cancer (formerly [facility])     skin cancer-forearm right , face    Coronary artery disease involving native coronary artery of native heart without angina pectoris 07/19/2022    Hemoptysis 10/16/2014    Since Ablation    Irregular heart  beats     Mixed hyperlipidemia 03/17/2023    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formerly [facility]) 12/10/2014    S/P drug eluting coronary stent placement 03/06/2017    2.25 x 18 Xience Alpine ADRIÁN - Distal LAD    Seizure disorder (formerly [facility])     Abnormal EEG with left temporal shapr waves    Seizures (formerly [facility])     Shortness of breath 09/04/2014    Total knee replacement status 01/12/2011     Past Surgical History:   Procedure Laterality Date    ABLATION OF DYSRHYTHMIC FOCUS  9/2014    CARPAL TUNNEL RELEASE      CERVICAL DISC SURGERY  2010    COLONOSCOPY  09/26/2016    normal    CORONARY ANGIOPLASTY WITH STENT PLACEMENT      FEMUR SURGERY      left    KNEE SURGERY  1-12-11 R total knee replacement with femoral nerve  block for pain control    LUNG REMOVAL, PARTIAL Right     20%    OTHER SURGICAL HISTORY Bilateral     excisions of lesions on bilat.neck and back    OTHER SURGICAL HISTORY  2014    Reveal Loop recorder placed in Cath Lab    UPPER GASTROINTESTINAL ENDOSCOPY  2016    gastric and esophogeal biopsy     Family History   Problem Relation Age of Onset    Heart Disease Mother     Arthritis Mother     High Blood Pressure Mother     Miscarriages / Stillbirths Mother     Stroke Mother     Arthritis Father     Heart Disease Father     Cancer Sister     Arthritis Sister     Depression Sister     Arthritis Brother     Arthritis Maternal Grandmother     Arthritis Maternal Grandfather     Arthritis Paternal Grandmother     Diabetes Paternal Grandmother     Arthritis Paternal Grandfather      Social History     Socioeconomic History    Marital status:      Spouse name: Not on file    Number of children: Not on file    Years of education: Not on file    Highest education level: Not on file   Occupational History    Not on file   Tobacco Use    Smoking status: Former     Current packs/day: 0.00     Average packs/day: 2.0 packs/day for 40.0 years (80.0 ttl pk-yrs)     Types: Cigarettes     Start date: 1953     Quit date: 1993     Years since quittin.0     Passive exposure: Never    Smokeless tobacco: Never    Tobacco comments:     20 yrs   Vaping Use    Vaping Use: Never used   Substance and Sexual Activity    Alcohol use: No    Drug use: No    Sexual activity: Not Currently     Partners: Female   Other Topics Concern    Not on file   Social History Narrative    Not on file     Social Determinants of Health     Financial Resource Strain: Low Risk  (3/17/2023)    Overall Financial Resource Strain (CARDIA)     Difficulty of Paying Living Expenses: Not hard at all   Food Insecurity: No Food Insecurity (2024)    Hunger Vital Sign     Worried About Running Out of Food in the Last Year: Never true     Ran Out

## 2024-07-01 ENCOUNTER — APPOINTMENT (OUTPATIENT)
Dept: CT IMAGING | Age: 79
DRG: 177 | End: 2024-07-01
Payer: MEDICARE

## 2024-07-01 ENCOUNTER — HOSPITAL ENCOUNTER (INPATIENT)
Age: 79
LOS: 14 days | Discharge: HOSPICE | DRG: 177 | End: 2024-07-16
Attending: EMERGENCY MEDICINE | Admitting: INTERNAL MEDICINE
Payer: MEDICARE

## 2024-07-01 ENCOUNTER — APPOINTMENT (OUTPATIENT)
Dept: GENERAL RADIOLOGY | Age: 79
DRG: 177 | End: 2024-07-01
Payer: MEDICARE

## 2024-07-01 DIAGNOSIS — J44.9 CHRONIC OBSTRUCTIVE PULMONARY DISEASE, UNSPECIFIED COPD TYPE (HCC): ICD-10-CM

## 2024-07-01 DIAGNOSIS — T17.908A ASPIRATION INTO AIRWAY, INITIAL ENCOUNTER: Primary | ICD-10-CM

## 2024-07-01 DIAGNOSIS — K22.2 ESOPHAGEAL STRICTURE: ICD-10-CM

## 2024-07-01 DIAGNOSIS — R13.19 ESOPHAGEAL DYSPHAGIA: ICD-10-CM

## 2024-07-01 LAB
ALBUMIN SERPL-MCNC: 3.9 G/DL (ref 3.4–5)
ALBUMIN/GLOB SERPL: 1.3 {RATIO} (ref 1.1–2.2)
ALP SERPL-CCNC: 103 U/L (ref 40–129)
ALT SERPL-CCNC: 10 U/L (ref 10–40)
ANION GAP SERPL CALCULATED.3IONS-SCNC: 9 MMOL/L (ref 3–16)
AST SERPL-CCNC: 19 U/L (ref 15–37)
BASE EXCESS BLDV CALC-SCNC: 2.7 MMOL/L (ref -3–3)
BASOPHILS # BLD: 0 K/UL (ref 0–0.2)
BASOPHILS NFR BLD: 0.5 %
BILIRUB SERPL-MCNC: 0.7 MG/DL (ref 0–1)
BUN SERPL-MCNC: 20 MG/DL (ref 7–20)
CALCIUM SERPL-MCNC: 8.9 MG/DL (ref 8.3–10.6)
CHLORIDE SERPL-SCNC: 106 MMOL/L (ref 99–110)
CO2 BLDV-SCNC: 32 MMOL/L
CO2 SERPL-SCNC: 29 MMOL/L (ref 21–32)
COHGB MFR BLDV: 1.1 % (ref 0–1.5)
CREAT SERPL-MCNC: 0.7 MG/DL (ref 0.8–1.3)
DEPRECATED RDW RBC AUTO: 17.5 % (ref 12.4–15.4)
EKG ATRIAL RATE: 357 BPM
EKG DIAGNOSIS: NORMAL
EKG Q-T INTERVAL: 394 MS
EKG QRS DURATION: 84 MS
EKG QTC CALCULATION (BAZETT): 451 MS
EKG R AXIS: -74 DEGREES
EKG T AXIS: 40 DEGREES
EKG VENTRICULAR RATE: 79 BPM
EOSINOPHIL # BLD: 0.1 K/UL (ref 0–0.6)
EOSINOPHIL NFR BLD: 0.7 %
FLUAV RNA RESP QL NAA+PROBE: NOT DETECTED
FLUBV RNA RESP QL NAA+PROBE: NOT DETECTED
GFR SERPLBLD CREATININE-BSD FMLA CKD-EPI: >90 ML/MIN/{1.73_M2}
GLUCOSE SERPL-MCNC: 98 MG/DL (ref 70–99)
HCO3 BLDV-SCNC: 30 MMOL/L (ref 23–29)
HCT VFR BLD AUTO: 37.8 % (ref 40.5–52.5)
HGB BLD-MCNC: 12.5 G/DL (ref 13.5–17.5)
LACTATE BLDV-SCNC: 1.3 MMOL/L (ref 0.4–1.9)
LYMPHOCYTES # BLD: 1.5 K/UL (ref 1–5.1)
LYMPHOCYTES NFR BLD: 17.2 %
MCH RBC QN AUTO: 30.9 PG (ref 26–34)
MCHC RBC AUTO-ENTMCNC: 33.1 G/DL (ref 31–36)
MCV RBC AUTO: 93.2 FL (ref 80–100)
METHGB MFR BLDV: 0.3 %
MONOCYTES # BLD: 0.8 K/UL (ref 0–1.3)
MONOCYTES NFR BLD: 8.8 %
NEUTROPHILS # BLD: 6.4 K/UL (ref 1.7–7.7)
NEUTROPHILS NFR BLD: 72.8 %
NT-PROBNP SERPL-MCNC: 2375 PG/ML (ref 0–449)
O2 CT VFR BLDV CALC: 12 VOL %
O2 THERAPY: ABNORMAL
PCO2 BLDV: 57.7 MMHG (ref 40–50)
PH BLDV: 7.33 [PH] (ref 7.35–7.45)
PLATELET # BLD AUTO: 154 K/UL (ref 135–450)
PMV BLD AUTO: 7.6 FL (ref 5–10.5)
PO2 BLDV: 33.6 MMHG (ref 25–40)
POTASSIUM SERPL-SCNC: 4.4 MMOL/L (ref 3.5–5.1)
PROT SERPL-MCNC: 6.8 G/DL (ref 6.4–8.2)
RBC # BLD AUTO: 4.06 M/UL (ref 4.2–5.9)
SAO2 % BLDV: 59 %
SARS-COV-2 RNA RESP QL NAA+PROBE: NOT DETECTED
SODIUM SERPL-SCNC: 144 MMOL/L (ref 136–145)
TROPONIN, HIGH SENSITIVITY: 40 NG/L (ref 0–22)
WBC # BLD AUTO: 8.8 K/UL (ref 4–11)

## 2024-07-01 PROCEDURE — 87636 SARSCOV2 & INF A&B AMP PRB: CPT

## 2024-07-01 PROCEDURE — 96366 THER/PROPH/DIAG IV INF ADDON: CPT

## 2024-07-01 PROCEDURE — 87040 BLOOD CULTURE FOR BACTERIA: CPT

## 2024-07-01 PROCEDURE — 93005 ELECTROCARDIOGRAM TRACING: CPT | Performed by: EMERGENCY MEDICINE

## 2024-07-01 PROCEDURE — 85025 COMPLETE CBC W/AUTO DIFF WBC: CPT

## 2024-07-01 PROCEDURE — 71260 CT THORAX DX C+: CPT

## 2024-07-01 PROCEDURE — 1200000000 HC SEMI PRIVATE

## 2024-07-01 PROCEDURE — 83605 ASSAY OF LACTIC ACID: CPT

## 2024-07-01 PROCEDURE — 71045 X-RAY EXAM CHEST 1 VIEW: CPT

## 2024-07-01 PROCEDURE — 84484 ASSAY OF TROPONIN QUANT: CPT

## 2024-07-01 PROCEDURE — 6360000002 HC RX W HCPCS: Performed by: INTERNAL MEDICINE

## 2024-07-01 PROCEDURE — 99285 EMERGENCY DEPT VISIT HI MDM: CPT

## 2024-07-01 PROCEDURE — 93010 ELECTROCARDIOGRAM REPORT: CPT | Performed by: INTERNAL MEDICINE

## 2024-07-01 PROCEDURE — 2580000003 HC RX 258: Performed by: INTERNAL MEDICINE

## 2024-07-01 PROCEDURE — 6370000000 HC RX 637 (ALT 250 FOR IP): Performed by: INTERNAL MEDICINE

## 2024-07-01 PROCEDURE — C9254 INJECTION, LACOSAMIDE: HCPCS | Performed by: INTERNAL MEDICINE

## 2024-07-01 PROCEDURE — 82803 BLOOD GASES ANY COMBINATION: CPT

## 2024-07-01 PROCEDURE — 80053 COMPREHEN METABOLIC PANEL: CPT

## 2024-07-01 PROCEDURE — 2580000003 HC RX 258: Performed by: EMERGENCY MEDICINE

## 2024-07-01 PROCEDURE — 6360000004 HC RX CONTRAST MEDICATION: Performed by: EMERGENCY MEDICINE

## 2024-07-01 PROCEDURE — 96365 THER/PROPH/DIAG IV INF INIT: CPT

## 2024-07-01 PROCEDURE — 6370000000 HC RX 637 (ALT 250 FOR IP): Performed by: EMERGENCY MEDICINE

## 2024-07-01 PROCEDURE — 6360000002 HC RX W HCPCS: Performed by: EMERGENCY MEDICINE

## 2024-07-01 PROCEDURE — 36415 COLL VENOUS BLD VENIPUNCTURE: CPT

## 2024-07-01 PROCEDURE — 83880 ASSAY OF NATRIURETIC PEPTIDE: CPT

## 2024-07-01 RX ORDER — DEXTROSE MONOHYDRATE AND SODIUM CHLORIDE 5; .45 G/100ML; G/100ML
INJECTION, SOLUTION INTRAVENOUS CONTINUOUS
Status: ACTIVE | OUTPATIENT
Start: 2024-07-01 | End: 2024-07-03

## 2024-07-01 RX ORDER — ONDANSETRON 2 MG/ML
4 INJECTION INTRAMUSCULAR; INTRAVENOUS EVERY 6 HOURS PRN
Status: DISCONTINUED | OUTPATIENT
Start: 2024-07-01 | End: 2024-07-03

## 2024-07-01 RX ORDER — ALBUTEROL SULFATE 90 UG/1
2 AEROSOL, METERED RESPIRATORY (INHALATION) EVERY 4 HOURS PRN
Status: DISCONTINUED | OUTPATIENT
Start: 2024-07-01 | End: 2024-07-08

## 2024-07-01 RX ORDER — SODIUM CHLORIDE 9 MG/ML
INJECTION, SOLUTION INTRAVENOUS PRN
Status: DISCONTINUED | OUTPATIENT
Start: 2024-07-01 | End: 2024-07-16 | Stop reason: HOSPADM

## 2024-07-01 RX ORDER — POTASSIUM CHLORIDE 20 MEQ/1
40 TABLET, EXTENDED RELEASE ORAL PRN
Status: DISCONTINUED | OUTPATIENT
Start: 2024-07-01 | End: 2024-07-16 | Stop reason: HOSPADM

## 2024-07-01 RX ORDER — LACOSAMIDE 10 MG/ML
100 INJECTION, SOLUTION INTRAVENOUS 2 TIMES DAILY
Status: DISCONTINUED | OUTPATIENT
Start: 2024-07-01 | End: 2024-07-16 | Stop reason: HOSPADM

## 2024-07-01 RX ORDER — ONDANSETRON 2 MG/ML
4 INJECTION INTRAMUSCULAR; INTRAVENOUS EVERY 6 HOURS PRN
Status: DISCONTINUED | OUTPATIENT
Start: 2024-07-01 | End: 2024-07-16 | Stop reason: HOSPADM

## 2024-07-01 RX ORDER — ENOXAPARIN SODIUM 100 MG/ML
40 INJECTION SUBCUTANEOUS DAILY
Status: DISCONTINUED | OUTPATIENT
Start: 2024-07-01 | End: 2024-07-02

## 2024-07-01 RX ORDER — MAGNESIUM SULFATE IN WATER 40 MG/ML
2000 INJECTION, SOLUTION INTRAVENOUS PRN
Status: DISCONTINUED | OUTPATIENT
Start: 2024-07-01 | End: 2024-07-16 | Stop reason: HOSPADM

## 2024-07-01 RX ORDER — ACETAMINOPHEN 650 MG/1
650 SUPPOSITORY RECTAL EVERY 6 HOURS PRN
Status: DISCONTINUED | OUTPATIENT
Start: 2024-07-01 | End: 2024-07-16 | Stop reason: HOSPADM

## 2024-07-01 RX ORDER — LEVETIRACETAM 500 MG/5ML
750 INJECTION, SOLUTION, CONCENTRATE INTRAVENOUS EVERY 12 HOURS
Status: DISCONTINUED | OUTPATIENT
Start: 2024-07-01 | End: 2024-07-16 | Stop reason: HOSPADM

## 2024-07-01 RX ORDER — ACETAMINOPHEN 325 MG/1
650 TABLET ORAL EVERY 6 HOURS PRN
Status: DISCONTINUED | OUTPATIENT
Start: 2024-07-01 | End: 2024-07-16 | Stop reason: HOSPADM

## 2024-07-01 RX ORDER — ONDANSETRON 4 MG/1
4 TABLET, ORALLY DISINTEGRATING ORAL EVERY 8 HOURS PRN
Status: DISCONTINUED | OUTPATIENT
Start: 2024-07-01 | End: 2024-07-16 | Stop reason: HOSPADM

## 2024-07-01 RX ORDER — DEXTROSE MONOHYDRATE, SODIUM CHLORIDE, AND POTASSIUM CHLORIDE 50; 1.49; 4.5 G/1000ML; G/1000ML; G/1000ML
INJECTION, SOLUTION INTRAVENOUS CONTINUOUS
Status: DISPENSED | OUTPATIENT
Start: 2024-07-01 | End: 2024-07-03

## 2024-07-01 RX ORDER — POLYETHYLENE GLYCOL 3350 17 G/17G
17 POWDER, FOR SOLUTION ORAL DAILY PRN
Status: DISCONTINUED | OUTPATIENT
Start: 2024-07-01 | End: 2024-07-16 | Stop reason: HOSPADM

## 2024-07-01 RX ORDER — POTASSIUM CHLORIDE 7.45 MG/ML
10 INJECTION INTRAVENOUS PRN
Status: DISCONTINUED | OUTPATIENT
Start: 2024-07-01 | End: 2024-07-16 | Stop reason: HOSPADM

## 2024-07-01 RX ORDER — SODIUM CHLORIDE 0.9 % (FLUSH) 0.9 %
5-40 SYRINGE (ML) INJECTION PRN
Status: DISCONTINUED | OUTPATIENT
Start: 2024-07-01 | End: 2024-07-16 | Stop reason: HOSPADM

## 2024-07-01 RX ORDER — SODIUM CHLORIDE 0.9 % (FLUSH) 0.9 %
5-40 SYRINGE (ML) INJECTION EVERY 12 HOURS SCHEDULED
Status: DISCONTINUED | OUTPATIENT
Start: 2024-07-01 | End: 2024-07-16 | Stop reason: HOSPADM

## 2024-07-01 RX ADMIN — LACOSAMIDE 100 MG: 10 INJECTION INTRAVENOUS at 21:38

## 2024-07-01 RX ADMIN — IOPAMIDOL 75 ML: 755 INJECTION, SOLUTION INTRAVENOUS at 17:40

## 2024-07-01 RX ADMIN — PANTOPRAZOLE SODIUM 40 MG: 40 INJECTION, POWDER, FOR SOLUTION INTRAVENOUS at 21:27

## 2024-07-01 RX ADMIN — AMPICILLIN SODIUM AND SULBACTAM SODIUM 3000 MG: 2; 1 INJECTION, POWDER, FOR SOLUTION INTRAMUSCULAR; INTRAVENOUS at 17:12

## 2024-07-01 RX ADMIN — DEXTROSE AND SODIUM CHLORIDE: 5; 450 INJECTION, SOLUTION INTRAVENOUS at 21:26

## 2024-07-01 RX ADMIN — ENOXAPARIN SODIUM 40 MG: 100 INJECTION SUBCUTANEOUS at 21:38

## 2024-07-01 RX ADMIN — LEVETIRACETAM 750 MG: 100 INJECTION INTRAVENOUS at 21:42

## 2024-07-01 RX ADMIN — CARBIDOPA AND LEVODOPA 1 TABLET: 25; 100 TABLET ORAL at 15:33

## 2024-07-01 ASSESSMENT — LIFESTYLE VARIABLES
HOW OFTEN DO YOU HAVE A DRINK CONTAINING ALCOHOL: NEVER
HOW MANY STANDARD DRINKS CONTAINING ALCOHOL DO YOU HAVE ON A TYPICAL DAY: PATIENT DOES NOT DRINK

## 2024-07-01 ASSESSMENT — PAIN DESCRIPTION - LOCATION: LOCATION: CHEST

## 2024-07-01 ASSESSMENT — PAIN - FUNCTIONAL ASSESSMENT: PAIN_FUNCTIONAL_ASSESSMENT: 0-10

## 2024-07-01 ASSESSMENT — PAIN SCALES - GENERAL: PAINLEVEL_OUTOF10: 6

## 2024-07-01 ASSESSMENT — PAIN DESCRIPTION - DESCRIPTORS: DESCRIPTORS: PRESSURE

## 2024-07-01 ASSESSMENT — PAIN DESCRIPTION - ORIENTATION: ORIENTATION: LEFT

## 2024-07-01 NOTE — ED PROVIDER NOTES
[facility] Emergency Department      CHIEF COMPLAINT  Shortness of Breath (Cough and difficulty breathing since Friday. States he just wants to spit all the time. Cannot lie flat at night. Pt on honey thickened liquids at home, takes pills crushed in applesauce.)      HISTORY OF PRESENT ILLNESS  Rodolfo Luna is a 78 y.o. male with a history of A-fib with Watchman device in place also with a history of coronary disease, lung cancer status post partial lobectomy also with seizure disorder who has chronic aspiration who is on thickened liquids presents with cough and shortness of breath.  He states he has been coughing up thick mucus recently and sometimes coughing up what ever he is eating.  He denies any hemoptysis.  He initially said he was vomiting this but on further clarification he is actually coughing this up.  He states after eating a short time later he often gets coughing fits.  He does have a history of aspiration.  He recently was diagnosed with a chronic PE and is on Eliquis for this.  He has a known leak to his Watchman device that has been stable.  He is followed by Dr. Oneill with cardiology.  He states he just does not feel well and feels ill.  He is not on oxygen at home.  He states if he lays flat at all he starts coughing a lot..  No history of CHF  No other complaints, modifying factors or associated symptoms.     History obtained from the patient and his wife.    I have reviewed the following from the nursing documentation.    Past Medical History:   Diagnosis Date    A-fib (HCC)     MIRNA (acute kidney injury) ([facility]) 03/31/2022    Anxiety     Arthritis     OA    Bradycardia 04/19/2014    Cancer ([facility])     skin cancer-forearm right , face    Coronary artery disease involving native coronary artery of native heart without angina pectoris 07/19/2022    Hemoptysis 10/16/2014    Since Ablation    Irregular heart  beats     Mixed hyperlipidemia 03/17/2023    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[facility])  afebrile with normal vitals signs.     Old records reviewed: None    Diagnoses affirmatively addressed, consideration of tests, treatments & admission, including shared decision making:      Here the patient is nontoxic-appearing.  He is frail appearing.  He is not requiring supplemental oxygen.  He does have coarse breath sounds and is coughing throughout the exam.  It sounds like he is having continued aspiration events at home and I am concerned for aspiration pneumonia.  He states this feels similar to when he has had pneumonia in the past.  Chest x-ray here is being read as normal.  He does have an elevated BNP.  Troponin is chronically elevated.  He is not having chest pain.  No leukocytosis.  COVID and flu test are negative.  I have ordered a CT of his chest to further evaluate for infection and also to further evaluate the known PE that he has.  I do think given his symptoms and presentation he should be admitted for antibiotics and potentially further speech therapy evaluation.  It sounds like he is having increased episodes of aspiration at home and needs further evaluation for this.  CT was pending at the time of shift change I will transfer care to Dr. Borrero to follow-up on the CT but will plan for admission.      Consultation summary: none    Social determinants of health: none      Labs and imaging reviewed and results discussed with patient. Patient was reassessed as noted above . Plan of care discussed with patient. Patient in agreement with plan.     CLINICAL IMPRESSION  1. Aspiration into airway, initial encounter        Blood pressure 126/83, pulse 71, temperature 97.7 °F (36.5 °C), temperature source Oral, resp. rate 15, weight 72.6 kg (160 lb), SpO2 99 %.    DISPOSITION  Rodolfo Luna was admitted in stable condition.    I, Nannette Gonzalez MD am the primary clinician of record.    (Please note this note was completed with a voice recognition program.  Efforts were made to edit the dictations

## 2024-07-01 NOTE — ED NOTES
Assisted pt with urinal pt had 200mL output. Pt was able to stand at the side of the bed, tolerated well.

## 2024-07-01 NOTE — PLAN OF CARE
Patient presenting with persistent coughing, coughing up water when he eats.     Patient does have history of chronic aspiration on thickened liquid diet.  Prior history of lung cancer s/p lobectomy  History of A-fib, s/p Watchman procedure on anticoagulation  On Sinemet.    Presenting with a persistent cough and shortness of breath, coughing up anything he eats, denies any emesis.  CT chest-no acute findings.      Admit.  N.p.o..  IV fluids  Pulmonary consult  Swallow eval.

## 2024-07-01 NOTE — H&P
History and Physical      Name:  Rodolfo Luna /Age/Sex: 1945  (78 y.o. male)   MRN & CSN:  [id number] & [id number] Encounter Date/Time: 2024 7:27 PM EDT   Location:   PCP: Carlton Leavitt APRN - CNP         Date of Exam: 2024       Chief Complaint:     Chief Complaint   Patient presents with    Shortness of Breath     Cough and difficulty breathing since Friday. States he just wants to spit all the time. Cannot lie flat at night. Pt on honey thickened liquids at home, takes pills crushed in applesauce.       Assessment/Plan:     Suspected aspiration possibly related to Parkinson's disease.  Keep NPO.  SLP to evaluate.  Received a dose of Unasyn in the emergency department.  Continue PTA Sinemet    Nonischemic myocardial injury: Chronic elevated troponin, likely indication for ACS    Chronic PE: Continue PTA Eliquis; imaging today showed interval improvement of thrombus of right pulmonary artery    A-fib status post Watchman device: EKG today showed atrial flutter with variable AV block.  Continue PTA Eliquis and monitor    Seizure disorder: Continue Vimpat and Keppra    Chronic conditions include hyperlipidemia, CAD status post PCI        Disposition:  Patient was admitted to the hospital    Discussed with Patient    Diet Diet NPO   DVT Prophylaxis [] Lovenox, []  Heparin, [] SCDs, [] Ambulation,  [x] Eliquis, [] Xarelto, [] Coumadin   Code Status Prior   Surrogate Decision Maker/ POA   Primary Decision Maker: Helen Luna A - [facility] - [phone]         History of Present Illness:     Rodolfo Luna is a 78 y.o. male with past medical history of Parkinson's disease with chronic aspiration on thickened liquids at baseline, seizure disorder, A-fib status post Watchman procedure, CAD status post PCI, hyperlipidemia and anxiety who presented with persistent cough related to oral intake,  He has a history of chronic aspiration and is on a thickened liquid diet, stating that he coughs up  thickening.     XR CHEST PORTABLE    Result Date: 7/1/2024  1. No acute finding in the chest. 2. Stable postsurgical change of partial right pneumonectomy.           Electronically signed: Merlin Smith MD     7/1/2024  7:27 PM

## 2024-07-02 ENCOUNTER — APPOINTMENT (OUTPATIENT)
Dept: GENERAL RADIOLOGY | Age: 79
DRG: 177 | End: 2024-07-02
Payer: MEDICARE

## 2024-07-02 LAB
ALBUMIN SERPL-MCNC: 3.5 G/DL (ref 3.4–5)
ALBUMIN/GLOB SERPL: 1.3 {RATIO} (ref 1.1–2.2)
ALP SERPL-CCNC: 95 U/L (ref 40–129)
ALT SERPL-CCNC: 10 U/L (ref 10–40)
ANION GAP SERPL CALCULATED.3IONS-SCNC: 9 MMOL/L (ref 3–16)
AST SERPL-CCNC: 17 U/L (ref 15–37)
BASOPHILS # BLD: 0 K/UL (ref 0–0.2)
BASOPHILS NFR BLD: 0.3 %
BILIRUB SERPL-MCNC: 1 MG/DL (ref 0–1)
BUN SERPL-MCNC: 12 MG/DL (ref 7–20)
CALCIUM SERPL-MCNC: 9 MG/DL (ref 8.3–10.6)
CHLORIDE SERPL-SCNC: 106 MMOL/L (ref 99–110)
CO2 SERPL-SCNC: 27 MMOL/L (ref 21–32)
CREAT SERPL-MCNC: <0.5 MG/DL (ref 0.8–1.3)
DEPRECATED RDW RBC AUTO: 17.9 % (ref 12.4–15.4)
EOSINOPHIL # BLD: 0.1 K/UL (ref 0–0.6)
EOSINOPHIL NFR BLD: 1.3 %
GFR SERPLBLD CREATININE-BSD FMLA CKD-EPI: >90 ML/MIN/{1.73_M2}
GLUCOSE BLD-MCNC: 103 MG/DL (ref 70–99)
GLUCOSE BLD-MCNC: 106 MG/DL (ref 70–99)
GLUCOSE BLD-MCNC: 95 MG/DL (ref 70–99)
GLUCOSE SERPL-MCNC: 101 MG/DL (ref 70–99)
HCT VFR BLD AUTO: 36.7 % (ref 40.5–52.5)
HGB BLD-MCNC: 12.2 G/DL (ref 13.5–17.5)
LACTATE BLDV-SCNC: 1.2 MMOL/L (ref 0.4–1.9)
LYMPHOCYTES # BLD: 1.5 K/UL (ref 1–5.1)
LYMPHOCYTES NFR BLD: 25.7 %
MCH RBC QN AUTO: 30.8 PG (ref 26–34)
MCHC RBC AUTO-ENTMCNC: 33.3 G/DL (ref 31–36)
MCV RBC AUTO: 92.7 FL (ref 80–100)
MONOCYTES # BLD: 0.5 K/UL (ref 0–1.3)
MONOCYTES NFR BLD: 9.2 %
NEUTROPHILS # BLD: 3.8 K/UL (ref 1.7–7.7)
NEUTROPHILS NFR BLD: 63.5 %
PERFORMED ON: ABNORMAL
PERFORMED ON: ABNORMAL
PERFORMED ON: NORMAL
PLATELET # BLD AUTO: 142 K/UL (ref 135–450)
PMV BLD AUTO: 7.5 FL (ref 5–10.5)
POTASSIUM SERPL-SCNC: 3.9 MMOL/L (ref 3.5–5.1)
PROT SERPL-MCNC: 6.2 G/DL (ref 6.4–8.2)
RBC # BLD AUTO: 3.95 M/UL (ref 4.2–5.9)
SODIUM SERPL-SCNC: 142 MMOL/L (ref 136–145)
WBC # BLD AUTO: 5.9 K/UL (ref 4–11)

## 2024-07-02 PROCEDURE — C9254 INJECTION, LACOSAMIDE: HCPCS | Performed by: INTERNAL MEDICINE

## 2024-07-02 PROCEDURE — 85025 COMPLETE CBC W/AUTO DIFF WBC: CPT

## 2024-07-02 PROCEDURE — 92611 MOTION FLUOROSCOPY/SWALLOW: CPT

## 2024-07-02 PROCEDURE — 6360000002 HC RX W HCPCS: Performed by: INTERNAL MEDICINE

## 2024-07-02 PROCEDURE — 80053 COMPREHEN METABOLIC PANEL: CPT

## 2024-07-02 PROCEDURE — 83605 ASSAY OF LACTIC ACID: CPT

## 2024-07-02 PROCEDURE — 1200000000 HC SEMI PRIVATE

## 2024-07-02 PROCEDURE — 36415 COLL VENOUS BLD VENIPUNCTURE: CPT

## 2024-07-02 PROCEDURE — 74230 X-RAY XM SWLNG FUNCJ C+: CPT

## 2024-07-02 PROCEDURE — 92526 ORAL FUNCTION THERAPY: CPT

## 2024-07-02 PROCEDURE — 2580000003 HC RX 258: Performed by: INTERNAL MEDICINE

## 2024-07-02 PROCEDURE — 99232 SBSQ HOSP IP/OBS MODERATE 35: CPT

## 2024-07-02 PROCEDURE — 99222 1ST HOSP IP/OBS MODERATE 55: CPT | Performed by: INTERNAL MEDICINE

## 2024-07-02 RX ORDER — ATORVASTATIN CALCIUM 40 MG/1
80 TABLET, FILM COATED ORAL NIGHTLY
Status: DISCONTINUED | OUTPATIENT
Start: 2024-07-02 | End: 2024-07-16 | Stop reason: HOSPADM

## 2024-07-02 RX ORDER — ASPIRIN 81 MG/1
81 TABLET ORAL DAILY
Status: DISCONTINUED | OUTPATIENT
Start: 2024-07-02 | End: 2024-07-11

## 2024-07-02 RX ORDER — MEMANTINE HYDROCHLORIDE 5 MG/1
10 TABLET ORAL 2 TIMES DAILY
Status: DISCONTINUED | OUTPATIENT
Start: 2024-07-02 | End: 2024-07-16 | Stop reason: HOSPADM

## 2024-07-02 RX ORDER — ARIPIPRAZOLE 10 MG/1
5 TABLET ORAL DAILY
Status: DISCONTINUED | OUTPATIENT
Start: 2024-07-02 | End: 2024-07-09

## 2024-07-02 RX ORDER — MONTELUKAST SODIUM 10 MG/1
10 TABLET ORAL NIGHTLY
Status: DISCONTINUED | OUTPATIENT
Start: 2024-07-02 | End: 2024-07-16 | Stop reason: HOSPADM

## 2024-07-02 RX ORDER — FINASTERIDE 5 MG/1
5 TABLET, FILM COATED ORAL DAILY
Status: DISCONTINUED | OUTPATIENT
Start: 2024-07-02 | End: 2024-07-16 | Stop reason: HOSPADM

## 2024-07-02 RX ADMIN — DEXTROSE AND SODIUM CHLORIDE: 5; 450 INJECTION, SOLUTION INTRAVENOUS at 15:22

## 2024-07-02 RX ADMIN — LACOSAMIDE 100 MG: 10 INJECTION INTRAVENOUS at 11:47

## 2024-07-02 RX ADMIN — LEVETIRACETAM 750 MG: 100 INJECTION INTRAVENOUS at 11:16

## 2024-07-02 RX ADMIN — LACOSAMIDE 100 MG: 10 INJECTION INTRAVENOUS at 20:08

## 2024-07-02 RX ADMIN — DEXTROSE AND SODIUM CHLORIDE: 5; 450 INJECTION, SOLUTION INTRAVENOUS at 11:21

## 2024-07-02 RX ADMIN — PANTOPRAZOLE SODIUM 40 MG: 40 INJECTION, POWDER, FOR SOLUTION INTRAVENOUS at 11:16

## 2024-07-02 RX ADMIN — LEVETIRACETAM 750 MG: 100 INJECTION INTRAVENOUS at 20:07

## 2024-07-02 NOTE — PROGRESS NOTES
Progress Note    Admit Date:  7/1/2024    Patient was admitted for difficulty swallowing.    Subjective:  Mr. Luna was seen sitting up in bed. He denies any SOB or cough.     Objective:   Patient Vitals for the past 4 hrs:   BP Temp Temp src Pulse Resp SpO2 Height Weight   07/02/24 1500 -- -- -- -- -- -- 1.778 m (5' 10\") 68 kg (150 lb)   07/02/24 1445 (!) 151/85 97.8 °F (36.6 °C) Oral 77 16 97 % -- --          Intake/Output Summary (Last 24 hours) at 7/2/2024 1636  Last data filed at 7/2/2024 0817  Gross per 24 hour   Intake --   Output 900 ml   Net -900 ml       Physical Exam:  Gen: No distress. Alert.   Eyes: PERRL. No sclera icterus. No conjunctival injection.   ENT: No discharge. Pharynx clear.   Neck: No JVD.  Trachea midline.  Resp: No accessory muscle use. No crackles. No wheezes. + rhonchi.   CV: Regular rate. irregular rhythm. No murmur.  No rub. No edema.   GI: Non-tender. Non-distended.  Normal bowel sounds.  Skin: Warm and dry. No nodule on exposed extremities. No rash on exposed extremities.   M/S: No cyanosis. No joint deformity. No clubbing.   Neuro: Awake. Grossly nonfocal    Psych: Oriented x 3. No anxiety or agitation.       Medications:  apixaban, 5 mg, BID  ARIPiprazole, 5 mg, Daily  carbidopa-levodopa, 1 tablet, TID  finasteride, 5 mg, Daily  memantine, 10 mg, BID  montelukast, 10 mg, Nightly  atorvastatin, 80 mg, Nightly  aspirin EC, 81 mg, Daily  lacosamide, 100 mg, BID  levETIRAcetam, 750 mg, Q12H  pantoprazole (PROTONIX) 40 mg in sodium chloride (PF) 0.9 % 10 mL injection, 40 mg, Daily  sodium chloride flush, 5-40 mL, 2 times per day      PRN Medications:  albuterol sulfate HFA, 2 puff, Q4H PRN  ondansetron, 4 mg, Q6H PRN  sodium chloride flush, 5-40 mL, PRN  sodium chloride, , PRN  potassium chloride, 40 mEq, PRN   Or  potassium alternative oral replacement, 40 mEq, PRN   Or  potassium chloride, 10 mEq, PRN  magnesium sulfate, 2,000 mg, PRN  ondansetron, 4 mg, Q8H PRN   Or  ondansetron, 4  mg, Q6H PRN  polyethylene glycol, 17 g, Daily PRN  acetaminophen, 650 mg, Q6H PRN   Or  acetaminophen, 650 mg, Q6H PRN          Data:  CBC:   Recent Labs     07/01/24  1400 07/02/24 0725   WBC 8.8 5.9   HGB 12.5* 12.2*   HCT 37.8* 36.7*   MCV 93.2 92.7    142     BMP:   Recent Labs     07/01/24 1400 07/02/24 0725    142   K 4.4 3.9    106   CO2 29 27   BUN 20 12   CREATININE 0.7* <0.5*     LIVER PROFILE:   Recent Labs     07/01/24  1400 07/02/24 0725   AST 19 17   ALT 10 10   BILITOT 0.7 1.0   ALKPHOS 103 95     PT/INR: No results for input(s): \"PROTIME\", \"INR\" in the last 72 hours.    CULTURES  Results       Procedure Component Value Units Date/Time    Culture, Blood 2 [[id number]] Collected: 07/01/24 1409    Order Status: Sent Specimen: Blood Updated: 07/01/24 1745    COVID-19 & Influenza Combo [[id number]] Collected: 07/01/24 1400    Order Status: Completed Specimen: Nasopharyngeal Swab Updated: 07/01/24 1437     SARS-CoV-2 RNA, RT PCR NOT DETECTED     Comment: Not Detected results do not preclude SARS-CoV-2 infection and  should not be used as the sole basis for patient management  decisions.  Results must be combined with clinical observations,  patient history, and epidemiological information.  Testing was performed using KH[employer] BENITO SARS-CoV-2 and Influenza A/B  nucleic acid assay. This test is a multiplex Real-Time Reverse  Transcriptase Polymerase Chain Reaction (RT-PCR)-based in vitro  diagnostic test intended for the qualitative detection of nucleic  acids from SARS-CoV-2, influenza A, and influenza B in nasopharyngeal  and nasal swab specimens for use under the FDA’s Emergency Use  Authorization (EUA) only.    Patient Fact Sheet:  https://www.fda.gov/media/941570/download  Provider Fact Sheet: https://www.fda.gov/media/850303/download  EUA: https://www.fda.gov/media/447002/download  IFU: https://www.fda.gov/media/030202/download    Methodology:  RT-PCR          Influenza A NOT

## 2024-07-02 NOTE — PROGRESS NOTES
Transferred care to ANDREA Schuster. Face to face bedside report given, no need voiced at this time.

## 2024-07-02 NOTE — CONSULTS
resolution of the previously seen eccentric thrombus in the   main right pulmonary artery.   3. Status post right upper lobectomy with chronic right pleural effusion and   pleural thickening.   :     ASSESSMENT:  Chronic dysphagia due to Parkinson's and there may be an acute on chronic component due to worsening Parkinson's versus esophageal stricture or pressure on his esophagus due to the cervical hardware from neck surgery  History of VATS RUL lobectomy for lung cancer with chronic pleural thickening and effusion  H/o PE    PLAN:  Agree with speech evaluation as well as a GI evaluation  I think is okay to observe off of antibiotics  Patient has continued Eliquis for history of PE  The patient is agreeable to a peg tube if required for nutrition  Thank you for this consult and please call with any further question    Thank you Jose Miguel Tan MD for this consult

## 2024-07-02 NOTE — PROGRESS NOTES
Shift assessment completed, see flow sheet.   Pt is awake A/O x4.  Pt denies any pain or SOB.  He denies any cough at this.      SpO2 98%. Respirations are easy, even, and unlabored.   Bilateral lung sounds diminished.     VSS  Afib on the monitor    PIV, WNL with NS 75 ml/hr    All lines and monitoring devices in place. Bed in lowest position with wheels locked. No needs expressed at this time. Will continue to monitor.

## 2024-07-02 NOTE — PLAN OF CARE
Problem: SLP Adult - Impaired Swallowing  Goal: By Discharge: Advance to least restrictive diet without signs or symptoms of aspiration for planned discharge setting.  See evaluation for individualized goals.  Note: SLP completed evaluation. Please refer to notes in EMR.    Jessy Rehman M.A., CCC-SLP   Speech-Language Pathologist #[id number]  Speech Pathology and Swallowing Disorders  P: (inpatient): [id number] (speech desk): [phone]  E: [email or web address]  Certified to provide:  FEES, MBS, Vital Stim, and [facility] Dysphagia Therapy Program (MDTP)

## 2024-07-02 NOTE — FLOWSHEET NOTE
07/01/24 2000   Vital Signs   Temp 97.7 °F (36.5 °C)   Temp Source Oral   Pulse 84   Heart Rate Source Monitor   Respirations 17   BP (!) 157/80   MAP (Calculated) 106   Pain Assessment   Pain Assessment None - Denies Pain   Oxygen Therapy   SpO2 97 %   O2 Device None (Room air)     Pt admitted as hold in ER, awaiting bed. Transferred from room 2 to room 14. Pt is alert and oriented x4. Vitals signs stable. Admission questions complete and home med rec complete. Pt updated on plan of care. Message sent to hospital ist regarding duplicate orders for IV fluids.

## 2024-07-02 NOTE — PROCEDURES
SPEECH/LANGUAGE PATHOLOGY  Swallowing Disorders and Dysphagia  VIDEOFLUOROSCOPIC STUDY OF SWALLOWING (VFSS) / Modified Barium Swallow Study (MBSS)  Facility/Department: [facility] ED  Diet Recommendation: NPO ; Ice chips with SLP/RN only (oral care must be completed prior); Meds via alt means of nutrition (unless otherwise specified by GI)  Risk Management: Control risk factors for aspiration PNA by completing oral care 3-4x/day and increasing physical mobility as is medically feasible  Specialist Referrals: GI  Ancillary Tests: Could consider esophagram (Barium Swallow) and Could consider EGD    PATIENT NAME:  Rodolfo Luna      :  1945    Room:    TODAY'S DATE:  2024    [x]The admitting diagnosis and active problem list, as listed below have been reviewed prior to initiation of this evaluation.     ADMITTING DIAGNOSIS: Aspiration into respiratory tract, initial encounter [T17.908A]     ACTIVE PROBLEM LIST:   Patient Active Problem List   Diagnosis    Dizziness    Syncope and collapse    Contusion of knee, right    Dysphagia    Encounter for electronic analysis of reveal event recorder    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HCC)    History of nonmelanoma skin cancer    Ischemic chest pain (HCC)    Left hand pain    Hand pain, right    Arthritis of carpometacarpal (CMC) joints of both thumbs    Radial styloid tenosynovitis (de quervain)    Left elbow fracture, closed, initial encounter    Multiple falls    General weakness    Thrombocytopenia (HCC)    Malignant neoplasm of lung (HCC)    Orthostasis    Hypotension    Chest pain    Abnormal finding on EKG    Digoxin toxicity, accidental or unintentional, initial encounter    COVID-19 virus infection    COVID    Rib pain on right side    Coronary artery disease involving native coronary artery of native heart without angina pectoris    Atrial fibrillation, persistent (HCC)    Presence of Watchman left atrial appendage closure device    Mixed

## 2024-07-02 NOTE — PROGRESS NOTES
Pt arrived to room 222 at this time. Assessment completed. No complaints of pain or discomfort voiced. No signs of symptoms of distress noted. Patient tolerated medications well. Respirations easy and even. Bed in lowest position, bed alarm in place and functioning properly, SR up x 2 and bed in low position. Call light within reach.     Bedside Mobility Assessment Tool (BMAT):     Assessment Level 1- Sit and Shake    1. From a semi-reclined position, ask patient to sit up and rotate to a seated position at the side of the bed. Can use the bedrail.    2. Ask patient to reach out and grab your hand and shake making sure patient reaches across his/her midline.   Pass- Patient is able to come to a seated position, maintain core strength. Maintains seated balance while reaching across midline. Move on to Assessment Level 2.     Assessment Level 2- Stretch and Point   1. With patient in seated position at the side of the bed, have patient place both feet on the floor (or stool) with knees no higher than hips.    2. Ask patient to stretch one leg and straighten the knee, then bend the ankle/flex and point the toes. If appropriate, repeat with the other leg.   Pass- Patient is able to demonstrate appropriate quad strength on intended weight bearing limb(s). Move onto Assessment Level 3.     Assessment Level 3- Stand   1. Ask patient to elevate off the bed or chair (seated to standing) using an assistive device (cane, bedrail).    2. Patient should be able to raise buttocks off be and hold for a count of five. May repeat once.   Pass- Patient maintains standing stability for at least 5 seconds, proceed to assessment level 4.    Assessment Level 4- Walk   1. Ask patient to march in place at bedside.    2. Then ask patient to advance step and return each foot. Some medical conditions may render a patient from stepping backwards, use your best clinical judgement.   Fail- Patient not able to complete tasks OR requires use of

## 2024-07-03 PROBLEM — E43 SEVERE PROTEIN-CALORIE MALNUTRITION (HCC): Status: ACTIVE | Noted: 2024-07-03

## 2024-07-03 LAB
GLUCOSE BLD-MCNC: 100 MG/DL (ref 70–99)
GLUCOSE BLD-MCNC: 104 MG/DL (ref 70–99)
GLUCOSE BLD-MCNC: 96 MG/DL (ref 70–99)
PERFORMED ON: ABNORMAL
PERFORMED ON: ABNORMAL
PERFORMED ON: NORMAL

## 2024-07-03 PROCEDURE — 92526 ORAL FUNCTION THERAPY: CPT

## 2024-07-03 PROCEDURE — 99231 SBSQ HOSP IP/OBS SF/LOW 25: CPT

## 2024-07-03 PROCEDURE — 6360000002 HC RX W HCPCS: Performed by: INTERNAL MEDICINE

## 2024-07-03 PROCEDURE — 1200000000 HC SEMI PRIVATE

## 2024-07-03 PROCEDURE — 99232 SBSQ HOSP IP/OBS MODERATE 35: CPT | Performed by: INTERNAL MEDICINE

## 2024-07-03 PROCEDURE — 2580000003 HC RX 258: Performed by: INTERNAL MEDICINE

## 2024-07-03 PROCEDURE — C9254 INJECTION, LACOSAMIDE: HCPCS | Performed by: INTERNAL MEDICINE

## 2024-07-03 PROCEDURE — 6360000002 HC RX W HCPCS

## 2024-07-03 RX ORDER — LORAZEPAM 2 MG/ML
0.5 INJECTION INTRAMUSCULAR EVERY 4 HOURS PRN
Status: COMPLETED | OUTPATIENT
Start: 2024-07-03 | End: 2024-07-03

## 2024-07-03 RX ORDER — LORAZEPAM 2 MG/ML
1 INJECTION INTRAMUSCULAR ONCE
Status: COMPLETED | OUTPATIENT
Start: 2024-07-03 | End: 2024-07-04

## 2024-07-03 RX ADMIN — LEVETIRACETAM 750 MG: 100 INJECTION INTRAVENOUS at 22:17

## 2024-07-03 RX ADMIN — LACOSAMIDE 100 MG: 10 INJECTION INTRAVENOUS at 22:11

## 2024-07-03 RX ADMIN — LACOSAMIDE 100 MG: 10 INJECTION INTRAVENOUS at 08:03

## 2024-07-03 RX ADMIN — PANTOPRAZOLE SODIUM 40 MG: 40 INJECTION, POWDER, FOR SOLUTION INTRAVENOUS at 08:06

## 2024-07-03 RX ADMIN — LORAZEPAM 0.5 MG: 2 INJECTION INTRAMUSCULAR; INTRAVENOUS at 13:19

## 2024-07-03 RX ADMIN — LEVETIRACETAM 750 MG: 100 INJECTION INTRAVENOUS at 08:03

## 2024-07-03 RX ADMIN — LORAZEPAM 0.5 MG: 2 INJECTION INTRAMUSCULAR; INTRAVENOUS at 19:23

## 2024-07-03 ASSESSMENT — PAIN SCALES - GENERAL: PAINLEVEL_OUTOF10: 0

## 2024-07-03 NOTE — PROGRESS NOTES
Progress Note    Admit Date:  7/1/2024    Patient was admitted for aspiration.    Subjective:  Mr. Luna was seen sitting up in bed. He states he feels anxious but denies any other complaints.     Objective:   Patient Vitals for the past 4 hrs:   BP Temp Temp src Pulse Resp SpO2   07/03/24 1325 132/82 97.9 °F (36.6 °C) Oral 77 16 96 %          Intake/Output Summary (Last 24 hours) at 7/3/2024 1628  Last data filed at 7/3/2024 1213  Gross per 24 hour   Intake 2481.74 ml   Output 1275 ml   Net 1206.74 ml       Physical Exam:  Gen: No distress. Alert.   Eyes: PERRL. No sclera icterus. No conjunctival injection.   ENT: No discharge. Pharynx clear.   Neck: No JVD.  Trachea midline.  Resp: No accessory muscle use. No crackles. No wheezes. No rhonchi.   CV: Regular rate. Regular rhythm. No murmur.  No rub. No edema.   GI: Non-tender. Non-distended.  Normal bowel sounds.  Skin: Warm and dry. No nodule on exposed extremities. No rash on exposed extremities.   M/S: No cyanosis. No joint deformity. No clubbing.   Neuro: Awake. Grossly nonfocal    Psych: Oriented x 3. No anxiety or agitation.       Medications:  apixaban, 5 mg, BID  ARIPiprazole, 5 mg, Daily  carbidopa-levodopa, 1 tablet, TID  finasteride, 5 mg, Daily  memantine, 10 mg, BID  montelukast, 10 mg, Nightly  atorvastatin, 80 mg, Nightly  aspirin EC, 81 mg, Daily  lacosamide, 100 mg, BID  levETIRAcetam, 750 mg, Q12H  pantoprazole (PROTONIX) 40 mg in sodium chloride (PF) 0.9 % 10 mL injection, 40 mg, Daily  sodium chloride flush, 5-40 mL, 2 times per day      PRN Medications:  LORazepam, 0.5 mg, Q4H PRN  albuterol sulfate HFA, 2 puff, Q4H PRN  sodium chloride flush, 5-40 mL, PRN  sodium chloride, , PRN  potassium chloride, 40 mEq, PRN   Or  potassium alternative oral replacement, 40 mEq, PRN   Or  potassium chloride, 10 mEq, PRN  magnesium sulfate, 2,000 mg, PRN  ondansetron, 4 mg, Q8H PRN   Or  ondansetron, 4 mg, Q6H PRN  polyethylene glycol, 17 g, Daily

## 2024-07-03 NOTE — PROGRESS NOTES
Speech Language Pathology  Swallowing Disorders and Dysphagia    Dysphagia Treatment/Follow-Up Note  Facility/Department: [address] MEDICAL-SURGICAL    Recommendations: SLP recommends to continue with NPO; Ice chips with SLP/RN only (oral care must be completed prior); Meds via alt means of nutrition pending GI consult  Risk Management: Control risk factors for aspiration PNA by completing oral care 3-4x/day and increasing physical mobility as is medically feasible.     NAME:Rodolfo Luna  : 1945 (78 y.o.)   MRN: [id number]  ROOM: [address] [place] [address]  ADMISSION DATE: 2024  PATIENT DIAGNOSIS(ES): Aspiration into respiratory tract, initial encounter [T17.908A]  Aspiration into airway, initial encounter [T17.908A]  Allergies   Allergen Reactions    Apixaban      Other reaction(s): Other (See Comments), unknown/H&P    Cefuroxime Axetil      Other reaction(s): Hives    Lisinopril      Other reaction(s): Other (See Comments), unknown/H&P    Morphine      Bad sweats  Other reaction(s): GI upset, Other (See Comments), shaking  Bad sweats  Bad sweats      Other Other (See Comments)     Pt. States that there is another med that he is allergic to,does not know the name states that it makes him sweat  Other reaction(s): Throat irritation    Rivaroxaban      Other reaction(s): Coordination problem, Other (See Comments)    Clopidogrel Rash    Codeine Anxiety, Nausea Only and Rash     dizzy  Other reaction(s): Nausea/ Sweaty, Other (See Comments)  dizzy      Penicillins Nausea And Vomiting and Rash     Other reaction(s): Dizzy, Other (See Comments), shaking    Plavix [Clopidogrel Bisulfate] Rash       DATE ONSET: 2024    Pain: The patient does not complain of pain       Current Diet: Diet NPO      Dysphagia Treatment and Impressions:  Subjective: Pt seen in room at bedside with RN (Karmen) permission  Noteworthy events reported/Chart Review: Pt awaiting GO consult. MBS completed yesterday with severe  pharyngoesophageal dysphagia. See images below:          Patient tolerance to current diet and treatment:Pt with inability to pass solids through the PES. Recommended pt remain NPO pending GI consult       Respiratory Status: Did not take as session was spent on education  Oral Care Status:    Oral care WFL  Oral Care Completed?   [] Yes   [x] No  Not warranted-oral care adequate upon assessment    PO Trials:   Deferred PO trials  this date as pt remains NPO pending GI consult. Pt and spouse educated on MBS results, anatomical structures, and rationale for current NPO status. Spent ~10 mins educating spouse. Spouse also had questions about oral meds. RN and myself provided education together regarding medication route and rationale for not taking PO meds this date.     Education: SLP edu pt re:  Pt and spouse educated on MBS results, anatomical structures, and rationale for current NPO status. Spent ~10 mins educating spouse. Spouse also had questions about oral meds. RN and myself provided education together regarding medication route and rationale for not taking PO meds this date.     Assessment: Pt currently NPO. Awaiting GI consult. Education provided to pt and spouse. See above. Good carryover of recommended compensatory strategies    Recommendations: SLP recommends to continue with NPO; Ice chips with SLP/RN only (oral care must be completed prior); Meds via alt means of nutrition  Risk Management: Control risk factors for aspiration PNA by completing oral care 3-4x/day and increasing physical mobility as is medically feasible.     Dysphagia Goals:  Short Term Goals  Timeframe for short term goals: 5 days (07/07/2024)  Goal 1: The patient will tolerate recommended diet with no clinical s/s of aspiration 5/5  7/3/2024 : Goal not addressed, see above. Ongoing, not progressing.    Goal 2: The patient will tolerate therapeutic diet upgrade trials with no clinical s/s of aspiration 5/5  7/3/2024 : Goal not  addressed, see above. Ongoing, not progressing.    Goal 3: The patient will recall/perform recommended compensatory strategies given min cues  7/3/2024 : Goal addressed, see above. Ongoing, progressing.    Goal 4: The patient will tolerate repeat instrumental assessment when able   7/2/2024: GOAL MET 7/2     Long Term Goals  Timeframe for Long term goals 7 days (07/09/2024)  Goal 1: The patient will tolerate least restrictive diet with no clinical s/s of aspiration or worsening respiratory/pulmonary status  7/3/2024 : Ongoing, progressing.      Speech/Language/Cog Goals: N/A          Plan:    Continued Dysphagia treatment with goals per plan of care.    Discharge Recommendations: SLP at discharge is pending clinical progression    If pt discharges from hospital prior to Speech/Swallowing discharge, this note serves as tx and discharge summary.     Total Treatment Time / Charges     Time in Time out Total Time / units   Cognitive Tx         Speech Tx      Dysphagia Tx 1200 1210 10 mins / 1 unit     Signature:  Jessy Rehman M.A., CCC-SLP   Speech-Language Pathologist #[id number]  Speech Pathology and Swallowing Disorders  P: (inpatient): [phone] (speech desk): [phone]  E: [email or web address]  Certified to provide:  FEES, MBS, Vital Stim, and Mg Dysphagia Therapy Program (MDTP)

## 2024-07-03 NOTE — PROGRESS NOTES
Comprehensive Nutrition Assessment    Type and Reason for Visit:  Initial, Positive Nutrition Screen (MST 2)    Nutrition Recommendations/Plan:   Continue NPO      Malnutrition Assessment:  Malnutrition Status:  Severe malnutrition (07/03/24 1919)    Context:  Acute Illness     Findings of the 6 clinical characteristics of malnutrition:  Energy Intake:  50% or less of estimated energy requirements for 5 or more days  Weight Loss:  7.5% over 3 months     Body Fat Loss:  Moderate body fat loss Orbital, Buccal region   Muscle Mass Loss:  Moderate muscle mass loss Temples (temporalis), Clavicles (pectoralis & deltoids)  Fluid Accumulation:  No significant fluid accumulation Extremities   Strength:  Not Performed    Nutrition Assessment:    Pt. severely malnourished AEB 10% loss of body weigh since April 2024 d/t in ability to swallow ; noted muscle loss and fat loss .  At risk for further nutritional compromise r/t  remains NPO d/t aspiration r/t severe pharyngoesophageal dysphagia and dx of Parkinson's .  Will continue NPO .     Nutrition Related Findings:    pt is a & O ; lying in bed; appears thin wth sunken fail feaures; reporets he will have his esophagus stretched innext day or 2 ;  /tMBS 7/2 with severe pharyngoesophageal dysphagia Wound Type: None       Current Nutrition Intake & Therapies:    Average Meal Intake: NPO  Average Supplements Intake: NPO  Diet NPO    Anthropometric Measures:  Height: 177.8 cm (5' 10\")  Ideal Body Weight (IBW): 166 lbs (75 kg)    Admission Body Weight: 70.3 kg (155 lb)  Current Body Weight: 70.3 kg (155 lb), 93.4 % IBW. Weight Source: Bed Scale  Current BMI (kg/m2): 22.2  Usual Body Weight: 78.5 kg (173 lb) (April 2024)  % Weight Change (Calculated): -10.4                    BMI Categories: Normal Weight (BMI 22.0 to 24.9) age over 65    Estimated Daily Nutrient Needs:  Energy Requirements Based On: Kcal/kg  Weight Used for Energy Requirements: Adjusted  Energy (kcal/day):  2739-4567 based 25-28 kcal/kg cbw  Weight Used for Protein Requirements: Current  Protein (g/day):       Fluid (ml/day): 71-85 based ~ 1-1.2 gr/kg cbw    Nutrition Diagnosis:   Severe malnutrition related to altered GI function, altered GI structure, inadequate protein-energy intake, swallowing difficulty as evidenced by NPO or clear liquid status due to medical condition, poor intake prior to admission, swallow study results, weight loss 7.5% in 3 months, moderate muscle loss, moderate loss of subcutaneous fat, GI abnormality    Nutrition Interventions:   Food and/or Nutrient Delivery: Continue NPO  Nutrition Education/Counseling: No recommendation at this time  Coordination of Nutrition Care: Continue to monitor while inpatient       Goals:     Goals: Initiate PO diet, by next RD assessment       Nutrition Monitoring and Evaluation:   Behavioral-Environmental Outcomes: None Identified  Food/Nutrient Intake Outcomes: Diet Advancement/Tolerance  Physical Signs/Symptoms Outcomes: Nutrition Focused Physical Findings, Biochemical Data, Chewing or Swallowing, Weight    Discharge Planning:    Too soon to determine     Barbara Wilson RD, LD  Contact: [phone]

## 2024-07-03 NOTE — PLAN OF CARE
Problem: Chronic Conditions and Co-morbidities  Goal: Patient's chronic conditions and co-morbidity symptoms are monitored and maintained or improved  Outcome: Progressing  Flowsheets (Taken 7/2/2024 2004)  Care Plan - Patient's Chronic Conditions and Co-Morbidity Symptoms are Monitored and Maintained or Improved: Monitor and assess patient's chronic conditions and comorbid symptoms for stability, deterioration, or improvement     Problem: Safety - Adult  Goal: Free from fall injury  Outcome: Progressing  Flowsheets (Taken 7/2/2024 2004)  Free From Fall Injury: Instruct family/caregiver on patient safety

## 2024-07-03 NOTE — CARE COORDINATION
Case Management Assessment  Initial Evaluation    Date/Time of Evaluation: 7/3/2024 11:52 AM  Assessment Completed by: Merline Andrade    If patient is discharged prior to next notation, then this note serves as note for discharge by case management.    Patient Name: Rodolfo Luna                   YOB: 1945  Diagnosis: Aspiration into respiratory tract, initial encounter [T17.908A]  Aspiration into airway, initial encounter [T17.908A]                   Date / Time: 7/1/2024  1:16 PM    Patient Admission Status: Inpatient   Readmission Risk (Low < 19, Mod (19-27), High > 27): Readmission Risk Score: 29.9    Current PCP: Carlton Leavitt, KATIE - CNP  PCP verified by CM? Yes ([place])    Chart Reviewed: Yes      History Provided by: Patient  Patient Orientation: Alert and Oriented    Patient Cognition: Alert    Hospitalization in the last 30 days (Readmission):  No    If yes, Readmission Assessment in CM Navigator will be completed.    Advance Directives:      Code Status: DNR-CCA   Patient's Primary Decision Maker is: Patient Declined (Legal Next of Kin Remains as Decision Maker)    Primary Decision Maker: Helen Luna A - Spouse - [phone]    Discharge Planning:    Patient lives with: Spouse/Significant Other Type of Home: House  Primary Care Giver: Self  Patient Support Systems include: Spouse/Significant Other   Current Financial resources: Medicare  Current community resources: None  Current services prior to admission: None            Current DME:              Type of Home Care services:  None    ADLS  Prior functional level: Independent in ADLs/IADLs  Current functional level: Independent in ADLs/IADLs    PT AM-PAC:   /24  OT AM-PAC:   /24    Family can provide assistance at DC: Yes  Would you like Case Management to discuss the discharge plan with any other family members/significant others, and if so, who? Yes (wife)  Plans to Return to Present Housing: Yes  Other Identified  Issues/Barriers to RETURNING to current housing: none  Potential Assistance needed at discharge: N/A            Potential DME:    Patient expects to discharge to: House  Plan for transportation at discharge:      Financial    Payor: [facility] MEDICARE / Plan: [facility] MEDICARE COMPLETE / Product Type: *No Product type* /     Does insurance require precert for SNF: Yes    Potential assistance Purchasing Medications: No  Meds-to-Beds request: Yes      [facility] - [facility] [address] [facility] - P [phone] - F [phone]  [address] [facility] [address]  Phone: [phone] Fax: [phone]    [facility] [id number] - [facility] [address] [place] [address] [phone] - F [phone]  [address] [facility] [address]  Phone: [phone] Fax: [phone]    [facility] [place], [place] - [address] [facility] - P [phone] - F [phone]  [address] [facility] [address]  Phone: [phone] Fax: [phone]    [facility] [id number] - [place], [place] - [address] [facility] - P [phone] - F [phone]  [address] [facility] [address]  Phone: [phone] Fax: [phone]      Notes:    Factors facilitating achievement of predicted outcomes: Motivated, Cooperative, and Pleasant    Barriers to discharge: NPO, GI recs    Additional Case Management Notes: Reviewed chart and met with pt at [facility]. From house with wife, plan to return at ID. IPTA. + . No HC PTA. Uses walker PRN. No needs identified at this time. Will continue to monitor.       The Plan for Transition of Care is related to the following treatment goals of Aspiration into respiratory tract, initial encounter [T17.908A]  Aspiration into airway, initial encounter [T17.908A]    IF APPLICABLE: The Patient and/or patient representative Rodolfo and his family were provided with a choice of provider and agrees with the discharge plan. Freedom of choice list with basic dialogue that supports  the patient's individualized plan of care/goals and shares the quality data associated with the providers was provided to:     Patient Representative Name:       The Patient and/or Patient Representative Agree with the Discharge Plan?      Merline Andrade  Case Management Department  Ph: [phone] Fax: [phone]

## 2024-07-03 NOTE — PROGRESS NOTES
Routine consult called to [facility] for severe pharyngeal dysphagia and aspiration of all liquid consistencies at this time.

## 2024-07-03 NOTE — ACP (ADVANCE CARE PLANNING)
Advance Care Planning     General Advance Care Planning (ACP) Conversation    Date of Conversation: 7/3/2024  Conducted with: Patient with Decision Making Capacity  Other persons present: None    Healthcare Decision Maker:   Primary Decision Maker: Helen Luna - [facility] - [phone]  Click here to complete Healthcare Decision Makers including selection of the Healthcare Decision Maker Relationship (ie \"Primary\").   Today we documented Decision Maker(s) consistent with Legal Next of Kin hierarchy.    Content/Action Overview:  DECLINED ACP Conversation - will revisit periodically  Reviewed DNR/DNI and patient elects Full Code (Attempt Resuscitation)        Length of Voluntary ACP Conversation in minutes:  <16 minutes (Non-Billable)    Merline Andrade

## 2024-07-03 NOTE — PROGRESS NOTES
Wife came to the desk to ask why pt is not receiving his pills. This RN and Izaiah SLP explained the aspiration precautions and swallow eval results to the wife and explained why it would not be safe or effective to give the pt oral medications right now. This RN explained that the pt is able to get some of his medications in IV form, and that he has received those meds today. The pt's wife expressed understanding. Wife states that the pt is very anxious and requests that something be ordered for his anxiety, MD notified at this time to inquire about a PRN anxiety IV med.

## 2024-07-03 NOTE — PROGRESS NOTES
Pt evening shift assessment complete. VSS and oral medication not given as ordered, d/t pt being NPO.. Pt assessed for comfort needs, .  Pt does not express any additional needs at this time, resting comfortably in bed.

## 2024-07-03 NOTE — PROGRESS NOTES
Pulmonary Progress Note  Reason for referral and CC: unable to eat     Subjective:  no changes      EXAM: /82   Pulse 77   Temp 97.9 °F (36.6 °C) (Oral)   Resp 16   Ht 1.778 m (5' 10\")   Wt 70.7 kg (155 lb 12.8 oz)   SpO2 96%   BMI 22.35 kg/m²    Constitutional:  No acute distress.   Eyes: PERRL. Conjunctivae anicteric.   ENT: Normal nose. Normal tongue.    Neck:  Trachea is midline.   Respiratory: No accessory muscle usage. No wheezes. No rales. No Rhonchi.  Cardiovascular: Normal S1S2. No digit clubbing. No digit cyanosis. No LE edema.   Psychiatric: No anxiety or Agitation. Alert and Oriented to person, place and time.    Scheduled Meds:   apixaban  5 mg Oral BID    ARIPiprazole  5 mg Oral Daily    carbidopa-levodopa  1 tablet Oral TID    finasteride  5 mg Oral Daily    memantine  10 mg Oral BID    montelukast  10 mg Oral Nightly    atorvastatin  80 mg Oral Nightly    aspirin EC  81 mg Oral Daily    lacosamide  100 mg IntraVENous BID    levETIRAcetam  750 mg IntraVENous Q12H    pantoprazole (PROTONIX) 40 mg in sodium chloride (PF) 0.9 % 10 mL injection  40 mg IntraVENous Daily    sodium chloride flush  5-40 mL IntraVENous 2 times per day     Continuous Infusions:   sodium chloride      dextrose 5% and 0.45% NaCl with KCl 20 mEq Stopped (07/02/24 1524)    dextrose 5 % and 0.45 % NaCl 75 mL/hr at 07/03/24 1144     PRN Meds:  LORazepam, albuterol sulfate HFA, sodium chloride flush, sodium chloride, potassium chloride **OR** potassium alternative oral replacement **OR** potassium chloride, magnesium sulfate, ondansetron **OR** ondansetron, polyethylene glycol, acetaminophen **OR** acetaminophen    Labs:  CBC:   Recent Labs     07/01/24  1400 07/02/24  0725   WBC 8.8 5.9   HGB 12.5* 12.2*   HCT 37.8* 36.7*   MCV 93.2 92.7    142     BMP:   Recent Labs     07/01/24  1400 07/02/24  0725    142   K 4.4 3.9    106   CO2 29 27   BUN 20 12   CREATININE 0.7* <0.5*       CTPA  CT CHEST

## 2024-07-03 NOTE — PROGRESS NOTES
AM assessment completed. No complaints of pain or discomfort voiced. No signs of symptoms of distress noted. Patient tolerated medications well. Respirations easy and even. Bed in lowest position, bed alarm in place and functioning properly, SR up x 2 and bed in low position. Call light within reach.     Bedside Mobility Assessment Tool (BMAT):     Assessment Level 1- Sit and Shake    1. From a semi-reclined position, ask patient to sit up and rotate to a seated position at the side of the bed. Can use the bedrail.    2. Ask patient to reach out and grab your hand and shake making sure patient reaches across his/her midline.   Pass- Patient is able to come to a seated position, maintain core strength. Maintains seated balance while reaching across midline. Move on to Assessment Level 2.     Assessment Level 2- Stretch and Point   1. With patient in seated position at the side of the bed, have patient place both feet on the floor (or stool) with knees no higher than hips.    2. Ask patient to stretch one leg and straighten the knee, then bend the ankle/flex and point the toes. If appropriate, repeat with the other leg.   Pass- Patient is able to demonstrate appropriate quad strength on intended weight bearing limb(s). Move onto Assessment Level 3.     Assessment Level 3- Stand   1. Ask patient to elevate off the bed or chair (seated to standing) using an assistive device (cane, bedrail).    2. Patient should be able to raise buttocks off be and hold for a count of five. May repeat once.   Pass- Patient maintains standing stability for at least 5 seconds, proceed to assessment level 4.    Assessment Level 4- Walk   1. Ask patient to march in place at bedside.    2. Then ask patient to advance step and return each foot. Some medical conditions may render a patient from stepping backwards, use your best clinical judgement.   Fail- Patient not able to complete tasks OR requires use of assistive device. Patient is  MOBILITY LEVEL 3.       Mobility Level- 3

## 2024-07-04 LAB
GLUCOSE BLD-MCNC: 106 MG/DL (ref 70–99)
GLUCOSE BLD-MCNC: 84 MG/DL (ref 70–99)
GLUCOSE BLD-MCNC: 95 MG/DL (ref 70–99)
GLUCOSE BLD-MCNC: 97 MG/DL (ref 70–99)
PERFORMED ON: ABNORMAL
PERFORMED ON: NORMAL

## 2024-07-04 PROCEDURE — 6360000002 HC RX W HCPCS: Performed by: INTERNAL MEDICINE

## 2024-07-04 PROCEDURE — C9254 INJECTION, LACOSAMIDE: HCPCS | Performed by: INTERNAL MEDICINE

## 2024-07-04 PROCEDURE — 99232 SBSQ HOSP IP/OBS MODERATE 35: CPT | Performed by: INTERNAL MEDICINE

## 2024-07-04 PROCEDURE — 2580000003 HC RX 258: Performed by: INTERNAL MEDICINE

## 2024-07-04 PROCEDURE — 1200000000 HC SEMI PRIVATE

## 2024-07-04 PROCEDURE — 94640 AIRWAY INHALATION TREATMENT: CPT

## 2024-07-04 RX ADMIN — Medication 2 PUFF: at 05:41

## 2024-07-04 RX ADMIN — DEXTROSE AND SODIUM CHLORIDE: 5; 200 INJECTION, SOLUTION INTRAVENOUS at 14:51

## 2024-07-04 RX ADMIN — LEVETIRACETAM 750 MG: 100 INJECTION INTRAVENOUS at 21:28

## 2024-07-04 RX ADMIN — ONDANSETRON 4 MG: 2 INJECTION INTRAMUSCULAR; INTRAVENOUS at 13:05

## 2024-07-04 RX ADMIN — PANTOPRAZOLE SODIUM 40 MG: 40 INJECTION, POWDER, FOR SOLUTION INTRAVENOUS at 09:06

## 2024-07-04 RX ADMIN — ONDANSETRON 4 MG: 2 INJECTION INTRAMUSCULAR; INTRAVENOUS at 05:47

## 2024-07-04 RX ADMIN — LACOSAMIDE 100 MG: 10 INJECTION INTRAVENOUS at 21:20

## 2024-07-04 RX ADMIN — LACOSAMIDE 100 MG: 10 INJECTION INTRAVENOUS at 09:15

## 2024-07-04 RX ADMIN — Medication 10 ML: at 13:06

## 2024-07-04 RX ADMIN — LEVETIRACETAM 750 MG: 100 INJECTION INTRAVENOUS at 09:08

## 2024-07-04 RX ADMIN — LORAZEPAM 1 MG: 2 INJECTION INTRAMUSCULAR; INTRAVENOUS at 01:18

## 2024-07-04 ASSESSMENT — PAIN DESCRIPTION - DESCRIPTORS: DESCRIPTORS: ACHING;DISCOMFORT

## 2024-07-04 ASSESSMENT — PAIN DESCRIPTION - FREQUENCY: FREQUENCY: INTERMITTENT

## 2024-07-04 ASSESSMENT — PAIN DESCRIPTION - LOCATION: LOCATION: ABDOMEN

## 2024-07-04 ASSESSMENT — PAIN SCALES - GENERAL
PAINLEVEL_OUTOF10: 0
PAINLEVEL_OUTOF10: 6
PAINLEVEL_OUTOF10: 2

## 2024-07-04 ASSESSMENT — PAIN DESCRIPTION - ORIENTATION: ORIENTATION: MID;LOWER

## 2024-07-04 ASSESSMENT — PAIN - FUNCTIONAL ASSESSMENT: PAIN_FUNCTIONAL_ASSESSMENT: ACTIVITIES ARE NOT PREVENTED

## 2024-07-04 ASSESSMENT — PAIN DESCRIPTION - ONSET: ONSET: ON-GOING

## 2024-07-04 ASSESSMENT — PAIN DESCRIPTION - PAIN TYPE: TYPE: ACUTE PAIN

## 2024-07-04 NOTE — PROGRESS NOTES
Handoff report and transfer of care given at bedside to Padmini RN.  Patient in stable condition, denies needs/concerns at this time.  Call light within reach.

## 2024-07-04 NOTE — PROGRESS NOTES
Perfect Serve to on call provider: Pt is NPO and not getting his abilify or lexapro. Can he have order for Ativan IV. Also, Eliquis PO and not able to take-can an alternate be ordered?

## 2024-07-04 NOTE — PROGRESS NOTES
Progress Note    Admit Date:  7/1/2024    Patient was admitted for aspiration.    Subjective:  Mr. Luna was seen sitting up in bed. He states he feels anxious but denies any other complaints.     Patient with significant dysphagia ; does have history of presbyesophagus   -seen by GI plan is for egd tomorrow,  kept n.p.o.    Objective:   Patient Vitals for the past 4 hrs:   BP Temp Temp src Pulse Resp SpO2   07/04/24 1439 (!) 142/76 97.5 °F (36.4 °C) Oral 88 20 97 %            Intake/Output Summary (Last 24 hours) at 7/4/2024 1519  Last data filed at 7/4/2024 0301  Gross per 24 hour   Intake --   Output 1200 ml   Net -1200 ml         Physical Exam:  Gen: No distress. Alert.   Patient pleasant awake alert and well-oriented  Eyes: PERRL. No sclera icterus. No conjunctival injection.   ENT: No discharge. Pharynx clear.   Neck: No JVD.  Trachea midline.  Resp: No accessory muscle use. No crackles. No wheezes. No rhonchi.   CV: Regular rate. Regular rhythm. No murmur.  No rub. No edema.   GI: Non-tender. Non-distended.  Normal bowel sounds.  Skin: Warm and dry. No nodule on exposed extremities. No rash on exposed extremities.   M/S: No cyanosis. No joint deformity. No clubbing.   Neuro: Awake. Grossly nonfocal    Psych: Oriented x 3. No anxiety or agitation.       Medications:  apixaban, 5 mg, BID  ARIPiprazole, 5 mg, Daily  carbidopa-levodopa, 1 tablet, TID  finasteride, 5 mg, Daily  memantine, 10 mg, BID  montelukast, 10 mg, Nightly  atorvastatin, 80 mg, Nightly  aspirin EC, 81 mg, Daily  lacosamide, 100 mg, BID  levETIRAcetam, 750 mg, Q12H  pantoprazole (PROTONIX) 40 mg in sodium chloride (PF) 0.9 % 10 mL injection, 40 mg, Daily  sodium chloride flush, 5-40 mL, 2 times per day      PRN Medications:  albuterol sulfate HFA, 2 puff, Q4H PRN  sodium chloride flush, 5-40 mL, PRN  sodium chloride, , PRN  potassium chloride, 40 mEq, PRN   Or  potassium alternative oral replacement, 40 mEq, PRN   Or  potassium chloride, 10  nasopharyngeal  and nasal swab specimens for use under the FDA’s Emergency Use  Authorization (EUA) only.    Patient Fact Sheet:  https://www.fda.gov/media/902121/download  Provider Fact Sheet: https://www.fda.gov/media/160582/download  EUA: https://www.fda.gov/media/002574/download  IFU: https://www.fda.gov/media/608670/download    Methodology:  RT-PCR          Influenza A NOT DETECTED     Influenza B NOT DETECTED    Culture, Blood 1 [[id number]] Collected: 07/01/24 1400    Order Status: Completed Specimen: Blood Updated: 07/02/24 1815     Blood Culture, Routine No Growth to date.  Any change in status will be called.    Narrative:      ORDER#: [id number]                          ORDERED BY: MILO GARCIA  SOURCE: Blood                              COLLECTED:  07/01/24 14:00  ANTIBIOTICS AT ABIGAIL.:                      RECEIVED :  07/01/24 14:05  If child <=2 yrs old please draw pediatric bottle.~Blood Culture 1            EKG  Encounter Date: 07/01/24   EKG 12 Lead   Result Value    Ventricular Rate 79    Atrial Rate 357    QRS Duration 84    Q-T Interval 394    QTc Calculation (Bazett) 451    R Axis -74    T Axis 40    Diagnosis      Atrial flutter with variable A-V blockLeft axis deviationAbnormal ECGWhen compared with ECG of 24-MAY-2024 09:39,Atrial flutter has replaced Atrial fibrillationNonspecific T wave abnormality no longer evident in Inferior leadsNonspecific T wave abnormality no longer evident in Lateral leadsConfirmed by TERESITA BE, MARNI ([id number]) on 7/1/2024 2:19:46 PM           RADIOLOGY  FL MODIFIED BARIUM SWALLOW W VIDEO   Final Result   1. Positive silent tracheal aspiration of thin liquids and nectar thickened   liquids.   2. While there is no evidence of laryngeal penetration or tracheal aspiration   of purees, there was notable stasis and residual puree barium within the   valleculae and piriform sinuses, with only minimal passage of purees below   the EUS secondary to stenosis in this location.

## 2024-07-04 NOTE — FLOWSHEET NOTE
07/03/24 2145   Vital Signs   Temp 97.8 °F (36.6 °C)   Temp Source Oral   Pulse 72   Heart Rate Source Monitor   Respirations 16   /83   MAP (Calculated) 95   MAP (mmHg) 94   BP Location Right upper arm   BP Method Automatic   Patient Position Semi fowlers   Pain Assessment   Pain Assessment None - Denies Pain   Pain Level 0   Opioid-Induced Sedation   POSS Score 1   Oxygen Therapy   SpO2 97 %   Pulse Oximetry Type Intermittent   O2 Device None (Room air)   O2 Flow Rate (L/min) 0 L/min     Shift assessment complete, see flow sheet, schedule medications given, see MAR. IV infusing without difficulty at this time. Patient alert/oriented x 4. On room air. NPO for swallow evaluation.   Bed in lowest position, Call light and bed side table within reach, pt educated on use of call light if needing assist., pt verbalized understanding, denies further questions at this time. Denies any needs at this time, continue to monitor.  Bedside Mobility Assessment Tool (BMAT):     Assessment Level 1- Sit and Shake    1. From a semi-reclined position, ask patient to sit up and rotate to a seated position at the side of the bed. Can use the bedrail.    2. Ask patient to reach out and grab your hand and shake making sure patient reaches across his/her midline.   Pass- Patient is able to come to a seated position, maintain core strength. Maintains seated balance while reaching across midline. Move on to Assessment Level 2.     Assessment Level 2- Stretch and Point   1. With patient in seated position at the side of the bed, have patient place both feet on the floor (or stool) with knees no higher than hips.    2. Ask patient to stretch one leg and straighten the knee, then bend the ankle/flex and point the toes. If appropriate, repeat with the other leg.   Pass- Patient is able to demonstrate appropriate quad strength on intended weight bearing limb(s). Move onto Assessment Level 3.     Assessment Level 3- Stand   1. Ask patient  to elevate off the bed or chair (seated to standing) using an assistive device (cane, bedrail).    2. Patient should be able to raise buttocks off be and hold for a count of five. May repeat once.   Pass- Patient maintains standing stability for at least 5 seconds, proceed to assessment level 4.    Assessment Level 4- Walk   1. Ask patient to march in place at bedside.    2. Then ask patient to advance step and return each foot. Some medical conditions may render a patient from stepping backwards, use your best clinical judgement.   Pass- Patient demonstrates balance while shifting weight and ability to step, takes independent steps, does not use assistive device patient is MOBILITY LEVEL 4.      Mobility Level- 3

## 2024-07-04 NOTE — PLAN OF CARE
Problem: Discharge Planning  Goal: Discharge to home or other facility with appropriate resources  7/4/2024 0027 by Merlyn Thompson RN  Outcome: Progressing  7/4/2024 0026 by Merlyn Thompson RN  Outcome: Progressing  Flowsheets (Taken 7/3/2024 2148)  Discharge to home or other facility with appropriate resources:   Identify barriers to discharge with patient and caregiver   Arrange for needed discharge resources and transportation as appropriate  7/3/2024 1144 by Karmen Bal RN  Outcome: Progressing     Problem: Safety - Adult  Goal: Free from fall injury  7/4/2024 0027 by Merlyn Thompson RN  Outcome: Progressing  7/4/2024 0026 by Merlyn Thompson RN  Outcome: Progressing  7/3/2024 1144 by Karmen Bal RN  Outcome: Progressing     Problem: Chronic Conditions and Co-morbidities  Goal: Patient's chronic conditions and co-morbidity symptoms are monitored and maintained or improved  7/4/2024 0027 by Merlyn Thompson RN  Outcome: Progressing  7/4/2024 0026 by Merlyn Thompson RN  Outcome: Progressing  Flowsheets (Taken 7/3/2024 2148)  Care Plan - Patient's Chronic Conditions and Co-Morbidity Symptoms are Monitored and Maintained or Improved:   Monitor and assess patient's chronic conditions and comorbid symptoms for stability, deterioration, or improvement   Collaborate with multidisciplinary team to address chronic and comorbid conditions and prevent exacerbation or deterioration  7/3/2024 1144 by Karmen Bal RN  Outcome: Progressing     Problem: Nutrition Deficit:  Goal: Optimize nutritional status  7/4/2024 0027 by Merlyn Thompson RN  Outcome: Progressing  7/4/2024 0026 by Merlyn Thompson RN  Outcome: Progressing     Problem: Neurosensory - Adult  Goal: Achieves maximal functionality and self care  Outcome: Progressing     Problem: Skin/Tissue Integrity - Adult  Goal: Skin integrity remains intact  Outcome: Progressing

## 2024-07-04 NOTE — PLAN OF CARE
Problem: Chronic Conditions and Co-morbidities  Goal: Patient's chronic conditions and co-morbidity symptoms are monitored and maintained or improved  7/4/2024 0732 by Dalila Garcia RN  Outcome: Progressing  7/4/2024 0027 by Merlyn Thompson RN  Outcome: Progressing  7/4/2024 0026 by Merlyn Thompson RN  Outcome: Progressing  Flowsheets (Taken 7/3/2024 2148)  Care Plan - Patient's Chronic Conditions and Co-Morbidity Symptoms are Monitored and Maintained or Improved:   Monitor and assess patient's chronic conditions and comorbid symptoms for stability, deterioration, or improvement   Collaborate with multidisciplinary team to address chronic and comorbid conditions and prevent exacerbation or deterioration     Problem: Safety - Adult  Goal: Free from fall injury  7/4/2024 0732 by Dalila Garcia RN  Outcome: Progressing  7/4/2024 0027 by Merlyn Thompson RN  Outcome: Progressing  7/4/2024 0026 by Merlyn Thompson RN  Outcome: Progressing

## 2024-07-04 NOTE — PROGRESS NOTES
Bedside report given and pt care transferred to Erika MENDEZ. Pt denies needs at this time. Call light within reach.

## 2024-07-04 NOTE — PROGRESS NOTES
PROGRESS NOTE  S:78 yrs Patient  admitted on 7/1/2024 with Aspiration into respiratory tract, initial encounter [T17.908A]  Aspiration into airway, initial encounter [T17.908A] .    No new complaints.  Reports onset of dysphagia was approximately 4 months ago.    Current Hospital Schedued Meds   apixaban  5 mg Oral BID    ARIPiprazole  5 mg Oral Daily    carbidopa-levodopa  1 tablet Oral TID    finasteride  5 mg Oral Daily    memantine  10 mg Oral BID    montelukast  10 mg Oral Nightly    atorvastatin  80 mg Oral Nightly    aspirin EC  81 mg Oral Daily    lacosamide  100 mg IntraVENous BID    levETIRAcetam  750 mg IntraVENous Q12H    pantoprazole (PROTONIX) 40 mg in sodium chloride (PF) 0.9 % 10 mL injection  40 mg IntraVENous Daily    sodium chloride flush  5-40 mL IntraVENous 2 times per day     Current Hospital IV Meds   dextrose 5 % and 0.2 % NaCl 1,000 mL infusion 75 mL/hr at 07/04/24 1451    sodium chloride       Current Hospital PRN Meds  albuterol sulfate HFA, sodium chloride flush, sodium chloride, potassium chloride **OR** potassium alternative oral replacement **OR** potassium chloride, magnesium sulfate, ondansetron **OR** ondansetron, polyethylene glycol, acetaminophen **OR** acetaminophen    Exam:   Vitals:    07/04/24 1439   BP: (!) 142/76   Pulse: 88   Resp: 20   Temp: 97.5 °F (36.4 °C)   SpO2: 97%     I/O last 3 completed shifts:  In: 2481.7 [I.V.:2481.7]  Out: 2350 [Urine:2350]    A medically necessary and appropriate physical exam was performed.       Labs:  CBC:   Recent Labs     07/02/24  0725   WBC 5.9   HGB 12.2*   HCT 36.7*   MCV 92.7        BMP:   Recent Labs     07/02/24  0725      K 3.9      CO2 27   BUN 12   CREATININE <0.5*     LIVER PROFILE:   Recent Labs     07/02/24  0725   AST 17   ALT 10   BILITOT 1.0   ALKPHOS 95     PT/INR: No results for input(s): \"INR\" in the last 72 hours.    Invalid input(s): \"PT\"    IMAGING:  No results

## 2024-07-04 NOTE — PROGRESS NOTES
Speech-Language Pathology  Swallowing Disorders and Dysphagia     SLP Attempt Note           Name: Rodolfo Luna  : 1945  Medical Diagnosis: Aspiration into respiratory tract, initial encounter []  Aspiration into airway, initial encounter []    SLP attempting to see pt. Hold today as pt remains NPO for GI. No charges filed. Will re-attempt once cleared for PO by GI. EGD tomorrow. Thank you,      Jessy Rehman M.A., CCC-SLP   Speech-Language Pathologist #[id number]  Speech Pathology and Swallowing Disorders  P: (inpatient): [id number] (speech desk): [phone]  E: [email or web address]  Certified to provide:  FEES, MBS, Vital Stim, and Mg Dysphagia Therapy Program (MDTP)

## 2024-07-05 ENCOUNTER — APPOINTMENT (OUTPATIENT)
Dept: GENERAL RADIOLOGY | Age: 79
DRG: 177 | End: 2024-07-05
Payer: MEDICARE

## 2024-07-05 PROBLEM — K22.89 PRESBYESOPHAGUS: Status: ACTIVE | Noted: 2024-07-05

## 2024-07-05 LAB
ANION GAP SERPL CALCULATED.3IONS-SCNC: 12 MMOL/L (ref 3–16)
BACTERIA BLD CULT ORG #2: NORMAL
BACTERIA BLD CULT: NORMAL
BUN SERPL-MCNC: 11 MG/DL (ref 7–20)
CALCIUM SERPL-MCNC: 8.9 MG/DL (ref 8.3–10.6)
CHLORIDE SERPL-SCNC: 101 MMOL/L (ref 99–110)
CO2 SERPL-SCNC: 20 MMOL/L (ref 21–32)
CREAT SERPL-MCNC: 0.6 MG/DL (ref 0.8–1.3)
GFR SERPLBLD CREATININE-BSD FMLA CKD-EPI: >90 ML/MIN/{1.73_M2}
GLUCOSE BLD-MCNC: 103 MG/DL (ref 70–99)
GLUCOSE BLD-MCNC: 108 MG/DL (ref 70–99)
GLUCOSE BLD-MCNC: 86 MG/DL (ref 70–99)
GLUCOSE BLD-MCNC: 97 MG/DL (ref 70–99)
GLUCOSE SERPL-MCNC: 98 MG/DL (ref 70–99)
PERFORMED ON: ABNORMAL
PERFORMED ON: ABNORMAL
PERFORMED ON: NORMAL
PERFORMED ON: NORMAL
POTASSIUM SERPL-SCNC: 4.4 MMOL/L (ref 3.5–5.1)
SODIUM SERPL-SCNC: 133 MMOL/L (ref 136–145)

## 2024-07-05 PROCEDURE — 99233 SBSQ HOSP IP/OBS HIGH 50: CPT | Performed by: INTERNAL MEDICINE

## 2024-07-05 PROCEDURE — 6370000000 HC RX 637 (ALT 250 FOR IP): Performed by: STUDENT IN AN ORGANIZED HEALTH CARE EDUCATION/TRAINING PROGRAM

## 2024-07-05 PROCEDURE — 36415 COLL VENOUS BLD VENIPUNCTURE: CPT

## 2024-07-05 PROCEDURE — 6360000002 HC RX W HCPCS: Performed by: INTERNAL MEDICINE

## 2024-07-05 PROCEDURE — 99152 MOD SED SAME PHYS/QHP 5/>YRS: CPT | Performed by: INTERNAL MEDICINE

## 2024-07-05 PROCEDURE — 80048 BASIC METABOLIC PNL TOTAL CA: CPT

## 2024-07-05 PROCEDURE — C9254 INJECTION, LACOSAMIDE: HCPCS | Performed by: INTERNAL MEDICINE

## 2024-07-05 PROCEDURE — 88305 TISSUE EXAM BY PATHOLOGIST: CPT

## 2024-07-05 PROCEDURE — 71046 X-RAY EXAM CHEST 2 VIEWS: CPT

## 2024-07-05 PROCEDURE — 2580000003 HC RX 258: Performed by: INTERNAL MEDICINE

## 2024-07-05 PROCEDURE — 1200000000 HC SEMI PRIVATE

## 2024-07-05 PROCEDURE — 2709999900 HC NON-CHARGEABLE SUPPLY: Performed by: INTERNAL MEDICINE

## 2024-07-05 PROCEDURE — 7100000010 HC PHASE II RECOVERY - FIRST 15 MIN: Performed by: INTERNAL MEDICINE

## 2024-07-05 PROCEDURE — 3609015300 HC ESOPHAGEAL DILATION MALONEY: Performed by: INTERNAL MEDICINE

## 2024-07-05 PROCEDURE — 99153 MOD SED SAME PHYS/QHP EA: CPT | Performed by: INTERNAL MEDICINE

## 2024-07-05 PROCEDURE — 3609012400 HC EGD TRANSORAL BIOPSY SINGLE/MULTIPLE: Performed by: INTERNAL MEDICINE

## 2024-07-05 PROCEDURE — 6370000000 HC RX 637 (ALT 250 FOR IP): Performed by: INTERNAL MEDICINE

## 2024-07-05 PROCEDURE — 74018 RADEX ABDOMEN 1 VIEW: CPT

## 2024-07-05 PROCEDURE — 7100000011 HC PHASE II RECOVERY - ADDTL 15 MIN: Performed by: INTERNAL MEDICINE

## 2024-07-05 RX ORDER — MIDAZOLAM HYDROCHLORIDE 5 MG/ML
INJECTION INTRAMUSCULAR; INTRAVENOUS PRN
Status: DISCONTINUED | OUTPATIENT
Start: 2024-07-05 | End: 2024-07-05 | Stop reason: ALTCHOICE

## 2024-07-05 RX ORDER — FENTANYL CITRATE 50 UG/ML
INJECTION, SOLUTION INTRAMUSCULAR; INTRAVENOUS PRN
Status: DISCONTINUED | OUTPATIENT
Start: 2024-07-05 | End: 2024-07-05 | Stop reason: ALTCHOICE

## 2024-07-05 RX ORDER — LORAZEPAM 2 MG/ML
0.5 INJECTION INTRAMUSCULAR EVERY 6 HOURS PRN
Status: DISCONTINUED | OUTPATIENT
Start: 2024-07-05 | End: 2024-07-09

## 2024-07-05 RX ADMIN — LACOSAMIDE 100 MG: 10 INJECTION INTRAVENOUS at 08:29

## 2024-07-05 RX ADMIN — LORAZEPAM 0.5 MG: 2 INJECTION INTRAMUSCULAR; INTRAVENOUS at 16:55

## 2024-07-05 RX ADMIN — ATORVASTATIN CALCIUM 80 MG: 40 TABLET, FILM COATED ORAL at 22:44

## 2024-07-05 RX ADMIN — PHENOL 1 SPRAY: 1.5 LIQUID ORAL at 23:44

## 2024-07-05 RX ADMIN — CARBIDOPA AND LEVODOPA 1 TABLET: 25; 100 TABLET ORAL at 22:44

## 2024-07-05 RX ADMIN — PANTOPRAZOLE SODIUM 40 MG: 40 INJECTION, POWDER, FOR SOLUTION INTRAVENOUS at 08:29

## 2024-07-05 RX ADMIN — Medication 40 MG: at 22:31

## 2024-07-05 RX ADMIN — MONTELUKAST SODIUM 10 MG: 10 TABLET, COATED ORAL at 22:44

## 2024-07-05 RX ADMIN — DEXTROSE AND SODIUM CHLORIDE: 5; 200 INJECTION, SOLUTION INTRAVENOUS at 23:43

## 2024-07-05 RX ADMIN — APIXABAN 5 MG: 5 TABLET, FILM COATED ORAL at 22:44

## 2024-07-05 RX ADMIN — LEVETIRACETAM 750 MG: 100 INJECTION INTRAVENOUS at 22:31

## 2024-07-05 RX ADMIN — DEXTROSE AND SODIUM CHLORIDE: 5; 200 INJECTION, SOLUTION INTRAVENOUS at 04:44

## 2024-07-05 RX ADMIN — LACOSAMIDE 100 MG: 10 INJECTION INTRAVENOUS at 22:30

## 2024-07-05 RX ADMIN — LEVETIRACETAM 750 MG: 100 INJECTION INTRAVENOUS at 08:28

## 2024-07-05 ASSESSMENT — PAIN SCALES - GENERAL
PAINLEVEL_OUTOF10: 0
PAINLEVEL_OUTOF10: 0

## 2024-07-05 ASSESSMENT — PAIN - FUNCTIONAL ASSESSMENT: PAIN_FUNCTIONAL_ASSESSMENT: WONG-BAKER FACES

## 2024-07-05 NOTE — PROGRESS NOTES
NG was placed, no previous nasal fx and or trauma noted and or reported, patient medicated with ativan prior to placement. No adverse reactions noted, no pain and or discomfort noted. All safety precautions in place

## 2024-07-05 NOTE — PROGRESS NOTES
HS assessment completed and meds administered. PT is NPO so many med still not able to administer-note left previous night shift for providers- all note/aware pt is NPO. Pt resting in bed, denies needs at this time. Call light use reviewed with verbal understanding received and call light in reach. Declines bed alarm, \"I haven't fallen in many years.\"      none

## 2024-07-05 NOTE — PLAN OF CARE
Problem: Chronic Conditions and Co-morbidities  Goal: Patient's chronic conditions and co-morbidity symptoms are monitored and maintained or improved  Outcome: Progressing     Problem: Safety - Adult  Goal: Free from fall injury  Outcome: Progressing     Problem: Discharge Planning  Goal: Discharge to home or other facility with appropriate resources  Outcome: Progressing     Problem: Nutrition Deficit:  Goal: Optimize nutritional status  Outcome: Progressing     Problem: Neurosensory - Adult  Goal: Achieves maximal functionality and self care  Outcome: Progressing     Problem: Skin/Tissue Integrity - Adult  Goal: Skin integrity remains intact  Outcome: Progressing     Problem: Musculoskeletal - Adult  Goal: Return mobility to safest level of function  Outcome: Progressing     Problem: Pain  Goal: Verbalizes/displays adequate comfort level or baseline comfort level  Outcome: Progressing

## 2024-07-05 NOTE — PROGRESS NOTES
Pt laying in bed during shift assessment. He is alert and oriented and accepting of care. Pt awaiting EGD today due to aspiration concerns and possible esophageal stretching. Pt unable to take oral meds at this time including eliwei and francisco, MD Tan made aware. Pt requesting IV ativan for his \"nerves\", but pt educated before procedure pt will get sedation medication and there is no current order for anti-anxiety medications. Pt has IV fluids infusing per order. States he has been urinating well. Call light within reach. No further concerns at this time.     1048: Consent form signed and in chart.    1450: Spoke with MD Lagos, stated for pt to have consult with speech therapy for clearance for a diet before placing order. Speech therapy Ambreen aware, stated she will try to see pt this afternoon. Awaiting results.     1630: Notified by Speech therapy, pt will need repeat MBS and should probably have an NG in the meantime to provide nutrition. Due to timing, MBS wont be able to be done until following week and bedside eval cannot be done due to silent aspiration. MD Tan notified, new orders placed for NG, tube feed, and repeat modified barium swallow. Pt and family aware. New orders placed for PRN ativan. No further concerns at this time.    1815: Awaiting official results of xray to confirm placement of NG for use with NG.     Bedside Mobility Assessment Tool (BMAT):     Assessment Level 1- Sit and Shake    1. From a semi-reclined position, ask patient to sit up and rotate to a seated position at the side of the bed. Can use the bedrail.    2. Ask patient to reach out and grab your hand and shake making sure patient reaches across his/her midline.   Pass- Patient is able to come to a seated position, maintain core strength. Maintains seated balance while reaching across midline. Move on to Assessment Level 2.     Assessment Level 2- Stretch and Point   1. With patient in seated position at the side of the bed,  have patient place both feet on the floor (or stool) with knees no higher than hips.    2. Ask patient to stretch one leg and straighten the knee, then bend the ankle/flex and point the toes. If appropriate, repeat with the other leg.   Pass- Patient is able to demonstrate appropriate quad strength on intended weight bearing limb(s). Move onto Assessment Level 3.     Assessment Level 3- Stand   1. Ask patient to elevate off the bed or chair (seated to standing) using an assistive device (cane, bedrail).    2. Patient should be able to raise buttocks off be and hold for a count of five. May repeat once.   Pass- Patient maintains standing stability for at least 5 seconds, proceed to assessment level 4.    Assessment Level 4- Walk   1. Ask patient to march in place at bedside.    2. Then ask patient to advance step and return each foot. Some medical conditions may render a patient from stepping backwards, use your best clinical judgement.   Fail- Patient not able to complete tasks OR requires use of assistive device. Patient is MOBILITY LEVEL 3.       Mobility Level- 3

## 2024-07-05 NOTE — PROGRESS NOTES
Handoff report and transfer of care given at bedside to Yuliana RN.  Patient in stable condition, denies needs/concerns at this time.  Call light within reach.

## 2024-07-05 NOTE — PLAN OF CARE
Problem: Chronic Conditions and Co-morbidities  Goal: Patient's chronic conditions and co-morbidity symptoms are monitored and maintained or improved  7/5/2024 1016 by Yuliana Chen RN  Outcome: Progressing  Flowsheets (Taken 7/5/2024 1016)  Care Plan - Patient's Chronic Conditions and Co-Morbidity Symptoms are Monitored and Maintained or Improved:   Monitor and assess patient's chronic conditions and comorbid symptoms for stability, deterioration, or improvement   Collaborate with multidisciplinary team to address chronic and comorbid conditions and prevent exacerbation or deterioration  7/5/2024 0603 by Erika Washington RN  Outcome: Progressing     Problem: Safety - Adult  Goal: Free from fall injury  7/5/2024 1016 by Yuliana Chen RN  Outcome: Progressing  Flowsheets (Taken 7/5/2024 1016)  Free From Fall Injury: Instruct family/caregiver on patient safety  7/5/2024 0603 by Erika Washington RN  Outcome: Progressing     Problem: Discharge Planning  Goal: Discharge to home or other facility with appropriate resources  7/5/2024 0603 by Erika Washington RN  Outcome: Progressing     Problem: Nutrition Deficit:  Goal: Optimize nutritional status  7/5/2024 1016 by Yuliana Chen RN  Outcome: Progressing  Flowsheets (Taken 7/5/2024 1016)  Nutrient intake appropriate for improving, restoring, or maintaining nutritional needs:   Monitor oral intake, labs, and treatment plans   Recommend appropriate diets, oral nutritional supplements, and vitamin/mineral supplements  7/5/2024 0603 by Erika Washington RN  Outcome: Progressing     Problem: Neurosensory - Adult  Goal: Achieves maximal functionality and self care  7/5/2024 0603 by Erika Washington RN  Outcome: Progressing     Problem: Skin/Tissue Integrity - Adult  Goal: Skin integrity remains intact  7/5/2024 0603 by Erika Washington RN  Outcome: Progressing     Problem: Musculoskeletal - Adult  Goal: Return mobility to safest level of function  7/5/2024 1016 by Gustavo  ANDREA Bridges  Outcome: Progressing  Flowsheets (Taken 7/5/2024 1016)  Return Mobility to Safest Level of Function:   Assess patient stability and activity tolerance for standing, transferring and ambulating with or without assistive devices   Assist with transfers and ambulation using safe patient handling equipment as needed  7/5/2024 0603 by Erika Washington RN  Outcome: Progressing     Problem: Pain  Goal: Verbalizes/displays adequate comfort level or baseline comfort level  7/5/2024 1016 by Yuliana Chen RN  Outcome: Adequate for Discharge  7/5/2024 0603 by Erika Washington RN  Outcome: Progressing

## 2024-07-05 NOTE — PROGRESS NOTES
Speech Language Pathology  Attempt Note     Name: Rodolfo Luna  : 1945  Medical Diagnosis: Aspiration into respiratory tract, initial encounter [T1]  Aspiration into airway, initial encounter [T1]      SLP attempted to see pt for dysphagia tx, reviewed pt's chart.  Pt currently NPO for EGD later this date.  ST to continue to follow and re-attempt f/u at a later time as pt is able and appropriate.  No charges.     Paulette Cook M.S. CCC-SLP  Speech-language pathologist  [phone]

## 2024-07-05 NOTE — PROGRESS NOTES
NG removed from right nare due to improper placement and coiling. New NG placed to left nare, awaiting stat xray results for use with tube feed.

## 2024-07-05 NOTE — DISCHARGE INSTR - COC
Continuity of Care Form    Patient Name: Rodolfo Luna   :  1945  MRN:  [id number]    Admit date:  2024  Discharge date: 24    Code Status Order: DNR-CCA   Advance Directives:   Advance Care Flowsheet Documentation       Date/Time Healthcare Directive Type of Healthcare Directive Copy in Chart Healthcare Agent Appointed Healthcare Agent's Name Healthcare Agent's Phone Number    24 1244 No, patient does not have an advance directive for healthcare treatment -- -- -- -- --            Admitting Physician:  Jose Miguel Tan MD  PCP: Carlton Leavitt, APRN - CNP    Discharging Nurse: Kim Martinez RN    Discharging Hospital Unit/Room#: 0222/0222-01  Discharging Unit Phone Number: [phone]    Emergency Contact:   Extended Emergency Contact Information  Primary Emergency Contact: Helen Luna  Address: [address] [place] [address]  Home Phone: [phone]  Mobile Phone: [phone]  Relation: Spouse   needed? No  Secondary Emergency Contact: page burgos  Mobile Phone: [phone]  Relation: Grandchild   needed? No    Past Surgical History:  Past Surgical History:   Procedure Laterality Date    ABLATION OF DYSRHYTHMIC FOCUS  2014    CARPAL TUNNEL RELEASE      CERVICAL DISC SURGERY  2010    COLONOSCOPY  2016    normal    CORONARY ANGIOPLASTY WITH STENT PLACEMENT      FEMUR SURGERY      left    KNEE SURGERY  -11 R total knee replacement with femoral nerve block for pain control    LUNG REMOVAL, PARTIAL Right     20%    OTHER SURGICAL HISTORY Bilateral     excisions of lesions on bilat.neck and back    OTHER SURGICAL HISTORY  2014    Reveal Loop recorder placed in Cath Lab    UPPER GASTROINTESTINAL ENDOSCOPY  2016    gastric and esophogeal biopsy       Immunization History:   Immunization History   Administered Date(s) Administered    COVID-19, PFIZER Bivalent, DO NOT Dilute, (age 12y+), IM, 30

## 2024-07-05 NOTE — H&P
Gastroenterology Preop Assessment    Patient:   Rodolfo Luna   :    1945   Facility:   [facility]  Referring/PCP: Carlton Leavitt, APRN - CNP  Date:     2024    Subjective:   Procedure: egd    HPI/Reason for procedure:  dysphagia    Past Medical History:   Diagnosis Date    A-fib (HCC)     MIRNA (acute kidney injury) (HCC) 2022    Anxiety     Arthritis     OA    Bradycardia 2014    Cancer ([facility])     skin cancer-forearm right , face    Coronary artery disease involving native coronary artery of native heart without angina pectoris 2022    Hemoptysis 10/16/2014    Since Ablation    Irregular heart  beats     Mixed hyperlipidemia 2023    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    S/P drug eluting coronary stent placement 2017    2.25 x 18 Xience Alpine ADRIÁN - Distal LAD    Seizure disorder ([facility])     Abnormal EEG with left temporal shapr waves    Seizures ([facility])     Shortness of breath 2014    Total knee replacement status 2011     Past Surgical History:   Procedure Laterality Date    ABLATION OF DYSRHYTHMIC FOCUS  2014    CARPAL TUNNEL RELEASE      CERVICAL DISC SURGERY  2010    COLONOSCOPY  2016    normal    CORONARY ANGIOPLASTY WITH STENT PLACEMENT      FEMUR SURGERY      left    KNEE SURGERY  11 R total knee replacement with femoral nerve block for pain control    LUNG REMOVAL, PARTIAL Right     20%    OTHER SURGICAL HISTORY Bilateral     excisions of lesions on bilat.neck and back    OTHER SURGICAL HISTORY  2014    Reveal Loop recorder placed in Cath Lab    UPPER GASTROINTESTINAL ENDOSCOPY  2016    gastric and esophogeal biopsy       Social:   Social History     Tobacco Use    Smoking status: Former     Current packs/day: 0.00     Average packs/day: 2.0 packs/day for 40.0 years (80.0 ttl pk-yrs)     Types: Cigarettes     Start date: 1953     Quit date: 1993     Years since quittin.1     Passive exposure: Never  bicarb-citric acid (EFFER-K) effervescent tablet 40 mEq, 40 mEq, Oral, PRN **OR** potassium chloride 10 mEq/100 mL IVPB (Peripheral Line), 10 mEq, IntraVENous, PRN, Jose Miguel Tan MD    magnesium sulfate 2000 mg in 50 mL IVPB premix, 2,000 mg, IntraVENous, PRN, Jose Miguel Tan MD    ondansetron (ZOFRAN-ODT) disintegrating tablet 4 mg, 4 mg, Oral, Q8H PRN **OR** ondansetron (ZOFRAN) injection 4 mg, 4 mg, IntraVENous, Q6H PRN, Jose Miguel Tan MD, 4 mg at 07/04/24 1305    polyethylene glycol (GLYCOLAX) packet 17 g, 17 g, Oral, Daily PRN, Jose Miguel Tan MD    acetaminophen (TYLENOL) tablet 650 mg, 650 mg, Oral, Q6H PRN **OR** acetaminophen (TYLENOL) suppository 650 mg, 650 mg, Rectal, Q6H PRN, Jose Miguel Tan MD      Infusions:    dextrose 5 % and 0.2 % NaCl 1,000 mL infusion 75 mL/hr at 07/05/24 0444    sodium chloride       PRN Medications: albuterol sulfate HFA, sodium chloride flush, sodium chloride, potassium chloride **OR** potassium alternative oral replacement **OR** potassium chloride, magnesium sulfate, ondansetron **OR** ondansetron, polyethylene glycol, acetaminophen **OR** acetaminophen  Allergies:   Allergies   Allergen Reactions    Apixaban      Other reaction(s): Other (See Comments), unknown/H&P    Cefuroxime Axetil      Other reaction(s): Hives    Lisinopril      Other reaction(s): Other (See Comments), unknown/H&P    Morphine      Bad sweats  Other reaction(s): GI upset, Other (See Comments), shaking  Bad sweats  Bad sweats      Other Other (See Comments)     Pt. States that there is another med that he is allergic to,does not know the name states that it makes him sweat  Other reaction(s): Throat irritation    Rivaroxaban      Other reaction(s): Coordination problem, Other (See Comments)    Clopidogrel Rash    Codeine Anxiety, Nausea Only and Rash     dizzy  Other reaction(s): Nausea/ Sweaty, Other (See Comments)  dizzy      Penicillins Nausea And Vomiting and Rash     Other reaction(s):

## 2024-07-05 NOTE — PROGRESS NOTES
Speech Language Pathology  Communication Note    Name: Rodolfo Luna  : 1945  Medical Diagnosis: Aspiration into respiratory tract, initial encounter [T1]  Aspiration into airway, initial encounter []      SLP spoke with RN via phone after completion of EGD regarding possible dysphagia follow-up/initiation of PO diet this date.  Pt s/p MBSS on 7/3/24 with silent aspiration of both thin and mildly (nectar) thick liquids, as well as, inability to pass puree bolus through UES.      Pt not safe for therapeutic PO trials at bedside at this time given severity of oropharyngeal and esophageal dysphagia, recent silent aspiration with PO on MBSS.  Would recommend repeat instrumental swallow study via MBSS to correlate clinical findings s/p EGD and prior to initiation of possible PO diet.      SLP spoke with Irene Duran NP via phone regarding rationale for continued NPO recommendation at this time pending completion of repeat MBSS.  NP verbalized understanding and to place order for MBSS.  Unfortunately MBSS unable to be completed on the weekends d/t staffing.  Will plan to complete MBSS Monday () as pt is able and appropriate.  SLP and NP discussed possible placement of NG over the weekend prior to repeat MBSS (given pt's increased risk of aspiration with PO, particularly silent; again, pt not deemed safe for PO intake at bedside).  All information relayed to pt's RN, Cricket MONZON to continue to follow.      Paulette Cook M.S. CCC-SLP  Speech-language pathologist  [id number]

## 2024-07-05 NOTE — PROGRESS NOTES
RNA, RT PCR NOT DETECTED     Comment: Not Detected results do not preclude SARS-CoV-2 infection and  should not be used as the sole basis for patient management  decisions.  Results must be combined with clinical observations,  patient history, and epidemiological information.  Testing was performed using KH[employer] BENITO SARS-CoV-2 and Influenza A/B  nucleic acid assay. This test is a multiplex Real-Time Reverse  Transcriptase Polymerase Chain Reaction (RT-PCR)-based in vitro  diagnostic test intended for the qualitative detection of nucleic  acids from SARS-CoV-2, influenza A, and influenza B in nasopharyngeal  and nasal swab specimens for use under the FDA’s Emergency Use  Authorization (EUA) only.    Patient Fact Sheet:  https://www.fda.gov/media/629728/download  Provider Fact Sheet: https://www.fda.gov/media/369780/download  EUA: https://www.fda.gov/media/299881/download  IFU: https://www.fda.gov/media/122656/download    Methodology:  RT-PCR          Influenza A NOT DETECTED     Influenza B NOT DETECTED    Culture, Blood 1 [[id number]] Collected: 07/01/24 1400    Order Status: Completed Specimen: Blood Updated: 07/02/24 1815     Blood Culture, Routine No Growth to date.  Any change in status will be called.    Narrative:      ORDER#: [id number]                          ORDERED BY: MILO GARCIA  SOURCE: Blood                              COLLECTED:  07/01/24 14:00  ANTIBIOTICS AT ABIGAIL.:                      RECEIVED :  07/01/24 14:05  If child <=2 yrs old please draw pediatric bottle.~Blood Culture 1            EKG  Encounter Date: 07/01/24   EKG 12 Lead   Result Value    Ventricular Rate 79    Atrial Rate 357    QRS Duration 84    Q-T Interval 394    QTc Calculation (Bazett) 451    R Axis -74    T Axis 40    Diagnosis      Atrial flutter with variable A-V blockLeft axis deviationAbnormal ECGWhen compared with ECG of 24-MAY-2024 09:39,Atrial flutter has replaced Atrial fibrillationNonspecific T wave abnormality no  nurse.         Total time today 38 minutes. > 50 % time dominated by coordination of care       Jose Miguel Tan MD  07/05/24

## 2024-07-06 PROBLEM — K22.2 ESOPHAGEAL STRICTURE: Status: ACTIVE | Noted: 2024-07-06

## 2024-07-06 LAB
GLUCOSE BLD-MCNC: 106 MG/DL (ref 70–99)
GLUCOSE BLD-MCNC: 108 MG/DL (ref 70–99)
GLUCOSE BLD-MCNC: 113 MG/DL (ref 70–99)
GLUCOSE BLD-MCNC: 114 MG/DL (ref 70–99)
GLUCOSE BLD-MCNC: 117 MG/DL (ref 70–99)
GLUCOSE BLD-MCNC: 130 MG/DL (ref 70–99)
GLUCOSE BLD-MCNC: 137 MG/DL (ref 70–99)
PERFORMED ON: ABNORMAL

## 2024-07-06 PROCEDURE — 6360000002 HC RX W HCPCS: Performed by: INTERNAL MEDICINE

## 2024-07-06 PROCEDURE — 5A0955A ASSISTANCE WITH RESPIRATORY VENTILATION, GREATER THAN 96 CONSECUTIVE HOURS, HIGH NASAL FLOW/VELOCITY: ICD-10-PCS | Performed by: INTERNAL MEDICINE

## 2024-07-06 PROCEDURE — 0DB58ZX EXCISION OF ESOPHAGUS, VIA NATURAL OR ARTIFICIAL OPENING ENDOSCOPIC, DIAGNOSTIC: ICD-10-PCS | Performed by: INTERNAL MEDICINE

## 2024-07-06 PROCEDURE — 6370000000 HC RX 637 (ALT 250 FOR IP): Performed by: STUDENT IN AN ORGANIZED HEALTH CARE EDUCATION/TRAINING PROGRAM

## 2024-07-06 PROCEDURE — 99232 SBSQ HOSP IP/OBS MODERATE 35: CPT | Performed by: INTERNAL MEDICINE

## 2024-07-06 PROCEDURE — 0DB68ZX EXCISION OF STOMACH, VIA NATURAL OR ARTIFICIAL OPENING ENDOSCOPIC, DIAGNOSTIC: ICD-10-PCS | Performed by: INTERNAL MEDICINE

## 2024-07-06 PROCEDURE — 2580000003 HC RX 258: Performed by: INTERNAL MEDICINE

## 2024-07-06 PROCEDURE — 1200000000 HC SEMI PRIVATE

## 2024-07-06 PROCEDURE — C9254 INJECTION, LACOSAMIDE: HCPCS | Performed by: INTERNAL MEDICINE

## 2024-07-06 PROCEDURE — 0D758ZZ DILATION OF ESOPHAGUS, VIA NATURAL OR ARTIFICIAL OPENING ENDOSCOPIC: ICD-10-PCS | Performed by: INTERNAL MEDICINE

## 2024-07-06 RX ADMIN — LORAZEPAM 0.5 MG: 2 INJECTION INTRAMUSCULAR; INTRAVENOUS at 21:25

## 2024-07-06 RX ADMIN — CARBIDOPA AND LEVODOPA 1 TABLET: 25; 100 TABLET ORAL at 08:37

## 2024-07-06 RX ADMIN — MONTELUKAST SODIUM 10 MG: 10 TABLET, COATED ORAL at 21:33

## 2024-07-06 RX ADMIN — ATORVASTATIN CALCIUM 80 MG: 40 TABLET, FILM COATED ORAL at 21:33

## 2024-07-06 RX ADMIN — ASPIRIN 81 MG: 81 TABLET, COATED ORAL at 08:37

## 2024-07-06 RX ADMIN — LEVETIRACETAM 750 MG: 100 INJECTION INTRAVENOUS at 08:33

## 2024-07-06 RX ADMIN — SODIUM CHLORIDE, PRESERVATIVE FREE 10 ML: 5 INJECTION INTRAVENOUS at 21:32

## 2024-07-06 RX ADMIN — APIXABAN 5 MG: 5 TABLET, FILM COATED ORAL at 21:33

## 2024-07-06 RX ADMIN — Medication 40 MG: at 21:25

## 2024-07-06 RX ADMIN — LACOSAMIDE 100 MG: 10 INJECTION INTRAVENOUS at 21:25

## 2024-07-06 RX ADMIN — LACOSAMIDE 100 MG: 10 INJECTION INTRAVENOUS at 08:31

## 2024-07-06 RX ADMIN — Medication 40 MG: at 08:30

## 2024-07-06 RX ADMIN — PHENOL 1 SPRAY: 1.5 LIQUID ORAL at 13:11

## 2024-07-06 RX ADMIN — PHENOL 1 SPRAY: 1.5 LIQUID ORAL at 16:39

## 2024-07-06 RX ADMIN — APIXABAN 5 MG: 5 TABLET, FILM COATED ORAL at 08:37

## 2024-07-06 RX ADMIN — ARIPIPRAZOLE 5 MG: 10 TABLET ORAL at 08:38

## 2024-07-06 RX ADMIN — LEVETIRACETAM 750 MG: 100 INJECTION INTRAVENOUS at 21:25

## 2024-07-06 RX ADMIN — FINASTERIDE 5 MG: 5 TABLET, FILM COATED ORAL at 08:38

## 2024-07-06 RX ADMIN — CARBIDOPA AND LEVODOPA 1 TABLET: 25; 100 TABLET ORAL at 21:33

## 2024-07-06 RX ADMIN — CARBIDOPA AND LEVODOPA 1 TABLET: 25; 100 TABLET ORAL at 14:50

## 2024-07-06 ASSESSMENT — PAIN - FUNCTIONAL ASSESSMENT
PAIN_FUNCTIONAL_ASSESSMENT: ACTIVITIES ARE NOT PREVENTED
PAIN_FUNCTIONAL_ASSESSMENT: ACTIVITIES ARE NOT PREVENTED

## 2024-07-06 ASSESSMENT — PAIN DESCRIPTION - ORIENTATION: ORIENTATION: UPPER

## 2024-07-06 ASSESSMENT — PAIN DESCRIPTION - LOCATION
LOCATION: THROAT
LOCATION: THROAT

## 2024-07-06 ASSESSMENT — PAIN SCALES - GENERAL
PAINLEVEL_OUTOF10: 3
PAINLEVEL_OUTOF10: 3
PAINLEVEL_OUTOF10: 0
PAINLEVEL_OUTOF10: 0

## 2024-07-06 ASSESSMENT — PAIN SCALES - WONG BAKER: WONGBAKER_NUMERICALRESPONSE: NO HURT

## 2024-07-06 ASSESSMENT — PAIN DESCRIPTION - DESCRIPTORS
DESCRIPTORS: SORE
DESCRIPTORS: SORE

## 2024-07-06 NOTE — PROGRESS NOTES
Progress Note    Admit Date:  7/1/2024    Patient was admitted for aspiration.   Dysphagia    Has h/o presbyesophagus    Seen by speech therapy   Seen by GI - S/P EGD with esophageal dilatation today 7/5     Subjective:  Mr. Luna was seen in bed.  He is tolerating his tube feeds which are now at goal.  Plans are to do a modified barium swallow on Monday.    Objective:   Patient Vitals for the past 4 hrs:   BP Temp Temp src Pulse Resp SpO2 Weight   07/06/24 0828 123/71 97.7 °F (36.5 °C) Oral 90 16 96 % --   07/06/24 0600 -- -- -- -- -- -- 67.9 kg (149 lb 12.8 oz)            Intake/Output Summary (Last 24 hours) at 7/6/2024 0939  Last data filed at 7/6/2024 0902  Gross per 24 hour   Intake 201 ml   Output 900 ml   Net -699 ml         Physical Exam:  Gen: No distress. Alert.   Patient pleasant awake alert and well-oriented  Eyes: PERRL. No sclera icterus. No conjunctival injection.   ENT: No discharge. Pharynx clear.   Neck: No JVD.  Trachea midline.  Resp: No accessory muscle use. No crackles. No wheezes. No rhonchi.   CV: Regular rate. Regular rhythm. No murmur.  No rub. No edema.   GI: Non-tender. Non-distended.  Normal bowel sounds.  Skin: Warm and dry. No nodule on exposed extremities. No rash on exposed extremities.   M/S: No cyanosis. No joint deformity. No clubbing.   Neuro: Awake. Grossly nonfocal    Psych: Oriented x 3. No anxiety or agitation.       Medications:  benzocaine, , Once  pantoprazole (PROTONIX) 40 mg in sodium chloride (PF) 0.9 % 10 mL injection, 40 mg, Q12H  apixaban, 5 mg, BID  ARIPiprazole, 5 mg, Daily  carbidopa-levodopa, 1 tablet, TID  finasteride, 5 mg, Daily  memantine, 10 mg, BID  montelukast, 10 mg, Nightly  atorvastatin, 80 mg, Nightly  aspirin EC, 81 mg, Daily  lacosamide, 100 mg, BID  levETIRAcetam, 750 mg, Q12H  sodium chloride flush, 5-40 mL, 2 times per day      PRN Medications:  LORazepam, 0.5 mg, Q6H PRN  phenol, 1 spray, Q2H PRN  albuterol sulfate HFA, 2 puff, Q4H  bolus. Rec GI consult.  - was given Unasyn in ED, defer further abx to pulm  - PPI daily   - aspiration precautions     - pulmonology consulted   - GI consulted-> S/P EGD on 7/5/2024 with esophageal dilatation .  Cont NPO, IVF   Per GI -> Speech therapy need to reevaluate for resuming diet    Speech therapist was contacted for further eval  - speech  therapist requesting a modified barium swallow.   Modified barium swallow cannot be done till Monday;  now it is Friday evening.   Current plan patient will remain n.p.o. until modified barium swallow can be done on Monday to assess diet.   Until then we will place an NG tube and start tube feedings.    Hx of PE  - CT chest as above   - AC on Eliquis      Atrial fibrillation/flutter s/p watchman and RFCA 2014     HTN  - on Norvasc  - monitor BP     CAD s/p PCI  - on asa and statin      Focal seizure disorder  - IV vimpat and keppra until oral medication can be resumed     Hx of CVA   - on asa and statin     Parkinson's disease  - on sinemet and namenda      Hx of lung cancer s/p thoracotomy and VATS  - chronic pleural effusion     Porphyria cutanea tarda      Anxiety  - prn ativan     DVT Prophylaxis: Eliquis   Diet: Diet NPO  ADULT TUBE FEEDING; Nasogastric; Standard with Fiber; Continuous; 30; Yes; 10; Q 4 hours; 50; 30; Q 4 hours  Code Status: DNR-CCA      Patient is back from endoscopy   status post esophageal dilatation  GI recommending a repeat speech therapy eval before starting a diet   Speech therapist was contacted for further eval; they  are requesting a  repeat modified barium swallow as pt had silent aspiration. Not safe for bedside eval  only and starting a diet   Modified barium swallow cannot be done till Monday    Current plan : patient will remain n.p.o. until modified barium swallow can be done on Monday to assess diet.   Until then we will place an NG tube and start tube feedings..  Resume home medications this can be given through NG tube..   Stop

## 2024-07-06 NOTE — PROGRESS NOTES
PROGRESS NOTE  S:78 yrs Patient  admitted on 7/1/2024 with Aspiration into respiratory tract, initial encounter [T17.908A]  Aspiration into airway, initial encounter [T17.908A] .    Planning barium study on Monday.  Tube feeds from  in the interim.    Current Hospital Schedued Meds   benzocaine   Mouth/Throat Once    pantoprazole (PROTONIX) 40 mg in sodium chloride (PF) 0.9 % 10 mL injection  40 mg IntraVENous Q12H    apixaban  5 mg Oral BID    ARIPiprazole  5 mg Oral Daily    carbidopa-levodopa  1 tablet Oral TID    finasteride  5 mg Oral Daily    memantine  10 mg Oral BID    montelukast  10 mg Oral Nightly    atorvastatin  80 mg Oral Nightly    aspirin EC  81 mg Oral Daily    lacosamide  100 mg IntraVENous BID    levETIRAcetam  750 mg IntraVENous Q12H    sodium chloride flush  5-40 mL IntraVENous 2 times per day     Current Hospital IV Meds   sodium chloride       Current Hospital PRN Meds  LORazepam, phenol, albuterol sulfate HFA, sodium chloride flush, sodium chloride, potassium chloride **OR** potassium alternative oral replacement **OR** potassium chloride, magnesium sulfate, ondansetron **OR** ondansetron, polyethylene glycol, acetaminophen **OR** acetaminophen    Exam:   Vitals:    07/06/24 0828   BP: 123/71   Pulse: 90   Resp: 16   Temp: 97.7 °F (36.5 °C)   SpO2: 96%     I/O last 3 completed shifts:  In: 201 [NG/GT:201]  Out: 800 [Urine:800]   General appearance: alert, appears stated age, and cooperative  HEENT: PERRLA  Neck: no adenopathy, no carotid bruit, no JVD, supple, symmetrical, trachea midline, and thyroid not enlarged, symmetric, no tenderness/mass/nodules  Lungs: clear to auscultation bilaterally  Heart: regular rate and rhythm, S1, S2 normal, no murmur, click, rub or gallop  Abdomen: soft, non-tender; bowel sounds normal; no masses,  no organomegaly  Extremities: extremities normal, atraumatic, no cyanosis or edema     Labs:  CBC: No results for input(s):  \"WBC\", \"HGB\", \"HCT\", \"MCV\", \"PLT\" in the last 72 hours.  BMP:   Recent Labs     07/05/24  1516   *   K 4.4      CO2 20*   BUN 11   CREATININE 0.6*     LIVER PROFILE: No results for input(s): \"AST\", \"ALT\", \"LIPASE\", \"AMYLASE\", \"BILIDIR\", \"BILITOT\", \"ALKPHOS\" in the last 72 hours.    Invalid input(s): \"PROT\", \"ALB\"  PT/INR: No results for input(s): \"INR\" in the last 72 hours.    Invalid input(s): \"PT\"    IMAGING:  XR ABDOMEN FOR NG/OG/NE TUBE PLACEMENT    Result Date: 7/5/2024  EXAMINATION: ONE SUPINE XRAY VIEW(S) OF THE ABDOMEN 7/5/2024 6:58 pm COMPARISON: CT chest, abdomen, and pelvis 06/07/2024. HISTORY: ORDERING SYSTEM PROVIDED HISTORY: confirm NG placement, NG tube advancement recs TECHNOLOGIST PROVIDED HISTORY: Reason for exam:->confirm NG placement, NG tube advancement recs Portable?->Yes Reason for Exam: confirm NG placement, NG tube advancement recs FINDINGS: The tip of the enteric tube is in the gastric fundus.  The side port is in the distal esophagus.  No evidence of pneumoperitoneum.  No acute osseous abnormality.     The tip of the enteric tube is in the gastric fundus. The side port is in the distal esophagus.  Recommend advancement by at least 8 cm.     XR CHEST (2 VW)    Result Date: 7/5/2024  EXAMINATION: TWO XRAY VIEWS OF THE CHEST 7/5/2024 5:26 pm COMPARISON: 07/01/2024 HISTORY: ORDERING SYSTEM PROVIDED HISTORY: check ng placement, and advancement recommendations TECHNOLOGIST PROVIDED HISTORY: Reason for exam:->check ng placement, and advancement recommendations Reason for Exam: check ng placement, and advancement recommendations FINDINGS: Cardiomediastinal silhouette is stable.  Similar small right pleural effusion and atelectasis.  No pneumothorax.  The left lung is clear.  No gross bony abnormality.  Enteric tube is looped in the cervical and upper thoracic region.     Enteric tube is looped in the cervical and upper thoracic region.     EGD    Result Date: 7/5/2024  JUSTINE

## 2024-07-06 NOTE — PROGRESS NOTES
Comprehensive Nutrition Assessment    Type and Reason for Visit:  Reassess, Consult (TF)    Nutrition Recommendations/Plan:   Continue NPO   Increased Jevity 1.5 goal rate 60 ml/hr  Continue water flush @ 30 ml q hr     Malnutrition Assessment:  Malnutrition Status:  Severe malnutrition (07/03/24 1919)    Context:  Acute Illness     Findings of the 6 clinical characteristics of malnutrition:  Energy Intake:  50% or less of estimated energy requirements for 5 or more days  Weight Loss:  7.5% over 3 months     Body Fat Loss:  Moderate body fat loss Orbital, Buccal region   Muscle Mass Loss:  Moderate muscle mass loss Temples (temporalis), Clavicles (pectoralis & deltoids)  Fluid Accumulation:  No significant fluid accumulation Extremities   Strength:  Not Performed    Nutrition Assessment:    Pt improving from a nutritional standpoint AEB he is receiving enteral nutrition via NGT.  Remains at risk for further nutritional compromise r/t severe dysphagia and NPO ; severely malnourished and altered nutrition related labs .  Will increase Jevity 1.5 to 60 ml/hr      Nutrition Related Findings:    Pt A & O; + Esophgel dilatation 7/5; MBS scheduled for 7/8; NGT placed 7/5; low Na; Low h/h; No BM recorded Wound Type: None       Current Nutrition Intake & Therapies:    Average Meal Intake: NPO  Average Supplements Intake: NPO  Current Tube Feeding (TF) Orders:  Feeding Route: Nasogastric  Formula: Standard with Fiber  Schedule: Continuous  Feeding Regimen: Jevity 1.5 @ 50 mlhr  Additives/Modulars: None  Water Flushes: 30 q 4 hr  Current TF & Flush Orders Provides: 1000 ml TV; 1500 kcal 64 gr pro; 760 ml + 180 ml water flush  Goal TF & Flush Orders Provides: @ 60 ml /hr x 20 hrs + 1200 ml TV; 1800 kcal; 82 gr pro; 912 ml free water + 180 ml flush     Anthropometric Measures:  Height: 177.8 cm (5' 10\")  Ideal Body Weight (IBW): 166 lbs (75 kg)    Admission Body Weight: 70.3 kg (155 lb)  Current Body Weight: 67.9 kg (149 lb  11.1 oz), 93.4 % IBW. Weight Source: Bed Scale  Current BMI (kg/m2): 21.5  Usual Body Weight: 78.5 kg (173 lb) (April 2024)  % Weight Change (Calculated): -10.4                    BMI Categories: Underweight (BMI less than 22) age over 65    Estimated Daily Nutrient Needs:  Energy Requirements Based On: Kcal/kg  Weight Used for Energy Requirements: Adjusted  Energy (kcal/day): 9121-1024 based 25-28 kcal/kg cbw  Weight Used for Protein Requirements: Current  Protein (g/day): 71-85 baed ~ 1-1.2 gr/kg  Method Used for Fluid Requirements: 1 ml/kcal  Fluid (ml/day): 7025-2661    Nutrition Diagnosis:   Severe malnutrition related to altered GI function, altered GI structure, inadequate protein-energy intake, swallowing difficulty as evidenced by NPO or clear liquid status due to medical condition, poor intake prior to admission, swallow study results, weight loss 7.5% in 3 months, moderate muscle loss, moderate loss of subcutaneous fat, GI abnormality    Nutrition Interventions:   Food and/or Nutrient Delivery: Continue NPO, Modify Tube Feeding  Nutrition Education/Counseling: No recommendation at this time  Coordination of Nutrition Care: Speech Therapy, Continue to monitor while inpatient       Goals:  Previous Goal Met: No Progress toward Goal(s)  Goals: Tolerate nutrition support at goal rate, by next RD assessment       Nutrition Monitoring and Evaluation:   Behavioral-Environmental Outcomes: None Identified  Food/Nutrient Intake Outcomes: Enteral Nutrition Intake/Tolerance  Physical Signs/Symptoms Outcomes: Biochemical Data, Chewing or Swallowing, Nutrition Focused Physical Findings, Fluid Status or Edema, Weight    Discharge Planning:    Too soon to determine     Barbara Wilson RD, LD  Contact: [phone]

## 2024-07-06 NOTE — FLOWSHEET NOTE
07/05/24 1951   Vital Signs   Temp 97.7 °F (36.5 °C)   Temp Source Oral   Pulse 90   Heart Rate Source Monitor   Respirations 16   /76   MAP (Calculated) 96   BP Location Left upper arm   BP Method Automatic   Patient Position Sitting   Oxygen Therapy   SpO2 98 %   O2 Device None (Room air)     Shift assessment completed. Pt is alert and oriented. Vital signs are WNL. Respirations are even & easy. No complaints voiced. Pt denies needs at this time. SR up x 2 and bed in low position. Call light is within reach.

## 2024-07-06 NOTE — PROGRESS NOTES
Physician Progress Note      PATIENT:               HARSHA DON  CSN #:                  [id number]  :                       1945  ADMIT DATE:       2024 1:16 PM  DISCH DATE:  RESPONDING  PROVIDER #:        Jose Miguel Acevedo MD          QUERY TEXT:    Pt admitted with Shortness of Breath (Cough and difficulty breathing since   Friday and has Severe malnutrition related to altered GI function, altered GI   structure, inadequate protein-energy intake, swallowing difficulty as   evidenced by NPO or clear liquid status due to medical condition, poor intake   prior to admission, swallow study results, GI abnormality documented in   Dietitian note dated 7/3. Please further specify type of malnutrition with   documentation in the medical record.    The medical record reflects the following:  Risk Factors: Parkinsons disease, Chronic aspiration  Clinical Indicators: Per Dietitian note, ' weight loss 7.5% in 3 months,   moderate muscle loss, moderate loss of subcutaneous fat,'  Treatment: Dietitian Consult, SLP consult, Barium swallow, GI consult    ASPEN Criteria:    https://aspenjournals.onlinelibrary.hendrickson.com/doi/full/[email or web address]  5    Thank you,  Juanita Nuñez RN BSN  Clinical   [email or web address]  Options provided:  -- Severe Malnutrition  -- Other - I will add my own diagnosis  -- Disagree - Not applicable / Not valid  -- Disagree - Clinically unable to determine / Unknown  -- Refer to Clinical Documentation Reviewer    PROVIDER RESPONSE TEXT:    This patient has severe malnutrition.    Query created by: Juanita Nuñez on 2024 5:28 PM      Electronically signed by:  Jose Miguel Acevedo MD 2024 10:35 PM

## 2024-07-06 NOTE — PROGRESS NOTES
Patient complaining of a sore throat from NG tube. Phenol mouth spray given. No further assistance needed at this time. Will continue to monitor.

## 2024-07-06 NOTE — PLAN OF CARE
Problem: Chronic Conditions and Co-morbidities  Goal: Patient's chronic conditions and co-morbidity symptoms are monitored and maintained or improved  Outcome: Progressing     Problem: Safety - Adult  Goal: Free from fall injury  Outcome: Progressing     Problem: Discharge Planning  Goal: Discharge to home or other facility with appropriate resources  Outcome: Progressing     Problem: Nutrition Deficit:  Goal: Optimize nutritional status  7/6/2024 1850 by Moise Jones RN  Outcome: Progressing  7/6/2024 1432 by Barbara Wilson RD, LD  Outcome: Progressing     Problem: Neurosensory - Adult  Goal: Achieves maximal functionality and self care  Outcome: Progressing     Problem: Skin/Tissue Integrity - Adult  Goal: Skin integrity remains intact  Outcome: Progressing     Problem: Musculoskeletal - Adult  Goal: Return mobility to safest level of function  Outcome: Progressing     Problem: Pain  Goal: Verbalizes/displays adequate comfort level or baseline comfort level  Outcome: Progressing

## 2024-07-06 NOTE — PLAN OF CARE
Problem: Chronic Conditions and Co-morbidities  Goal: Patient's chronic conditions and co-morbidity symptoms are monitored and maintained or improved  7/5/2024 2350 by Harman Cochran RN  Outcome: Progressing  7/5/2024 1016 by Yuliana Chen RN  Outcome: Progressing  Flowsheets (Taken 7/5/2024 1016)  Care Plan - Patient's Chronic Conditions and Co-Morbidity Symptoms are Monitored and Maintained or Improved:   Monitor and assess patient's chronic conditions and comorbid symptoms for stability, deterioration, or improvement   Collaborate with multidisciplinary team to address chronic and comorbid conditions and prevent exacerbation or deterioration     Problem: Safety - Adult  Goal: Free from fall injury  7/5/2024 2350 by Harman Cochran RN  Outcome: Progressing  7/5/2024 1016 by Yuliana Chen RN  Outcome: Progressing  Flowsheets (Taken 7/5/2024 1016)  Free From Fall Injury: Instruct family/caregiver on patient safety     Problem: Discharge Planning  Goal: Discharge to home or other facility with appropriate resources  Outcome: Progressing     Problem: Nutrition Deficit:  Goal: Optimize nutritional status  7/5/2024 2350 by Harman Cochran RN  Outcome: Progressing  7/5/2024 1016 by Yuliana Chen RN  Outcome: Progressing  Flowsheets (Taken 7/5/2024 1016)  Nutrient intake appropriate for improving, restoring, or maintaining nutritional needs:   Monitor oral intake, labs, and treatment plans   Recommend appropriate diets, oral nutritional supplements, and vitamin/mineral supplements     Problem: Neurosensory - Adult  Goal: Achieves maximal functionality and self care  Outcome: Progressing     Problem: Skin/Tissue Integrity - Adult  Goal: Skin integrity remains intact  Outcome: Progressing     Problem: Musculoskeletal - Adult  Goal: Return mobility to safest level of function  7/5/2024 2350 by Harman Cochran RN  Outcome: Progressing  7/5/2024 1016 by Yuliana Chen RN  Outcome: Progressing  Flowsheets (Taken

## 2024-07-06 NOTE — PROGRESS NOTES
Patient complaining of a sore throat. PRN Phenol mouth spray given. No further assistance needed at this time. Will continue to monitor.

## 2024-07-06 NOTE — PROGRESS NOTES
Patient would like to talk to nursing staff about their insulin.     Patient did not give any additional information.     Please call patient back.    Shift report given to Moise at bedside. Patient is stable. All end of shift needs have been met. No further assistance needed at this time.

## 2024-07-07 ENCOUNTER — APPOINTMENT (OUTPATIENT)
Dept: GENERAL RADIOLOGY | Age: 79
DRG: 177 | End: 2024-07-07
Payer: MEDICARE

## 2024-07-07 LAB
GLUCOSE BLD-MCNC: 112 MG/DL (ref 70–99)
GLUCOSE BLD-MCNC: 122 MG/DL (ref 70–99)
GLUCOSE BLD-MCNC: 146 MG/DL (ref 70–99)
GLUCOSE BLD-MCNC: 151 MG/DL (ref 70–99)
PERFORMED ON: ABNORMAL

## 2024-07-07 PROCEDURE — 99233 SBSQ HOSP IP/OBS HIGH 50: CPT | Performed by: INTERNAL MEDICINE

## 2024-07-07 PROCEDURE — 1200000000 HC SEMI PRIVATE

## 2024-07-07 PROCEDURE — 6360000002 HC RX W HCPCS: Performed by: INTERNAL MEDICINE

## 2024-07-07 PROCEDURE — C9254 INJECTION, LACOSAMIDE: HCPCS | Performed by: INTERNAL MEDICINE

## 2024-07-07 PROCEDURE — 2580000003 HC RX 258: Performed by: INTERNAL MEDICINE

## 2024-07-07 PROCEDURE — 6370000000 HC RX 637 (ALT 250 FOR IP): Performed by: STUDENT IN AN ORGANIZED HEALTH CARE EDUCATION/TRAINING PROGRAM

## 2024-07-07 PROCEDURE — 71046 X-RAY EXAM CHEST 2 VIEWS: CPT

## 2024-07-07 RX ADMIN — MONTELUKAST SODIUM 10 MG: 10 TABLET, COATED ORAL at 22:04

## 2024-07-07 RX ADMIN — Medication 40 MG: at 10:36

## 2024-07-07 RX ADMIN — MEMANTINE HYDROCHLORIDE 10 MG: 5 TABLET ORAL at 22:05

## 2024-07-07 RX ADMIN — ASPIRIN 81 MG: 81 TABLET, COATED ORAL at 10:38

## 2024-07-07 RX ADMIN — Medication 40 MG: at 22:05

## 2024-07-07 RX ADMIN — LEVETIRACETAM 750 MG: 100 INJECTION INTRAVENOUS at 10:36

## 2024-07-07 RX ADMIN — LORAZEPAM 0.5 MG: 2 INJECTION INTRAMUSCULAR; INTRAVENOUS at 07:47

## 2024-07-07 RX ADMIN — ATORVASTATIN CALCIUM 80 MG: 40 TABLET, FILM COATED ORAL at 22:04

## 2024-07-07 RX ADMIN — LACOSAMIDE 100 MG: 10 INJECTION INTRAVENOUS at 10:36

## 2024-07-07 RX ADMIN — LORAZEPAM 0.5 MG: 2 INJECTION INTRAMUSCULAR; INTRAVENOUS at 19:38

## 2024-07-07 RX ADMIN — CARBIDOPA AND LEVODOPA 1 TABLET: 25; 100 TABLET ORAL at 22:04

## 2024-07-07 RX ADMIN — LACOSAMIDE 100 MG: 10 INJECTION INTRAVENOUS at 22:11

## 2024-07-07 RX ADMIN — APIXABAN 5 MG: 5 TABLET, FILM COATED ORAL at 22:04

## 2024-07-07 RX ADMIN — SODIUM CHLORIDE, PRESERVATIVE FREE 10 ML: 5 INJECTION INTRAVENOUS at 10:39

## 2024-07-07 RX ADMIN — APIXABAN 5 MG: 5 TABLET, FILM COATED ORAL at 10:38

## 2024-07-07 RX ADMIN — FINASTERIDE 5 MG: 5 TABLET, FILM COATED ORAL at 10:38

## 2024-07-07 RX ADMIN — SODIUM CHLORIDE, PRESERVATIVE FREE 10 ML: 5 INJECTION INTRAVENOUS at 22:08

## 2024-07-07 RX ADMIN — CARBIDOPA AND LEVODOPA 1 TABLET: 25; 100 TABLET ORAL at 10:38

## 2024-07-07 RX ADMIN — ARIPIPRAZOLE 5 MG: 10 TABLET ORAL at 10:38

## 2024-07-07 RX ADMIN — CARBIDOPA AND LEVODOPA 1 TABLET: 25; 100 TABLET ORAL at 14:47

## 2024-07-07 RX ADMIN — LEVETIRACETAM 750 MG: 100 INJECTION INTRAVENOUS at 22:12

## 2024-07-07 NOTE — PROGRESS NOTES
PROGRESS NOTE  S:78 yrs Patient  admitted on 7/1/2024 with Aspiration into respiratory tract, initial encounter [T17.908A]  Aspiration into airway, initial encounter [T17.908A] .    No major overnight events or complaints    Current Hospital Schedued Meds   benzocaine   Mouth/Throat Once    pantoprazole (PROTONIX) 40 mg in sodium chloride (PF) 0.9 % 10 mL injection  40 mg IntraVENous Q12H    apixaban  5 mg Oral BID    ARIPiprazole  5 mg Oral Daily    carbidopa-levodopa  1 tablet Oral TID    finasteride  5 mg Oral Daily    memantine  10 mg Oral BID    montelukast  10 mg Oral Nightly    atorvastatin  80 mg Oral Nightly    aspirin EC  81 mg Oral Daily    lacosamide  100 mg IntraVENous BID    levETIRAcetam  750 mg IntraVENous Q12H    sodium chloride flush  5-40 mL IntraVENous 2 times per day     Current Hospital IV Meds   sodium chloride       Current Hospital PRN Meds  LORazepam, phenol, albuterol sulfate HFA, sodium chloride flush, sodium chloride, potassium chloride **OR** potassium alternative oral replacement **OR** potassium chloride, magnesium sulfate, ondansetron **OR** ondansetron, polyethylene glycol, acetaminophen **OR** acetaminophen    Exam:   Vitals:    07/07/24 0303   BP: 125/72   Pulse: 95   Resp: 16   Temp: 99.1 °F (37.3 °C)   SpO2: 93%     I/O last 3 completed shifts:  In: 4172 [I.V.:2874; NG/GT:1298]  Out: 900 [Urine:900]   General appearance: alert, appears stated age, and cooperative  HEENT: PERRLA  Neck: no adenopathy, no carotid bruit, no JVD, supple, symmetrical, trachea midline, and thyroid not enlarged, symmetric, no tenderness/mass/nodules  Lungs: clear to auscultation bilaterally  Heart: regular rate and rhythm, S1, S2 normal, no murmur, click, rub or gallop  Abdomen: soft, non-tender; bowel sounds normal; no masses,  no organomegaly  Extremities: extremities normal, atraumatic, no cyanosis or edema     Labs:  CBC: No results for input(s): \"WBC\", \"HGB\",

## 2024-07-07 NOTE — PROGRESS NOTES
Reassessed patient. Breathing has improved and does not sounds less ronchi. Tube feeds restarted. After xray showed no aspiration.

## 2024-07-07 NOTE — PLAN OF CARE
Problem: Chronic Conditions and Co-morbidities  Goal: Patient's chronic conditions and co-morbidity symptoms are monitored and maintained or improved  7/6/2024 2151 by Harman Cochran RN  Outcome: Progressing  7/6/2024 1850 by Moise Jones RN  Outcome: Progressing     Problem: Safety - Adult  Goal: Free from fall injury  7/6/2024 2151 by Harman Cochran RN  Outcome: Progressing  7/6/2024 1850 by Moise Jones RN  Outcome: Progressing     Problem: Discharge Planning  Goal: Discharge to home or other facility with appropriate resources  7/6/2024 2151 by Harman Cochran RN  Outcome: Progressing  7/6/2024 1850 by Moise Jones RN  Outcome: Progressing     Problem: Nutrition Deficit:  Goal: Optimize nutritional status  7/6/2024 2151 by Harman Cochran RN  Outcome: Progressing  7/6/2024 1850 by Moise Jones RN  Outcome: Progressing  7/6/2024 1432 by Barbara Wilson RD, LD  Outcome: Progressing     Problem: Neurosensory - Adult  Goal: Achieves maximal functionality and self care  7/6/2024 2151 by Harman Cochran RN  Outcome: Progressing  7/6/2024 1850 by Moise Jones RN  Outcome: Progressing     Problem: Skin/Tissue Integrity - Adult  Goal: Skin integrity remains intact  7/6/2024 2151 by Harman Cochran RN  Outcome: Progressing  Flowsheets (Taken 7/6/2024 1851 by Moise Jones RN)  Skin Integrity Remains Intact: Monitor for areas of redness and/or skin breakdown  7/6/2024 1850 by Moise Jones RN  Outcome: Progressing     Problem: Musculoskeletal - Adult  Goal: Return mobility to safest level of function  7/6/2024 2151 by Harman Cochran RN  Outcome: Progressing  7/6/2024 1850 by Moise Jones RN  Outcome: Progressing     Problem: Pain  Goal: Verbalizes/displays adequate comfort level or baseline comfort level  7/6/2024 2151 by Harman Cochran RN  Outcome: Progressing  7/6/2024 1850 by Moise Jones RN  Outcome: Progressing

## 2024-07-07 NOTE — PROGRESS NOTES
Patient has increased chest congestion. Suction started to help patient with drainage over night. Drainage has increased and patient unable to remove a lot of the fleam. Hospitalist notified and2 view xray ordered. Writer stopped tube feeding at this time until confirmed that patient is not having aspiration.

## 2024-07-07 NOTE — FLOWSHEET NOTE
07/06/24 2115   Vital Signs   Temp 99.7 °F (37.6 °C)   Temp Source Oral   Pulse 82   Heart Rate Source Monitor   Respirations 16   /69   MAP (Calculated) 91   BP Location Right upper arm   BP Method Automatic   Patient Position Semi fowlers   Pain Assessment   Pain Assessment None - Denies Pain   Opioid-Induced Sedation   POSS Score 1   RASS   Carter Agitation Sedation Scale (RASS) 0   Oxygen Therapy   SpO2 94 %   O2 Device None (Room air)     Shift assessment completed. Pt is alert and oriented. Vital signs are WNL. Respirations are even & easy. No complaints voiced. Pt denies needs at this time. SR up x 2 and bed in low position. Call light is within reach.    199.9

## 2024-07-07 NOTE — PROGRESS NOTES
Shift report given to Moise at bedside. Patient is stable. All end of shift needs have been met. No further assistance needed at this time.

## 2024-07-07 NOTE — PROGRESS NOTES
Speech Language Pathology  Communication/ Attempt Note    Name: Rodolfo Luna  : 1945  Medical Diagnosis: Aspiration into respiratory tract, initial encounter [T1]  Aspiration into airway, initial encounter [T1]    Attempt to see pt for dysphagia treatment, but pt not safe for therapeutic PO trials at this time given severity of oropharyngeal and esophageal dysphagia and silent aspiration with PO on prior MBSS. Please see discussion for pt POC on SLP 24 attempt note. Anticipated MBSS to be completed tomorrow given pt availability and SLP schedule. RN reported increased congestion and repeat X-Ray to further assess. Tube feeding held at this time. RN aware. Speech therapy to continue to follow.     Faiza Ramos M.A., CCC-SLP   [id number]  Speech-Language Pathologist  Desk: [phone]

## 2024-07-07 NOTE — PROGRESS NOTES
IM Progress Note    Admit Date:  7/1/2024    Patient was admitted for aspiration.   Dysphagia    Has h/o presbyesophagus    Seen by speech therapy   Seen by GI - S/P EGD with esophageal dilatation today 7/5     Subjective:  Mr. Luna is feeling worse today.  He is having more chest congestion.  Tube feeds have been temporarily stopped..  Plans are to do a modified barium swallow on Monday.    Objective:   Patient Vitals for the past 4 hrs:   BP Temp Temp src Pulse Resp SpO2   07/07/24 1030 (!) 155/77 98.1 °F (36.7 °C) Oral (!) 129 16 95 %            Intake/Output Summary (Last 24 hours) at 7/7/2024 1059  Last data filed at 7/7/2024 0801  Gross per 24 hour   Intake 3971 ml   Output 200 ml   Net 3771 ml         Physical Exam:  Gen: No distress. Alert.   Patient pleasant awake alert and well-oriented  Eyes: PERRL. No sclera icterus. No conjunctival injection.   ENT: No discharge. Pharynx clear.   Neck: No JVD.  Trachea midline.  Resp: No accessory muscle use. No crackles. No wheezes.  He has rhonchi.   CV: Regular rate. Regular rhythm. No murmur.  No rub. No edema.   GI: Non-tender. Non-distended.  Normal bowel sounds.  Skin: Warm and dry. No nodule on exposed extremities. No rash on exposed extremities.   M/S: No cyanosis. No joint deformity. No clubbing.   Neuro: Awake. Grossly nonfocal    Psych: Oriented x 3. No anxiety or agitation.       Medications:  benzocaine, , Once  pantoprazole (PROTONIX) 40 mg in sodium chloride (PF) 0.9 % 10 mL injection, 40 mg, Q12H  apixaban, 5 mg, BID  ARIPiprazole, 5 mg, Daily  carbidopa-levodopa, 1 tablet, TID  finasteride, 5 mg, Daily  memantine, 10 mg, BID  montelukast, 10 mg, Nightly  atorvastatin, 80 mg, Nightly  aspirin EC, 81 mg, Daily  lacosamide, 100 mg, BID  levETIRAcetam, 750 mg, Q12H  sodium chloride flush, 5-40 mL, 2 times per day      PRN Medications:  LORazepam, 0.5 mg, Q6H PRN  phenol, 1 spray, Q2H PRN  albuterol sulfate HFA, 2 puff, Q4H PRN  sodium chloride flush, 5-40  pass puree bolus. Rec GI consult.  - was given Unasyn in ED, defer further abx to pulm.  Pulmonology recommended watching him off antibiotics.  This chest congestion is worse today.  I ordered a stat chest x-ray.  He may need on antibiotics.  - PPI daily   - aspiration precautions     - pulmonology consulted   - GI consulted-> S/P EGD on 7/5/2024 with esophageal dilatation .  Cont NPO, IVF   Per GI -> Speech therapy need to reevaluate for resuming diet    Speech therapist was contacted for further eval  - speech  therapist requesting a modified barium swallow.   Modified barium swallow cannot be done till Monday;  now it is Friday evening.   Current plan patient will remain n.p.o. until modified barium swallow can be done on Monday to assess diet.   Until then we will place an NG tube and start tube feedings.    Hx of PE  - CT chest as above   - AC on Eliquis      Atrial fibrillation/flutter s/p watchman and RFCA 2014     HTN  - on Norvasc  - monitor BP     CAD s/p PCI  - on asa and statin      Focal seizure disorder  - IV vimpat and keppra until oral medication can be resumed     Hx of CVA   - on asa and statin     Parkinson's disease  - on sinemet and namenda      Hx of lung cancer s/p thoracotomy and VATS  - chronic pleural effusion     Porphyria cutanea tarda      Anxiety  - prn ativan     DVT Prophylaxis: Eliquis   Diet: Diet NPO  ADULT TUBE FEEDING; Nasogastric; Standard with Fiber; Continuous; 30; Yes; 10; Q 4 hours; 60; 30; Q 4 hours  Code Status: DNR-CCA      Patient is back from endoscopy   status post esophageal dilatation  GI recommending a repeat speech therapy eval before starting a diet   Speech therapist was contacted for further eval; they  are requesting a  repeat modified barium swallow as pt had silent aspiration. Not safe for bedside eval  only and starting a diet   Modified barium swallow cannot be done till Monday    Current plan : patient will remain n.p.o. until modified barium swallow can  be done on Monday to assess diet.   NG tube placed.  Tube feeds on hold.  Resume home medications this can be given through NG tube..      Plan of care discussed in detail with patient and the patient's nurse.             MARTHA GRANADOS MD  07/07/24

## 2024-07-08 ENCOUNTER — APPOINTMENT (OUTPATIENT)
Dept: GENERAL RADIOLOGY | Age: 79
DRG: 177 | End: 2024-07-08
Payer: MEDICARE

## 2024-07-08 PROBLEM — J96.01 ACUTE HYPOXIC RESPIRATORY FAILURE (HCC): Status: ACTIVE | Noted: 2024-07-08

## 2024-07-08 LAB
ALBUMIN SERPL-MCNC: 3.3 G/DL (ref 3.4–5)
ALBUMIN/GLOB SERPL: 1 {RATIO} (ref 1.1–2.2)
ALP SERPL-CCNC: 98 U/L (ref 40–129)
ALT SERPL-CCNC: <5 U/L (ref 10–40)
ANION GAP SERPL CALCULATED.3IONS-SCNC: 13 MMOL/L (ref 3–16)
AST SERPL-CCNC: 28 U/L (ref 15–37)
BASOPHILS # BLD: 0 K/UL (ref 0–0.2)
BASOPHILS NFR BLD: 0.1 %
BILIRUB SERPL-MCNC: 2 MG/DL (ref 0–1)
BUN SERPL-MCNC: 16 MG/DL (ref 7–20)
CALCIUM SERPL-MCNC: 8.5 MG/DL (ref 8.3–10.6)
CHLORIDE SERPL-SCNC: 102 MMOL/L (ref 99–110)
CO2 SERPL-SCNC: 22 MMOL/L (ref 21–32)
CREAT SERPL-MCNC: <0.5 MG/DL (ref 0.8–1.3)
DEPRECATED RDW RBC AUTO: 17.8 % (ref 12.4–15.4)
EKG ATRIAL RATE: 138 BPM
EKG ATRIAL RATE: 138 BPM
EKG DIAGNOSIS: NORMAL
EKG DIAGNOSIS: NORMAL
EKG Q-T INTERVAL: 294 MS
EKG Q-T INTERVAL: 310 MS
EKG QRS DURATION: 86 MS
EKG QRS DURATION: 90 MS
EKG QTC CALCULATION (BAZETT): 430 MS
EKG QTC CALCULATION (BAZETT): 448 MS
EKG R AXIS: -89 DEGREES
EKG R AXIS: 252 DEGREES
EKG T AXIS: 41 DEGREES
EKG T AXIS: 43 DEGREES
EKG VENTRICULAR RATE: 126 BPM
EKG VENTRICULAR RATE: 129 BPM
EOSINOPHIL # BLD: 0 K/UL (ref 0–0.6)
EOSINOPHIL NFR BLD: 0 %
GFR SERPLBLD CREATININE-BSD FMLA CKD-EPI: >90 ML/MIN/{1.73_M2}
GLUCOSE BLD-MCNC: 121 MG/DL (ref 70–99)
GLUCOSE BLD-MCNC: 126 MG/DL (ref 70–99)
GLUCOSE BLD-MCNC: 132 MG/DL (ref 70–99)
GLUCOSE BLD-MCNC: 139 MG/DL (ref 70–99)
GLUCOSE SERPL-MCNC: 139 MG/DL (ref 70–99)
HCT VFR BLD AUTO: 41.8 % (ref 40.5–52.5)
HGB BLD-MCNC: 13.8 G/DL (ref 13.5–17.5)
LYMPHOCYTES # BLD: 0.8 K/UL (ref 1–5.1)
LYMPHOCYTES NFR BLD: 6.3 %
MCH RBC QN AUTO: 30.8 PG (ref 26–34)
MCHC RBC AUTO-ENTMCNC: 33 G/DL (ref 31–36)
MCV RBC AUTO: 93.4 FL (ref 80–100)
MONOCYTES # BLD: 1.2 K/UL (ref 0–1.3)
MONOCYTES NFR BLD: 9.3 %
NEUTROPHILS # BLD: 11 K/UL (ref 1.7–7.7)
NEUTROPHILS NFR BLD: 84.3 %
PERFORMED ON: ABNORMAL
PLATELET # BLD AUTO: 152 K/UL (ref 135–450)
PMV BLD AUTO: 8 FL (ref 5–10.5)
POTASSIUM SERPL-SCNC: 4.1 MMOL/L (ref 3.5–5.1)
PROT SERPL-MCNC: 6.7 G/DL (ref 6.4–8.2)
RBC # BLD AUTO: 4.47 M/UL (ref 4.2–5.9)
SODIUM SERPL-SCNC: 137 MMOL/L (ref 136–145)
WBC # BLD AUTO: 13 K/UL (ref 4–11)

## 2024-07-08 PROCEDURE — 6370000000 HC RX 637 (ALT 250 FOR IP): Performed by: INTERNAL MEDICINE

## 2024-07-08 PROCEDURE — 2580000003 HC RX 258: Performed by: INTERNAL MEDICINE

## 2024-07-08 PROCEDURE — C9254 INJECTION, LACOSAMIDE: HCPCS | Performed by: INTERNAL MEDICINE

## 2024-07-08 PROCEDURE — 2700000000 HC OXYGEN THERAPY PER DAY

## 2024-07-08 PROCEDURE — 2500000003 HC RX 250 WO HCPCS: Performed by: INTERNAL MEDICINE

## 2024-07-08 PROCEDURE — 92526 ORAL FUNCTION THERAPY: CPT

## 2024-07-08 PROCEDURE — 93005 ELECTROCARDIOGRAM TRACING: CPT | Performed by: INTERNAL MEDICINE

## 2024-07-08 PROCEDURE — 6360000002 HC RX W HCPCS: Performed by: INTERNAL MEDICINE

## 2024-07-08 PROCEDURE — 85025 COMPLETE CBC W/AUTO DIFF WBC: CPT

## 2024-07-08 PROCEDURE — 74230 X-RAY XM SWLNG FUNCJ C+: CPT

## 2024-07-08 PROCEDURE — 93010 ELECTROCARDIOGRAM REPORT: CPT | Performed by: INTERNAL MEDICINE

## 2024-07-08 PROCEDURE — 71045 X-RAY EXAM CHEST 1 VIEW: CPT

## 2024-07-08 PROCEDURE — 36415 COLL VENOUS BLD VENIPUNCTURE: CPT

## 2024-07-08 PROCEDURE — 92611 MOTION FLUOROSCOPY/SWALLOW: CPT

## 2024-07-08 PROCEDURE — 2060000000 HC ICU INTERMEDIATE R&B

## 2024-07-08 PROCEDURE — 94640 AIRWAY INHALATION TREATMENT: CPT

## 2024-07-08 PROCEDURE — 6370000000 HC RX 637 (ALT 250 FOR IP): Performed by: STUDENT IN AN ORGANIZED HEALTH CARE EDUCATION/TRAINING PROGRAM

## 2024-07-08 PROCEDURE — 80053 COMPREHEN METABOLIC PANEL: CPT

## 2024-07-08 PROCEDURE — 99233 SBSQ HOSP IP/OBS HIGH 50: CPT | Performed by: INTERNAL MEDICINE

## 2024-07-08 PROCEDURE — 94761 N-INVAS EAR/PLS OXIMETRY MLT: CPT

## 2024-07-08 RX ORDER — ALBUTEROL SULFATE 2.5 MG/3ML
2.5 SOLUTION RESPIRATORY (INHALATION) 3 TIMES DAILY
Status: DISCONTINUED | OUTPATIENT
Start: 2024-07-08 | End: 2024-07-10

## 2024-07-08 RX ORDER — ALBUTEROL SULFATE 2.5 MG/3ML
2.5 SOLUTION RESPIRATORY (INHALATION) EVERY 4 HOURS PRN
Status: DISCONTINUED | OUTPATIENT
Start: 2024-07-08 | End: 2024-07-16 | Stop reason: HOSPADM

## 2024-07-08 RX ORDER — LEVOFLOXACIN 5 MG/ML
500 INJECTION, SOLUTION INTRAVENOUS EVERY 24 HOURS
Status: DISCONTINUED | OUTPATIENT
Start: 2024-07-08 | End: 2024-07-11

## 2024-07-08 RX ORDER — SODIUM CHLORIDE 9 MG/ML
INJECTION, SOLUTION INTRAVENOUS CONTINUOUS
Status: DISCONTINUED | OUTPATIENT
Start: 2024-07-08 | End: 2024-07-12

## 2024-07-08 RX ORDER — DILTIAZEM HYDROCHLORIDE 5 MG/ML
10 INJECTION INTRAVENOUS ONCE
Status: COMPLETED | OUTPATIENT
Start: 2024-07-08 | End: 2024-07-08

## 2024-07-08 RX ADMIN — ARIPIPRAZOLE 5 MG: 10 TABLET ORAL at 11:50

## 2024-07-08 RX ADMIN — Medication 2 PUFF: at 16:58

## 2024-07-08 RX ADMIN — LEVETIRACETAM 750 MG: 100 INJECTION INTRAVENOUS at 22:09

## 2024-07-08 RX ADMIN — Medication 40 MG: at 11:50

## 2024-07-08 RX ADMIN — DILTIAZEM HYDROCHLORIDE 5 MG/HR: 5 INJECTION, SOLUTION INTRAVENOUS at 17:03

## 2024-07-08 RX ADMIN — ATORVASTATIN CALCIUM 80 MG: 40 TABLET, FILM COATED ORAL at 22:00

## 2024-07-08 RX ADMIN — LACOSAMIDE 100 MG: 10 INJECTION INTRAVENOUS at 22:09

## 2024-07-08 RX ADMIN — SODIUM CHLORIDE, PRESERVATIVE FREE 10 ML: 5 INJECTION INTRAVENOUS at 11:51

## 2024-07-08 RX ADMIN — DILTIAZEM HYDROCHLORIDE 10 MG: 5 INJECTION, SOLUTION INTRAVENOUS at 16:36

## 2024-07-08 RX ADMIN — FINASTERIDE 5 MG: 5 TABLET, FILM COATED ORAL at 11:50

## 2024-07-08 RX ADMIN — APIXABAN 5 MG: 5 TABLET, FILM COATED ORAL at 11:50

## 2024-07-08 RX ADMIN — PHENOL 1 SPRAY: 1.5 LIQUID ORAL at 04:01

## 2024-07-08 RX ADMIN — LEVOFLOXACIN 500 MG: 5 INJECTION, SOLUTION INTRAVENOUS at 12:13

## 2024-07-08 RX ADMIN — APIXABAN 5 MG: 5 TABLET, FILM COATED ORAL at 22:00

## 2024-07-08 RX ADMIN — CARBIDOPA AND LEVODOPA 1 TABLET: 25; 100 TABLET ORAL at 11:49

## 2024-07-08 RX ADMIN — MONTELUKAST SODIUM 10 MG: 10 TABLET, COATED ORAL at 22:00

## 2024-07-08 RX ADMIN — LEVETIRACETAM 750 MG: 100 INJECTION INTRAVENOUS at 11:50

## 2024-07-08 RX ADMIN — LACOSAMIDE 100 MG: 10 INJECTION INTRAVENOUS at 11:49

## 2024-07-08 RX ADMIN — MEMANTINE HYDROCHLORIDE 10 MG: 5 TABLET ORAL at 22:00

## 2024-07-08 RX ADMIN — MEMANTINE HYDROCHLORIDE 10 MG: 5 TABLET ORAL at 11:50

## 2024-07-08 RX ADMIN — SODIUM CHLORIDE: 9 INJECTION, SOLUTION INTRAVENOUS at 12:10

## 2024-07-08 RX ADMIN — ALBUTEROL SULFATE 2.5 MG: 2.5 SOLUTION RESPIRATORY (INHALATION) at 19:37

## 2024-07-08 RX ADMIN — ACETAMINOPHEN 650 MG: 650 SUPPOSITORY RECTAL at 16:01

## 2024-07-08 RX ADMIN — CARBIDOPA AND LEVODOPA 1 TABLET: 25; 100 TABLET ORAL at 22:00

## 2024-07-08 RX ADMIN — SODIUM CHLORIDE, PRESERVATIVE FREE 10 ML: 5 INJECTION INTRAVENOUS at 22:20

## 2024-07-08 RX ADMIN — LORAZEPAM 0.5 MG: 2 INJECTION INTRAMUSCULAR; INTRAVENOUS at 13:39

## 2024-07-08 RX ADMIN — Medication 40 MG: at 22:09

## 2024-07-08 RX ADMIN — ASPIRIN 81 MG: 81 TABLET, COATED ORAL at 11:50

## 2024-07-08 ASSESSMENT — PAIN DESCRIPTION - PAIN TYPE: TYPE: ACUTE PAIN

## 2024-07-08 ASSESSMENT — PAIN DESCRIPTION - FREQUENCY: FREQUENCY: INTERMITTENT

## 2024-07-08 ASSESSMENT — PAIN DESCRIPTION - DIRECTION: RADIATING_TOWARDS: DENIES

## 2024-07-08 ASSESSMENT — PAIN DESCRIPTION - LOCATION: LOCATION: THROAT

## 2024-07-08 ASSESSMENT — PAIN SCALES - GENERAL
PAINLEVEL_OUTOF10: 4
PAINLEVEL_OUTOF10: 0

## 2024-07-08 ASSESSMENT — PAIN DESCRIPTION - DESCRIPTORS: DESCRIPTORS: DISCOMFORT

## 2024-07-08 ASSESSMENT — PAIN SCALES - WONG BAKER
WONGBAKER_NUMERICALRESPONSE: NO HURT

## 2024-07-08 ASSESSMENT — PAIN - FUNCTIONAL ASSESSMENT: PAIN_FUNCTIONAL_ASSESSMENT: ACTIVITIES ARE NOT PREVENTED

## 2024-07-08 ASSESSMENT — PAIN DESCRIPTION - ORIENTATION: ORIENTATION: POSTERIOR

## 2024-07-08 ASSESSMENT — PAIN DESCRIPTION - ONSET: ONSET: SUDDEN

## 2024-07-08 NOTE — PROGRESS NOTES
Pt transferred to PCU, diltiazem bolus given and drip started.  Family and patient understand reasoning for medication.  Currently on HF oxygen.  Bed in lowest position and call light within reach.  WCTM

## 2024-07-08 NOTE — PROGRESS NOTES
Handoff report given to Bonifacio MENDEZ. Pt in bed with eyes closed.  No signs of distress noted. With continuous NG Tube feeding. Call light within reach.

## 2024-07-08 NOTE — PROGRESS NOTES
/71   Pulse (!) 107   Temp 99.8 °F (37.7 °C) (Axillary)   Resp 24   Ht 1.778 m (5' 10\")   Wt 67.4 kg (148 lb 9.4 oz)   SpO2 96%   BMI 21.32 kg/m²     Assessment complete. Meds passed. Pt denies needs at this time    Found at 80% on RA after MBS. RRT called. NT suctioning performed by RT, CXRAY performed. Requiring 15l Non rebreather mask, after suction down to 8, then 6L of oxygen.  Tube feeds stopped. Meds ok to be given via NGT per Dr Loza.             Bedside Mobility Assessment Tool (BMAT):     Assessment Level 1- Sit and Shake    1. From a semi-reclined position, ask patient to sit up and rotate to a seated position at the side of the bed. Can use the bedrail.    2. Ask patient to reach out and grab your hand and shake making sure patient reaches across his/her midline.   Pass- Patient is able to come to a seated position, maintain core strength. Maintains seated balance while reaching across midline. Move on to Assessment Level 2.     Assessment Level 2- Stretch and Point   1. With patient in seated position at the side of the bed, have patient place both feet on the floor (or stool) with knees no higher than hips.    2. Ask patient to stretch one leg and straighten the knee, then bend the ankle/flex and point the toes. If appropriate, repeat with the other leg.   Pass- Patient is able to demonstrate appropriate quad strength on intended weight bearing limb(s). Move onto Assessment Level 3.     Assessment Level 3- Stand   1. Ask patient to elevate off the bed or chair (seated to standing) using an assistive device (cane, bedrail).    2. Patient should be able to raise buttocks off be and hold for a count of five. May repeat once.   Pass- Patient maintains standing stability for at least 5 seconds, proceed to assessment level 4.    Assessment Level 4- Walk   1. Ask patient to march in place at bedside.    2. Then ask patient to advance step and return each foot. Some medical conditions may

## 2024-07-08 NOTE — PROGRESS NOTES
PM assessment completed. No complaints of pain or discomfort voiced at this time. With NG tube attached to left nares, on continuous tube feeding Jevity 1.5 at 60ml/hr.  No signs or symptoms of distress noted. No signs of aspiratin noted. Stated that he is fine, Patient is NPO. Patient's oral medication given through NG tube. Respirations easy and even. Oral suction catheter is at patient's side, stated that he is doing oral suctioning by himself. Bed in lowest position, bed alarm in place and functioning properly, bed rails x2 up,  Call light within reach.

## 2024-07-08 NOTE — CODE DOCUMENTATION
Pt noted with SOB, requiring increased oxygen demand. Rapid called and stat chest xray ordered. Pt requiring suction at bedside.

## 2024-07-08 NOTE — CARE COORDINATION
Reviewed chart, met with pt bedside. Pt sleeping. Pt had rapid response called this morning. Pt now on 6 L HF O2, none at baseline. NG in placed. Will need PT recs. Pt has been active with [facility]. Will continue to monitor.

## 2024-07-08 NOTE — PROGRESS NOTES
PROGRESS NOTE  S:78 yrs Patient  admitted on 7/1/2024 with Aspiration into respiratory tract, initial encounter [T17.908A]  Aspiration into airway, initial encounter [T17.908A] .  Today, he had repeat MBS with significant aspiration. Rapid called for hypoxia. Oxygen requirements now 6 L HFNC. TF via NG stopped.     Exam:   Vitals:    07/08/24 0853   BP:    Pulse:    Resp:    Temp:    SpO2: 100%   Generalized: alert, no acute distress  HEENT sclera clear, anicteric, +NG  Neck supple, trachea midline  Heart: NSR  Abdomen soft,NT, ND  Extremities no edema     Medications: Reviewed  Labs: Reviewed     Imaging  CXR: 7/8  Again identified is volume loss on the right with right effusion and pleural  thickening along the right chest wall associated with remote rib fracture.  NG tube unchanged.  Postoperative changes the lower cervical spine.  Loop  recorder.  No obvious acute infiltrate  or pneumothorax.    MBS, 7/8  Laryngeal penetration and subglottic tracheal aspiration with both thin  barium and nectar thick barium.   Attending Supervising Physician's Attestation Statement  The patient is a 78 y.o. male. I have performed a history and physical examination of the patient. I discussed the case with GEOVANNA Lane    I reviewed the patient's Past Medical History, Past Surgical History, Medications, and Allergies.     Physical Exam:  Vitals:    07/08/24 1545 07/08/24 1550 07/08/24 1615 07/08/24 1658   BP: (!) 144/98 127/85 107/71    Pulse: (!) 160 (!) 145 (!) 107    Resp: 26 24 24    Temp:   99.8 °F (37.7 °C)    TempSrc:   Axillary    SpO2:   96% 96%   Weight:       Height:           Physical Examination:   General - anxious and chronically ill appearing  Mental status - agitated  Eyes - sclera anicteric  Neck - supple, no significant adenopathy  Heart - irreglularly irregular  Abdomen - soft, NT, ND  Extremities - no pedal edema         Assessment   77 yo male with pmx of

## 2024-07-08 NOTE — PROGRESS NOTES
Reassessed patient at bedside, stated that he is fine. No signs of aspiration noted. No discomfort voiced at this time.

## 2024-07-08 NOTE — PROGRESS NOTES
RT Inhaler-Nebulizer Bronchodilator Protocol Note    There is a bronchodilator order in the chart from a provider indicating to follow the RT Bronchodilator Protocol and there is an “Initiate RT Inhaler-Nebulizer Bronchodilator Protocol” order as well (see protocol at bottom of note).    CXR Findings:  XR CHEST PORTABLE    Result Date: 7/8/2024  Stable chest radiograph.       The findings from the last RT Protocol Assessment were as follows:   History Pulmonary Disease: (P) Smoker 15 pack years or more  Respiratory Pattern: (P) Dyspnea on exertion or RR 21-25 bpm  Breath Sounds: (P) Inspiratory and expiratory or bilateral wheezing and/or rhonchi  Cough: (P) Strong, productive  Indication for Bronchodilator Therapy: (P) Decreased or absent breath sounds  Bronchodilator Assessment Score: (P) 10    Aerosolized bronchodilator medication orders have been revised according to the RT Inhaler-Nebulizer Bronchodilator Protocol below.    Respiratory Therapist to perform RT Therapy Protocol Assessment initially then follow the protocol.  Repeat RT Therapy Protocol Assessment PRN for score 0-3 or on second treatment, BID, and PRN for scores above 3.    No Indications - adjust the frequency to every 6 hours PRN wheezing or bronchospasm, if no treatments needed after 48 hours then discontinue using Per Protocol order mode.     If indication present, adjust the RT bronchodilator orders based on the Bronchodilator Assessment Score as indicated below.  Use Inhaler orders unless patient has one or more of the following: on home nebulizer, not able to hold breath for 10 seconds, is not alert and oriented, cannot activate and use MDI correctly, or respiratory rate 25 breaths per minute or more, then use the equivalent nebulizer order(s) with same Frequency and PRN reasons based on the score.  If a patient is on this medication at home then do not decrease Frequency below that used at home.    0-3 - enter or revise RT bronchodilator

## 2024-07-08 NOTE — PROCEDURES
SPEECH/LANGUAGE PATHOLOGY  Swallowing Disorders and Dysphagia  VIDEOFLUOROSCOPIC STUDY OF SWALLOWING (VFSS) / Modified Barium Swallow Study (MBSS)  Facility/Department: [address] MEDICAL-SURGICAL  Diet Recommendation: NPO ; Ice chips with SLP/RN only (oral care must be completed prior); Meds via alt means of nutrition  Risk Management: Control risk factors for aspiration PNA by completing oral care 3-4x/day and increasing physical mobility as is medically feasible  Specialist Referrals: ENT, Pulmonology, GI, and Neuro  Ancillary Tests: N/A- defer to MD  Goals: Tolerate LRD  Follow-up exam: Will f/u per original POC    PATIENT NAME:  Rodolfo Luna      :  1945    Room: 0222/0222-01   TODAY'S DATE:  2024    [x]The admitting diagnosis and active problem list, as listed below have been reviewed prior to initiation of this evaluation.     ADMITTING DIAGNOSIS: Aspiration into respiratory tract, initial encounter [T17.908A]  Aspiration into airway, initial encounter [T17.908A]     ACTIVE PROBLEM LIST:   Patient Active Problem List   Diagnosis    Dizziness    Syncope and collapse    Contusion of knee, right    Dysphagia    Encounter for electronic analysis of reveal event recorder    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HCC)    History of nonmelanoma skin cancer    Ischemic chest pain (HCC)    Left hand pain    Hand pain, right    Arthritis of carpometacarpal (CMC) joints of both thumbs    Radial styloid tenosynovitis (de quervain)    Left elbow fracture, closed, initial encounter    Multiple falls    General weakness    Thrombocytopenia (HCC)    Malignant neoplasm of lung (HCC)    Orthostasis    Hypotension    Chest pain    Abnormal finding on EKG    Digoxin toxicity, accidental or unintentional, initial encounter    COVID-19 virus infection    COVID    Rib pain on right side    Coronary artery disease involving native coronary artery of native heart without angina pectoris    Atrial fibrillation, persistent (HCC)     strategies given min cues       Goal 4: The patient will tolerate repeat instrumental assessment when able   7/2/2024: GOAL MET 7/2    Goal 5: The patient will tolerate repeat instrumental assessment when able   07/08/2024: GOAL MET    Long Term Goals  Timeframe for Long term goals 7 days (07/09/2024)- EXTEND X7 DAYS TO 07/15/2024  Goal 1: The patient will tolerate least restrictive diet with no clinical s/s of aspiration or worsening respiratory/pulmonary status  7/07/2024 : Ongoing, not progressing.      Pain: The patient does not complain of pain    Patient seen for inpatient MBS. Referring provider will be notified of results and recommendations. Please don't hesitate to contact me at [phone] with questions or concerns.      Therapy Time:    Individual   Time In  0800   Time Out  0825   Minutes  25 mins /  2 units      Signature:  eJssy Rehman M.A., CCC-SLP   Speech-Language Pathologist #[id number]  Speech Pathology and Swallowing Disorders  P: (inpatient): [phone] (speech desk): [phone]  E: [email or web address]  Certified to provide:  FEES, MBS, Vital Stim, and [facility] Dysphagia Therapy Program (MDTP)

## 2024-07-08 NOTE — PLAN OF CARE
Problem: Chronic Conditions and Co-morbidities  Goal: Patient's chronic conditions and co-morbidity symptoms are monitored and maintained or improved  7/8/2024 0127 by Salty Rice RN  Outcome: Progressing     Problem: Safety - Adult  Goal: Free from fall injury  7/8/2024 0127 by Salty Rice RN  Outcome: Progressing     Problem: Discharge Planning  Goal: Discharge to home or other facility with appropriate resources  7/8/2024 0127 by Salty Rice RN  Outcome: Progressing     Problem: Nutrition Deficit:  Goal: Optimize nutritional status  7/8/2024 0127 by Salty Rice RN  Outcome: Progressing     Problem: Neurosensory - Adult  Goal: Achieves maximal functionality and self care  7/8/2024 0127 by Salty Rice RN  Outcome: Progressing  Flowsheets (Taken 7/7/2024 2202)  Achieves maximal functionality and self care: Monitor swallowing and airway patency with patient fatigue and changes in neurological status     Problem: Skin/Tissue Integrity - Adult  Goal: Skin integrity remains intact  7/8/2024 0127 by Salty Rice RN  Outcome: Progressing     Problem: Musculoskeletal - Adult  Goal: Return mobility to safest level of function  7/8/2024 0127 by Salty Rice RN  Outcome: Progressing     Problem: Pain  Goal: Verbalizes/displays adequate comfort level or baseline comfort level  7/8/2024 0127 by Salty Rice RN  Outcome: Progressing

## 2024-07-08 NOTE — PROGRESS NOTES
MD notified of a flutter starting at 130s, then increasing to  150-160s. Orders for CMP, CBC, EKG, Diltazem bolus and infusion, and transfer to PCU placed. EKG showed AFIB RVR. PCU RN aware.     Patient complaining of SOB with the elevated HR. Denies Dizziness    Transferred to room 306 without incident. Wife at bedside, all questions answered.

## 2024-07-08 NOTE — PROGRESS NOTES
IM Progress Note    Admit Date:  7/1/2024    Patient was admitted for aspiration.   Dysphagia    Has h/o presbyesophagus    Seen by speech therapy   Seen by GI - S/P EGD with esophageal dilatation today 7/5     Subjective:  Mr. Luna became severely hypoxic after coming back from barium swallow procedure  Rapid response was called.  Placed on a nonrebreather mask  Currently on 8 L high flow  Objective:   Patient Vitals for the past 4 hrs:   BP Temp Temp src Pulse Resp SpO2   07/08/24 0800 127/74 98.8 °F (37.1 °C) Oral 92 18 96 %            Intake/Output Summary (Last 24 hours) at 7/8/2024 0841  Last data filed at 7/8/2024 0708  Gross per 24 hour   Intake 2322 ml   Output 1000 ml   Net 1322 ml         Physical Exam:  Gen: Mild respiratory distress. Alert.   Patient pleasant awake alert and well-oriented  Eyes: PERRL. No sclera icterus. No conjunctival injection.   ENT: No discharge. Pharynx clear.   Neck: No JVD.  Trachea midline.  Resp: + accessory muscle use. No crackles. No wheezes.  He has rhonchi.   CV: Regular rate. Regular rhythm. No murmur.  No rub. No edema.   GI: Non-tender. Non-distended.  Normal bowel sounds.  Skin: Warm and dry. No nodule on exposed extremities. No rash on exposed extremities.   M/S: No cyanosis. No joint deformity. No clubbing.   Neuro: Awake. Grossly nonfocal    Psych: Oriented x 3. No anxiety or agitation.       Medications:  benzocaine, , Once  pantoprazole (PROTONIX) 40 mg in sodium chloride (PF) 0.9 % 10 mL injection, 40 mg, Q12H  apixaban, 5 mg, BID  ARIPiprazole, 5 mg, Daily  carbidopa-levodopa, 1 tablet, TID  finasteride, 5 mg, Daily  memantine, 10 mg, BID  montelukast, 10 mg, Nightly  atorvastatin, 80 mg, Nightly  aspirin EC, 81 mg, Daily  lacosamide, 100 mg, BID  levETIRAcetam, 750 mg, Q12H  sodium chloride flush, 5-40 mL, 2 times per day      PRN Medications:  LORazepam, 0.5 mg, Q6H PRN  phenol, 1 spray, Q2H PRN  albuterol sulfate HFA, 2 puff, Q4H PRN  sodium chloride flush,  hours  Code Status: DNR-CCA  Plan of care discussed in detail with patient and the patient's nurse.      PADMINI PARTIDA MD  07/08/24

## 2024-07-09 PROBLEM — R29.898 RIGHT LEG WEAKNESS: Status: ACTIVE | Noted: 2024-07-09

## 2024-07-09 LAB
ANION GAP SERPL CALCULATED.3IONS-SCNC: 10 MMOL/L (ref 3–16)
ANISOCYTOSIS BLD QL SMEAR: ABNORMAL
BASOPHILS # BLD: 0 K/UL (ref 0–0.2)
BASOPHILS NFR BLD: 0 %
BUN SERPL-MCNC: 21 MG/DL (ref 7–20)
CALCIUM SERPL-MCNC: 8.4 MG/DL (ref 8.3–10.6)
CHLORIDE SERPL-SCNC: 101 MMOL/L (ref 99–110)
CO2 SERPL-SCNC: 27 MMOL/L (ref 21–32)
CREAT SERPL-MCNC: 0.7 MG/DL (ref 0.8–1.3)
DEPRECATED RDW RBC AUTO: 17.4 % (ref 12.4–15.4)
EOSINOPHIL # BLD: 0 K/UL (ref 0–0.6)
EOSINOPHIL NFR BLD: 0 %
GFR SERPLBLD CREATININE-BSD FMLA CKD-EPI: >90 ML/MIN/{1.73_M2}
GLUCOSE BLD-MCNC: 121 MG/DL (ref 70–99)
GLUCOSE BLD-MCNC: 123 MG/DL (ref 70–99)
GLUCOSE BLD-MCNC: 139 MG/DL (ref 70–99)
GLUCOSE SERPL-MCNC: 142 MG/DL (ref 70–99)
HCT VFR BLD AUTO: 38 % (ref 40.5–52.5)
HGB BLD-MCNC: 12.8 G/DL (ref 13.5–17.5)
INR PPP: 2.59 (ref 0.85–1.15)
LYMPHOCYTES # BLD: 0.5 K/UL (ref 1–5.1)
LYMPHOCYTES NFR BLD: 5 %
MAGNESIUM SERPL-MCNC: 1.7 MG/DL (ref 1.8–2.4)
MCH RBC QN AUTO: 31 PG (ref 26–34)
MCHC RBC AUTO-ENTMCNC: 33.7 G/DL (ref 31–36)
MCV RBC AUTO: 92 FL (ref 80–100)
MONOCYTES # BLD: 0.5 K/UL (ref 0–1.3)
MONOCYTES NFR BLD: 5 %
NEUTROPHILS # BLD: 9.6 K/UL (ref 1.7–7.7)
NEUTROPHILS NFR BLD: 83 %
NEUTS BAND NFR BLD MANUAL: 7 % (ref 0–7)
PERFORMED ON: ABNORMAL
PLATELET # BLD AUTO: 150 K/UL (ref 135–450)
PLATELET BLD QL SMEAR: ADEQUATE
PMV BLD AUTO: 7.9 FL (ref 5–10.5)
POTASSIUM SERPL-SCNC: 4.2 MMOL/L (ref 3.5–5.1)
PROTHROMBIN TIME: 27.7 SEC (ref 11.9–14.9)
RBC # BLD AUTO: 4.14 M/UL (ref 4.2–5.9)
SLIDE REVIEW: ABNORMAL
SODIUM SERPL-SCNC: 138 MMOL/L (ref 136–145)
WBC # BLD AUTO: 10.7 K/UL (ref 4–11)

## 2024-07-09 PROCEDURE — 6370000000 HC RX 637 (ALT 250 FOR IP): Performed by: INTERNAL MEDICINE

## 2024-07-09 PROCEDURE — C9254 INJECTION, LACOSAMIDE: HCPCS | Performed by: INTERNAL MEDICINE

## 2024-07-09 PROCEDURE — 6360000002 HC RX W HCPCS: Performed by: INTERNAL MEDICINE

## 2024-07-09 PROCEDURE — 94761 N-INVAS EAR/PLS OXIMETRY MLT: CPT

## 2024-07-09 PROCEDURE — 99223 1ST HOSP IP/OBS HIGH 75: CPT | Performed by: INTERNAL MEDICINE

## 2024-07-09 PROCEDURE — 97530 THERAPEUTIC ACTIVITIES: CPT

## 2024-07-09 PROCEDURE — 2580000003 HC RX 258: Performed by: INTERNAL MEDICINE

## 2024-07-09 PROCEDURE — G0378 HOSPITAL OBSERVATION PER HR: HCPCS

## 2024-07-09 PROCEDURE — 99232 SBSQ HOSP IP/OBS MODERATE 35: CPT

## 2024-07-09 PROCEDURE — 97166 OT EVAL MOD COMPLEX 45 MIN: CPT

## 2024-07-09 PROCEDURE — 85610 PROTHROMBIN TIME: CPT

## 2024-07-09 PROCEDURE — 2500000003 HC RX 250 WO HCPCS: Performed by: INTERNAL MEDICINE

## 2024-07-09 PROCEDURE — 80048 BASIC METABOLIC PNL TOTAL CA: CPT

## 2024-07-09 PROCEDURE — 36415 COLL VENOUS BLD VENIPUNCTURE: CPT

## 2024-07-09 PROCEDURE — 97161 PT EVAL LOW COMPLEX 20 MIN: CPT

## 2024-07-09 PROCEDURE — 83735 ASSAY OF MAGNESIUM: CPT

## 2024-07-09 PROCEDURE — 2700000000 HC OXYGEN THERAPY PER DAY

## 2024-07-09 PROCEDURE — 85025 COMPLETE CBC W/AUTO DIFF WBC: CPT

## 2024-07-09 PROCEDURE — 96367 TX/PROPH/DG ADDL SEQ IV INF: CPT

## 2024-07-09 PROCEDURE — 94640 AIRWAY INHALATION TREATMENT: CPT

## 2024-07-09 PROCEDURE — 6370000000 HC RX 637 (ALT 250 FOR IP): Performed by: STUDENT IN AN ORGANIZED HEALTH CARE EDUCATION/TRAINING PROGRAM

## 2024-07-09 RX ORDER — LORAZEPAM 2 MG/ML
1 INJECTION INTRAMUSCULAR EVERY 6 HOURS PRN
Status: DISCONTINUED | OUTPATIENT
Start: 2024-07-09 | End: 2024-07-11

## 2024-07-09 RX ORDER — QUETIAPINE FUMARATE 25 MG/1
25 TABLET, FILM COATED ORAL 2 TIMES DAILY
Status: DISCONTINUED | OUTPATIENT
Start: 2024-07-09 | End: 2024-07-11

## 2024-07-09 RX ADMIN — ALBUTEROL SULFATE 2.5 MG: 2.5 SOLUTION RESPIRATORY (INHALATION) at 13:29

## 2024-07-09 RX ADMIN — FINASTERIDE 5 MG: 5 TABLET, FILM COATED ORAL at 08:11

## 2024-07-09 RX ADMIN — APIXABAN 5 MG: 5 TABLET, FILM COATED ORAL at 08:11

## 2024-07-09 RX ADMIN — CARBIDOPA AND LEVODOPA 1 TABLET: 25; 100 TABLET ORAL at 20:58

## 2024-07-09 RX ADMIN — ARIPIPRAZOLE 5 MG: 10 TABLET ORAL at 08:12

## 2024-07-09 RX ADMIN — DILTIAZEM HYDROCHLORIDE 2.5 MG/HR: 5 INJECTION, SOLUTION INTRAVENOUS at 18:28

## 2024-07-09 RX ADMIN — MEROPENEM 1000 MG: 1 INJECTION INTRAVENOUS at 10:07

## 2024-07-09 RX ADMIN — LEVETIRACETAM 750 MG: 100 INJECTION INTRAVENOUS at 08:09

## 2024-07-09 RX ADMIN — LACOSAMIDE 100 MG: 10 INJECTION INTRAVENOUS at 08:09

## 2024-07-09 RX ADMIN — MEMANTINE HYDROCHLORIDE 10 MG: 5 TABLET ORAL at 20:43

## 2024-07-09 RX ADMIN — LEVETIRACETAM 750 MG: 100 INJECTION INTRAVENOUS at 20:42

## 2024-07-09 RX ADMIN — LORAZEPAM 0.5 MG: 2 INJECTION INTRAMUSCULAR; INTRAVENOUS at 08:20

## 2024-07-09 RX ADMIN — LORAZEPAM 0.5 MG: 2 INJECTION INTRAMUSCULAR; INTRAVENOUS at 01:26

## 2024-07-09 RX ADMIN — SODIUM CHLORIDE: 9 INJECTION, SOLUTION INTRAVENOUS at 10:03

## 2024-07-09 RX ADMIN — SODIUM CHLORIDE, PRESERVATIVE FREE 10 ML: 5 INJECTION INTRAVENOUS at 20:56

## 2024-07-09 RX ADMIN — QUETIAPINE FUMARATE 25 MG: 25 TABLET ORAL at 20:43

## 2024-07-09 RX ADMIN — LORAZEPAM 0.5 MG: 2 INJECTION INTRAMUSCULAR; INTRAVENOUS at 14:31

## 2024-07-09 RX ADMIN — MEROPENEM 1000 MG: 1 INJECTION INTRAVENOUS at 17:04

## 2024-07-09 RX ADMIN — CARBIDOPA AND LEVODOPA 1 TABLET: 25; 100 TABLET ORAL at 08:11

## 2024-07-09 RX ADMIN — SODIUM CHLORIDE, PRESERVATIVE FREE 10 ML: 5 INJECTION INTRAVENOUS at 08:12

## 2024-07-09 RX ADMIN — ALBUTEROL SULFATE 2.5 MG: 2.5 SOLUTION RESPIRATORY (INHALATION) at 07:41

## 2024-07-09 RX ADMIN — LEVOFLOXACIN 500 MG: 5 INJECTION, SOLUTION INTRAVENOUS at 08:30

## 2024-07-09 RX ADMIN — LORAZEPAM 0.5 MG: 2 INJECTION INTRAMUSCULAR; INTRAVENOUS at 21:00

## 2024-07-09 RX ADMIN — ACETAMINOPHEN 650 MG: 325 TABLET ORAL at 08:11

## 2024-07-09 RX ADMIN — ALBUTEROL SULFATE 2.5 MG: 2.5 SOLUTION RESPIRATORY (INHALATION) at 19:54

## 2024-07-09 RX ADMIN — ASPIRIN 81 MG: 81 TABLET, COATED ORAL at 08:11

## 2024-07-09 RX ADMIN — MONTELUKAST SODIUM 10 MG: 10 TABLET, COATED ORAL at 20:57

## 2024-07-09 RX ADMIN — MEMANTINE HYDROCHLORIDE 10 MG: 5 TABLET ORAL at 08:12

## 2024-07-09 RX ADMIN — Medication 40 MG: at 20:42

## 2024-07-09 RX ADMIN — Medication 40 MG: at 08:09

## 2024-07-09 RX ADMIN — ATORVASTATIN CALCIUM 80 MG: 40 TABLET, FILM COATED ORAL at 20:58

## 2024-07-09 RX ADMIN — LACOSAMIDE 100 MG: 10 INJECTION INTRAVENOUS at 20:42

## 2024-07-09 RX ADMIN — CARBIDOPA AND LEVODOPA 1 TABLET: 25; 100 TABLET ORAL at 14:31

## 2024-07-09 ASSESSMENT — PAIN SCALES - WONG BAKER

## 2024-07-09 ASSESSMENT — PAIN SCALES - GENERAL
PAINLEVEL_OUTOF10: 3
PAINLEVEL_OUTOF10: 0

## 2024-07-09 ASSESSMENT — PAIN DESCRIPTION - DESCRIPTORS: DESCRIPTORS: ACHING

## 2024-07-09 ASSESSMENT — PAIN DESCRIPTION - LOCATION: LOCATION: GENERALIZED

## 2024-07-09 NOTE — PROGRESS NOTES
Progress Note    Admit Date:  7/1/2024    Patient was admitted for aspiration.  Dysphagia   Has h/o presbyesophagus   Seen by speech therapy  Seen by GI - S/P EGD with esophageal dilatation 7/5   Severely hypoxic after MBS - RR called and pt on nonrebreather mask, pt moved to PCU     Subjective:  Mr. Luna was seen laying in bed. He states he feels about the same as yesterday. He is currently on 5L HFNC. Possible bronch tomorrow.    Objective:   Patient Vitals for the past 4 hrs:   BP Temp Temp src Pulse Resp SpO2   07/09/24 0751 125/69 99.9 °F (37.7 °C) Oral 96 20 95 %   07/09/24 0744 -- -- -- -- -- 95 %          Intake/Output Summary (Last 24 hours) at 7/9/2024 1029  Last data filed at 7/9/2024 0128  Gross per 24 hour   Intake 0 ml   Output 200 ml   Net -200 ml       Physical Exam:  Gen: No distress. Alert. Ill appearing   Eyes: PERRL. No sclera icterus. No conjunctival injection.   ENT: No discharge. Pharynx clear. +NG in place  Neck: No JVD.  Trachea midline.  Resp: No accessory muscle use. No crackles. No wheezes. + rhonchi.   CV: irregularly irregular. No murmur.  No rub. No edema.   GI: Non-tender. Non-distended.  Normal bowel sounds.  Skin: Warm and dry. No nodule on exposed extremities. No rash on exposed extremities.   M/S: No cyanosis. No joint deformity. No clubbing.   Neuro: Awake. Grossly nonfocal    Psych: Oriented x 3. No anxiety or agitation.       Medications:  meropenem, 1,000 mg, Q8H  levofloxacin, 500 mg, Q24H  albuterol, 2.5 mg, TID  benzocaine, , Once  pantoprazole (PROTONIX) 40 mg in sodium chloride (PF) 0.9 % 10 mL injection, 40 mg, Q12H  [Held by provider] apixaban, 5 mg, BID  ARIPiprazole, 5 mg, Daily  carbidopa-levodopa, 1 tablet, TID  finasteride, 5 mg, Daily  memantine, 10 mg, BID  montelukast, 10 mg, Nightly  atorvastatin, 80 mg, Nightly  aspirin EC, 81 mg, Daily  lacosamide, 100 mg, BID  levETIRAcetam, 750 mg, Q12H  sodium chloride flush, 5-40 mL, 2 times per day      PRN

## 2024-07-09 NOTE — PROGRESS NOTES
Speech-Language Pathology  Swallowing Disorders and Dysphagia     SLP Attempt Note           Name: Rodolfo Luna  : 1945  Medical Diagnosis: Aspiration into respiratory tract, initial encounter []  Aspiration into airway, initial encounter []    Attempting to follow-up with pt. NPO for GI and pulm for possible bronch tomorrow. No charges filed. Thank you,      Jessy Rehman M.A., CCC-SLP   Speech-Language Pathologist #[id number]  Speech Pathology and Swallowing Disorders  P: (inpatient): [phone] (speech desk): [phone]  E: [email or web address]  Certified to provide:  FEES, MBS, Vital Stim, and [facility] Dysphagia Therapy Program (MDTP)

## 2024-07-09 NOTE — PROGRESS NOTES
Patient asking for PRN lorazepam at this time. Patient educated that order is for Q6H PRN and medication will not be available until 2030.   Wife asked RN to message doctor to increase frequency of medication to Q4H PRN.   Message sent to Dr. Dago MD does not want to increase frequency at this time.

## 2024-07-09 NOTE — CONSULTS
[facility]                                 Pulmonary/Critical care  Consult /Progress Note :                                                                  CC :SOB     Patient is being seen at the request of Dr Huitron  for a consultation for hypoxia    HISTORY OF PRESENT ILLNESS:   77 yo male with about 20 PPY smoking history and history of Lung cancer s/p Lobectomy /VATS     He has recent admission and a h/o Parkinson's and esophageal stricture status post dilation   He states that he has had trouble swallowing for about a year.    Recently he has not been able to eat at all and whenever he eats there is chocking  Has feeding tube   Yesterday he started getting more SOB and hypoxic   Had NT suctioned x 2 right nareby RT .Moderate amount thick secretions. ,he is provided with  Yankauer at bedside.       PAST MEDICAL HISTORY:  Past Medical History:   Diagnosis Date    A-fib ([facility])     MIRNA (acute kidney injury) ([facility]) 03/31/2022    Anxiety     Arthritis     OA    Bradycardia 04/19/2014    Cancer ([facility])     skin cancer-forearm right , face    Coronary artery disease involving native coronary artery of native heart without angina pectoris 07/19/2022    Hemoptysis 10/16/2014    Since Ablation    Irregular heart  beats     Mixed hyperlipidemia 03/17/2023    Parkinson disease ([facility])     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[facility]) 12/10/2014    S/P drug eluting coronary stent placement 03/06/2017    2.25 x 18 Xience Alpine ADRIÁN - Distal LAD    Seizure disorder ([facility])     Abnormal EEG with left temporal shapr waves    Seizures ([facility])     Shortness of breath 09/04/2014    Total knee replacement status 01/12/2011     PAST SURGICAL HISTORY:  Past Surgical History:   Procedure Laterality Date    ABLATION OF DYSRHYTHMIC FOCUS  9/2014    CARPAL TUNNEL RELEASE      CERVICAL DISC SURGERY  2010    COLONOSCOPY  09/26/2016    normal    CORONARY ANGIOPLASTY WITH STENT

## 2024-07-09 NOTE — PROGRESS NOTES
PROGRESS NOTE  S:78 yrs Patient  admitted on 7/1/2024 with Aspiration into respiratory tract, initial encounter [T17.908A]  Aspiration into airway, initial encounter [T17.908A] .  Today, he is requiring 7 L HFNC. He reports coughing up white and yellow secretions. He was transferred to PCU for oxygen requirements and A. Flutter. Patient is agreeable to eventual PEG placement.     Exam:   Vitals:    07/09/24 0751   BP: 125/69   Pulse: 96   Resp: 20   Temp: 99.9 °F (37.7 °C)   SpO2: 95%   Generalized: lethargic, no acute distress  HEENT sclera clear, anicteric, +NG  Neck supple, trachea midline  Heart: A. Flutter   Abdomen soft,NT, ND  Extremities no edema     Medications: Reviewed    Labs:  CBC:   Recent Labs     07/08/24  1559   WBC 13.0*   HGB 13.8   HCT 41.8   MCV 93.4        Attending Supervising Physician's Attestation Statement  The patient is a 78 y.o. male. I have performed a history and physical examination of the patient. I discussed the case with GEOVANNA Lane    I reviewed the patient's Past Medical History, Past Surgical History, Medications, and Allergies.     Physical Exam:  Vitals:    07/09/24 0751 07/09/24 1137 07/09/24 1329 07/09/24 1628   BP: 125/69 106/69  113/68   Pulse: 96 86  94   Resp: 20 22  24   Temp: 99.9 °F (37.7 °C) 97.9 °F (36.6 °C)  98 °F (36.7 °C)   TempSrc: Oral Oral  Oral   SpO2: 95% 92% 90% 95%   Weight:       Height:           Physical Examination:   General - no distress, chronically ill appearing  Mental status - alert  Eyes - sclera anicteric  Neck - supple, no significant adenopathy  Heart - irreglularly irregular  Abdomen - soft, NT, ND  Extremities - no pedal edema          Assessment   77 yo male with pmx of Parkinson's disease, CVA, A.fib s/p Watchman, PE (on eliquis), lung cancer s/p VATS, and seizures admitted with c/o coughing secondary to severe pharyngoesophageal dysphagia. EGD 7/5 with dilation of upper UES, biopsies

## 2024-07-09 NOTE — PROGRESS NOTES
RT Inhaler-Nebulizer Bronchodilator Protocol Note    There is a bronchodilator order in the chart from a provider indicating to follow the RT Bronchodilator Protocol and there is an “Initiate RT Inhaler-Nebulizer Bronchodilator Protocol” order as well (see protocol at bottom of note).    CXR Findings:  XR CHEST PORTABLE    Result Date: 7/8/2024  Stable chest radiograph.       The findings from the last RT Protocol Assessment were as follows:   History Pulmonary Disease: Smoker 15 pack years or more  Respiratory Pattern: Dyspnea on exertion or RR 21-25 bpm  Breath Sounds: Inspiratory and expiratory or bilateral wheezing and/or rhonchi  Cough: Strong, productive  Indication for Bronchodilator Therapy: Decreased or absent breath sounds  Bronchodilator Assessment Score: 10    Aerosolized bronchodilator medication orders have been revised according to the RT Inhaler-Nebulizer Bronchodilator Protocol below.    Respiratory Therapist to perform RT Therapy Protocol Assessment initially then follow the protocol.  Repeat RT Therapy Protocol Assessment PRN for score 0-3 or on second treatment, BID, and PRN for scores above 3.    No Indications - adjust the frequency to every 6 hours PRN wheezing or bronchospasm, if no treatments needed after 48 hours then discontinue using Per Protocol order mode.     If indication present, adjust the RT bronchodilator orders based on the Bronchodilator Assessment Score as indicated below.  Use Inhaler orders unless patient has one or more of the following: on home nebulizer, not able to hold breath for 10 seconds, is not alert and oriented, cannot activate and use MDI correctly, or respiratory rate 25 breaths per minute or more, then use the equivalent nebulizer order(s) with same Frequency and PRN reasons based on the score.  If a patient is on this medication at home then do not decrease Frequency below that used at home.    0-3 - enter or revise RT bronchodilator order(s) to equivalent RT

## 2024-07-09 NOTE — PROGRESS NOTES
RT Inhaler-Nebulizer Bronchodilator Protocol Note    There is a bronchodilator order in the chart from a provider indicating to follow the RT Bronchodilator Protocol and there is an “Initiate RT Inhaler-Nebulizer Bronchodilator Protocol” order as well (see protocol at bottom of note).    CXR Findings:  XR CHEST PORTABLE    Result Date: 7/8/2024  Stable chest radiograph.       The findings from the last RT Protocol Assessment were as follows:   History Pulmonary Disease: (P) Smoker 15 pack years or more  Respiratory Pattern: (P) Dyspnea on exertion or RR 21-25 bpm  Breath Sounds: (P) Inspiratory and expiratory or bilateral wheezing and/or rhonchi  Cough: (P) Strong, productive  Indication for Bronchodilator Therapy: (P) Decreased or absent breath sounds  Bronchodilator Assessment Score: (P) 10    Aerosolized bronchodilator medication orders have been revised according to the RT Inhaler-Nebulizer Bronchodilator Protocol below.    Respiratory Therapist to perform RT Therapy Protocol Assessment initially then follow the protocol.  Repeat RT Therapy Protocol Assessment PRN for score 0-3 or on second treatment, BID, and PRN for scores above 3.    No Indications - adjust the frequency to every 6 hours PRN wheezing or bronchospasm, if no treatments needed after 48 hours then discontinue using Per Protocol order mode.     If indication present, adjust the RT bronchodilator orders based on the Bronchodilator Assessment Score as indicated below.  Use Inhaler orders unless patient has one or more of the following: on home nebulizer, not able to hold breath for 10 seconds, is not alert and oriented, cannot activate and use MDI correctly, or respiratory rate 25 breaths per minute or more, then use the equivalent nebulizer order(s) with same Frequency and PRN reasons based on the score.  If a patient is on this medication at home then do not decrease Frequency below that used at home.    0-3 - enter or revise RT bronchodilator  order(s) to equivalent RT Bronchodilator order with Frequency of every 4 hours PRN for wheezing or increased work of breathing using Per Protocol order mode.        4-6 - enter or revise RT Bronchodilator order(s) to two equivalent RT bronchodilator orders with one order with BID Frequency and one order with Frequency of every 4 hours PRN wheezing or increased work of breathing using Per Protocol order mode.        7-10 - enter or revise RT Bronchodilator order(s) to two equivalent RT bronchodilator orders with one order with TID Frequency and one order with Frequency of every 4 hours PRN wheezing or increased work of breathing using Per Protocol order mode.       11-13 - enter or revise RT Bronchodilator order(s) to one equivalent RT bronchodilator order with QID Frequency and an Albuterol order with Frequency of every 4 hours PRN wheezing or increased work of breathing using Per Protocol order mode.      Greater than 13 - enter or revise RT Bronchodilator order(s) to one equivalent RT bronchodilator order with every 4 hours Frequency and an Albuterol order with Frequency of every 2 hours PRN wheezing or increased work of breathing using Per Protocol order mode.     Electronically signed by Tasia Romero RCP on 7/9/2024 at 7:56 PM

## 2024-07-09 NOTE — FLOWSHEET NOTE
07/08/24 2049   Vital Signs   Temp 98.2 °F (36.8 °C)   Temp Source Oral   Pulse (!) 102   Heart Rate Source Monitor   Respirations 18   /72   MAP (Calculated) 88   BP Location Left upper arm   BP Method Automatic   Oxygen Therapy   SpO2 94 %   O2 Device High flow nasal cannula   O2 Flow Rate (L/min) 7 L/min     Pt assessment complete.  Pt lying quietly in bed.  Lung sounds diminished with Rhonchi.  Pt on 02 7 liters via high flow nasal canula.  Pt remains in Afib with HR in the low 100's.  Pt has Normal saline infusing at 50ml/hr and Cardizem drip infusing at 5ml/hr.  NG tube in place which is clamped at this time.  Medications given via NG tube. Pt tolerated well.  Bed rails wrapped for seizure precautions.  Denies needs at this time.  Call light within reach.  Bed exit alarm on.

## 2024-07-09 NOTE — FLOWSHEET NOTE
07/08/24 2346   Vital Signs   Temp 97.8 °F (36.6 °C)   Temp Source Oral   Pulse 83   Heart Rate Source Monitor   Respirations 18   /75   MAP (Calculated) 92   BP Location Right upper arm   BP Method Automatic   Patient Position Semi fowlers   Oxygen Therapy   SpO2 95 %   O2 Device High flow nasal cannula   O2 Flow Rate (L/min) 7 L/min     Pt reassessment complete.  No changes noted.  Denies needs.  Call light within reach.

## 2024-07-09 NOTE — PROGRESS NOTES
Inpatient Occupational Therapy Evaluation and Treatment    Unit: PCU  Date:  7/9/2024  Patient Name:    Rodolfo Luna  Admitting diagnosis:  Aspiration into respiratory tract, initial encounter [T17.908A]  Aspiration into airway, initial encounter [T17.908A]  Admit Date:  7/1/2024  Precautions/Restrictions/WB Status/ Lines/ Wounds/ Oxygen: Fall risk, Bed/chair alarm, Lines (IV, Supplemental O2 (5L), and NG tube), Telemetry, and Continuous pulse oximetry    Pt seen for cotreatment this date due to patient safety, patient endurance, complexity of condition, and acute illness/injury    Treatment Time:  8086-3859  Treatment Number:  1  Timed Code Treatment Minutes: 16 minutes  Total Treatment Minutes:  26  minutes    Patient Goals for Therapy: \"get stronger \"          Discharge Recommendations: SNF  DME needs for discharge: Defer to facility       Therapy recommendations for staff:   Assist of 1 for ambulation with use of rolling walker (RW) and gait belt to/from BSC  to/from chair    History of Present Illness: Per H&P by Dr. Smith: \" 78 y.o. male with past medical history of Parkinson's disease with chronic aspiration on thickened liquids at baseline, seizure disorder, A-fib status post Watchman procedure, CAD status post PCI, hyperlipidemia and anxiety who presented with persistent cough related to oral intake,  He has a history of chronic aspiration and is on a thickened liquid diet, stating that he coughs up water when he eats.  No vomiting.  He also describes shortness of breath, orthopnea and some lightheadedness. No leg swelling, he denies a history of CHF.\"    07/05 - EGD with esophageal dilatation     07/08 - MBS completed; Severely hypoxic after MBS - RR called and pt on nonrebreather mask, pt moved to PCU     AM-PAC Score: AM-PAC Inpatient Daily Activity Raw Score: 16     Subjective:  Patient lying reclined in bed with family present (wife - Carol).   Pt agreeable to this OT session.     Cognition:    A&O  tolerance, ADLs, Balance , Strength, and Transfers. Pt functioning below baseline and will likely benefit from skilled occupational therapy services to maximize safety and independence.     Pt was cooperative throughout session though demonstrated decreased standing tolerance and endurance and increased weakness. Pt required occasional verbal cueing for safety awareness with line mgmt. Pt would benefit from continued OT services to reduce caregiver burden and increase pt's independence and safety during ADL/IADL participation.     Recommending SNF upon discharge as patient functioning well below baseline, demonstrates good rehab potential and unable to return home due to burden of care beyond caregiver ability, home environment not conducive to patient recovery, and limited safety awareness.    Goal(s) :   To be met in 3 Visits:  Bed to toilet/BSC:       SBA    To be met in 5 Visits:  Supine to/from Sit in preparation for ADL task:   Supervision  Toileting        SBA  Grooming       Supervision  Upper Body Dressing:      Supervision  Lower Body Dressing:      SBA  Pt to demonstrate UE therapeutic exs x 15 reps with minimal cues    Rehabilitation Potential: Good  Strengths for achieving goals include: Pt motivated, PLOF, Family Support, and Pt cooperative   Barriers to achieving goals include:  Complexity of condition and Weakness    Plan:  To be seen 3-5 x/wk while in acute care setting for therapeutic exercises, bed mobility, transfers, family/patient education, ADL/IADL retraining, and energy conservation training.    Electronically signed by Leti Metz on 7/9/2024 at 3:58 PM    Leti Metz OTS     Occupational therapy treatment completed under supervision of this therapist.  RIP Ordonez/L #[id number]        If patient discharges from this facility prior to next visit, this note will serve as the Discharge Summary

## 2024-07-09 NOTE — PROGRESS NOTES
Dr. Kendrick in to see patient. Verbal order to place eliquis on hold. TF to remain on hold. Possible plan for bronch tomorrow.

## 2024-07-09 NOTE — FLOWSHEET NOTE
07/09/24 0751   Vital Signs   Temp 99.9 °F (37.7 °C)   Temp Source Oral   Pulse 96   Heart Rate Source Monitor   Respirations 20   /69   MAP (Calculated) 88   BP Location Right upper arm   BP Method Automatic   Patient Position Semi fowlers   Oxygen Therapy   SpO2 95 %   O2 Device High flow nasal cannula   O2 Flow Rate (L/min) 7 L/min     Patient resting quietly in bed. No s/s of distress noted.  PRN IV ativan given per patient request.   Meds administered via NG.   TF remains on hold.   Shift assessment complete, see flow sheet. Denies needs at this time. Call light in reach. Will monitor.

## 2024-07-10 ENCOUNTER — APPOINTMENT (OUTPATIENT)
Dept: GENERAL RADIOLOGY | Age: 79
DRG: 177 | End: 2024-07-10
Payer: MEDICARE

## 2024-07-10 PROBLEM — T17.908A ASPIRATION INTO AIRWAY, INITIAL ENCOUNTER: Status: ACTIVE | Noted: 2024-07-10

## 2024-07-10 LAB
ANION GAP SERPL CALCULATED.3IONS-SCNC: 10 MMOL/L (ref 3–16)
ANION GAP SERPL CALCULATED.3IONS-SCNC: 9 MMOL/L (ref 3–16)
BASOPHILS # BLD: 0 K/UL (ref 0–0.2)
BASOPHILS NFR BLD: 0.1 %
BUN SERPL-MCNC: 20 MG/DL (ref 7–20)
BUN SERPL-MCNC: 23 MG/DL (ref 7–20)
CALCIUM SERPL-MCNC: 8.5 MG/DL (ref 8.3–10.6)
CALCIUM SERPL-MCNC: 8.7 MG/DL (ref 8.3–10.6)
CHLORIDE SERPL-SCNC: 104 MMOL/L (ref 99–110)
CHLORIDE SERPL-SCNC: 104 MMOL/L (ref 99–110)
CO2 SERPL-SCNC: 27 MMOL/L (ref 21–32)
CO2 SERPL-SCNC: 27 MMOL/L (ref 21–32)
CREAT SERPL-MCNC: 0.6 MG/DL (ref 0.8–1.3)
CREAT SERPL-MCNC: 1.1 MG/DL (ref 0.8–1.3)
DEPRECATED RDW RBC AUTO: 17 % (ref 12.4–15.4)
EOSINOPHIL # BLD: 0 K/UL (ref 0–0.6)
EOSINOPHIL NFR BLD: 0.1 %
GFR SERPLBLD CREATININE-BSD FMLA CKD-EPI: 68 ML/MIN/{1.73_M2}
GFR SERPLBLD CREATININE-BSD FMLA CKD-EPI: >90 ML/MIN/{1.73_M2}
GLUCOSE BLD-MCNC: 103 MG/DL (ref 70–99)
GLUCOSE BLD-MCNC: 104 MG/DL (ref 70–99)
GLUCOSE BLD-MCNC: 108 MG/DL (ref 70–99)
GLUCOSE SERPL-MCNC: 110 MG/DL (ref 70–99)
GLUCOSE SERPL-MCNC: 113 MG/DL (ref 70–99)
HCT VFR BLD AUTO: 35.2 % (ref 40.5–52.5)
HGB BLD-MCNC: 12.1 G/DL (ref 13.5–17.5)
LYMPHOCYTES # BLD: 0.7 K/UL (ref 1–5.1)
LYMPHOCYTES NFR BLD: 6.2 %
MCH RBC QN AUTO: 31.3 PG (ref 26–34)
MCHC RBC AUTO-ENTMCNC: 34.2 G/DL (ref 31–36)
MCV RBC AUTO: 91.4 FL (ref 80–100)
MONOCYTES # BLD: 1.1 K/UL (ref 0–1.3)
MONOCYTES NFR BLD: 10 %
NEUTROPHILS # BLD: 9 K/UL (ref 1.7–7.7)
NEUTROPHILS NFR BLD: 83.6 %
PERFORMED ON: ABNORMAL
PLATELET # BLD AUTO: 143 K/UL (ref 135–450)
PMV BLD AUTO: 8 FL (ref 5–10.5)
POTASSIUM SERPL-SCNC: 3.8 MMOL/L (ref 3.5–5.1)
POTASSIUM SERPL-SCNC: 4.3 MMOL/L (ref 3.5–5.1)
RBC # BLD AUTO: 3.86 M/UL (ref 4.2–5.9)
SODIUM SERPL-SCNC: 140 MMOL/L (ref 136–145)
SODIUM SERPL-SCNC: 141 MMOL/L (ref 136–145)
WBC # BLD AUTO: 10.8 K/UL (ref 4–11)

## 2024-07-10 PROCEDURE — 2580000003 HC RX 258: Performed by: INTERNAL MEDICINE

## 2024-07-10 PROCEDURE — 6360000002 HC RX W HCPCS: Performed by: INTERNAL MEDICINE

## 2024-07-10 PROCEDURE — 6370000000 HC RX 637 (ALT 250 FOR IP): Performed by: INTERNAL MEDICINE

## 2024-07-10 PROCEDURE — 2000000000 HC ICU R&B

## 2024-07-10 PROCEDURE — 96375 TX/PRO/DX INJ NEW DRUG ADDON: CPT

## 2024-07-10 PROCEDURE — 96366 THER/PROPH/DIAG IV INF ADDON: CPT

## 2024-07-10 PROCEDURE — C9254 INJECTION, LACOSAMIDE: HCPCS | Performed by: INTERNAL MEDICINE

## 2024-07-10 PROCEDURE — 6370000000 HC RX 637 (ALT 250 FOR IP): Performed by: STUDENT IN AN ORGANIZED HEALTH CARE EDUCATION/TRAINING PROGRAM

## 2024-07-10 PROCEDURE — 96368 THER/DIAG CONCURRENT INF: CPT

## 2024-07-10 PROCEDURE — 2700000000 HC OXYGEN THERAPY PER DAY

## 2024-07-10 PROCEDURE — 94761 N-INVAS EAR/PLS OXIMETRY MLT: CPT

## 2024-07-10 PROCEDURE — 31720 CLEARANCE OF AIRWAYS: CPT

## 2024-07-10 PROCEDURE — 80048 BASIC METABOLIC PNL TOTAL CA: CPT

## 2024-07-10 PROCEDURE — 99233 SBSQ HOSP IP/OBS HIGH 50: CPT | Performed by: INTERNAL MEDICINE

## 2024-07-10 PROCEDURE — 96376 TX/PRO/DX INJ SAME DRUG ADON: CPT

## 2024-07-10 PROCEDURE — 94640 AIRWAY INHALATION TREATMENT: CPT

## 2024-07-10 PROCEDURE — 36415 COLL VENOUS BLD VENIPUNCTURE: CPT

## 2024-07-10 PROCEDURE — 85025 COMPLETE CBC W/AUTO DIFF WBC: CPT

## 2024-07-10 PROCEDURE — 71045 X-RAY EXAM CHEST 1 VIEW: CPT

## 2024-07-10 PROCEDURE — 99232 SBSQ HOSP IP/OBS MODERATE 35: CPT

## 2024-07-10 RX ORDER — SODIUM CHLORIDE FOR INHALATION 3 %
4 VIAL, NEBULIZER (ML) INHALATION EVERY 12 HOURS
Status: DISCONTINUED | OUTPATIENT
Start: 2024-07-10 | End: 2024-07-16 | Stop reason: HOSPADM

## 2024-07-10 RX ORDER — ALBUTEROL SULFATE 2.5 MG/3ML
2.5 SOLUTION RESPIRATORY (INHALATION)
Status: DISCONTINUED | OUTPATIENT
Start: 2024-07-11 | End: 2024-07-13

## 2024-07-10 RX ADMIN — LEVETIRACETAM 750 MG: 100 INJECTION INTRAVENOUS at 20:38

## 2024-07-10 RX ADMIN — MEROPENEM 1000 MG: 1 INJECTION INTRAVENOUS at 09:40

## 2024-07-10 RX ADMIN — QUETIAPINE FUMARATE 25 MG: 25 TABLET ORAL at 20:21

## 2024-07-10 RX ADMIN — LACOSAMIDE 100 MG: 10 INJECTION INTRAVENOUS at 09:33

## 2024-07-10 RX ADMIN — ALBUTEROL SULFATE 2.5 MG: 2.5 SOLUTION RESPIRATORY (INHALATION) at 14:16

## 2024-07-10 RX ADMIN — ATORVASTATIN CALCIUM 80 MG: 40 TABLET, FILM COATED ORAL at 20:22

## 2024-07-10 RX ADMIN — SODIUM CHLORIDE, PRESERVATIVE FREE 10 ML: 5 INJECTION INTRAVENOUS at 09:39

## 2024-07-10 RX ADMIN — Medication 40 MG: at 20:42

## 2024-07-10 RX ADMIN — CARBIDOPA AND LEVODOPA 1 TABLET: 25; 100 TABLET ORAL at 09:38

## 2024-07-10 RX ADMIN — MEMANTINE HYDROCHLORIDE 10 MG: 5 TABLET ORAL at 20:22

## 2024-07-10 RX ADMIN — CARBIDOPA AND LEVODOPA 1 TABLET: 25; 100 TABLET ORAL at 14:41

## 2024-07-10 RX ADMIN — FINASTERIDE 5 MG: 5 TABLET, FILM COATED ORAL at 09:38

## 2024-07-10 RX ADMIN — Medication 40 MG: at 09:34

## 2024-07-10 RX ADMIN — LORAZEPAM 1 MG: 2 INJECTION INTRAMUSCULAR; INTRAVENOUS at 09:33

## 2024-07-10 RX ADMIN — Medication 4 ML: at 18:55

## 2024-07-10 RX ADMIN — LORAZEPAM 1 MG: 2 INJECTION INTRAMUSCULAR; INTRAVENOUS at 01:48

## 2024-07-10 RX ADMIN — SODIUM CHLORIDE, PRESERVATIVE FREE 10 ML: 5 INJECTION INTRAVENOUS at 20:44

## 2024-07-10 RX ADMIN — MEROPENEM 1000 MG: 1 INJECTION INTRAVENOUS at 01:49

## 2024-07-10 RX ADMIN — ALBUTEROL SULFATE 2.5 MG: 2.5 SOLUTION RESPIRATORY (INHALATION) at 18:55

## 2024-07-10 RX ADMIN — MONTELUKAST SODIUM 10 MG: 10 TABLET, COATED ORAL at 20:21

## 2024-07-10 RX ADMIN — LORAZEPAM 1 MG: 2 INJECTION INTRAMUSCULAR; INTRAVENOUS at 18:24

## 2024-07-10 RX ADMIN — LEVETIRACETAM 750 MG: 100 INJECTION INTRAVENOUS at 09:36

## 2024-07-10 RX ADMIN — LEVOFLOXACIN 500 MG: 5 INJECTION, SOLUTION INTRAVENOUS at 09:40

## 2024-07-10 RX ADMIN — CARBIDOPA AND LEVODOPA 1 TABLET: 25; 100 TABLET ORAL at 20:44

## 2024-07-10 RX ADMIN — QUETIAPINE FUMARATE 25 MG: 25 TABLET ORAL at 09:38

## 2024-07-10 RX ADMIN — ALBUTEROL SULFATE 2.5 MG: 2.5 SOLUTION RESPIRATORY (INHALATION) at 07:54

## 2024-07-10 RX ADMIN — MEROPENEM 1000 MG: 1 INJECTION INTRAVENOUS at 16:30

## 2024-07-10 RX ADMIN — LACOSAMIDE 100 MG: 10 INJECTION INTRAVENOUS at 20:35

## 2024-07-10 RX ADMIN — MEMANTINE HYDROCHLORIDE 10 MG: 5 TABLET ORAL at 09:38

## 2024-07-10 ASSESSMENT — PAIN SCALES - WONG BAKER
WONGBAKER_NUMERICALRESPONSE: NO HURT

## 2024-07-10 NOTE — PROGRESS NOTES
PROGRESS NOTE  S:78 yrs Patient  admitted on 7/1/2024 with Aspiration into respiratory tract, initial encounter [T17.908A]  Aspiration into airway, initial encounter [T17.908A]  Right leg weakness [R29.898] .  Today, he is requiring 5 L oxygen. He reports breathing feels improved. TF remain off for possible bronch today or tomorrow.     Exam:   Vitals:    07/10/24 0931   BP:    Pulse: 96   Resp:    Temp:    SpO2: 93%   Generalized: alert, no acute distress  HEENT sclera clear, anicteric, +NG  Neck supple, trachea midline  Heart: A. Fib   Abdomen soft,NT, ND  Extremities no edema     Medications: Reviewed    Labs:  CBC:   Recent Labs     07/08/24  1559 07/09/24  1255 07/10/24  0450   WBC 13.0* 10.7 10.8   HGB 13.8 12.8* 12.1*   HCT 41.8 38.0* 35.2*   MCV 93.4 92.0 91.4    150 143     BMP:   Recent Labs     07/08/24  1559 07/09/24  1255 07/10/24  0450    138 140   K 4.1 4.2 4.3    101 104   CO2 22 27 27   BUN 16 21* 20   CREATININE <0.5* 0.7* 1.1     Attending Supervising Physician's Attestation Statement  The patient is a 78 y.o. male. I have performed a history and physical examination of the patient. I discussed the case with GEOVANNA Lane    I reviewed the patient's Past Medical History, Past Surgical History, Medications, and Allergies.     Physical Exam:  Vitals:    07/10/24 0931 07/10/24 1308 07/10/24 1516 07/10/24 1631   BP:  120/73  119/72   Pulse: 96 92  93   Resp:  20  20   Temp:  97.2 °F (36.2 °C)  97.5 °F (36.4 °C)   TempSrc:  Oral  Oral   SpO2: 93% 97% 95% 98%   Weight:       Height:           Physical Examination:   General - no distress, chronically ill appearing  Mental status - alert  Eyes - sclera anicteric  Neck - supple, no significant adenopathy  Heart - irreglularly irregular  Abdomen - soft, NT, ND  Extremities - no pedal edema         Assessment   77 yo male with pmx of Parkinson's disease, CVA, A.fib s/p Watchman, PE (on

## 2024-07-10 NOTE — PROGRESS NOTES
Comprehensive Nutrition Assessment    Type and Reason for Visit:  Reassess    Nutrition Recommendations/Plan:   Continue NPO      Malnutrition Assessment:  Malnutrition Status:  Severe malnutrition (07/03/24 1919)    Context:  Acute Illness     Findings of the 6 clinical characteristics of malnutrition:  Energy Intake:  50% or less of estimated energy requirements for 5 or more days  Weight Loss:  7.5% over 3 months     Body Fat Loss:  Moderate body fat loss Orbital, Buccal region   Muscle Mass Loss:  Moderate muscle mass loss Temples (temporalis), Clavicles (pectoralis & deltoids)  Fluid Accumulation:  No significant fluid accumulation Extremities   Strength:  Not Performed    Nutrition Assessment:    Pt declining from a nutritional standpoint AEB 5# loss since admission and currently NPO with TF stopped d/t need for Bronchoscopy .  Remains at risk for further nutritional compromise r/t sever dysphagia with aspiration and latered nutrition related labs .  Will continue NPO      Nutrition Related Findings:    Pt is A & O ; TF stopped d/t possible BRONCHOSCOPY today or Tomorrow; IVFs @ 50 ml/hr; Last BM recorded 7/1; h/h are low; Wound Type: None       Current Nutrition Intake & Therapies:    Average Meal Intake: NPO  Average Supplements Intake: NPO  Diet NPO    Anthropometric Measures:  Height: 177.8 cm (5' 10\")  Ideal Body Weight (IBW): 166 lbs (75 kg)    Admission Body Weight: 70.3 kg (155 lb)  Current Body Weight: 68.3 kg (150 lb 10.2 oz), 93.4 % IBW. Weight Source: Bed Scale  Current BMI (kg/m2): 21.6  Usual Body Weight: 78.5 kg (173 lb) (April 2024)  % Weight Change (Calculated): -10.4                    BMI Categories: Underweight (BMI less than 22) age over 65    Estimated Daily Nutrient Needs:  Energy Requirements Based On: Kcal/kg  Weight Used for Energy Requirements: Adjusted  Energy (kcal/day): 8521-8888 based 25-28 kcal/kg cbw  Weight Used for Protein Requirements: Current  Protein (g/day): 71-85

## 2024-07-10 NOTE — FLOWSHEET NOTE
07/09/24 2045   Vital Signs   Temp 98 °F (36.7 °C)   Pulse 90   /68   MAP (Calculated) 83   BP Location Right upper arm   BP Method Automatic     Pt in bed requesting his Ativan to be increased educated on new medication . Still insisted MD notified awaiting response. VSS Cardizem remains infusing per order. Call light within reach . Pt using [place] to suction himself.

## 2024-07-10 NOTE — PROGRESS NOTES
Progress Note    Admit Date:  7/1/2024    Patient was admitted for aspiration.  Dysphagia   Has h/o presbyesophagus   Seen by speech therapy  Seen by GI - S/P EGD with esophageal dilatation 7/5   Severely hypoxic after MBS - RR called and pt on nonrebreather mask, pt moved to PCU     Subjective:  Mr. Luna was seen laying in bed. He states he does not feel any better today. I was able to wean him down to 6L HFNC. Possible bronch today.     Objective:   Patient Vitals for the past 4 hrs:   Pulse SpO2   07/10/24 0931 96 93 %          Intake/Output Summary (Last 24 hours) at 7/10/2024 1231  Last data filed at 7/10/2024 0930  Gross per 24 hour   Intake 2575.77 ml   Output 100 ml   Net 2475.77 ml       Physical Exam:  Gen: No distress. Alert. Ill appearing   Eyes: PERRL. No sclera icterus. No conjunctival injection.   ENT: No discharge. Pharynx clear. +NG in place  Neck: No JVD.  Trachea midline.  Resp: No accessory muscle use. No crackles. No wheezes. + rhonchi.   CV: irregularly irregular. No murmur.  No rub. No edema.   GI: Non-tender. Non-distended.  Normal bowel sounds.  Skin: Warm and dry. No nodule on exposed extremities. No rash on exposed extremities.   M/S: No cyanosis. No joint deformity. No clubbing.   Neuro: Awake. Grossly nonfocal    Psych: Oriented x 3. No anxiety or agitation.       Medications:  meropenem, 1,000 mg, Q8H  QUEtiapine, 25 mg, BID  levofloxacin, 500 mg, Q24H  albuterol, 2.5 mg, TID  benzocaine, , Once  pantoprazole (PROTONIX) 40 mg in sodium chloride (PF) 0.9 % 10 mL injection, 40 mg, Q12H  [Held by provider] apixaban, 5 mg, BID  carbidopa-levodopa, 1 tablet, TID  finasteride, 5 mg, Daily  memantine, 10 mg, BID  montelukast, 10 mg, Nightly  atorvastatin, 80 mg, Nightly  aspirin EC, 81 mg, Daily  lacosamide, 100 mg, BID  levETIRAcetam, 750 mg, Q12H  sodium chloride flush, 5-40 mL, 2 times per day      PRN Medications:  LORazepam, 1 mg, Q6H PRN  albuterol, 2.5 mg, Q4H PRN  phenol, 1 spray, Q2H  PRN  sodium chloride flush, 5-40 mL, PRN  sodium chloride, , PRN  potassium chloride, 40 mEq, PRN   Or  potassium alternative oral replacement, 40 mEq, PRN   Or  potassium chloride, 10 mEq, PRN  magnesium sulfate, 2,000 mg, PRN  ondansetron, 4 mg, Q8H PRN   Or  ondansetron, 4 mg, Q6H PRN  polyethylene glycol, 17 g, Daily PRN  acetaminophen, 650 mg, Q6H PRN   Or  acetaminophen, 650 mg, Q6H PRN          Data:  CBC:   Recent Labs     07/08/24  1559 07/09/24  1255 07/10/24  0450   WBC 13.0* 10.7 10.8   HGB 13.8 12.8* 12.1*   HCT 41.8 38.0* 35.2*   MCV 93.4 92.0 91.4    150 143     BMP:   Recent Labs     07/08/24  1559 07/09/24  1255 07/10/24  0450    138 140   K 4.1 4.2 4.3    101 104   CO2 22 27 27   BUN 16 21* 20   CREATININE <0.5* 0.7* 1.1     LIVER PROFILE:   Recent Labs     07/08/24  1559   AST 28   ALT <5*   BILITOT 2.0*   ALKPHOS 98     PT/INR:   Recent Labs     07/09/24  1255   PROTIME 27.7*   INR 2.59*       CULTURES  Results       Procedure Component Value Units Date/Time    Culture, Blood 2 [[id number]] Collected: 07/01/24 1409    Order Status: Completed Specimen: Blood Updated: 07/05/24 1415     Culture, Blood 2 No Growth after 4 days of incubation.    Narrative:      ORDER#: [id number]                          ORDERED BY: MILO GARCIA  SOURCE: Blood Antecubital-Rig              COLLECTED:  07/01/24 14:09  ANTIBIOTICS AT ABIGAIL.:                      RECEIVED :  07/01/24 17:45  If child <=2 yrs old please draw pediatric bottle.~Blood Culture #2    COVID-19 & Influenza Combo [[id number]] Collected: 07/01/24 1400    Order Status: Completed Specimen: Nasopharyngeal Swab Updated: 07/01/24 1437     SARS-CoV-2 RNA, RT PCR NOT DETECTED     Comment: Not Detected results do not preclude SARS-CoV-2 infection and  should not be used as the sole basis for patient management  decisions.  Results must be combined with clinical observations,  patient history, and epidemiological information.  Testing was  CHEST PORTABLE   Final Result   1. No acute finding in the chest.   2. Stable postsurgical change of partial right pneumonectomy.               Assessment/Plan:  Aspiration w/concern for pna  Hx of presbyesophagus   Dysphagia  Acute hypoxic resp failure   - hx of chronic aspiration on thickened liquid diet  - CT chest as above   - SLP eval and treat -> rec pt stay NPO  - MBS with severe pharyngeal dysphagia with aspiration of all liquid consistencies. Significant cricopharyngeal dysfunction with inability to pass puree bolus. Rec GI consult.  - was given Unasyn once in ED, defer further abx to pulm.  - PPI daily   - aspiration precautions     - pulmonology consulted, okay to observe off abx, signed off 7/3  - GI consulted-> S/P dilation w/opening of UES 7/5/2024   - Per GI -> SLP need to reevaluate for resuming diet   - SLP req MBSS to resume diet -> NPO, profound oropharyngeal dysphagia  - RR called 7/8 for severe hypoxia after MBSS, placed on 8L and transferred to PCU. Stat CXR with stable chest radiograph.  - started on Levaquin D#3, Merrem D#2 (pulm added for leukocytosis)   - GI states okay to resume TF via NG, will likely need PEG   - pulm re consulted, holding Eliquis and TF for possible bronch   - CXR today with concern for pna, weaned down to 6L O2 NC      Hypomagnesemia   - replacement protocol      Hx of PE  - CT chest negative for PE  - AC on Eliquis      Atrial fibrillation/flutter s/p watchman and RFCA 2014  - diltiazem gtt started 7/8 for afib RVR      HTN  - on Norvasc  - monitor BP     CAD s/p PCI  - on asa and statin      Focal seizure disorder  - IV vimpat and keppra until oral medication can be resumed     Hx of CVA   - on asa and statin     Parkinson's disease  - on sinemet and namenda      Hx of lung cancer s/p thoracotomy and VATS  - chronic pleural effusion     Porphyria cutanea tarda       Anxiety  - prn ativan     Code status discussed with Dr. Huitron, patient, and wife present in

## 2024-07-10 NOTE — PROGRESS NOTES
Dr. Kendrick made aware that wife wishes for patient to be a full code at this time.   Upon attempting to suction back of mouth with mic[place]uni patient did not display gag reflex. Will open eyes to voice and make attempts to communicate with staff.

## 2024-07-10 NOTE — PROGRESS NOTES
[facility]                                 Pulmonary/Critical care  Consult /Progress Note :                                                                  CC :SOB       Subjective  Remain lethargic   Can be awaken  No gag  Very week cough   Wife at bedside    PHYSICAL EXAM:  Vitals:    07/10/24 0806   BP:    Pulse: 89   Resp: 20   Temp: 97.8 °F (36.6 °C)   SpO2: 98%     Gen: No distress.   Eyes: PERRL. No sclera icterus. No conjunctival injection.   ENT: No discharge. Pharynx clear.   Neck: Trachea midline. No obvious mass.    Resp:Bilateral rhonchi   GI: Non-tender. Non-distended. No hernia.   Skin: Warm and dry. No nodule on exposed extremities.   Lymph: No cervical LAD. No supraclavicular LAD.   M/S: No cyanosis. No joint deformity. No clubbing.   Neuro: Awake. Alert. Moves all four extremities.   Psych: Oriented x 3. No anxiety.     LABS:  CBC:   Recent Labs     07/08/24  1559 07/09/24  1255 07/10/24  0450   WBC 13.0* 10.7 10.8   HGB 13.8 12.8* 12.1*   HCT 41.8 38.0* 35.2*   MCV 93.4 92.0 91.4    150 143       BMP:   Recent Labs     07/08/24  1559 07/09/24  1255 07/10/24  0450    138 140   K 4.1 4.2 4.3    101 104   CO2 22 27 27   BUN 16 21* 20   CREATININE <0.5* 0.7* 1.1       LIVER PROFILE:   Recent Labs     07/08/24  1559   AST 28   ALT <5*   BILITOT 2.0*   ALKPHOS 98         t     imaging was reviewed by me and showed     ASSESSMENT:  Acute hypoxic respiratory failure requiring 8 L  Possible aspiration  History of PE  Severe pharyngeal esophageal dysphagia and stricture of the lower cervical esophagus status post  History of lung cancer status post VATS      PLAN:  Patient hypoxia most likely related to intrapulmonary and possible aspiration with right lower lobe atelectasis and possible left side pneumonia  Long discussion with the wife and grandson about the code status and they will address it

## 2024-07-10 NOTE — PROGRESS NOTES
RT Inhaler-Nebulizer Bronchodilator Protocol Note    There is a bronchodilator order in the chart from a provider indicating to follow the RT Bronchodilator Protocol and there is an “Initiate RT Inhaler-Nebulizer Bronchodilator Protocol” order as well (see protocol at bottom of note).    CXR Findings:  XR CHEST PORTABLE    Result Date: 7/10/2024  Small right pleural effusion with patchy infiltrates in the right lower lobe concerning for pneumonia       The findings from the last RT Protocol Assessment were as follows:   History Pulmonary Disease: (P) Smoker 15 pack years or more  Respiratory Pattern: (P) Mild dyspnea at rest, irregular pattern, or RR 21-25 bpm  Breath Sounds: (P) Intermittent or unilateral wheezes  Cough: (P) Weak, productive  Indication for Bronchodilator Therapy: (P) Decreased or absent breath sounds  Bronchodilator Assessment Score: (P) 11    Aerosolized bronchodilator medication orders have been revised according to the RT Inhaler-Nebulizer Bronchodilator Protocol below.    Respiratory Therapist to perform RT Therapy Protocol Assessment initially then follow the protocol.  Repeat RT Therapy Protocol Assessment PRN for score 0-3 or on second treatment, BID, and PRN for scores above 3.    No Indications - adjust the frequency to every 6 hours PRN wheezing or bronchospasm, if no treatments needed after 48 hours then discontinue using Per Protocol order mode.     If indication present, adjust the RT bronchodilator orders based on the Bronchodilator Assessment Score as indicated below.  Use Inhaler orders unless patient has one or more of the following: on home nebulizer, not able to hold breath for 10 seconds, is not alert and oriented, cannot activate and use MDI correctly, or respiratory rate 25 breaths per minute or more, then use the equivalent nebulizer order(s) with same Frequency and PRN reasons based on the score.  If a patient is on this medication at home then do not decrease Frequency  below that used at home.    0-3 - enter or revise RT bronchodilator order(s) to equivalent RT Bronchodilator order with Frequency of every 4 hours PRN for wheezing or increased work of breathing using Per Protocol order mode.        4-6 - enter or revise RT Bronchodilator order(s) to two equivalent RT bronchodilator orders with one order with BID Frequency and one order with Frequency of every 4 hours PRN wheezing or increased work of breathing using Per Protocol order mode.        7-10 - enter or revise RT Bronchodilator order(s) to two equivalent RT bronchodilator orders with one order with TID Frequency and one order with Frequency of every 4 hours PRN wheezing or increased work of breathing using Per Protocol order mode.       11-13 - enter or revise RT Bronchodilator order(s) to one equivalent RT bronchodilator order with QID Frequency and an Albuterol order with Frequency of every 4 hours PRN wheezing or increased work of breathing using Per Protocol order mode.      Greater than 13 - enter or revise RT Bronchodilator order(s) to one equivalent RT bronchodilator order with every 4 hours Frequency and an Albuterol order with Frequency of every 2 hours PRN wheezing or increased work of breathing using Per Protocol order mode.       Electronically signed by Tasia Romero RCP on 7/10/2024 at 6:58 PM

## 2024-07-10 NOTE — PROGRESS NOTES
Spoke with KATIE Tafoya with GI regarding resuming patient's tube feed.   Call placed to Dr. Kendrick to ask if tube feed can be resumed.

## 2024-07-10 NOTE — PROGRESS NOTES
4 Eyes Skin Assessment     NAME:  Rodolfo Luna  YOB: 1945  MEDICAL RECORD NUMBER:  [id number]    The patient is being assessed for  Transfer to New Unit    I agree that at least one RN has performed a thorough Head to Toe Skin Assessment on the patient. ALL assessment sites listed below have been assessed.      Areas assessed by both nurses:    Head, Face, Ears, Shoulders, Back, Chest, Arms, Elbows, Hands, Sacrum. Buttock, Coccyx, Ischium, Legs. Feet and Heels, and Under Medical Devices     Blanchable redness to coccyx.        Does the Patient have a Wound? No noted wound(s)    Red blanchable coccyx.       Jorge Prevention initiated by RN: No  Wound Care Orders initiated by RN: No    Pressure Injury (Stage 3,4, Unstageable, DTI, NWPT, and Complex wounds) if present, place Wound referral order by RN under : No    New Ostomies, if present place, Ostomy referral order under : No     Nurse 1 eSignature: Electronically signed by Yvonne Gamble RN on 7/10/24 at 6:01 PM EDT    **SHARE this note so that the co-signing nurse can place an eSignature**    Nurse 2 eSignature: Electronically signed by Michelle Leger RN on 7/10/24 at 6:42 PM EDT

## 2024-07-10 NOTE — FLOWSHEET NOTE
07/10/24 0012   Vital Signs   Temp 98.9 °F (37.2 °C)   Temp Source Oral   Pulse 86   Heart Rate Source Monitor   Respirations 18   BP (!) 108/59   MAP (Calculated) 75   BP Location Left upper arm   BP Method Automatic   Patient Position Semi fowlers   Oxygen Therapy   SpO2 93 %   O2 Device High flow nasal cannula   O2 Flow Rate (L/min) 5 L/min     Pt in bed suctioned himself x2 with [place] VSS Cardizem drip infusing per order , call light within reach.

## 2024-07-10 NOTE — FLOWSHEET NOTE
07/10/24 1308   Vital Signs   Temp 97.2 °F (36.2 °C)   Temp Source Oral   Pulse 92   Respirations 20   /73   MAP (Calculated) 89   BP Location Left upper arm   Oxygen Therapy   SpO2 97 %   O2 Device High flow nasal cannula   O2 Flow Rate (L/min) 6 L/min  (titrated to 5L)     Reassessment complete. Patient denies needs. Call light in reach. Will monitor.

## 2024-07-10 NOTE — PROGRESS NOTES
Dr. Kendrick at bedside to speak with patient's wife regarding patient's current condition.   MD states patient unable to clear secretions and needs bronchoscopy but unsure if patient is stable enough to undergo bronchoscopy without ending up on ventilator post procedure.   Dr. Kendrick also spoke with patient's grandson Luiz, per wife's request via telephone.   Patient is currently DNR-CCA per chart. Wife and grandson state they are unsure when it was decided that patient would be DNR-CCA as they did not make that decision and they do not feel patient would be in the correct state of mind to make this decision recently.   Patient made full code at this time per Dr. Kendrick.

## 2024-07-10 NOTE — PLAN OF CARE
Problem: Chronic Conditions and Co-morbidities  Goal: Patient's chronic conditions and co-morbidity symptoms are monitored and maintained or improved  Outcome: Progressing     Problem: Safety - Adult  Goal: Free from fall injury  Outcome: Progressing     Problem: Discharge Planning  Goal: Discharge to home or other facility with appropriate resources  Outcome: Progressing     Problem: Nutrition Deficit:  Goal: Optimize nutritional status  Outcome: Progressing     Problem: Neurosensory - Adult  Goal: Achieves maximal functionality and self care  Outcome: Progressing     Problem: Skin/Tissue Integrity - Adult  Goal: Skin integrity remains intact  Outcome: Progressing     Problem: Musculoskeletal - Adult  Goal: Return mobility to safest level of function  Outcome: Progressing     Problem: Pain  Goal: Verbalizes/displays adequate comfort level or baseline comfort level  Outcome: Progressing     Problem: Skin/Tissue Integrity  Goal: Absence of new skin breakdown  Description: 1.  Monitor for areas of redness and/or skin breakdown  2.  Assess vascular access sites hourly  3.  Every 4-6 hours minimum:  Change oxygen saturation probe site  4.  Every 4-6 hours:  If on nasal continuous positive airway pressure, respiratory therapy assess nares and determine need for appliance change or resting period.  Outcome: Progressing

## 2024-07-10 NOTE — PROGRESS NOTES
Patient has urinated <100mL today. Bladder scan shows 234mL. Does not feel need to urinate.   CHARLES Vicente PA-C made aware.

## 2024-07-10 NOTE — PROGRESS NOTES
Spoke with Virgen Vicente PA-C who states she spoke with patient and patient is stating he does not want to be a full code.   At the time of conversation patient alert and oriented and answering orientation questions appropriately.   Wife at beside at the time of conversation.   Dr. Kendrick made aware.

## 2024-07-10 NOTE — PROGRESS NOTES
Pt transferred from PCU to ICU 6. Pt currently on 4L 02 via HFNC, tolerating well. PIV x1 remains in place and patent with NS and ATB infusing per orders. Skin check completed, no open areas noted, coccyx red and blanchable. Pt is A&O to self and situation, drowsy. Pt has family at bedside at this time.

## 2024-07-10 NOTE — PROGRESS NOTES
Dr. Huitron spoke with patient and wife at bedside regarding patient's wishes.   Upon asking patient if he would be okay to be placed on a ventilator short term patient states he would be agreeable.   Patient to remain a full code and will proceed with transfer to ICU.

## 2024-07-10 NOTE — FLOWSHEET NOTE
07/10/24 0806   Vital Signs   Temp 97.8 °F (36.6 °C)   Temp Source Oral   Pulse 89   Heart Rate Source Monitor   Respirations 20   /73   MAP (Calculated) 90   BP Location Left upper arm   BP Method Automatic   Patient Position Semi fowlers   Oxygen Therapy   SpO2 98 %   O2 Device High flow nasal cannula   O2 Flow Rate (L/min) 8 L/min     Patient resting quietly in bed. No s/s of distress noted.  Meds administered via NG.   TF remains on hold for possible bronchoscopy today or tomorrow.   Cardizem gtt off. HRs 90s-110s.  Shift assessment complete, see flow sheet. Denies needs at this time. Call light in reach. Will monitor.

## 2024-07-11 ENCOUNTER — APPOINTMENT (OUTPATIENT)
Dept: CT IMAGING | Age: 79
DRG: 177 | End: 2024-07-11
Payer: MEDICARE

## 2024-07-11 ENCOUNTER — APPOINTMENT (OUTPATIENT)
Dept: GENERAL RADIOLOGY | Age: 79
DRG: 177 | End: 2024-07-11
Payer: MEDICARE

## 2024-07-11 LAB
ANION GAP SERPL CALCULATED.3IONS-SCNC: 8 MMOL/L (ref 3–16)
BASOPHILS # BLD: 0 K/UL (ref 0–0.2)
BASOPHILS NFR BLD: 0 %
BUN SERPL-MCNC: 23 MG/DL (ref 7–20)
CALCIUM SERPL-MCNC: 8.6 MG/DL (ref 8.3–10.6)
CHLORIDE SERPL-SCNC: 107 MMOL/L (ref 99–110)
CO2 SERPL-SCNC: 28 MMOL/L (ref 21–32)
CREAT SERPL-MCNC: <0.5 MG/DL (ref 0.8–1.3)
DEPRECATED RDW RBC AUTO: 16.9 % (ref 12.4–15.4)
EOSINOPHIL # BLD: 0.1 K/UL (ref 0–0.6)
EOSINOPHIL NFR BLD: 0.7 %
GFR SERPLBLD CREATININE-BSD FMLA CKD-EPI: >90 ML/MIN/{1.73_M2}
GLUCOSE BLD-MCNC: 105 MG/DL (ref 70–99)
GLUCOSE BLD-MCNC: 108 MG/DL (ref 70–99)
GLUCOSE BLD-MCNC: 75 MG/DL (ref 70–99)
GLUCOSE BLD-MCNC: 78 MG/DL (ref 70–99)
GLUCOSE BLD-MCNC: 85 MG/DL (ref 70–99)
GLUCOSE BLD-MCNC: 89 MG/DL (ref 70–99)
GLUCOSE SERPL-MCNC: 96 MG/DL (ref 70–99)
HCT VFR BLD AUTO: 34.2 % (ref 40.5–52.5)
HGB BLD-MCNC: 11.7 G/DL (ref 13.5–17.5)
INR PPP: 1.57 (ref 0.85–1.15)
LYMPHOCYTES # BLD: 0.5 K/UL (ref 1–5.1)
LYMPHOCYTES NFR BLD: 4.4 %
MCH RBC QN AUTO: 31.3 PG (ref 26–34)
MCHC RBC AUTO-ENTMCNC: 34.3 G/DL (ref 31–36)
MCV RBC AUTO: 91.4 FL (ref 80–100)
MONOCYTES # BLD: 1 K/UL (ref 0–1.3)
MONOCYTES NFR BLD: 9.5 %
NEUTROPHILS # BLD: 8.8 K/UL (ref 1.7–7.7)
NEUTROPHILS NFR BLD: 85.4 %
PERFORMED ON: ABNORMAL
PERFORMED ON: ABNORMAL
PERFORMED ON: NORMAL
PLATELET # BLD AUTO: 125 K/UL (ref 135–450)
PMV BLD AUTO: 8.4 FL (ref 5–10.5)
POTASSIUM SERPL-SCNC: 3.8 MMOL/L (ref 3.5–5.1)
PROTHROMBIN TIME: 18.9 SEC (ref 11.9–14.9)
RBC # BLD AUTO: 3.74 M/UL (ref 4.2–5.9)
SODIUM SERPL-SCNC: 143 MMOL/L (ref 136–145)
WBC # BLD AUTO: 10.3 K/UL (ref 4–11)

## 2024-07-11 PROCEDURE — 6360000002 HC RX W HCPCS: Performed by: INTERNAL MEDICINE

## 2024-07-11 PROCEDURE — 94640 AIRWAY INHALATION TREATMENT: CPT

## 2024-07-11 PROCEDURE — 70450 CT HEAD/BRAIN W/O DYE: CPT

## 2024-07-11 PROCEDURE — 74018 RADEX ABDOMEN 1 VIEW: CPT

## 2024-07-11 PROCEDURE — C9254 INJECTION, LACOSAMIDE: HCPCS | Performed by: INTERNAL MEDICINE

## 2024-07-11 PROCEDURE — 36415 COLL VENOUS BLD VENIPUNCTURE: CPT

## 2024-07-11 PROCEDURE — 99291 CRITICAL CARE FIRST HOUR: CPT | Performed by: INTERNAL MEDICINE

## 2024-07-11 PROCEDURE — 99233 SBSQ HOSP IP/OBS HIGH 50: CPT | Performed by: INTERNAL MEDICINE

## 2024-07-11 PROCEDURE — 6370000000 HC RX 637 (ALT 250 FOR IP): Performed by: INTERNAL MEDICINE

## 2024-07-11 PROCEDURE — 2580000003 HC RX 258: Performed by: INTERNAL MEDICINE

## 2024-07-11 PROCEDURE — 82306 VITAMIN D 25 HYDROXY: CPT

## 2024-07-11 PROCEDURE — 2000000000 HC ICU R&B

## 2024-07-11 PROCEDURE — 51798 US URINE CAPACITY MEASURE: CPT

## 2024-07-11 PROCEDURE — 2700000000 HC OXYGEN THERAPY PER DAY

## 2024-07-11 PROCEDURE — 6370000000 HC RX 637 (ALT 250 FOR IP): Performed by: STUDENT IN AN ORGANIZED HEALTH CARE EDUCATION/TRAINING PROGRAM

## 2024-07-11 PROCEDURE — 94761 N-INVAS EAR/PLS OXIMETRY MLT: CPT

## 2024-07-11 PROCEDURE — 85025 COMPLETE CBC W/AUTO DIFF WBC: CPT

## 2024-07-11 PROCEDURE — 80048 BASIC METABOLIC PNL TOTAL CA: CPT

## 2024-07-11 PROCEDURE — 99223 1ST HOSP IP/OBS HIGH 75: CPT | Performed by: PSYCHIATRY & NEUROLOGY

## 2024-07-11 PROCEDURE — 85610 PROTHROMBIN TIME: CPT

## 2024-07-11 PROCEDURE — 87641 MR-STAPH DNA AMP PROBE: CPT

## 2024-07-11 RX ORDER — CARBOXYMETHYLCELLULOSE SODIUM 10 MG/ML
1 GEL OPHTHALMIC
Status: DISCONTINUED | OUTPATIENT
Start: 2024-07-11 | End: 2024-07-16 | Stop reason: HOSPADM

## 2024-07-11 RX ORDER — ASPIRIN 81 MG/1
81 TABLET ORAL DAILY
Status: DISCONTINUED | OUTPATIENT
Start: 2024-07-11 | End: 2024-07-16 | Stop reason: HOSPADM

## 2024-07-11 RX ORDER — QUETIAPINE FUMARATE 25 MG/1
25 TABLET, FILM COATED ORAL NIGHTLY
Status: DISCONTINUED | OUTPATIENT
Start: 2024-07-12 | End: 2024-07-16 | Stop reason: HOSPADM

## 2024-07-11 RX ORDER — DILTIAZEM HYDROCHLORIDE 60 MG/1
30 TABLET, FILM COATED ORAL EVERY 8 HOURS SCHEDULED
Status: DISCONTINUED | OUTPATIENT
Start: 2024-07-11 | End: 2024-07-16 | Stop reason: HOSPADM

## 2024-07-11 RX ADMIN — Medication 40 MG: at 07:51

## 2024-07-11 RX ADMIN — ALBUTEROL SULFATE 2.5 MG: 2.5 SOLUTION RESPIRATORY (INHALATION) at 19:00

## 2024-07-11 RX ADMIN — Medication 4 ML: at 07:33

## 2024-07-11 RX ADMIN — ALBUTEROL SULFATE 2.5 MG: 2.5 SOLUTION RESPIRATORY (INHALATION) at 11:26

## 2024-07-11 RX ADMIN — VANCOMYCIN HYDROCHLORIDE 1500 MG: 10 INJECTION, POWDER, LYOPHILIZED, FOR SOLUTION INTRAVENOUS at 11:26

## 2024-07-11 RX ADMIN — MONTELUKAST SODIUM 10 MG: 10 TABLET, COATED ORAL at 21:12

## 2024-07-11 RX ADMIN — MEROPENEM 1000 MG: 1 INJECTION INTRAVENOUS at 02:12

## 2024-07-11 RX ADMIN — ALBUTEROL SULFATE 2.5 MG: 2.5 SOLUTION RESPIRATORY (INHALATION) at 07:33

## 2024-07-11 RX ADMIN — DILTIAZEM HYDROCHLORIDE 30 MG: 60 TABLET ORAL at 15:01

## 2024-07-11 RX ADMIN — MEROPENEM 1000 MG: 1 INJECTION INTRAVENOUS at 09:42

## 2024-07-11 RX ADMIN — SODIUM CHLORIDE, PRESERVATIVE FREE 10 ML: 5 INJECTION INTRAVENOUS at 21:32

## 2024-07-11 RX ADMIN — LEVETIRACETAM 750 MG: 100 INJECTION INTRAVENOUS at 08:34

## 2024-07-11 RX ADMIN — LACOSAMIDE 100 MG: 10 INJECTION INTRAVENOUS at 07:49

## 2024-07-11 RX ADMIN — CARBIDOPA AND LEVODOPA 1 TABLET: 25; 100 TABLET ORAL at 07:50

## 2024-07-11 RX ADMIN — APIXABAN 5 MG: 5 TABLET, FILM COATED ORAL at 11:26

## 2024-07-11 RX ADMIN — DILTIAZEM HYDROCHLORIDE 30 MG: 60 TABLET ORAL at 21:32

## 2024-07-11 RX ADMIN — ATORVASTATIN CALCIUM 80 MG: 40 TABLET, FILM COATED ORAL at 21:12

## 2024-07-11 RX ADMIN — SODIUM CHLORIDE: 9 INJECTION, SOLUTION INTRAVENOUS at 07:45

## 2024-07-11 RX ADMIN — LEVETIRACETAM 750 MG: 100 INJECTION INTRAVENOUS at 21:20

## 2024-07-11 RX ADMIN — Medication 40 MG: at 21:18

## 2024-07-11 RX ADMIN — CARBIDOPA AND LEVODOPA 1 TABLET: 25; 100 TABLET ORAL at 21:12

## 2024-07-11 RX ADMIN — MEROPENEM 1000 MG: 1 INJECTION INTRAVENOUS at 18:29

## 2024-07-11 RX ADMIN — SODIUM CHLORIDE, PRESERVATIVE FREE 10 ML: 5 INJECTION INTRAVENOUS at 07:51

## 2024-07-11 RX ADMIN — CARBIDOPA AND LEVODOPA 1 TABLET: 25; 100 TABLET ORAL at 15:01

## 2024-07-11 RX ADMIN — MEMANTINE HYDROCHLORIDE 10 MG: 5 TABLET ORAL at 21:11

## 2024-07-11 RX ADMIN — MEMANTINE HYDROCHLORIDE 10 MG: 5 TABLET ORAL at 07:50

## 2024-07-11 RX ADMIN — Medication 4 ML: at 19:01

## 2024-07-11 RX ADMIN — LORAZEPAM 1 MG: 2 INJECTION INTRAMUSCULAR; INTRAVENOUS at 07:49

## 2024-07-11 RX ADMIN — SODIUM CHLORIDE: 9 INJECTION, SOLUTION INTRAVENOUS at 08:07

## 2024-07-11 RX ADMIN — MUPIROCIN: 20 OINTMENT TOPICAL at 21:32

## 2024-07-11 RX ADMIN — QUETIAPINE FUMARATE 25 MG: 25 TABLET ORAL at 07:50

## 2024-07-11 RX ADMIN — ALBUTEROL SULFATE 2.5 MG: 2.5 SOLUTION RESPIRATORY (INHALATION) at 15:20

## 2024-07-11 RX ADMIN — ASPIRIN 81 MG: 81 TABLET, COATED ORAL at 15:03

## 2024-07-11 RX ADMIN — LEVOFLOXACIN 500 MG: 5 INJECTION, SOLUTION INTRAVENOUS at 08:32

## 2024-07-11 RX ADMIN — VANCOMYCIN HYDROCHLORIDE 1500 MG: 10 INJECTION, POWDER, LYOPHILIZED, FOR SOLUTION INTRAVENOUS at 23:45

## 2024-07-11 RX ADMIN — LACOSAMIDE 100 MG: 10 INJECTION INTRAVENOUS at 21:21

## 2024-07-11 RX ADMIN — MUPIROCIN: 20 OINTMENT TOPICAL at 11:32

## 2024-07-11 ASSESSMENT — PAIN SCALES - GENERAL: PAINLEVEL_OUTOF10: 0

## 2024-07-11 ASSESSMENT — PAIN SCALES - WONG BAKER
WONGBAKER_NUMERICALRESPONSE: NO HURT
WONGBAKER_NUMERICALRESPONSE: NO HURT

## 2024-07-11 NOTE — PROGRESS NOTES
[facility] Pulmonary, Critical Care [facility] Sleep Specialists                                 Pulmonary/Critical care  Consult /Progress Note :                                                                  CC :SOB       Subjective  Remain lethargic and not much communication  Can be awaken  No gag  Very week cough   Wife at bedside  .  RT a lot of junk has been seen  Wife at bedside and does not want to proceed with bronchoscopy    PHYSICAL EXAM:  Vitals:    07/11/24 0900   BP: (!) 93/49   Pulse: 98   Resp: 20   Temp:    SpO2: 97%     Gen: No distress.   Eyes: PERRL. No sclera icterus. No conjunctival injection.   ENT: No discharge. Pharynx clear.   Neck: Trachea midline. No obvious mass.    Resp:Bilateral rhonchi   GI: Non-tender. Non-distended. No hernia.   Skin: Warm and dry. No nodule on exposed extremities.   Lymph: No cervical LAD. No supraclavicular LAD.   M/S: No cyanosis. No joint deformity. No clubbing.   Neuro: Awake. Alert however he go back to sleep immediate. Moves all four extremities.   Psych: Oriented x 3. No anxiety.     LABS:  CBC:   Recent Labs     07/09/24  1255 07/10/24  0450 07/11/24  0436   WBC 10.7 10.8 10.3   HGB 12.8* 12.1* 11.7*   HCT 38.0* 35.2* 34.2*   MCV 92.0 91.4 91.4    143 125*       BMP:   Recent Labs     07/10/24  0450 07/10/24  1729 07/11/24  0436    141 143   K 4.3 3.8 3.8    104 107   CO2 27 27 28   BUN 20 23* 23*   CREATININE 1.1 0.6* <0.5*       LIVER PROFILE:   Recent Labs     07/08/24  1559   AST 28   ALT <5*   BILITOT 2.0*   ALKPHOS 98         t     imaging was reviewed by me and showed     ASSESSMENT:  Acute hypoxic respiratory failure requiring 8 L  Possible aspiration  History of PE  Severe pharyngeal esophageal dysphagia and stricture of the lower cervical esophagus status post  History of lung cancer status post VATS      PLAN:  Patient hypoxia most likely related to intrapulmonary and possible aspiration  with right lower lobe atelectasis and possible left side pneumonia  I would like neurology evaluation as my concern is his mental status    Long discussion with the wife and grandson about the code status and they will address it with the hospitalist, for now they want full code    Hold on an bronchoscopy since declined by family and does not want to take the rest    Anticoagulation to be resumed however there is a chance he might need feeding tube so defer to IM team  On Zosyn develop leukocyte for possible aspiration  Continue aggressive pulmonary toilet    Observe in ICU  Aggressive pulmonary toielt  Wean O2 as  Remain high risk for intubation    Care reviewed with nursing staff, medical and surgical specialty care, primary care and the patient's family as available.     Chart review/lab review/X-ray viewing/documentation and had long Conversation with patient/family re: prognosis, care options and any end of life issues:      Critical care time spent reviewing labs/films, examining patient, collaborating with other physicians more than 35  Minutes  excluding procedures ..    Tulio Kendrick M.D.   7/11/2024  4:57 PM

## 2024-07-11 NOTE — PROGRESS NOTES
RT Inhaler-Nebulizer Bronchodilator Protocol Note    There is a bronchodilator order in the chart from a provider indicating to follow the RT Bronchodilator Protocol and there is an “Initiate RT Inhaler-Nebulizer Bronchodilator Protocol” order as well (see protocol at bottom of note).    CXR Findings:  XR CHEST PORTABLE    Result Date: 7/10/2024  Small right pleural effusion with patchy infiltrates in the right lower lobe concerning for pneumonia       The findings from the last RT Protocol Assessment were as follows:   History Pulmonary Disease: Smoker 15 pack years or more  Respiratory Pattern: Regular pattern and RR 12-20 bpm  Breath Sounds: Inspiratory and expiratory or bilateral wheezing and/or rhonchi  Cough: Strong, spontaneous, non-productive  Indication for Bronchodilator Therapy: Decreased or absent breath sounds  Bronchodilator Assessment Score: 7    Aerosolized bronchodilator medication orders have been revised according to the RT Inhaler-Nebulizer Bronchodilator Protocol below.    Respiratory Therapist to perform RT Therapy Protocol Assessment initially then follow the protocol.  Repeat RT Therapy Protocol Assessment PRN for score 0-3 or on second treatment, BID, and PRN for scores above 3.    No Indications - adjust the frequency to every 6 hours PRN wheezing or bronchospasm, if no treatments needed after 48 hours then discontinue using Per Protocol order mode.     If indication present, adjust the RT bronchodilator orders based on the Bronchodilator Assessment Score as indicated below.  Use Inhaler orders unless patient has one or more of the following: on home nebulizer, not able to hold breath for 10 seconds, is not alert and oriented, cannot activate and use MDI correctly, or respiratory rate 25 breaths per minute or more, then use the equivalent nebulizer order(s) with same Frequency and PRN reasons based on the score.  If a patient is on this medication at home then do not decrease Frequency

## 2024-07-11 NOTE — ACP (ADVANCE CARE PLANNING)
Advance Care Planning   Advance Care Planning Note  Ambulatory Spiritual Care Services    Date:  7/1/2024    Received request from IDT member.    Consultation conversation participants:   Spouse     Goals of ACP Conversation:  Discuss advance care planning documents    Health Care Decision Makers:      Primary Decision Maker: Helen Luna - Spouse - [phone]   Summary:  Documented Next of Kin, per patient report    Electronically signed by Chaplain Barrera on 7/11/2024 at 2:32 PM.

## 2024-07-11 NOTE — CARE COORDINATION
Met with patient to follow up on discharge plan. The patient is still refusing SNF.  Plans to go home. Will dc home with Loni.

## 2024-07-11 NOTE — PROGRESS NOTES
[facility]   Pharmacy Pharmacokinetic Monitoring Service - Vancomycin     Rodolfo Luna is a 78 y.o. male starting on vancomycin therapy for PNA. Pharmacy consulted by Dr Kendrick for monitoring and adjustment.    Target Concentration: Goal AUC/CLARIBEL 400-600 mg*hr/L    Additional Antimicrobials: Merrem    Pertinent Laboratory Values:   Wt Readings from Last 1 Encounters:   07/11/24 74.5 kg (164 lb 4.8 oz)     Temp Readings from Last 1 Encounters:   07/11/24 98.1 °F (36.7 °C) (Axillary)     Estimated Creatinine Clearance: 126 mL/min (based on SCr of 0.5 mg/dL).  Recent Labs     07/10/24  0450 07/10/24  1729 07/11/24  0436   CREATININE 1.1 0.6* <0.5*   BUN 20 23* 23*   WBC 10.8  --  10.3     Procalcitonin:     Pertinent Cultures:  Culture Date Source Results   7/1 Blood NGTD   MRSA Nasal Swab: was ordered by provider, awaiting results.    Plan:  Dosing recommendations based on Bayesian software  Start vancomycin 1500mg 12h  Anticipated AUC of 514 and trough concentration of 12 at steady state  Renal labs as indicated   Vancomycin concentration ordered for 7/12 @ 1000   Pharmacy will continue to monitor patient and adjust therapy as indicated    Thank you for the consult,  Maida Arita RPH  7/11/2024 10:28 AM

## 2024-07-11 NOTE — PLAN OF CARE
Problem: Chronic Conditions and Co-morbidities  Goal: Patient's chronic conditions and co-morbidity symptoms are monitored and maintained or improved  Outcome: Progressing     Problem: Safety - Adult  Goal: Free from fall injury  Outcome: Progressing     Problem: Discharge Planning  Goal: Discharge to home or other facility with appropriate resources  Outcome: Progressing     Problem: Nutrition Deficit:  Goal: Optimize nutritional status  7/11/2024 0537 by Nelida Campos RN  Outcome: Progressing  7/10/2024 1549 by Barbara Wilson RD, LD  Outcome: Progressing  Flowsheets (Taken 7/10/2024 1542)  Nutrient intake appropriate for improving, restoring, or maintaining nutritional needs:   Assess nutritional status and recommend course of action   Recommend, monitor, and adjust tube feedings and TPN/PPN based on assessed needs     Problem: Neurosensory - Adult  Goal: Achieves maximal functionality and self care  Outcome: Progressing     Problem: Skin/Tissue Integrity - Adult  Goal: Skin integrity remains intact  Outcome: Progressing     Problem: Musculoskeletal - Adult  Goal: Return mobility to safest level of function  Outcome: Progressing     Problem: Pain  Goal: Verbalizes/displays adequate comfort level or baseline comfort level  Outcome: Progressing     Problem: Skin/Tissue Integrity  Goal: Absence of new skin breakdown  Description: 1.  Monitor for areas of redness and/or skin breakdown  2.  Assess vascular access sites hourly  3.  Every 4-6 hours minimum:  Change oxygen saturation probe site  4.  Every 4-6 hours:  If on nasal continuous positive airway pressure, respiratory therapy assess nares and determine need for appliance change or resting period.  Outcome: Progressing

## 2024-07-11 NOTE — PROGRESS NOTES
Nursing Shift Summary: 8150-8130    Shift Events  Weaned to 3L NC  Continues to be lethargic.  Improved throughout shift  Neurology consulted  CT head completed:  No acute results  D/C PRN Ativan & Levaquin  Remains NPO    Family updated: Yes:  Spouse at bedside    Rhythm: Atrial Fibrillation     Most recent vitals:   Visit Vitals  BP (!) 126/42   Pulse 89   Temp 98.4 °F (36.9 °C) (Axillary)   Resp 25   Ht 1.778 m (5' 10\")   Wt 74.5 kg (164 lb 4.8 oz)   SpO2 96%   BMI 23.57 kg/m²        Respiratory support needed (if any):  - O2 - NC - 3 lpm    Admission weight Weight - Scale: 72.6 kg (160 lb) (07/01/24 1323)    Today's weight    Wt Readings from Last 1 Encounters:   07/11/24 74.5 kg (164 lb 4.8 oz)        I/O & Drains  I/O this shift:  In: 752.4 [I.V.:414.8; NG/GT:120; IV Piggyback:217.6]  Out: 300 [Urine:300]  Abbasi need assessed each shift: N/A - no abbasi present  Last documented BM (in last 48 hrs):  Patient Vitals for the past 48 hrs:   Last BM (including prior to admit)   07/11/24 0800 07/01/24                Restraints (in use currently or dc'd in last 12 hrs): No    Order current and documentation up to date? No    Lines/Drains reviewed @ bedside.  Peripheral IV 07/10/24 Right Forearm (Active)   Number of days: 1         Drip rates at handoff:    sodium chloride 50 mL/hr at 07/11/24 0800    sodium chloride 10 mL/hr at 07/11/24 0807       Any consults during the shift? Yes: Consults received: : Neurology       Bedside Shift Handoff given to: Nelida MENDEZ @ 7:06 PM  All continuous medications reviewed at bedside  Kimberly Brunson RN

## 2024-07-11 NOTE — PROGRESS NOTES
Progress Note    Admit Date:  7/1/2024    Patient with Parkinsons disease and presbyesophagus  admitted for aspiration pna     Seen by speech therapy  Seen by GI - S/P EGD with esophageal dilatation 7/5   Severely hypoxic after MBS - RR called and pt on nonrebreather     Was moved to ICU overnight for confusion   Planned for bronch today , NPO, TF held    Subjective:  Mr. Luna was seen awake, alert and oriented  laying in bed. He feels ok , no new events overnight, remains on 4 L this am   Minimal cough       Objective:   Patient Vitals for the past 4 hrs:   BP Temp Temp src Pulse Resp SpO2 Weight   07/11/24 0600 (!) 115/56 -- -- 100 21 91 % --   07/11/24 0531 -- -- -- -- -- -- 74.5 kg (164 lb 4.8 oz)   07/11/24 0500 113/65 -- -- 89 21 98 % --   07/11/24 0400 118/63 98.4 °F (36.9 °C) Axillary 99 24 96 % --            Intake/Output Summary (Last 24 hours) at 7/11/2024 0702  Last data filed at 7/11/2024 0100  Gross per 24 hour   Intake 2861.38 ml   Output 490 ml   Net 2371.38 ml         Physical Exam:        General: thin elderly male, ill appearing    Awake, alert and oriented. Appears to be not in any distress  Mucous Membranes:  Pink , anicteric  NG +  Atrophic facial muscles +  Neck: No JVD, no carotid bruit, no thyromegaly  Chest:  Clear to auscultation bilaterally diminished in right base  Cardiovascular:  RRR S1S2 heard, no murmurs or gallops  Abdomen:  Soft, undistended, non tender, no organomegaly, BS present  Extremities: No edema or cyanosis. Distal pulses well felt  Neurological : grossly normal with gen weakness       Medications:  sodium chloride (Inhalant), 4 mL, Q12H  albuterol, 2.5 mg, 4x Daily RT  meropenem, 1,000 mg, Q8H  QUEtiapine, 25 mg, BID  levofloxacin, 500 mg, Q24H  benzocaine, , Once  pantoprazole (PROTONIX) 40 mg in sodium chloride (PF) 0.9 % 10 mL injection, 40 mg, Q12H  [Held by provider] apixaban, 5 mg, BID  carbidopa-levodopa, 1 tablet, TID  finasteride, 5 mg, Daily  memantine, 10 mg,  (unconfirmed)No significant change was foundConfirmed by ROLAND CARBONE MD ([id number]) on 7/8/2024 6:27:21 PM           RADIOLOGY  XR CHEST PORTABLE   Final Result   Small right pleural effusion with patchy infiltrates in the right lower lobe   concerning for pneumonia         XR CHEST PORTABLE   Final Result   Stable chest radiograph.         FL MODIFIED BARIUM SWALLOW W VIDEO   Final Result   Laryngeal penetration and subglottic tracheal aspiration with both thin   barium and nectar thick barium.      Please see separate speech pathology report for full discussion of findings   and recommendations.         XR CHEST (2 VW)   Final Result   1. Enteric tube is noted with its tip projecting over the area of the   stomach, however the side port is with in the area of the GE junction.   Recommend advancement.   2. Unchanged small right pleural effusion and/or basilar atelectasis.         XR ABDOMEN FOR NG/OG/NE TUBE PLACEMENT   Final Result   The tip of the enteric tube is in the gastric fundus. The side port is in the   distal esophagus.  Recommend advancement by at least 8 cm.         XR CHEST (2 VW)   Final Result   Enteric tube is looped in the cervical and upper thoracic region.         FL MODIFIED BARIUM SWALLOW W VIDEO   Final Result   1. Positive silent tracheal aspiration of thin liquids and nectar thickened   liquids.   2. While there is no evidence of laryngeal penetration or tracheal aspiration   of purees, there was notable stasis and residual puree barium within the   valleculae and piriform sinuses, with only minimal passage of purees below   the EUS secondary to stenosis in this location.   3. Mild smooth stricture of the lower cervical esophagus, likely secondary to   extrinsic compression upon the left lateral wall of the lower cervical   esophagus by thoracic osteophytes.   Please see separate speech pathology report for full discussion of findings   and recommendations.         CT CHEST PULMONARY

## 2024-07-11 NOTE — PROGRESS NOTES
Pt with c/o chest pain 10/10. Pain woke pt up from sleep.     STAT EKG ordered     PRN norco administered for pain.     MD. DAKOTAH Lester at bedside to see pt. Orders given for troponin levels to be drawn Q 6 Hrs, One time dose of Morphine, STAT CXR

## 2024-07-11 NOTE — PROGRESS NOTES
Neurology consult sent via perfect serve, Electronically signed by Andrew Jimenez on 7/11/2024 at 9:54 AM

## 2024-07-11 NOTE — PROGRESS NOTES
RT Inhaler-Nebulizer Bronchodilator Protocol Note    There is a bronchodilator order in the chart from a provider indicating to follow the RT Bronchodilator Protocol and there is an “Initiate RT Inhaler-Nebulizer Bronchodilator Protocol” order as well (see protocol at bottom of note).    CXR Findings:  XR CHEST PORTABLE    Result Date: 7/10/2024  Small right pleural effusion with patchy infiltrates in the right lower lobe concerning for pneumonia       The findings from the last RT Protocol Assessment were as follows:   History Pulmonary Disease: (P) Smoker 15 pack years or more  Respiratory Pattern: (P) Mild dyspnea at rest, irregular pattern, or RR 21-25 bpm  Breath Sounds: (P) Intermittent or unilateral wheezes  Cough: (P) Weak, productive  Indication for Bronchodilator Therapy: Decreased or absent breath sounds  Bronchodilator Assessment Score: (P) 11    Aerosolized bronchodilator medication orders have been revised according to the RT Inhaler-Nebulizer Bronchodilator Protocol below.    Respiratory Therapist to perform RT Therapy Protocol Assessment initially then follow the protocol.  Repeat RT Therapy Protocol Assessment PRN for score 0-3 or on second treatment, BID, and PRN for scores above 3.    No Indications - adjust the frequency to every 6 hours PRN wheezing or bronchospasm, if no treatments needed after 48 hours then discontinue using Per Protocol order mode.     If indication present, adjust the RT bronchodilator orders based on the Bronchodilator Assessment Score as indicated below.  Use Inhaler orders unless patient has one or more of the following: on home nebulizer, not able to hold breath for 10 seconds, is not alert and oriented, cannot activate and use MDI correctly, or respiratory rate 25 breaths per minute or more, then use the equivalent nebulizer order(s) with same Frequency and PRN reasons based on the score.  If a patient is on this medication at home then do not decrease Frequency below  - CPAP/BiPAP as needed.

## 2024-07-11 NOTE — PROGRESS NOTES
07/11/24 1703   Encounter Summary   Encounter Overview/Reason Grief, Loss, and Adjustments   Service Provided For Family   Referral/Consult From Family   Support System Spouse;Family members   Last Encounter  07/11/24  (CH assisted Pt's wife with education and resources for Financial POA, CH provided emotional support, validation, nurtured hope)   Complexity of Encounter High   Begin Time 1650   End Time  1710   Total Time Calculated 20 min   Spiritual/Emotional needs   Type Emotional Distress   Grief, Loss, and Adjustments   Type Life Adjustments;Adjustment to illness

## 2024-07-11 NOTE — PLAN OF CARE
Problem: Chronic Conditions and Co-morbidities  Goal: Patient's chronic conditions and co-morbidity symptoms are monitored and maintained or improved  7/11/2024 1859 by Kimberly Brunson, RN  Outcome: Progressing  Flowsheets (Taken 7/11/2024 0800)  Care Plan - Patient's Chronic Conditions and Co-Morbidity Symptoms are Monitored and Maintained or Improved:   Monitor and assess patient's chronic conditions and comorbid symptoms for stability, deterioration, or improvement   Collaborate with multidisciplinary team to address chronic and comorbid conditions and prevent exacerbation or deterioration     Problem: Discharge Planning  Goal: Discharge to home or other facility with appropriate resources  7/11/2024 1859 by Kimberly Brunson, RN  Outcome: Progressing  Flowsheets (Taken 7/11/2024 0800)  Discharge to home or other facility with appropriate resources:   Arrange for needed discharge resources and transportation as appropriate   Identify discharge learning needs (meds, wound care, etc)   Identify barriers to discharge with patient and caregiver     Problem: Neurosensory - Adult  Goal: Achieves maximal functionality and self care  7/11/2024 1859 by Kimberly Brunson, RN  Outcome: Progressing  Flowsheets (Taken 7/11/2024 0800)  Achieves maximal functionality and self care: Monitor swallowing and airway patency with patient fatigue and changes in neurological status  Note: Neuro status improved.  Cough stronger at end of shift      Problem: Respiratory - Adult  Goal: Achieves optimal ventilation and oxygenation  Outcome: Progressing  Flowsheets (Taken 7/11/2024 1900)  Achieves optimal ventilation and oxygenation:   Assess for changes in respiratory status   Assess for changes in mentation and behavior   Position to facilitate oxygenation and minimize respiratory effort   Oxygen supplementation based on oxygen saturation or arterial blood gases   Encourage broncho-pulmonary hygiene including cough, deep breathe,

## 2024-07-11 NOTE — PROGRESS NOTES
Speech Language Pathology  Attempt Note     Name: Rodolfo Luna  : 1945  Medical Diagnosis: Aspiration into respiratory tract, initial encounter [T17.908A]  Aspiration into airway, initial encounter [T17.908A]  Right leg weakness [R29.898]      SLP reviewed pt's chart, spoke with RN.  Pt currently strict NPO at this time.  Per chart and RN, pt with poor secretion management, possible bronchoscopy tomorrow.  Pt not appropriate for follow-up/PO trials at this time.  ST to continue to follow.  RN aware.  No charges.    Paulette Cook M.S. CCC-SLP  Speech-language pathologist  [id number]       home management/community/leisure

## 2024-07-11 NOTE — PROGRESS NOTES
4 Eyes Skin Assessment     NAME:  Rodolfo Luna  YOB: 1945  MEDICAL RECORD NUMBER:  [id number]    The patient is being assessed for  Other Shift Assessment     I agree that at least one RN has performed a thorough Head to Toe Skin Assessment on the patient. ALL assessment sites listed below have been assessed.      Areas assessed by both nurses:    Head, Face, Ears, Shoulders, Back, Chest, Arms, Elbows, Hands, Sacrum. Buttock, Coccyx, Ischium, Legs. Feet and Heels, and Under Medical Devices     *Blanchable Redness to coccyx noted, Mepilex placed         Does the Patient have a Wound? No noted wound(s)       Jorge Prevention initiated by RN: Yes  Wound Care Orders initiated by RN: No    Pressure Injury (Stage 3,4, Unstageable, DTI, NWPT, and Complex wounds) if present, place Wound referral order by RN under : No    New Ostomies, if present place, Ostomy referral order under : No     Nurse 1 eSignature: Electronically signed by Nelida Campos RN on 7/11/24 at 5:40 AM EDT    **SHARE this note so that the co-signing nurse can place an eSignature**    Nurse 2 eSignature: Electronically signed by Yesica Blevins RN on 7/11/24 at 6:27 AM EDT     Patient is not able to demonstrated the ability to move from a reclining position to an upright position within the recliner. however patient is alert, oriented and able to provide informed consent

## 2024-07-11 NOTE — CONSULTS
Inpatient Neurology consult        [facility]            Rodolfo Luna  1945    Date of Service: 7/11/2024    Referring Physician: Franca Huitron DO      Reason for the consult and CC: Encephalopathy    HPI:   The patient is a 78 y.o. male with a past medical history of Parkinson's disease, atrial fibrillation (s/p Watchman device), CAD, seizures, and lung cancer s/p partial lobectomy originally presenting to the hospital for concerns of esophageal stricture status post dilation.  Patient states he has had trouble swallowing for about a year and recently he has not been able to eat at all and whenever he eats there is choking.  Patient currently undergoing treatment for acute hypoxic respiratory failure and possible aspiration pneumonia.  Neurology has been consulted for evaluation and further management of encephalopathy.       Constitutional:   Vitals:    07/11/24 1000 07/11/24 1100 07/11/24 1128 07/11/24 1200   BP: 108/70 (!) 109/53  120/68   Pulse: 94 93 96 98   Resp: 25 21 19 25   Temp:    97.5 °F (36.4 °C)   TempSrc:    Temporal   SpO2: 97% 96% 97% 94%   Weight:       Height:             I personally reviewed and updated social history, past medical history, medications, allergy, surgical history, and family history as documented in the patient's electronic health records.       ROS: 10-14 ROS reviewed with the patient/nurse/family which were unremarkable except mentioned in H&P.    General appearance: Chronically ill-appearing  Mental Status:   Oriented to person, place, problem, and time.    Memory: Good immediate recall.  Intact remote memory  Normal attention span and concentration.  Language: intact naming, repeating and fluency   Good fund of Knowledge. Aware of current events and vocabulary   Cranial Nerves:   II: Visual fields: Full. Pupils: equal, round, reactive to light, bilaterally  III,IV,VI: Extra Ocular Movements are intact. No nystagmus  V: Facial sensation is intact  VII:

## 2024-07-11 NOTE — PROGRESS NOTES
Shift assessment completed, see flow sheet. Pt is alert and oriented. Slightly drowsy, however wakes easily. Vital signs are stable.     Temp- 97.8  HR- 98 RR- 22 BP- 119/64 SpO2-99%    Pt receiving supplemental oxygen flowing at 4 L/min    ICU line in place, dressing C/D/I     Call light in reach.

## 2024-07-11 NOTE — PROGRESS NOTES
Perfect Serve to Trevor Mckay MD  at 12:56 PM    Situation:   No urine output for my shift  Bladder scan 261 ml    Action  Continue to bladder scan  Straight cath if >450 ml

## 2024-07-12 LAB
25(OH)D3 SERPL-MCNC: 9.7 NG/ML
ANION GAP SERPL CALCULATED.3IONS-SCNC: 10 MMOL/L (ref 3–16)
BASOPHILS # BLD: 0 K/UL (ref 0–0.2)
BASOPHILS NFR BLD: 0.1 %
BUN SERPL-MCNC: 19 MG/DL (ref 7–20)
CALCIUM SERPL-MCNC: 8.1 MG/DL (ref 8.3–10.6)
CHLORIDE SERPL-SCNC: 105 MMOL/L (ref 99–110)
CO2 SERPL-SCNC: 26 MMOL/L (ref 21–32)
CREAT SERPL-MCNC: <0.5 MG/DL (ref 0.8–1.3)
DEPRECATED RDW RBC AUTO: 17.1 % (ref 12.4–15.4)
EOSINOPHIL # BLD: 0.1 K/UL (ref 0–0.6)
EOSINOPHIL NFR BLD: 0.5 %
GFR SERPLBLD CREATININE-BSD FMLA CKD-EPI: >90 ML/MIN/{1.73_M2}
GLUCOSE BLD-MCNC: 111 MG/DL (ref 70–99)
GLUCOSE BLD-MCNC: 82 MG/DL (ref 70–99)
GLUCOSE BLD-MCNC: 85 MG/DL (ref 70–99)
GLUCOSE BLD-MCNC: 93 MG/DL (ref 70–99)
GLUCOSE BLD-MCNC: 95 MG/DL (ref 70–99)
GLUCOSE SERPL-MCNC: 94 MG/DL (ref 70–99)
HCT VFR BLD AUTO: 32.7 % (ref 40.5–52.5)
HGB BLD-MCNC: 11.2 G/DL (ref 13.5–17.5)
LYMPHOCYTES # BLD: 0.5 K/UL (ref 1–5.1)
LYMPHOCYTES NFR BLD: 4.6 %
MCH RBC QN AUTO: 31.1 PG (ref 26–34)
MCHC RBC AUTO-ENTMCNC: 34.2 G/DL (ref 31–36)
MCV RBC AUTO: 90.8 FL (ref 80–100)
MONOCYTES # BLD: 1.2 K/UL (ref 0–1.3)
MONOCYTES NFR BLD: 10.9 %
MRSA DNA SPEC QL NAA+PROBE: ABNORMAL
NEUTROPHILS # BLD: 9.2 K/UL (ref 1.7–7.7)
NEUTROPHILS NFR BLD: 83.9 %
ORGANISM: ABNORMAL
PERFORMED ON: ABNORMAL
PERFORMED ON: NORMAL
PLATELET # BLD AUTO: 130 K/UL (ref 135–450)
PMV BLD AUTO: 8.3 FL (ref 5–10.5)
POTASSIUM SERPL-SCNC: 3.6 MMOL/L (ref 3.5–5.1)
RBC # BLD AUTO: 3.6 M/UL (ref 4.2–5.9)
SODIUM SERPL-SCNC: 141 MMOL/L (ref 136–145)
VANCOMYCIN TROUGH SERPL-MCNC: 13 UG/ML (ref 10–20)
WBC # BLD AUTO: 11 K/UL (ref 4–11)

## 2024-07-12 PROCEDURE — 3609013300 HC EGD TUBE PLACEMENT: Performed by: INTERNAL MEDICINE

## 2024-07-12 PROCEDURE — APPSS30 APP SPLIT SHARED TIME 16-30 MINUTES

## 2024-07-12 PROCEDURE — 0DH63UZ INSERTION OF FEEDING DEVICE INTO STOMACH, PERCUTANEOUS APPROACH: ICD-10-PCS | Performed by: INTERNAL MEDICINE

## 2024-07-12 PROCEDURE — 51798 US URINE CAPACITY MEASURE: CPT

## 2024-07-12 PROCEDURE — 6360000002 HC RX W HCPCS: Performed by: INTERNAL MEDICINE

## 2024-07-12 PROCEDURE — 6370000000 HC RX 637 (ALT 250 FOR IP): Performed by: INTERNAL MEDICINE

## 2024-07-12 PROCEDURE — 94640 AIRWAY INHALATION TREATMENT: CPT

## 2024-07-12 PROCEDURE — 2000000000 HC ICU R&B

## 2024-07-12 PROCEDURE — 2709999900 HC NON-CHARGEABLE SUPPLY: Performed by: INTERNAL MEDICINE

## 2024-07-12 PROCEDURE — 2580000003 HC RX 258: Performed by: INTERNAL MEDICINE

## 2024-07-12 PROCEDURE — 80048 BASIC METABOLIC PNL TOTAL CA: CPT

## 2024-07-12 PROCEDURE — 85025 COMPLETE CBC W/AUTO DIFF WBC: CPT

## 2024-07-12 PROCEDURE — 99153 MOD SED SAME PHYS/QHP EA: CPT | Performed by: INTERNAL MEDICINE

## 2024-07-12 PROCEDURE — 6370000000 HC RX 637 (ALT 250 FOR IP): Performed by: STUDENT IN AN ORGANIZED HEALTH CARE EDUCATION/TRAINING PROGRAM

## 2024-07-12 PROCEDURE — 80202 ASSAY OF VANCOMYCIN: CPT

## 2024-07-12 PROCEDURE — 99233 SBSQ HOSP IP/OBS HIGH 50: CPT | Performed by: PSYCHIATRY & NEUROLOGY

## 2024-07-12 PROCEDURE — 99233 SBSQ HOSP IP/OBS HIGH 50: CPT | Performed by: INTERNAL MEDICINE

## 2024-07-12 PROCEDURE — 99152 MOD SED SAME PHYS/QHP 5/>YRS: CPT | Performed by: INTERNAL MEDICINE

## 2024-07-12 PROCEDURE — 36415 COLL VENOUS BLD VENIPUNCTURE: CPT

## 2024-07-12 PROCEDURE — C9254 INJECTION, LACOSAMIDE: HCPCS | Performed by: INTERNAL MEDICINE

## 2024-07-12 PROCEDURE — 94761 N-INVAS EAR/PLS OXIMETRY MLT: CPT

## 2024-07-12 PROCEDURE — 2700000000 HC OXYGEN THERAPY PER DAY

## 2024-07-12 RX ORDER — FENTANYL CITRATE 50 UG/ML
INJECTION, SOLUTION INTRAMUSCULAR; INTRAVENOUS PRN
Status: DISCONTINUED | OUTPATIENT
Start: 2024-07-12 | End: 2024-07-12 | Stop reason: ALTCHOICE

## 2024-07-12 RX ORDER — MIDAZOLAM HYDROCHLORIDE 5 MG/ML
INJECTION INTRAMUSCULAR; INTRAVENOUS PRN
Status: DISCONTINUED | OUTPATIENT
Start: 2024-07-12 | End: 2024-07-12 | Stop reason: ALTCHOICE

## 2024-07-12 RX ORDER — DEXTROSE MONOHYDRATE AND SODIUM CHLORIDE 5; .45 G/100ML; G/100ML
INJECTION, SOLUTION INTRAVENOUS CONTINUOUS
Status: DISCONTINUED | OUTPATIENT
Start: 2024-07-12 | End: 2024-07-13

## 2024-07-12 RX ADMIN — Medication 40 MG: at 21:15

## 2024-07-12 RX ADMIN — FINASTERIDE 5 MG: 5 TABLET, FILM COATED ORAL at 10:00

## 2024-07-12 RX ADMIN — MUPIROCIN: 20 OINTMENT TOPICAL at 10:10

## 2024-07-12 RX ADMIN — VANCOMYCIN HYDROCHLORIDE 1250 MG: 10 INJECTION, POWDER, LYOPHILIZED, FOR SOLUTION INTRAVENOUS at 14:37

## 2024-07-12 RX ADMIN — CARBIDOPA AND LEVODOPA 1 TABLET: 25; 100 TABLET ORAL at 21:01

## 2024-07-12 RX ADMIN — ALBUTEROL SULFATE 2.5 MG: 2.5 SOLUTION RESPIRATORY (INHALATION) at 07:23

## 2024-07-12 RX ADMIN — DILTIAZEM HYDROCHLORIDE 30 MG: 60 TABLET ORAL at 21:00

## 2024-07-12 RX ADMIN — LACOSAMIDE 100 MG: 10 INJECTION INTRAVENOUS at 10:03

## 2024-07-12 RX ADMIN — ALBUTEROL SULFATE 2.5 MG: 2.5 SOLUTION RESPIRATORY (INHALATION) at 11:19

## 2024-07-12 RX ADMIN — Medication 4 ML: at 19:04

## 2024-07-12 RX ADMIN — DEXTROSE AND SODIUM CHLORIDE: 5; 450 INJECTION, SOLUTION INTRAVENOUS at 10:13

## 2024-07-12 RX ADMIN — Medication 4 ML: at 07:24

## 2024-07-12 RX ADMIN — MEROPENEM 1000 MG: 1 INJECTION INTRAVENOUS at 10:11

## 2024-07-12 RX ADMIN — LEVETIRACETAM 750 MG: 100 INJECTION INTRAVENOUS at 10:05

## 2024-07-12 RX ADMIN — MONTELUKAST SODIUM 10 MG: 10 TABLET, COATED ORAL at 21:01

## 2024-07-12 RX ADMIN — LEVETIRACETAM 750 MG: 100 INJECTION INTRAVENOUS at 21:16

## 2024-07-12 RX ADMIN — Medication 40 MG: at 10:10

## 2024-07-12 RX ADMIN — MEMANTINE HYDROCHLORIDE 10 MG: 5 TABLET ORAL at 10:00

## 2024-07-12 RX ADMIN — ALBUTEROL SULFATE 2.5 MG: 2.5 SOLUTION RESPIRATORY (INHALATION) at 19:04

## 2024-07-12 RX ADMIN — SODIUM CHLORIDE, PRESERVATIVE FREE 10 ML: 5 INJECTION INTRAVENOUS at 21:17

## 2024-07-12 RX ADMIN — DILTIAZEM HYDROCHLORIDE 30 MG: 60 TABLET ORAL at 06:06

## 2024-07-12 RX ADMIN — SODIUM CHLORIDE, PRESERVATIVE FREE 10 ML: 5 INJECTION INTRAVENOUS at 10:10

## 2024-07-12 RX ADMIN — DILTIAZEM HYDROCHLORIDE 30 MG: 60 TABLET ORAL at 14:31

## 2024-07-12 RX ADMIN — CARBIDOPA AND LEVODOPA 1 TABLET: 25; 100 TABLET ORAL at 10:00

## 2024-07-12 RX ADMIN — CARBIDOPA AND LEVODOPA 1 TABLET: 25; 100 TABLET ORAL at 14:31

## 2024-07-12 RX ADMIN — LACOSAMIDE 100 MG: 10 INJECTION INTRAVENOUS at 21:15

## 2024-07-12 RX ADMIN — MEROPENEM 1000 MG: 1 INJECTION INTRAVENOUS at 00:56

## 2024-07-12 RX ADMIN — MUPIROCIN: 20 OINTMENT TOPICAL at 21:16

## 2024-07-12 RX ADMIN — ATORVASTATIN CALCIUM 80 MG: 40 TABLET, FILM COATED ORAL at 21:00

## 2024-07-12 RX ADMIN — MEMANTINE HYDROCHLORIDE 10 MG: 5 TABLET ORAL at 21:01

## 2024-07-12 RX ADMIN — MEROPENEM 1000 MG: 1 INJECTION INTRAVENOUS at 16:38

## 2024-07-12 ASSESSMENT — PAIN SCALES - WONG BAKER

## 2024-07-12 ASSESSMENT — PAIN SCALES - GENERAL: PAINLEVEL_OUTOF10: 0

## 2024-07-12 NOTE — FLOWSHEET NOTE
07/12/24 0800   Vitals   Temp 97.5 °F (36.4 °C)   Temp Source Temporal   Pulse 100   Heart Rate Source Monitor   Respirations 27   /62   MAP (Calculated) 86   MAP (mmHg) 83   BP Method Automatic   Pain Assessment   Schultz-Baker Pain Rating 0   Response to Pain Intervention Patient satisfied   Oxygen Therapy   SpO2 93 %   O2 Device None (Room air)     Vital signs stable. Pt continues to saturate well on RA.  Pt is alert and oriented X3. Mentation much improved from yesterday. Pt denies any SOB or worsening pain at the moment. Nothing new noted on head to toe assessment. External urinary catheter remains secure in place.  Pt is Afib on the monitor. Morning medication administration completed via NG tube. D5 1/2 NS initiated at 40 ml/hr. Pt repositioned in bed for comfort. Remains strict NPO. Pt denies any further assistance at the moment. Will continue to monitor.

## 2024-07-12 NOTE — PROGRESS NOTES
Occupational Therapy/Physical Therapy    Will need new OT/PT orders when pt is medically appropriate for therapy services secondary to transfer from PCU to ICU.    Jigna Childers, OTR/L #[id number]  Aaron Sanchez, PT, DPT

## 2024-07-12 NOTE — PROGRESS NOTES
Reassessment complete, VSS. Pt continues to have slight confusion. Changes documented in flowsheet.

## 2024-07-12 NOTE — PROGRESS NOTES
RT Inhaler-Nebulizer Bronchodilator Protocol Note    There is a bronchodilator order in the chart from a provider indicating to follow the RT Bronchodilator Protocol and there is an “Initiate RT Inhaler-Nebulizer Bronchodilator Protocol” order as well (see protocol at bottom of note).    CXR Findings:  XR CHEST PORTABLE    Result Date: 7/10/2024  Small right pleural effusion with patchy infiltrates in the right lower lobe concerning for pneumonia       The findings from the last RT Protocol Assessment were as follows:   History Pulmonary Disease: Smoker 15 pack years or more  Respiratory Pattern: Regular pattern and RR 12-20 bpm  Breath Sounds: Inspiratory and expiratory or bilateral wheezing and/or rhonchi  Cough: Strong, spontaneous, non-productive  Indication for Bronchodilator Therapy: Decreased or absent breath sounds  Bronchodilator Assessment Score: 7    Aerosolized bronchodilator medication orders have been revised according to the RT Inhaler-Nebulizer Bronchodilator Protocol below.    Respiratory Therapist to perform RT Therapy Protocol Assessment initially then follow the protocol.  Repeat RT Therapy Protocol Assessment PRN for score 0-3 or on second treatment, BID, and PRN for scores above 3.    No Indications - adjust the frequency to every 6 hours PRN wheezing or bronchospasm, if no treatments needed after 48 hours then discontinue using Per Protocol order mode.     If indication present, adjust the RT bronchodilator orders based on the Bronchodilator Assessment Score as indicated below.  Use Inhaler orders unless patient has one or more of the following: on home nebulizer, not able to hold breath for 10 seconds, is not alert and oriented, cannot activate and use MDI correctly, or respiratory rate 25 breaths per minute or more, then use the equivalent nebulizer order(s) with same Frequency and PRN reasons based on the score.  If a patient is on this medication at home then do not decrease Frequency  below that used at home.    0-3 - enter or revise RT bronchodilator order(s) to equivalent RT Bronchodilator order with Frequency of every 4 hours PRN for wheezing or increased work of breathing using Per Protocol order mode.        4-6 - enter or revise RT Bronchodilator order(s) to two equivalent RT bronchodilator orders with one order with BID Frequency and one order with Frequency of every 4 hours PRN wheezing or increased work of breathing using Per Protocol order mode.        7-10 - enter or revise RT Bronchodilator order(s) to two equivalent RT bronchodilator orders with one order with TID Frequency and one order with Frequency of every 4 hours PRN wheezing or increased work of breathing using Per Protocol order mode.       11-13 - enter or revise RT Bronchodilator order(s) to one equivalent RT bronchodilator order with QID Frequency and an Albuterol order with Frequency of every 4 hours PRN wheezing or increased work of breathing using Per Protocol order mode.      Greater than 13 - enter or revise RT Bronchodilator order(s) to one equivalent RT bronchodilator order with every 4 hours Frequency and an Albuterol order with Frequency of every 2 hours PRN wheezing or increased work of breathing using Per Protocol order mode.     RT to enter RT Home Evaluation for COPD & MDI Assessment order using Per Protocol order mode.    Electronically signed by Mariel Ybarra RCP on 7/12/2024 at 7:29 AM

## 2024-07-12 NOTE — H&P
History and Physical / Pre-Sedation Assessment    Patient:  Rodolfo Luna   :   1945     Intended Procedure:  EGD    HPI:78 year old male with history of HTN, HLD, CAD, A fib s/p watchman, CVA, Parkinson's disease, lung ca s/p surgery admitted with aspiration pneumonia and severe dysphagia despite EGD with dilation.     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    S/P drug eluting coronary stent placement 2017    2.25 x 18 Xience Alpine ADRIÁN - Distal LAD    Seizure disorder ([facility])     Abnormal EEG with left temporal shapr waves    Seizures ([facility])     Shortness of breath 2014    Total knee replacement status 2011     Past Surgical History:   Procedure Laterality Date    ABLATION OF DYSRHYTHMIC FOCUS  2014    CARPAL TUNNEL RELEASE      CERVICAL DISC SURGERY      COLONOSCOPY  2016    normal    CORONARY ANGIOPLASTY WITH STENT PLACEMENT      ESOPHAGEAL DILATATION N/A 2024    ESOPHAGEAL DILATION KATHRYN performed by Abraham Lagos DO at [facility]    FEMUR SURGERY      left    KNEE SURGERY  11 R total knee replacement with femoral nerve block for pain control    LUNG REMOVAL, PARTIAL Right     20%    OTHER SURGICAL HISTORY Bilateral     excisions of lesions on bilat.neck and back    OTHER SURGICAL HISTORY  2014    Reveal Loop recorder placed in Cath Lab    UPPER GASTROINTESTINAL ENDOSCOPY  2016    gastric and esophogeal biopsy    UPPER GASTROINTESTINAL ENDOSCOPY  2024    ESOPHAGOGASTRODUODENOSCOPY  Resp 25   Ht 1.778 m (5' 10\")   Wt 73.6 kg (162 lb 4.8 oz)   SpO2 92%   BMI 23.29 kg/m²    Airway: Mallampati: II (soft palate, uvula, fauces visible)  Pulmonary:Normal  Cardiac:Normal  Abdomen:Normal    Pre-Procedure Assessment / Plan:  ASA: Class 3 - A patient with severe systemic disease that limits activity but is not incapacitating  Level of Sedation Plan:Moderate sedation  Post Procedure plan: Return to same level of care    I assessed the patient and find that the patient is in satisfactory condition to proceed with the planned procedure and sedation plan.    I have explained the risk, benefits, and alternatives to the procedure; the patient understands and agrees to proceed.       Dc Elizondo MD  7/12/2024

## 2024-07-12 NOTE — PROGRESS NOTES
P Pulmonary, Critical Care and Sleep Specialists                                 Pulmonary/Critical care  Consult /Progress Note :                                                                  CC :SOB       Subjective  He is way more alert  On RA  Sat 100   Seroquel  is on hold   No gag  Much better cough   Wife at bedside  Wife at bedside and does not want to proceed with bronchoscopy    PHYSICAL EXAM:  Vitals:    07/12/24 0724   BP:    Pulse: 88   Resp: 15   Temp:    SpO2: 94%     Gen: No distress.   Eyes: PERRL. No sclera icterus. No conjunctival injection.   ENT: No discharge. Pharynx clear.   Neck: Trachea midline. No obvious mass.    Resp:Bilateral rhonchi   GI: Non-tender. Non-distended. No hernia.   Skin: Warm and dry. No nodule on exposed extremities.   Lymph: No cervical LAD. No supraclavicular LAD.   M/S: No cyanosis. No joint deformity. No clubbing.   Neuro: Awake. Alert however he go back to sleep immediate. Moves all four extremities.   Psych: Oriented x 3. No anxiety.     LABS:  CBC:   Recent Labs     07/10/24  0450 07/11/24 0436 07/12/24  0440   WBC 10.8 10.3 11.0   HGB 12.1* 11.7* 11.2*   HCT 35.2* 34.2* 32.7*   MCV 91.4 91.4 90.8    125* 130*       BMP:   Recent Labs     07/10/24  1729 07/11/24 0436 07/12/24  0440    143 141   K 3.8 3.8 3.6    107 105   CO2 27 28 26   BUN 23* 23* 19   CREATININE 0.6* <0.5* <0.5*       LIVER PROFILE:   No results for input(s): \"AST\", \"ALT\", \"LIPASE\", \"AMYLASE\", \"BILIDIR\", \"BILITOT\", \"ALKPHOS\" in the last 72 hours.    Invalid input(s): \"ALB\"      t     imaging was reviewed by me and showed     ASSESSMENT:  Acute hypoxic respiratory failure requiring 8 L  Possible aspiration  History of PE  Severe pharyngeal esophageal dysphagia and stricture of the lower cervical esophagus status post  History of lung cancer status post VATS      PLAN:  Patient hypoxia resolved  More awake   Hold on Bronch

## 2024-07-12 NOTE — PROGRESS NOTES
4 Eyes Skin Assessment     NAME:  Rodolfo Luna  YOB: 1945  MEDICAL RECORD NUMBER:  [id number]    The patient is being assessed for  Other Shift Assessment    I agree that at least one RN has performed a thorough Head to Toe Skin Assessment on the patient. ALL assessment sites listed below have been assessed.      Areas assessed by both nurses:    Head, Face, Ears, Shoulders, Back, Chest, Arms, Elbows, Hands, Sacrum. Buttock, Coccyx, Ischium, Legs. Feet and Heels, and Under Medical Devices         Does the Patient have a Wound? No noted wound(s)       Jorge Prevention initiated by RN: Yes  Wound Care Orders initiated by RN: No    Pressure Injury (Stage 3,4, Unstageable, DTI, NWPT, and Complex wounds) if present, place Wound referral order by RN under : No    New Ostomies, if present place, Ostomy referral order under : No     Nurse 1 eSignature: Electronically signed by Nelida Campos RN on 7/12/24 at 6:38 AM EDT    **SHARE this note so that the co-signing nurse can place an eSignature**    Nurse 2 eSignature: Electronically signed by Yesica Blevins RN on 7/12/24 at 6:41 AM EDT

## 2024-07-12 NOTE — PROGRESS NOTES
Rodolfo Luna  Neurology Follow-up  [facility] Neurology    Date of Service: 7/12/2024    Subjective:   CC: Follow up today regarding: Encephalopathy     Events noted. Chart and lab reviewed. No acute events overnight. Patient intermittently altered.       ROS : A 10-12 system review obtained and updated today and is unremarkable except as mentioned  in my interval history.     Past medical history, social history, medication and family history reviewed.       Objective:  Exam:   Constitutional:   Vitals:    07/12/24 0800 07/12/24 0900 07/12/24 1000 07/12/24 1100   BP: 133/62 139/78 (!) 141/71 134/75   Pulse: 100 94 97 96   Resp: 27 21 21 23   Temp: 97.5 °F (36.4 °C)   97.9 °F (36.6 °C)   TempSrc: Temporal   Temporal   SpO2: 93% 95% 95% 93%   Weight:       Height:         General appearance: Chronically ill-appearing  Mental Status:   Oriented to person, place, problem. Disoriented to time.    Memory: Good immediate recall.  Intact remote memory  Normal attention span and concentration.  Language: intact naming, repeating and fluency   Good fund of Knowledge. Aware of current events and vocabulary   Cranial Nerves:   II: Visual fields: Full. Pupils: equal, round, reactive to light, bilaterally  III,IV,VI: Extra Ocular Movements are intact. No nystagmus  V: Facial sensation is intact  VII: Facial strength and movements: intact and symmetric  IX: Normal palatal elevation and shoulder shrug  XII: Tongue movements are normal  Musculoskeletal: 4/5 in all 4 extremities.  Good range of motion.  No muscle atrophy.    Tone: Normal tone.   Reflexes: Symmetric 2+ in the arms and 2+ in the legs   Planters: Flexor bilaterally  Coordination: no pronator drift, no dysmetria with FNF  Sensation: normal in both arms and legs.    MDM:      A. Problems (any 1)    High:    [x] Acute/Chronic Illness/injury posing threat to life or bodily function:    [] Severe exacerbation of chronic illness:      Moderate:    []     1 or more

## 2024-07-12 NOTE — PROGRESS NOTES
Speech-Language Pathology  Swallowing Disorders and Dysphagia     SLP Attempt Note           Name: Rodolfo Luna  : 1945  Medical Diagnosis: Aspiration into respiratory tract, initial encounter [T1.908A]  Aspiration into airway, initial encounter [T1.8A]  Right leg weakness [R29.898]    Attempting to see pt for dysphagia follow-up. Pt awaiting PEG and currently NPO with NG. Will hold on ice chip trials given potential PEG. Holding on bronch as pt is now on RA and satting well. No charges filed. Will re-attempt once pt is clinically appropriate for ice chip trials. Will continue to monitor chart. Thank you,    Jessy Rehman M.A., CCC-SLP   Speech-Language Pathologist #[id number]  Speech Pathology and Swallowing Disorders  P: (inpatient): [phone] (speech desk): [phone]  E: [email or web address]  Certified to provide:  FEES, MBS, Vital Stim, and Mg Dysphagia Therapy Program (MDTP)

## 2024-07-12 NOTE — PROGRESS NOTES
07/12/24 1605   Encounter Summary   Encounter Overview/Reason Spiritual/Emotional Needs   Service Provided For Patient and family together   Referral/Consult From [facility]   Support System Spouse;Family members   Last Encounter  07/12/24  ( provided emotional support to Pt and family members, Spouse expressed gratitude for the compassionate care)   Begin Time 1525   End Time  1540   Total Time Calculated 15 min   Spiritual/Emotional needs   Type Spiritual Support   Assessment/Intervention/Outcome   Assessment Calm;Coping   Intervention Active listening;Explored/Affirmed feelings, thoughts, concerns;Explored Coping Skills/Resources;Nurtured Hope   Outcome Encouraged;Expressed Gratitude

## 2024-07-12 NOTE — PROGRESS NOTES
Shift assessment completed, see flow sheet. Pt is alert and oriented x3. Slightly dowsy, however wakes easily. Vital signs are stable.     Temp-98.7  HR- 97 RR-25  BP-141/80  SpO2-94%       Pt receiving supplemental oxygen flowing at 3 L/min. Breaths are easy, even, and unlabored.     All ICU lines remain in place. Dressings are C/D/I.     Call light in reach. Will continue to monitor.

## 2024-07-12 NOTE — PROGRESS NOTES
Pt was rubbing nose, and then pulled NG out of left nare. No injuries noted, pt denies pain.     Pt also noted start pulling at PIV line.     MD notified and gives orders for bilateral wrist restraints.

## 2024-07-12 NOTE — PLAN OF CARE
Problem: Chronic Conditions and Co-morbidities  Goal: Patient's chronic conditions and co-morbidity symptoms are monitored and maintained or improved  7/12/2024 0144 by Nelida Campos RN  Outcome: Progressing     Problem: Safety - Adult  Goal: Free from fall injury  Outcome: Progressing     Problem: Discharge Planning  Goal: Discharge to home or other facility with appropriate resources  7/12/2024 0144 by Nelida Campos RN  Outcome: Progressing     Problem: Respiratory - Adult  Goal: Achieves optimal ventilation and oxygenation  7/12/2024 0144 by Nelida Campos RN  Outcome: Progressing  7/11/2024 1900 by Kimberly Brunson RN  Outcome: Progressing  Flowsheets (Taken 7/11/2024 1900)  Achieves optimal ventilation and oxygenation:   Assess for changes in respiratory status   Assess for changes in mentation and behavior   Position to facilitate oxygenation and minimize respiratory effort   Oxygen supplementation based on oxygen saturation or arterial blood gases   Encourage broncho-pulmonary hygiene including cough, deep breathe, incentive spirometry   Assess the need for suctioning and aspirate as needed   Respiratory therapy support as indicated  Note: Wean oxygen as allowed to 3L.  Improved cough.      Problem: Safety - Medical Restraint  Goal: Remains free of injury from restraints (Restraint for Interference with Medical Device)  Description: INTERVENTIONS:  1. Determine that other, less restrictive measures have been tried or would not be effective before applying the restraint  2. Evaluate the patient's condition at the time of restraint application  3. Inform patient/family regarding the reason for restraint  4. Q2H: Monitor safety, psychosocial status, comfort, nutrition and hydration  Outcome: Progressing  Flowsheets (Taken 7/11/2024 2230)  Remains free of injury from restraints (restraint for interference with medical device):   Determine that other, less restrictive measures have been tried or would not

## 2024-07-12 NOTE — PROGRESS NOTES
Progress Note    Admit Date:  7/1/2024    Patient with Parkinsons disease and presbyesophagus  admitted for aspiration pna     Seen by speech therapy  Seen by GI - S/P EGD with esophageal dilatation 7/5   Severely hypoxic after MBS - RR called and pt on nonrebreather     Was moved to ICU overnight for confusion   Planned for bronch but family refused but improved and now on RA      Subjective:  Mr. Luna was seen awake, alert and better  oriented  laying in bed. He feels ok , no new events overnight, remains on RA         Objective:   Patient Vitals for the past 4 hrs:   BP Pulse Resp SpO2 Weight   07/12/24 0724 -- 88 15 94 % --   07/12/24 0723 -- 82 21 94 % --   07/12/24 0700 129/68 96 29 92 % --   07/12/24 0606 (!) 130/40 95 -- -- --   07/12/24 0600 (!) 130/40 100 28 95 % --   07/12/24 0500 137/70 90 26 94 % --   07/12/24 0408 -- -- -- -- 73.6 kg (162 lb 4.8 oz)   07/12/24 0400 137/66 92 26 95 % --            Intake/Output Summary (Last 24 hours) at 7/12/2024 0740  Last data filed at 7/12/2024 0414  Gross per 24 hour   Intake 1611.84 ml   Output 400 ml   Net 1211.84 ml         Physical Exam:        General: thin elderly male, ill appearing    Awake, alert and better oriented. More energetic   Appears to be not in any distress  Mucous Membranes:  Pink , anicteric  NG +  Atrophic facial muscles +  Neck: No JVD, no carotid bruit, no thyromegaly  Chest:  Clear to auscultation bilaterally diminished in right base  Cardiovascular:  RRR S1S2 heard, no murmurs or gallops  Abdomen:  Soft, undistended, non tender, no organomegaly, BS present  Extremities: No edema or cyanosis. Distal pulses well felt  Neurological : resting tremors, rigidity   grossly normal with gen weakness       Medications:  mupirocin, , BID  vancomycin, 1,500 mg, Q12H  aspirin EC, 81 mg, Daily  [Held by provider] QUEtiapine, 25 mg, Nightly  dilTIAZem, 30 mg, 3 times per day  sodium chloride (Inhalant), 4 mL, Q12H  albuterol, 2.5 mg, 4x Daily  advancement.   2. Unchanged small right pleural effusion and/or basilar atelectasis.         XR ABDOMEN FOR NG/OG/NE TUBE PLACEMENT   Final Result   The tip of the enteric tube is in the gastric fundus. The side port is in the   distal esophagus.  Recommend advancement by at least 8 cm.         XR CHEST (2 VW)   Final Result   Enteric tube is looped in the cervical and upper thoracic region.         FL MODIFIED BARIUM SWALLOW W VIDEO   Final Result   1. Positive silent tracheal aspiration of thin liquids and nectar thickened   liquids.   2. While there is no evidence of laryngeal penetration or tracheal aspiration   of purees, there was notable stasis and residual puree barium within the   valleculae and piriform sinuses, with only minimal passage of purees below   the EUS secondary to stenosis in this location.   3. Mild smooth stricture of the lower cervical esophagus, likely secondary to   extrinsic compression upon the left lateral wall of the lower cervical   esophagus by thoracic osteophytes.   Please see separate speech pathology report for full discussion of findings   and recommendations.         CT CHEST PULMONARY EMBOLISM W CONTRAST   Final Result   1. No evidence for acute pulmonary artery embolism.   2. Near complete resolution of the previously seen eccentric thrombus in the   main right pulmonary artery.   3. Status post right upper lobectomy with chronic right pleural effusion and   pleural thickening.         XR CHEST PORTABLE   Final Result   1. No acute finding in the chest.   2. Stable postsurgical change of partial right pneumonectomy.               Assessment/Plan:    Aspiration pneumonia   Hx of Dysphagia with presbyesophagus   Acute hypoxic resp failure     - hx of chronic aspiration on thickened liquid diet  - CT chest as above with no clear Airspace disease, has chronic right pleural effusion and resolved previous thrombus   - SLP eval and treat -> rec pt stay NPO  - MBS with severe

## 2024-07-13 ENCOUNTER — APPOINTMENT (OUTPATIENT)
Dept: GENERAL RADIOLOGY | Age: 79
DRG: 177 | End: 2024-07-13
Payer: MEDICARE

## 2024-07-13 LAB
ANION GAP SERPL CALCULATED.3IONS-SCNC: 10 MMOL/L (ref 3–16)
BASOPHILS # BLD: 0 K/UL (ref 0–0.2)
BASOPHILS NFR BLD: 0.1 %
BUN SERPL-MCNC: 17 MG/DL (ref 7–20)
CALCIUM SERPL-MCNC: 7.9 MG/DL (ref 8.3–10.6)
CHLORIDE SERPL-SCNC: 109 MMOL/L (ref 99–110)
CO2 SERPL-SCNC: 26 MMOL/L (ref 21–32)
CREAT SERPL-MCNC: <0.5 MG/DL (ref 0.8–1.3)
DEPRECATED RDW RBC AUTO: 17.2 % (ref 12.4–15.4)
EOSINOPHIL # BLD: 0.1 K/UL (ref 0–0.6)
EOSINOPHIL NFR BLD: 1.4 %
GFR SERPLBLD CREATININE-BSD FMLA CKD-EPI: >90 ML/MIN/{1.73_M2}
GLUCOSE BLD-MCNC: 112 MG/DL (ref 70–99)
GLUCOSE BLD-MCNC: 113 MG/DL (ref 70–99)
GLUCOSE BLD-MCNC: 121 MG/DL (ref 70–99)
GLUCOSE BLD-MCNC: 133 MG/DL (ref 70–99)
GLUCOSE SERPL-MCNC: 116 MG/DL (ref 70–99)
HCT VFR BLD AUTO: 33.1 % (ref 40.5–52.5)
HGB BLD-MCNC: 11.2 G/DL (ref 13.5–17.5)
LYMPHOCYTES # BLD: 0.6 K/UL (ref 1–5.1)
LYMPHOCYTES NFR BLD: 6.2 %
MAGNESIUM SERPL-MCNC: 1.8 MG/DL (ref 1.8–2.4)
MCH RBC QN AUTO: 31.3 PG (ref 26–34)
MCHC RBC AUTO-ENTMCNC: 34 G/DL (ref 31–36)
MCV RBC AUTO: 92 FL (ref 80–100)
MONOCYTES # BLD: 1.2 K/UL (ref 0–1.3)
MONOCYTES NFR BLD: 12.8 %
NEUTROPHILS # BLD: 7.2 K/UL (ref 1.7–7.7)
NEUTROPHILS NFR BLD: 79.5 %
PERFORMED ON: ABNORMAL
PLATELET # BLD AUTO: 152 K/UL (ref 135–450)
PMV BLD AUTO: 8.2 FL (ref 5–10.5)
POTASSIUM SERPL-SCNC: 3.4 MMOL/L (ref 3.5–5.1)
RBC # BLD AUTO: 3.59 M/UL (ref 4.2–5.9)
SODIUM SERPL-SCNC: 145 MMOL/L (ref 136–145)
VANCOMYCIN TROUGH SERPL-MCNC: 14.3 UG/ML (ref 10–20)
WBC # BLD AUTO: 9.1 K/UL (ref 4–11)

## 2024-07-13 PROCEDURE — 6370000000 HC RX 637 (ALT 250 FOR IP)

## 2024-07-13 PROCEDURE — 6360000002 HC RX W HCPCS: Performed by: INTERNAL MEDICINE

## 2024-07-13 PROCEDURE — 71045 X-RAY EXAM CHEST 1 VIEW: CPT

## 2024-07-13 PROCEDURE — 6370000000 HC RX 637 (ALT 250 FOR IP): Performed by: INTERNAL MEDICINE

## 2024-07-13 PROCEDURE — C9254 INJECTION, LACOSAMIDE: HCPCS | Performed by: INTERNAL MEDICINE

## 2024-07-13 PROCEDURE — 94640 AIRWAY INHALATION TREATMENT: CPT

## 2024-07-13 PROCEDURE — 83735 ASSAY OF MAGNESIUM: CPT

## 2024-07-13 PROCEDURE — 2580000003 HC RX 258: Performed by: INTERNAL MEDICINE

## 2024-07-13 PROCEDURE — 99233 SBSQ HOSP IP/OBS HIGH 50: CPT | Performed by: PSYCHIATRY & NEUROLOGY

## 2024-07-13 PROCEDURE — 97530 THERAPEUTIC ACTIVITIES: CPT

## 2024-07-13 PROCEDURE — 36415 COLL VENOUS BLD VENIPUNCTURE: CPT

## 2024-07-13 PROCEDURE — 99233 SBSQ HOSP IP/OBS HIGH 50: CPT | Performed by: INTERNAL MEDICINE

## 2024-07-13 PROCEDURE — 2000000000 HC ICU R&B

## 2024-07-13 PROCEDURE — 97162 PT EVAL MOD COMPLEX 30 MIN: CPT

## 2024-07-13 PROCEDURE — 80048 BASIC METABOLIC PNL TOTAL CA: CPT

## 2024-07-13 PROCEDURE — 6370000000 HC RX 637 (ALT 250 FOR IP): Performed by: STUDENT IN AN ORGANIZED HEALTH CARE EDUCATION/TRAINING PROGRAM

## 2024-07-13 PROCEDURE — 97168 OT RE-EVAL EST PLAN CARE: CPT

## 2024-07-13 PROCEDURE — 80202 ASSAY OF VANCOMYCIN: CPT

## 2024-07-13 PROCEDURE — 94669 MECHANICAL CHEST WALL OSCILL: CPT

## 2024-07-13 PROCEDURE — 85025 COMPLETE CBC W/AUTO DIFF WBC: CPT

## 2024-07-13 PROCEDURE — 94761 N-INVAS EAR/PLS OXIMETRY MLT: CPT

## 2024-07-13 PROCEDURE — 2700000000 HC OXYGEN THERAPY PER DAY

## 2024-07-13 PROCEDURE — 99232 SBSQ HOSP IP/OBS MODERATE 35: CPT | Performed by: INTERNAL MEDICINE

## 2024-07-13 RX ORDER — ALBUTEROL SULFATE 2.5 MG/3ML
2.5 SOLUTION RESPIRATORY (INHALATION)
Status: DISCONTINUED | OUTPATIENT
Start: 2024-07-13 | End: 2024-07-16 | Stop reason: HOSPADM

## 2024-07-13 RX ORDER — LANOLIN ALCOHOL/MO/W.PET/CERES
100 CREAM (GRAM) TOPICAL DAILY
Status: DISCONTINUED | OUTPATIENT
Start: 2024-07-13 | End: 2024-07-16 | Stop reason: HOSPADM

## 2024-07-13 RX ORDER — M-VIT,TX,IRON,MINS/CALC/FOLIC 27MG-0.4MG
1 TABLET ORAL DAILY
Status: DISCONTINUED | OUTPATIENT
Start: 2024-07-13 | End: 2024-07-16 | Stop reason: HOSPADM

## 2024-07-13 RX ORDER — M-VIT,TX,IRON,MINS/CALC/FOLIC 27MG-0.4MG
1 TABLET ORAL DAILY
Status: DISCONTINUED | OUTPATIENT
Start: 2024-07-13 | End: 2024-07-13

## 2024-07-13 RX ADMIN — MEMANTINE HYDROCHLORIDE 10 MG: 5 TABLET ORAL at 09:03

## 2024-07-13 RX ADMIN — MEROPENEM 1000 MG: 1 INJECTION INTRAVENOUS at 17:44

## 2024-07-13 RX ADMIN — Medication 40 MG: at 20:43

## 2024-07-13 RX ADMIN — MEROPENEM 1000 MG: 1 INJECTION INTRAVENOUS at 01:01

## 2024-07-13 RX ADMIN — CARBIDOPA AND LEVODOPA 1 TABLET: 25; 100 TABLET ORAL at 20:50

## 2024-07-13 RX ADMIN — MONTELUKAST SODIUM 10 MG: 10 TABLET, COATED ORAL at 20:50

## 2024-07-13 RX ADMIN — LEVETIRACETAM 750 MG: 100 INJECTION INTRAVENOUS at 20:43

## 2024-07-13 RX ADMIN — MEROPENEM 1000 MG: 1 INJECTION INTRAVENOUS at 09:02

## 2024-07-13 RX ADMIN — LACOSAMIDE 100 MG: 10 INJECTION INTRAVENOUS at 09:05

## 2024-07-13 RX ADMIN — ALBUTEROL SULFATE 2.5 MG: 2.5 SOLUTION RESPIRATORY (INHALATION) at 07:30

## 2024-07-13 RX ADMIN — MEMANTINE HYDROCHLORIDE 10 MG: 5 TABLET ORAL at 20:50

## 2024-07-13 RX ADMIN — POTASSIUM BICARBONATE 40 MEQ: 782 TABLET, EFFERVESCENT ORAL at 05:28

## 2024-07-13 RX ADMIN — SODIUM CHLORIDE, PRESERVATIVE FREE 10 ML: 5 INJECTION INTRAVENOUS at 20:44

## 2024-07-13 RX ADMIN — Medication 4 ML: at 07:31

## 2024-07-13 RX ADMIN — LEVETIRACETAM 750 MG: 100 INJECTION INTRAVENOUS at 09:03

## 2024-07-13 RX ADMIN — I-VITE, TAB 1000-60-2MG (60/BT) 1 TABLET: TAB at 13:48

## 2024-07-13 RX ADMIN — MUPIROCIN: 20 OINTMENT TOPICAL at 09:02

## 2024-07-13 RX ADMIN — ATORVASTATIN CALCIUM 80 MG: 40 TABLET, FILM COATED ORAL at 20:50

## 2024-07-13 RX ADMIN — FINASTERIDE 5 MG: 5 TABLET, FILM COATED ORAL at 09:04

## 2024-07-13 RX ADMIN — ASPIRIN 81 MG: 81 TABLET, COATED ORAL at 09:04

## 2024-07-13 RX ADMIN — Medication 4 ML: at 19:14

## 2024-07-13 RX ADMIN — DILTIAZEM HYDROCHLORIDE 30 MG: 60 TABLET ORAL at 05:28

## 2024-07-13 RX ADMIN — DEXTROSE AND SODIUM CHLORIDE: 5; 450 INJECTION, SOLUTION INTRAVENOUS at 04:22

## 2024-07-13 RX ADMIN — DILTIAZEM HYDROCHLORIDE 30 MG: 60 TABLET ORAL at 13:48

## 2024-07-13 RX ADMIN — DILTIAZEM HYDROCHLORIDE 30 MG: 60 TABLET ORAL at 20:50

## 2024-07-13 RX ADMIN — APIXABAN 5 MG: 5 TABLET, FILM COATED ORAL at 20:50

## 2024-07-13 RX ADMIN — VANCOMYCIN HYDROCHLORIDE 1250 MG: 10 INJECTION, POWDER, LYOPHILIZED, FOR SOLUTION INTRAVENOUS at 14:55

## 2024-07-13 RX ADMIN — MUPIROCIN: 20 OINTMENT TOPICAL at 21:04

## 2024-07-13 RX ADMIN — ALBUTEROL SULFATE 2.5 MG: 2.5 SOLUTION RESPIRATORY (INHALATION) at 19:14

## 2024-07-13 RX ADMIN — Medication: at 14:41

## 2024-07-13 RX ADMIN — POLYETHYLENE GLYCOL (3350) 17 G: 17 POWDER, FOR SOLUTION ORAL at 16:42

## 2024-07-13 RX ADMIN — VANCOMYCIN HYDROCHLORIDE 1250 MG: 10 INJECTION, POWDER, LYOPHILIZED, FOR SOLUTION INTRAVENOUS at 04:21

## 2024-07-13 RX ADMIN — SODIUM CHLORIDE, PRESERVATIVE FREE 10 ML: 5 INJECTION INTRAVENOUS at 09:30

## 2024-07-13 RX ADMIN — CARBIDOPA AND LEVODOPA 1 TABLET: 25; 100 TABLET ORAL at 13:48

## 2024-07-13 RX ADMIN — Medication 40 MG: at 09:03

## 2024-07-13 RX ADMIN — LACOSAMIDE 100 MG: 10 INJECTION INTRAVENOUS at 20:43

## 2024-07-13 RX ADMIN — CARBIDOPA AND LEVODOPA 1 TABLET: 25; 100 TABLET ORAL at 09:04

## 2024-07-13 RX ADMIN — Medication 100 MG: at 13:48

## 2024-07-13 RX ADMIN — ACETAMINOPHEN 650 MG: 325 TABLET ORAL at 20:50

## 2024-07-13 ASSESSMENT — PAIN DESCRIPTION - DESCRIPTORS: DESCRIPTORS: ACHING;DISCOMFORT;SORE

## 2024-07-13 ASSESSMENT — PAIN SCALES - GENERAL
PAINLEVEL_OUTOF10: 7
PAINLEVEL_OUTOF10: 0

## 2024-07-13 ASSESSMENT — PAIN DESCRIPTION - LOCATION: LOCATION: ARM;ABDOMEN;BACK

## 2024-07-13 NOTE — PROGRESS NOTES
P Pulmonary, Critical Care and Sleep Specialists                                 Pulmonary/Critical care  Consult /Progress Note :                                                                  CC :SOB       Subjective    He is way more alert  On RA but developed hypoxia and sat in the 70 this am  Had PEG tube and started feeding today if ok with GI   Sat 100   Seroquel  is on hold   No gag  Still with secretions   Wife at bedside  Wife at bedside and does not want to proceed with bronchoscopy    PHYSICAL EXAM:  Vitals:    07/13/24 0700   BP: 135/74   Pulse: 83   Resp: 26   Temp:    SpO2: 93%     Gen: No distress.   Eyes: PERRL. No sclera icterus. No conjunctival injection.   ENT: No discharge. Pharynx clear.   Neck: Trachea midline. No obvious mass.    Resp:Bilateral rhonchi   GI: Non-tender. Non-distended. No hernia.   Skin: Warm and dry. No nodule on exposed extremities.   Lymph: No cervical LAD. No supraclavicular LAD.   M/S: No cyanosis. No joint deformity. No clubbing.   Neuro: Awake. Alert however he go back to sleep immediate. Moves all four extremities.   Psych: Oriented x 3. No anxiety.     LABS:  CBC:   Recent Labs     07/11/24  0436 07/12/24  0440 07/13/24  0431   WBC 10.3 11.0 9.1   HGB 11.7* 11.2* 11.2*   HCT 34.2* 32.7* 33.1*   MCV 91.4 90.8 92.0   * 130* 152       BMP:   Recent Labs     07/11/24  0436 07/12/24  0440 07/13/24  0431    141 145   K 3.8 3.6 3.4*    105 109   CO2 28 26 26   BUN 23* 19 17   CREATININE <0.5* <0.5* <0.5*       LIVER PROFILE:   No results for input(s): \"AST\", \"ALT\", \"LIPASE\", \"AMYLASE\", \"BILIDIR\", \"BILITOT\", \"ALKPHOS\" in the last 72 hours.    Invalid input(s): \"ALB\"      t     imaging was reviewed by me and showed     ASSESSMENT:  Acute hypoxic respiratory failure requiring 8 L  Possible aspiration  History of PE  Severe pharyngeal esophageal dysphagia and stricture of the lower cervical esophagus status  post  History of lung cancer status post VATS      PLAN:  Patient hypoxia  most likley aspiration  Get CXR today  Keep in ICU   More awake   Hold on Bronch   Better cough  Seroquel on hold     Resume Anticoagulation once PEG tube addressed if ok with GI ,staff will call   On Merrem and vancomycin X 7 days   Observe in ICU  Aggressive pulmonary toielt  Wean O2 as  D5/12 40 cc /h to stop when start eating    Tulio Kendrick M.D.   7/13/2024  8:55 AM

## 2024-07-13 NOTE — PROGRESS NOTES
Nutrition Note    RECOMMENDATIONS    Nutrition Support:   Pt has been identified as at risk for refeeding syndrome per ASPEN guidelines. The following is recommended to reduce the risk of refeeding syndrome:  Check potassium, magnesium, and phosphorus prior to initiating nutrition and daily x 3 days following initiation of nutrition support.   Provide 100 mg thiamine for 5-7 days.   Provide MVI once daily for at least 10 days.   Restart TF, once GI is ok. Provide Jevity 1.5 (standard with fiber formula) x 20 hours at 30 mL/hr and as tolerated, increase by 10 mL q 12 hours until goal of 65 mL/hr.  Discontinue IV fluid of D5 and 0.45 saline, once TF is resumed.  Recommend water flush of 120 mL q 4 hours.       ASSESSMENT   Consult order for tube feeding management and order received. Pt has a PMH of lung ca s/p surgery, admitted ICU with aspiration pneumonia and severe dysphagia despite EGD with dilation. Pt remains NPO, he has been receiving Jevity 1.5 at goal rate 60ml/hr via NG tube. TF was off for Bronchoscopy since 7/9 per note review. Peg tube was placed 7/12. Will calculate TF per current nutrition needs and restart the TF once GI is ok. Pt is at risk for refeeding syndrome AEB none/negligible intake x 5 days, pre-feed lab shows low K of 3.4 (replaced), Phos is pending, and moderate body fat loss Orbital, Buccal region. Will provide TF recommendations and continue to follow up.       Malnutrition Status: Severe malnutrition  Acute Illness  Findings of the 6 clinical characteristics of malnutrition:  Energy Intake:  50% or less of estimated energy requirements for 5 or more days  Weight Loss:  7.5% over 3 months     Body Fat Loss:  Moderate body fat loss Orbital, Buccal region   Muscle Mass Loss:  Moderate muscle mass loss Temples (temporalis), Clavicles (pectoralis & deltoids)  Fluid Accumulation:  No significant fluid accumulation Extremities   Strength:  Not Performed      NUTRITION DIAGNOSIS   Inadequate  protein intake related to altered GI function, altered GI structure, inadequate enteral nutrition infusion as evidenced by NPO or clear liquid status due to medical condition, nutrition support - enteral nutrition  Severe malnutrition related to inadequate protein-energy intake as evidenced by Criteria as identified in malnutrition assessment    Goals: Initiate nutrition support, by next RD assessment, Tolerate nutrition support at goal rate     NUTRITION RELATED FINDINGS  Objective: D5 and 0.45% saline at 40ml/hr via IV to provide 163kcal calories.  LBM is unknown. No edema noted. Na+ 145, POCG 112.  Wounds: None    CURRENT NUTRITION THERAPIES  Diet NPO     PO Intake: NPO   PO Supplement Intake:NPO    Current Tube Feeding (TF) Orders:  Feeding Route: PEG  Formula: Standard with Fiber  Schedule: Continuous  Feeding Regimen: Jevity 1.5  Additives/Modulars: None  Water Flushes: 120ml Q4 hrs to provide 720ml water   Goal TF & Flush Orders Provides: Jevity 1.5 at goal rate of 65ml/hr x 20 hours to provide 1300TV, 1950 kcal total calories, 84g protein and 988ml free water. Total water is 720ml+ 988ml =1708ml.    ANTHROPOMETRICS  Current Height: 177.8 cm (5' 10\")  Current Weight - Scale: 74.3 kg (163 lb 14.4 oz)    Admission weight: 68.2 kg (150 lb 5.7 oz)  Ideal Body Weight (IBW): 166 lbs  (75 kg)    Usual Bodyweight 78.5 kg (173 lb) (April 2024)       BMI: 23.5      COMPARATIVE STANDARDS  Total Energy Requirements (kcals/day): 5421-4668     Protein (g):  68-95       Fluid (mL/day):  9851-7815    EDUCATION  Education not indicated     The patient will be monitored per nutrition standards of care. Consult dietitian if additional nutrition interventions are needed prior to RD reassessment.     Benigno Cuevas RD    Contact: [phone]

## 2024-07-13 NOTE — PROGRESS NOTES
at bedside. Peg tube dressing changed. Updated him on dietician's recommendations on starting medications thiamine and multivitamin, medications ordered. Tube feed ordered to be started. Dr.Al restrepo wanted to restart elquis, GI ok with this medication be restarted. See new orders. Electronically signed by Senait Reyes RN on 7/13/2024 at 1:12 PM

## 2024-07-13 NOTE — PROGRESS NOTES
Reassessment completed (see Flowsheet). All ICU lines remain intact, ICU monitoring continued-   Infusing:  KVO/ Abx (See MAR).     Pt is Now up to a chair with PT/OT.   Pt transported with Steady.    pt's blood pressures WDL.

## 2024-07-13 NOTE — PROGRESS NOTES
Transfer shift report given to  Kim MENDEZ. Pt in stable condition, care transferred at this time. Electronically signed by Senait Reyes RN on 7/13/2024 at 3:25 PM

## 2024-07-13 NOTE — PROGRESS NOTES
Pharmacy Vancomycin Consult     Vancomycin Day: 3  Current Dosinmg q12h  Current indication: PNA      Recent Labs     24  0440 24  0431   BUN 19 17   CREATININE <0.5* <0.5*   WBC 11.0 9.1       Intake/Output Summary (Last 24 hours) at 2024 1512  Last data filed at 2024 0700  Gross per 24 hour   Intake 2293.66 ml   Output 1075 ml   Net 1218.66 ml     Culture Date      Source                       Results      Ht Readings from Last 1 Encounters:   24 1.778 m (5' 10\")        Wt Readings from Last 1 Encounters:   24 74.3 kg (163 lb 14.4 oz)       Body mass index is 23.52 kg/m².    Estimated Creatinine Clearance: 126 mL/min (based on SCr of 0.5 mg/dL).    Trough: 14.3    Assessment/Plan:  Continue current regimen of 1250mg q12h. Predicted AUC of 537 and trough of 13.8

## 2024-07-13 NOTE — PROGRESS NOTES
Inpatient Physical Therapy Re-Evaluation and Treatment    Unit: ICU  Date:  7/13/2024  Patient Name:    Rodolfo Luna  Admitting diagnosis:  Aspiration into respiratory tract, initial encounter [T17.908A]  Aspiration into airway, initial encounter [T17.908A]  Right leg weakness [R29.898]  Admit Date:  7/1/2024  Precautions/Restrictions/WB Status/ Lines/ Wounds/ Oxygen: Fall risk, Bed/chair alarm, Lines (IV, external catheter, and PEG tube), Telemetry, Continuous pulse oximetry, and Isolation Precautions: Contact      Pt seen for cotreatment this date due to patient safety, patient endurance, and complexity of condition    Treatment Time:  1610 - 1643  Treatment Number:  1   Timed Code Treatment Minutes: 23 minutes  Total Treatment Minutes:  33  minutes    Patient Stated Goals for Therapy: \" to go home \"          Discharge Recommendations: SNF  DME needs for discharge: Defer to facility       Therapy recommendation for EMS Transport: can transport by wheelchair    Therapy recommendations for staff:   Assist of 2 for transfers with use of STORMY STEDY and gait belt to/from BSC  to/from chair    History of Present Illness:   Per admission H&P:  \"Rodolfo Luna is a 78 y.o. male with past medical history of Parkinson's disease with chronic aspiration on thickened liquids at baseline, seizure disorder, A-fib status post Watchman procedure, CAD status post PCI, hyperlipidemia and anxiety who presented with persistent cough related to oral intake,  He has a history of chronic aspiration and is on a thickened liquid diet, stating that he coughs up water when he eats.  No vomiting.  He also describes shortness of breath, orthopnea and some lightheadedness. No leg swelling, he denies a history of CHF     Upon arrival at the emergency department, his vital signs were within normal limits.  CBC and CMP were reassuring.  BNP was 2375.  Troponin 40, chronically elevated.  VBG showed compensated chronic respiratory acidosis.  COVID  and influenza testing were negative.  Lactate was reassuring, blood cultures were obtained.  CXR was nonacute, stable postsurgical partial right pneumonectomy noted.  A CT of the chest showed resolution of previously noted right pulmonary artery thrombus.  Patient was started on Unasyn and admitted to the hospital.     Prior medical records in Epic have been thoroughly reviewed and there is no relevant information beyond that already obtained.\"    AM-PAC Mobility Score       AM-PAC Inpatient Mobility without Stair Climbing Raw Score : 15      Subjective  Patient sitting on BSC with no family present.  Pt agreeable to this PT session.     Cognition    A&O orientation not directly assessed.    Able to follow 1 step commands    Pain   No  Location: NA  Rating: NA /10  Pain Medicine Status: No request made    Activity Tolerance   During therapy session noted pt with desaturation of O2      BP (mmHg) HR (bpm) SpO2 (%) on RA Comments   Supine at rest           Seated at EOB           Standing           End of session 142/84 103 93%        Preadmission Environment:  Pt. Lives                                             Spouse and grandson (staying there for about a month)  Home environment:                            one story home  Steps to enter first floor:                     3 steps on porch, 2 steps to get in from kitchen (wife states that they are having [facility] come put handrails)  Steps to second floor/basement:        N/A  Laundry:                                              1st floor  Bathroom:                                           tub/shower unit, grab bars, hand held shower head, and comfort height toilet (called [facility] to put grab bars in tub)  Pt sleeps in a:                                     Flat bed  Equipment owned:                              RW, rollator, shower chair, and manual WC      Preadmission Status:  Pt. Able to drive:                                 Yes  Pt. Fully independent with ADLs:        Yes  Pt. Required assistance for:               Independent PTA (wife completes cooking, cleaning, grocery shopping)  Pt. independent for functional transfers and utilized SPC for mobility in home and SPC out in community  History of falls:                                    Yes - patient reports 1 fall in the past 6 months   Home Health Services:                       None    Objective  Does this pt have an acute or acute on chronic diagnosis of CHF? No    Upper Extremity ROM/Strength  Please see OT evaluation.      Lower Extremity ROM / Strength   AROM WFL: Yes  ROM limitations:     BLE strength impaired, but not formally assessed with MMT.    Lower Extremity Sensation    WNL    Coordination  WNL    Tone  WNL    Balance  Static Sitting:  Good ; SBA  Dynamic Sitting:  Good ; SBA   Comments:     Static Standing: Fair ; CGA  Dynamic Standing: Fair ; Min A   Comments:    Posture  Seated: Forward head and neck  Standing: Forward head and neck    Bed Mobility   Supine to Sit:    Not Tested  Sit to Supine:   Not Tested  Rolling:   Not Tested   Scooting in sitting: SBA   Scooting in supine:  Not Tested   Bridging:  Not Tested    Transfer Training     Sit to stand:   CGA   Stand to sit:   CGA   BSC to/from Chair:  Total A with use of gait belt and rolling walker (RW)    Gait gait deferred due to fatigue; pt ambulated 0 ft.   Distance:      0 ft  Deviations (firm surface/linoleum):  N/A  Assistive Device Used:    N/A  Level of Assist:    N/A   Comment:     Stair Training deferred, pt unsafe/ not appropriate to complete stairs at this time    Therapeutic Exercises Initiated  deferred secondary to treatment focus on functional mobility  Supine:  N/A    Seated:  N/A    Standing:  N/A      Positioning Needs   Pt up in chair, alarm set, positioned in proper neutral alignment and pressure relief provided.   Call light provided and all needs within reach  RN aware of pt position/status    Other Activities  None.    Patient/Family

## 2024-07-13 NOTE — PROGRESS NOTES
Shift handoff report given to Addison MENDEZ at bedside.   Pt is Awake and alert  IV handoff completed.  Call light within reach, bed in lowest position, bed alarm on. End of shift checks completed.    Pt has been free of falls for duration of shift. .

## 2024-07-13 NOTE — PROGRESS NOTES
PROGRESS NOTE  S:78 yrs Patient  admitted on 7/1/2024 with Aspiration into respiratory tract, initial encounter [T17.908A]  Aspiration into airway, initial encounter [T17.908A]  Right leg weakness [R29.898] .  Today he feels well. No abd pain. Remains on room air    Exam:   Vitals:    07/13/24 1200   BP: 135/69   Pulse: 91   Resp: 17   Temp:    SpO2: 95%     General - no distress, chronically ill appearing  Mental status - more alert  Eyes - sclera anicteric  Neck - supple, no significant adenopathy  Heart - irreglularly irregular  Abdomen - soft, NT, ND. PEG external bumper lightly on skin  Extremities - no pedal edema     Medications: Reviewed    Labs:  CBC:   Recent Labs     07/11/24 0436 07/12/24 0440 07/13/24  0431   WBC 10.3 11.0 9.1   HGB 11.7* 11.2* 11.2*   HCT 34.2* 32.7* 33.1*   MCV 91.4 90.8 92.0   * 130* 152     BMP:   Recent Labs     07/11/24  0436 07/12/24  0440 07/13/24  0431    141 145   K 3.8 3.6 3.4*    105 109   CO2 28 26 26   BUN 23* 19 17   CREATININE <0.5* <0.5* <0.5*       PT/INR:   Recent Labs     07/11/24 0436   INR 1.57*       Impression:78 year old male with history of HTN, HLD, CAD, A fib s/p watchman, CVA, Parkinson's disease, lung ca s/p surgery admitted with aspiration pneumonia and severe dysphagia despite EGD with dilation. He had EGD with PEG tube placement yesterday    Recommendation:  Continue supportive care  Start TFs per dietitian recs  Keep HOB elevated during PEG use  Keep PEG site clean and dry  Flush PEG with 60mL free water every 6h and after use  Will sign off; call with questions      Dc Elizondo MD, MD  1:05 PM 7/13/2024

## 2024-07-13 NOTE — PROGRESS NOTES
Pt resting in bed.  VSS s/p PEG tube placement today.  D5 0.45% NS gtt infusing at ordered 40 mL/hr, along with KVO fluid infusing for IVPB abx.  Assessment completed. Pt pleasantly confused at times.  Questions and concerns addressed.  Will monitor.

## 2024-07-13 NOTE — PROGRESS NOTES
RT Nebulizer Bronchodilator Protocol Note    There is a bronchodilator order in the chart from a provider indicating to follow the RT Bronchodilator Protocol and there is an “Initiate RT Bronchodilator Protocol” order as well (see protocol at bottom of note).    CXR Findings:  XR CHEST PORTABLE    Result Date: 7/13/2024  1. Increasing airspace opacity in the right hemithorax with small right effusion. 2. Minimally increased opacity in the left hemithorax. 3. Consider CT scan to further characterize if appropriate.       The findings from the last RT Protocol Assessment were as follows:  Smoking: Chronic pulmonary disease  Respiratory Pattern: (P) Regular pattern and RR 12-20 bpm  Breath Sounds: Slightly diminished and/or crackles  Cough: Weak, productive  Indication for Bronchodilator Therapy: (P) On home bronchodilators (prn)  Bronchodilator Assessment Score: (P) 6    Aerosolized bronchodilator medication orders have been revised according to the RT Nebulizer Bronchodilator Protocol below.    Respiratory Therapist to perform RT Therapy Protocol Assessment initially then follow the protocol.  Repeat RT Therapy Protocol Assessment PRN for score 0-3 or on second treatment, BID, and PRN for scores above 3.    No Indications - adjust the frequency to every 6 hours PRN wheezing or bronchospasm, if no treatments needed after 48 hours then discontinue using Per Protocol order mode.     If indication present, adjust the RT bronchodilator orders based on the Bronchodilator Assessment Score as indicated below.  If a patient is on this medication at home then do not decrease Frequency below that used at home.    0-3 - enter or revise RT bronchodilator order(s) to equivalent RT Bronchodilator order with Frequency of every 4 hours PRN for wheezing or increased work of breathing using Per Protocol order mode.       4-6 - enter or revise RT Bronchodilator order(s) to two equivalent RT bronchodilator orders with one order with BID  Frequency and one order with Frequency of every 4 hours PRN wheezing or increased work of breathing using Per Protocol order mode.         7-10 - enter or revise RT Bronchodilator order(s) to two equivalent RT bronchodilator orders with one order with TID Frequency and one order with Frequency of every 4 hours PRN wheezing or increased work of breathing using Per Protocol order mode.       11-13 - enter or revise RT Bronchodilator order(s) to one equivalent RT bronchodilator order with QID Frequency and an Albuterol order with Frequency of every 4 hours PRN wheezing or increased work of breathing using Per Protocol order mode.      Greater than 13 - enter or revise RT Bronchodilator order(s) to one equivalent RT bronchodilator order with every 4 hours Frequency and an Albuterol order with Frequency of every 2 hours PRN wheezing or increased work of breathing using Per Protocol order mode.     RT to enter RT Home Evaluation for COPD & MDI Assessment order using Per Protocol order mode.    Electronically signed by Josseline Dwyer RCP on 7/13/2024 at 1:59 PM

## 2024-07-13 NOTE — PROGRESS NOTES
Rodolfo Luna  Neurology Follow-up  [facility] Neurology    Date of Service: 7/13/2024    Subjective:   CC: Follow up today regarding: Encephalopathy     Events noted. Chart and lab reviewed. No acute events overnight.  The patient is alert and oriented today.  The patient remains to have preserved long-term memory.  The patient had a long conversation with me about where he lived in [place] more than 20 years ago.  He was forgetful a bit about the location of the the the town that he lived before.      ROS : A 10-12 system review obtained and updated today and is unremarkable except as mentioned  in my interval history.     Past medical history, social history, medication and family history reviewed.       Objective:  Exam:   Constitutional:   Vitals:    07/13/24 0700 07/13/24 0800 07/13/24 0900 07/13/24 0934   BP: 135/74 (!) 136/59 123/78    Pulse: 83 82 96    Resp: 26 16 26    Temp:  98.3 °F (36.8 °C)     TempSrc:  Temporal     SpO2: 93% 90% 92%    Weight:       Height:    1.778 m (5' 10\")     General appearance: Chronically ill-appearing  Mental Status:   Oriented to person, place, problem. Disoriented to time.    Memory: Good immediate recall.  Intact remote memory  Normal attention span and concentration.  Language: intact naming, repeating and fluency   Good fund of Knowledge. Aware of current events and vocabulary   Cranial Nerves:   II: Visual fields: Full. Pupils: equal, round, reactive to light, bilaterally  III,IV,VI: Extra Ocular Movements are intact. No nystagmus  V: Facial sensation is intact  VII: Facial strength and movements: intact and symmetric  IX: Normal palatal elevation and shoulder shrug  XII: Tongue movements are normal  Musculoskeletal: 4/5 in all 4 extremities.  Good range of motion.  No muscle atrophy.    Tone: Normal tone.   Reflexes: Symmetric 2+ in the arms and 2+ in the legs   Planters: Flexor bilaterally  Coordination: no pronator drift, no dysmetria with FNF  Sensation: normal

## 2024-07-13 NOTE — PROGRESS NOTES
Writer checked on patient, he was on  the phone with his wife. He handed me the phone asking writer to tell his wife where he was. Writer spoke  with wife, she says he seems  more confused   to her. That he has called  her twice to  tell her he is at a doctors office. Wife updated. When writer reassessed him he was able to tell me he was at [facility] and the year was 2024. Perfect serve sent to  updating her to see if maybe she wants an ABG collected. Electronically signed by Senait Reyes RN on 7/13/2024 at 10:49 AM

## 2024-07-13 NOTE — PROGRESS NOTES
Progress Note    Admit Date:  7/1/2024    Patient with Parkinsons disease and presbyesophagus  admitted for aspiration pna     Seen by speech therapy  Seen by GI - S/P EGD with esophageal dilatation 7/5   Severely hypoxic after MBS - RR called and pt on nonrebreather     Was moved to ICU overnight for confusion   Planned for bronch but family refused but improved and now on RA      Subjective:  Mr. Luna was seen awake, alert and better  oriented  laying in bed. He feels ok , no new events overnight, remains on RA   With oxygen saturation dropped to the 70s for a short duration of time.  After suctioning it came back.  Currently he is on room air.    Objective:   Patient Vitals for the past 4 hrs:   BP Pulse Resp SpO2 Height   07/13/24 0934 -- -- -- -- 1.778 m (5' 10\")   07/13/24 0900 123/78 96 26 92 % --            Intake/Output Summary (Last 24 hours) at 7/13/2024 1219  Last data filed at 7/13/2024 0700  Gross per 24 hour   Intake 2293.66 ml   Output 1375 ml   Net 918.66 ml         Physical Exam:        General: thin elderly male, ill appearing    Awake, alert and better oriented. More energetic   Appears to be not in any distress  Mucous Membranes:  Pink , anicteric  NG +  Atrophic facial muscles +  Neck: No JVD, no carotid bruit, no thyromegaly  Chest:  Clear to auscultation bilaterally diminished in right base  Cardiovascular:  RRR S1S2 heard, no murmurs or gallops  Abdomen:  Soft, undistended, non tender, no organomegaly, BS present  Extremities: No edema or cyanosis. Distal pulses well felt  Neurological : resting tremors, rigidity   grossly normal with gen weakness       Medications:  vancomycin, 1,250 mg, Q12H  mupirocin, , BID  aspirin EC, 81 mg, Daily  [Held by provider] QUEtiapine, 25 mg, Nightly  dilTIAZem, 30 mg, 3 times per day  sodium chloride (Inhalant), 4 mL, Q12H  albuterol, 2.5 mg, 4x Daily RT  meropenem, 1,000 mg, Q8H  pantoprazole (PROTONIX) 40 mg in sodium chloride (PF) 0.9 % 10 mL injection,  Stable postsurgical change of partial right pneumonectomy.         XR CHEST PORTABLE    (Results Pending)         Assessment/Plan:    Aspiration pneumonia   Hx of Dysphagia with presbyesophagus   Acute hypoxic resp failure     - hx of chronic aspiration on thickened liquid diet  - CT chest as above with no clear Airspace disease, has chronic right pleural effusion and resolved previous thrombus   - SLP eval and treat -> rec pt stay NPO  - MBS with severe pharyngeal dysphagia with aspiration of all liquid consistencies. Significant cricopharyngeal dysfunction with inability to pass puree bolus.   - was given Unasyn once in ED, defer further abx to pulm.  - PPI daily initiated     - pulmonology consulted, okay to observe off abx  - GI consulted-> S/P dilation w/opening of UES 7/5/2024   -repeat SLp eval showed  profound oropharyngeal dysphagia  - Rapid response  called 7/8 for severe hypoxia after MBSS, placed on 8L and transferred to PCU. Repeat imaging with no pna but highly suspected   - started on Levaquin, Merrem with high suspicion and worsening hypoxia , vanc added   - planned for bronch with BAL but family refused, pt improved and now on RA   -PEG placed   -Start tube feeds today     Hx of PE  - CT chest with resolution of previous  PE  - AC on Eliquis     Atrial fibrillation/flutter s/p watchman and RFCA 2014  - diltiazem gtt started 7/8 for afib RVR and now weaned off   - can resume cardizem via NG      HTN  Stable      CAD s/p PCI  - on asa and statin      Focal seizure disorder  - IV vimpat and keppra until oral medication can be resumed     Hx of CVA   - on asa and statin     Parkinson's disease  - on sinemet and namenda      Hx of lung cancer s/p thoracotomy and VATS  - chronic pleural effusion stable      Porphyria cutanea tarda       Anxiety  - prn ativan     Hold seroquel for ongoing confusion     DVT Prophylaxis: holding Eliquis   Diet: Diet NPO  Code Status: Full Code      PADMINI PARTIDA

## 2024-07-13 NOTE — PROGRESS NOTES
Inpatient Occupational Therapy Re-Evaluation and Treatment    Unit: ICU  Date:  7/13/2024  Patient Name:    Rodolfo Luna  Admitting diagnosis:  Aspiration into respiratory tract, initial encounter [T17.908A]  Aspiration into airway, initial encounter [T17.908A]  Right leg weakness [R29.898]  Admit Date:  7/1/2024  Precautions/Restrictions/WB Status/ Lines/ Wounds/ Oxygen: Fall risk, Bed/chair alarm, Lines (IV, external catheter, and PEG tube), Confusion, Telemetry, Continuous pulse oximetry, and Isolation Precautions: Contact    Pt seen for cotreatment this date due to patient safety, patient endurance, and complexity of condition    Treatment Time: 16:10-16:43  Treatment Number:  1  Timed Code Treatment Minutes: 23 minutes  Total Treatment Minutes: 33 minutes    Patient Goals for Therapy: \"to get better\"          Discharge Recommendations: SNF  DME needs for discharge: Defer to facility       Therapy recommendations for staff:   Assist of 2 for transfers with use of STORMY STEDY and gait belt to/from BSC  to/from chair    History of Present Illness: Per H&P by Dr. Smith: \" 78 y.o. male with past medical history of Parkinson's disease with chronic aspiration on thickened liquids at baseline, seizure disorder, A-fib status post Watchman procedure, CAD status post PCI, hyperlipidemia and anxiety who presented with persistent cough related to oral intake,  He has a history of chronic aspiration and is on a thickened liquid diet, stating that he coughs up water when he eats.  No vomiting.  He also describes shortness of breath, orthopnea and some lightheadedness. No leg swelling, he denies a history of CHF.\"    AM-PAC Score: AM-PAC Inpatient Daily Activity Raw Score: 15     Subjective:  Patient sitting on BSC with no family present.   Pt agreeable to this OT session.     Cognition:    A&O orientation not directly assessed.    Able to follow 1 step commands    Pain:   No  Location: n/a  Rating: NA /10  Pain Medicine  Proprioception:  NT    Coordination:  WFL    Tone:  NT    Balance:  Sitting:    SBA for static and dynamic sitting  Standing:    CGA for static standing      Min A for dynamic standing    Bed Mobility:   Supine to Sit:    Not Tested  Sit to Supine:   Not Tested  Rolling:   Not Tested  Scooting in sitting: SBA to scoot hips back in the recliner  Scooting in supine:  Not Tested    Transfers:    Sit to stand:    CGA  Stand to sit:    CGA  Bed to chair:     Not Tested  Bed/ chair to standard toilet:  Not Tested  Bed/chair to BSC:   Total A with the use of a gait belt and the STEDY  Functional Mobility:   Not Tested    See PT note for gait analysis.    ADLs:  Dressing:      UE:   Not Tested  LE:    Not Tested    Bathing:    UE:  Not Tested  LE:  Not Tested    Eating:   Not Tested    Toileting:  Not Tested    Grooming/hygiene: Not Tested      Positioning Needs:   Pt up in chair, alarm set, call light provided and all needs within reach .     Ther Ex / Activities Initiated:   Elbow flex/ext x15  Shoulder flex/ext:  x15  Scapular protraction:  x15  Sit to Stands: x5    Patient/Family Education:   Pt educated on role of inpatient OT, plan of care, importance of continued activity, DC recommendations and Calling for assist with mobility.    CHF Education  N/A    Assessment:  Pt seen for Occupational therapy evaluation in acute care setting.  Pt demonstrated decreased Activity tolerance, ADLs, Balance , Safety Awareness, Strength, Transfers, and Cognition. Pt functioning below baseline and will likely benefit from skilled occupational therapy services to maximize safety and independence.     Recommending SNF upon discharge as patient functioning well below baseline, demonstrates good rehab potential and unable to return home due to inability to negotiate stairs to enter home/bedroom/bathroom, burden of care beyond caregiver ability, home environment not conducive to patient recovery, and limited safety awareness.    Goal(s) :

## 2024-07-13 NOTE — PLAN OF CARE
Problem: Chronic Conditions and Co-morbidities  Goal: Patient's chronic conditions and co-morbidity symptoms are monitored and maintained or improved  Outcome: Progressing  Flowsheets (Taken 7/12/2024 2000)  Care Plan - Patient's Chronic Conditions and Co-Morbidity Symptoms are Monitored and Maintained or Improved: Monitor and assess patient's chronic conditions and comorbid symptoms for stability, deterioration, or improvement     Problem: Safety - Adult  Goal: Free from fall injury  Outcome: Progressing     Problem: Discharge Planning  Goal: Discharge to home or other facility with appropriate resources  Outcome: Progressing  Flowsheets (Taken 7/12/2024 2000)  Discharge to home or other facility with appropriate resources: Identify barriers to discharge with patient and caregiver     Problem: Nutrition Deficit:  Goal: Optimize nutritional status  Outcome: Progressing     Problem: Neurosensory - Adult  Goal: Achieves maximal functionality and self care  Outcome: Progressing  Flowsheets (Taken 7/12/2024 2000)  Achieves maximal functionality and self care:   Monitor swallowing and airway patency with patient fatigue and changes in neurological status   Encourage and assist patient to increase activity and self care with guidance from physical therapy/occupational therapy     Problem: Skin/Tissue Integrity - Adult  Goal: Skin integrity remains intact  Outcome: Progressing  Flowsheets  Taken 7/12/2024 2148  Skin Integrity Remains Intact:   Monitor for areas of redness and/or skin breakdown   Assess vascular access sites hourly   Every 4-6 hours minimum: Change oxygen saturation probe site   Every 4-6 hours: If on nasal continuous positive airway pressure, respiratory therapy assesses nares and determine need for appliance change or resting period  Taken 7/12/2024 2000  Skin Integrity Remains Intact:   Monitor for areas of redness and/or skin breakdown   Assess vascular access sites hourly   Every 4-6 hours minimum:  Determine that other, less restrictive measures have been tried or would not be effective before applying the restraint  2. Evaluate the patient's condition at the time of restraint application  3. Inform patient/family regarding the reason for restraint  4. Q2H: Monitor safety, psychosocial status, comfort, nutrition and hydration  Outcome: Progressing

## 2024-07-14 LAB
ANION GAP SERPL CALCULATED.3IONS-SCNC: 8 MMOL/L (ref 3–16)
BASOPHILS # BLD: 0 K/UL (ref 0–0.2)
BASOPHILS NFR BLD: 0.2 %
BUN SERPL-MCNC: 18 MG/DL (ref 7–20)
CALCIUM SERPL-MCNC: 7.5 MG/DL (ref 8.3–10.6)
CHLORIDE SERPL-SCNC: 107 MMOL/L (ref 99–110)
CO2 SERPL-SCNC: 28 MMOL/L (ref 21–32)
CREAT SERPL-MCNC: <0.5 MG/DL (ref 0.8–1.3)
DEPRECATED RDW RBC AUTO: 17.5 % (ref 12.4–15.4)
EOSINOPHIL # BLD: 0.2 K/UL (ref 0–0.6)
EOSINOPHIL NFR BLD: 2.5 %
GFR SERPLBLD CREATININE-BSD FMLA CKD-EPI: >90 ML/MIN/{1.73_M2}
GLUCOSE BLD-MCNC: 138 MG/DL (ref 70–99)
GLUCOSE SERPL-MCNC: 136 MG/DL (ref 70–99)
HCT VFR BLD AUTO: 32.6 % (ref 40.5–52.5)
HGB BLD-MCNC: 11.1 G/DL (ref 13.5–17.5)
LYMPHOCYTES # BLD: 0.6 K/UL (ref 1–5.1)
LYMPHOCYTES NFR BLD: 7.2 %
MAGNESIUM SERPL-MCNC: 1.9 MG/DL (ref 1.8–2.4)
MAGNESIUM SERPL-MCNC: 1.9 MG/DL (ref 1.8–2.4)
MCH RBC QN AUTO: 30.8 PG (ref 26–34)
MCHC RBC AUTO-ENTMCNC: 34 G/DL (ref 31–36)
MCV RBC AUTO: 90.8 FL (ref 80–100)
MONOCYTES # BLD: 1 K/UL (ref 0–1.3)
MONOCYTES NFR BLD: 11.4 %
NEUTROPHILS # BLD: 7.1 K/UL (ref 1.7–7.7)
NEUTROPHILS NFR BLD: 78.7 %
PERFORMED ON: ABNORMAL
PHOSPHATE SERPL-MCNC: 1.9 MG/DL (ref 2.5–4.9)
PLATELET # BLD AUTO: 171 K/UL (ref 135–450)
PMV BLD AUTO: 8.4 FL (ref 5–10.5)
POTASSIUM SERPL-SCNC: 3 MMOL/L (ref 3.5–5.1)
RBC # BLD AUTO: 3.59 M/UL (ref 4.2–5.9)
SODIUM SERPL-SCNC: 143 MMOL/L (ref 136–145)
WBC # BLD AUTO: 9 K/UL (ref 4–11)

## 2024-07-14 PROCEDURE — 6370000000 HC RX 637 (ALT 250 FOR IP): Performed by: INTERNAL MEDICINE

## 2024-07-14 PROCEDURE — 94669 MECHANICAL CHEST WALL OSCILL: CPT

## 2024-07-14 PROCEDURE — 2580000003 HC RX 258: Performed by: INTERNAL MEDICINE

## 2024-07-14 PROCEDURE — 2000000000 HC ICU R&B

## 2024-07-14 PROCEDURE — 84100 ASSAY OF PHOSPHORUS: CPT

## 2024-07-14 PROCEDURE — 6360000002 HC RX W HCPCS: Performed by: INTERNAL MEDICINE

## 2024-07-14 PROCEDURE — C9254 INJECTION, LACOSAMIDE: HCPCS | Performed by: INTERNAL MEDICINE

## 2024-07-14 PROCEDURE — 36415 COLL VENOUS BLD VENIPUNCTURE: CPT

## 2024-07-14 PROCEDURE — 80048 BASIC METABOLIC PNL TOTAL CA: CPT

## 2024-07-14 PROCEDURE — 99233 SBSQ HOSP IP/OBS HIGH 50: CPT | Performed by: INTERNAL MEDICINE

## 2024-07-14 PROCEDURE — 2700000000 HC OXYGEN THERAPY PER DAY

## 2024-07-14 PROCEDURE — 51798 US URINE CAPACITY MEASURE: CPT

## 2024-07-14 PROCEDURE — 85025 COMPLETE CBC W/AUTO DIFF WBC: CPT

## 2024-07-14 PROCEDURE — 83735 ASSAY OF MAGNESIUM: CPT

## 2024-07-14 PROCEDURE — 6370000000 HC RX 637 (ALT 250 FOR IP): Performed by: STUDENT IN AN ORGANIZED HEALTH CARE EDUCATION/TRAINING PROGRAM

## 2024-07-14 PROCEDURE — 94640 AIRWAY INHALATION TREATMENT: CPT

## 2024-07-14 PROCEDURE — 99232 SBSQ HOSP IP/OBS MODERATE 35: CPT | Performed by: INTERNAL MEDICINE

## 2024-07-14 PROCEDURE — 94761 N-INVAS EAR/PLS OXIMETRY MLT: CPT

## 2024-07-14 RX ORDER — LORAZEPAM 0.5 MG/1
0.25 TABLET ORAL ONCE
Status: COMPLETED | OUTPATIENT
Start: 2024-07-14 | End: 2024-07-14

## 2024-07-14 RX ORDER — ZOLPIDEM TARTRATE 5 MG/1
5 TABLET ORAL NIGHTLY
Status: DISCONTINUED | OUTPATIENT
Start: 2024-07-14 | End: 2024-07-16 | Stop reason: HOSPADM

## 2024-07-14 RX ADMIN — MUPIROCIN: 20 OINTMENT TOPICAL at 20:35

## 2024-07-14 RX ADMIN — CARBIDOPA AND LEVODOPA 1 TABLET: 25; 100 TABLET ORAL at 20:41

## 2024-07-14 RX ADMIN — SODIUM CHLORIDE, PRESERVATIVE FREE 10 ML: 5 INJECTION INTRAVENOUS at 20:35

## 2024-07-14 RX ADMIN — ALBUTEROL SULFATE 2.5 MG: 2.5 SOLUTION RESPIRATORY (INHALATION) at 07:09

## 2024-07-14 RX ADMIN — DIBASIC SODIUM PHOSPHATE, MONOBASIC POTASSIUM PHOSPHATE AND MONOBASIC SODIUM PHOSPHATE 4 TABLET: 852; 155; 130 TABLET ORAL at 06:08

## 2024-07-14 RX ADMIN — ACETAMINOPHEN 650 MG: 325 TABLET ORAL at 02:41

## 2024-07-14 RX ADMIN — Medication 4 ML: at 07:10

## 2024-07-14 RX ADMIN — FINASTERIDE 5 MG: 5 TABLET, FILM COATED ORAL at 09:30

## 2024-07-14 RX ADMIN — CARBIDOPA AND LEVODOPA 1 TABLET: 25; 100 TABLET ORAL at 14:53

## 2024-07-14 RX ADMIN — Medication 4 ML: at 18:40

## 2024-07-14 RX ADMIN — LEVETIRACETAM 750 MG: 100 INJECTION INTRAVENOUS at 20:35

## 2024-07-14 RX ADMIN — MEMANTINE HYDROCHLORIDE 10 MG: 5 TABLET ORAL at 20:41

## 2024-07-14 RX ADMIN — LEVETIRACETAM 750 MG: 100 INJECTION INTRAVENOUS at 09:30

## 2024-07-14 RX ADMIN — DILTIAZEM HYDROCHLORIDE 30 MG: 60 TABLET ORAL at 20:41

## 2024-07-14 RX ADMIN — MEROPENEM 1000 MG: 1 INJECTION INTRAVENOUS at 01:35

## 2024-07-14 RX ADMIN — DILTIAZEM HYDROCHLORIDE 30 MG: 60 TABLET ORAL at 06:08

## 2024-07-14 RX ADMIN — VANCOMYCIN HYDROCHLORIDE 1250 MG: 10 INJECTION, POWDER, LYOPHILIZED, FOR SOLUTION INTRAVENOUS at 15:00

## 2024-07-14 RX ADMIN — LORAZEPAM 0.25 MG: 0.5 TABLET ORAL at 23:11

## 2024-07-14 RX ADMIN — VANCOMYCIN HYDROCHLORIDE 1250 MG: 10 INJECTION, POWDER, LYOPHILIZED, FOR SOLUTION INTRAVENOUS at 03:21

## 2024-07-14 RX ADMIN — Medication 100 MG: at 09:30

## 2024-07-14 RX ADMIN — CARBIDOPA AND LEVODOPA 1 TABLET: 25; 100 TABLET ORAL at 09:29

## 2024-07-14 RX ADMIN — LACOSAMIDE 100 MG: 10 INJECTION INTRAVENOUS at 20:35

## 2024-07-14 RX ADMIN — APIXABAN 5 MG: 5 TABLET, FILM COATED ORAL at 09:29

## 2024-07-14 RX ADMIN — MEMANTINE HYDROCHLORIDE 10 MG: 5 TABLET ORAL at 09:29

## 2024-07-14 RX ADMIN — Medication 40 MG: at 09:28

## 2024-07-14 RX ADMIN — MONTELUKAST SODIUM 10 MG: 10 TABLET, COATED ORAL at 20:41

## 2024-07-14 RX ADMIN — Medication 40 MG: at 20:35

## 2024-07-14 RX ADMIN — MEROPENEM 1000 MG: 1 INJECTION INTRAVENOUS at 09:54

## 2024-07-14 RX ADMIN — I-VITE, TAB 1000-60-2MG (60/BT) 1 TABLET: TAB at 09:30

## 2024-07-14 RX ADMIN — SODIUM CHLORIDE, PRESERVATIVE FREE 10 ML: 5 INJECTION INTRAVENOUS at 09:31

## 2024-07-14 RX ADMIN — LACOSAMIDE 100 MG: 10 INJECTION INTRAVENOUS at 09:28

## 2024-07-14 RX ADMIN — MUPIROCIN: 20 OINTMENT TOPICAL at 09:31

## 2024-07-14 RX ADMIN — ZOLPIDEM TARTRATE 5 MG: 5 TABLET ORAL at 23:11

## 2024-07-14 RX ADMIN — ALBUTEROL SULFATE 2.5 MG: 2.5 SOLUTION RESPIRATORY (INHALATION) at 18:40

## 2024-07-14 RX ADMIN — APIXABAN 5 MG: 5 TABLET, FILM COATED ORAL at 20:41

## 2024-07-14 RX ADMIN — ATORVASTATIN CALCIUM 80 MG: 40 TABLET, FILM COATED ORAL at 20:41

## 2024-07-14 RX ADMIN — MEROPENEM 1000 MG: 1 INJECTION INTRAVENOUS at 17:16

## 2024-07-14 RX ADMIN — ASPIRIN 81 MG: 81 TABLET, COATED ORAL at 09:29

## 2024-07-14 RX ADMIN — DILTIAZEM HYDROCHLORIDE 30 MG: 60 TABLET ORAL at 14:53

## 2024-07-14 ASSESSMENT — PAIN SCALES - GENERAL
PAINLEVEL_OUTOF10: 6
PAINLEVEL_OUTOF10: 0
PAINLEVEL_OUTOF10: 9
PAINLEVEL_OUTOF10: 0

## 2024-07-14 ASSESSMENT — PAIN - FUNCTIONAL ASSESSMENT: PAIN_FUNCTIONAL_ASSESSMENT: ACTIVITIES ARE NOT PREVENTED

## 2024-07-14 ASSESSMENT — PAIN DESCRIPTION - DESCRIPTORS
DESCRIPTORS: ACHING
DESCRIPTORS: ACHING

## 2024-07-14 ASSESSMENT — PAIN DESCRIPTION - LOCATION
LOCATION: HEAD
LOCATION: HEAD

## 2024-07-14 NOTE — PROGRESS NOTES
Shift assessment complete- see flowsheets.  Pt is A&OX4.    VSS  Respirations are easy, even and unlabored. Pt is on 2L NC.    PIV WDL. Flushed at this time.    PEG tube WDL  - TF infusing at 40mL/hr.    Pt up x1 assist with walker. Large BM    Plan of care and goals reviewed. Call light within reach.  Bed in lowest position with wheels locked.

## 2024-07-14 NOTE — PROGRESS NOTES
End of shift note:    TFs increased to 40 mLs/hr per order overnight.  Pt c/o headache overnight; medicated with prn tylenol x1.  PO K phos replacement administered this AM.  Pt w/ one episode of desaturation on monitor; placed on 4L HFNC.  VS otherwise stable.  Pt resting in bed at this time.

## 2024-07-14 NOTE — PROGRESS NOTES
Progress Note    Admit Date:  7/1/2024    Patient with Parkinsons disease and presbyesophagus  admitted for aspiration pna     Seen by speech therapy  Seen by GI - S/P EGD with esophageal dilatation 7/5   Severely hypoxic after MBS - RR called and pt on nonrebreather     Was moved to ICU overnight for confusion   Planned for bronch but family refused but improved and now on RA      Subjective:  Mr. Luna was seen awake, alert and better  oriented        Objective:   Patient Vitals for the past 4 hrs:   BP Temp Temp src Pulse Resp SpO2   07/14/24 0900 128/66 97.8 °F (36.6 °C) Oral 92 16 98 %   07/14/24 0800 121/77 -- -- 96 28 (!) 87 %            Intake/Output Summary (Last 24 hours) at 7/14/2024 1139  Last data filed at 7/14/2024 0949  Gross per 24 hour   Intake 3023.66 ml   Output 1250 ml   Net 1773.66 ml         Physical Exam:  General: thin elderly male, ill appearing    Awake, alert and better oriented. More energetic   Appears to be not in any distress  Mucous Membranes:  Pink , anicteric  NG +  Atrophic facial muscles +  Neck: No JVD, no carotid bruit, no thyromegaly  Chest:  Clear to auscultation bilaterally diminished in right base  Cardiovascular:  RRR S1S2 heard, no murmurs or gallops  Abdomen:  Soft, undistended, non tender, no organomegaly, BS present  Extremities: No edema or cyanosis. Distal pulses well felt  Neurological : resting tremors, rigidity   grossly normal with gen weakness       Medications:  thiamine, 100 mg, Daily  multivitamin, 1 tablet, Daily  albuterol, 2.5 mg, BID RT  vancomycin, 1,250 mg, Q12H  mupirocin, , BID  aspirin EC, 81 mg, Daily  [Held by provider] QUEtiapine, 25 mg, Nightly  dilTIAZem, 30 mg, 3 times per day  sodium chloride (Inhalant), 4 mL, Q12H  meropenem, 1,000 mg, Q8H  pantoprazole (PROTONIX) 40 mg in sodium chloride (PF) 0.9 % 10 mL injection, 40 mg, Q12H  apixaban, 5 mg, BID  carbidopa-levodopa, 1 tablet, TID  finasteride, 5 mg, Daily  memantine, 10 mg,  confusion     DVT Prophylaxis: holding Eliquis   Diet: Diet NPO  Code Status: Full Code      PADMINI PARTIDA MD  07/14/24

## 2024-07-14 NOTE — PROGRESS NOTES
RT Inhaler-Nebulizer Bronchodilator Protocol Note    There is a bronchodilator order in the chart from a provider indicating to follow the RT Bronchodilator Protocol and there is an “Initiate RT Inhaler-Nebulizer Bronchodilator Protocol” order as well (see protocol at bottom of note).    CXR Findings:  XR CHEST PORTABLE    Result Date: 7/13/2024  1. Increasing airspace opacity in the right hemithorax with small right effusion. 2. Minimally increased opacity in the left hemithorax. 3. Consider CT scan to further characterize if appropriate.       The findings from the last RT Protocol Assessment were as follows:   History Pulmonary Disease: (P) Chronic pulmonary disease  Respiratory Pattern: (P) Regular pattern and RR 12-20 bpm  Breath Sounds: (P) Slightly diminished and/or crackles  Cough: (P) Weak, productive  Indication for Bronchodilator Therapy: (P) Decreased or absent breath sounds  Bronchodilator Assessment Score: (P) 6    Aerosolized bronchodilator medication orders have been revised according to the RT Inhaler-Nebulizer Bronchodilator Protocol below.    Respiratory Therapist to perform RT Therapy Protocol Assessment initially then follow the protocol.  Repeat RT Therapy Protocol Assessment PRN for score 0-3 or on second treatment, BID, and PRN for scores above 3.    No Indications - adjust the frequency to every 6 hours PRN wheezing or bronchospasm, if no treatments needed after 48 hours then discontinue using Per Protocol order mode.     If indication present, adjust the RT bronchodilator orders based on the Bronchodilator Assessment Score as indicated below.  Use Inhaler orders unless patient has one or more of the following: on home nebulizer, not able to hold breath for 10 seconds, is not alert and oriented, cannot activate and use MDI correctly, or respiratory rate 25 breaths per minute or more, then use the equivalent nebulizer order(s) with same Frequency and PRN reasons based on the score.  If a

## 2024-07-14 NOTE — PLAN OF CARE
Problem: Chronic Conditions and Co-morbidities  Goal: Patient's chronic conditions and co-morbidity symptoms are monitored and maintained or improved  7/14/2024 0015 by Addison Martinez, RN  Outcome: Progressing  Flowsheets (Taken 7/13/2024 2000)  Care Plan - Patient's Chronic Conditions and Co-Morbidity Symptoms are Monitored and Maintained or Improved: Monitor and assess patient's chronic conditions and comorbid symptoms for stability, deterioration, or improvement  7/13/2024 1449 by Senait Reyes, RN  Outcome: Progressing     Problem: Safety - Adult  Goal: Free from fall injury  Outcome: Progressing     Problem: Discharge Planning  Goal: Discharge to home or other facility with appropriate resources  Outcome: Progressing  Flowsheets (Taken 7/13/2024 2000)  Discharge to home or other facility with appropriate resources: Identify barriers to discharge with patient and caregiver     Problem: Nutrition Deficit:  Goal: Optimize nutritional status  Outcome: Progressing     Problem: Neurosensory - Adult  Goal: Achieves maximal functionality and self care  Outcome: Progressing  Flowsheets (Taken 7/13/2024 2000)  Achieves maximal functionality and self care: Monitor swallowing and airway patency with patient fatigue and changes in neurological status     Problem: Skin/Tissue Integrity - Adult  Goal: Skin integrity remains intact  Outcome: Progressing  Flowsheets  Taken 7/13/2024 7339  Skin Integrity Remains Intact:   Monitor for areas of redness and/or skin breakdown   Assess vascular access sites hourly   Every 4-6 hours minimum: Change oxygen saturation probe site   Every 4-6 hours: If on nasal continuous positive airway pressure, respiratory therapy assesses nares and determine need for appliance change or resting period  Taken 7/13/2024 2000  Skin Integrity Remains Intact:   Monitor for areas of redness and/or skin breakdown   Assess vascular access sites hourly   Every 4-6 hours minimum: Change oxygen saturation

## 2024-07-14 NOTE — PROGRESS NOTES
[facility]                                 Pulmonary/Critical care  Consult /Progress Note :                                                                  CC :SOB       Subjective    He is way more alert  On 2 L   Minimal hypoxia   Had PEG tube and started feeding today if ok with GI   Started on tube feeding   Seroquel  is on hold  Still with some secretions       PHYSICAL EXAM:  Vitals:    07/14/24 0900   BP: 128/66   Pulse: 92   Resp: 16   Temp: 97.8 °F (36.6 °C)   SpO2: 98%     Gen: No distress.   Eyes: PERRL. No sclera icterus. No conjunctival injection.   ENT: No discharge. Pharynx clear.   Neck: Trachea midline. No obvious mass.    Resp:Bilateral rhonchi   GI: Non-tender. Non-distended. No hernia.   Skin: Warm and dry. No nodule on exposed extremities.   Lymph: No cervical LAD. No supraclavicular LAD.   M/S: No cyanosis. No joint deformity. No clubbing.   Neuro: Awake. Alert however he go back to sleep immediate. Moves all four extremities.   Psych: Oriented x 3. No anxiety.     LABS:  CBC:   Recent Labs     07/12/24 0440 07/13/24  0431 07/14/24  0430   WBC 11.0 9.1 9.0   HGB 11.2* 11.2* 11.1*   HCT 32.7* 33.1* 32.6*   MCV 90.8 92.0 90.8   * 152 171       BMP:   Recent Labs     07/12/24 0440 07/13/24 0431 07/14/24  0430    145 143   K 3.6 3.4* 3.0*    109 107   CO2 26 26 28   PHOS  --   --  1.9*   BUN 19 17 18   CREATININE <0.5* <0.5* <0.5*       LIVER PROFILE:   No results for input(s): \"AST\", \"ALT\", \"LIPASE\", \"AMYLASE\", \"BILIDIR\", \"BILITOT\", \"ALKPHOS\" in the last 72 hours.    Invalid input(s): \"ALB\"      t     imaging was reviewed by me and showed         ASSESSMENT:  Acute hypoxic respiratory failure requiring 8 L  Possible aspiration  History of PE  Severe pharyngeal esophageal dysphagia and stricture of the lower cervical esophagus status post  History of lung cancer status post

## 2024-07-14 NOTE — PROGRESS NOTES
RT Nebulizer Bronchodilator Protocol Note    There is a bronchodilator order in the chart from a provider indicating to follow the RT Bronchodilator Protocol and there is an “Initiate RT Bronchodilator Protocol” order as well (see protocol at bottom of note).    CXR Findings:  XR CHEST PORTABLE    Result Date: 7/13/2024  1. Increasing airspace opacity in the right hemithorax with small right effusion. 2. Minimally increased opacity in the left hemithorax. 3. Consider CT scan to further characterize if appropriate.       The findings from the last RT Protocol Assessment were as follows:  Smoking: (P) Chronic pulmonary disease  Respiratory Pattern: (P) Regular pattern and RR 12-20 bpm  Breath Sounds: (P) Slightly diminished and/or crackles  Cough: (P) Weak, productive  Indication for Bronchodilator Therapy: (P) On home bronchodilators  Bronchodilator Assessment Score: (P) 6    Aerosolized bronchodilator medication orders have been revised according to the RT Nebulizer Bronchodilator Protocol below.    Respiratory Therapist to perform RT Therapy Protocol Assessment initially then follow the protocol.  Repeat RT Therapy Protocol Assessment PRN for score 0-3 or on second treatment, BID, and PRN for scores above 3.    No Indications - adjust the frequency to every 6 hours PRN wheezing or bronchospasm, if no treatments needed after 48 hours then discontinue using Per Protocol order mode.     If indication present, adjust the RT bronchodilator orders based on the Bronchodilator Assessment Score as indicated below.  If a patient is on this medication at home then do not decrease Frequency below that used at home.    0-3 - enter or revise RT bronchodilator order(s) to equivalent RT Bronchodilator order with Frequency of every 4 hours PRN for wheezing or increased work of breathing using Per Protocol order mode.       4-6 - enter or revise RT Bronchodilator order(s) to two equivalent RT bronchodilator orders with one order  with BID Frequency and one order with Frequency of every 4 hours PRN wheezing or increased work of breathing using Per Protocol order mode.         7-10 - enter or revise RT Bronchodilator order(s) to two equivalent RT bronchodilator orders with one order with TID Frequency and one order with Frequency of every 4 hours PRN wheezing or increased work of breathing using Per Protocol order mode.       11-13 - enter or revise RT Bronchodilator order(s) to one equivalent RT bronchodilator order with QID Frequency and an Albuterol order with Frequency of every 4 hours PRN wheezing or increased work of breathing using Per Protocol order mode.      Greater than 13 - enter or revise RT Bronchodilator order(s) to one equivalent RT bronchodilator order with every 4 hours Frequency and an Albuterol order with Frequency of every 2 hours PRN wheezing or increased work of breathing using Per Protocol order mode.     RT to enter RT Home Evaluation for COPD & MDI Assessment order using Per Protocol order mode.    Electronically signed by Josseline Dwyer RCP on 7/14/2024 at 4:35 PM

## 2024-07-14 NOTE — PROGRESS NOTES
Rounding completed with Dr. Kendrick and multidisciplinary team. POC, labs, lines and assessment reviewed.   - Keep in ICU

## 2024-07-15 LAB
ANION GAP SERPL CALCULATED.3IONS-SCNC: 7 MMOL/L (ref 3–16)
BASOPHILS # BLD: 0 K/UL (ref 0–0.2)
BASOPHILS NFR BLD: 0.3 %
BUN SERPL-MCNC: 16 MG/DL (ref 7–20)
CALCIUM SERPL-MCNC: 7.4 MG/DL (ref 8.3–10.6)
CHLORIDE SERPL-SCNC: 105 MMOL/L (ref 99–110)
CO2 SERPL-SCNC: 29 MMOL/L (ref 21–32)
CREAT SERPL-MCNC: <0.5 MG/DL (ref 0.8–1.3)
DEPRECATED RDW RBC AUTO: 17.5 % (ref 12.4–15.4)
EOSINOPHIL # BLD: 0.3 K/UL (ref 0–0.6)
EOSINOPHIL NFR BLD: 2.6 %
GFR SERPLBLD CREATININE-BSD FMLA CKD-EPI: >90 ML/MIN/{1.73_M2}
GLUCOSE SERPL-MCNC: 126 MG/DL (ref 70–99)
HCT VFR BLD AUTO: 34.2 % (ref 40.5–52.5)
HGB BLD-MCNC: 11.6 G/DL (ref 13.5–17.5)
LYMPHOCYTES # BLD: 0.8 K/UL (ref 1–5.1)
LYMPHOCYTES NFR BLD: 7.6 %
MAGNESIUM SERPL-MCNC: 1.8 MG/DL (ref 1.8–2.4)
MCH RBC QN AUTO: 31.2 PG (ref 26–34)
MCHC RBC AUTO-ENTMCNC: 34 G/DL (ref 31–36)
MCV RBC AUTO: 91.8 FL (ref 80–100)
MONOCYTES # BLD: 1 K/UL (ref 0–1.3)
MONOCYTES NFR BLD: 9.3 %
NEUTROPHILS # BLD: 8.3 K/UL (ref 1.7–7.7)
NEUTROPHILS NFR BLD: 80.2 %
PHOSPHATE SERPL-MCNC: 1.8 MG/DL (ref 2.5–4.9)
PLATELET # BLD AUTO: 206 K/UL (ref 135–450)
PMV BLD AUTO: 8.6 FL (ref 5–10.5)
POTASSIUM SERPL-SCNC: 3.3 MMOL/L (ref 3.5–5.1)
RBC # BLD AUTO: 3.72 M/UL (ref 4.2–5.9)
SODIUM SERPL-SCNC: 141 MMOL/L (ref 136–145)
WBC # BLD AUTO: 10.3 K/UL (ref 4–11)

## 2024-07-15 PROCEDURE — 2580000003 HC RX 258: Performed by: INTERNAL MEDICINE

## 2024-07-15 PROCEDURE — 6370000000 HC RX 637 (ALT 250 FOR IP): Performed by: INTERNAL MEDICINE

## 2024-07-15 PROCEDURE — 84100 ASSAY OF PHOSPHORUS: CPT

## 2024-07-15 PROCEDURE — 97530 THERAPEUTIC ACTIVITIES: CPT

## 2024-07-15 PROCEDURE — 80048 BASIC METABOLIC PNL TOTAL CA: CPT

## 2024-07-15 PROCEDURE — 94669 MECHANICAL CHEST WALL OSCILL: CPT

## 2024-07-15 PROCEDURE — 83735 ASSAY OF MAGNESIUM: CPT

## 2024-07-15 PROCEDURE — 6360000002 HC RX W HCPCS: Performed by: INTERNAL MEDICINE

## 2024-07-15 PROCEDURE — 97110 THERAPEUTIC EXERCISES: CPT

## 2024-07-15 PROCEDURE — 36415 COLL VENOUS BLD VENIPUNCTURE: CPT

## 2024-07-15 PROCEDURE — 94761 N-INVAS EAR/PLS OXIMETRY MLT: CPT

## 2024-07-15 PROCEDURE — C9254 INJECTION, LACOSAMIDE: HCPCS | Performed by: INTERNAL MEDICINE

## 2024-07-15 PROCEDURE — 2700000000 HC OXYGEN THERAPY PER DAY

## 2024-07-15 PROCEDURE — 85025 COMPLETE CBC W/AUTO DIFF WBC: CPT

## 2024-07-15 PROCEDURE — 2500000003 HC RX 250 WO HCPCS: Performed by: INTERNAL MEDICINE

## 2024-07-15 PROCEDURE — 99233 SBSQ HOSP IP/OBS HIGH 50: CPT | Performed by: INTERNAL MEDICINE

## 2024-07-15 PROCEDURE — 6370000000 HC RX 637 (ALT 250 FOR IP): Performed by: STUDENT IN AN ORGANIZED HEALTH CARE EDUCATION/TRAINING PROGRAM

## 2024-07-15 PROCEDURE — 2000000000 HC ICU R&B

## 2024-07-15 PROCEDURE — 94640 AIRWAY INHALATION TREATMENT: CPT

## 2024-07-15 PROCEDURE — 99232 SBSQ HOSP IP/OBS MODERATE 35: CPT | Performed by: INTERNAL MEDICINE

## 2024-07-15 RX ORDER — LORAZEPAM 1 MG/1
1 TABLET ORAL EVERY 8 HOURS PRN
Status: DISCONTINUED | OUTPATIENT
Start: 2024-07-15 | End: 2024-07-16 | Stop reason: HOSPADM

## 2024-07-15 RX ADMIN — VANCOMYCIN HYDROCHLORIDE 1250 MG: 10 INJECTION, POWDER, LYOPHILIZED, FOR SOLUTION INTRAVENOUS at 02:45

## 2024-07-15 RX ADMIN — MUPIROCIN: 20 OINTMENT TOPICAL at 09:01

## 2024-07-15 RX ADMIN — APIXABAN 5 MG: 5 TABLET, FILM COATED ORAL at 19:54

## 2024-07-15 RX ADMIN — ZOLPIDEM TARTRATE 5 MG: 5 TABLET ORAL at 19:54

## 2024-07-15 RX ADMIN — FINASTERIDE 5 MG: 5 TABLET, FILM COATED ORAL at 09:03

## 2024-07-15 RX ADMIN — LACOSAMIDE 100 MG: 10 INJECTION INTRAVENOUS at 19:49

## 2024-07-15 RX ADMIN — POTASSIUM PHOSPHATE, MONOBASIC POTASSIUM PHOSPHATE, DIBASIC 10 MMOL: 224; 236 INJECTION, SOLUTION, CONCENTRATE INTRAVENOUS at 09:01

## 2024-07-15 RX ADMIN — CARBIDOPA AND LEVODOPA 1 TABLET: 25; 100 TABLET ORAL at 19:54

## 2024-07-15 RX ADMIN — DILTIAZEM HYDROCHLORIDE 30 MG: 60 TABLET ORAL at 22:05

## 2024-07-15 RX ADMIN — Medication 4 ML: at 18:40

## 2024-07-15 RX ADMIN — MONTELUKAST SODIUM 10 MG: 10 TABLET, COATED ORAL at 19:54

## 2024-07-15 RX ADMIN — DILTIAZEM HYDROCHLORIDE 30 MG: 60 TABLET ORAL at 13:58

## 2024-07-15 RX ADMIN — ACETAMINOPHEN 650 MG: 325 TABLET ORAL at 04:40

## 2024-07-15 RX ADMIN — APIXABAN 5 MG: 5 TABLET, FILM COATED ORAL at 09:03

## 2024-07-15 RX ADMIN — ATORVASTATIN CALCIUM 80 MG: 40 TABLET, FILM COATED ORAL at 19:53

## 2024-07-15 RX ADMIN — VANCOMYCIN HYDROCHLORIDE 1250 MG: 10 INJECTION, POWDER, LYOPHILIZED, FOR SOLUTION INTRAVENOUS at 16:16

## 2024-07-15 RX ADMIN — MUPIROCIN: 20 OINTMENT TOPICAL at 19:54

## 2024-07-15 RX ADMIN — MEROPENEM 1000 MG: 1 INJECTION INTRAVENOUS at 17:48

## 2024-07-15 RX ADMIN — MEMANTINE HYDROCHLORIDE 10 MG: 5 TABLET ORAL at 09:03

## 2024-07-15 RX ADMIN — LEVETIRACETAM 750 MG: 100 INJECTION INTRAVENOUS at 19:49

## 2024-07-15 RX ADMIN — Medication 4 ML: at 07:34

## 2024-07-15 RX ADMIN — Medication 100 MG: at 09:03

## 2024-07-15 RX ADMIN — SODIUM CHLORIDE, PRESERVATIVE FREE 10 ML: 5 INJECTION INTRAVENOUS at 19:49

## 2024-07-15 RX ADMIN — ALBUTEROL SULFATE 2.5 MG: 2.5 SOLUTION RESPIRATORY (INHALATION) at 07:34

## 2024-07-15 RX ADMIN — Medication 40 MG: at 09:03

## 2024-07-15 RX ADMIN — Medication 40 MG: at 19:49

## 2024-07-15 RX ADMIN — LORAZEPAM 1 MG: 1 TABLET ORAL at 19:54

## 2024-07-15 RX ADMIN — MEROPENEM 1000 MG: 1 INJECTION INTRAVENOUS at 01:07

## 2024-07-15 RX ADMIN — CARBIDOPA AND LEVODOPA 1 TABLET: 25; 100 TABLET ORAL at 09:03

## 2024-07-15 RX ADMIN — LACOSAMIDE 100 MG: 10 INJECTION INTRAVENOUS at 09:04

## 2024-07-15 RX ADMIN — MEMANTINE HYDROCHLORIDE 10 MG: 5 TABLET ORAL at 19:53

## 2024-07-15 RX ADMIN — LEVETIRACETAM 750 MG: 100 INJECTION INTRAVENOUS at 09:03

## 2024-07-15 RX ADMIN — ALBUTEROL SULFATE 2.5 MG: 2.5 SOLUTION RESPIRATORY (INHALATION) at 18:40

## 2024-07-15 RX ADMIN — MEROPENEM 1000 MG: 1 INJECTION INTRAVENOUS at 09:19

## 2024-07-15 RX ADMIN — CARBIDOPA AND LEVODOPA 1 TABLET: 25; 100 TABLET ORAL at 13:58

## 2024-07-15 RX ADMIN — DIBASIC SODIUM PHOSPHATE, MONOBASIC POTASSIUM PHOSPHATE AND MONOBASIC SODIUM PHOSPHATE 4 TABLET: 852; 155; 130 TABLET ORAL at 05:29

## 2024-07-15 RX ADMIN — ASPIRIN 81 MG: 81 TABLET, COATED ORAL at 09:03

## 2024-07-15 RX ADMIN — DILTIAZEM HYDROCHLORIDE 30 MG: 60 TABLET ORAL at 04:40

## 2024-07-15 RX ADMIN — I-VITE, TAB 1000-60-2MG (60/BT) 1 TABLET: TAB at 09:03

## 2024-07-15 NOTE — PLAN OF CARE
Problem: Chronic Conditions and Co-morbidities  Goal: Patient's chronic conditions and co-morbidity symptoms are monitored and maintained or improved  Outcome: Adequate for Discharge     Problem: Safety - Adult  Goal: Free from fall injury  Outcome: Adequate for Discharge     Problem: Discharge Planning  Goal: Discharge to home or other facility with appropriate resources  Outcome: Adequate for Discharge     Problem: Nutrition Deficit:  Goal: Optimize nutritional status  Outcome: Adequate for Discharge     Problem: Neurosensory - Adult  Goal: Achieves maximal functionality and self care  Outcome: Adequate for Discharge     Problem: Skin/Tissue Integrity - Adult  Goal: Skin integrity remains intact  Outcome: Adequate for Discharge     Problem: Musculoskeletal - Adult  Goal: Return mobility to safest level of function  Outcome: Adequate for Discharge     Problem: Pain  Goal: Verbalizes/displays adequate comfort level or baseline comfort level  Outcome: Adequate for Discharge     Problem: Skin/Tissue Integrity  Goal: Absence of new skin breakdown  Description: 1.  Monitor for areas of redness and/or skin breakdown  2.  Assess vascular access sites hourly  3.  Every 4-6 hours minimum:  Change oxygen saturation probe site  4.  Every 4-6 hours:  If on nasal continuous positive airway pressure, respiratory therapy assess nares and determine need for appliance change or resting period.  Outcome: Adequate for Discharge     Problem: Respiratory - Adult  Goal: Achieves optimal ventilation and oxygenation  Outcome: Adequate for Discharge

## 2024-07-15 NOTE — PLAN OF CARE
Problem: Safety - Medical Restraint  Goal: Remains free of injury from restraints (Restraint for Interference with Medical Device)  Description: INTERVENTIONS:  1. Determine that other, less restrictive measures have been tried or would not be effective before applying the restraint  2. Evaluate the patient's condition at the time of restraint application  3. Inform patient/family regarding the reason for restraint  4. Q2H: Monitor safety, psychosocial status, comfort, nutrition and hydration  7/14/2024 2021 by Addison Martinez, RN  Outcome: Completed  7/14/2024 2019 by Addison Martinez, RN  Outcome: Progressing  7/14/2024 2019 by Adidson Martinez, RN  Outcome: Progressing

## 2024-07-15 NOTE — PROGRESS NOTES
RT Inhaler-Nebulizer Bronchodilator Protocol Note    There is a bronchodilator order in the chart from a provider indicating to follow the RT Bronchodilator Protocol and there is an “Initiate RT Inhaler-Nebulizer Bronchodilator Protocol” order as well (see protocol at bottom of note).    CXR Findings:  No results found.    The findings from the last RT Protocol Assessment were as follows:   History Pulmonary Disease: (P) Chronic pulmonary disease  Respiratory Pattern: (P) Regular pattern and RR 12-20 bpm  Breath Sounds: (P) Slightly diminished and/or crackles  Cough: (P) Weak, productive  Indication for Bronchodilator Therapy: (P) Decreased or absent breath sounds  Bronchodilator Assessment Score: (P) 6    Aerosolized bronchodilator medication orders have been revised according to the RT Inhaler-Nebulizer Bronchodilator Protocol below.    Respiratory Therapist to perform RT Therapy Protocol Assessment initially then follow the protocol.  Repeat RT Therapy Protocol Assessment PRN for score 0-3 or on second treatment, BID, and PRN for scores above 3.    No Indications - adjust the frequency to every 6 hours PRN wheezing or bronchospasm, if no treatments needed after 48 hours then discontinue using Per Protocol order mode.     If indication present, adjust the RT bronchodilator orders based on the Bronchodilator Assessment Score as indicated below.  Use Inhaler orders unless patient has one or more of the following: on home nebulizer, not able to hold breath for 10 seconds, is not alert and oriented, cannot activate and use MDI correctly, or respiratory rate 25 breaths per minute or more, then use the equivalent nebulizer order(s) with same Frequency and PRN reasons based on the score.  If a patient is on this medication at home then do not decrease Frequency below that used at home.    0-3 - enter or revise RT bronchodilator order(s) to equivalent RT Bronchodilator order with Frequency of every 4 hours PRN for

## 2024-07-15 NOTE — PROGRESS NOTES
Vancomycin Day: 5/7  Current Regimen: 1250 mg IV every 12 hours    Patient's labs, cultures, vitals, and vancomycin regimen reviewed. No changes today.

## 2024-07-15 NOTE — PROGRESS NOTES
Care rounds completed with Dr. Matta and multidisciplinary team. Reviewed labs, meds, VS, assessment, & plan of care for today. See dictated note and new orders for details. NNO orders received.

## 2024-07-15 NOTE — CARE COORDINATION
Spoke with Michelle MENDEZ about patient's discharge needs.  The patient will be discharging to home with oxygen and tube feeds.    11:30 - spoke with [facility] - [phone] - to make referral for g-tube feeds. [facility] advised he will run benefits and this can take 3-4 hours. Will also need to see if Jevity 1.5 is available today. May not happen until tomorrow.    Spoke with Jer/Caryl. Need actual walk test in chart.   Walk test updated by Michelle MENDEZ.  Documents faxed to Jer.    Spoke with [facility]. They will follow in [facility] and will reach out to patient to schedule time to meet with patient.     Met with patient and spouse at bedside to answer questions. Spouse will be the one to administer g-tube feeds at home.     2:47 - Spoke with [facility] for update on g-tube feeds. [facility] advised nothing back yet but will keep checking until his next scheduled teaching appointment at 3:30. [facility] is aware the spouse will be the one to receive g-tube feeding education.     3:18 - Received call from spouse. Provided update - advising patient will likely not be discharged until tomorrow.    Received call from Jer/Caryl. Caryl can accept the patient. CM to call Jer when patient is ready to dc so he can get the patient set up with 02.

## 2024-07-15 NOTE — PROGRESS NOTES
End of shift note:    VSS throughout the night.  Pt on 2L NC.  TFs increased to 60 mLs/hr overnight.  Pt with c/o anxiety earlier in shift.  One time dose of Ativan administered for anxiety, as well as Ambien for sleep.  Poor rest for pt overnight.  K phos replacement administered this AM.

## 2024-07-15 NOTE — PROGRESS NOTES
Shift assessment, completed, see flow sheet. Pt is alert and oriented x4.     VSS. Respirations are easy, even, and unlabored. Bilateral lung sounds are diminished throughout. PEG tube remains in place and patent with TF infusing at goal, pt tolerating well.       PIV x2 remain in place and patent with sites and dressings C/D/I. External catheter remains in place and patent.    Call light within reach. Bed in lowest position. Bed alarm on. Telesitter remains present at bedside. Pt denies any further needs at this time.

## 2024-07-15 NOTE — PROGRESS NOTES
Progress Note    Admit Date:  7/1/2024    Patient with Parkinsons disease and presbyesophagus  admitted for aspiration pna     Seen by speech therapy  Seen by GI - S/P EGD with esophageal dilatation 7/5   Severely hypoxic after MBS - RR called and pt on nonrebreather     Was moved to ICU overnight for confusion   Planned for bronch but family refused but improved and now on RA      Subjective:  Mr. Luna was seen awake, alert and oriented    On RA while awake       Objective:   Patient Vitals for the past 4 hrs:   BP Temp Temp src Pulse Resp SpO2   07/15/24 0735 -- -- -- 76 22 97 %   07/15/24 0700 130/77 -- -- 79 24 96 %   07/15/24 0600 122/65 -- -- 81 26 92 %   07/15/24 0500 134/66 -- -- 88 29 94 %   07/15/24 0400 (!) 145/79 98.5 °F (36.9 °C) Oral 92 22 92 %            Intake/Output Summary (Last 24 hours) at 7/15/2024 0736  Last data filed at 7/15/2024 0600  Gross per 24 hour   Intake 2355.71 ml   Output 800 ml   Net 1555.71 ml         Physical Exam:  General: thin elderly male, ill appearing    Awake, alert and better oriented. More energetic   Appears to be not in any distress  Mucous Membranes:  Pink , anicteric  NG +  Atrophic facial muscles +  Neck: No JVD, no carotid bruit, no thyromegaly  Chest:  Clear to auscultation bilaterally diminished in right base  Cardiovascular:  RRR S1S2 heard, no murmurs or gallops  Abdomen:  Soft, undistended, non tender, no organomegaly, BS present  Extremities: No edema or cyanosis. Distal pulses well felt  Neurological : resting tremors, rigidity   grossly normal with gen weakness       Medications:  zolpidem, 5 mg, Nightly  thiamine, 100 mg, Daily  multivitamin, 1 tablet, Daily  albuterol, 2.5 mg, BID RT  vancomycin, 1,250 mg, Q12H  mupirocin, , BID  aspirin EC, 81 mg, Daily  [Held by provider] QUEtiapine, 25 mg, Nightly  dilTIAZem, 30 mg, 3 times per day  sodium chloride (Inhalant), 4 mL, Q12H  meropenem, 1,000 mg, Q8H  pantoprazole (PROTONIX) 40 mg in sodium chloride (PF)  https://www.fda.gov/media/231060/download  EUA: https://www.fda.gov/media/522281/download  IFU: https://www.fda.gov/media/618635/download    Methodology:  RT-PCR          Influenza A NOT DETECTED     Influenza B NOT DETECTED    Culture, Blood 1 [[id number]] Collected: 07/01/24 1400    Order Status: Completed Specimen: Blood Updated: 07/05/24 1815     Blood Culture, Routine No Growth after 4 days of incubation.    Narrative:      ORDER#: [id number]                          ORDERED BY: MILO GARCIA  SOURCE: Blood                              COLLECTED:  07/01/24 14:00  ANTIBIOTICS AT ABIGAIL.:                      RECEIVED :  07/01/24 14:05  If child <=2 yrs old please draw pediatric bottle.~Blood Culture 1            EKG  Encounter Date: 07/01/24   EKG 12 Lead   Result Value    Ventricular Rate 126    Atrial Rate 138    QRS Duration 90    Q-T Interval 310    QTc Calculation (Bazett) 448    R Axis -89    T Axis 41    Diagnosis      Atrial fibrillation with rapid ventricular responseLeft axis deviationRSR' or QR pattern in V1 suggests right ventricular conduction delayAbnormal ECGWhen compared with ECG of 08-JUL-2024 16:13, (unconfirmed)No significant change was foundConfirmed by TONA BE, ROLAND ([id number]) on 7/8/2024 6:27:21 PM           RADIOLOGY  XR CHEST PORTABLE   Final Result   1. Increasing airspace opacity in the right hemithorax with small right   effusion.   2. Minimally increased opacity in the left hemithorax.   3. Consider CT scan to further characterize if appropriate.         XR ABDOMEN FOR NG/OG/NE TUBE PLACEMENT   Final Result   1. The tip of the enteric tube is in the gastric fundus. The side port is in   the distal esophagus.  Consider advancement.   2. Worsening opacities of the right lung base.  Small right pleural effusion.         CT HEAD WO CONTRAST   Final Result   1. No acute intracranial abnormality.   2. Diffuse parenchymal atrophy with chronic microvascular ischemic change.         XR CHEST  VATS  - chronic pleural effusion stable      Porphyria cutanea tarda       Anxiety  - prn ativan     Hold seroquel for ongoing confusion     DVT Prophylaxis: holding Eliquis   Diet: Diet NPO  Code Status: Full Code    Dc planning     PADMINI PARTIDA MD  07/15/24

## 2024-07-15 NOTE — DISCHARGE SUMMARY
Name:  Rodolfo Luna  Room:  3006/3006-01  MRN:    [id number]    Discharge Summary      This discharge summary is in conjunction with a complete physical exam done on the day of discharge.      Discharging Physician: PADMINI PARTIDA MD      Admit: 7/1/2024  Discharge:  7/16/2024     Diagnoses this Admission    Principal Problem:    Aspiration into airway  Active Problems:    Coronary artery disease involving native coronary artery of native heart without angina pectoris    Presence of Watchman left atrial appendage closure device    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HCC)    Malignant neoplasm of lung (HCC)    Parkinson's disease (HCC)    History of CVA (cerebrovascular accident)    Seizure disorder (HCC)    Severe protein-calorie malnutrition (HCC)    Presbyesophagus    Esophageal stricture    Acute hypoxic respiratory failure (HCC)    Right leg weakness    Aspiration into airway, initial encounter  Resolved Problems:    * No resolved hospital problems. *          Procedures (Please Review Full Report for Details)      Consults    IP CONSULT TO PULMONOLOGY  IP CONSULT TO GI  IP CONSULT TO HOME CARE NEEDS  IP CONSULT TO DIETITIAN  IP CONSULT TO PULMONOLOGY  IP CONSULT TO PALLIATIVE CARE  IP CONSULT TO SPIRITUAL SERVICES  IP CONSULT TO NEUROLOGY  PHARMACY TO DOSE VANCOMYCIN  IP CONSULT TO DIETITIAN      HPI:    Rodolfo Luna is a 78 y.o. male with past medical history of Parkinson's disease with chronic aspiration on thickened liquids at baseline, seizure disorder, A-fib status post Watchman procedure, CAD status post PCI, hyperlipidemia and anxiety who presented with persistent cough related to oral intake,  He has a history of chronic aspiration and is on a thickened liquid diet, stating that he coughs up water when he eats.  No vomiting.  He also describes shortness of breath, orthopnea and some lightheadedness. No leg swelling, he denies a history of CHF     Upon arrival at the emergency department, his vital  signs were within normal limits.  CBC and CMP were reassuring.  BNP was 2375.  Troponin 40, chronically elevated.  VBG showed compensated chronic respiratory acidosis.  COVID and influenza testing were negative.  Lactate was reassuring, blood cultures were obtained.  CXR was nonacute, stable postsurgical partial right pneumonectomy noted.  A CT of the chest showed resolution of previously noted right pulmonary artery thrombus.  Patient was started on Unasyn and admitted to the hospital.      Physical Exam at Discharge:  /77   Pulse 76   Temp 98.5 °F (36.9 °C) (Oral)   Resp 22   Ht 1.778 m (5' 10\")   Wt 74.3 kg (163 lb 14.4 oz)   SpO2 97%   BMI 23.52 kg/m²         Hospital Course        Aspiration pneumonia   Hx of Dysphagia with presbyesophagus   Acute hypoxic resp failure   COPD no exacerbation - on inhalers at home   - hx of chronic aspiration on thickened liquid diet  - CT chest as above with no clear Airspace disease, has chronic right pleural effusion and resolved previous thrombus   - SLP eval and treat -> rec pt stay NPO  - MBS with severe pharyngeal dysphagia with aspiration of all liquid consistencies. Significant cricopharyngeal dysfunction with inability to pass puree bolus.   - was given Unasyn once in ED, defer further abx to pulm.  - PPI daily initiated     - pulmonology consulted, okay to observe off abx  - GI consulted-> S/P dilation w/opening of UES 7/5/2024   -repeat SLp eval showed  profound oropharyngeal dysphagia  - Rapid response  called 7/8 for severe hypoxia after MBSS, placed on 8L and transferred to PCU. Repeat imaging with no pna but highly suspected   - started on Levaquin, Merrem with high suspicion and worsening hypoxia , vanc added   - planned for bronch with BAL but family refused, pt improved and now on RA --> 2 L   -PEG placed   -tolerating tube feeds   Now on 2 L --> had a face-to-face conversation regarding home O2  Walking test done by nurse reviewed     Hx of PE  -

## 2024-07-15 NOTE — PROGRESS NOTES
Oxygen documentation:    O2 saturation at REST on ROOM AIR = ___72___% (while resting/sleeping).    If saturation is 89% or above please proceed with steps 2 and 3……….    O2 saturation with AMBULATION of __15___ feet on ROOM AIR = __88___%  O2 saturation with AMBULATION on ___2____ liter/min = ____93__%    DCP notified: __yes____

## 2024-07-15 NOTE — PROGRESS NOTES
Inpatient Occupational Therapy Treatment    Unit: ICU  Date:  7/15/2024  Patient Name:    Rodolfo Luna  Admitting diagnosis:  Aspiration into respiratory tract, initial encounter [T17.908A]  Aspiration into airway, initial encounter [T17.908A]  Right leg weakness [R29.898]  Admit Date:  7/1/2024  Precautions/Restrictions/WB Status/ Lines/ Wounds/ Oxygen: Fall risk, Bed/chair alarm, Lines (IV, external catheter, and PEG tube), Confusion, Telemetry, Continuous pulse oximetry, and Isolation Precautions: Contact    Pt seen for cotreatment this date due to patient safety, patient endurance, and complexity of condition    Treatment Time: 9:45-10:10  Treatment Number:  2  Timed Code Treatment Minutes: 25 minutes  Total Treatment Minutes: 25 minutes    Patient Goals for Therapy: \"to get better\"          Discharge Recommendations: Home with 24/7 assist  and Home OT  DME needs for discharge: Needs Met       Therapy recommendations for staff:   Assist of 1 for ambulation with use of rolling walker (RW) and gait belt within room    History of Present Illness: Per H&P by Dr. Smith: \" 78 y.o. male with past medical history of Parkinson's disease with chronic aspiration on thickened liquids at baseline, seizure disorder, A-fib status post Watchman procedure, CAD status post PCI, hyperlipidemia and anxiety who presented with persistent cough related to oral intake,  He has a history of chronic aspiration and is on a thickened liquid diet, stating that he coughs up water when he eats.  No vomiting.  He also describes shortness of breath, orthopnea and some lightheadedness. No leg swelling, he denies a history of CHF.\"    AM-PAC Score: AM-PAC Inpatient Daily Activity Raw Score: 18     Subjective:  Patient lying reclined in bed with no family present.   Pt agreeable to this OT session.     Cognition:    A&O x4   Able to follow 1 step commands    Pain:   No  Location: n/a  Rating: NA /10  Pain Medicine Status: No request  made    Activity Tolerance:   Pt completed therapy session with SOB/WARE     BP (mmHg) HR (bpm) SpO2 (%) on RA Comments   Supine at rest and asleep    72% Recovered quickly once therapist aroused patient   Seated at EOB   92%    Standing   88-93%    End of session   94%        Preadmission Environment:   Pt. Lives                                             Spouse and grandson (staying there for about a month)  Home environment:                            one story home  Steps to enter first floor:                     3 steps on porch, 2 steps to get in from kitchen (wife states that they are having [facility] come put handrails)  Steps to second floor/basement:        N/A  Laundry:                                              1st floor  Bathroom:                                           tub/shower unit, grab bars, hand held shower head, and comfort height toilet (called [facility] to put grab bars in tub)  Pt sleeps in a:                                     Flat bed  Equipment owned:                              RW, rollator, shower chair, and manual WC     Preadmission Status:  Pt. Able to drive:                                 Yes  Pt. Fully independent with ADLs:       Yes  Pt. Required assistance for:               Independent PTA (wife completes cooking, cleaning, grocery shopping)  Pt. independent for functional transfers and utilized SPC for mobility in home and SPC out in community  History of falls:                                    Yes - patient reports 1 fall in the past 6 months   Home Health Services:                       None    Objective:  Does this pt have an acute or acute on chronic diagnosis of CHF? No      Balance:  Sitting:    SBA for static and dynamic sitting  Standing:    CGA with RW    Bed Mobility:   Supine to Sit:    Min A   Sit to Supine:   Not Tested  Rolling:   Not Tested  Scooting in sitting: SBA   Scooting in supine:  Not Tested    Transfers:    Sit to stand:    CGA  Stand to sit:    [facility]  Bed to  chair:     CGA and With RW and gait belt  Bed/ chair to standard toilet:  Not Tested  Bed/chair to BSC:   Not Tested   Functional Mobility:   CGA and With RW and gait belt within room    ADLs:  Dressing:      UE:   Not Tested  LE:    Not Tested    Bathing:    UE:  Not Tested  LE:  Not Tested    Eating:   Not Tested    Toileting:  Not Tested    Grooming/hygiene: Not Tested      Positioning Needs:   Pt up in chair, alarm set, call light provided and all needs within reach .     Ther Ex / Activities Initiated:   Shoulder flex/ext:  x10  Shoulder horizontal abd/add:  x10  Scapular retraction:  x10  Sit to Stands: x5    Patient/Family Education:   Pt educated on role of inpatient OT, plan of care, importance of continued activity, DC recommendations and Calling for assist with mobility.    CHF Education  N/A    Assessment:  Pt seen for Occupational therapy treatment in acute care setting.  Pt demonstrated decreased Activity tolerance, ADLs, Balance , Safety Awareness, Strength, Transfers, and Cognition. Pt functioning below baseline and will likely benefit from skilled occupational therapy services to maximize safety and independence.     Patient demonstrates improvements with mobility this date. Patient walked across room with CGA and use of RW. Patient reports wife will be home with him at all times.     Recommending Home 24 hr assist upon discharge as patient functioning below baseline level and would benefit from continued therapy services    Goal(s) :   To be met in 3 Visits:  Bed to toilet/BSC:       Min A  Pt will complete 3/3 CHF goals     N/A    To be met in 5 Visits:  Supine to/from Sit in preparation for ADL task:   SBA  Toileting        CGA  Grooming       SBA  Upper Body Dressing:      SBA  Lower Body Dressing:      CGA  Pt to demonstrate UE therapeutic exs x 15 reps with minimal cues    Rehabilitation Potential: Good  Strengths for achieving goals include: Pt motivated, PLOF, and Pt cooperative   Barriers

## 2024-07-15 NOTE — PLAN OF CARE
Problem: Chronic Conditions and Co-morbidities  Goal: Patient's chronic conditions and co-morbidity symptoms are monitored and maintained or improved  7/14/2024 2019 by Addison Martinez RN  Outcome: Progressing  7/14/2024 2019 by Addison Martinez RN  Outcome: Progressing  7/14/2024 2019 by Addison Martinez RN  Flowsheets  Taken 7/14/2024 2017  Care Plan - Patient's Chronic Conditions and Co-Morbidity Symptoms are Monitored and Maintained or Improved: Monitor and assess patient's chronic conditions and comorbid symptoms for stability, deterioration, or improvement  Taken 7/14/2024 2000  Care Plan - Patient's Chronic Conditions and Co-Morbidity Symptoms are Monitored and Maintained or Improved: Monitor and assess patient's chronic conditions and comorbid symptoms for stability, deterioration, or improvement      Problem: Safety - Adult  Goal: Free from fall injury  7/14/2024 2019 by Addison Martinez RN  Outcome: Progressing  7/14/2024 2019 by Addison Martinez RN  Outcome: Progressing  7/14/2024 2019 by Addison Martinez RN  Flowsheets (Taken 7/14/2024 2017)  Free From Fall Injury: Instruct family/caregiver on patient safety    Problem: Discharge Planning  Goal: Discharge to home or other facility with appropriate resources  7/14/2024 2019 by Addison Martinez RN  Outcome: Progressing  7/14/2024 2019 by Addison Martinez RN  Outcome: Progressing  Flowsheets (Taken 7/14/2024 2000)  Discharge to home or other facility with appropriate resources: Identify barriers to discharge with patient and caregiver    Problem: Nutrition Deficit:  Goal: Optimize nutritional status  7/14/2024 2019 by Addison Martinez RN  Outcome: Progressing  7/14/2024 2019 by Addison Martinez RN  Outcome: Progressing  7/14/2024 2019 by Addison Martinez RN  Flowsheets (Taken 7/14/2024 2017)  Nutrient intake appropriate for improving, restoring, or maintaining nutritional needs: Assess nutritional status and recommend course of action    Problem: Neurosensory -  Adult  Goal: Achieves maximal functionality and self care  7/14/2024 2019 by Addison Martinez RN  Outcome: Progressing  7/14/2024 2019 by Addison Martinez RN  Outcome: Progressing  Flowsheets (Taken 7/14/2024 2000)  Achieves maximal functionality and self care:   Monitor swallowing and airway patency with patient fatigue and changes in neurological status   Encourage and assist patient to increase activity and self care with guidance from physical therapy/occupational therapy    Problem: Skin/Tissue Integrity - Adult  Goal: Skin integrity remains intact  7/14/2024 2019 by Addison Martinez RN  Outcome: Progressing  7/14/2024 2019 by Addison Martinez RN  Outcome: Progressing  Flowsheets  Taken 7/14/2024 2015  Skin Integrity Remains Intact:   Monitor for areas of redness and/or skin breakdown   Assess vascular access sites hourly   Every 4-6 hours minimum: Change oxygen saturation probe site   Every 4-6 hours: If on nasal continuous positive airway pressure, respiratory therapy assesses nares and determine need for appliance change or resting period  Taken 7/14/2024 2000  Skin Integrity Remains Intact:   Monitor for areas of redness and/or skin breakdown   Assess vascular access sites hourly   Every 4-6 hours minimum: Change oxygen saturation probe site   Every 4-6 hours: If on nasal continuous positive airway pressure, respiratory therapy assesses nares and determine need for appliance change or resting period    Problem: Musculoskeletal - Adult  Goal: Return mobility to safest level of function  7/14/2024 2019 by Addison Martinez RN  Outcome: Progressing  7/14/2024 2019 by Addison Martinez RN  Outcome: Progressing  Flowsheets (Taken 7/14/2024 2000)  Return Mobility to Safest Level of Function:   Assess patient stability and activity tolerance for standing, transferring and ambulating with or without assistive devices   Assist with transfers and ambulation using safe patient handling equipment as needed    Problem:  Respiratory - Adult  Goal: Achieves optimal ventilation and oxygenation  7/14/2024 2019 by Addison Martinez RN  Outcome: Progressing  7/14/2024 2019 by Addison Martinez RN  Outcome: Progressing  Flowsheets (Taken 7/14/2024 2000)  Achieves optimal ventilation and oxygenation:   Assess for changes in respiratory status   Assess for changes in mentation and behavior   Position to facilitate oxygenation and minimize respiratory effort   Oxygen supplementation based on oxygen saturation or arterial blood gases    Problem: Pain  Goal: Verbalizes/displays adequate comfort level or baseline comfort level  7/14/2024 2019 by Addison Martinez RN  Outcome: Progressing  7/14/2024 2019 by Addison Martinez RN  Outcome: Progressing  Flowsheets (Taken 7/14/2024 2000)  Verbalizes/displays adequate comfort level or baseline comfort level:   Encourage patient to monitor pain and request assistance   Assess pain using appropriate pain scale   Implement non-pharmacological measures as appropriate and evaluate response    Problem: Skin/Tissue Integrity  Goal: Absence of new skin breakdown  Description: 1.  Monitor for areas of redness and/or skin breakdown  2.  Assess vascular access sites hourly  3.  Every 4-6 hours minimum:  Change oxygen saturation probe site  4.  Every 4-6 hours:  If on nasal continuous positive airway pressure, respiratory therapy assess nares and determine need for appliance change or resting period.  7/14/2024 2019 by Addison Martinez RN  Outcome: Progressing  7/14/2024 2019 by Addison Martinez RN  Outcome: Progressing

## 2024-07-15 NOTE — PROGRESS NOTES
Pulmonary Progress Note  CC: Aspiration pneumonia    Subjective:  tolerating tube feeds  Hypoxia while sleeping    EXAM: /77   Pulse 76   Temp 98.5 °F (36.9 °C) (Oral)   Resp 22   Ht 1.778 m (5' 10\")   Wt 74.3 kg (163 lb 14.4 oz)   SpO2 97%   BMI 23.52 kg/m²  on RA  Constitutional:  No acute distress.   Eyes: PERRL. Conjunctivae anicteric.   ENT: Normal nose. Normal tongue.    Neck:  Trachea is midline.   Respiratory: No accessory muscle usage. No wheezes. No rales. +Rhonchi.  Cardiovascular: Normal S1S2. No digit clubbing. No digit cyanosis. No LE edema.   Psychiatric: No anxiety or Agitation. Alert and Oriented to person, place and time.    Scheduled Meds:   zolpidem  5 mg Oral Nightly    thiamine  100 mg Per G Tube Daily    multivitamin  1 tablet Per G Tube Daily    albuterol  2.5 mg Nebulization BID RT    vancomycin  1,250 mg IntraVENous Q12H    mupirocin   Each Nostril BID    aspirin EC  81 mg Oral Daily    [Held by provider] QUEtiapine  25 mg Oral Nightly    dilTIAZem  30 mg Oral 3 times per day    sodium chloride (Inhalant)  4 mL Nebulization Q12H    meropenem  1,000 mg IntraVENous Q8H    pantoprazole (PROTONIX) 40 mg in sodium chloride (PF) 0.9 % 10 mL injection  40 mg IntraVENous Q12H    apixaban  5 mg Oral BID    carbidopa-levodopa  1 tablet Oral TID    finasteride  5 mg Oral Daily    memantine  10 mg Oral BID    montelukast  10 mg Oral Nightly    atorvastatin  80 mg Oral Nightly    lacosamide  100 mg IntraVENous BID    levETIRAcetam  750 mg IntraVENous Q12H    sodium chloride flush  5-40 mL IntraVENous 2 times per day     Continuous Infusions:   sodium chloride Stopped (07/15/24 0443)     PRN Meds:  carboxymethylcellulose PF, albuterol, phenol, sodium chloride flush, sodium chloride, potassium chloride **OR** potassium alternative oral replacement **OR** potassium chloride, magnesium sulfate, ondansetron **OR** ondansetron, polyethylene glycol, acetaminophen **OR**

## 2024-07-15 NOTE — PROGRESS NOTES
Inpatient Physical Therapy Treatment    Unit: ICU  Date:  7/15/2024  Patient Name:    Rodolfo Luna  Admitting diagnosis:  Aspiration into respiratory tract, initial encounter [T17.908A]  Aspiration into airway, initial encounter [T17.908A]  Right leg weakness [R29.898]  Admit Date:  7/1/2024  Precautions/Restrictions/WB Status/ Lines/ Wounds/ Oxygen: Fall risk, Bed/chair alarm, Lines (IV, external catheter, and PEG tube), Telemetry, Continuous pulse oximetry, and Isolation Precautions: Contact      Pt seen for cotreatment this date due to patient safety, patient endurance, and complexity of condition    Treatment Time:  0946 - 1011  Treatment Number:  2   Timed Code Treatment Minutes: 25 minutes  Total Treatment Minutes:  25  minutes    Patient Stated Goals for Therapy: \" to go home \"          Discharge Recommendations: Home with 24hr assistance and Home PT  DME needs for discharge: Needs Met       Therapy recommendation for EMS Transport: can transport by wheelchair    Therapy recommendations for staff:   Assist of 2 for transfers with use of STORMY STEDY and gait belt to/from BSC  to/from chair    History of Present Illness:   Per admission H&P:  \"Rodolfo Luna is a 78 y.o. male with past medical history of Parkinson's disease with chronic aspiration on thickened liquids at baseline, seizure disorder, A-fib status post Watchman procedure, CAD status post PCI, hyperlipidemia and anxiety who presented with persistent cough related to oral intake,  He has a history of chronic aspiration and is on a thickened liquid diet, stating that he coughs up water when he eats.  No vomiting.  He also describes shortness of breath, orthopnea and some lightheadedness. No leg swelling, he denies a history of CHF     Upon arrival at the emergency department, his vital signs were within normal limits.  CBC and CMP were reassuring.  BNP was 2375.  Troponin 40, chronically elevated.  VBG showed compensated chronic respiratory  set, positioned in proper neutral alignment and pressure relief provided.   Call light provided and all needs within reach  RN aware of pt position/status    Other Activities  None.    Patient/Family Education   Pt educated on role of inpatient PT, POC, importance of continued activity, DC recommendations, safety awareness, HEP, and calling for assist with mobility.      Assessment  Pt seen today for physical therapy Treatment. Pt demonstrates limited ROM, decreased strength, impaired balance, and decreased activity tolerance, contributing to limitations during bed mobility, transfers, ambulation, and places the patient at an increased risk of falling. Today's treatment consisted of ambulation and therapeutic exercises in order to address the above impairments and functional limitations. Pt had desaturation of SpO2 at times through treatment, however able to improve with instruction of pursed lip breathing.    Recommending Home 24 hr assist and with home PT upon discharge as patient functioning below baseline level    Goals :   To be met in 3 visits:  1). Independent with LE Ex x 10 reps  2). Sit to/from stand: SBA  3). Bed to chair: SBA  4). CHF goal: N/A    To be met in 6 visits:  1).  Supine to/from sit: Supervision  2).  Sit to/from stand: Supervision  3).  Bed to chair: Supervision  4).  Gait: Ambulate 50 ft.  with CGA and use of gait belt and rolling walker (RW)  5).  Tolerate B LE exercises 3 sets of 10-15 reps    Rehabilitation Potential: Good  Strengths for achieving goals include:   Pt motivated, PLOF, Family Support, and Pt cooperative   Barriers to achieving goals include:    Complexity of condition, Chronicity of condition, and Weakness    Plan    To be seen 2-3 x/ week while in acute care setting for therapeutic exercises, bed mobility, transfers, progressive gait training, balance training, and family/patient education.    Signature: Tammi Velasquez, PT     If patient discharges from this facility  prior to next visit, this note will serve as the Discharge Summary.    CHF Education  N/A

## 2024-07-16 VITALS
HEART RATE: 81 BPM | TEMPERATURE: 97.3 F | SYSTOLIC BLOOD PRESSURE: 134 MMHG | WEIGHT: 163.9 LBS | RESPIRATION RATE: 27 BRPM | HEIGHT: 70 IN | OXYGEN SATURATION: 95 % | BODY MASS INDEX: 23.46 KG/M2 | DIASTOLIC BLOOD PRESSURE: 71 MMHG

## 2024-07-16 LAB
GLUCOSE BLD-MCNC: 108 MG/DL (ref 70–99)
GLUCOSE BLD-MCNC: 120 MG/DL (ref 70–99)
GLUCOSE BLD-MCNC: 94 MG/DL (ref 70–99)
MAGNESIUM SERPL-MCNC: 1.9 MG/DL (ref 1.8–2.4)
PERFORMED ON: ABNORMAL
PERFORMED ON: ABNORMAL
PERFORMED ON: NORMAL
PHOSPHATE SERPL-MCNC: 2.6 MG/DL (ref 2.5–4.9)

## 2024-07-16 PROCEDURE — 84100 ASSAY OF PHOSPHORUS: CPT

## 2024-07-16 PROCEDURE — 6370000000 HC RX 637 (ALT 250 FOR IP): Performed by: INTERNAL MEDICINE

## 2024-07-16 PROCEDURE — 6360000002 HC RX W HCPCS: Performed by: INTERNAL MEDICINE

## 2024-07-16 PROCEDURE — 2580000003 HC RX 258: Performed by: INTERNAL MEDICINE

## 2024-07-16 PROCEDURE — 99233 SBSQ HOSP IP/OBS HIGH 50: CPT | Performed by: INTERNAL MEDICINE

## 2024-07-16 PROCEDURE — 99239 HOSP IP/OBS DSCHRG MGMT >30: CPT | Performed by: INTERNAL MEDICINE

## 2024-07-16 PROCEDURE — 36415 COLL VENOUS BLD VENIPUNCTURE: CPT

## 2024-07-16 PROCEDURE — 83735 ASSAY OF MAGNESIUM: CPT

## 2024-07-16 PROCEDURE — 94640 AIRWAY INHALATION TREATMENT: CPT

## 2024-07-16 PROCEDURE — 6370000000 HC RX 637 (ALT 250 FOR IP): Performed by: STUDENT IN AN ORGANIZED HEALTH CARE EDUCATION/TRAINING PROGRAM

## 2024-07-16 PROCEDURE — C9254 INJECTION, LACOSAMIDE: HCPCS | Performed by: INTERNAL MEDICINE

## 2024-07-16 PROCEDURE — 94669 MECHANICAL CHEST WALL OSCILL: CPT

## 2024-07-16 PROCEDURE — 94761 N-INVAS EAR/PLS OXIMETRY MLT: CPT

## 2024-07-16 RX ORDER — HYDROXYZINE HYDROCHLORIDE 25 MG/1
25 TABLET, FILM COATED ORAL ONCE
Status: COMPLETED | OUTPATIENT
Start: 2024-07-16 | End: 2024-07-16

## 2024-07-16 RX ADMIN — ALBUTEROL SULFATE 2.5 MG: 2.5 SOLUTION RESPIRATORY (INHALATION) at 07:32

## 2024-07-16 RX ADMIN — ASPIRIN 81 MG: 81 TABLET, COATED ORAL at 08:37

## 2024-07-16 RX ADMIN — DILTIAZEM HYDROCHLORIDE 30 MG: 60 TABLET ORAL at 14:55

## 2024-07-16 RX ADMIN — LORAZEPAM 1 MG: 1 TABLET ORAL at 05:49

## 2024-07-16 RX ADMIN — LACOSAMIDE 100 MG: 10 INJECTION INTRAVENOUS at 08:35

## 2024-07-16 RX ADMIN — HYDROXYZINE HYDROCHLORIDE 25 MG: 25 TABLET ORAL at 00:11

## 2024-07-16 RX ADMIN — I-VITE, TAB 1000-60-2MG (60/BT) 1 TABLET: TAB at 08:37

## 2024-07-16 RX ADMIN — CARBIDOPA AND LEVODOPA 1 TABLET: 25; 100 TABLET ORAL at 14:55

## 2024-07-16 RX ADMIN — APIXABAN 5 MG: 5 TABLET, FILM COATED ORAL at 08:37

## 2024-07-16 RX ADMIN — FINASTERIDE 5 MG: 5 TABLET, FILM COATED ORAL at 08:37

## 2024-07-16 RX ADMIN — CARBIDOPA AND LEVODOPA 1 TABLET: 25; 100 TABLET ORAL at 08:37

## 2024-07-16 RX ADMIN — VANCOMYCIN HYDROCHLORIDE 1250 MG: 10 INJECTION, POWDER, LYOPHILIZED, FOR SOLUTION INTRAVENOUS at 03:33

## 2024-07-16 RX ADMIN — VANCOMYCIN HYDROCHLORIDE 1250 MG: 10 INJECTION, POWDER, LYOPHILIZED, FOR SOLUTION INTRAVENOUS at 14:15

## 2024-07-16 RX ADMIN — LEVETIRACETAM 750 MG: 100 INJECTION INTRAVENOUS at 08:34

## 2024-07-16 RX ADMIN — Medication 4 ML: at 07:33

## 2024-07-16 RX ADMIN — Medication 40 MG: at 08:37

## 2024-07-16 RX ADMIN — DILTIAZEM HYDROCHLORIDE 30 MG: 60 TABLET ORAL at 05:49

## 2024-07-16 RX ADMIN — Medication 100 MG: at 08:37

## 2024-07-16 RX ADMIN — MEMANTINE HYDROCHLORIDE 10 MG: 5 TABLET ORAL at 08:37

## 2024-07-16 RX ADMIN — MEROPENEM 1000 MG: 1 INJECTION INTRAVENOUS at 00:15

## 2024-07-16 NOTE — PROGRESS NOTES
Shift assessment was completed (see flow sheet). Pt is A/O, denies any pain or other needs at this time.   Pt provided oral care, and encouraged to cough and suction with yanker  Respirations are even, unlabored, with diminished/ rhonchi sounds. On 2L O2 via HFNC.  Scheduled medications to follow- crushed via PEG.     Call light within reach. Bed in lowest position. Bed alarm on.     Patient is able to demonstrate the ability to move from a reclining position to an upright position within the recliner.

## 2024-07-16 NOTE — CARE COORDINATION
Spoke with [employer] to obtain an update on g-tube feed set up.  Benefits were approved at 4:00 PM on 7/16/24. Patient is 100 percent covered.     [facility] will contact the spouse to set up a time to provide education to the spouse.     [facility] met with the patient and spouse in the room to educate the spouse.     ELMER spoke with [facility] who will request someone from [facility] meet with the family tomorrow to assist the spouse with setting up the tube feeds.

## 2024-07-16 NOTE — CARE COORDINATION
DISCHARGE ORDER  Date/Time 2024 5:45 PM  Completed by: Teresa Boeck, RN, Case Management    Patient Name: Rodolfo Luna      : 1945  Admitting Diagnosis: Aspiration into respiratory tract, initial encounter [T17.908A]  Aspiration into airway, initial encounter [T17.908A]  Right leg weakness [R29.898]      Admit order Date and Status: 2024  (verify MD's last order for status of admission)      Noted discharge order.   If applicable PT/OT recommendation at Discharge: Home with OT/PT and  assist.   DME recommendation by PT/OT:  Confirmed discharge plan: Yes  with whom patient and spouse  If pt confirmed DC plan does family need to be contacted by CM No if yes who______  Discharge Plan: The patient is discharging to home today with spouse. Will transport via [facility].     Date of Last IMM Given: 24    Reviewed chart.  Role of discharge planner explained and patient verbalized understanding. Discharge order is noted.    Has Home O2 in place on admit:  No  Informed of need to bring portable home O2 tank on day of discharge for nursing to connect prior to leaving:   Patient is a new oxygen set up with [employer].   Verbalized agreement/Understanding:   Not Indicated  Pt is being d/c'd to home today. Pt's O2 sats are 96% on 2L.    Discharge timeout done with Kim MENDEZ. All discharge needs and concerns addressed.

## 2024-07-16 NOTE — DISCHARGE INSTRUCTIONS
Jevity 1.5, Tube Feed 200mL to be given every 4 hours via PEG tube syringe, and Water flushes, 120 ml every 4 hours for tube patency and hydration.

## 2024-07-16 NOTE — PROGRESS NOTES
Vancomycin Day: 6/7  Current Regimen: 1250 mg IV every 12 hours    Patient's labs, cultures, vitals, and vancomycin regimen reviewed.   SCr stable  U/O not measured/well documented  [facility] updated    Plan:   No change today  Maida Arita Pharm D 7/16/202410:51 AM  .

## 2024-07-16 NOTE — CARE COORDINATION
CM did not deliver 2nd IMM within 4 hours for DC, verbal explanation of patient rights at bedside. Pt voiced understanding of discharge MCR rights and is agreeable to discharge.

## 2024-07-16 NOTE — PROGRESS NOTES
D/C instructions reviewed with patient and spouse at bedside, Education provided extensively on PEG tube care- Flush- Feed.  Care plan and patient education complete. IV removed. Pt spouse has copy of D/C instructions.   All documented personal belongings collected and leaving with patients spouse. Pt leaving floor via stretcher with transportation.

## 2024-07-16 NOTE — PROGRESS NOTES
Pulmonary Progress Note  CC: Aspiration pneumonia    Subjective:  tolerating tube feeds  Hypoxia while sleeping    EXAM: /66   Pulse 77   Temp 98.4 °F (36.9 °C) (Oral)   Resp 25   Ht 1.778 m (5' 10\")   Wt 74.3 kg (163 lb 14.4 oz)   SpO2 92%   BMI 23.52 kg/m²  on 2L  Constitutional:  No acute distress.   Eyes: PERRL. Conjunctivae anicteric.   ENT: Normal nose. Normal tongue.    Neck:  Trachea is midline.   Respiratory: No accessory muscle usage. No wheezes. No rales. +Rhonchi.  Cardiovascular: Normal S1S2. No digit clubbing. No digit cyanosis. No LE edema.   Psychiatric: No anxiety or Agitation. Alert and Oriented to person, place and time.    Scheduled Meds:   zolpidem  5 mg Oral Nightly    thiamine  100 mg Per G Tube Daily    multivitamin  1 tablet Per G Tube Daily    albuterol  2.5 mg Nebulization BID RT    vancomycin  1,250 mg IntraVENous Q12H    aspirin EC  81 mg Oral Daily    [Held by provider] QUEtiapine  25 mg Oral Nightly    dilTIAZem  30 mg Oral 3 times per day    sodium chloride (Inhalant)  4 mL Nebulization Q12H    pantoprazole (PROTONIX) 40 mg in sodium chloride (PF) 0.9 % 10 mL injection  40 mg IntraVENous Q12H    apixaban  5 mg Oral BID    carbidopa-levodopa  1 tablet Oral TID    finasteride  5 mg Oral Daily    memantine  10 mg Oral BID    montelukast  10 mg Oral Nightly    atorvastatin  80 mg Oral Nightly    lacosamide  100 mg IntraVENous BID    levETIRAcetam  750 mg IntraVENous Q12H    sodium chloride flush  5-40 mL IntraVENous 2 times per day     Continuous Infusions:   sodium chloride Stopped (07/15/24 7923)     PRN Meds:  LORazepam, carboxymethylcellulose PF, albuterol, phenol, sodium chloride flush, sodium chloride, potassium chloride **OR** potassium alternative oral replacement **OR** potassium chloride, magnesium sulfate, ondansetron **OR** ondansetron, polyethylene glycol, acetaminophen **OR** acetaminophen    Labs:  CBC:   Recent Labs     07/14/24  0430 07/15/24  0422   WBC 9.0

## 2024-07-16 NOTE — PROGRESS NOTES
Reassessment completed (see Flowsheet).     Education provided to spouse on Peg tube- cleaning, and flushing.     Awaiting [employer] to provide education for home use.     Plan for patient to D/C home today.

## 2024-07-16 NOTE — PROGRESS NOTES
Assessment/Plan:    Aspiration pneumonia   Hx of Dysphagia with presbyesophagus   Acute hypoxic resp failure   COPD no exacerbation   - hx of chronic aspiration on thickened liquid diet  - CT chest as above with no clear Airspace disease, has chronic right pleural effusion and resolved previous thrombus   - SLP eval and treat -> rec pt stay NPO  - MBS with severe pharyngeal dysphagia with aspiration of all liquid consistencies. Significant cricopharyngeal dysfunction with inability to pass puree bolus.   - was given Unasyn once in ED, defer further abx to pulm.  - PPI daily initiated     - pulmonology consulted, okay to observe off abx  - GI consulted-> S/P dilation w/opening of UES 7/5/2024   -repeat SLp eval showed  profound oropharyngeal dysphagia  - Rapid response  called 7/8 for severe hypoxia after MBSS, placed on 8L and transferred to PCU. Repeat imaging with no pna but highly suspected   - started on Levaquin, Merrem with high suspicion and worsening hypoxia , vanc added   - planned for bronch with BAL but family refused, pt improved and now on RA --> 2 L   -PEG placed   -tolerating tube feeds   Now on 2 L.  Need for home O2 discussed with patient     Hx of PE  - CT chest with resolution of previous  PE  - AC on Eliquis     Atrial fibrillation/flutter s/p watchman and RFCA 2014  - diltiazem gtt started 7/8 for afib RVR and now weaned off   - can resume cardizem via NG      HTN  Stable      CAD s/p PCI  - on asa and statin      Focal seizure disorder  - IV vimpat and keppra until oral medication can be resumed     Hx of CVA   - on asa and statin     Parkinson's disease  - on sinemet and namenda      Hx of lung cancer s/p thoracotomy and VATS  - chronic pleural effusion stable      Porphyria cutanea tarda       Anxiety  - prn ativan     Hold seroquel for ongoing confusion     DVT Prophylaxis: holding Eliquis   Diet: Diet NPO  ADULT TUBE FEEDING; PEG; Standard with Fiber; Continuous; 30; Yes; 10; Q 12  hours; 65; 120; Q 4 hours  Code Status: Full Code    Dc [place]     PADMINI PARTIDA MD  07/16/24

## 2024-07-17 ENCOUNTER — CARE COORDINATION (OUTPATIENT)
Dept: CASE MANAGEMENT | Age: 79
End: 2024-07-17

## 2024-07-17 DIAGNOSIS — T17.908A ASPIRATION INTO AIRWAY, INITIAL ENCOUNTER: Primary | ICD-10-CM

## 2024-07-17 PROCEDURE — 1111F DSCHRG MED/CURRENT MED MERGE: CPT

## 2024-07-17 NOTE — CARE COORDINATION
Patient Current Location:  Home: [address] [place] [address]    Care Transition Nurse contacted the spouse/partner  by telephone to perform post hospital discharge assessment, verified name and  as identifiers. Provided introduction to self, and explanation of the Care Transition Nurse role.     Patient: Rodolfo Luna    Patient : 1945   MRN: [id number]    Reason for Admission:  Aspiration into respiratory tract, initial encounter [T17.908A]  Aspiration into airway, initial encounter [T178A]  Right leg weakness [R29.898]     Discharge Date: 24  RURS: Readmission Risk Score: 30.7      Last Discharge Facility       Date Complaint Diagnosis Description Type Department Provider    24 Shortness of Breath Aspiration into airway, initial encounter ... ED to Hosp-Admission (Discharged) (ADMITTED) [facility] Franca Huitron DO; Nannette Gonzalez ...            Was this an external facility discharge? No    Additional needs identified to be addressed with provider   No needs identified             Method of communication with provider: phone.    Patients top risk factors for readmission: caregiver stress and feeding tube.    Interventions to address risk factors:   Spouse states home care agency started today, and has arranged for new feeding tube pump to be provided today as it is not working.    Care Summary Note: Spoke with spouse, States patient is doing well. States had difficulty with feeding tube pump last evening and \"'s wife\" who is a nurse came and help feed patient through bolus. Home care nurse from [facility] care came early this morning and made sure patient was bolus fed this morning, and new pump to arrive today. States therapy is to start today. States dressing dry and intact around feeding tube, and spouse states what to watch for of s/sx of infection and when to call Home care nurse for complications. States patient has all meds and tolerating well. States wearing oxygen at

## 2024-07-18 ENCOUNTER — APPOINTMENT (OUTPATIENT)
Dept: GENERAL RADIOLOGY | Age: 79
End: 2024-07-18
Payer: OTHER GOVERNMENT

## 2024-07-18 ENCOUNTER — HOSPITAL ENCOUNTER (INPATIENT)
Age: 79
LOS: 4 days | Discharge: NURSING, REHAB OR LONG-TERM CARE | End: 2024-07-22
Attending: EMERGENCY MEDICINE | Admitting: INTERNAL MEDICINE
Payer: OTHER GOVERNMENT

## 2024-07-18 ENCOUNTER — APPOINTMENT (OUTPATIENT)
Dept: ULTRASOUND IMAGING | Age: 79
End: 2024-07-18
Payer: OTHER GOVERNMENT

## 2024-07-18 DIAGNOSIS — R60.0 BILATERAL LEG EDEMA: Primary | ICD-10-CM

## 2024-07-18 DIAGNOSIS — R06.02 SHORTNESS OF BREATH: ICD-10-CM

## 2024-07-18 DIAGNOSIS — N50.89 TESTICULAR NODULE: ICD-10-CM

## 2024-07-18 DIAGNOSIS — N43.3 HYDROCELE, UNSPECIFIED HYDROCELE TYPE: ICD-10-CM

## 2024-07-18 DIAGNOSIS — R79.89 ELEVATED BRAIN NATRIURETIC PEPTIDE (BNP) LEVEL: ICD-10-CM

## 2024-07-18 LAB
ALBUMIN SERPL-MCNC: 2.7 G/DL (ref 3.4–5)
ALBUMIN/GLOB SERPL: 0.7 {RATIO} (ref 1.1–2.2)
ALP SERPL-CCNC: 109 U/L (ref 40–129)
ALT SERPL-CCNC: 20 U/L (ref 10–40)
ANION GAP SERPL CALCULATED.3IONS-SCNC: 8 MMOL/L (ref 3–16)
AST SERPL-CCNC: 69 U/L (ref 15–37)
BASOPHILS # BLD: 0 K/UL (ref 0–0.2)
BASOPHILS NFR BLD: 0.4 %
BILIRUB SERPL-MCNC: 1.1 MG/DL (ref 0–1)
BILIRUB UR QL STRIP.AUTO: NEGATIVE
BUN SERPL-MCNC: 16 MG/DL (ref 7–20)
CALCIUM SERPL-MCNC: 8.1 MG/DL (ref 8.3–10.6)
CHLORIDE SERPL-SCNC: 104 MMOL/L (ref 99–110)
CLARITY UR: CLEAR
CO2 SERPL-SCNC: 28 MMOL/L (ref 21–32)
COLOR UR: YELLOW
CREAT SERPL-MCNC: <0.5 MG/DL (ref 0.8–1.3)
DEPRECATED RDW RBC AUTO: 17.8 % (ref 12.4–15.4)
EKG DIAGNOSIS: NORMAL
EKG Q-T INTERVAL: 314 MS
EKG QRS DURATION: 76 MS
EKG QTC CALCULATION (BAZETT): 398 MS
EKG R AXIS: -27 DEGREES
EKG T AXIS: 27 DEGREES
EKG VENTRICULAR RATE: 97 BPM
EOSINOPHIL # BLD: 0.1 K/UL (ref 0–0.6)
EOSINOPHIL NFR BLD: 0.9 %
FLUAV RNA RESP QL NAA+PROBE: NOT DETECTED
FLUBV RNA RESP QL NAA+PROBE: NOT DETECTED
GFR SERPLBLD CREATININE-BSD FMLA CKD-EPI: >90 ML/MIN/{1.73_M2}
GLUCOSE BLD-MCNC: 80 MG/DL (ref 70–99)
GLUCOSE SERPL-MCNC: 107 MG/DL (ref 70–99)
GLUCOSE UR STRIP.AUTO-MCNC: NEGATIVE MG/DL
HCT VFR BLD AUTO: 34.6 % (ref 40.5–52.5)
HGB BLD-MCNC: 11.2 G/DL (ref 13.5–17.5)
HGB UR QL STRIP.AUTO: NEGATIVE
KETONES UR STRIP.AUTO-MCNC: NEGATIVE MG/DL
LEUKOCYTE ESTERASE UR QL STRIP.AUTO: NEGATIVE
LIPASE SERPL-CCNC: 24 U/L (ref 13–60)
LYMPHOCYTES # BLD: 1.2 K/UL (ref 1–5.1)
LYMPHOCYTES NFR BLD: 10.7 %
MCH RBC QN AUTO: 30.3 PG (ref 26–34)
MCHC RBC AUTO-ENTMCNC: 32.5 G/DL (ref 31–36)
MCV RBC AUTO: 93.2 FL (ref 80–100)
MONOCYTES # BLD: 0.7 K/UL (ref 0–1.3)
MONOCYTES NFR BLD: 6.4 %
NEUTROPHILS # BLD: 9.3 K/UL (ref 1.7–7.7)
NEUTROPHILS NFR BLD: 81.6 %
NITRITE UR QL STRIP.AUTO: NEGATIVE
NT-PROBNP SERPL-MCNC: 3911 PG/ML (ref 0–449)
PERFORMED ON: NORMAL
PH UR STRIP.AUTO: 8.5 [PH] (ref 5–8)
PLATELET # BLD AUTO: 276 K/UL (ref 135–450)
PMV BLD AUTO: 8.1 FL (ref 5–10.5)
POTASSIUM SERPL-SCNC: 4.4 MMOL/L (ref 3.5–5.1)
PROT SERPL-MCNC: 6.4 G/DL (ref 6.4–8.2)
PROT UR STRIP.AUTO-MCNC: NEGATIVE MG/DL
RBC # BLD AUTO: 3.71 M/UL (ref 4.2–5.9)
S PYO AG THROAT QL: NEGATIVE
SARS-COV-2 RNA RESP QL NAA+PROBE: NOT DETECTED
SODIUM SERPL-SCNC: 140 MMOL/L (ref 136–145)
SP GR UR STRIP.AUTO: 1.01 (ref 1–1.03)
TROPONIN, HIGH SENSITIVITY: 31 NG/L (ref 0–22)
TROPONIN, HIGH SENSITIVITY: 35 NG/L (ref 0–22)
UA COMPLETE W REFLEX CULTURE PNL UR: ABNORMAL
UA DIPSTICK W REFLEX MICRO PNL UR: ABNORMAL
URN SPEC COLLECT METH UR: ABNORMAL
UROBILINOGEN UR STRIP-ACNC: 0.2 E.U./DL
WBC # BLD AUTO: 11.4 K/UL (ref 4–11)

## 2024-07-18 PROCEDURE — 71046 X-RAY EXAM CHEST 2 VIEWS: CPT

## 2024-07-18 PROCEDURE — 2060000000 HC ICU INTERMEDIATE R&B

## 2024-07-18 PROCEDURE — 84484 ASSAY OF TROPONIN QUANT: CPT

## 2024-07-18 PROCEDURE — 83690 ASSAY OF LIPASE: CPT

## 2024-07-18 PROCEDURE — 93010 ELECTROCARDIOGRAM REPORT: CPT | Performed by: INTERNAL MEDICINE

## 2024-07-18 PROCEDURE — 76870 US EXAM SCROTUM: CPT

## 2024-07-18 PROCEDURE — 87081 CULTURE SCREEN ONLY: CPT

## 2024-07-18 PROCEDURE — 87880 STREP A ASSAY W/OPTIC: CPT

## 2024-07-18 PROCEDURE — 83880 ASSAY OF NATRIURETIC PEPTIDE: CPT

## 2024-07-18 PROCEDURE — 36415 COLL VENOUS BLD VENIPUNCTURE: CPT

## 2024-07-18 PROCEDURE — 87636 SARSCOV2 & INF A&B AMP PRB: CPT

## 2024-07-18 PROCEDURE — 6360000002 HC RX W HCPCS

## 2024-07-18 PROCEDURE — C9254 INJECTION, LACOSAMIDE: HCPCS

## 2024-07-18 PROCEDURE — 85025 COMPLETE CBC W/AUTO DIFF WBC: CPT

## 2024-07-18 PROCEDURE — 80053 COMPREHEN METABOLIC PANEL: CPT

## 2024-07-18 PROCEDURE — 2580000003 HC RX 258

## 2024-07-18 PROCEDURE — 99285 EMERGENCY DEPT VISIT HI MDM: CPT

## 2024-07-18 PROCEDURE — 81003 URINALYSIS AUTO W/O SCOPE: CPT

## 2024-07-18 PROCEDURE — 93005 ELECTROCARDIOGRAM TRACING: CPT | Performed by: REGISTERED NURSE

## 2024-07-18 PROCEDURE — 6370000000 HC RX 637 (ALT 250 FOR IP)

## 2024-07-18 PROCEDURE — 99223 1ST HOSP IP/OBS HIGH 75: CPT

## 2024-07-18 RX ORDER — ACETAMINOPHEN 650 MG/1
650 SUPPOSITORY RECTAL EVERY 6 HOURS PRN
Status: DISCONTINUED | OUTPATIENT
Start: 2024-07-18 | End: 2024-07-22 | Stop reason: HOSPADM

## 2024-07-18 RX ORDER — ONDANSETRON 4 MG/1
4 TABLET, ORALLY DISINTEGRATING ORAL EVERY 8 HOURS PRN
Status: DISCONTINUED | OUTPATIENT
Start: 2024-07-18 | End: 2024-07-22 | Stop reason: HOSPADM

## 2024-07-18 RX ORDER — ASPIRIN 81 MG/1
81 TABLET, CHEWABLE ORAL DAILY
Status: DISCONTINUED | OUTPATIENT
Start: 2024-07-19 | End: 2024-07-22 | Stop reason: HOSPADM

## 2024-07-18 RX ORDER — LACOSAMIDE 10 MG/ML
100 INJECTION, SOLUTION INTRAVENOUS 2 TIMES DAILY
Status: DISCONTINUED | OUTPATIENT
Start: 2024-07-18 | End: 2024-07-22 | Stop reason: HOSPADM

## 2024-07-18 RX ORDER — FUROSEMIDE 10 MG/ML
40 INJECTION INTRAMUSCULAR; INTRAVENOUS DAILY
Status: DISCONTINUED | OUTPATIENT
Start: 2024-07-18 | End: 2024-07-22

## 2024-07-18 RX ORDER — POLYETHYLENE GLYCOL 3350 17 G/17G
17 POWDER, FOR SOLUTION ORAL DAILY PRN
Status: DISCONTINUED | OUTPATIENT
Start: 2024-07-18 | End: 2024-07-22 | Stop reason: HOSPADM

## 2024-07-18 RX ORDER — MONTELUKAST SODIUM 10 MG/1
10 TABLET ORAL DAILY
Status: DISCONTINUED | OUTPATIENT
Start: 2024-07-19 | End: 2024-07-22 | Stop reason: HOSPADM

## 2024-07-18 RX ORDER — POTASSIUM CHLORIDE 20 MEQ/1
40 TABLET, EXTENDED RELEASE ORAL PRN
Status: DISCONTINUED | OUTPATIENT
Start: 2024-07-18 | End: 2024-07-22 | Stop reason: HOSPADM

## 2024-07-18 RX ORDER — SODIUM CHLORIDE 9 MG/ML
INJECTION, SOLUTION INTRAVENOUS PRN
Status: DISCONTINUED | OUTPATIENT
Start: 2024-07-18 | End: 2024-07-22 | Stop reason: HOSPADM

## 2024-07-18 RX ORDER — FINASTERIDE 5 MG/1
5 TABLET, FILM COATED ORAL DAILY
Status: DISCONTINUED | OUTPATIENT
Start: 2024-07-19 | End: 2024-07-22 | Stop reason: HOSPADM

## 2024-07-18 RX ORDER — LANOLIN ALCOHOL/MO/W.PET/CERES
3 CREAM (GRAM) TOPICAL NIGHTLY
Status: DISCONTINUED | OUTPATIENT
Start: 2024-07-18 | End: 2024-07-22 | Stop reason: HOSPADM

## 2024-07-18 RX ORDER — PANTOPRAZOLE SODIUM 20 MG/1
20 TABLET, DELAYED RELEASE ORAL DAILY
Status: DISCONTINUED | OUTPATIENT
Start: 2024-07-19 | End: 2024-07-19

## 2024-07-18 RX ORDER — SODIUM CHLORIDE 0.9 % (FLUSH) 0.9 %
10 SYRINGE (ML) INJECTION PRN
Status: DISCONTINUED | OUTPATIENT
Start: 2024-07-18 | End: 2024-07-22 | Stop reason: HOSPADM

## 2024-07-18 RX ORDER — MAGNESIUM SULFATE 1 G/100ML
1000 INJECTION INTRAVENOUS PRN
Status: DISCONTINUED | OUTPATIENT
Start: 2024-07-18 | End: 2024-07-22 | Stop reason: HOSPADM

## 2024-07-18 RX ORDER — FERROUS SULFATE 300 MG/5ML
300 LIQUID (ML) ORAL 2 TIMES DAILY WITH MEALS
Status: DISCONTINUED | OUTPATIENT
Start: 2024-07-19 | End: 2024-07-22 | Stop reason: HOSPADM

## 2024-07-18 RX ORDER — ARIPIPRAZOLE 10 MG/1
5 TABLET ORAL DAILY
Status: DISCONTINUED | OUTPATIENT
Start: 2024-07-19 | End: 2024-07-22 | Stop reason: HOSPADM

## 2024-07-18 RX ORDER — SODIUM CHLORIDE 0.9 % (FLUSH) 0.9 %
5-40 SYRINGE (ML) INJECTION EVERY 12 HOURS SCHEDULED
Status: DISCONTINUED | OUTPATIENT
Start: 2024-07-18 | End: 2024-07-22 | Stop reason: HOSPADM

## 2024-07-18 RX ORDER — ACETAMINOPHEN 325 MG/1
650 TABLET ORAL EVERY 6 HOURS PRN
Status: DISCONTINUED | OUTPATIENT
Start: 2024-07-18 | End: 2024-07-22 | Stop reason: HOSPADM

## 2024-07-18 RX ORDER — ESCITALOPRAM OXALATE 10 MG/1
10 TABLET ORAL DAILY
Status: DISCONTINUED | OUTPATIENT
Start: 2024-07-19 | End: 2024-07-22 | Stop reason: HOSPADM

## 2024-07-18 RX ORDER — ONDANSETRON 2 MG/ML
4 INJECTION INTRAMUSCULAR; INTRAVENOUS EVERY 6 HOURS PRN
Status: DISCONTINUED | OUTPATIENT
Start: 2024-07-18 | End: 2024-07-22 | Stop reason: HOSPADM

## 2024-07-18 RX ORDER — MEMANTINE HYDROCHLORIDE 5 MG/1
10 TABLET ORAL 2 TIMES DAILY
Status: DISCONTINUED | OUTPATIENT
Start: 2024-07-18 | End: 2024-07-22 | Stop reason: HOSPADM

## 2024-07-18 RX ORDER — POTASSIUM CHLORIDE 7.45 MG/ML
10 INJECTION INTRAVENOUS PRN
Status: DISCONTINUED | OUTPATIENT
Start: 2024-07-18 | End: 2024-07-22 | Stop reason: HOSPADM

## 2024-07-18 RX ORDER — CETIRIZINE HYDROCHLORIDE 10 MG/1
5 TABLET ORAL DAILY
Status: DISCONTINUED | OUTPATIENT
Start: 2024-07-19 | End: 2024-07-19

## 2024-07-18 RX ADMIN — Medication 3 MG: at 23:06

## 2024-07-18 RX ADMIN — FUROSEMIDE 40 MG: 10 INJECTION, SOLUTION INTRAMUSCULAR; INTRAVENOUS at 20:01

## 2024-07-18 RX ADMIN — MEMANTINE HYDROCHLORIDE 10 MG: 5 TABLET ORAL at 23:06

## 2024-07-18 RX ADMIN — APIXABAN 5 MG: 5 TABLET, FILM COATED ORAL at 23:06

## 2024-07-18 RX ADMIN — LACOSAMIDE 100 MG: 10 INJECTION INTRAVENOUS at 23:06

## 2024-07-18 RX ADMIN — LEVETIRACETAM 750 MG: 250 TABLET, FILM COATED ORAL at 23:05

## 2024-07-18 RX ADMIN — CARBIDOPA AND LEVODOPA 1 TABLET: 25; 100 TABLET ORAL at 23:06

## 2024-07-18 RX ADMIN — SODIUM CHLORIDE, PRESERVATIVE FREE 10 ML: 5 INJECTION INTRAVENOUS at 20:02

## 2024-07-18 ASSESSMENT — PAIN - FUNCTIONAL ASSESSMENT: PAIN_FUNCTIONAL_ASSESSMENT: 0-10

## 2024-07-18 ASSESSMENT — PAIN SCALES - GENERAL
PAINLEVEL_OUTOF10: 0
PAINLEVEL_OUTOF10: 0
PAINLEVEL_OUTOF10: 8

## 2024-07-18 ASSESSMENT — PAIN SCALES - WONG BAKER
WONGBAKER_NUMERICALRESPONSE: NO HURT
WONGBAKER_NUMERICALRESPONSE: NO HURT

## 2024-07-18 NOTE — FLOWSHEET NOTE
07/18/24 1845   Vital Signs   Temp 98.1 °F (36.7 °C)   Temp Source Oral   Pulse (!) 110   Heart Rate Source Monitor   Respirations 18   BP (!) 140/76   MAP (Calculated) 97   BP Location Right upper arm   BP Method Automatic   Patient Position High fowlers   Pain Assessment   Pain Assessment None - Denies Pain   Oxygen Therapy   SpO2 94 %   O2 Device None (Room air)     Vitals completed in ER while receiving bedside report. Patient does not appear to be in distress. Patient to be admitted to room 313.     Beatriz Hammer RN

## 2024-07-18 NOTE — ED PROVIDER NOTES
Emergency Department Physician who either signs orCo-signs this chart in the absence of a cardiologist.  Please see their note for interpretation of EKG.      RADIOLOGY:   Non-plain film images such as CT, Ultrasound and MRI are read by the radiologist. Plain radiographic images are visualized andpreliminarily interpreted by the  ED Provider with the below findings:    Interpretation perthe Radiologist below, if available at the time of this note:    XR CHEST (2 VW)   Final Result   Significant improvement in the right lung infiltration.         US SCROTUM W COMPLETE DUPLEX   Final Result   1. Unremarkable sonographic appearance of the bilateral testicles, without   evidence of torsion or mass.   2. Unremarkable sonographic appearance of the bilateral epididymides.   3. Mild to moderate mildly complicated left hydrocele, demonstrating internal   floating debris.   4. Nonspecific 9 mm solid hypoechoic nodular focus adherent to the lower left   scrotal wall.  Most likely differentials include a small blood clot, sperm   granuloma, or adenomatoid tumor.           No results found.       PROCEDURES   Unless otherwise noted below, none     Procedures    CRITICAL CARE TIME   N/A    CONSULTS:  None      EMERGENCY DEPARTMENT COURSE and DIFFERENTIALDIAGNOSIS/MDM:   Vitals:    Vitals:    07/18/24 1330 07/18/24 1410 07/18/24 1431 07/18/24 1532   BP: 121/77 122/79 120/62 129/83   Pulse: (!) 108 (!) 109 92 98   Resp: 24 15 17 15   Temp:       SpO2: 97% 96% 95% 95%       Patient was given thefollowing medications:  Medications - No data to display    PDMP Monitoring:    Last PDMP Trace as Reviewed ([place]):  Review User Review Instant Review Result            Urine Drug Screenings (1 yr)       Drug screen multi urine  Collected: 5/16/2024 10:00 AM (Final result)              Drug screen multi urine  Collected: 4/9/2012 12:00 PM (Final result)   Narrative:    This method is a screening test to detect only these drug classes as  part of  leg edema    2. Shortness of breath    3. Elevated brain natriuretic peptide (BNP) level    4. Testicular nodule    5. Hydrocele, unspecified hydrocele type          DISPOSITION/PLAN   DISPOSITION Admitted 07/18/2024 04:25:04 PM      PATIENT REFERREDTO:  No follow-up provider specified.    DISCHARGE MEDICATIONS:  New Prescriptions    No medications on file       DISCONTINUED MEDICATIONS:  Discontinued Medications    No medications on file              (Please note that portions ofthis note were completed with a voice recognition program.  Efforts were made to edit the dictations but occasionally words are mis-transcribed.)    KATIE Lagunas - CNP (electronically signed)       Tiffanie Weiss, KATIE - GEOVANNA  07/18/24 0916

## 2024-07-18 NOTE — ED PROVIDER NOTES
I personally saw the patient and made/approved the management plan and take responsibility for the patient management.    History: 78 y.o. M with no prior diagnosis of heart failure with recent G-tube placement who presents due to significant edema bilaterally.    Exam: Chronically ill-appearing  male.  4+ pitting edema equal bilaterally as well as pitting edema in abdomen and bilateral upper extremities.  Patient awake and alert.  Regular rhythm with rate in 90s.  Intact distal pulses.    MDM: Laboratory evaluation is concerning for significant elevated BNP which appears to be new in the patient, mildly elevated troponin.  EKG does not show any signs of acute emergent ischemia.  Suspect likely new onset heart failure.  Patient admitted for diuresis and echocardiogram.  Patient expresses understanding and agreement with this plan is admitted for further care.    The Ekg interpreted by me shows  atrial fibrillation with a rate of 97  Axis is   Normal  QTc is   398ms  Intervals and Durations are unremarkable.      ST Segments: nonspecific changes  Voltage decreased from previous EKG on 7/8/2024    For further details of this patient's emergency department encounter, please see the advanced practice provider's documentation.    FINAL IMPRESSION      1. Bilateral leg edema    2. Shortness of breath    3. Elevated brain natriuretic peptide (BNP) level    4. Testicular nodule    5. Hydrocele, unspecified hydrocele type             Erich Gr MD  07/18/24 9083

## 2024-07-19 ENCOUNTER — APPOINTMENT (OUTPATIENT)
Age: 79
End: 2024-07-19
Payer: OTHER GOVERNMENT

## 2024-07-19 PROBLEM — N43.3 HYDROCELE: Status: ACTIVE | Noted: 2024-07-19

## 2024-07-19 PROBLEM — R60.0 BILATERAL LEG EDEMA: Status: ACTIVE | Noted: 2024-07-19

## 2024-07-19 PROBLEM — R79.89 ELEVATED BRAIN NATRIURETIC PEPTIDE (BNP) LEVEL: Status: ACTIVE | Noted: 2024-07-19

## 2024-07-19 PROBLEM — N50.89 TESTICULAR NODULE: Status: ACTIVE | Noted: 2024-07-19

## 2024-07-19 LAB
ANION GAP SERPL CALCULATED.3IONS-SCNC: 9 MMOL/L (ref 3–16)
BASOPHILS # BLD: 0.1 K/UL (ref 0–0.2)
BASOPHILS NFR BLD: 0.6 %
BUN SERPL-MCNC: 15 MG/DL (ref 7–20)
CALCIUM SERPL-MCNC: 8 MG/DL (ref 8.3–10.6)
CHLORIDE SERPL-SCNC: 103 MMOL/L (ref 99–110)
CO2 SERPL-SCNC: 30 MMOL/L (ref 21–32)
CREAT SERPL-MCNC: <0.5 MG/DL (ref 0.8–1.3)
DEPRECATED RDW RBC AUTO: 17.4 % (ref 12.4–15.4)
ECHO AO ROOT DIAM: 3.8 CM
ECHO AO ROOT INDEX: 2.01 CM/M2
ECHO AV MEAN GRADIENT: 4 MMHG
ECHO AV MEAN VELOCITY: 1 M/S
ECHO AV PEAK GRADIENT: 6 MMHG
ECHO AV PEAK VELOCITY: 1.3 M/S
ECHO AV VELOCITY RATIO: 0.62
ECHO AV VTI: 24.9 CM
ECHO BSA: 1.89 M2
ECHO EST RA PRESSURE: 3 MMHG
ECHO IVC EXP: 2 CM
ECHO IVC INSP: 1 CM
ECHO LA AREA 2C: 21.8 CM2
ECHO LA AREA 4C: 26.5 CM2
ECHO LA MAJOR AXIS: 5.6 CM
ECHO LA MINOR AXIS: 6.6 CM
ECHO LA VOL BP: 78 ML (ref 18–58)
ECHO LA VOL MOD A2C: 61 ML (ref 18–58)
ECHO LA VOL MOD A4C: 86 ML (ref 18–58)
ECHO LA VOL/BSA BIPLANE: 41 ML/M2 (ref 16–34)
ECHO LA VOLUME INDEX MOD A2C: 32 ML/M2 (ref 16–34)
ECHO LA VOLUME INDEX MOD A4C: 46 ML/M2 (ref 16–34)
ECHO LV E' LATERAL VELOCITY: 8 CM/S
ECHO LV E' SEPTAL VELOCITY: 5 CM/S
ECHO LV FRACTIONAL SHORTENING: 38 % (ref 28–44)
ECHO LV INTERNAL DIMENSION DIASTOLE INDEX: 1.69 CM/M2
ECHO LV INTERNAL DIMENSION DIASTOLIC: 3.2 CM (ref 4.2–5.9)
ECHO LV INTERNAL DIMENSION SYSTOLIC INDEX: 1.06 CM/M2
ECHO LV INTERNAL DIMENSION SYSTOLIC: 2 CM
ECHO LV ISOVOLUMETRIC RELAXATION TIME (IVRT): 56 MS
ECHO LV IVSD: 1.4 CM (ref 0.6–1)
ECHO LV MASS 2D: 153 G (ref 88–224)
ECHO LV MASS INDEX 2D: 81 G/M2 (ref 49–115)
ECHO LV POSTERIOR WALL DIASTOLIC: 1.4 CM (ref 0.6–1)
ECHO LV RELATIVE WALL THICKNESS RATIO: 0.88
ECHO LVOT AV VTI INDEX: 0.62
ECHO LVOT MEAN GRADIENT: 2 MMHG
ECHO LVOT PEAK GRADIENT: 3 MMHG
ECHO LVOT PEAK VELOCITY: 0.8 M/S
ECHO LVOT VTI: 15.4 CM
ECHO MV E VELOCITY: 1.07 M/S
ECHO MV E/E' LATERAL: 13.38
ECHO MV E/E' RATIO (AVERAGED): 17.39
ECHO MV E/E' SEPTAL: 21.4
ECHO MV LVOT VTI INDEX: 1.21
ECHO MV MAX VELOCITY: 1.2 M/S
ECHO MV MEAN GRADIENT: 2 MMHG
ECHO MV MEAN VELOCITY: 0.7 M/S
ECHO MV PEAK GRADIENT: 6 MMHG
ECHO MV VTI: 18.6 CM
ECHO PV MAX VELOCITY: 1.1 M/S
ECHO PV MEAN GRADIENT: 3 MMHG
ECHO PV MEAN VELOCITY: 0.8 M/S
ECHO PV PEAK GRADIENT: 5 MMHG
ECHO PV VTI: 19.9 CM
ECHO RA AREA 4C: 24.3 CM2
ECHO RA END SYSTOLIC VOLUME APICAL 4 CHAMBER INDEX BSA: 40 ML/M2
ECHO RA VOLUME: 76 ML
ECHO RIGHT VENTRICULAR SYSTOLIC PRESSURE (RVSP): 41 MMHG
ECHO RV BASAL DIMENSION: 3.2 CM
ECHO RV FREE WALL PEAK S': 12 CM/S
ECHO RV LONGITUDINAL DIMENSION: 7.7 CM
ECHO RV MID DIMENSION: 2.5 CM
ECHO RV TAPSE: 1.2 CM (ref 1.7–?)
ECHO TV REGURGITANT MAX VELOCITY: 3.08 M/S
ECHO TV REGURGITANT PEAK GRADIENT: 38 MMHG
EOSINOPHIL # BLD: 0.1 K/UL (ref 0–0.6)
EOSINOPHIL NFR BLD: 1.1 %
GFR SERPLBLD CREATININE-BSD FMLA CKD-EPI: >90 ML/MIN/{1.73_M2}
GLUCOSE BLD-MCNC: 122 MG/DL (ref 70–99)
GLUCOSE BLD-MCNC: 123 MG/DL (ref 70–99)
GLUCOSE BLD-MCNC: 75 MG/DL (ref 70–99)
GLUCOSE BLD-MCNC: 78 MG/DL (ref 70–99)
GLUCOSE BLD-MCNC: 98 MG/DL (ref 70–99)
GLUCOSE SERPL-MCNC: 81 MG/DL (ref 70–99)
HCT VFR BLD AUTO: 30.7 % (ref 40.5–52.5)
HGB BLD-MCNC: 10.5 G/DL (ref 13.5–17.5)
LYMPHOCYTES # BLD: 1.1 K/UL (ref 1–5.1)
LYMPHOCYTES NFR BLD: 11.8 %
MCH RBC QN AUTO: 31.4 PG (ref 26–34)
MCHC RBC AUTO-ENTMCNC: 34.4 G/DL (ref 31–36)
MCV RBC AUTO: 91.3 FL (ref 80–100)
MONOCYTES # BLD: 0.8 K/UL (ref 0–1.3)
MONOCYTES NFR BLD: 8.8 %
NEUTROPHILS # BLD: 7.4 K/UL (ref 1.7–7.7)
NEUTROPHILS NFR BLD: 77.7 %
PERFORMED ON: ABNORMAL
PERFORMED ON: ABNORMAL
PERFORMED ON: NORMAL
PLATELET # BLD AUTO: 308 K/UL (ref 135–450)
PMV BLD AUTO: 8.4 FL (ref 5–10.5)
POTASSIUM SERPL-SCNC: 4 MMOL/L (ref 3.5–5.1)
RBC # BLD AUTO: 3.36 M/UL (ref 4.2–5.9)
SODIUM SERPL-SCNC: 142 MMOL/L (ref 136–145)
WBC # BLD AUTO: 9.5 K/UL (ref 4–11)

## 2024-07-19 PROCEDURE — 93306 TTE W/DOPPLER COMPLETE: CPT

## 2024-07-19 PROCEDURE — 6370000000 HC RX 637 (ALT 250 FOR IP)

## 2024-07-19 PROCEDURE — 97530 THERAPEUTIC ACTIVITIES: CPT

## 2024-07-19 PROCEDURE — 36415 COLL VENOUS BLD VENIPUNCTURE: CPT

## 2024-07-19 PROCEDURE — 6360000002 HC RX W HCPCS

## 2024-07-19 PROCEDURE — 80048 BASIC METABOLIC PNL TOTAL CA: CPT

## 2024-07-19 PROCEDURE — 2060000000 HC ICU INTERMEDIATE R&B

## 2024-07-19 PROCEDURE — C9254 INJECTION, LACOSAMIDE: HCPCS

## 2024-07-19 PROCEDURE — 97165 OT EVAL LOW COMPLEX 30 MIN: CPT

## 2024-07-19 PROCEDURE — 6370000000 HC RX 637 (ALT 250 FOR IP): Performed by: INTERNAL MEDICINE

## 2024-07-19 PROCEDURE — 93306 TTE W/DOPPLER COMPLETE: CPT | Performed by: INTERNAL MEDICINE

## 2024-07-19 PROCEDURE — 6360000002 HC RX W HCPCS: Performed by: NURSE PRACTITIONER

## 2024-07-19 PROCEDURE — 2580000003 HC RX 258: Performed by: NURSE PRACTITIONER

## 2024-07-19 PROCEDURE — 99232 SBSQ HOSP IP/OBS MODERATE 35: CPT | Performed by: INTERNAL MEDICINE

## 2024-07-19 PROCEDURE — 2500000003 HC RX 250 WO HCPCS: Performed by: INTERNAL MEDICINE

## 2024-07-19 PROCEDURE — 85025 COMPLETE CBC W/AUTO DIFF WBC: CPT

## 2024-07-19 PROCEDURE — 2580000003 HC RX 258

## 2024-07-19 PROCEDURE — 97161 PT EVAL LOW COMPLEX 20 MIN: CPT

## 2024-07-19 RX ORDER — GUAIFENESIN/DEXTROMETHORPHAN 100-10MG/5
5 SYRUP ORAL EVERY 8 HOURS PRN
Status: DISCONTINUED | OUTPATIENT
Start: 2024-07-19 | End: 2024-07-22 | Stop reason: HOSPADM

## 2024-07-19 RX ORDER — METOPROLOL TARTRATE 1 MG/ML
5 INJECTION, SOLUTION INTRAVENOUS ONCE
Status: COMPLETED | OUTPATIENT
Start: 2024-07-19 | End: 2024-07-19

## 2024-07-19 RX ORDER — DILTIAZEM HYDROCHLORIDE 60 MG/1
30 TABLET, FILM COATED ORAL EVERY 8 HOURS SCHEDULED
Status: DISCONTINUED | OUTPATIENT
Start: 2024-07-19 | End: 2024-07-22 | Stop reason: HOSPADM

## 2024-07-19 RX ORDER — LEVETIRACETAM 500 MG/5ML
750 INJECTION, SOLUTION, CONCENTRATE INTRAVENOUS EVERY 12 HOURS
Status: DISCONTINUED | OUTPATIENT
Start: 2024-07-19 | End: 2024-07-22 | Stop reason: HOSPADM

## 2024-07-19 RX ADMIN — MEMANTINE HYDROCHLORIDE 10 MG: 5 TABLET ORAL at 08:22

## 2024-07-19 RX ADMIN — ARIPIPRAZOLE 5 MG: 10 TABLET ORAL at 08:23

## 2024-07-19 RX ADMIN — DILTIAZEM HYDROCHLORIDE 30 MG: 60 TABLET ORAL at 13:21

## 2024-07-19 RX ADMIN — MEMANTINE HYDROCHLORIDE 10 MG: 5 TABLET ORAL at 20:30

## 2024-07-19 RX ADMIN — APIXABAN 5 MG: 5 TABLET, FILM COATED ORAL at 08:23

## 2024-07-19 RX ADMIN — FUROSEMIDE 40 MG: 10 INJECTION, SOLUTION INTRAMUSCULAR; INTRAVENOUS at 08:22

## 2024-07-19 RX ADMIN — METOPROLOL TARTRATE 5 MG: 5 INJECTION INTRAVENOUS at 11:17

## 2024-07-19 RX ADMIN — MINERAL SUPPLEMENT IRON 300 MG / 5 ML STRENGTH LIQUID 100 PER BOX UNFLAVORED 300 MG: at 16:32

## 2024-07-19 RX ADMIN — Medication 3 MG: at 20:30

## 2024-07-19 RX ADMIN — SODIUM CHLORIDE, PRESERVATIVE FREE 10 ML: 5 INJECTION INTRAVENOUS at 08:24

## 2024-07-19 RX ADMIN — LEVETIRACETAM 750 MG: 100 INJECTION INTRAVENOUS at 09:06

## 2024-07-19 RX ADMIN — APIXABAN 5 MG: 5 TABLET, FILM COATED ORAL at 20:30

## 2024-07-19 RX ADMIN — DILTIAZEM HYDROCHLORIDE 30 MG: 60 TABLET ORAL at 20:30

## 2024-07-19 RX ADMIN — MONTELUKAST SODIUM 10 MG: 10 TABLET, COATED ORAL at 08:23

## 2024-07-19 RX ADMIN — PANTOPRAZOLE SODIUM 40 MG: 40 INJECTION, POWDER, FOR SOLUTION INTRAVENOUS at 09:06

## 2024-07-19 RX ADMIN — ESCITALOPRAM OXALATE 10 MG: 10 TABLET ORAL at 08:23

## 2024-07-19 RX ADMIN — LEVETIRACETAM 750 MG: 100 INJECTION INTRAVENOUS at 20:25

## 2024-07-19 RX ADMIN — FINASTERIDE 5 MG: 5 TABLET, FILM COATED ORAL at 08:23

## 2024-07-19 RX ADMIN — PANTOPRAZOLE SODIUM 20 MG: 20 TABLET, DELAYED RELEASE ORAL at 08:23

## 2024-07-19 RX ADMIN — LACOSAMIDE 100 MG: 10 INJECTION INTRAVENOUS at 20:25

## 2024-07-19 RX ADMIN — SODIUM CHLORIDE, PRESERVATIVE FREE 10 ML: 5 INJECTION INTRAVENOUS at 20:25

## 2024-07-19 RX ADMIN — CARBIDOPA AND LEVODOPA 1 TABLET: 25; 100 TABLET ORAL at 08:23

## 2024-07-19 RX ADMIN — ASPIRIN 81 MG: 81 TABLET, CHEWABLE ORAL at 08:23

## 2024-07-19 RX ADMIN — LACOSAMIDE 100 MG: 10 INJECTION INTRAVENOUS at 08:22

## 2024-07-19 RX ADMIN — MINERAL SUPPLEMENT IRON 300 MG / 5 ML STRENGTH LIQUID 100 PER BOX UNFLAVORED 300 MG: at 08:22

## 2024-07-19 RX ADMIN — CARBIDOPA AND LEVODOPA 1 TABLET: 25; 100 TABLET ORAL at 13:21

## 2024-07-19 RX ADMIN — CARBIDOPA AND LEVODOPA 1 TABLET: 25; 100 TABLET ORAL at 20:30

## 2024-07-19 ASSESSMENT — PAIN SCALES - WONG BAKER
WONGBAKER_NUMERICALRESPONSE: NO HURT

## 2024-07-19 ASSESSMENT — PAIN SCALES - GENERAL: PAINLEVEL_OUTOF10: 0

## 2024-07-19 NOTE — PROGRESS NOTES
Inpatient Occupational Therapy Evaluation and Treatment    Unit: PCU  Date:  7/19/2024  Patient Name:    Rodolfo Luna  Admitting diagnosis:  Shortness of breath [R06.02]  Peripheral edema [R60.0]  Bilateral leg edema [R60.0]  Elevated brain natriuretic peptide (BNP) level [R79.89]  Testicular nodule [N50.89]  Hydrocele, unspecified hydrocele type [N43.3]  Admit Date:  7/18/2024  Precautions/Restrictions/WB Status/ Lines/ Wounds/ Oxygen: Fall risk, Bed/chair alarm, Lines (IV and PEG tube), Telemetry, and Isolation Precautions: Contact    Pt seen for cotreatment this date due to patient safety, patient endurance, and complexity of condition    Treatment Time:  14:03-14:36  Treatment Number:  1  Timed Code Treatment Minutes: 23 minutes  Total Treatment Minutes: 33 minutes    Patient Goals for Therapy: \"to get better\"          Discharge Recommendations: SNF  DME needs for discharge: Defer to facility       Therapy recommendations for staff:   Assist of 1 for ambulation with use of rolling walker (RW) and gait belt to/from BSC  to/from chair    History of Present Illness: Per H&P  \"Patient is a 78 year-old male with PMHx of afib, anxiety,  who [place] [facility] [place] complaint of [place] swelling. Pt states that he was recently discharged from [facility] after having a PEG tube placed. He states that he has been having SOB, BLE edema, testicular swelling and pain since Tuesday. He states that the swelling has been gradually worsening and now feels that it involves his hands. He states that his left testicle has also been swollen and painful with a dull ache. He denies any hx of CHF. He states he has been tolerating his tube feeds well and does not take anything by mouth. He denies CP, n/v, abd pain, dysuria, fever, chills, or dizziness.\"    AM-PAC Score: AM-PAC Inpatient Daily Activity Raw Score: 15     Subjective:  Patient lying reclined in bed with no family present.   Pt agreeable to this OT session.     Cognition:

## 2024-07-19 NOTE — PROGRESS NOTES
Comprehensive Nutrition Assessment    Type and Reason for Visit:  Initial, Positive Nutrition Screen, Consult (+ screen for MST = 2 and home EN; consult for TF ordering and management)    Nutrition Recommendations/Plan:   Continue TF order - ADULT TUBE FEEDING; PEG tube; Standard with Fiber formula - Jevity 1.5 with a goal rate of 65 ml/hr x 20 hours. Start with 30 ml/hr and increase by 15 ml every 6 hours, as tolerated by patient, until goal rate can be achieved and maintained. Water flushes, 30 ml every 3 hours for tube patency.   Monitor TF rate, intake, and tolerance + water flushes.   Monitor nutrition-related labs, bowel function, and weight trends.      Malnutrition Assessment:  Malnutrition Status:  Severe malnutrition (07/19/24 1307)    Context:  Acute Illness     Findings of the 6 clinical characteristics of malnutrition:  Energy Intake:  50% or less of estimated energy requirements for 5 or more days  Weight Loss:  No significant weight loss     Body Fat Loss:  Moderate body fat loss Orbital, Buccal region   Muscle Mass Loss:  Moderate muscle mass loss Temples (temporalis), Clavicles (pectoralis & deltoids)  Fluid Accumulation:  Mild Extremities (BUE + 1 pitting edema)   Strength:  Not Performed    Nutrition Assessment:    patient is severely malnourished r/t inadequate energy-protein intake, difficulty swallowing, and need for EN regimen as sole source of nutrition AEB moderate fat loss and muscle wasting, severe dysphagia with aspiration, and PEG tube placed on 7/12/24 + EN is his sole source of nutrition; he is at risk for further compromise d/t severe malnutrition, altered nutrition-related labs, and need for EN as sole source of nutrition; will continue Jevity 1.5 with a goal rate of 65 ml/hr x 20 hours + 30 ml water flushes every 3 hours    Nutrition Related Findings:    patient is A & O x 4; patient presented with c/o BLE swelling; patient had his PEG tube placed on 7/12/24; TF was started

## 2024-07-19 NOTE — H&P
[facility] Medicine History & Physical      PCP: Carlton Leavitt, APRN - CNP    Date of Admission: 7/18/2024    Date of Service: Pt seen/examined on 07/18/24     Chief Complaint:    Chief Complaint   Patient presents with    Leg Swelling     BLE swelling and arm swelling. G tube placed Monday. Pt comes via EMS from home. Also testicle pain/swelling. Throat sore         History Of Present Illness:      The patient is a 78 y.o. male with PMH of afib, anxiety,  who presented to [facility] ED with complaint of leg swelling. Pt states that he was recently discharged from [facility] after having a PEG tube placed. He states that he has been having SOB, BLE edema, testicular swelling and pain since Tuesday. He states that the swelling has been gradually worsening and now feels that it involves his hands. He states that his left testicle has also been swollen and painful with a dull ache. He denies any hx of CHF. He states he has been tolerating his tube feeds well and does not take anything by mouth. He denies CP, n/v, abd pain, dysuria, fever, chills, or dizziness.     Past Medical History:        Diagnosis Date    A-fib ([facility])     MIRNA (acute kidney injury) (Formerly [facility]) 03/31/2022    Anxiety     Arthritis     OA    Bradycardia 04/19/2014    Cancer (Formerly [facility])     skin cancer-forearm right , face    Coronary artery disease involving native coronary artery of native heart without angina pectoris 07/19/2022    Hemoptysis 10/16/2014    Since Ablation    Irregular heart  beats     Mixed hyperlipidemia 03/17/2023    Parkinson disease ([facility])     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[facility]) 12/10/2014    S/P drug eluting coronary stent placement 03/06/2017    2.25 x 18 Xience [place] ADRIÁN - Distal LAD    Seizure disorder ([facility])     Abnormal EEG with left temporal shapr waves    Seizures ([facility]ly [facility])     Shortness of breath 09/04/2014    Total knee replacement status 01/12/2011       Past Surgical History:        Procedure Laterality Date    ABLATION OF DYSRHYTHMIC FOCUS   infection and  should not be used as the sole basis for patient management  decisions.  Results must be combined with clinical observations,  patient history, and epidemiological information.  Testing was performed using KH[employer] BENITO SARS-CoV-2 and Influenza A/B  nucleic acid assay. This test is a multiplex Real-Time Reverse  Transcriptase Polymerase Chain Reaction (RT-PCR)-based in vitro  diagnostic test intended for the qualitative detection of nucleic  acids from SARS-CoV-2, influenza A, and influenza B in nasopharyngeal  and nasal swab specimens for use under the FDA’s Emergency Use  Authorization (EUA) only.    Patient Fact Sheet:  https://www.fda.gov/media/391501/download  Provider Fact Sheet: https://www.fda.gov/media/495655/download  EUA: https://www.fda.gov/media/116229/download  IFU: https://www.fda.gov/media/252167/download    Methodology:  RT-PCR          Influenza A NOT DETECTED     Influenza B NOT DETECTED    Strep screen group a throat [[id number]] Collected: 07/18/24 1321    Order Status: Completed Specimen: Throat Updated: 07/18/24 1349     Rapid Strep A Screen Negative     Comment: A culture confirmation plate has been set up and a separate  report will follow.  See micro report.         Culture, Beta Strep Confirm Plates [[id number]] Collected: 07/18/24 1321    Order Status: No result Updated: 07/18/24 1350    MRSA DNA Probe, Nasal [[id number]]  (Abnormal) Collected: 07/11/24 1005    Order Status: Completed Specimen: Nasal swab from [facility] Updated: 07/12/24 0859     Organism Staph aureus MRSA     MRSA SCREEN RT-PCR --     POSITIVE for  Normal Range: Not detected  CONTACT PRECAUTIONS INDICATED      Narrative:      ORDER#: [id number]                          ORDERED BY: DAV HARPER  SOURCE: [facility]                              COLLECTED:  07/11/24 10:05  ANTIBIOTICS AT ABIGAIL.:                      RECEIVED :  07/11/24 10:26  CALL  [place]  [facility] tel. [phone],  Microbiology results called to and read

## 2024-07-19 NOTE — CONSULTS
Urology Consult Note      Reason for Consultation: scrotal abnormality on US    Chief Complaint: \"I have a penile prosthesis\"  HPI:  Rodolfo is a 78 y.o. male who is admitted with edema.  There was edema of his scrotum, so a scrotal US was obtained.:    1. Unremarkable sonographic appearance of the bilateral testicles, without  evidence of torsion or mass.  2. Unremarkable sonographic appearance of the bilateral epididymides.  3. Mild to moderate mildly complicated left hydrocele, demonstrating internal  floating debris.  4. Nonspecific 9 mm solid hypoechoic nodular focus adherent to the lower left  scrotal wall.  Most likely differentials include a small blood clot, sperm  granuloma, or adenomatoid tumor.     He has a penile prosthesis.  There is a pump in his scrotum.  This history is not listed in his EPIC chart, therefore I do not believe reading radiologist was aware of this.      Past Medical History:   Diagnosis Date    A-fib ([facility])     MIRNA (acute kidney injury) ([facility]) 03/31/2022    Anxiety     Arthritis     OA    Bradycardia 04/19/2014    Cancer ([facility])     skin cancer-forearm right , face    Coronary artery disease involving native coronary artery of native heart without angina pectoris 07/19/2022    Hemoptysis 10/16/2014    Since Ablation    Irregular heart  beats     Mixed hyperlipidemia 03/17/2023    Parkinson disease ([facility])     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[facility]) 12/10/2014    S/P drug eluting coronary stent placement 03/06/2017    2.25 x 18 Xience Alpine ADRIÁN - Distal LAD    Seizure disorder ([facility])     Abnormal EEG with left temporal shapr waves    Seizures ([facility])     Shortness of breath 09/04/2014    Total knee replacement status 01/12/2011       Past Surgical History:   Procedure Laterality Date    ABLATION OF DYSRHYTHMIC FOCUS  9/2014    CARPAL TUNNEL RELEASE      CERVICAL DISC SURGERY  2010    COLONOSCOPY  09/26/2016    normal    CORONARY ANGIOPLASTY WITH STENT PLACEMENT      ESOPHAGEAL DILATATION N/A  G Tube Daily Vicente, Virgen PA-C   5 mg at 24 0823       Allergies   Allergen Reactions    Cefuroxime Axetil      Other reaction(s): Hives    Lisinopril      Other reaction(s): Other (See Comments), unknown/H&P    Morphine      Bad sweats  Other reaction(s): GI upset, Other (See Comments), shaking  Bad sweats  Bad sweats      Other Other (See Comments)     Pt. States that there is another med that he is allergic to,does not know the name states that it makes him sweat  Other reaction(s): Throat irritation    Rivaroxaban      Other reaction(s): Coordination problem, Other (See Comments)    Apixaban      hot flashes and stomach pain and he is peeing more than usual    Clopidogrel Rash    Codeine Anxiety, Nausea Only and Rash     dizzy  Other reaction(s): Nausea/ Sweaty, Other (See Comments)  dizzy      Penicillins Nausea And Vomiting and Rash     Other reaction(s): Dizzy, Other (See Comments), shaking    Plavix [Clopidogrel Bisulfate] Rash       Family History   Problem Relation Age of Onset    Heart Disease Mother     Arthritis Mother     High Blood Pressure Mother     Miscarriages / Stillbirths Mother     Stroke Mother     Arthritis Father     Heart Disease Father     Cancer Sister     Arthritis Sister     Depression Sister     Arthritis Brother     Arthritis Maternal Grandmother     Arthritis Maternal Grandfather     Arthritis Paternal Grandmother     Diabetes Paternal Grandmother     Arthritis Paternal Grandfather        Social History     Tobacco Use    Smoking status: Former     Current packs/day: 0.00     Average packs/day: 2.0 packs/day for 40.0 years (80.0 ttl pk-yrs)     Types: Cigarettes     Start date: 1953     Quit date: 1993     Years since quittin.1     Passive exposure: Never    Smokeless tobacco: Never    Tobacco comments:     20 yrs   Vaping Use    Vaping Use: Never used   Substance Use Topics    Alcohol use: No    Drug use: No         Review of Systems: A 12 point ROS was

## 2024-07-19 NOTE — CARE COORDINATION
Case Management Assessment  Initial Evaluation    Date/Time of Evaluation: 7/19/2024 9:17 AM  Assessment Completed by: Hoa Knapp RN    If patient is discharged prior to next notation, then this note serves as note for discharge by case management.    Patient Name: Rodolfo Luna                   YOB: 1945  Diagnosis: Shortness of breath [R06.02]  Peripheral edema [R60.0]  Bilateral leg edema [R60.0]  Elevated brain natriuretic peptide (BNP) level [R79.89]  Testicular nodule [N50.89]  Hydrocele, unspecified hydrocele type [N43.3]                   Date / Time: 7/18/2024 12:47 PM    Patient Admission Status: Inpatient   Readmission Risk (Low < 19, Mod (19-27), High > 27): Readmission Risk Score: 33.6    Current PCP: Carlton Leavitt APRN - CNP  PCP verified by CM? Yes    Chart Reviewed: Yes      History Provided by: Patient  Patient Orientation: Alert and Oriented    Patient Cognition: Alert    Hospitalization in the last 30 days (Readmission):  Yes    If yes, Readmission Assessment in CM Navigator will be completed.    Advance Directives:      Code Status: Full Code   Patient's Primary Decision Maker is: Patient Declined (Legal Next of Kin Remains as Decision Maker)    Primary Decision Maker: Courtney Lunaith A - Spouse - [phone]    Discharge Planning:    Patient lives with: Spouse/Significant Other Type of Home: House  Primary Care Giver: Self  Patient Support Systems include: Spouse/Significant Other   Current Financial resources: Medicare  Current community resources: None  Current services prior to admission: Home Care, Other (Comment) (best choice, [facility] via [facility])            Current DME:              Type of Home Care services:  None    ADLS  Prior functional level: Independent in ADLs/IADLs  Current functional level: Independent in ADLs/IADLs    PT AM-PAC:   /24  OT AM-PAC:   /24    Family can provide assistance at DC: Yes  Would you like Case Management to discuss the discharge plan  with any other family members/significant others, and if so, who? Yes  Plans to Return to Present Housing: Yes  Other Identified Issues/Barriers to RETURNING to current housing: na  Potential Assistance needed at discharge: N/A            Potential DME:    Patient expects to discharge to: House  Plan for transportation at discharge:      Financial    Payor: [employer] / Plan: [employer] / Product Type: *No Product type* /     Does insurance require precert for SNF: Yes    Potential assistance Purchasing Medications: No  Meds-to-Beds request: Yes      [facility] - [facility] [address] [place] [address] -  [phone] - F [phone]  [address] [facility] [address]  Phone: [phone] Fax: [phone]    [facility] [id number] [address] [phone] - F [phone]  [address] [facility] [address]  Phone: [phone] Fax: [phone]    [place], [place] - [address] [facility] -  [phone] - F [phone]  [address] [facility] [address]  Phone: [phone] Fax: [phone]    [facility] [id number] - [place], [place] - [address] [facility] - P [phone] - F [phone]  [address] [facility] [address]  Phone: [phone] Fax: [phone]      Notes:    Factors facilitating achievement of predicted outcomes: Family support, Caregiver support, Cooperative, and Pleasant    Barriers to discharge: edema    Additional Case Management Notes: Reviewed chart and met with pt at bedside. Role of CM explained. IPTA. Lives home with spouse and has home care via [employer]. Pt just started with TF during last admission. Pt states he has all necessary supplies and states spouse with provide transport home at DC. No additional DC or DME needs identified.     1115: MD states pt and spouse want pt to DC to [facility]. Pt is 100% service connected. [facility] to get list of approved facilities. Had to leave .

## 2024-07-19 NOTE — ACP (ADVANCE CARE PLANNING)
Advance Care Planning     General Advance Care Planning (ACP) Conversation    Date of Conversation: 7/19/2024  Conducted with: Patient with Decision Making Capacity  Other persons present: None    Healthcare Decision Maker:   Primary Decision Maker: Helen Luna - [facility] - [phone]  Click here to complete Healthcare Decision Makers including selection of the Healthcare Decision Maker Relationship (ie \"Primary\").       Content/Action Overview:  DECLINED ACP Conversation - will revisit periodically  Reviewed DNR/DNI and patient elects Full Code (Attempt Resuscitation)        Length of Voluntary ACP Conversation in minutes:  <16 minutes (Non-Billable)    Hoa Knapp RN

## 2024-07-19 NOTE — FLOWSHEET NOTE
07/19/24 0819   Vital Signs   Temp 99.3 °F (37.4 °C)   Temp Source Oral   Pulse (!) 101   Respirations 16   /72   MAP (Calculated) 92   BP Location Right upper arm   Oxygen Therapy   SpO2 92 %   O2 Device None (Room air)     Patient resting quietly in bed. No s/s of distress noted. Shift assessment complete, see flow sheet. Denies needs at this time. Call light in reach. Will monitor.

## 2024-07-19 NOTE — DISCHARGE INSTR - COC
Continuity of Care Form    Patient Name: Rodolfo Luna   :  1945  MRN:  [id number]    Admit date:  2024  Discharge date:  ***    Code Status Order: Full Code   Advance Directives:     Admitting Physician:  Trevor Mckay MD  PCP: Carlton Leavitt, APRN - CNP    Discharging Nurse: ***  Discharging Hospital Unit/Room#: /0313-02  Discharging Unit Phone Number: [phone]    Emergency Contact:   Extended Emergency Contact Information  Primary Emergency Contact: Helen Luna  Address: [address] [address]  Home Phone: [phone]  Mobile Phone: [phone]  Relation: Spouse   needed? No  Secondary Emergency Contact: lorettapage  Mobile Phone: [phone]  Relation: Grandchild   needed? No    Past Surgical History:  Past Surgical History:   Procedure Laterality Date    ABLATION OF DYSRHYTHMIC FOCUS  2014    CARPAL TUNNEL RELEASE      CERVICAL DISC SURGERY  2010    COLONOSCOPY  2016    normal    CORONARY ANGIOPLASTY WITH STENT PLACEMENT      ESOPHAGEAL DILATATION N/A 2024    ESOPHAGEAL DILATION PERALTA performed by Abraham Lagos DO at [facility] ENDOSCOPY    FEMUR SURGERY      left    KNEE SURGERY  11 R total knee replacement with femoral nerve block for pain control    LUNG REMOVAL, PARTIAL Right     20%    OTHER SURGICAL HISTORY Bilateral     excisions of lesions on bilat.neck and back    OTHER SURGICAL HISTORY  2014    Reveal Loop recorder placed in Cath Lab    UPPER GASTROINTESTINAL ENDOSCOPY  2016    gastric and esophogeal biopsy    UPPER GASTROINTESTINAL ENDOSCOPY  2024    ESOPHAGOGASTRODUODENOSCOPY BIOPSY performed by Abraham Lagos DO at [facility] ENDOSCOPY    UPPER GASTROINTESTINAL ENDOSCOPY N/A 2024    EGD/PEG performed by Dc Elizondo MD at [facility] ENDOSCOPY       Immunization History:   Immunization History   Administered Date(s)

## 2024-07-19 NOTE — PROGRESS NOTES
Inpatient Physical Therapy Evaluation & Treatment    Unit: PCU  Date:  7/19/2024  Patient Name:    Rodolfo Luna  Admitting diagnosis:  Shortness of breath [R06.02]  Peripheral edema [R60.0]  Bilateral leg edema [R60.0]  Elevated brain natriuretic peptide (BNP) level [R79.89]  Testicular nodule [N50.89]  Hydrocele, unspecified hydrocele type [N43.3]  Admit Date:  7/18/2024  Precautions/Restrictions/WB Status/ Lines/ Wounds/ Oxygen: Fall risk, Bed/chair alarm, Lines (IV and PEG tube), and Isolation Precautions: Contact    Watch BP    Pt seen for cotreatment this date due to patient safety, patient endurance, acute illness/injury, and need for the assistance of 2 skilled therapists    Treatment Time:  14:03-14:36  Treatment Number:  1   Timed Code Treatment Minutes: 23 minutes  Total Treatment Minutes:  33  minutes    Patient Stated Goals for Therapy: \" to get moving \"          Discharge Recommendations: SNF  DME needs for discharge: Defer to facility       Therapy recommendation for EMS Transport: can transport by wheelchair    Therapy recommendations for staff:   Assist of 1 for transfers with use of rolling walker (RW) and gait belt to/from BSC  to/from chair    Watch BP    History of Present Illness:   Per H&P:  \"The patient is a 78 y.o. male with PMH of afib, anxiety,  who presented to [facility] with complaint of leg swelling. Pt states that he was recently discharged from [facility] after having a PEG tube placed. He states that he has been having SOB, BLE edema, testicular swelling and pain since Tuesday. He states that the swelling has been gradually worsening and now feels that it involves his hands. He states that his left testicle has also been swollen and painful with a dull ache. He denies any hx of CHF. He states he has been tolerating his tube feeds well and does not take anything by mouth. He denies CP, n/v, abd pain, dysuria, fever, chills, or dizziness. \"  Past Medical History:    Past Medical

## 2024-07-20 LAB
ANION GAP SERPL CALCULATED.3IONS-SCNC: 8 MMOL/L (ref 3–16)
BASOPHILS # BLD: 0 K/UL (ref 0–0.2)
BASOPHILS NFR BLD: 0.3 %
BUN SERPL-MCNC: 16 MG/DL (ref 7–20)
CALCIUM SERPL-MCNC: 7.6 MG/DL (ref 8.3–10.6)
CHLORIDE SERPL-SCNC: 101 MMOL/L (ref 99–110)
CO2 SERPL-SCNC: 32 MMOL/L (ref 21–32)
CREAT SERPL-MCNC: <0.5 MG/DL (ref 0.8–1.3)
DEPRECATED RDW RBC AUTO: 17.2 % (ref 12.4–15.4)
EOSINOPHIL # BLD: 0.2 K/UL (ref 0–0.6)
EOSINOPHIL NFR BLD: 1.5 %
GFR SERPLBLD CREATININE-BSD FMLA CKD-EPI: >90 ML/MIN/{1.73_M2}
GLUCOSE BLD-MCNC: 105 MG/DL (ref 70–99)
GLUCOSE BLD-MCNC: 108 MG/DL (ref 70–99)
GLUCOSE BLD-MCNC: 119 MG/DL (ref 70–99)
GLUCOSE BLD-MCNC: 129 MG/DL (ref 70–99)
GLUCOSE BLD-MCNC: 139 MG/DL (ref 70–99)
GLUCOSE BLD-MCNC: 148 MG/DL (ref 70–99)
GLUCOSE SERPL-MCNC: 115 MG/DL (ref 70–99)
HCT VFR BLD AUTO: 30.9 % (ref 40.5–52.5)
HGB BLD-MCNC: 10.4 G/DL (ref 13.5–17.5)
LYMPHOCYTES # BLD: 1.2 K/UL (ref 1–5.1)
LYMPHOCYTES NFR BLD: 11.4 %
MCH RBC QN AUTO: 31.2 PG (ref 26–34)
MCHC RBC AUTO-ENTMCNC: 33.7 G/DL (ref 31–36)
MCV RBC AUTO: 92.6 FL (ref 80–100)
MONOCYTES # BLD: 1 K/UL (ref 0–1.3)
MONOCYTES NFR BLD: 8.8 %
NEUTROPHILS # BLD: 8.5 K/UL (ref 1.7–7.7)
NEUTROPHILS NFR BLD: 78 %
PERFORMED ON: ABNORMAL
PLATELET # BLD AUTO: 345 K/UL (ref 135–450)
PMV BLD AUTO: 8.1 FL (ref 5–10.5)
POTASSIUM SERPL-SCNC: 3.9 MMOL/L (ref 3.5–5.1)
RBC # BLD AUTO: 3.33 M/UL (ref 4.2–5.9)
S PYO THROAT QL CULT: NORMAL
SODIUM SERPL-SCNC: 141 MMOL/L (ref 136–145)
WBC # BLD AUTO: 10.9 K/UL (ref 4–11)

## 2024-07-20 PROCEDURE — 6370000000 HC RX 637 (ALT 250 FOR IP)

## 2024-07-20 PROCEDURE — 80048 BASIC METABOLIC PNL TOTAL CA: CPT

## 2024-07-20 PROCEDURE — 6370000000 HC RX 637 (ALT 250 FOR IP): Performed by: INTERNAL MEDICINE

## 2024-07-20 PROCEDURE — 2580000003 HC RX 258

## 2024-07-20 PROCEDURE — 36415 COLL VENOUS BLD VENIPUNCTURE: CPT

## 2024-07-20 PROCEDURE — 2580000003 HC RX 258: Performed by: NURSE PRACTITIONER

## 2024-07-20 PROCEDURE — 6360000002 HC RX W HCPCS

## 2024-07-20 PROCEDURE — C9254 INJECTION, LACOSAMIDE: HCPCS

## 2024-07-20 PROCEDURE — 6360000002 HC RX W HCPCS: Performed by: NURSE PRACTITIONER

## 2024-07-20 PROCEDURE — 6370000000 HC RX 637 (ALT 250 FOR IP): Performed by: STUDENT IN AN ORGANIZED HEALTH CARE EDUCATION/TRAINING PROGRAM

## 2024-07-20 PROCEDURE — 2060000000 HC ICU INTERMEDIATE R&B

## 2024-07-20 PROCEDURE — 85025 COMPLETE CBC W/AUTO DIFF WBC: CPT

## 2024-07-20 RX ORDER — FUROSEMIDE 20 MG/1
20 TABLET ORAL DAILY
Qty: 5 TABLET | Refills: 0
Start: 2024-07-20 | End: 2024-07-22

## 2024-07-20 RX ADMIN — DILTIAZEM HYDROCHLORIDE 30 MG: 60 TABLET ORAL at 14:40

## 2024-07-20 RX ADMIN — DILTIAZEM HYDROCHLORIDE 30 MG: 60 TABLET ORAL at 20:30

## 2024-07-20 RX ADMIN — APIXABAN 5 MG: 5 TABLET, FILM COATED ORAL at 20:29

## 2024-07-20 RX ADMIN — SODIUM CHLORIDE, PRESERVATIVE FREE 10 ML: 5 INJECTION INTRAVENOUS at 20:28

## 2024-07-20 RX ADMIN — LEVETIRACETAM 750 MG: 100 INJECTION INTRAVENOUS at 08:17

## 2024-07-20 RX ADMIN — MINERAL SUPPLEMENT IRON 300 MG / 5 ML STRENGTH LIQUID 100 PER BOX UNFLAVORED 300 MG: at 16:40

## 2024-07-20 RX ADMIN — ACETAMINOPHEN 650 MG: 325 TABLET ORAL at 04:52

## 2024-07-20 RX ADMIN — MEMANTINE HYDROCHLORIDE 10 MG: 5 TABLET ORAL at 20:29

## 2024-07-20 RX ADMIN — GUAIFENESIN AND DEXTROMETHORPHAN 5 ML: 100; 10 SYRUP ORAL at 00:07

## 2024-07-20 RX ADMIN — PANTOPRAZOLE SODIUM 40 MG: 40 INJECTION, POWDER, FOR SOLUTION INTRAVENOUS at 08:16

## 2024-07-20 RX ADMIN — APIXABAN 5 MG: 5 TABLET, FILM COATED ORAL at 08:16

## 2024-07-20 RX ADMIN — DILTIAZEM HYDROCHLORIDE 30 MG: 60 TABLET ORAL at 04:52

## 2024-07-20 RX ADMIN — LEVETIRACETAM 750 MG: 100 INJECTION INTRAVENOUS at 20:29

## 2024-07-20 RX ADMIN — ESCITALOPRAM OXALATE 10 MG: 10 TABLET ORAL at 08:17

## 2024-07-20 RX ADMIN — ASPIRIN 81 MG: 81 TABLET, CHEWABLE ORAL at 08:17

## 2024-07-20 RX ADMIN — LACOSAMIDE 100 MG: 10 INJECTION INTRAVENOUS at 20:28

## 2024-07-20 RX ADMIN — FINASTERIDE 5 MG: 5 TABLET, FILM COATED ORAL at 08:17

## 2024-07-20 RX ADMIN — ARIPIPRAZOLE 5 MG: 10 TABLET ORAL at 08:16

## 2024-07-20 RX ADMIN — MONTELUKAST SODIUM 10 MG: 10 TABLET, COATED ORAL at 08:17

## 2024-07-20 RX ADMIN — CARBIDOPA AND LEVODOPA 1 TABLET: 25; 100 TABLET ORAL at 14:40

## 2024-07-20 RX ADMIN — Medication 3 MG: at 20:29

## 2024-07-20 RX ADMIN — MINERAL SUPPLEMENT IRON 300 MG / 5 ML STRENGTH LIQUID 100 PER BOX UNFLAVORED 300 MG: at 08:17

## 2024-07-20 RX ADMIN — CARBIDOPA AND LEVODOPA 1 TABLET: 25; 100 TABLET ORAL at 08:16

## 2024-07-20 RX ADMIN — LACOSAMIDE 100 MG: 10 INJECTION INTRAVENOUS at 08:16

## 2024-07-20 RX ADMIN — CARBIDOPA AND LEVODOPA 1 TABLET: 25; 100 TABLET ORAL at 20:29

## 2024-07-20 RX ADMIN — SODIUM CHLORIDE, PRESERVATIVE FREE 10 ML: 5 INJECTION INTRAVENOUS at 08:17

## 2024-07-20 RX ADMIN — FUROSEMIDE 40 MG: 10 INJECTION, SOLUTION INTRAMUSCULAR; INTRAVENOUS at 08:17

## 2024-07-20 RX ADMIN — MEMANTINE HYDROCHLORIDE 10 MG: 5 TABLET ORAL at 08:16

## 2024-07-20 ASSESSMENT — PAIN DESCRIPTION - DESCRIPTORS: DESCRIPTORS: SORE

## 2024-07-20 ASSESSMENT — PAIN DESCRIPTION - LOCATION: LOCATION: RIB CAGE

## 2024-07-20 ASSESSMENT — PAIN SCALES - GENERAL: PAINLEVEL_OUTOF10: 9

## 2024-07-20 NOTE — FLOWSHEET NOTE
07/20/24 0800   Vital Signs   Temp 98.6 °F (37 °C)   Temp Source Oral   Pulse 75   Heart Rate Source Monitor   Respirations 18   /62   MAP (Calculated) 81   BP Location Right upper arm   Patient Position Semi fowlers   Oxygen Therapy   SpO2 92 %   Pulse Oximetry Type Intermittent   O2 Device None (Room air)     Patient is resting showing no s/s of distress. Patient is alert and oriented. Meds were given via peg/IV, see MAR. Patient is denying any needs. Bed is in lowest position and call light is within reach. Will continue to monitor. Shift assessment complete, see flowsheets. Tube feed was placed was on hold, will restart around 1200 to ensure 4 hour hold. Electronically signed by Brynn Cheng RN on 7/20/2024 at 8:53 AM

## 2024-07-20 NOTE — PLAN OF CARE
Problem: Safety - Adult  Goal: Free from fall injury  Outcome: Progressing     Problem: Discharge Planning  Goal: Discharge to home or other facility with appropriate resources  Outcome: Progressing     Problem: Skin/Tissue Integrity  Goal: Absence of new skin breakdown  Description: 1.  Monitor for areas of redness and/or skin breakdown  2.  Assess vascular access sites hourly  3.  Every 4-6 hours minimum:  Change oxygen saturation probe site  4.  Every 4-6 hours:  If on nasal continuous positive airway pressure, respiratory therapy assess nares and determine need for appliance change or resting period.  Outcome: Progressing     Problem: Chronic Conditions and Co-morbidities  Goal: Patient's chronic conditions and co-morbidity symptoms are monitored and maintained or improved  Outcome: Progressing     Problem: Nutrition Deficit:  Goal: Optimize nutritional status  Outcome: Progressing

## 2024-07-20 NOTE — PROGRESS NOTES
Pt requested something for nerves, also notified her that will not be leaving perfectserve sent to   Electronically signed by Brynn Cheng RN on 7/20/2024 at 2:48 PM

## 2024-07-20 NOTE — PROGRESS NOTES
Hospitalist Progress Note      PCP: Carlton Leavitt, APRN - CNP    Date of Admission: 7/18/2024    Subjective: no acute events overnight    Medications:  Reviewed    Infusion Medications    sodium chloride       Scheduled Medications    levETIRAcetam  750 mg IntraVENous Q12H    pantoprazole (PROTONIX) 40 mg in sodium chloride (PF) 0.9 % 10 mL injection  40 mg IntraVENous Daily    dilTIAZem  30 mg Oral 3 times per day    sodium chloride flush  5-40 mL IntraVENous 2 times per day    furosemide  40 mg IntraVENous Daily    montelukast  10 mg Per G Tube Daily    memantine  10 mg Per G Tube BID    melatonin  3 mg Per G Tube Nightly    lacosamide  100 mg IntraVENous BID    escitalopram  10 mg Per G Tube Daily    ferrous Sulfate  300 mg Per G Tube BID WC    finasteride  5 mg Per G Tube Daily    carbidopa-levodopa  1 tablet Per G Tube TID    aspirin  81 mg Per G Tube Daily    apixaban  5 mg Per G Tube BID    ARIPiprazole  5 mg Per G Tube Daily     PRN Meds: guaiFENesin-dextromethorphan, sodium chloride flush, sodium chloride, potassium chloride **OR** potassium alternative oral replacement **OR** potassium chloride, magnesium sulfate, ondansetron **OR** ondansetron, polyethylene glycol, acetaminophen **OR** acetaminophen, perflutren lipid microspheres      Intake/Output Summary (Last 24 hours) at 7/20/2024 1551  Last data filed at 7/20/2024 1245  Gross per 24 hour   Intake 1237 ml   Output 2500 ml   Net -1263 ml       Physical Exam Performed:    /61   Pulse 78   Temp 98.2 °F (36.8 °C) (Oral)   Resp 18   Ht 1.778 m (5' 10\")   Wt 69.4 kg (153 lb 1.6 oz)   SpO2 93%   BMI 21.97 kg/m²       General: elderly male, chronically ill appearing    Awake, alert and oriented. Appears to be not in any distress  Mucous Membranes:  Pink , anicteric  Neck: No JVD, no carotid bruit, no thyromegaly  Chest:  Clear to auscultation bilaterally, no added sounds  Cardiovascular:  RRR S1S2 heard, no murmurs or

## 2024-07-20 NOTE — FLOWSHEET NOTE
07/20/24 1236   Vital Signs   Temp 98.2 °F (36.8 °C)   Temp Source Oral   Pulse 78   Heart Rate Source Monitor   Respirations 18   /61   MAP (Calculated) 78   BP Location Right upper arm   BP Method Automatic   Patient Position Semi fowlers   Oxygen Therapy   SpO2 93 %   Pulse Oximetry Type Intermittent   O2 Device None (Room air)     NO change to prior assessment. TF restarted at 30ml/hr, wife at bedside. Electronically signed by Brynn Cheng RN on 7/20/2024 at 1:41 PM

## 2024-07-20 NOTE — CARE COORDINATION
Reviewed chart, met with pt bedside, noted DC order. Pt is [facility] with [facility] and will need to go to [facility] SNF. [facility] is not in network. Pt asked to call wife to further discuss, LVM for wife Helen. Will continue to monitor.     1230 - Met with pt and wife at bedside. States they want to go to [facility] at DC. States they have [facility] medicare as well. LVM with Franca at [facility] to see if they can accept pt at DC. Will continue to monitor.     1320 - Spoke with [facility] who states they can not accept at pt has [employer] as primary and are OON. [facility] Medicare OON as well. [facility] who will not take referrals over the weekend. They are also only LTC. Ref to [facility] per pt/wife request. Explained pt likely will be here over the weekend due to [facility] no being in over the weekend.     1615 - Spoke with Alistair at [facility] who states clinically they can accept. LVM her VM to start precert. Will continue to monitor.

## 2024-07-20 NOTE — FLOWSHEET NOTE
07/20/24 1639   Vital Signs   Temp 98.1 °F (36.7 °C)   Temp Source Oral   Pulse 86   Heart Rate Source Monitor   Respirations 18   /72   MAP (Calculated) 89   BP Location Right lower arm   Patient Position Semi fowlers   Oxygen Therapy   SpO2 94 %   Pulse Oximetry Type Intermittent   O2 Device None (Room air)     NO change to prior assessment. Electronically signed by Brynn Cheng RN on 7/20/2024 at 4:40 PM

## 2024-07-20 NOTE — PLAN OF CARE
Problem: Safety - Adult  Goal: Free from fall injury  Outcome: Progressing     Problem: Discharge Planning  Goal: Discharge to home or other facility with appropriate resources  Outcome: Progressing  Flowsheets (Taken 7/19/2024 2030)  Discharge to home or other facility with appropriate resources: Identify barriers to discharge with patient and caregiver     Problem: Skin/Tissue Integrity  Goal: Absence of new skin breakdown  Description: 1.  Monitor for areas of redness and/or skin breakdown  2.  Assess vascular access sites hourly  3.  Every 4-6 hours minimum:  Change oxygen saturation probe site  4.  Every 4-6 hours:  If on nasal continuous positive airway pressure, respiratory therapy assess nares and determine need for appliance change or resting period.  Outcome: Progressing     Problem: Chronic Conditions and Co-morbidities  Goal: Patient's chronic conditions and co-morbidity symptoms are monitored and maintained or improved  Outcome: Progressing  Flowsheets (Taken 7/19/2024 2030)  Care Plan - Patient's Chronic Conditions and Co-Morbidity Symptoms are Monitored and Maintained or Improved: Monitor and assess patient's chronic conditions and comorbid symptoms for stability, deterioration, or improvement     Problem: Nutrition Deficit:  Goal: Optimize nutritional status  Outcome: Progressing

## 2024-07-21 LAB
ANION GAP SERPL CALCULATED.3IONS-SCNC: 5 MMOL/L (ref 3–16)
BASOPHILS # BLD: 0.1 K/UL (ref 0–0.2)
BASOPHILS NFR BLD: 0.7 %
BUN SERPL-MCNC: 15 MG/DL (ref 7–20)
CALCIUM SERPL-MCNC: 7.9 MG/DL (ref 8.3–10.6)
CHLORIDE SERPL-SCNC: 104 MMOL/L (ref 99–110)
CO2 SERPL-SCNC: 33 MMOL/L (ref 21–32)
CREAT SERPL-MCNC: <0.5 MG/DL (ref 0.8–1.3)
DEPRECATED RDW RBC AUTO: 17.4 % (ref 12.4–15.4)
EOSINOPHIL # BLD: 0.2 K/UL (ref 0–0.6)
EOSINOPHIL NFR BLD: 2.2 %
GFR SERPLBLD CREATININE-BSD FMLA CKD-EPI: >90 ML/MIN/{1.73_M2}
GLUCOSE BLD-MCNC: 111 MG/DL (ref 70–99)
GLUCOSE BLD-MCNC: 112 MG/DL (ref 70–99)
GLUCOSE BLD-MCNC: 116 MG/DL (ref 70–99)
GLUCOSE BLD-MCNC: 121 MG/DL (ref 70–99)
GLUCOSE BLD-MCNC: 141 MG/DL (ref 70–99)
GLUCOSE SERPL-MCNC: 109 MG/DL (ref 70–99)
HCT VFR BLD AUTO: 29.8 % (ref 40.5–52.5)
HGB BLD-MCNC: 10.3 G/DL (ref 13.5–17.5)
LYMPHOCYTES # BLD: 1.5 K/UL (ref 1–5.1)
LYMPHOCYTES NFR BLD: 17.1 %
MCH RBC QN AUTO: 31.6 PG (ref 26–34)
MCHC RBC AUTO-ENTMCNC: 34.6 G/DL (ref 31–36)
MCV RBC AUTO: 91.4 FL (ref 80–100)
MONOCYTES # BLD: 0.9 K/UL (ref 0–1.3)
MONOCYTES NFR BLD: 9.7 %
NEUTROPHILS # BLD: 6.3 K/UL (ref 1.7–7.7)
NEUTROPHILS NFR BLD: 70.3 %
PERFORMED ON: ABNORMAL
PLATELET # BLD AUTO: 362 K/UL (ref 135–450)
PMV BLD AUTO: 8.4 FL (ref 5–10.5)
POTASSIUM SERPL-SCNC: 4.1 MMOL/L (ref 3.5–5.1)
RBC # BLD AUTO: 3.26 M/UL (ref 4.2–5.9)
SODIUM SERPL-SCNC: 142 MMOL/L (ref 136–145)
WBC # BLD AUTO: 9 K/UL (ref 4–11)

## 2024-07-21 PROCEDURE — 85025 COMPLETE CBC W/AUTO DIFF WBC: CPT

## 2024-07-21 PROCEDURE — C9254 INJECTION, LACOSAMIDE: HCPCS

## 2024-07-21 PROCEDURE — 80048 BASIC METABOLIC PNL TOTAL CA: CPT

## 2024-07-21 PROCEDURE — 2580000003 HC RX 258: Performed by: NURSE PRACTITIONER

## 2024-07-21 PROCEDURE — 2580000003 HC RX 258

## 2024-07-21 PROCEDURE — 6370000000 HC RX 637 (ALT 250 FOR IP): Performed by: INTERNAL MEDICINE

## 2024-07-21 PROCEDURE — 36415 COLL VENOUS BLD VENIPUNCTURE: CPT

## 2024-07-21 PROCEDURE — 6360000002 HC RX W HCPCS

## 2024-07-21 PROCEDURE — 6370000000 HC RX 637 (ALT 250 FOR IP)

## 2024-07-21 PROCEDURE — 6360000002 HC RX W HCPCS: Performed by: NURSE PRACTITIONER

## 2024-07-21 PROCEDURE — 2060000000 HC ICU INTERMEDIATE R&B

## 2024-07-21 RX ADMIN — ASPIRIN 81 MG: 81 TABLET, CHEWABLE ORAL at 08:12

## 2024-07-21 RX ADMIN — ACETAMINOPHEN 650 MG: 325 TABLET ORAL at 16:53

## 2024-07-21 RX ADMIN — FINASTERIDE 5 MG: 5 TABLET, FILM COATED ORAL at 08:12

## 2024-07-21 RX ADMIN — MEMANTINE HYDROCHLORIDE 10 MG: 5 TABLET ORAL at 08:11

## 2024-07-21 RX ADMIN — PANTOPRAZOLE SODIUM 40 MG: 40 INJECTION, POWDER, FOR SOLUTION INTRAVENOUS at 08:11

## 2024-07-21 RX ADMIN — SODIUM CHLORIDE, PRESERVATIVE FREE 10 ML: 5 INJECTION INTRAVENOUS at 08:33

## 2024-07-21 RX ADMIN — Medication 3 MG: at 21:59

## 2024-07-21 RX ADMIN — CARBIDOPA AND LEVODOPA 1 TABLET: 25; 100 TABLET ORAL at 21:59

## 2024-07-21 RX ADMIN — LEVETIRACETAM 750 MG: 100 INJECTION INTRAVENOUS at 08:10

## 2024-07-21 RX ADMIN — CARBIDOPA AND LEVODOPA 1 TABLET: 25; 100 TABLET ORAL at 14:07

## 2024-07-21 RX ADMIN — DILTIAZEM HYDROCHLORIDE 30 MG: 60 TABLET ORAL at 05:08

## 2024-07-21 RX ADMIN — MINERAL SUPPLEMENT IRON 300 MG / 5 ML STRENGTH LIQUID 100 PER BOX UNFLAVORED 300 MG: at 08:12

## 2024-07-21 RX ADMIN — LEVETIRACETAM 750 MG: 100 INJECTION INTRAVENOUS at 21:59

## 2024-07-21 RX ADMIN — APIXABAN 5 MG: 5 TABLET, FILM COATED ORAL at 08:12

## 2024-07-21 RX ADMIN — ESCITALOPRAM OXALATE 10 MG: 10 TABLET ORAL at 08:12

## 2024-07-21 RX ADMIN — APIXABAN 5 MG: 5 TABLET, FILM COATED ORAL at 22:00

## 2024-07-21 RX ADMIN — LACOSAMIDE 100 MG: 10 INJECTION INTRAVENOUS at 21:59

## 2024-07-21 RX ADMIN — MEMANTINE HYDROCHLORIDE 10 MG: 5 TABLET ORAL at 21:59

## 2024-07-21 RX ADMIN — CARBIDOPA AND LEVODOPA 1 TABLET: 25; 100 TABLET ORAL at 08:12

## 2024-07-21 RX ADMIN — LACOSAMIDE 100 MG: 10 INJECTION INTRAVENOUS at 08:13

## 2024-07-21 RX ADMIN — FUROSEMIDE 40 MG: 10 INJECTION, SOLUTION INTRAMUSCULAR; INTRAVENOUS at 08:11

## 2024-07-21 RX ADMIN — SODIUM CHLORIDE, PRESERVATIVE FREE 10 ML: 5 INJECTION INTRAVENOUS at 22:00

## 2024-07-21 RX ADMIN — ARIPIPRAZOLE 5 MG: 10 TABLET ORAL at 08:11

## 2024-07-21 RX ADMIN — MINERAL SUPPLEMENT IRON 300 MG / 5 ML STRENGTH LIQUID 100 PER BOX UNFLAVORED 300 MG: at 16:44

## 2024-07-21 RX ADMIN — MONTELUKAST SODIUM 10 MG: 10 TABLET, COATED ORAL at 08:12

## 2024-07-21 RX ADMIN — DILTIAZEM HYDROCHLORIDE 30 MG: 60 TABLET ORAL at 14:07

## 2024-07-21 ASSESSMENT — PAIN DESCRIPTION - DESCRIPTORS: DESCRIPTORS: ACHING

## 2024-07-21 ASSESSMENT — PAIN SCALES - GENERAL: PAINLEVEL_OUTOF10: 3

## 2024-07-21 ASSESSMENT — PAIN DESCRIPTION - LOCATION: LOCATION: TOE (COMMENT WHICH ONE)

## 2024-07-21 NOTE — PROGRESS NOTES
Hospitalist Progress Note      PCP: Carlton Leavtit, APRN - CNP    Date of Admission: 7/18/2024    Subjective: no acute events overnight    Medications:  Reviewed    Infusion Medications    sodium chloride       Scheduled Medications    levETIRAcetam  750 mg IntraVENous Q12H    pantoprazole (PROTONIX) 40 mg in sodium chloride (PF) 0.9 % 10 mL injection  40 mg IntraVENous Daily    dilTIAZem  30 mg Oral 3 times per day    sodium chloride flush  5-40 mL IntraVENous 2 times per day    furosemide  40 mg IntraVENous Daily    montelukast  10 mg Per G Tube Daily    memantine  10 mg Per G Tube BID    melatonin  3 mg Per G Tube Nightly    lacosamide  100 mg IntraVENous BID    escitalopram  10 mg Per G Tube Daily    ferrous Sulfate  300 mg Per G Tube BID WC    finasteride  5 mg Per G Tube Daily    carbidopa-levodopa  1 tablet Per G Tube TID    aspirin  81 mg Per G Tube Daily    apixaban  5 mg Per G Tube BID    ARIPiprazole  5 mg Per G Tube Daily     PRN Meds: guaiFENesin-dextromethorphan, sodium chloride flush, sodium chloride, potassium chloride **OR** potassium alternative oral replacement **OR** potassium chloride, magnesium sulfate, ondansetron **OR** ondansetron, polyethylene glycol, acetaminophen **OR** acetaminophen, perflutren lipid microspheres      Intake/Output Summary (Last 24 hours) at 7/21/2024 1652  Last data filed at 7/21/2024 1514  Gross per 24 hour   Intake 1203 ml   Output 1950 ml   Net -747 ml       Physical Exam Performed:    BP (!) 95/57   Pulse 72   Temp 97.7 °F (36.5 °C) (Oral)   Resp 18   Ht 1.778 m (5' 10\")   Wt 67.9 kg (149 lb 12.8 oz)   SpO2 95%   BMI 21.49 kg/m²     General: NAD  Chest:  Clear to auscultation bilaterally, no added sounds  Cardiovascular:  RRR S1S2 heard, no murmurs or gallops  Abdomen:  Soft, undistended, PEG tube in place  non tender, no organomegaly, BS present  Extremities: significantly improved peripheral edema in UE and LE now trace  Resolved scrotal edema             Note above makes patient higher risk for morbidity and mortality requiring testing and treatment.       DVT Prophylaxis: Lovenox  Diet: Diet NPO  ADULT TUBE FEEDING; PEG; Standard with Fiber; Continuous; 30; Yes; 15; Q 6 hours; 65; 30; Q 3 hours  Code Status: Full Code  PT/OT Eval Status: ordered    Dispo - cont care, awaiting precert for dc to SNF      Joanne Peñaloza MD

## 2024-07-21 NOTE — CARE COORDINATION
Spoke with Alistair at [facility] who is starting precert under pt's [facility] medicare. States [facility] is no active currently. She can bring in under [facility] and work with [facility]. Will continue to monitor.

## 2024-07-21 NOTE — PLAN OF CARE
Problem: Safety - Adult  Goal: Free from fall injury  7/21/2024 0808 by Venkatesh Kinsey RN  Outcome: Progressing  7/20/2024 1937 by Shayy Freeman RN  Outcome: Progressing     Problem: Discharge Planning  Goal: Discharge to home or other facility with appropriate resources  7/21/2024 0808 by Venkatesh Kinsey RN  Outcome: Progressing  7/20/2024 1937 by Shayy Freeman RN  Outcome: Progressing     Problem: Skin/Tissue Integrity  Goal: Absence of new skin breakdown  Description: 1.  Monitor for areas of redness and/or skin breakdown  2.  Assess vascular access sites hourly  3.  Every 4-6 hours minimum:  Change oxygen saturation probe site  4.  Every 4-6 hours:  If on nasal continuous positive airway pressure, respiratory therapy assess nares and determine need for appliance change or resting period.  7/21/2024 0808 by Venkatesh Kinsey RN  Outcome: Progressing  7/20/2024 1937 by Shayy Freeman RN  Outcome: Progressing     Problem: Chronic Conditions and Co-morbidities  Goal: Patient's chronic conditions and co-morbidity symptoms are monitored and maintained or improved  7/21/2024 0808 by Venkatesh Kinsey RN  Outcome: Progressing  7/20/2024 1937 by Shayy Freeman RN  Outcome: Progressing     Problem: Nutrition Deficit:  Goal: Optimize nutritional status  7/21/2024 0808 by Venkatesh Kinsey RN  Outcome: Progressing  7/20/2024 1937 by Shayy Freeman RN  Outcome: Progressing

## 2024-07-21 NOTE — FLOWSHEET NOTE
07/21/24 0725   Vital Signs   Temp 97.9 °F (36.6 °C)   Temp Source Oral   Pulse 80   Heart Rate Source Monitor   Respirations 18   /64   MAP (Calculated) 80   BP Location Right upper arm   BP Method Automatic   Patient Position Semi fowlers   Oxygen Therapy   SpO2 94 %   O2 Device None (Room air)     Pt. Resting in bed. Call light in reach. Shift assessment completed see flow sheet. Denies any needs at this time. Will continue to monitor.

## 2024-07-21 NOTE — PROGRESS NOTES
Pt. Wife in room and concerned about pt. Going to [facility] she does not want pt. To go there any more. Merline informed earlier in day pt. Wife would like to talk about different SNF and would try come talk to pt. And wife when she got caught up. Wife came out at 1650 asked if she come in call placed to case management no answer at this time.

## 2024-07-21 NOTE — FLOWSHEET NOTE
07/20/24 2351   Vital Signs   Temp 97.8 °F (36.6 °C)   Temp Source Oral   Pulse 67   Heart Rate Source Monitor   Respirations 18   /63   MAP (Calculated) 76   Oxygen Therapy   SpO2 93 %   O2 Device None (Room air)     Resting in bed with respirations easy/even on RA.  Call in easy reach.  Bed alarm on for safety.  Continue to monitor closely.  Shayy Freeman RN

## 2024-07-22 VITALS
HEART RATE: 80 BPM | HEIGHT: 70 IN | TEMPERATURE: 97 F | RESPIRATION RATE: 18 BRPM | OXYGEN SATURATION: 98 % | WEIGHT: 144.6 LBS | DIASTOLIC BLOOD PRESSURE: 70 MMHG | SYSTOLIC BLOOD PRESSURE: 110 MMHG | BODY MASS INDEX: 20.7 KG/M2

## 2024-07-22 LAB
GLUCOSE BLD-MCNC: 108 MG/DL (ref 70–99)
GLUCOSE BLD-MCNC: 113 MG/DL (ref 70–99)
GLUCOSE BLD-MCNC: 117 MG/DL (ref 70–99)
GLUCOSE BLD-MCNC: 131 MG/DL (ref 70–99)
GLUCOSE BLD-MCNC: 96 MG/DL (ref 70–99)
PERFORMED ON: ABNORMAL
PERFORMED ON: NORMAL

## 2024-07-22 PROCEDURE — C9254 INJECTION, LACOSAMIDE: HCPCS

## 2024-07-22 PROCEDURE — 6370000000 HC RX 637 (ALT 250 FOR IP)

## 2024-07-22 PROCEDURE — 6360000002 HC RX W HCPCS

## 2024-07-22 PROCEDURE — 6360000002 HC RX W HCPCS: Performed by: NURSE PRACTITIONER

## 2024-07-22 PROCEDURE — 2580000003 HC RX 258: Performed by: NURSE PRACTITIONER

## 2024-07-22 PROCEDURE — 99238 HOSP IP/OBS DSCHRG MGMT 30/<: CPT | Performed by: INTERNAL MEDICINE

## 2024-07-22 PROCEDURE — 2580000003 HC RX 258

## 2024-07-22 PROCEDURE — 6370000000 HC RX 637 (ALT 250 FOR IP): Performed by: INTERNAL MEDICINE

## 2024-07-22 PROCEDURE — 97110 THERAPEUTIC EXERCISES: CPT

## 2024-07-22 RX ORDER — FUROSEMIDE 20 MG/1
20 TABLET ORAL DAILY PRN
Qty: 30 TABLET | Refills: 0 | Status: SHIPPED | OUTPATIENT
Start: 2024-07-22 | End: 2024-08-21

## 2024-07-22 RX ADMIN — CARBIDOPA AND LEVODOPA 1 TABLET: 25; 100 TABLET ORAL at 08:51

## 2024-07-22 RX ADMIN — MINERAL SUPPLEMENT IRON 300 MG / 5 ML STRENGTH LIQUID 100 PER BOX UNFLAVORED 300 MG: at 17:20

## 2024-07-22 RX ADMIN — MONTELUKAST SODIUM 10 MG: 10 TABLET, COATED ORAL at 08:51

## 2024-07-22 RX ADMIN — SODIUM CHLORIDE, PRESERVATIVE FREE 10 ML: 5 INJECTION INTRAVENOUS at 08:56

## 2024-07-22 RX ADMIN — PANTOPRAZOLE SODIUM 40 MG: 40 INJECTION, POWDER, FOR SOLUTION INTRAVENOUS at 08:52

## 2024-07-22 RX ADMIN — MINERAL SUPPLEMENT IRON 300 MG / 5 ML STRENGTH LIQUID 100 PER BOX UNFLAVORED 300 MG: at 08:56

## 2024-07-22 RX ADMIN — LACOSAMIDE 100 MG: 10 INJECTION INTRAVENOUS at 08:52

## 2024-07-22 RX ADMIN — LEVETIRACETAM 750 MG: 100 INJECTION INTRAVENOUS at 08:51

## 2024-07-22 RX ADMIN — CARBIDOPA AND LEVODOPA 1 TABLET: 25; 100 TABLET ORAL at 13:59

## 2024-07-22 RX ADMIN — FUROSEMIDE 40 MG: 10 INJECTION, SOLUTION INTRAMUSCULAR; INTRAVENOUS at 08:52

## 2024-07-22 RX ADMIN — MEMANTINE HYDROCHLORIDE 10 MG: 5 TABLET ORAL at 08:52

## 2024-07-22 RX ADMIN — ESCITALOPRAM OXALATE 10 MG: 10 TABLET ORAL at 08:51

## 2024-07-22 RX ADMIN — ARIPIPRAZOLE 5 MG: 10 TABLET ORAL at 08:51

## 2024-07-22 RX ADMIN — APIXABAN 5 MG: 5 TABLET, FILM COATED ORAL at 08:52

## 2024-07-22 RX ADMIN — ASPIRIN 81 MG: 81 TABLET, CHEWABLE ORAL at 08:50

## 2024-07-22 RX ADMIN — ACETAMINOPHEN 650 MG: 325 TABLET ORAL at 05:53

## 2024-07-22 RX ADMIN — FINASTERIDE 5 MG: 5 TABLET, FILM COATED ORAL at 08:49

## 2024-07-22 RX ADMIN — DILTIAZEM HYDROCHLORIDE 30 MG: 60 TABLET ORAL at 13:59

## 2024-07-22 RX ADMIN — DILTIAZEM HYDROCHLORIDE 30 MG: 60 TABLET ORAL at 05:54

## 2024-07-22 ASSESSMENT — PAIN SCALES - GENERAL: PAINLEVEL_OUTOF10: 9

## 2024-07-22 ASSESSMENT — PAIN DESCRIPTION - LOCATION: LOCATION: ABDOMEN

## 2024-07-22 NOTE — PROGRESS NOTES
Patient educated on discharge instructions as well as new medications use, dosage, administration and possible side effects.  Patient verified knowledge. IV removed without difficulty and dry dressing in place. Telemetry monitor removed and returned to [facility]. Pt left facility in stable condition to Nursing Home with all of their personal belongings.   Report called to Kayce MENDEZ @ [facility].

## 2024-07-22 NOTE — PROGRESS NOTES
Occupational Therapy  Attempted to see pt for OT treatment. Pt declining therapy d/t pt stating he was being discharged home and wanted to conserve his energy. Educated on importance of continued mobility. Will continue to follow as schedule allows and pt medically appropriate; unless d/c home.    Jessy Payton, OTR/L

## 2024-07-22 NOTE — CARE COORDINATION
DISCHARGE ORDER  Date/Time 2024 2:54 PM  Completed by: Hoa Knapp RN, Case Management    Patient Name: Rodolfo Luna    : 1945      Admit order Date and Status: ip  Noted discharge order. (verify MD's last order for status of admission/Traditional Medicare 3 [place] Inpatient qualifying stay required for SNF)    Confirmed discharge plan with:              Patient:  Yes              When pt confirms DC plan does any support person need to be contacted by CM Yes if yes who____judith__                      Discharge to Facility: [facility]   Facility phone number for staff giving report: [phone]   Pre-certification completed: na   Hospital Exemption Notification (HENS) completed: yes   Discharge orders and Continuity of Care faxed to facility:  epic      Transportation:               Medical Transport explained with choice list offered to pt/family.                Choice:(no preference)  Agency used: [facility]   time:   1745      Pt/family/Nursing/Facility aware of  time:   Yes Names: farideh in room at this time  Ambulance form completed:  yes:      Date Last IMM Given:     Comments:pt to DC to [facility] today. Linda aware. Transport being arranged. Pt has TF and will continue at facility. No additional DC or DME needs noted    Pt is being d/c'd to [facility] today. Pt's O2 sats are 95% on ra.    Discharge timeout done with rn, cm, pt. All discharge needs and concerns addressed.    Discharging nurse to complete JOANIE, reconcile AVS, and place final copy with patient's discharge packet. Discharging RN to ensure that written prescriptions for  Level II medications are sent with patient to the facility as per protocol.

## 2024-07-22 NOTE — FLOWSHEET NOTE
07/22/24 0801   Vitals   Temp 98.1 °F (36.7 °C)   Temp Source Oral   Pulse 78   Heart Rate Source Monitor   Respirations 18   /68   MAP (Calculated) 81   BP Location Left upper arm   BP Upper/Lower Upper   BP Method Automatic   Patient Position Semi fowlers   Oxygen Therapy   SpO2 95 %   O2 Device None (Room air)     Shift assessment completed-see flow sheet. Patient in bed awake,alert and oriented x4. Forgetfulness noted at times.  VSS. Morning medications given via Peg tube, no issues noted, Dressing C,D,I.  Patient denies any discomfort at this time.  No further needs expressed at this time, call light within reach.

## 2024-07-22 NOTE — PLAN OF CARE
Problem: Safety - Adult  Goal: Free from fall injury  7/21/2024 2158 by Sasha Sanchez RN  Outcome: Progressing  7/21/2024 0808 by Venkatesh Kinsey RN  Outcome: Progressing     Problem: Discharge Planning  Goal: Discharge to home or other facility with appropriate resources  7/21/2024 2158 by Sasha Sanchez RN  Outcome: Progressing  7/21/2024 0808 by Venkatesh Kinsey RN  Outcome: Progressing     Problem: Skin/Tissue Integrity  Goal: Absence of new skin breakdown  Description: 1.  Monitor for areas of redness and/or skin breakdown  2.  Assess vascular access sites hourly  3.  Every 4-6 hours minimum:  Change oxygen saturation probe site  4.  Every 4-6 hours:  If on nasal continuous positive airway pressure, respiratory therapy assess nares and determine need for appliance change or resting period.  7/21/2024 2158 by Sasha Sanchez RN  Outcome: Progressing  7/21/2024 0808 by Venkatesh Kinsey RN  Outcome: Progressing     Problem: Chronic Conditions and Co-morbidities  Goal: Patient's chronic conditions and co-morbidity symptoms are monitored and maintained or improved  7/21/2024 2158 by Sasha Sanchez RN  Outcome: Progressing  7/21/2024 0808 by Venkatesh Kinsey RN  Outcome: Progressing     Problem: Nutrition Deficit:  Goal: Optimize nutritional status  7/21/2024 2158 by Sasha Sanchez RN  Outcome: Progressing  7/21/2024 0808 by Venkatesh Kinsey RN  Outcome: Progressing     Problem: Respiratory - Adult  Goal: Achieves optimal ventilation and oxygenation  Outcome: Progressing     Problem: Cardiovascular - Adult  Goal: Maintains optimal cardiac output and hemodynamic stability  Outcome: Progressing  Goal: Absence of cardiac dysrhythmias or at baseline  Outcome: Progressing     Problem: Skin/Tissue Integrity - Adult  Goal: Skin integrity remains intact  Outcome: Progressing  Goal: Incisions, wounds, or drain sites healing without S/S of infection  Outcome: Progressing  Goal: Oral mucous membranes

## 2024-07-22 NOTE — DISCHARGE SUMMARY
Hospital Medicine Discharge Summary    Patient: Rodolfo Luna     Gender: male  : 1945   Age: 78 y.o.  MRN: [id number]    Admitting Physician: Trevor Mckay MD  Discharge Physician: Franca Huitron,     Code Status: Full Code     Admit Date: 2024   Discharge Date:  2024     Discharge Diagnoses:    Active Hospital Problems    Diagnosis Date Noted    Mixed hyperlipidemia [E78.2] 2023     Priority: Medium    Presence of Watchman left atrial appendage closure device [Z95.818] 2023     Priority: Medium    Atrial fibrillation, persistent (HCC) [I48.19] 2022     Priority: Medium    Coronary artery disease involving native coronary artery of native heart without angina pectoris [I25.10] 2022     Priority: Medium    Elevated brain natriuretic peptide (BNP) level [R79.89] 2024    Testicular nodule [N50.89] 2024    Bilateral leg edema [R60.0] 2024    Hydrocele [N43.3] 2024    Peripheral edema [R60.0] 2024    Seizure disorder (HCC) [G40.909] 2024    Parkinson's disease (HCC) [G20.A1] 2024    History of nonmelanoma skin cancer [Z85.828] 2015    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 He has decided he does not want to go to SNF, he is discussing getting some home health with case management. He is tolerating the TFs well. He has no dyspnea. His swelling is resolved.     Peripheral edema, scrotal edema   Recent admission with IVF resuscitation likely causing edema along with Hypoalbuminemia   - CXR with no edema, stable on RA  - echo ordered, last echo on 23 with EF of 55%   - BNP 3,911 on admission  - given lasix x 1 with significant UOP of 2.4 L and resolving edema and resolved scrotal edema  - continue IV lasix, plan to discharge on lasix 20 mg daily for 5 more days, can use prn outpt  - continue daily weights, low sodium diet, 2000 ml fluid

## 2024-07-22 NOTE — PLAN OF CARE
Problem: Discharge Planning  Goal: Discharge to home or other facility with appropriate resources  7/22/2024 1131 by Mariely Chinchilla, RN  Outcome: Progressing  Flowsheets (Taken 7/22/2024 1131)  Discharge to home or other facility with appropriate resources:   Refer to discharge planning if patient needs post-hospital services based on physician order or complex needs related to functional status, cognitive ability or social support system   Arrange for needed discharge resources and transportation as appropriate     Problem: Skin/Tissue Integrity  Goal: Absence of new skin breakdown  Description: 1.  Monitor for areas of redness and/or skin breakdown  2.  Assess vascular access sites hourly  3.  Every 4-6 hours minimum:  Change oxygen saturation probe site  4.  Every 4-6 hours:  If on nasal continuous positive airway pressure, respiratory therapy assess nares and determine need for appliance change or resting period.  7/22/2024 1131 by Mariely Chinchilla, RN  Outcome: Progressing

## 2024-07-22 NOTE — CARE COORDINATION
Spoke to pt and spouse at bedside. Pt does not want to go to [facility] now. Pt does not want to go to a [facility] approved facility. Pt now wants to DC home with DEBBIE with [facility]. Will follow    1208: notified by RN stating that family now wants to Dc to facility. Call to the [facility] for assistance. Awaiting return call    1232: called to pt's room again. Pt and spouse now stating they don't think pt is able to DC home. They are unsure where they would like to place pt. Call to [facility]. They are sending list of approved facilities    1341: pt would like to go to [facility]. Call to [facility] for bed availability. Awaiting return call

## 2024-07-22 NOTE — PROGRESS NOTES
Inpatient Physical Therapy Treatment    Unit: PCU  Date:  7/22/2024  Patient Name:    Rodolfo Luna  Admitting diagnosis:  Shortness of breath [R06.02]  Peripheral edema [R60.0]  Bilateral leg edema [R60.0]  Elevated brain natriuretic peptide (BNP) level [R79.89]  Testicular nodule [N50.89]  Hydrocele, unspecified hydrocele type [N43.3]  Admit Date:  7/18/2024  Precautions/Restrictions/WB Status/ Lines/ Wounds/ Oxygen: Fall risk, Bed/chair alarm, Lines (IV and PEG tube), and Isolation Precautions: Contact    Watch BP    Pt seen for cotreatment this date due to patient safety, patient endurance, acute illness/injury, and need for the assistance of 2 skilled therapists    Treatment Time:  1454 - 1507  Treatment Number:  2   Timed Code Treatment Minutes: 13 minutes  Total Treatment Minutes:  13  minutes    Patient Stated Goals for Therapy: \" to get moving \"          Discharge Recommendations: SNF  DME needs for discharge: Defer to facility       Therapy recommendation for EMS Transport: can transport by wheelchair    Therapy recommendations for staff:   Assist of 1 for transfers with use of rolling walker (RW) and gait belt to/from C  to/from chair    Watch BP    History of Present Illness:   Per H&P:  \"The patient is a 78 y.o. male with PMH of afib, anxiety,  who presented to [facility] ED with complaint of leg swelling. Pt states that he was recently discharged from [place] after having a PEG tube placed. He states that he has been having SOB, BLE edema, testicular swelling and pain since Tuesday. He states that the swelling has been gradually worsening and now feels that it involves his hands. He states that his left testicle has also been swollen and painful with a dull ache. He denies any hx of CHF. He states he has been tolerating his tube feeds well and does not take anything by mouth. He denies CP, n/v, abd pain, dysuria, fever, chills, or dizziness. \"  Past Medical History:    Past Medical History[]Expand by

## 2024-07-22 NOTE — FLOWSHEET NOTE
07/21/24 2130   Vital Signs   Temp 99.4 °F (37.4 °C)   Temp Source Oral   Pulse 76   Heart Rate Source Monitor   Respirations 18   BP 95/61   MAP (Calculated) 72   BP Location Left upper arm   BP Method Automatic   Patient Position Semi fowlers   Oxygen Therapy   SpO2 95 %   O2 Device None (Room air)

## 2024-07-25 ENCOUNTER — TELEPHONE (OUTPATIENT)
Dept: PULMONOLOGY | Age: 79
End: 2024-07-25

## 2024-07-25 NOTE — TELEPHONE ENCOUNTER
Patient did not show for CT results/former [facility] patient appointment  with Dr. Kendrick on 7/25/24    Same Day Cancellation: No    Patient rescheduled:  No    New appointment: n/a    Patient was also no show on: n/a    LOV n/a (hospital follow up)

## 2024-08-20 ENCOUNTER — HOSPITAL ENCOUNTER (OUTPATIENT)
Dept: NEUROLOGY | Age: 79
Discharge: HOME | End: 2024-08-20
Payer: MEDICARE

## 2024-08-20 ENCOUNTER — OFFICE VISIT (OUTPATIENT)
Age: 79
End: 2024-08-20
Payer: MEDICARE

## 2024-08-20 VITALS
HEIGHT: 70 IN | OXYGEN SATURATION: 92 % | SYSTOLIC BLOOD PRESSURE: 114 MMHG | DIASTOLIC BLOOD PRESSURE: 62 MMHG | BODY MASS INDEX: 20.62 KG/M2 | WEIGHT: 144 LBS | HEART RATE: 76 BPM

## 2024-08-20 DIAGNOSIS — T88.7XXA SIDE EFFECT OF MEDICATION: ICD-10-CM

## 2024-08-20 DIAGNOSIS — R56.9 SEIZURE (HCC): ICD-10-CM

## 2024-08-20 DIAGNOSIS — R42 DIZZINESS: ICD-10-CM

## 2024-08-20 DIAGNOSIS — R41.3 MEMORY LOSS: ICD-10-CM

## 2024-08-20 DIAGNOSIS — R55 SYNCOPE AND COLLAPSE: ICD-10-CM

## 2024-08-20 DIAGNOSIS — G20.A1 PARKINSON'S DISEASE WITHOUT DYSKINESIA OR FLUCTUATING MANIFESTATIONS (HCC): Primary | ICD-10-CM

## 2024-08-20 PROCEDURE — 1124F ACP DISCUSS-NO DSCNMKR DOCD: CPT | Performed by: PSYCHIATRY & NEUROLOGY

## 2024-08-20 PROCEDURE — 95813 EEG EXTND MNTR 61-119 MIN: CPT

## 2024-08-20 PROCEDURE — 1036F TOBACCO NON-USER: CPT | Performed by: PSYCHIATRY & NEUROLOGY

## 2024-08-20 PROCEDURE — 99214 OFFICE O/P EST MOD 30 MIN: CPT | Performed by: PSYCHIATRY & NEUROLOGY

## 2024-08-20 PROCEDURE — G8427 DOCREV CUR MEDS BY ELIG CLIN: HCPCS | Performed by: PSYCHIATRY & NEUROLOGY

## 2024-08-20 PROCEDURE — G8420 CALC BMI NORM PARAMETERS: HCPCS | Performed by: PSYCHIATRY & NEUROLOGY

## 2024-08-20 PROCEDURE — 95813 EEG EXTND MNTR 61-119 MIN: CPT | Performed by: PSYCHIATRY & NEUROLOGY

## 2024-08-20 PROCEDURE — 1111F DSCHRG MED/CURRENT MED MERGE: CPT | Performed by: PSYCHIATRY & NEUROLOGY

## 2024-08-20 RX ORDER — FAMOTIDINE 20 MG/1
20 TABLET, FILM COATED ORAL 2 TIMES DAILY
COMMUNITY
Start: 2024-08-13

## 2024-08-20 RX ORDER — MEMANTINE HYDROCHLORIDE 10 MG/1
10 TABLET ORAL 2 TIMES DAILY
Qty: 60 TABLET | Refills: 2 | Status: SHIPPED | OUTPATIENT
Start: 2024-08-20

## 2024-08-20 RX ORDER — LEVETIRACETAM 500 MG/1
500 TABLET ORAL 2 TIMES DAILY
Qty: 60 TABLET | Refills: 2 | Status: SHIPPED | OUTPATIENT
Start: 2024-08-20

## 2024-08-20 RX ORDER — ALPRAZOLAM 0.25 MG/1
0.25 TABLET ORAL 3 TIMES DAILY PRN
COMMUNITY
Start: 2024-08-09

## 2024-08-20 RX ORDER — IPRATROPIUM BROMIDE AND ALBUTEROL SULFATE 2.5; .5 MG/3ML; MG/3ML
1 SOLUTION RESPIRATORY (INHALATION) EVERY 6 HOURS PRN
COMMUNITY
Start: 2024-08-13

## 2024-08-20 RX ORDER — LACOSAMIDE 100 MG/1
100 TABLET ORAL 2 TIMES DAILY
Qty: 60 TABLET | Refills: 2 | Status: SHIPPED | OUTPATIENT
Start: 2024-08-20 | End: 2024-11-18

## 2024-08-20 NOTE — PATIENT INSTRUCTIONS

## 2024-08-20 NOTE — PROGRESS NOTES
EEG completed and available for interpretation on the [facility] database .

## 2024-08-20 NOTE — PROGRESS NOTES
tobacco. He reports that he does not drink alcohol and does not use drugs.     Family history:    Family History   Problem Relation Age of Onset    Heart Disease Mother     Arthritis Mother     High Blood Pressure Mother     Miscarriages / Stillbirths Mother     Stroke Mother     Arthritis Father     Heart Disease Father     Cancer Sister     Arthritis Sister     Depression Sister     Arthritis Brother     Arthritis Maternal Grandmother     Arthritis Maternal Grandfather     Arthritis Paternal Grandmother     Diabetes Paternal Grandmother     Arthritis Paternal Grandfather         Review of system:  No chest pain, shortness of breath, palpitation, cough, fever, abdominal pain, vomiting, diarrhea, dysuria, vertigo, joint pain, change in speech/vision or new onset of weakness/numbness. Remaining as per HPI.      /62 (Site: Left Upper Arm)   Pulse 76   Ht 1.778 m (5' 10\")   Wt 65.3 kg (144 lb)   SpO2 92% Comment: RA  BMI 20.66 kg/m²     Neurological examination:  MENTAL STATUS: AAOx3  LANG/SPEECH: Fluent, intact naming, repetition & comprehension  CRANIAL NERVES:  No facial weakness.  MOTOR: Generalized muscular weakness 4/5.  REFLEXES: 2+ throughout.  SENSORY: Grossly intact.  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    Imaging, procedure, and laboratory data:   I reviewed the brain imagings and EEG.    Impression:      ICD-10-CM    1. Parkinson's disease without dyskinesia or fluctuating manifestations (HCC)  G20.A1 lacosamide (VIMPAT) 100 MG TABS tablet      2. Seizure (HCC)  R56.9 lacosamide (VIMPAT) 100 MG TABS tablet      3. Memory loss  R41.3 lacosamide (VIMPAT) 100 MG TABS tablet      4. Side effect of medication  T88.7XXA         At this time, the family and patient agree to cut back levetiracetam due to generalized fatigue.  The patient will continue lacosamide, Sinemet and memantine without adjustment.    Plan:    1.  Levetiracetam 500 mg twice daily.  2.  Lacosamide 100 mg twice

## 2024-08-28 ENCOUNTER — HOSPITAL ENCOUNTER (OUTPATIENT)
Age: 79
Setting detail: SPECIMEN
Discharge: HOME | End: 2024-08-28
Payer: OTHER GOVERNMENT

## 2024-08-28 LAB
BILIRUB UR QL STRIP.AUTO: NEGATIVE
CLARITY UR: CLEAR
COLOR UR: YELLOW
GLUCOSE UR STRIP.AUTO-MCNC: NEGATIVE MG/DL
HGB UR QL STRIP.AUTO: NEGATIVE
KETONES UR STRIP.AUTO-MCNC: NEGATIVE MG/DL
LEUKOCYTE ESTERASE UR QL STRIP.AUTO: NEGATIVE
NITRITE UR QL STRIP.AUTO: NEGATIVE
PH UR STRIP.AUTO: 8 [PH] (ref 5–8)
PROT UR STRIP.AUTO-MCNC: NEGATIVE MG/DL
SP GR UR STRIP.AUTO: 1.01 (ref 1–1.03)
UA DIPSTICK W REFLEX MICRO PNL UR: NORMAL
URN SPEC COLLECT METH UR: NORMAL
UROBILINOGEN UR STRIP-ACNC: 1 E.U./DL

## 2024-08-28 PROCEDURE — 81003 URINALYSIS AUTO W/O SCOPE: CPT

## 2024-08-28 PROCEDURE — 87086 URINE CULTURE/COLONY COUNT: CPT

## 2024-08-28 PROCEDURE — 87186 SC STD MICRODIL/AGAR DIL: CPT

## 2024-08-28 PROCEDURE — 87077 CULTURE AEROBIC IDENTIFY: CPT

## 2024-08-31 LAB
BACTERIA UR CULT: ABNORMAL
ORGANISM: ABNORMAL

## 2024-09-16 PROBLEM — E88.09 HYPOALBUMINEMIA: Status: ACTIVE | Noted: 2024-09-16

## 2024-09-24 ENCOUNTER — HOSPITAL ENCOUNTER (OUTPATIENT)
Age: 79
Discharge: HOME | End: 2024-09-24
Payer: MEDICARE

## 2024-09-24 ENCOUNTER — HOSPITAL ENCOUNTER (OUTPATIENT)
Dept: GENERAL RADIOLOGY | Age: 79
Discharge: HOME | End: 2024-09-24
Payer: MEDICARE

## 2024-09-24 ENCOUNTER — OFFICE VISIT (OUTPATIENT)
Dept: PULMONOLOGY | Age: 79
End: 2024-09-24
Payer: OTHER GOVERNMENT

## 2024-09-24 VITALS
WEIGHT: 153 LBS | HEART RATE: 85 BPM | SYSTOLIC BLOOD PRESSURE: 122 MMHG | HEIGHT: 69 IN | DIASTOLIC BLOOD PRESSURE: 60 MMHG | BODY MASS INDEX: 22.66 KG/M2 | OXYGEN SATURATION: 95 % | RESPIRATION RATE: 17 BRPM

## 2024-09-24 DIAGNOSIS — R06.02 SOB (SHORTNESS OF BREATH): ICD-10-CM

## 2024-09-24 DIAGNOSIS — R06.02 SOB (SHORTNESS OF BREATH): Primary | ICD-10-CM

## 2024-09-24 PROCEDURE — 99213 OFFICE O/P EST LOW 20 MIN: CPT | Performed by: INTERNAL MEDICINE

## 2024-09-24 PROCEDURE — 1036F TOBACCO NON-USER: CPT | Performed by: INTERNAL MEDICINE

## 2024-09-24 PROCEDURE — 71046 X-RAY EXAM CHEST 2 VIEWS: CPT

## 2024-09-24 PROCEDURE — G8420 CALC BMI NORM PARAMETERS: HCPCS | Performed by: INTERNAL MEDICINE

## 2024-09-24 PROCEDURE — G8427 DOCREV CUR MEDS BY ELIG CLIN: HCPCS | Performed by: INTERNAL MEDICINE

## 2024-09-24 PROCEDURE — 1124F ACP DISCUSS-NO DSCNMKR DOCD: CPT | Performed by: INTERNAL MEDICINE

## 2024-09-25 ENCOUNTER — OFFICE VISIT (OUTPATIENT)
Dept: CARDIOLOGY CLINIC | Age: 79
End: 2024-09-25
Payer: MEDICARE

## 2024-09-25 VITALS
BODY MASS INDEX: 22.75 KG/M2 | WEIGHT: 153.6 LBS | SYSTOLIC BLOOD PRESSURE: 100 MMHG | OXYGEN SATURATION: 96 % | HEART RATE: 77 BPM | HEIGHT: 69 IN | DIASTOLIC BLOOD PRESSURE: 58 MMHG

## 2024-09-25 DIAGNOSIS — I20.89 CHRONIC STABLE ANGINA (HCC): Primary | ICD-10-CM

## 2024-09-25 DIAGNOSIS — I10 ESSENTIAL HYPERTENSION: ICD-10-CM

## 2024-09-25 DIAGNOSIS — I50.32 CHRONIC DIASTOLIC HEART FAILURE WITH PRESERVED EJECTION FRACTION (HCC): ICD-10-CM

## 2024-09-25 DIAGNOSIS — I48.19 ATRIAL FIBRILLATION, PERSISTENT (HCC): ICD-10-CM

## 2024-09-25 PROCEDURE — 1036F TOBACCO NON-USER: CPT | Performed by: INTERNAL MEDICINE

## 2024-09-25 PROCEDURE — 3078F DIAST BP <80 MM HG: CPT | Performed by: INTERNAL MEDICINE

## 2024-09-25 PROCEDURE — G8427 DOCREV CUR MEDS BY ELIG CLIN: HCPCS | Performed by: INTERNAL MEDICINE

## 2024-09-25 PROCEDURE — 99214 OFFICE O/P EST MOD 30 MIN: CPT | Performed by: INTERNAL MEDICINE

## 2024-09-25 PROCEDURE — 1124F ACP DISCUSS-NO DSCNMKR DOCD: CPT | Performed by: INTERNAL MEDICINE

## 2024-09-25 PROCEDURE — 3074F SYST BP LT 130 MM HG: CPT | Performed by: INTERNAL MEDICINE

## 2024-09-25 PROCEDURE — G8420 CALC BMI NORM PARAMETERS: HCPCS | Performed by: INTERNAL MEDICINE

## 2024-11-21 ENCOUNTER — OFFICE VISIT (OUTPATIENT)
Age: 79
End: 2024-11-21
Payer: MEDICARE

## 2024-11-21 VITALS
BODY MASS INDEX: 23.85 KG/M2 | OXYGEN SATURATION: 97 % | HEIGHT: 69 IN | SYSTOLIC BLOOD PRESSURE: 120 MMHG | RESPIRATION RATE: 14 BRPM | DIASTOLIC BLOOD PRESSURE: 72 MMHG | WEIGHT: 161 LBS | HEART RATE: 60 BPM

## 2024-11-21 DIAGNOSIS — G20.A1 PARKINSON'S DISEASE WITHOUT DYSKINESIA OR FLUCTUATING MANIFESTATIONS (HCC): ICD-10-CM

## 2024-11-21 DIAGNOSIS — R56.9 SEIZURE (HCC): ICD-10-CM

## 2024-11-21 DIAGNOSIS — R41.3 MEMORY LOSS: ICD-10-CM

## 2024-11-21 PROCEDURE — 1159F MED LIST DOCD IN RCRD: CPT | Performed by: PSYCHIATRY & NEUROLOGY

## 2024-11-21 PROCEDURE — G8420 CALC BMI NORM PARAMETERS: HCPCS | Performed by: PSYCHIATRY & NEUROLOGY

## 2024-11-21 PROCEDURE — 1036F TOBACCO NON-USER: CPT | Performed by: PSYCHIATRY & NEUROLOGY

## 2024-11-21 PROCEDURE — G8427 DOCREV CUR MEDS BY ELIG CLIN: HCPCS | Performed by: PSYCHIATRY & NEUROLOGY

## 2024-11-21 PROCEDURE — 99214 OFFICE O/P EST MOD 30 MIN: CPT | Performed by: PSYCHIATRY & NEUROLOGY

## 2024-11-21 PROCEDURE — 1124F ACP DISCUSS-NO DSCNMKR DOCD: CPT | Performed by: PSYCHIATRY & NEUROLOGY

## 2024-11-21 PROCEDURE — G8484 FLU IMMUNIZE NO ADMIN: HCPCS | Performed by: PSYCHIATRY & NEUROLOGY

## 2024-11-21 RX ORDER — CARBIDOPA AND LEVODOPA 25; 100 MG/1; MG/1
1 TABLET, EXTENDED RELEASE ORAL 2 TIMES DAILY
Qty: 60 TABLET | Refills: 2 | Status: SHIPPED | OUTPATIENT
Start: 2024-11-21

## 2024-11-21 RX ORDER — LEVETIRACETAM 750 MG/1
750 TABLET ORAL 2 TIMES DAILY
Qty: 60 TABLET | Refills: 2 | Status: SHIPPED | OUTPATIENT
Start: 2024-11-21

## 2024-11-21 RX ORDER — MEMANTINE HYDROCHLORIDE 10 MG/1
10 TABLET ORAL 2 TIMES DAILY
Qty: 60 TABLET | Refills: 2 | Status: SHIPPED | OUTPATIENT
Start: 2024-11-21

## 2024-11-21 RX ORDER — CARBIDOPA AND LEVODOPA 25; 100 MG/1; MG/1
1 TABLET ORAL 3 TIMES DAILY
Qty: 90 TABLET | Refills: 2 | Status: SHIPPED | OUTPATIENT
Start: 2024-11-21

## 2024-11-21 RX ORDER — LACOSAMIDE 100 MG/1
100 TABLET ORAL 2 TIMES DAILY
Qty: 60 TABLET | Refills: 2 | Status: SHIPPED | OUTPATIENT
Start: 2024-11-21 | End: 2025-02-19

## 2024-11-21 NOTE — PATIENT INSTRUCTIONS

## 2024-11-21 NOTE — PROGRESS NOTES
Neurology outpatient follow-up visit    Patient name: Rodolfo Luna      Chief Complaint:  Seizure disorder, dementia and parkinsonism.    History of present illness:  The patient is a 78 y.o. male with a past medical history of Parkinson's disease, atrial fibrillation (s/p Watchman device), CAD, seizures, and lung cancer s/p partial lobectomy originally presenting to the hospital for concerns of esophageal stricture status post dilation.  The patient is here for follow-up after multiple hospitalization with confusion and seizure-like activity.  The patient had EEG done this morning it showed no evidence of seizure tendency but mild degree of encephalopathy.  The patient remains on levetiracetam and lacosamide.  Per family, the patient has not had any seizure-like activity so far.  The family also reports significant generalized fatigue.    Interval History:  11/21/24: The patient is here for follow-up Parkinson's, seizure disorder and dementia.  Unfortunately, the patient developed seizure-like activity with a lower dose of levetiracetam.  So levetiracetam was back to 750 mg twice daily again.  The patient remains on Sinemet 3 times a day, lacosamide, and memantine.  The patient is doing well today.  The patient remains to have shuffling gait.  The patient denies any new focal weakness or numbness at this time.    Past medical history:    Past Medical History:   Diagnosis Date    A-fib ([facility])     MIRNA (acute kidney injury) ([facility]) 03/31/2022    Anxiety     Arthritis     OA    Bradycardia 04/19/2014    Cancer ([facility])     skin cancer-forearm right , face    Coronary artery disease involving native coronary artery of native heart without angina pectoris 07/19/2022    Hemoptysis 10/16/2014    Since Ablation    History of implantation of penile prosthesis     Irregular heart  beats     Mixed hyperlipidemia 03/17/2023    Parkinson disease ([facility])     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[facility]) 12/10/2014    S/P drug eluting coronary stent

## 2024-11-27 ENCOUNTER — TELEPHONE (OUTPATIENT)
Dept: NEUROLOGY | Age: 79
End: 2024-11-27

## 2024-11-27 NOTE — TELEPHONE ENCOUNTER
Pt is confused about meds, thinks he got too many at pharmacy, can someone CB to confirm his med regimen?

## 2024-11-27 NOTE — TELEPHONE ENCOUNTER
Spoke with patient who was informed on Dr Walker's recommendation. I went over all four medications that were prescribed in depth. See below. I did inform patient to CB with any additional questions.     Plan:     1.  Levetiracetam 750 mg twice daily.  2.  Lacosamide 100 mg twice daily.  3.  Sinemet 100/25 mg 3 times daily.  4.  Memantine 10 mg twice daily.  5.  Seizure precaution.  6.  Follow-up in 3 months.  7.  Sinemet CR 1 tablet twice daily.

## 2024-12-09 ENCOUNTER — TELEPHONE (OUTPATIENT)
Age: 79
End: 2024-12-09

## 2024-12-09 RX ORDER — LEVETIRACETAM 500 MG/1
500 TABLET ORAL 2 TIMES DAILY
Qty: 60 TABLET | Refills: 2 | Status: SHIPPED | OUTPATIENT
Start: 2024-12-09 | End: 2025-03-09

## 2024-12-09 NOTE — TELEPHONE ENCOUNTER
I did not increase any lacosamide on the last visit.  Actually, hospitalist increase levetiracetam back to 750 mg twice daily which we knew that the patient developed daytime sleepiness from levetiracetam in the past.  He can try levetiracetam 500 mg twice daily with the same dose of lacosamide.

## 2024-12-09 NOTE — TELEPHONE ENCOUNTER
Pt's wife called saying since Dr. Walker increased Rodolfo's lacosamide and Keppra at his appt on 11/21, he's been a lot more tired/drowsy.  She says after his 9 am dose, he goes right back to sleep.  She wants to know if this is a side effect of the increased dose or if Dr. Walker thinks the medications need to be adjusted down.  She says she'll have him continue the same dose if Dr. Walker thinks he may eventually adjust to it and if the drowsiness subsides.     Pharmacy: [facility]

## 2024-12-10 NOTE — TELEPHONE ENCOUNTER
Spoke with pt's wife and informed her of results/ recommendations.  She verbalized understanding.

## 2024-12-18 ENCOUNTER — HOSPITAL ENCOUNTER (OUTPATIENT)
Dept: GENERAL RADIOLOGY | Age: 79
Discharge: HOME | End: 2024-12-18
Attending: INTERNAL MEDICINE

## 2024-12-18 ENCOUNTER — HOSPITAL ENCOUNTER (OUTPATIENT)
Dept: SPEECH THERAPY | Age: 79
Setting detail: THERAPIES SERIES
Discharge: HOME | End: 2024-12-18
Attending: INTERNAL MEDICINE
Payer: MEDICARE

## 2024-12-18 DIAGNOSIS — R13.10 DYSPHAGIA, UNSPECIFIED TYPE: ICD-10-CM

## 2024-12-18 PROCEDURE — 92526 ORAL FUNCTION THERAPY: CPT

## 2024-12-18 PROCEDURE — 92611 MOTION FLUOROSCOPY/SWALLOW: CPT

## 2024-12-18 NOTE — PROCEDURES
SPEECH/LANGUAGE PATHOLOGY  Swallowing Disorders and Dysphagia  VIDEOFLUOROSCOPIC STUDY OF SWALLOWING (VFSS) / Modified Barium Swallow Study (MBSS)  Facility/Department: [facility] SPEECH THERAPY    PATIENT NAME:  Rodolfo Luna      :  1945    Room: Room/bed info not found   TODAY'S DATE:  2024    [x]The admitting diagnosis and active problem list, as listed below have been reviewed prior to initiation of this evaluation.     ADMITTING DIAGNOSIS: Dysphagia, unspecified type [R13.10]     ACTIVE PROBLEM LIST:   Patient Active Problem List   Diagnosis    Dizziness    Syncope and collapse    Contusion of knee, right    Dysphagia    Encounter for electronic analysis of reveal event recorder    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HCC)    History of nonmelanoma skin cancer    Ischemic chest pain (HCC)    Left hand pain    Hand pain, right    Arthritis of carpometacarpal (CMC) joints of both thumbs    Radial styloid tenosynovitis (de quervain)    Left elbow fracture, closed, initial encounter    Multiple falls    General weakness    Thrombocytopenia (HCC)    Malignant neoplasm of lung (HCC)    Orthostasis    Hypotension    Chest pain    Abnormal finding on EKG    Digoxin toxicity, accidental or unintentional, initial encounter    COVID-19 virus infection    COVID    Rib pain on right side    Coronary artery disease involving native coronary artery of native heart without angina pectoris    Atrial fibrillation, persistent (HCC)    Presence of Watchman left atrial appendage closure device    Mixed hyperlipidemia    TIA (transient ischemic attack)    Altered mental status    Atrial fibrillation (HCC)    Parkinson's disease (HCC)    Left pontine stroke (HCC)    Generalized weakness    Intermittent confusion    Acute encephalopathy    Choking    History of CVA (cerebrovascular accident)    Hemoptysis    Seizure disorder (HCC)    Aspiration into airway    Severe protein-calorie malnutrition (HCC)    Presbyesophagus    Esophageal

## 2025-03-20 ENCOUNTER — TELEPHONE (OUTPATIENT)
Age: 80
End: 2025-03-20

## 2025-03-20 NOTE — TELEPHONE ENCOUNTER
Current pt of Dr. Walker's requesting to see Dr. Aquino due to Dr. Walker leaving [facility].  I explained to pt's wife that Dr. Aquino will review his chart first to determine if he can take over Rodoflo's care and that we'll call her with his determination.     3 mo f/u on 5/5/25 with Dr. Walker has been cancelled.     Pt sees Dr. Walker for seizure disorder, Parkinson's & dementia.  He takes Keppra & lacosamide for seizures.

## 2025-03-21 ENCOUNTER — TELEPHONE (OUTPATIENT)
Dept: FAMILY MEDICINE CLINIC | Age: 80
End: 2025-03-21

## 2025-03-21 NOTE — TELEPHONE ENCOUNTER
Pt is requesting a new referral for neurology. Pt states the one he is currently seeing is retiring. Pt would like to go to [facility] in [place], [place]. Fax number is [phone].

## 2025-03-21 NOTE — TELEPHONE ENCOUNTER
Please call patient to schedule    Per Dr Aquino:      [place]iana to schedule follow up appointment with me at time interval previously planned by Dr. Walker

## 2025-03-21 NOTE — TELEPHONE ENCOUNTER
He has appt with Dr. Aquino in May.  Did he not want to keep that appointment?  We can certainly send referral to [facility] if he would prefer but he may not be able to be seen until June/ July

## 2025-03-21 NOTE — TELEPHONE ENCOUNTER
Patient of Dr Walker's requesting transfer to you.     Pt sees Dr. Walker for seizure disorder, Parkinson's & dementia. He takes Keppra & lacosamide for seizures     Please review chart and advise on scheduling or if they need to get a new referral?

## 2025-04-11 ENCOUNTER — TELEPHONE (OUTPATIENT)
Dept: FAMILY MEDICINE CLINIC | Age: 80
End: 2025-04-11

## 2025-04-11 NOTE — TELEPHONE ENCOUNTER
Patient is requesting to have an order to have oxygen discontinued.    D/C order will need sent to [facility] fax# [phone]

## 2025-04-14 ENCOUNTER — TELEPHONE (OUTPATIENT)
Dept: PULMONOLOGY | Age: 80
End: 2025-04-14

## 2025-04-14 NOTE — TELEPHONE ENCOUNTER
Patient care coordinator called asking if we can discontinue patient oxygen. She states that the patient says he does not use this. Please advise

## 2025-04-14 NOTE — PLAN OF CARE
Problem: Discharge Planning  Goal: Discharge to home or other facility with appropriate resources  Outcome: Progressing     Problem: Safety - Adult  Goal: Free from fall injury  Outcome: Progressing     Problem: Skin/Tissue Integrity  Goal: Absence of new skin breakdown  Description: 1.  Monitor for areas of redness and/or skin breakdown  2.  Assess vascular access sites hourly  3.  Every 4-6 hours minimum:  Change oxygen saturation probe site  4.  Every 4-6 hours:  If on nasal continuous positive airway pressure, respiratory therapy assess nares and determine need for appliance change or resting period.  Outcome: Progressing     Problem: Chronic Conditions and Co-morbidities  Goal: Patient's chronic conditions and co-morbidity symptoms are monitored and maintained or improved  Outcome: Progressing     Problem: Pain  Goal: Verbalizes/displays adequate comfort level or baseline comfort level  Outcome: Progressing      [facility]

## 2025-04-28 DIAGNOSIS — G20.A1 PARKINSON'S DISEASE WITHOUT DYSKINESIA OR FLUCTUATING MANIFESTATIONS (HCC): Primary | ICD-10-CM

## 2025-04-28 RX ORDER — CARBIDOPA AND LEVODOPA 25; 100 MG/1; MG/1
1 TABLET, EXTENDED RELEASE ORAL 2 TIMES DAILY
Qty: 60 TABLET | Refills: 0 | Status: SHIPPED | OUTPATIENT
Start: 2025-04-28

## 2025-04-28 NOTE — TELEPHONE ENCOUNTER
Last seen: 11/21/24    Next appt: 5/20/25 to establish with Dr. Aquino    Last filled: 11/21/24 for 30-day supply with 2 refills

## 2025-05-13 ENCOUNTER — TRANSCRIBE ORDERS (OUTPATIENT)
Dept: ADMINISTRATIVE | Age: 80
End: 2025-05-13

## 2025-05-13 DIAGNOSIS — R13.12 DYSPHAGIA, OROPHARYNGEAL: Primary | ICD-10-CM

## 2025-05-18 ENCOUNTER — HOSPITAL ENCOUNTER (EMERGENCY)
Age: 80
Discharge: HOME | End: 2025-05-18
Attending: EMERGENCY MEDICINE
Payer: MEDICARE

## 2025-05-18 VITALS
BODY MASS INDEX: 24.29 KG/M2 | WEIGHT: 164 LBS | RESPIRATION RATE: 16 BRPM | DIASTOLIC BLOOD PRESSURE: 63 MMHG | SYSTOLIC BLOOD PRESSURE: 124 MMHG | TEMPERATURE: 98.1 F | OXYGEN SATURATION: 99 % | HEART RATE: 78 BPM | HEIGHT: 69 IN

## 2025-05-18 DIAGNOSIS — T14.8XXA BLEEDING FROM WOUND: Primary | ICD-10-CM

## 2025-05-18 LAB
ALBUMIN SERPL-MCNC: 3.5 G/DL (ref 3.4–5)
ALBUMIN/GLOB SERPL: 0.8 {RATIO} (ref 1.1–2.2)
ALP SERPL-CCNC: 116 U/L (ref 40–129)
ALT SERPL-CCNC: 21 U/L (ref 10–40)
ANION GAP SERPL CALCULATED.3IONS-SCNC: 13 MMOL/L (ref 3–16)
ANTI-XA UNFRAC HEPARIN: >1.1 IU/ML (ref 0.3–0.7)
AST SERPL-CCNC: 37 U/L (ref 15–37)
BASOPHILS # BLD: 0.1 K/UL (ref 0–0.2)
BASOPHILS NFR BLD: 0.9 %
BILIRUB SERPL-MCNC: 1.1 MG/DL (ref 0–1)
BUN SERPL-MCNC: 23 MG/DL (ref 7–20)
CALCIUM SERPL-MCNC: 8.6 MG/DL (ref 8.3–10.6)
CHLORIDE SERPL-SCNC: 101 MMOL/L (ref 99–110)
CO2 SERPL-SCNC: 24 MMOL/L (ref 21–32)
CREAT SERPL-MCNC: 0.7 MG/DL (ref 0.8–1.3)
DEPRECATED RDW RBC AUTO: 15.1 % (ref 12.4–15.4)
EOSINOPHIL # BLD: 0 K/UL (ref 0–0.6)
EOSINOPHIL NFR BLD: 0.6 %
GFR SERPLBLD CREATININE-BSD FMLA CKD-EPI: >90 ML/MIN/{1.73_M2}
GLUCOSE SERPL-MCNC: 94 MG/DL (ref 70–99)
HCT VFR BLD AUTO: 36.7 % (ref 40.5–52.5)
HGB BLD-MCNC: 12.3 G/DL (ref 13.5–17.5)
LYMPHOCYTES # BLD: 2.4 K/UL (ref 1–5.1)
LYMPHOCYTES NFR BLD: 29.8 %
MCH RBC QN AUTO: 31.3 PG (ref 26–34)
MCHC RBC AUTO-ENTMCNC: 33.6 G/DL (ref 31–36)
MCV RBC AUTO: 93.1 FL (ref 80–100)
MONOCYTES # BLD: 0.6 K/UL (ref 0–1.3)
MONOCYTES NFR BLD: 8 %
NEUTROPHILS # BLD: 4.8 K/UL (ref 1.7–7.7)
NEUTROPHILS NFR BLD: 60.7 %
PLATELET # BLD AUTO: 116 K/UL (ref 135–450)
PMV BLD AUTO: 9.2 FL (ref 5–10.5)
POTASSIUM SERPL-SCNC: 4.6 MMOL/L (ref 3.5–5.1)
PROT SERPL-MCNC: 8 G/DL (ref 6.4–8.2)
RBC # BLD AUTO: 3.94 M/UL (ref 4.2–5.9)
SODIUM SERPL-SCNC: 138 MMOL/L (ref 136–145)
WBC # BLD AUTO: 7.9 K/UL (ref 4–11)

## 2025-05-18 PROCEDURE — 85025 COMPLETE CBC W/AUTO DIFF WBC: CPT

## 2025-05-18 PROCEDURE — 6370000000 HC RX 637 (ALT 250 FOR IP): Performed by: NURSE PRACTITIONER

## 2025-05-18 PROCEDURE — 2500000003 HC RX 250 WO HCPCS: Performed by: EMERGENCY MEDICINE

## 2025-05-18 PROCEDURE — 85520 HEPARIN ASSAY: CPT

## 2025-05-18 PROCEDURE — 99284 EMERGENCY DEPT VISIT MOD MDM: CPT

## 2025-05-18 PROCEDURE — 80053 COMPREHEN METABOLIC PANEL: CPT

## 2025-05-18 PROCEDURE — 96374 THER/PROPH/DIAG INJ IV PUSH: CPT

## 2025-05-18 RX ORDER — CARBIDOPA AND LEVODOPA 25; 100 MG/1; MG/1
1 TABLET ORAL ONCE
Status: COMPLETED | OUTPATIENT
Start: 2025-05-18 | End: 2025-05-18

## 2025-05-18 RX ORDER — TRANEXAMIC ACID 100 MG/ML
10 INJECTION, SOLUTION INTRAVENOUS ONCE
Status: DISCONTINUED | OUTPATIENT
Start: 2025-05-18 | End: 2025-05-18

## 2025-05-18 RX ORDER — TRANEXAMIC ACID 100 MG/ML
1000 INJECTION, SOLUTION INTRAVENOUS
Status: COMPLETED | OUTPATIENT
Start: 2025-05-18 | End: 2025-05-18

## 2025-05-18 RX ORDER — TRANEXAMIC ACID 100 MG/ML
1000 INJECTION, SOLUTION INTRAVENOUS ONCE
Status: COMPLETED | OUTPATIENT
Start: 2025-05-18 | End: 2025-05-18

## 2025-05-18 RX ADMIN — TRANEXAMIC ACID 1000 MG: 100 INJECTION, SOLUTION INTRAVENOUS at 17:32

## 2025-05-18 RX ADMIN — CARBIDOPA AND LEVODOPA 1 TABLET: 25; 100 TABLET ORAL at 20:05

## 2025-05-18 RX ADMIN — TRANEXAMIC ACID 1000 MG: 100 INJECTION, SOLUTION INTRAVENOUS at 18:06

## 2025-05-18 ASSESSMENT — PAIN - FUNCTIONAL ASSESSMENT: PAIN_FUNCTIONAL_ASSESSMENT: NONE - DENIES PAIN

## 2025-05-19 ENCOUNTER — HOSPITAL ENCOUNTER (OUTPATIENT)
Dept: SPEECH THERAPY | Age: 80
Setting detail: THERAPIES SERIES
Discharge: HOME | End: 2025-05-19
Payer: MEDICARE

## 2025-05-19 ENCOUNTER — RESULTS FOLLOW-UP (OUTPATIENT)
Dept: FAMILY MEDICINE CLINIC | Age: 80
End: 2025-05-19

## 2025-05-19 ENCOUNTER — HOSPITAL ENCOUNTER (OUTPATIENT)
Dept: GENERAL RADIOLOGY | Age: 80
Discharge: HOME | End: 2025-05-19
Payer: MEDICARE

## 2025-05-19 DIAGNOSIS — R13.12 DYSPHAGIA, OROPHARYNGEAL: ICD-10-CM

## 2025-05-19 PROCEDURE — 74230 X-RAY XM SWLNG FUNCJ C+: CPT

## 2025-05-19 PROCEDURE — 92611 MOTION FLUOROSCOPY/SWALLOW: CPT

## 2025-05-19 NOTE — PROCEDURES
related to hx of dysphagia, prolonged use of alt means of nutrition, Parkinson's Disease, the aging swallow, and co-morbidities. This study appears largely similar to prior MBS studies completed in 07/2024 and 12/2024. Swallow safety is impaired; swallow efficiency is impaired. Pt appears to be at high risk for aspiration PNA, and high risk for malnutrition/dehydration without alternative source of nutrition/hydration which is currently in use. Non-oral nutrition is warranted. Swallow prognosis is guarded given severity of laryngeal dysfunction and progressive nature of condition/disease. Pt appears to be a fair candidate for swallow rehabilitation    Patient presents as high aspiration risk and high PNA / adverse pulmonary event risk given the following factors via BOLUS Framework (WILLOW Alvarez. & Austin, A.H. (2021):  poor overall health / frail  Impaired respiratory status / weak cough  hx GERD / GI disease affecting oropharyngeal swallowing  currently requiring invasive medical devices (trach/NGT)  at risk to aspirate large volumes of acidic material  reduced ambulation   previous aspiration PNA  *However, pt does not appears to have experienced negative sequelae associated with aspiration despite small amounts of po intake for QOL.     DIGEST Safety thGthrthathdtheth:th th5th DIGEST Efficiency thGthrthathdtheth:th th5th Overall DIGEST Pharyngeal Severity: life threatening/profound       Diet Recommendation: NPO ; Consider ice chips only after oral care to combat disuse atrophy and for pt comfort; Meds via alt means of nutrition  *Given ongoing severity of dysphagia despite behavorial swallow intervention, consider pt goals and the fact that pt reports no negative sequelae associated with aspiration despite small amounts of po intake for QOL.   Risk Management: Control risk factors for aspiration PNA by completing oral care 3-4x/day and increasing physical mobility as is medically feasible  Specialist Referrals: N/A  Ancillary Tests: N/A  Goals:

## 2025-05-19 NOTE — ED NOTES
Surgicele placed on pt nose wound per NP request. Pt tolerated well, no oozing through while in ER. Pt and wife instructed on use and verbalized understanding.

## 2025-05-20 ENCOUNTER — OFFICE VISIT (OUTPATIENT)
Dept: NEUROLOGY | Age: 80
End: 2025-05-20
Payer: MEDICARE

## 2025-05-20 VITALS
HEIGHT: 69 IN | BODY MASS INDEX: 24.29 KG/M2 | SYSTOLIC BLOOD PRESSURE: 108 MMHG | WEIGHT: 164 LBS | DIASTOLIC BLOOD PRESSURE: 60 MMHG | HEART RATE: 75 BPM | OXYGEN SATURATION: 95 %

## 2025-05-20 DIAGNOSIS — R41.3 MEMORY LOSS: ICD-10-CM

## 2025-05-20 DIAGNOSIS — R26.9 GAIT DISORDER: ICD-10-CM

## 2025-05-20 DIAGNOSIS — R25.1 TREMULOUSNESS: ICD-10-CM

## 2025-05-20 DIAGNOSIS — R13.10 DYSPHAGIA, UNSPECIFIED TYPE: ICD-10-CM

## 2025-05-20 DIAGNOSIS — G20.C PARKINSONISM, UNSPECIFIED PARKINSONISM TYPE (HCC): ICD-10-CM

## 2025-05-20 DIAGNOSIS — G40.909 NONINTRACTABLE EPILEPSY WITHOUT STATUS EPILEPTICUS, UNSPECIFIED EPILEPSY TYPE (HCC): Primary | ICD-10-CM

## 2025-05-20 PROCEDURE — 1159F MED LIST DOCD IN RCRD: CPT | Performed by: STUDENT IN AN ORGANIZED HEALTH CARE EDUCATION/TRAINING PROGRAM

## 2025-05-20 PROCEDURE — 1036F TOBACCO NON-USER: CPT | Performed by: STUDENT IN AN ORGANIZED HEALTH CARE EDUCATION/TRAINING PROGRAM

## 2025-05-20 PROCEDURE — 1124F ACP DISCUSS-NO DSCNMKR DOCD: CPT | Performed by: STUDENT IN AN ORGANIZED HEALTH CARE EDUCATION/TRAINING PROGRAM

## 2025-05-20 PROCEDURE — G8420 CALC BMI NORM PARAMETERS: HCPCS | Performed by: STUDENT IN AN ORGANIZED HEALTH CARE EDUCATION/TRAINING PROGRAM

## 2025-05-20 PROCEDURE — G8427 DOCREV CUR MEDS BY ELIG CLIN: HCPCS | Performed by: STUDENT IN AN ORGANIZED HEALTH CARE EDUCATION/TRAINING PROGRAM

## 2025-05-20 PROCEDURE — 99215 OFFICE O/P EST HI 40 MIN: CPT | Performed by: STUDENT IN AN ORGANIZED HEALTH CARE EDUCATION/TRAINING PROGRAM

## 2025-05-20 RX ORDER — LACOSAMIDE 100 MG/1
100 TABLET ORAL 2 TIMES DAILY
Qty: 60 TABLET | Refills: 11 | Status: SHIPPED | OUTPATIENT
Start: 2025-05-20 | End: 2026-05-15

## 2025-05-20 RX ORDER — LORAZEPAM 1 MG/1
1 TABLET ORAL EVERY 6 HOURS PRN
COMMUNITY

## 2025-05-20 RX ORDER — LEVETIRACETAM 500 MG/1
750 TABLET ORAL 2 TIMES DAILY
Qty: 90 TABLET | Refills: 11 | Status: SHIPPED | OUTPATIENT
Start: 2025-05-20 | End: 2026-05-15

## 2025-05-20 RX ORDER — MEMANTINE HYDROCHLORIDE 10 MG/1
10 TABLET ORAL 2 TIMES DAILY
Qty: 60 TABLET | Refills: 11 | Status: SHIPPED | OUTPATIENT
Start: 2025-05-20 | End: 2026-05-15

## 2025-05-20 NOTE — PROGRESS NOTES
The EEG showed rare epileptogenic potentials over the left temporal area and intermittent focal slowing activity over the same area. 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     10/2014 EEG = normal    6/2012 EEG = normal    Assessment & Plan  1.  Focal idiopathic epilepsy.  Onset 1987.  Seizure semiology = 1) transient loss of awareness, unresponsive.  Previous EEG revealed rare epileptogenic potentials left temporal area.  No epileptogenic lesion found on brain MRI. Seizures are well controlled with Keppra and Vimpat but there are signs of adverse effects from ASM most likely due to Vimpat (gaze evoked vertical nystagmus, gait ataxia).     Continue Keppra 750mg BID   Continue Vimpat 100mg BID. Consider lower dose in the future.     2. Tremulousness, gait disorder, history of parkinsonism  He no longer has parkinsonism. Diagnosis of parkinsonism associated with previous Abilify exposure but parkinsonism has resolved after stopping Abilify and starting Sinemet. I suspect parkinsonism is drug induced not neurodegenerative. Tremulousness and gait ataxia are likely adverse effects from Vimpat.     Reduce and stop Sinemet  Consider reducing Vimpat in the future    3. Dysphagia.   Speech therapy suspects dysphagia is due to neurological etiology. No clear evidence of motor neuron disease, NMJ disorder, or myopathy on exam. Parkinsonism is resolved but dysphagia is severe and persists.     Will fax request records from GI specialist to review  Labs: CK, aldolase, AChR Ab    4. Memory loss  We did not have time to address this today. Will evaluate memory loss at next visit.     Follow-up  The patient will follow up in 3 months.    The patient (or guardian, if applicable) and other individuals in attendance with the patient were advised that Artificial Intelligence will be utilized during this visit to record, process the conversation to generate a clinical note, and support improvement of the AI

## 2025-05-20 NOTE — PATIENT INSTRUCTIONS
Reduce Sinemet by 1 tablet every 1 week until you stop Sinemet. Let me know if this results in your mobility or tremor getting worse.

## 2025-05-21 ENCOUNTER — TELEPHONE (OUTPATIENT)
Dept: NEUROLOGY | Age: 80
End: 2025-05-21

## 2025-05-21 NOTE — ED PROVIDER NOTES
I independently examined and evaluated Rodolfo Luna.    In brief, patient is a 79-year-old male who had Mohs procedure 2 weeks ago and started having bleeding today.  He is on Eliquis due to history of atrial fibrillation. On arrival, he had brisk bleeding. Fortunato was holding pressure. Lidocaine with epi injected. Applied topical TXA over gauze and held manual pressure. Due to on going bleeding, IV established. Labs obtained. Gave IV TXA. Continued bleeding. Patient gave consent to place suture. After the suture, the bleeding seems to finally slow down but still continuing to ooze. Surgifoam applied and the bleeding stopped. He was instructed to keep the surgifoam on. Close follow up wit his doctors.  Discharged home with strict return precautions.    All diagnostic, treatment, and disposition decisions were made by myself in conjunction with the advanced practice provider/resident physician.     I personally saw the patient and performed a substantive portion of the visit including aspects of the medical decision making. I approved management plan and take responsibility for the patient management.     I personally saw the patient and independently provided 30 minutes of non-concurrent critical care out of the total shared critical care time provided.    Comment: Please note this report has been produced using speech recognition software and may contain errors related to that system including errors in grammar, punctuation, and spelling, as well as words and phrases that may be inappropriate. If there are any questions or concerns please feel free to contact the dictating provider for clarification.    For all further details of the patient's emergency department visit, please see the advanced practice provider's documentation.        Joyce Mckeon MD  05/18/25 2018    
Medication List as of 5/18/2025  8:08 PM          DISCONTINUED MEDICATIONS:  Discharge Medication List as of 5/18/2025  8:08 PM                 (Please note that portions of this note were completed with a voice recognition program.  Efforts were made to edit the dictations but occasionally words are mis-transcribed.)    KATIE Carcamo CNP (electronically signed)      Fortunato Ríos APRN - CNP  05/21/25 [id number]

## 2025-05-21 NOTE — TELEPHONE ENCOUNTER
Dr Aquino wanted records of the office visit notes for the past 12 months.       Request was faxed to [facility]

## 2025-05-23 ENCOUNTER — HOSPITAL ENCOUNTER (OUTPATIENT)
Dept: LAB | Age: 80
Discharge: HOME | End: 2025-05-23

## 2025-05-23 DIAGNOSIS — R13.10 DYSPHAGIA, UNSPECIFIED TYPE: ICD-10-CM

## 2025-05-23 LAB — CK SERPL-CCNC: 69 U/L (ref 39–308)

## 2025-05-28 LAB — ALDOLASE SERPL-CCNC: 2.6 U/L (ref 1.2–7.6)

## 2025-05-29 LAB
ACHR BIND AB SER-SCNC: 0.1 NMOL/L (ref 0–0.4)
ACHR BLOCK AB/ACHR TOTAL SFR SER: 0 % (ref 0–26)

## 2025-06-02 ENCOUNTER — INITIAL CONSULT (OUTPATIENT)
Dept: SURGERY | Age: 80
End: 2025-06-02
Payer: MEDICARE

## 2025-06-02 VITALS
HEIGHT: 69 IN | DIASTOLIC BLOOD PRESSURE: 62 MMHG | BODY MASS INDEX: 25.62 KG/M2 | SYSTOLIC BLOOD PRESSURE: 110 MMHG | WEIGHT: 173 LBS

## 2025-06-02 DIAGNOSIS — L92.9 EXCESSIVE GRANULATION TISSUE: Primary | ICD-10-CM

## 2025-06-02 PROCEDURE — 99213 OFFICE O/P EST LOW 20 MIN: CPT | Performed by: SURGERY

## 2025-06-02 PROCEDURE — G8419 CALC BMI OUT NRM PARAM NOF/U: HCPCS | Performed by: SURGERY

## 2025-06-02 PROCEDURE — 17250 CHEM CAUT OF GRANLTJ TISSUE: CPT | Performed by: SURGERY

## 2025-06-02 PROCEDURE — 1124F ACP DISCUSS-NO DSCNMKR DOCD: CPT | Performed by: SURGERY

## 2025-06-02 PROCEDURE — G8427 DOCREV CUR MEDS BY ELIG CLIN: HCPCS | Performed by: SURGERY

## 2025-06-02 PROCEDURE — 1036F TOBACCO NON-USER: CPT | Performed by: SURGERY

## 2025-06-02 PROCEDURE — 1159F MED LIST DOCD IN RCRD: CPT | Performed by: SURGERY

## 2025-06-02 PROCEDURE — 1125F AMNT PAIN NOTED PAIN PRSNT: CPT | Performed by: SURGERY

## 2025-06-02 RX ORDER — LIDOCAINE HYDROCHLORIDE 10 MG/ML
2 INJECTION, SOLUTION EPIDURAL; INFILTRATION; INTRACAUDAL; PERINEURAL ONCE
Status: COMPLETED | OUTPATIENT
Start: 2025-06-02 | End: 2025-06-02

## 2025-06-02 RX ADMIN — LIDOCAINE HYDROCHLORIDE 2 ML: 10 INJECTION, SOLUTION EPIDURAL; INFILTRATION; INTRACAUDAL; PERINEURAL at 11:10

## 2025-06-02 NOTE — PROGRESS NOTES
Arthritis Paternal Grandfather      Social History     Socioeconomic History    Marital status:      Spouse name: Not on file    Number of children: Not on file    Years of education: Not on file    Highest education level: Not on file   Occupational History    Not on file   Tobacco Use    Smoking status: Former     Current packs/day: 0.00     Average packs/day: 2.0 packs/day for 40.0 years (80.0 ttl pk-yrs)     Types: Cigarettes     Start date: 1953     Quit date: 1993     Years since quittin.0     Passive exposure: Never    Smokeless tobacco: Never    Tobacco comments:     20 yrs   Vaping Use    Vaping status: Never Used   Substance and Sexual Activity    Alcohol use: No    Drug use: No    Sexual activity: Not Currently     Partners: Female   Other Topics Concern    Not on file   Social History Narrative    Not on file     Social Drivers of Health     Financial Resource Strain: Low Risk  (3/17/2023)    Overall Financial Resource Strain (CARDIA)     Difficulty of Paying Living Expenses: Not hard at all   Food Insecurity: No Food Insecurity (2024)    Hunger Vital Sign     Worried About Running Out of Food in the Last Year: Never true     Ran Out of Food in the Last Year: Never true   Transportation Needs: No Transportation Needs (2024)    PRAPARE - Transportation     Lack of Transportation (Medical): No     Lack of Transportation (Non-Medical): No   Physical Activity: Not on file   Stress: Not on file   Social Connections: Not on file   Intimate Partner Violence: Unknown (2024)    [facility] from [facility], [facility]    Interpersonal Safety     Feel physically or emotionally unsafe where currently live: Not on file     Harm by anyone: Not on file     Emotionally Harmed: Not on file   Housing Stability: Low Risk  (2024)    Housing Stability Vital Sign     Unable to Pay for Housing in the Last Year: No     Number of

## 2025-06-09 ENCOUNTER — TELEPHONE (OUTPATIENT)
Dept: NEUROLOGY | Age: 80
End: 2025-06-09

## 2025-06-09 DIAGNOSIS — R26.9 GAIT DISORDER: ICD-10-CM

## 2025-06-09 DIAGNOSIS — R25.1 TREMULOUSNESS: Primary | ICD-10-CM

## 2025-06-09 RX ORDER — CARBIDOPA AND LEVODOPA 25; 100 MG/1; MG/1
1 TABLET ORAL 3 TIMES DAILY
Qty: 90 TABLET | Refills: 11 | Status: ON HOLD | OUTPATIENT
Start: 2025-06-09 | End: 2026-06-04

## 2025-06-09 NOTE — TELEPHONE ENCOUNTER
Pt caregiver called. PT stopped taking carbadopa-levadopa 2 weeks ago, no tapering, just stopped. Pt was feeling lethargic, weakness. Pt decided to restart medication yesterday. Please advise on what caregiver should advise pt to do. Stop again, taper?    Please call home health caregiver.

## 2025-06-09 NOTE — TELEPHONE ENCOUNTER
Patient caregiver called and reports that patient stopped taking carbadopa-levadopa 2 weeks ago, no tapering, just stopped.       States patient was feeling lethargic, and had weakness.     Caregiver states patient decided to restart medication yesterday.    Caregiver wants to know what she should advise patient to do. Stop again  ?  Does he need to taper the medication?      Please review and advise

## 2025-06-09 NOTE — TELEPHONE ENCOUNTER
Spoke with caregiver and informed he bindu Dr Aquino's response and recommendations.     She voiced understanding and will inform patient as well.

## 2025-06-10 ENCOUNTER — HOSPITAL ENCOUNTER (INPATIENT)
Age: 80
LOS: 7 days | Discharge: HOME | DRG: 919 | End: 2025-06-17
Attending: STUDENT IN AN ORGANIZED HEALTH CARE EDUCATION/TRAINING PROGRAM | Admitting: SURGERY
Payer: MEDICARE

## 2025-06-10 ENCOUNTER — APPOINTMENT (OUTPATIENT)
Dept: CT IMAGING | Age: 80
DRG: 919 | End: 2025-06-10
Payer: MEDICARE

## 2025-06-10 ENCOUNTER — APPOINTMENT (OUTPATIENT)
Dept: GENERAL RADIOLOGY | Age: 80
DRG: 919 | End: 2025-06-10
Payer: MEDICARE

## 2025-06-10 DIAGNOSIS — R53.1 GENERALIZED WEAKNESS: Primary | ICD-10-CM

## 2025-06-10 DIAGNOSIS — R41.3 MEMORY LOSS: ICD-10-CM

## 2025-06-10 DIAGNOSIS — R26.9 GAIT DISORDER: ICD-10-CM

## 2025-06-10 DIAGNOSIS — R50.9 FEVER OF UNKNOWN ORIGIN: ICD-10-CM

## 2025-06-10 DIAGNOSIS — R25.1 TREMULOUSNESS: ICD-10-CM

## 2025-06-10 DIAGNOSIS — G40.909 NONINTRACTABLE EPILEPSY WITHOUT STATUS EPILEPTICUS, UNSPECIFIED EPILEPSY TYPE (HCC): ICD-10-CM

## 2025-06-10 LAB
ALBUMIN SERPL-MCNC: 3.3 G/DL (ref 3.4–5)
ALBUMIN/GLOB SERPL: 0.7 {RATIO} (ref 1.1–2.2)
ALP SERPL-CCNC: 104 U/L (ref 40–129)
ALT SERPL-CCNC: 18 U/L (ref 10–40)
ANION GAP SERPL CALCULATED.3IONS-SCNC: 15 MMOL/L (ref 3–16)
ANISOCYTOSIS BLD QL SMEAR: ABNORMAL
AST SERPL-CCNC: 37 U/L (ref 15–37)
BACTERIA URNS QL MICRO: ABNORMAL /HPF
BASOPHILS # BLD: 0 K/UL (ref 0–0.2)
BASOPHILS NFR BLD: 0 %
BILIRUB SERPL-MCNC: 1.3 MG/DL (ref 0–1)
BILIRUB UR QL STRIP.AUTO: NEGATIVE
BUN SERPL-MCNC: 19 MG/DL (ref 7–20)
CALCIUM SERPL-MCNC: 8.3 MG/DL (ref 8.3–10.6)
CHLORIDE SERPL-SCNC: 97 MMOL/L (ref 99–110)
CLARITY UR: CLEAR
CO2 SERPL-SCNC: 22 MMOL/L (ref 21–32)
COLOR UR: YELLOW
CREAT SERPL-MCNC: 0.8 MG/DL (ref 0.8–1.3)
DEPRECATED RDW RBC AUTO: 15.2 % (ref 12.4–15.4)
EOSINOPHIL # BLD: 0 K/UL (ref 0–0.6)
EOSINOPHIL NFR BLD: 0 %
EPI CELLS #/AREA URNS HPF: ABNORMAL /HPF (ref 0–5)
FLUAV RNA RESP QL NAA+PROBE: NOT DETECTED
FLUBV RNA RESP QL NAA+PROBE: NOT DETECTED
GFR SERPLBLD CREATININE-BSD FMLA CKD-EPI: 90 ML/MIN/{1.73_M2}
GLUCOSE SERPL-MCNC: 98 MG/DL (ref 70–99)
GLUCOSE UR STRIP.AUTO-MCNC: NEGATIVE MG/DL
HCT VFR BLD AUTO: 35.2 % (ref 40.5–52.5)
HGB BLD-MCNC: 11.4 G/DL (ref 13.5–17.5)
HGB UR QL STRIP.AUTO: NEGATIVE
KETONES UR STRIP.AUTO-MCNC: NEGATIVE MG/DL
LACTATE BLDV-SCNC: 1.6 MMOL/L (ref 0.4–1.9)
LEUKOCYTE ESTERASE UR QL STRIP.AUTO: NEGATIVE
LYMPHOCYTES # BLD: 1.4 K/UL (ref 1–5.1)
LYMPHOCYTES NFR BLD: 18 %
MCH RBC QN AUTO: 31 PG (ref 26–34)
MCHC RBC AUTO-ENTMCNC: 32.5 G/DL (ref 31–36)
MCV RBC AUTO: 95.4 FL (ref 80–100)
MONOCYTES # BLD: 0.8 K/UL (ref 0–1.3)
MONOCYTES NFR BLD: 11 %
MUCOUS THREADS #/AREA URNS LPF: ABNORMAL /LPF
NEUTROPHILS # BLD: 5.4 K/UL (ref 1.7–7.7)
NEUTROPHILS NFR BLD: 70 %
NEUTS BAND NFR BLD MANUAL: 1 % (ref 0–7)
NITRITE UR QL STRIP.AUTO: NEGATIVE
PH UR STRIP.AUTO: 8 [PH] (ref 5–8)
PLATELET # BLD AUTO: 111 K/UL (ref 135–450)
PLATELET BLD QL SMEAR: ABNORMAL
PMV BLD AUTO: 9.4 FL (ref 5–10.5)
POTASSIUM SERPL-SCNC: 4.3 MMOL/L (ref 3.5–5.1)
PROT SERPL-MCNC: 7.9 G/DL (ref 6.4–8.2)
PROT UR STRIP.AUTO-MCNC: 30 MG/DL
RBC # BLD AUTO: 3.69 M/UL (ref 4.2–5.9)
RBC #/AREA URNS HPF: ABNORMAL /HPF (ref 0–4)
SARS-COV-2 RNA RESP QL NAA+PROBE: NOT DETECTED
SLIDE REVIEW: ABNORMAL
SODIUM SERPL-SCNC: 134 MMOL/L (ref 136–145)
SP GR UR STRIP.AUTO: 1.01 (ref 1–1.03)
UA COMPLETE W REFLEX CULTURE PNL UR: ABNORMAL
UA DIPSTICK W REFLEX MICRO PNL UR: YES
URN SPEC COLLECT METH UR: ABNORMAL
UROBILINOGEN UR STRIP-ACNC: 4 E.U./DL
WBC # BLD AUTO: 7.6 K/UL (ref 4–11)
WBC #/AREA URNS HPF: ABNORMAL /HPF (ref 0–5)

## 2025-06-10 PROCEDURE — 36415 COLL VENOUS BLD VENIPUNCTURE: CPT

## 2025-06-10 PROCEDURE — 71250 CT THORAX DX C-: CPT

## 2025-06-10 PROCEDURE — 2500000003 HC RX 250 WO HCPCS: Performed by: SURGERY

## 2025-06-10 PROCEDURE — 70450 CT HEAD/BRAIN W/O DYE: CPT

## 2025-06-10 PROCEDURE — 85025 COMPLETE CBC W/AUTO DIFF WBC: CPT

## 2025-06-10 PROCEDURE — 80053 COMPREHEN METABOLIC PANEL: CPT

## 2025-06-10 PROCEDURE — 71045 X-RAY EXAM CHEST 1 VIEW: CPT

## 2025-06-10 PROCEDURE — 83605 ASSAY OF LACTIC ACID: CPT

## 2025-06-10 PROCEDURE — 81001 URINALYSIS AUTO W/SCOPE: CPT

## 2025-06-10 PROCEDURE — 93005 ELECTROCARDIOGRAM TRACING: CPT | Performed by: STUDENT IN AN ORGANIZED HEALTH CARE EDUCATION/TRAINING PROGRAM

## 2025-06-10 PROCEDURE — 1200000000 HC SEMI PRIVATE

## 2025-06-10 PROCEDURE — 87636 SARSCOV2 & INF A&B AMP PRB: CPT

## 2025-06-10 PROCEDURE — 2580000003 HC RX 258: Performed by: STUDENT IN AN ORGANIZED HEALTH CARE EDUCATION/TRAINING PROGRAM

## 2025-06-10 PROCEDURE — 6370000000 HC RX 637 (ALT 250 FOR IP): Performed by: STUDENT IN AN ORGANIZED HEALTH CARE EDUCATION/TRAINING PROGRAM

## 2025-06-10 PROCEDURE — 84145 PROCALCITONIN (PCT): CPT

## 2025-06-10 PROCEDURE — 87040 BLOOD CULTURE FOR BACTERIA: CPT

## 2025-06-10 PROCEDURE — 99285 EMERGENCY DEPT VISIT HI MDM: CPT

## 2025-06-10 RX ORDER — SODIUM CHLORIDE 9 MG/ML
INJECTION, SOLUTION INTRAVENOUS PRN
Status: DISCONTINUED | OUTPATIENT
Start: 2025-06-10 | End: 2025-06-17 | Stop reason: HOSPADM

## 2025-06-10 RX ORDER — SODIUM CHLORIDE 0.9 % (FLUSH) 0.9 %
5-40 SYRINGE (ML) INJECTION PRN
Status: DISCONTINUED | OUTPATIENT
Start: 2025-06-10 | End: 2025-06-17 | Stop reason: HOSPADM

## 2025-06-10 RX ORDER — ONDANSETRON 2 MG/ML
4 INJECTION INTRAMUSCULAR; INTRAVENOUS EVERY 6 HOURS PRN
Status: DISCONTINUED | OUTPATIENT
Start: 2025-06-10 | End: 2025-06-17 | Stop reason: HOSPADM

## 2025-06-10 RX ORDER — ACETAMINOPHEN 325 MG/1
650 TABLET ORAL ONCE
Status: COMPLETED | OUTPATIENT
Start: 2025-06-10 | End: 2025-06-10

## 2025-06-10 RX ORDER — POLYETHYLENE GLYCOL 3350 17 G/17G
17 POWDER, FOR SOLUTION ORAL DAILY PRN
Status: DISCONTINUED | OUTPATIENT
Start: 2025-06-10 | End: 2025-06-17 | Stop reason: HOSPADM

## 2025-06-10 RX ORDER — POTASSIUM CHLORIDE 7.45 MG/ML
10 INJECTION INTRAVENOUS PRN
Status: DISCONTINUED | OUTPATIENT
Start: 2025-06-10 | End: 2025-06-17 | Stop reason: HOSPADM

## 2025-06-10 RX ORDER — SODIUM CHLORIDE 0.9 % (FLUSH) 0.9 %
5-40 SYRINGE (ML) INJECTION EVERY 12 HOURS SCHEDULED
Status: DISCONTINUED | OUTPATIENT
Start: 2025-06-10 | End: 2025-06-17 | Stop reason: HOSPADM

## 2025-06-10 RX ORDER — MAGNESIUM SULFATE IN WATER 40 MG/ML
2000 INJECTION, SOLUTION INTRAVENOUS PRN
Status: DISCONTINUED | OUTPATIENT
Start: 2025-06-10 | End: 2025-06-17 | Stop reason: HOSPADM

## 2025-06-10 RX ORDER — ENOXAPARIN SODIUM 100 MG/ML
40 INJECTION SUBCUTANEOUS DAILY
Status: DISCONTINUED | OUTPATIENT
Start: 2025-06-11 | End: 2025-06-11

## 2025-06-10 RX ORDER — POTASSIUM CHLORIDE 1500 MG/1
40 TABLET, EXTENDED RELEASE ORAL PRN
Status: DISCONTINUED | OUTPATIENT
Start: 2025-06-10 | End: 2025-06-17 | Stop reason: HOSPADM

## 2025-06-10 RX ORDER — ONDANSETRON 4 MG/1
4 TABLET, ORALLY DISINTEGRATING ORAL EVERY 8 HOURS PRN
Status: DISCONTINUED | OUTPATIENT
Start: 2025-06-10 | End: 2025-06-17 | Stop reason: HOSPADM

## 2025-06-10 RX ORDER — ACETAMINOPHEN 325 MG/1
650 TABLET ORAL EVERY 6 HOURS PRN
Status: DISCONTINUED | OUTPATIENT
Start: 2025-06-10 | End: 2025-06-17 | Stop reason: HOSPADM

## 2025-06-10 RX ORDER — 0.9 % SODIUM CHLORIDE 0.9 %
1000 INTRAVENOUS SOLUTION INTRAVENOUS ONCE
Status: COMPLETED | OUTPATIENT
Start: 2025-06-10 | End: 2025-06-11

## 2025-06-10 RX ORDER — ACETAMINOPHEN 650 MG/1
650 SUPPOSITORY RECTAL EVERY 6 HOURS PRN
Status: DISCONTINUED | OUTPATIENT
Start: 2025-06-10 | End: 2025-06-17 | Stop reason: HOSPADM

## 2025-06-10 RX ADMIN — SODIUM CHLORIDE 1000 ML: 0.9 INJECTION, SOLUTION INTRAVENOUS at 23:42

## 2025-06-10 RX ADMIN — SODIUM CHLORIDE, PRESERVATIVE FREE 10 ML: 5 INJECTION INTRAVENOUS at 23:40

## 2025-06-10 RX ADMIN — ACETAMINOPHEN 650 MG: 325 TABLET ORAL at 23:40

## 2025-06-10 ASSESSMENT — PAIN SCALES - GENERAL: PAINLEVEL_OUTOF10: 0

## 2025-06-11 ENCOUNTER — APPOINTMENT (OUTPATIENT)
Dept: CT IMAGING | Age: 80
DRG: 919 | End: 2025-06-11
Payer: MEDICARE

## 2025-06-11 PROBLEM — R51.9 NONINTRACTABLE HEADACHE: Status: ACTIVE | Noted: 2025-06-11

## 2025-06-11 LAB
ANION GAP SERPL CALCULATED.3IONS-SCNC: 15 MMOL/L (ref 3–16)
ANISOCYTOSIS BLD QL SMEAR: ABNORMAL
BASOPHILS # BLD: 0 K/UL (ref 0–0.2)
BASOPHILS NFR BLD: 0 %
BUN SERPL-MCNC: 16 MG/DL (ref 7–20)
CALCIUM SERPL-MCNC: 8.2 MG/DL (ref 8.3–10.6)
CHLORIDE SERPL-SCNC: 102 MMOL/L (ref 99–110)
CO2 SERPL-SCNC: 18 MMOL/L (ref 21–32)
CREAT SERPL-MCNC: 0.8 MG/DL (ref 0.8–1.3)
DEPRECATED RDW RBC AUTO: 16 % (ref 12.4–15.4)
EKG ATRIAL RATE: 300 BPM
EKG DIAGNOSIS: NORMAL
EKG Q-T INTERVAL: 414 MS
EKG QRS DURATION: 82 MS
EKG QTC CALCULATION (BAZETT): 495 MS
EKG R AXIS: -40 DEGREES
EKG T AXIS: 43 DEGREES
EKG VENTRICULAR RATE: 86 BPM
EOSINOPHIL # BLD: 0 K/UL (ref 0–0.6)
EOSINOPHIL NFR BLD: 0 %
GFR SERPLBLD CREATININE-BSD FMLA CKD-EPI: 90 ML/MIN/{1.73_M2}
GLUCOSE SERPL-MCNC: 86 MG/DL (ref 70–99)
HCT VFR BLD AUTO: 33.7 % (ref 40.5–52.5)
HGB BLD-MCNC: 11.1 G/DL (ref 13.5–17.5)
LEVETIRACETAM SERPL-MCNC: 11.1 UG/ML (ref 6–46)
LYMPHOCYTES # BLD: 1.8 K/UL (ref 1–5.1)
LYMPHOCYTES NFR BLD: 21 %
MCH RBC QN AUTO: 31.8 PG (ref 26–34)
MCHC RBC AUTO-ENTMCNC: 32.9 G/DL (ref 31–36)
MCV RBC AUTO: 96.7 FL (ref 80–100)
MEDICATION DOSE-MCNC: NORMAL
MONOCYTES # BLD: 0.3 K/UL (ref 0–1.3)
MONOCYTES NFR BLD: 4 %
NEUTROPHILS # BLD: 6.4 K/UL (ref 1.7–7.7)
NEUTROPHILS NFR BLD: 75 %
PATH INTERP BLD-IMP: NO
PLATELET # BLD AUTO: 95 K/UL (ref 135–450)
PLATELET BLD QL SMEAR: ABNORMAL
PMV BLD AUTO: 9.3 FL (ref 5–10.5)
POIKILOCYTOSIS BLD QL SMEAR: ABNORMAL
POTASSIUM SERPL-SCNC: 4.9 MMOL/L (ref 3.5–5.1)
PROCALCITONIN SERPL IA-MCNC: 0.1 NG/ML (ref 0–0.15)
RBC # BLD AUTO: 3.49 M/UL (ref 4.2–5.9)
SLIDE REVIEW: ABNORMAL
SODIUM SERPL-SCNC: 135 MMOL/L (ref 136–145)
WBC # BLD AUTO: 8.5 K/UL (ref 4–11)

## 2025-06-11 PROCEDURE — 93010 ELECTROCARDIOGRAM REPORT: CPT | Performed by: STUDENT IN AN ORGANIZED HEALTH CARE EDUCATION/TRAINING PROGRAM

## 2025-06-11 PROCEDURE — 85025 COMPLETE CBC W/AUTO DIFF WBC: CPT

## 2025-06-11 PROCEDURE — 74177 CT ABD & PELVIS W/CONTRAST: CPT

## 2025-06-11 PROCEDURE — 6360000002 HC RX W HCPCS

## 2025-06-11 PROCEDURE — 87186 SC STD MICRODIL/AGAR DIL: CPT

## 2025-06-11 PROCEDURE — 87081 CULTURE SCREEN ONLY: CPT

## 2025-06-11 PROCEDURE — 6370000000 HC RX 637 (ALT 250 FOR IP): Performed by: SURGERY

## 2025-06-11 PROCEDURE — 80177 DRUG SCRN QUAN LEVETIRACETAM: CPT

## 2025-06-11 PROCEDURE — 86403 PARTICLE AGGLUT ANTBDY SCRN: CPT

## 2025-06-11 PROCEDURE — 99222 1ST HOSP IP/OBS MODERATE 55: CPT | Performed by: NEUROMUSCULOSKELETAL MEDICINE & OMM

## 2025-06-11 PROCEDURE — 80048 BASIC METABOLIC PNL TOTAL CA: CPT

## 2025-06-11 PROCEDURE — 87070 CULTURE OTHR SPECIMN AEROBIC: CPT

## 2025-06-11 PROCEDURE — 87205 SMEAR GRAM STAIN: CPT

## 2025-06-11 PROCEDURE — 1200000000 HC SEMI PRIVATE

## 2025-06-11 PROCEDURE — 80235 DRUG ASSAY LACOSAMIDE: CPT

## 2025-06-11 PROCEDURE — 2500000003 HC RX 250 WO HCPCS: Performed by: SURGERY

## 2025-06-11 PROCEDURE — 6370000000 HC RX 637 (ALT 250 FOR IP): Performed by: NURSE PRACTITIONER

## 2025-06-11 PROCEDURE — 0202U NFCT DS 22 TRGT SARS-COV-2: CPT

## 2025-06-11 PROCEDURE — APPSS45 APP SPLIT SHARED TIME 31-45 MINUTES

## 2025-06-11 PROCEDURE — 87077 CULTURE AEROBIC IDENTIFY: CPT

## 2025-06-11 PROCEDURE — 36415 COLL VENOUS BLD VENIPUNCTURE: CPT

## 2025-06-11 PROCEDURE — 6360000004 HC RX CONTRAST MEDICATION: Performed by: NURSE PRACTITIONER

## 2025-06-11 PROCEDURE — 2580000003 HC RX 258: Performed by: SURGERY

## 2025-06-11 RX ORDER — FUROSEMIDE 10 MG/ML
2 SOLUTION ORAL DAILY PRN
Status: ON HOLD | COMMUNITY
End: 2025-06-17 | Stop reason: HOSPADM

## 2025-06-11 RX ORDER — MONTELUKAST SODIUM 10 MG/1
10 TABLET ORAL NIGHTLY
Status: ON HOLD | COMMUNITY

## 2025-06-11 RX ORDER — B-COMPLEX WITH VITAMIN C
400 TABLET ORAL DAILY
Status: DISCONTINUED | OUTPATIENT
Start: 2025-06-11 | End: 2025-06-11 | Stop reason: RX

## 2025-06-11 RX ORDER — ATROPINE SULFATE 10 MG/ML
1 SOLUTION/ DROPS OPHTHALMIC 4 TIMES DAILY PRN
Status: ON HOLD | COMMUNITY

## 2025-06-11 RX ORDER — ALUMINA, MAGNESIA, AND SIMETHICONE 2400; 2400; 240 MG/30ML; MG/30ML; MG/30ML
10 SUSPENSION ORAL EVERY 4 HOURS PRN
Status: ON HOLD | COMMUNITY

## 2025-06-11 RX ORDER — LORAZEPAM 1 MG/1
1 TABLET ORAL EVERY 6 HOURS PRN
Status: DISCONTINUED | OUTPATIENT
Start: 2025-06-11 | End: 2025-06-17 | Stop reason: HOSPADM

## 2025-06-11 RX ORDER — ANORECTAL OINTMENT 15.7; .44; 24; 20.6 G/100G; G/100G; G/100G; G/100G
OINTMENT TOPICAL
Status: ON HOLD | COMMUNITY

## 2025-06-11 RX ORDER — LEVETIRACETAM 500 MG/1
750 TABLET ORAL 2 TIMES DAILY
Status: DISCONTINUED | OUTPATIENT
Start: 2025-06-11 | End: 2025-06-11 | Stop reason: ALTCHOICE

## 2025-06-11 RX ORDER — ARIPIPRAZOLE 10 MG/1
5 TABLET ORAL DAILY
Status: ON HOLD | COMMUNITY

## 2025-06-11 RX ORDER — FINASTERIDE 5 MG/1
5 TABLET, FILM COATED ORAL DAILY
Status: DISCONTINUED | OUTPATIENT
Start: 2025-06-11 | End: 2025-06-17 | Stop reason: HOSPADM

## 2025-06-11 RX ORDER — PANTOPRAZOLE SODIUM 40 MG/1
40 TABLET, DELAYED RELEASE ORAL DAILY
Status: DISCONTINUED | OUTPATIENT
Start: 2025-06-11 | End: 2025-06-11 | Stop reason: ALTCHOICE

## 2025-06-11 RX ORDER — CARBIDOPA AND LEVODOPA 25; 100 MG/1; MG/1
1 TABLET ORAL 3 TIMES DAILY
Status: DISCONTINUED | OUTPATIENT
Start: 2025-06-11 | End: 2025-06-17 | Stop reason: HOSPADM

## 2025-06-11 RX ORDER — FERROUS SULFATE 325(65) MG
325 TABLET ORAL 2 TIMES DAILY WITH MEALS
Status: DISCONTINUED | OUTPATIENT
Start: 2025-06-11 | End: 2025-06-11 | Stop reason: SDUPTHER

## 2025-06-11 RX ORDER — LEVETIRACETAM 100 MG/ML
750 SOLUTION ORAL 2 TIMES DAILY
Status: DISCONTINUED | OUTPATIENT
Start: 2025-06-11 | End: 2025-06-11

## 2025-06-11 RX ORDER — LACOSAMIDE 50 MG/1
100 TABLET ORAL 2 TIMES DAILY
Status: DISCONTINUED | OUTPATIENT
Start: 2025-06-11 | End: 2025-06-17 | Stop reason: HOSPADM

## 2025-06-11 RX ORDER — ESCITALOPRAM OXALATE 10 MG/1
20 TABLET ORAL DAILY
Status: DISCONTINUED | OUTPATIENT
Start: 2025-06-11 | End: 2025-06-11 | Stop reason: SDUPTHER

## 2025-06-11 RX ORDER — MEMANTINE HYDROCHLORIDE 5 MG/1
10 TABLET ORAL 2 TIMES DAILY
Status: DISCONTINUED | OUTPATIENT
Start: 2025-06-11 | End: 2025-06-17 | Stop reason: HOSPADM

## 2025-06-11 RX ORDER — IPRATROPIUM BROMIDE 42 UG/1
2 SPRAY, METERED NASAL 4 TIMES DAILY PRN
Status: ON HOLD | COMMUNITY

## 2025-06-11 RX ORDER — PANTOPRAZOLE SODIUM 40 MG/1
40 FOR SUSPENSION ORAL
Status: ON HOLD | COMMUNITY

## 2025-06-11 RX ORDER — CIPROFLOXACIN 2 MG/ML
400 INJECTION, SOLUTION INTRAVENOUS EVERY 12 HOURS
Status: DISCONTINUED | OUTPATIENT
Start: 2025-06-11 | End: 2025-06-17 | Stop reason: HOSPADM

## 2025-06-11 RX ORDER — ESCITALOPRAM OXALATE 10 MG/1
20 TABLET ORAL DAILY
Status: DISCONTINUED | OUTPATIENT
Start: 2025-06-11 | End: 2025-06-11

## 2025-06-11 RX ORDER — FERROUS SULFATE 300 MG/5ML
300 LIQUID (ML) ORAL 2 TIMES DAILY
Status: ON HOLD | COMMUNITY

## 2025-06-11 RX ORDER — DILTIAZEM HCL 60 MG
30 TABLET ORAL 2 TIMES DAILY
Status: DISCONTINUED | OUTPATIENT
Start: 2025-06-11 | End: 2025-06-17 | Stop reason: HOSPADM

## 2025-06-11 RX ORDER — LEVETIRACETAM 100 MG/ML
750 SOLUTION ORAL 2 TIMES DAILY
Status: DISCONTINUED | OUTPATIENT
Start: 2025-06-11 | End: 2025-06-17 | Stop reason: HOSPADM

## 2025-06-11 RX ORDER — METRONIDAZOLE 500 MG/100ML
500 INJECTION, SOLUTION INTRAVENOUS EVERY 8 HOURS
Status: DISCONTINUED | OUTPATIENT
Start: 2025-06-11 | End: 2025-06-17 | Stop reason: HOSPADM

## 2025-06-11 RX ORDER — IOPAMIDOL 755 MG/ML
75 INJECTION, SOLUTION INTRAVASCULAR
Status: COMPLETED | OUTPATIENT
Start: 2025-06-11 | End: 2025-06-11

## 2025-06-11 RX ORDER — MAGNESIUM SULFATE 1 G/100ML
1000 INJECTION INTRAVENOUS ONCE
Status: COMPLETED | OUTPATIENT
Start: 2025-06-11 | End: 2025-06-11

## 2025-06-11 RX ORDER — ESCITALOPRAM OXALATE 20 MG/1
20 TABLET ORAL DAILY
Status: ON HOLD | COMMUNITY
End: 2025-06-17 | Stop reason: HOSPADM

## 2025-06-11 RX ORDER — ESCITALOPRAM OXALATE 10 MG/1
10 TABLET ORAL DAILY
Status: DISCONTINUED | OUTPATIENT
Start: 2025-06-12 | End: 2025-06-17 | Stop reason: HOSPADM

## 2025-06-11 RX ORDER — FERROUS SULFATE 300 MG/5ML
300 LIQUID (ML) ORAL 2 TIMES DAILY
Status: DISCONTINUED | OUTPATIENT
Start: 2025-06-11 | End: 2025-06-17 | Stop reason: HOSPADM

## 2025-06-11 RX ORDER — MONTELUKAST SODIUM 10 MG/1
10 TABLET ORAL NIGHTLY
Status: DISCONTINUED | OUTPATIENT
Start: 2025-06-11 | End: 2025-06-17 | Stop reason: HOSPADM

## 2025-06-11 RX ORDER — LANSOPRAZOLE 30 MG/1
30 TABLET, ORALLY DISINTEGRATING, DELAYED RELEASE ORAL
Status: DISCONTINUED | OUTPATIENT
Start: 2025-06-11 | End: 2025-06-17 | Stop reason: HOSPADM

## 2025-06-11 RX ORDER — ATORVASTATIN CALCIUM 40 MG/1
80 TABLET, FILM COATED ORAL NIGHTLY
Status: DISCONTINUED | OUTPATIENT
Start: 2025-06-11 | End: 2025-06-17 | Stop reason: HOSPADM

## 2025-06-11 RX ORDER — ASPIRIN 81 MG/1
81 TABLET, CHEWABLE ORAL DAILY
Status: ON HOLD | COMMUNITY

## 2025-06-11 RX ORDER — LACOSAMIDE 50 MG/1
100 TABLET ORAL 2 TIMES DAILY
Status: DISCONTINUED | OUTPATIENT
Start: 2025-06-11 | End: 2025-06-11 | Stop reason: SDUPTHER

## 2025-06-11 RX ADMIN — Medication 750 MG: at 11:41

## 2025-06-11 RX ADMIN — Medication 6 MG: at 21:00

## 2025-06-11 RX ADMIN — Medication 750 MG: at 21:08

## 2025-06-11 RX ADMIN — SODIUM CHLORIDE, PRESERVATIVE FREE 10 ML: 5 INJECTION INTRAVENOUS at 11:43

## 2025-06-11 RX ADMIN — CARBIDOPA AND LEVODOPA 1 TABLET: 25; 100 TABLET ORAL at 15:00

## 2025-06-11 RX ADMIN — APIXABAN 5 MG: 5 TABLET, FILM COATED ORAL at 21:00

## 2025-06-11 RX ADMIN — IOPAMIDOL 75 ML: 755 INJECTION, SOLUTION INTRAVENOUS at 09:35

## 2025-06-11 RX ADMIN — SODIUM CHLORIDE, PRESERVATIVE FREE 10 ML: 5 INJECTION INTRAVENOUS at 21:01

## 2025-06-11 RX ADMIN — MAGNESIUM SULFATE HEPTAHYDRATE 1000 MG: 1 INJECTION, SOLUTION INTRAVENOUS at 15:42

## 2025-06-11 RX ADMIN — MEMANTINE HYDROCHLORIDE 10 MG: 5 TABLET ORAL at 21:08

## 2025-06-11 RX ADMIN — MONTELUKAST SODIUM 10 MG: 10 TABLET, COATED ORAL at 21:00

## 2025-06-11 RX ADMIN — DILTIAZEM HYDROCHLORIDE 30 MG: 60 TABLET ORAL at 21:00

## 2025-06-11 RX ADMIN — MINERAL SUPPLEMENT IRON 300 MG / 5 ML STRENGTH LIQUID 100 PER BOX UNFLAVORED 300 MG: at 11:43

## 2025-06-11 RX ADMIN — DILTIAZEM HYDROCHLORIDE 30 MG: 60 TABLET ORAL at 11:43

## 2025-06-11 RX ADMIN — ACETAMINOPHEN 650 MG: 325 TABLET ORAL at 21:39

## 2025-06-11 RX ADMIN — MINERAL SUPPLEMENT IRON 300 MG / 5 ML STRENGTH LIQUID 100 PER BOX UNFLAVORED 300 MG: at 20:59

## 2025-06-11 RX ADMIN — LANSOPRAZOLE 30 MG: 30 TABLET, ORALLY DISINTEGRATING, DELAYED RELEASE ORAL at 11:41

## 2025-06-11 RX ADMIN — LACOSAMIDE 100 MG: 50 TABLET, FILM COATED ORAL at 20:59

## 2025-06-11 RX ADMIN — CIPROFLOXACIN 400 MG: 2 INJECTION, SOLUTION INTRAVENOUS at 18:12

## 2025-06-11 RX ADMIN — ATORVASTATIN CALCIUM 80 MG: 40 TABLET, FILM COATED ORAL at 21:00

## 2025-06-11 RX ADMIN — MEMANTINE HYDROCHLORIDE 10 MG: 5 TABLET ORAL at 11:43

## 2025-06-11 RX ADMIN — SODIUM CHLORIDE: 0.9 INJECTION, SOLUTION INTRAVENOUS at 15:41

## 2025-06-11 RX ADMIN — FINASTERIDE 5 MG: 5 TABLET, FILM COATED ORAL at 11:43

## 2025-06-11 RX ADMIN — CARBIDOPA AND LEVODOPA 1 TABLET: 25; 100 TABLET ORAL at 21:00

## 2025-06-11 RX ADMIN — METRONIDAZOLE 500 MG: 500 INJECTION, SOLUTION INTRAVENOUS at 18:14

## 2025-06-11 ASSESSMENT — PAIN DESCRIPTION - ORIENTATION: ORIENTATION: LOWER

## 2025-06-11 ASSESSMENT — PAIN DESCRIPTION - LOCATION
LOCATION: BACK;NECK
LOCATION: BACK;NECK

## 2025-06-11 ASSESSMENT — PAIN SCALES - WONG BAKER: WONGBAKER_NUMERICALRESPONSE: NO HURT

## 2025-06-11 ASSESSMENT — PAIN SCALES - GENERAL
PAINLEVEL_OUTOF10: 6
PAINLEVEL_OUTOF10: 6

## 2025-06-11 ASSESSMENT — PAIN DESCRIPTION - DESCRIPTORS: DESCRIPTORS: ACHING

## 2025-06-11 NOTE — PROGRESS NOTES
Patient was ordered Vitamin B2 (riboflavin). Per the [facility] Formulary Committee Policy, this medication is non-formulary and not stocked by pharmacy. If you feel the patient needs to continue their home therapy during the inpatient stay, the patient may bring their medication bottle for verification and administration pursuant to our home medication use policy. Otherwise, the medication can be reordered at discharge.

## 2025-06-11 NOTE — FLOWSHEET NOTE
06/11/25 0345   Vital Signs   Temp 98.1 °F (36.7 °C)   Temp Source Oral   Pulse 83   Heart Rate Source Monitor   Respirations 18   /76   MAP (Calculated) 89   BP Location Left upper arm   BP Method Automatic   Patient Position Semi fowlers   Pain Assessment   Pain Assessment None - Denies Pain   Opioid-Induced Sedation   POSS Score 1   RASS   Carter Agitation Sedation Scale (RASS) 0   Oxygen Therapy   SpO2 92 %   O2 Device None (Room air)   Height and Weight   Height 1.753 m (5' 9\")   Weight - Scale 79.3 kg (174 lb 12.8 oz)   Weight Method Bed scale   BSA (Calculated - sq m) 1.96 sq meters   BMI (Calculated) 25.9     Received from ER awake, alert, oriented, via stretcher. with PEG tube. Admission assessment complete.  Vital signs are stable. CHG wipes done. Pt denies any needs at this time.  Call light within reach.

## 2025-06-11 NOTE — PROGRESS NOTES
Report given to ANDREA Romero on [facility]. Pt taken to 2 [place] in a stable condition. Belongings with the Pt.

## 2025-06-11 NOTE — PROGRESS NOTES
Consult has been called to Dr. leal on 6/11/25. Spoke with herberth. 11:37 AM    Sarah Ross  6/11/2025

## 2025-06-11 NOTE — PROGRESS NOTES
4 Eyes Skin Assessment     NAME:  Rodolfo Luna  YOB: 1945  MEDICAL RECORD NUMBER:  [id number]    The patient is being assessed for  Admission    I agree that at least one RN has performed a thorough Head to Toe Skin Assessment on the patient. ALL assessment sites listed below have been assessed.      Areas assessed by both nurses:    Head, Face, Ears, Shoulders, Back, Chest, Arms, Elbows, Hands, Sacrum. Buttock, Coccyx, Ischium, Legs. Feet and Heels, Under Medical Devices , and Other       Small wound left forehead  PEG tube        Does the Patient have a Wound? No noted wound(s)       Jorge Prevention initiated by RN: No  Wound Care Orders initiated by RN: No    Pressure Injury (Stage 3,4, Unstageable, DTI, NWPT, and Complex wounds) if present, place Wound referral order by RN under : No    New Ostomies, if present place, Ostomy referral order under : No     Nurse 1 eSignature: Electronically signed by Nick Ron RN on 6/11/25 at 4:55 AM EDT    **SHARE this note so that the co-signing nurse can place an eSignature**    Nurse 2 eSignature: Electronically signed by Thania Watson RN on 6/11/25 at 5:29 AM EDT

## 2025-06-11 NOTE — ED PROVIDER NOTES
[facility] EMERGENCY DEPARTMENT     EMERGENCY DEPARTMENT ENCOUNTER            Pt Name: Rodolfo Luna   MRN: [id number]   Birthdate 1945   Date of evaluation: 6/10/2025   Provider: Ansley Montiel MD   PCP: Carlton Leavitt, KATIE - CNP   Note Started: 8:17 PM EDT 6/10/25          CHIEF COMPLAINT     Chief Complaint   Patient presents with    Headache    Extremity Weakness     Head for a few days, can't stand up per wife.              HISTORY OF PRESENT ILLNESS:           Rodolfo Luna is a 79 y.o. male who presents with fatigue and headache.  He is wife provides most history.  She states that for about the last week, he has become more fatigued and weak to the point that he is now having difficulty even standing up.  She finally got him to agree to come to the hospital today.  He has also been complaining of posterior headaches for the last 3 days which is unusual for him.  He denies any pain other than pain in the back of his head.  He has not had any falls or trauma.  His wife has noted that he has had a PEG tube for about a year due to difficulty swallowing, despite a recent procedure to remove some excess skin from around to the area, he is still having a significant amount of drainage that looks like pus.    Nursing Notes were all reviewed and agreed with, or any disagreements were addressed in the HPI.     REVIEW OF SYSTEMS :    Positives and Pertinent negatives as per HPI.      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

## 2025-06-11 NOTE — PROGRESS NOTES
Comprehensive Nutrition Assessment    Type and Reason for Visit:  Initial, Positive nutrition screen, Consult    Nutrition Recommendations/Plan:   Start TF of Jevity 1.5 at 30 ml/hr and increase by 20 ml q 4hr until goal; rate of 65 ml/hr is met   Flush with 30 ml water q 4 hr or per MD guidance  Monitor TF tolerance and electrolytes      Malnutrition Assessment:  Malnutrition Status:  At risk for malnutrition (06/11/25 9551)    Context:  Acute Illness     Findings of the 6 clinical characteristics of malnutrition:  Energy Intake:  No decrease in energy intake  Weight Loss:  No weight loss     Body Fat Loss:  Unable to assess     Muscle Mass Loss:  Unable to assess    Fluid Accumulation:  Unable to assess     Strength:  Not Performed    Nutrition Assessment:    Pt. nutritionally compromised AEB pt admitted with fever and is TF dependent r/t dysphagia.  At risk for further nutrition compromise r/t currently NPO w/o TF infusing and altered nutrition related labs .  Will initiate TF if Jevity 1.5 at 30 ml/hr with 30 ml water flush q 4hr. .     Nutrition Related Findings:    pt is A & O with noted parkinsons and ementia; TF depe for ~ 1 year has gained ~ 30#; low Na; Low h/h Wound Type: None       Current Nutrition Intake & Therapies:    Average Meal Intake: NPO  Average Supplements Intake: NPO  Diet NPO    Anthropometric Measures:  Height: 175.3 cm (5' 9.02\")  Ideal Body Weight (IBW): 160 lbs (73 kg)    Admission Body Weight: 79.4 kg (175 lb)  Current Body Weight: 79.3 kg (174 lb 12 oz), 109.2 % IBW. Weight Source: Bed scale  Current BMI (kg/m2): 25.8  Usual Body Weight: 74.8 kg (165 lb) (July 2024)     % Weight Change (Calculated): 5.9                    BMI Categories: Overweight (BMI 25.0-29.9)    Estimated Daily Nutrient Needs:  Energy Requirements Based On: Kcal/kg  Weight Used for Energy Requirements: Current  Energy (kcal/day): 3712-3787 based ~ 22-25 kcal/kg cbw  Weight Used for Protein Requirements:

## 2025-06-11 NOTE — PROGRESS NOTES
0439: Informed MD via Perfect serve noted slight yellow to clear with pink tinged drainage at the PEG site. Swab ordered    0500: swab sent to lab

## 2025-06-11 NOTE — PROGRESS NOTES
Brief Progress Note    Date: 06/11/25    Subjective: Patient was evaluated by nocturnist early this AM. Current plan of care below. I have reviewed vitals, labs and orders and have briefly seen the patient.     Objective:  Vitals:    06/10/25 2330 06/10/25 2340 06/11/25 0308 06/11/25 0345   BP:   114/64 116/76   Pulse:   70    Resp:   13 18   Temp:  100 °F (37.8 °C) 97.5 °F (36.4 °C) 98.1 °F (36.7 °C)   TempSrc:  Oral Oral Oral   SpO2:   92% 92%   Weight: 78.5 kg (173 lb 1 oz)   79.3 kg (174 lb 12.8 oz)   Height:    1.753 m (5' 9\")       Physical Exam:    Gen: No distress. Alert. Appears chronically ill, thin pleasant elderly male  Eyes: PERRL. No sclera icterus. No conjunctival injection.   ENT: No discharge. Pharynx clear.   Neck: No JVD.  Trachea midline.  Resp: No accessory muscle use. No crackles. No wheezes. No rhonchi.   CV: Regular rate. Regular rhythm. No murmur.  No rub. No edema.   Capillary Refill: Brisk,< 3 seconds   GI: Non-tender. Non-distended.  Normal bowel sounds.  +peg tube with mild erythema around tube and tan drainage, ? Tube feed  Skin: Warm and dry. No nodule on exposed extremities. No rash on exposed extremities.   M/S: No cyanosis. No joint deformity. No clubbing.   Neuro: Awake. Grossly nonfocal      Labs:  CBC:   Recent Labs     06/10/25  2026 06/11/25  0828   WBC 7.6 8.5   HGB 11.4* 11.1*   HCT 35.2* 33.7*   MCV 95.4 96.7   * 95*     BMP:   Recent Labs     06/10/25  2026 06/11/25  0828   * 135*   K 4.3 4.9   CL 97* 102   CO2 22 18*   BUN 19 16   CREATININE 0.8 0.8     LIVER PROFILE:   Recent Labs     06/10/25  2026   AST 37   ALT 18   BILITOT 1.3*   ALKPHOS 104     PT/INR: No results for input(s): \"PROTIME\", \"INR\" in the last 72 hours.  APTT: No results for input(s): \"APTT\" in the last 72 hours.  UA:  Recent Labs     06/10/25  2152   COLORU Yellow   PHUR 8.0   WBCUA 3-5   RBCUA 5-10*   MUCUS Rare*   BACTERIA 2+*   CLARITYU Clear   LEUKOCYTESUR Negative   UROBILINOGEN 4.0*

## 2025-06-11 NOTE — PROGRESS NOTES
Attempted to contact patients wife to verify medications. There was no answer at this time. Will try again later.

## 2025-06-11 NOTE — PLAN OF CARE
Problem: Chronic Conditions and Co-morbidities  Goal: Patient's chronic conditions and co-morbidity symptoms are monitored and maintained or improved  Outcome: Progressing     Problem: Discharge Planning  Goal: Discharge to home or other facility with appropriate resources  Outcome: Progressing     Problem: ABCDS Injury Assessment  Goal: Absence of physical injury  Outcome: Progressing     Problem: Spiritual Struggle  Goal: Verbalizes spiritual struggle  Outcome: Progressing  Goal: Gains new understanding/perception  Outcome: Progressing  Goal: Growing sense of peace/hope  Outcome: Progressing  Goal: Explore resources for additional follow up, post discharge  Outcome: Progressing     Problem: Emotional Distress  Goal: Verbalization of thoughts and feelings  Outcome: Progressing  Goal: Reduce evidence of anxiety/worry/anger  Outcome: Progressing  Goal: Identifies/restores coping resources/skills  Outcome: Progressing  Goal: Growing sense of peace/hope  Outcome: Progressing  Goal: Explore resources for additional follow up, post discharge  Outcome: Progressing     Problem: Life Adjustment  Goal: Verbalization of feelings regarding change/loss  Outcome: Progressing  Goal: Finding meaning/purpose in the midst of illness/suffering  Outcome: Progressing  Goal: Identifies/restores coping resources/skills  Outcome: Progressing  Goal: Growing sense of peace/hope  Outcome: Progressing  Goal: Explore resources for additional follow up, post discharge  Outcome: Progressing     Problem: Support with Decision-Making  Goal: Verbalization of thoughts/feelings regarding decision  Outcome: Progressing  Goal: Movement towards resolution of decision  Outcome: Progressing  Goal: Identifies/restores coping resources/skills  Outcome: Progressing  Goal: Explore resources for additional follow up, post discharge  Outcome: Progressing     Problem: Unresolved Conflict/Relationships  Goal: Verbalization of thoughts and feelings  Outcome:

## 2025-06-11 NOTE — PROGRESS NOTES
Chief Complaint   Patient presents with    Headache    Extremity Weakness     Head for a few days, can't stand up per wife.         /      Vitals:    06/10/25 2139 06/10/25 2139 06/10/25 2330 06/10/25 2340   BP: 122/63      Pulse: 79      Resp: 21      Temp:  (!) 101 °F (38.3 °C)  100 °F (37.8 °C)   TempSrc:  Oral  Oral   SpO2: 97%      Weight:   78.5 kg (173 lb 1 oz)           Allergies   Allergen Reactions    Cefuroxime Axetil      Other reaction(s): Hives    Lisinopril      Other reaction(s): Other (See Comments), unknown/H&P    Morphine      Bad sweats  Other reaction(s): GI upset, Other (See Comments), shaking  Bad sweats  Bad sweats      Other/Food Other (See Comments)     Pt. States that there is another med that he is allergic to,does not know the name states that it makes him sweat  Other reaction(s): Throat irritation    Rivaroxaban      Other reaction(s): Coordination problem, Other (See Comments)    Apixaban      hot flashes and stomach pain and he is peeing more than usual    Clopidogrel Rash    Codeine Anxiety, Nausea Only and Rash     dizzy  Other reaction(s): Nausea/ Sweaty, Other (See Comments)  dizzy      Penicillins Nausea And Vomiting and Rash     Other reaction(s): Dizzy, Other (See Comments), shaking    Plavix [Clopidogrel Bisulfate] Rash       Current Facility-Administered Medications   Medication Dose Route Frequency Provider Last Rate Last Admin    sodium chloride flush 0.9 % injection 5-40 mL  5-40 mL IntraVENous 2 times per day Brandt Frazier MD   10 mL at 06/10/25 2340    sodium chloride flush 0.9 % injection 5-40 mL  5-40 mL IntraVENous PRN Brandt Frazier MD        0.9 % sodium chloride infusion   IntraVENous PRN Brandt Frazier MD        potassium chloride (KLOR-CON M) extended release tablet 40 mEq  40 mEq Oral PRN Brandt Frazier MD        Or    potassium bicarb-citric acid (EFFER-K) effervescent tablet 40 mEq  40 mEq Oral PRN Brandt Frazier MD        Or    potassium chloride 10 mEq/100 mL

## 2025-06-11 NOTE — CARE COORDINATION
Case Management Assessment  Initial Evaluation    Date/Time of Evaluation: 6/11/2025 10:08 AM  Assessment Completed by: Merline Andrade    If patient is discharged prior to next notation, then this note serves as note for discharge by case management.    Patient Name: Rodolfo Luna                   YOB: 1945  Diagnosis: Fever of unknown origin [R50.9]  Generalized weakness [R53.1]                   Date / Time: 6/10/2025  8:11 PM    Patient Admission Status: Inpatient   Readmission Risk (Low < 19, Mod (19-27), High > 27): Readmission Risk Score: 21.3    Current PCP: Carlton Leavitt, APRN - CNP  PCP verified by CM? Yes ([place])    Chart Reviewed: Yes      History Provided by: Patient  Patient Orientation: Alert and Oriented    Patient Cognition: Alert    Hospitalization in the last 30 days (Readmission):  No    If yes, Readmission Assessment in CM Navigator will be completed.    Advance Directives:      Code Status: Full Code   Patient's Primary Decision Maker is: Patient Declined (Legal Next of Kin Remains as Decision Maker)    Primary Decision Maker: JeremyHelen A - Spouse - [phone]    Discharge Planning:    Patient lives with: Spouse/Significant Other Type of Home: House  Primary Care Giver: Self  Patient Support Systems include: Spouse/Significant Other   Current Financial resources:  ([facility]), Medicare  Current community resources: None  Current services prior to admission: Home Care, Durable Medical Equipment, [facility]            Current DME: Walker, Cane, Wheelchair            Type of Home Care services:  PT, Nursing Services    ADLS  Prior functional level: Independent in ADLs/IADLs  Current functional level: Independent in ADLs/IADLs    PT AM-PAC:   /24  OT AM-PAC:   /24    Family can provide assistance at DC: Yes  Would you like Case Management to discuss the discharge plan with any other family members/significant others, and if so, who? Yes (wife)  Plans to Return to Present

## 2025-06-11 NOTE — H&P
V2.0  History and Physical      Name:  Rodolfo Luna /Age/Sex: 1945  (79 y.o. male)   MRN & CSN:  [id number] & [id number] Encounter Date/Time: 2025 2:43 AM EDT   Location:   PCP: Carlton Leavitt APRN - CNP       Hospital Day: 2    Assessment and Plan:       Hospital Problems           Last Modified POA    * (Principal) Fever of unknown origin 6/10/2025 Yes       Assessment/Plan: Admit patient to inpatient unit.    Posterior HA  Generalized weakness  Fever (FUO)  Lactic acidosis  SIRS positive  - defer abx for now d/t no clear source and stable hemodynamics  - procal negative as well  - PT/OT    PEG tube   - some yellow serous drainaige around insertion site  - no tenderness or erythema  - procal negative  - culture of yellow serous fluid obtained, however predict low positive predictive value of infection  - continue to defer abx    Chronic:  Afib - continue home AV glen blockers, not sure why patient is on Eliquis if hx of Watchman, continue Eliquis for now  Hx of Watchman device  CAD s/p ADRIÁN in 2017  Hx of digoxin toxicity  Hx of CVA ([place])  Parkinsonism  Orthostasis  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  Hx of lung cancer  Hx of porphyria cutanea tarda      Dispo: med/surg tele  Ppx:  indications for ulcer and DVT ppx reviewed and medications ordered as needed     Personally reviewed patient's home medications  Personally reviewed lab studies including but not limited to CBC, BMP/CMP with reflex to Mg, troponin, and/or BNP  Discussed management of the case with ED provider and agree with his/her recommended to admit patient for management of generalzied weakness  EKG interpreted personally and results indicating NSR  Imaging modalities that were personally interpreted include plain radiograph and/or CT with results indicating normal CXR  Drugs that require monitoring for toxicity include none and the method of monitoring was none    History from:     Patient, direct communication with

## 2025-06-11 NOTE — PLAN OF CARE
Problem: Chronic Conditions and Co-morbidities  Goal: Patient's chronic conditions and co-morbidity symptoms are monitored and maintained or improved  Outcome: Progressing  Flowsheets (Taken 6/11/2025 0345)  Care Plan - Patient's Chronic Conditions and Co-Morbidity Symptoms are Monitored and Maintained or Improved:   Monitor and assess patient's chronic conditions and comorbid symptoms for stability, deterioration, or improvement   Collaborate with multidisciplinary team to address chronic and comorbid conditions and prevent exacerbation or deterioration   Update acute care plan with appropriate goals if chronic or comorbid symptoms are exacerbated and prevent overall improvement and discharge     Problem: Discharge Planning  Goal: Discharge to home or other facility with appropriate resources  Outcome: Progressing  Flowsheets (Taken 6/11/2025 0345)  Discharge to home or other facility with appropriate resources: Identify barriers to discharge with patient and caregiver     Problem: ABCDS Injury Assessment  Goal: Absence of physical injury  Outcome: Progressing     Problem: Spiritual Struggle  Goal: Verbalizes spiritual struggle  Outcome: Progressing  Goal: Gains new understanding/perception  Outcome: Progressing  Goal: Growing sense of peace/hope  Outcome: Progressing  Goal: Explore resources for additional follow up, post discharge  Outcome: Progressing     Problem: Emotional Distress  Goal: Verbalization of thoughts and feelings  Outcome: Progressing  Goal: Reduce evidence of anxiety/worry/anger  Outcome: Progressing  Goal: Identifies/restores coping resources/skills  Outcome: Progressing  Goal: Growing sense of peace/hope  Outcome: Progressing  Goal: Explore resources for additional follow up, post discharge  Outcome: Progressing     Problem: Life Adjustment  Goal: Verbalization of feelings regarding change/loss  Outcome: Progressing  Goal: Finding meaning/purpose in the midst of illness/suffering  Outcome:

## 2025-06-12 PROBLEM — Z22.322 MRSA (METHICILLIN RESISTANT STAPH AUREUS) CULTURE POSITIVE: Status: ACTIVE | Noted: 2025-06-12

## 2025-06-12 LAB
ALBUMIN SERPL-MCNC: 2.8 G/DL (ref 3.4–5)
ALP SERPL-CCNC: 82 U/L (ref 40–129)
ALT SERPL-CCNC: 24 U/L (ref 10–40)
ANION GAP SERPL CALCULATED.3IONS-SCNC: 9 MMOL/L (ref 3–16)
AST SERPL-CCNC: 45 U/L (ref 15–37)
BASOPHILS # BLD: 0 K/UL (ref 0–0.2)
BASOPHILS NFR BLD: 0.6 %
BILIRUB DIRECT SERPL-MCNC: 0.6 MG/DL (ref 0–0.3)
BILIRUB INDIRECT SERPL-MCNC: 0.5 MG/DL (ref 0–1)
BILIRUB SERPL-MCNC: 1.1 MG/DL (ref 0–1)
BUN SERPL-MCNC: 18 MG/DL (ref 7–20)
CALCIUM SERPL-MCNC: 7.8 MG/DL (ref 8.3–10.6)
CHLORIDE SERPL-SCNC: 105 MMOL/L (ref 99–110)
CO2 SERPL-SCNC: 25 MMOL/L (ref 21–32)
CREAT SERPL-MCNC: 0.7 MG/DL (ref 0.8–1.3)
DEPRECATED RDW RBC AUTO: 15.1 % (ref 12.4–15.4)
EOSINOPHIL # BLD: 0 K/UL (ref 0–0.6)
EOSINOPHIL NFR BLD: 0.5 %
GFR SERPLBLD CREATININE-BSD FMLA CKD-EPI: >90 ML/MIN/{1.73_M2}
GLUCOSE SERPL-MCNC: 143 MG/DL (ref 70–99)
HCT VFR BLD AUTO: 27.7 % (ref 40.5–52.5)
HGB BLD-MCNC: 9.2 G/DL (ref 13.5–17.5)
LYMPHOCYTES # BLD: 0.7 K/UL (ref 1–5.1)
LYMPHOCYTES NFR BLD: 14.1 %
MAGNESIUM SERPL-MCNC: 2.32 MG/DL (ref 1.8–2.4)
MCH RBC QN AUTO: 31 PG (ref 26–34)
MCHC RBC AUTO-ENTMCNC: 33.3 G/DL (ref 31–36)
MCV RBC AUTO: 93.2 FL (ref 80–100)
MONOCYTES # BLD: 0.6 K/UL (ref 0–1.3)
MONOCYTES NFR BLD: 12.8 %
NEUTROPHILS # BLD: 3.5 K/UL (ref 1.7–7.7)
NEUTROPHILS NFR BLD: 72 %
PLATELET # BLD AUTO: 76 K/UL (ref 135–450)
PMV BLD AUTO: 8.9 FL (ref 5–10.5)
POTASSIUM SERPL-SCNC: 3.1 MMOL/L (ref 3.5–5.1)
PROCALCITONIN SERPL IA-MCNC: 0.21 NG/ML (ref 0–0.15)
PROT SERPL-MCNC: 6.4 G/DL (ref 6.4–8.2)
RBC # BLD AUTO: 2.97 M/UL (ref 4.2–5.9)
REPORT: NORMAL
RESP PATH DNA+RNA PNL NPH NAA+NON-PROBE: NORMAL
SODIUM SERPL-SCNC: 139 MMOL/L (ref 136–145)
WBC # BLD AUTO: 4.8 K/UL (ref 4–11)

## 2025-06-12 PROCEDURE — 6370000000 HC RX 637 (ALT 250 FOR IP): Performed by: SURGERY

## 2025-06-12 PROCEDURE — 84145 PROCALCITONIN (PCT): CPT

## 2025-06-12 PROCEDURE — 80048 BASIC METABOLIC PNL TOTAL CA: CPT

## 2025-06-12 PROCEDURE — 6360000002 HC RX W HCPCS

## 2025-06-12 PROCEDURE — 2580000003 HC RX 258: Performed by: INTERNAL MEDICINE

## 2025-06-12 PROCEDURE — 85025 COMPLETE CBC W/AUTO DIFF WBC: CPT

## 2025-06-12 PROCEDURE — 99233 SBSQ HOSP IP/OBS HIGH 50: CPT | Performed by: INTERNAL MEDICINE

## 2025-06-12 PROCEDURE — 99231 SBSQ HOSP IP/OBS SF/LOW 25: CPT | Performed by: NEUROMUSCULOSKELETAL MEDICINE & OMM

## 2025-06-12 PROCEDURE — APPSS30 APP SPLIT SHARED TIME 16-30 MINUTES

## 2025-06-12 PROCEDURE — 80076 HEPATIC FUNCTION PANEL: CPT

## 2025-06-12 PROCEDURE — 6370000000 HC RX 637 (ALT 250 FOR IP): Performed by: NURSE PRACTITIONER

## 2025-06-12 PROCEDURE — 2500000003 HC RX 250 WO HCPCS: Performed by: SURGERY

## 2025-06-12 PROCEDURE — 83735 ASSAY OF MAGNESIUM: CPT

## 2025-06-12 PROCEDURE — 6360000002 HC RX W HCPCS: Performed by: INTERNAL MEDICINE

## 2025-06-12 PROCEDURE — 1200000000 HC SEMI PRIVATE

## 2025-06-12 PROCEDURE — 36415 COLL VENOUS BLD VENIPUNCTURE: CPT

## 2025-06-12 RX ADMIN — APIXABAN 5 MG: 5 TABLET, FILM COATED ORAL at 21:07

## 2025-06-12 RX ADMIN — LACOSAMIDE 100 MG: 50 TABLET, FILM COATED ORAL at 21:07

## 2025-06-12 RX ADMIN — MINERAL SUPPLEMENT IRON 300 MG / 5 ML STRENGTH LIQUID 100 PER BOX UNFLAVORED 300 MG: at 08:32

## 2025-06-12 RX ADMIN — CIPROFLOXACIN 400 MG: 2 INJECTION, SOLUTION INTRAVENOUS at 18:51

## 2025-06-12 RX ADMIN — METRONIDAZOLE 500 MG: 500 INJECTION, SOLUTION INTRAVENOUS at 18:46

## 2025-06-12 RX ADMIN — POTASSIUM BICARBONATE 40 MEQ: 782 TABLET, EFFERVESCENT ORAL at 08:32

## 2025-06-12 RX ADMIN — Medication 750 MG: at 21:16

## 2025-06-12 RX ADMIN — ACETAMINOPHEN 650 MG: 325 TABLET ORAL at 21:23

## 2025-06-12 RX ADMIN — Medication 6 MG: at 21:07

## 2025-06-12 RX ADMIN — DILTIAZEM HYDROCHLORIDE 30 MG: 60 TABLET ORAL at 21:07

## 2025-06-12 RX ADMIN — Medication 750 MG: at 08:34

## 2025-06-12 RX ADMIN — MEMANTINE HYDROCHLORIDE 10 MG: 5 TABLET ORAL at 08:30

## 2025-06-12 RX ADMIN — SODIUM CHLORIDE, PRESERVATIVE FREE 10 ML: 5 INJECTION INTRAVENOUS at 21:00

## 2025-06-12 RX ADMIN — MINERAL SUPPLEMENT IRON 300 MG / 5 ML STRENGTH LIQUID 100 PER BOX UNFLAVORED 300 MG: at 21:08

## 2025-06-12 RX ADMIN — LORAZEPAM 1 MG: 1 TABLET ORAL at 14:02

## 2025-06-12 RX ADMIN — VANCOMYCIN HYDROCHLORIDE 1000 MG: 1 INJECTION, POWDER, LYOPHILIZED, FOR SOLUTION INTRAVENOUS at 21:00

## 2025-06-12 RX ADMIN — CIPROFLOXACIN 400 MG: 2 INJECTION, SOLUTION INTRAVENOUS at 05:45

## 2025-06-12 RX ADMIN — MEMANTINE HYDROCHLORIDE 10 MG: 5 TABLET ORAL at 21:07

## 2025-06-12 RX ADMIN — METRONIDAZOLE 500 MG: 500 INJECTION, SOLUTION INTRAVENOUS at 01:59

## 2025-06-12 RX ADMIN — FINASTERIDE 5 MG: 5 TABLET, FILM COATED ORAL at 08:36

## 2025-06-12 RX ADMIN — CARBIDOPA AND LEVODOPA 1 TABLET: 25; 100 TABLET ORAL at 08:36

## 2025-06-12 RX ADMIN — METRONIDAZOLE 500 MG: 500 INJECTION, SOLUTION INTRAVENOUS at 08:42

## 2025-06-12 RX ADMIN — DILTIAZEM HYDROCHLORIDE 30 MG: 60 TABLET ORAL at 08:30

## 2025-06-12 RX ADMIN — ESCITALOPRAM OXALATE 10 MG: 10 TABLET ORAL at 08:32

## 2025-06-12 RX ADMIN — LACOSAMIDE 100 MG: 50 TABLET, FILM COATED ORAL at 08:33

## 2025-06-12 RX ADMIN — CARBIDOPA AND LEVODOPA 1 TABLET: 25; 100 TABLET ORAL at 21:07

## 2025-06-12 RX ADMIN — MONTELUKAST SODIUM 10 MG: 10 TABLET, COATED ORAL at 21:07

## 2025-06-12 RX ADMIN — CARBIDOPA AND LEVODOPA 1 TABLET: 25; 100 TABLET ORAL at 14:22

## 2025-06-12 RX ADMIN — LANSOPRAZOLE 30 MG: 30 TABLET, ORALLY DISINTEGRATING, DELAYED RELEASE ORAL at 05:45

## 2025-06-12 RX ADMIN — ATORVASTATIN CALCIUM 80 MG: 40 TABLET, FILM COATED ORAL at 21:07

## 2025-06-12 RX ADMIN — APIXABAN 5 MG: 5 TABLET, FILM COATED ORAL at 08:30

## 2025-06-12 ASSESSMENT — PAIN DESCRIPTION - DESCRIPTORS: DESCRIPTORS: ACHING

## 2025-06-12 ASSESSMENT — PAIN SCALES - GENERAL
PAINLEVEL_OUTOF10: 5
PAINLEVEL_OUTOF10: 6
PAINLEVEL_OUTOF10: 5

## 2025-06-12 ASSESSMENT — PAIN - FUNCTIONAL ASSESSMENT: PAIN_FUNCTIONAL_ASSESSMENT: ACTIVITIES ARE NOT PREVENTED

## 2025-06-12 ASSESSMENT — PAIN DESCRIPTION - ONSET: ONSET: ON-GOING

## 2025-06-12 ASSESSMENT — PAIN DESCRIPTION - ORIENTATION: ORIENTATION: LOWER;LEFT;RIGHT

## 2025-06-12 ASSESSMENT — PAIN DESCRIPTION - LOCATION: LOCATION: BACK;EAR

## 2025-06-12 ASSESSMENT — PAIN DESCRIPTION - FREQUENCY: FREQUENCY: CONTINUOUS

## 2025-06-12 ASSESSMENT — PAIN DESCRIPTION - PAIN TYPE: TYPE: ACUTE PAIN

## 2025-06-12 NOTE — PROGRESS NOTES
Rodolfo Luna  Neurology Follow-up  [facility] Neurology    Date of Service: 6/12/2025    Subjective:   CC: Follow up today regarding: Posterior headache/generalized weakness     Events noted. Chart and lab reviewed.       ROS : A 10-12 system review obtained and updated today and is unremarkable except as mentioned  in my interval history.     Past medical history, social history, medication and family history reviewed.       Objective:  Exam:   Constitutional:   Vitals:    06/11/25 1733 06/11/25 2032 06/12/25 0547 06/12/25 0831   BP:  110/62 102/61 108/60   Pulse:  91 68 73   Resp:  18 18 18   Temp:  98.2 °F (36.8 °C) 97.7 °F (36.5 °C) 98.1 °F (36.7 °C)   TempSrc:  Oral Oral Oral   SpO2:  93% 94% 95%   Weight:       Height: 1.753 m (5' 9.02\")      General appearance:  Normal development and appear in no acute distress.   Mental Status:   Orientation to person, place, time, situation  Memory good immediate recall and intact remote memory               Attention intact as able to attend well to the exam                Language fluent in conversation              Comprehension intact; follows simple commands     Cranial Nerves:   II: Visual fields: Full without extinction on confrontational testing.   III,IV,VI: Pupils: equal, round, reactive to light, bilaterally; Extra Ocular Movements are intact. No nystagmus  V: Facial sensation is intact to pin prick and light touch   VII: Facial strength and movements: intact and symmetric  VIII: Hearing: Intact to finger rub bilaterally   IX, XI: Normal palatal elevation and shoulder shrug  XII: Tongue movements are normal; tongue midline with protrusion   Musculoskeletal: 4+/5 in all 4 extremities.  Good range of motion.  No muscle atrophy.  + Neck rigidity + cogwheel rigidity  Tone: Normal tone.   Reflexes: Bilateral biceps 2/4 and patellar 2/4   Planters: Flexor bilaterally  Coordination: no pronator drift, mild tremor  Sensation: normal in both arms and

## 2025-06-12 NOTE — PROGRESS NOTES
Brief Progress Note    Date: 06/12/25    Subjective: Says he has some cough, reports the feeding tube site feels better.     Objective:  Vitals:    06/11/25 1733 06/11/25 2032 06/12/25 0547 06/12/25 0831   BP:  110/62 102/61 108/60   Pulse:  91 68 73   Resp:  18 18 18   Temp:  98.2 °F (36.8 °C) 97.7 °F (36.5 °C) 98.1 °F (36.7 °C)   TempSrc:  Oral Oral Oral   SpO2:  93% 94% 95%   Weight:       Height: 1.753 m (5' 9.02\")          Physical Exam:    Gen: No distress. Alert. Appears chronically ill, thin pleasant elderly male  Eyes: PERRL. No sclera icterus. No conjunctival injection.   ENT: No discharge. Pharynx clear.   Neck: No JVD.  Trachea midline.  Resp: No accessory muscle use. No crackles. No wheezes. No rhonchi.   CV: Regular rate. Regular rhythm. No murmur.  No rub. No edema.   Capillary Refill: Brisk,< 3 seconds   GI: Non-tender. Non-distended.  Normal bowel sounds.  +peg tube with mild erythema around tube and tan drainage, ? Tube feed  Skin: Warm and dry. No nodule on exposed extremities. No rash on exposed extremities.   M/S: No cyanosis. No joint deformity. No clubbing.   Neuro: Awake. Grossly nonfocal      Labs:  CBC:   Recent Labs     06/10/25  2026 06/11/25  0828 06/12/25  0632   WBC 7.6 8.5 4.8   HGB 11.4* 11.1* 9.2*   HCT 35.2* 33.7* 27.7*   MCV 95.4 96.7 93.2   * 95* 76*     BMP:   Recent Labs     06/10/25  2026 06/11/25  0828 06/12/25  0632   * 135* 139   K 4.3 4.9 3.1*   CL 97* 102 105   CO2 22 18* 25   BUN 19 16 18   CREATININE 0.8 0.8 0.7*     LIVER PROFILE:   Recent Labs     06/10/25  2026 06/12/25  0632   AST 37 45*   ALT 18 24   BILIDIR  --  0.6*   BILITOT 1.3* 1.1*   ALKPHOS 104 82     PT/INR: No results for input(s): \"PROTIME\", \"INR\" in the last 72 hours.  APTT: No results for input(s): \"APTT\" in the last 72 hours.  UA:  Recent Labs     06/10/25  2152   COLORU Yellow   PHUR 8.0   WBCUA 3-5   RBCUA 5-10*   MUCUS Rare*   BACTERIA 2+*   CLARITYU Clear   LEUKOCYTESUR Negative

## 2025-06-12 NOTE — PROGRESS NOTES
Patient tolerated meds and tube feeding, no complaints of ABD pain or N/V. Tube feed increased per orders currently infusing. Fall precautions remain in place, call light and bedside table within reach.

## 2025-06-12 NOTE — CARE COORDINATION
Reviewed chart and met with pt who cont plan to return home with wife at [place]. Noted [facility] is active with [facility]. Will cont to follow for additional needs.

## 2025-06-12 NOTE — WOUND CARE
Wound Referral Progress Note       NAME:  Rodolfo Luna  MEDICAL RECORD NUMBER:  [id number]  AGE: 79 y.o.   GENDER: male  : 1945  TODAY'S DATE:  2025    Subjective Pt with dementia, answering questions appropriately   Reason for WOCN Evaluation and Assessment: Peg tube site      Rodolfo Luna is a 79 y.o. male referred by:   Provider and Nursing    Wound Identification:  Wound Type:  Hypergranulation tissue  Contributing Factors:  moisture and friction    Pt seen for wound care.  Pt admitted for fever.  Found to have infected peg tube.  Pt lives at home and cared for by his wife. He has HHC and PT services also.  Peg tube site with bloody drainage and hypergranulation tissue.  Silver nitrate treatment done by Dr Mancini on .  Tube is not secured.  Pt states he uses a safety pin at home and attaches it to his clothes.  Seen by Dr Elizondo yesterday and bumper adjusted.  It is flush with his abdomen at this time with sanguinous drainage.  Discussed SN treatment with Dr Huitron and Dr Elizondo.  Lidocaine cream applied to hypergranulation tissue and allowed to dwell x 10 minutes prior to SN treatment.  Pt will likely need additional SN treatment and tube secured better on abdomen.  Medline tube securement device (Cathgrip) placed to LLQ.  Additonal securement device provided at bedside.  Will place device name on JOANIE.        Patient Care Goal:  Wound Healing        PAST MEDICAL HISTORY        Diagnosis Date    A-fib (HCC)     MIRNA (acute kidney injury) 2022    Anxiety     Arthritis     OA    Bradycardia 2014    Cancer (HCC)     skin cancer-forearm right , face    Coronary artery disease involving native coronary artery of native heart without angina pectoris 2022    Hemoptysis 10/16/2014    Since Ablation    History of implantation of penile prosthesis     Irregular heart  beats     Mixed hyperlipidemia 2023    Parkinson disease (HCC)     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HCC)

## 2025-06-12 NOTE — DISCHARGE INSTR - COC
Continuity of Care Form    Patient Name: Rodolfo Luna   :  1945  MRN:  [id number]    Admit date:  6/10/2025  Discharge date:  2025    Code Status Order: Full Code   Advance Directives:     Admitting Physician:  Brandt Frazier MD  PCP: Carlton Leavitt, APRN - CNP    Discharging Nurse: Sandy MENDEZ  Discharging Hospital Unit/Room#: 0208/0208-01  Discharging Unit Phone Number: [phone]    Emergency Contact:   Extended Emergency Contact Information  Primary Emergency Contact: Helen Luna  Address: [id number] [place] [address]  Home Phone: [phone]  Mobile Phone: [phone]  Relation: Spouse   needed? No  Secondary Emergency Contact: lorettapage  Mobile Phone: [phone]  Relation: Grandchild   needed? No    Past Surgical History:  Past Surgical History:   Procedure Laterality Date    ABLATION OF DYSRHYTHMIC FOCUS  2014    CARPAL TUNNEL RELEASE      CERVICAL DISC SURGERY  2010    COLONOSCOPY  2016    normal    CORONARY ANGIOPLASTY WITH STENT PLACEMENT      ESOPHAGEAL DILATATION N/A 2024    ESOPHAGEAL DILATION PERALTA performed by Abraham Lagos DO at [facility] ENDOSCOPY    FEMUR SURGERY      left    KNEE SURGERY  -11 R total knee replacement with femoral nerve block for pain control    LUNG REMOVAL, PARTIAL Right     20%    OTHER SURGICAL HISTORY Bilateral     excisions of lesions on bilat.neck and back    OTHER SURGICAL HISTORY  2014    Reveal Loop recorder placed in Cath Lab    UPPER GASTROINTESTINAL ENDOSCOPY  2016    gastric and esophogeal biopsy    UPPER GASTROINTESTINAL ENDOSCOPY  2024    ESOPHAGOGASTRODUODENOSCOPY BIOPSY performed by Abraham Lagos DO at [facility] ENDOSCOPY    UPPER GASTROINTESTINAL ENDOSCOPY N/A 2024    EGD/PEG performed by Dc Elizondo MD at [facility] ENDOSCOPY       Immunization History:   Immunization History   Administered Date(s)

## 2025-06-12 NOTE — PROGRESS NOTES
PROGRESS NOTE  S:79 yrs Patient  admitted on 6/10/2025 with Fever of unknown origin [R50.9]  Generalized weakness [R53.1] .  Today, he reports feeling better. He denies pain and nausea. He has remained afebrile. He continues to have minimal drainage around PEG. TF running at goal. He reports bowel movement yesterday.     Exam:   Vitals:    06/12/25 0831   BP: 108/60   Pulse: 73   Resp: 18   Temp: 98.1 °F (36.7 °C)   SpO2: 95%   Generalized: alert, no acute distress  HEENT: sclera clear, anicteric  Neck supple, trachea midline  Heart NSR  Abdomen: soft, +PEG, NT. ND  Extremities no edema    Medications: Reviewed    Labs:  CBC:   Recent Labs     06/10/25  2026 06/11/25  0828 06/12/25  0632   WBC 7.6 8.5 4.8   HGB 11.4* 11.1* 9.2*   HCT 35.2* 33.7* 27.7*   MCV 95.4 96.7 93.2   * 95* 76*     BMP:   Recent Labs     06/10/25  2026 06/11/25  0828 06/12/25  0632   * 135* 139   K 4.3 4.9 3.1*   CL 97* 102 105   CO2 22 18* 25   BUN 19 16 18   CREATININE 0.8 0.8 0.7*     LIVER PROFILE:   Recent Labs     06/10/25  2026 06/12/25  0632   AST 37 45*   ALT 18 24   BILIDIR  --  0.6*   BILITOT 1.3* 1.1*   ALKPHOS 104 82     Attending Supervising Physician's Attestation Statement  The patient is a 79 y.o. male. I have performed a history and physical examination of the patient. I discussed the case with GEOVANNA Lane    I reviewed the patient's Past Medical History, Past Surgical History, Medications, and Allergies.     Physical Exam:  Vitals:    06/11/25 1733 06/11/25 2032 06/12/25 0547 06/12/25 0831   BP:  110/62 102/61 108/60   Pulse:  91 68 73   Resp:  18 18 18   Temp:  98.2 °F (36.8 °C) 97.7 °F (36.5 °C) 98.1 °F (36.7 °C)   TempSrc:  Oral Oral Oral   SpO2:  93% 94% 95%   Weight:       Height: 1.753 m (5' 9.02\")          Physical Examination:   General - improved and dehydrated  Mental status - agitated  Eyes - sclera anicteric  Neck - supple, no significant

## 2025-06-13 LAB
ALBUMIN SERPL-MCNC: 2.8 G/DL (ref 3.4–5)
ALP SERPL-CCNC: 90 U/L (ref 40–129)
ALT SERPL-CCNC: 30 U/L (ref 10–40)
ANION GAP SERPL CALCULATED.3IONS-SCNC: 10 MMOL/L (ref 3–16)
ANISOCYTOSIS BLD QL SMEAR: ABNORMAL
AST SERPL-CCNC: 49 U/L (ref 15–37)
BACTERIA SPEC AEROBE CULT: ABNORMAL
BACTERIA SPEC AEROBE CULT: ABNORMAL
BASOPHILS # BLD: 0 K/UL (ref 0–0.2)
BASOPHILS NFR BLD: 0 %
BILIRUB DIRECT SERPL-MCNC: 0.4 MG/DL (ref 0–0.3)
BILIRUB INDIRECT SERPL-MCNC: 0.3 MG/DL (ref 0–1)
BILIRUB SERPL-MCNC: 0.7 MG/DL (ref 0–1)
BUN SERPL-MCNC: 18 MG/DL (ref 7–20)
CALCIUM SERPL-MCNC: 7.5 MG/DL (ref 8.3–10.6)
CHLORIDE SERPL-SCNC: 106 MMOL/L (ref 99–110)
CO2 SERPL-SCNC: 25 MMOL/L (ref 21–32)
CREAT SERPL-MCNC: 0.6 MG/DL (ref 0.8–1.3)
DEPRECATED RDW RBC AUTO: 14.9 % (ref 12.4–15.4)
EOSINOPHIL # BLD: 0 K/UL (ref 0–0.6)
EOSINOPHIL NFR BLD: 0 %
GFR SERPLBLD CREATININE-BSD FMLA CKD-EPI: >90 ML/MIN/{1.73_M2}
GLUCOSE BLD-MCNC: 123 MG/DL (ref 70–99)
GLUCOSE SERPL-MCNC: 119 MG/DL (ref 70–99)
GRAM STN SPEC: ABNORMAL
HCT VFR BLD AUTO: 28.3 % (ref 40.5–52.5)
HGB BLD-MCNC: 9.5 G/DL (ref 13.5–17.5)
LACOSAMIDE SERPL-MCNC: 8.1 UG/ML (ref 1–10)
LYMPHOCYTES # BLD: 1.1 K/UL (ref 1–5.1)
LYMPHOCYTES NFR BLD: 25 %
MAGNESIUM SERPL-MCNC: 1.87 MG/DL (ref 1.8–2.4)
MCH RBC QN AUTO: 31.2 PG (ref 26–34)
MCHC RBC AUTO-ENTMCNC: 33.7 G/DL (ref 31–36)
MCV RBC AUTO: 92.6 FL (ref 80–100)
MONOCYTES # BLD: 0.2 K/UL (ref 0–1.3)
MONOCYTES NFR BLD: 4 %
NEUTROPHILS # BLD: 3.1 K/UL (ref 1.7–7.7)
NEUTROPHILS NFR BLD: 70 %
NEUTS BAND NFR BLD MANUAL: 1 % (ref 0–7)
ORGANISM: ABNORMAL
PERFORMED ON: ABNORMAL
PLATELET # BLD AUTO: 76 K/UL (ref 135–450)
PLATELET BLD QL SMEAR: ABNORMAL
PMV BLD AUTO: 9.1 FL (ref 5–10.5)
POIKILOCYTOSIS BLD QL SMEAR: ABNORMAL
POTASSIUM SERPL-SCNC: 3.5 MMOL/L (ref 3.5–5.1)
PROT SERPL-MCNC: 6 G/DL (ref 6.4–8.2)
RBC # BLD AUTO: 3.06 M/UL (ref 4.2–5.9)
SLIDE REVIEW: ABNORMAL
SODIUM SERPL-SCNC: 141 MMOL/L (ref 136–145)
WBC # BLD AUTO: 4.4 K/UL (ref 4–11)

## 2025-06-13 PROCEDURE — 80048 BASIC METABOLIC PNL TOTAL CA: CPT

## 2025-06-13 PROCEDURE — 99233 SBSQ HOSP IP/OBS HIGH 50: CPT | Performed by: INTERNAL MEDICINE

## 2025-06-13 PROCEDURE — 6370000000 HC RX 637 (ALT 250 FOR IP): Performed by: NURSE PRACTITIONER

## 2025-06-13 PROCEDURE — 1200000000 HC SEMI PRIVATE

## 2025-06-13 PROCEDURE — 83735 ASSAY OF MAGNESIUM: CPT

## 2025-06-13 PROCEDURE — 80076 HEPATIC FUNCTION PANEL: CPT

## 2025-06-13 PROCEDURE — 6370000000 HC RX 637 (ALT 250 FOR IP): Performed by: SURGERY

## 2025-06-13 PROCEDURE — 6360000002 HC RX W HCPCS: Performed by: INTERNAL MEDICINE

## 2025-06-13 PROCEDURE — 6360000002 HC RX W HCPCS

## 2025-06-13 PROCEDURE — 85025 COMPLETE CBC W/AUTO DIFF WBC: CPT

## 2025-06-13 PROCEDURE — 97162 PT EVAL MOD COMPLEX 30 MIN: CPT

## 2025-06-13 PROCEDURE — 97116 GAIT TRAINING THERAPY: CPT

## 2025-06-13 PROCEDURE — 36415 COLL VENOUS BLD VENIPUNCTURE: CPT

## 2025-06-13 PROCEDURE — 2500000003 HC RX 250 WO HCPCS: Performed by: SURGERY

## 2025-06-13 PROCEDURE — 2580000003 HC RX 258: Performed by: INTERNAL MEDICINE

## 2025-06-13 PROCEDURE — 97530 THERAPEUTIC ACTIVITIES: CPT

## 2025-06-13 RX ADMIN — Medication 6 MG: at 20:18

## 2025-06-13 RX ADMIN — MEMANTINE HYDROCHLORIDE 10 MG: 5 TABLET ORAL at 20:18

## 2025-06-13 RX ADMIN — FINASTERIDE 5 MG: 5 TABLET, FILM COATED ORAL at 09:46

## 2025-06-13 RX ADMIN — Medication 750 MG: at 20:29

## 2025-06-13 RX ADMIN — LORAZEPAM 1 MG: 1 TABLET ORAL at 09:46

## 2025-06-13 RX ADMIN — MINERAL SUPPLEMENT IRON 300 MG / 5 ML STRENGTH LIQUID 100 PER BOX UNFLAVORED 300 MG: at 09:46

## 2025-06-13 RX ADMIN — MEMANTINE HYDROCHLORIDE 10 MG: 5 TABLET ORAL at 09:46

## 2025-06-13 RX ADMIN — VANCOMYCIN HYDROCHLORIDE 1000 MG: 1 INJECTION, POWDER, LYOPHILIZED, FOR SOLUTION INTRAVENOUS at 09:59

## 2025-06-13 RX ADMIN — CIPROFLOXACIN 400 MG: 2 INJECTION, SOLUTION INTRAVENOUS at 04:13

## 2025-06-13 RX ADMIN — Medication 750 MG: at 10:07

## 2025-06-13 RX ADMIN — ATORVASTATIN CALCIUM 80 MG: 40 TABLET, FILM COATED ORAL at 20:19

## 2025-06-13 RX ADMIN — DILTIAZEM HYDROCHLORIDE 30 MG: 60 TABLET ORAL at 09:45

## 2025-06-13 RX ADMIN — SODIUM CHLORIDE, PRESERVATIVE FREE 10 ML: 5 INJECTION INTRAVENOUS at 20:19

## 2025-06-13 RX ADMIN — ESCITALOPRAM OXALATE 10 MG: 10 TABLET ORAL at 09:45

## 2025-06-13 RX ADMIN — ACETAMINOPHEN 650 MG: 325 TABLET ORAL at 20:19

## 2025-06-13 RX ADMIN — APIXABAN 5 MG: 5 TABLET, FILM COATED ORAL at 20:19

## 2025-06-13 RX ADMIN — LACOSAMIDE 100 MG: 50 TABLET, FILM COATED ORAL at 09:46

## 2025-06-13 RX ADMIN — CIPROFLOXACIN 400 MG: 2 INJECTION, SOLUTION INTRAVENOUS at 15:49

## 2025-06-13 RX ADMIN — APIXABAN 5 MG: 5 TABLET, FILM COATED ORAL at 09:45

## 2025-06-13 RX ADMIN — CARBIDOPA AND LEVODOPA 1 TABLET: 25; 100 TABLET ORAL at 20:25

## 2025-06-13 RX ADMIN — CARBIDOPA AND LEVODOPA 1 TABLET: 25; 100 TABLET ORAL at 15:51

## 2025-06-13 RX ADMIN — METRONIDAZOLE 500 MG: 500 INJECTION, SOLUTION INTRAVENOUS at 15:51

## 2025-06-13 RX ADMIN — DILTIAZEM HYDROCHLORIDE 30 MG: 60 TABLET ORAL at 20:18

## 2025-06-13 RX ADMIN — LACOSAMIDE 100 MG: 50 TABLET, FILM COATED ORAL at 20:19

## 2025-06-13 RX ADMIN — METRONIDAZOLE 500 MG: 500 INJECTION, SOLUTION INTRAVENOUS at 00:22

## 2025-06-13 RX ADMIN — MINERAL SUPPLEMENT IRON 300 MG / 5 ML STRENGTH LIQUID 100 PER BOX UNFLAVORED 300 MG: at 20:18

## 2025-06-13 RX ADMIN — METRONIDAZOLE 500 MG: 500 INJECTION, SOLUTION INTRAVENOUS at 09:56

## 2025-06-13 RX ADMIN — CARBIDOPA AND LEVODOPA 1 TABLET: 25; 100 TABLET ORAL at 09:47

## 2025-06-13 RX ADMIN — VANCOMYCIN HYDROCHLORIDE 1000 MG: 1 INJECTION, POWDER, LYOPHILIZED, FOR SOLUTION INTRAVENOUS at 20:17

## 2025-06-13 RX ADMIN — LANSOPRAZOLE 30 MG: 30 TABLET, ORALLY DISINTEGRATING, DELAYED RELEASE ORAL at 05:46

## 2025-06-13 RX ADMIN — MONTELUKAST SODIUM 10 MG: 10 TABLET, COATED ORAL at 20:18

## 2025-06-13 RX ADMIN — LORAZEPAM 1 MG: 1 TABLET ORAL at 20:51

## 2025-06-13 NOTE — FLOWSHEET NOTE
06/12/25 2100   Vital Signs   Temp (!) 102 °F (38.9 °C)   Temp Source Oral   Pulse 95   Heart Rate Source Monitor   Respirations 20   /61   MAP (Calculated) 83   BP Location Left upper arm   BP Method Automatic   Patient Position Semi fowlers   Pain Assessment   Pain Assessment 0-10   Pain Level 6   Patient's Stated Pain Goal 0 - No pain   Pain Location Back;Ear   Pain Orientation Lower;Left;Right   Pain Descriptors Aching   Functional Pain Assessment Activities are not prevented   Pain Type Acute pain   Pain Frequency Continuous   Pain Onset On-going   Non-Pharmaceutical Pain Intervention(s) Rest   Care Plan - Pain Goals   Verbalizes/displays adequate comfort level or baseline comfort level Encourage patient to monitor pain and request assistance;Assess pain using appropriate pain scale;Administer analgesics based on type and severity of pain and evaluate response;Implement non-pharmacological measures as appropriate and evaluate response   Opioid-Induced Sedation   POSS Score 1   RASS   Carter Agitation Sedation Scale (RASS) 0   Oxygen Therapy   SpO2 93 %   O2 Device None (Room air)     Shift assessment complete.  Patient is alert and oriented.  Vital signs checked, temperature of 102, PRN Tylenol given.  On PEG tube feeding, Jevity 1.5 at 65 ml/hr. Pt denies any needs at this time.  Call light within reach.

## 2025-06-13 NOTE — FLOWSHEET NOTE
06/12/25 2330   Vital Signs   Temp 99.1 °F (37.3 °C)   Temp Source Oral     Temperature rechecked

## 2025-06-13 NOTE — PROGRESS NOTES
Brief Progress Note    Date: 06/13/25    Subjective: Had some fever during the night. Says he is feeling ok.     Objective:  Vitals:    06/12/25 2330 06/13/25 0215 06/13/25 0930 06/13/25 1545   BP:  104/64 101/85 106/67   Pulse:  74 82 76   Resp:  18 18 18   Temp: 99.1 °F (37.3 °C) 97.9 °F (36.6 °C) 99.5 °F (37.5 °C) 98.7 °F (37.1 °C)   TempSrc: Oral Oral Oral Oral   SpO2:  92% 94% 96%   Weight:       Height:           Physical Exam:    Gen: No distress. Alert. Appears chronically ill, thin pleasant elderly male  Eyes: PERRL. No sclera icterus. No conjunctival injection.   ENT: No discharge. Pharynx clear.   Neck: No JVD.  Trachea midline.  Resp: No accessory muscle use. No crackles. No wheezes. No rhonchi.   CV: Regular rate. Regular rhythm. No murmur.  No rub. No edema.   Capillary Refill: Brisk,< 3 seconds   GI: Non-tender. Non-distended.  Normal bowel sounds.  +peg tube with mild erythema around tube and tan drainage, ? Tube feed  Skin: Warm and dry. No nodule on exposed extremities. No rash on exposed extremities.   M/S: No cyanosis. No joint deformity. No clubbing.   Neuro: Awake. Grossly nonfocal      Labs:  CBC:   Recent Labs     06/11/25 0828 06/12/25 0632 06/13/25  0542   WBC 8.5 4.8 4.4   HGB 11.1* 9.2* 9.5*   HCT 33.7* 27.7* 28.3*   MCV 96.7 93.2 92.6   PLT 95* 76* 76*     BMP:   Recent Labs     06/11/25 0828 06/12/25  0632 06/13/25  0542   * 139 141   K 4.9 3.1* 3.5    105 106   CO2 18* 25 25   BUN 16 18 18   CREATININE 0.8 0.7* 0.6*     LIVER PROFILE:   Recent Labs     06/10/25  2026 06/12/25  0632 06/13/25  0542   AST 37 45* 49*   ALT 18 24 30   BILIDIR  --  0.6* 0.4*   BILITOT 1.3* 1.1* 0.7   ALKPHOS 104 82 90     PT/INR: No results for input(s): \"PROTIME\", \"INR\" in the last 72 hours.  APTT: No results for input(s): \"APTT\" in the last 72 hours.  UA:  Recent Labs     06/10/25  2152   COLORU Yellow   PHUR 8.0   WBCUA 3-5   RBCUA 5-10*   MUCUS Rare*   BACTERIA 2+*   CLARITYU Clear

## 2025-06-13 NOTE — FLOWSHEET NOTE
06/12/25 2211   Vital Signs   Temp (!) 101.5 °F (38.6 °C)   Temp Source Oral     Temperature rechecked

## 2025-06-13 NOTE — PROGRESS NOTES
PROGRESS NOTE  S:79 yrs Patient  admitted on 6/10/2025 with Fever of unknown origin [R50.9]  Generalized weakness [R53.1] .  Today, he reports feeling well. He had max temp of 102 last night. PEG site with drainage. TF running at goal. He denies pain and nausea.     Exam:   Vitals:    06/13/25 0930   BP: 101/85   Pulse: 82   Resp: 18   Temp: 99.5 °F (37.5 °C)   SpO2: 94%   Generalized: alert, no acute distress  HEENT: sclera clear, anicteric  Neck supple, trachea midline  Heart NSR  Abdomen: soft, +PEG, NT. ND  Extremities no edema    Medications: Reviewed    Labs:  CBC:   Recent Labs     06/11/25  0828 06/12/25  0632 06/13/25  0542   WBC 8.5 4.8 4.4   HGB 11.1* 9.2* 9.5*   HCT 33.7* 27.7* 28.3*   MCV 96.7 93.2 92.6   PLT 95* 76* 76*     BMP:   Recent Labs     06/11/25  0828 06/12/25  0632 06/13/25  0542   * 139 141   K 4.9 3.1* 3.5    105 106   CO2 18* 25 25   BUN 16 18 18   CREATININE 0.8 0.7* 0.6*     Attending Supervising Physician's Attestation Statement  The patient is a 79 y.o. male. I have performed a history and physical examination of the patient. I discussed the case with GEOVANNA Lane    I reviewed the patient's Past Medical History, Past Surgical History, Medications, and Allergies.     Physical Exam:  Vitals:    06/12/25 2215 06/12/25 2330 06/13/25 0215 06/13/25 0930   BP:   104/64 101/85   Pulse:   74 82   Resp:   18 18   Temp: (!) 101.5 °F (38.6 °C) 99.1 °F (37.3 °C) 97.9 °F (36.6 °C) 99.5 °F (37.5 °C)   TempSrc: Oral Oral Oral Oral   SpO2:   92% 94%   Weight:       Height:           Physical Examination:   General - improved and dehydrated  Mental status - agitated  Eyes - sclera anicteric  Neck - supple, no significant adenopathy  Heart - irregularly irregular   Abdomen - soft, NT, ND. PEG site erythematous.   Extremities - no pedal edema         Assessment   78 yo male with pmx of Parkinson's disease, seizures, A.fib s/p watchman, CAD, PE,

## 2025-06-13 NOTE — CARE COORDINATION
Reviewed chart and met with pt who cont plan to return home with wife at [place]. Noted has home T.F and HHC PTA. Will cont to follow and will resume HHC at [place].

## 2025-06-13 NOTE — PROGRESS NOTES
Inpatient Physical Therapy Evaluation & Treatment    Unit: [facility]  Date:  6/13/2025  Patient Name:    Rodolfo Luna  Admitting diagnosis:  Fever of unknown origin [R50.9]  Generalized weakness [R53.1]  Admit Date:  6/10/2025  Precautions/Restrictions/WB Status/ Lines/ Wounds/ Oxygen: Fall risk, Bed/chair alarm, Lines (IV and NG tube), Telemetry, and Isolation Precautions: Contact Plus    Treatment Time:  17:20-17:55  Treatment Number:  1   Timed Code Treatment Minutes: 25 minutes  Total Treatment Minutes:  35  minutes    Patient Stated Goals for Therapy: \" go home \"          Discharge Recommendations: Home with   24 hr supervision and Home PT  DME needs for discharge: Needs Met       Therapy recommendation for EMS Transport: can transport by wheelchair    Therapy recommendations for staff:   Assist of 1 for ambulation with use of rolling walker (RW) and gait belt to/from chair  to/from bathroom    History of Present Illness:   From [facility] H&P 6/11  \" fever, weakness  Rodolfo Luna is a 79 y.o. male who presents with fever, weakness and posterior HA x4 days. Couldn't get up today so wife cat him to ED. Fever noted, and techncially meeting SIRS, but no source identified. Admit for close observation. Hemodynamics stable, likley representing viral syndrome. Procal ordered. Admit for supportive care.\"    Preadmission Environment:   Pt lives with    with spouse  Home environment:    one story home  Steps to enter first floor:   2 steps to enter and Handrail unilateral  Steps to second floor/basement: N/A  Laundry:     1st floor  Bathroom:     tub/shower unit, hand held shower head, grab bars in shower, shower chair , and raised toilet seat w/o arms  Pt sleeps in a:    Recliner  Equipment owned:    RW, SPC, manual WC, and raised toilet seat    Preadmission Status:  Pt able to drive: Yes  Pt fully independent with ADLs: Yes  Pt receives assistance from family for: Cleaning, Cooking, Laundry , and grocery

## 2025-06-13 NOTE — PROGRESS NOTES
Shift assessment complete. See flow sheet. Scheduled meds given via Peg tube per orders. TF running. PRN given for anxiety.   Patients head-toe complete, VS are logged, and active bowel sound noted in all four quadrants.     Patient on room air, RR easy and unlabored. No s/s of distress. A/O x4. Denies pain or SOB.      Patient sitting in bed, denies further needs at this time. Call light and bedside table are within reach. The bed is locked and is in the lowest position.       Vitals:    06/13/25 0930   BP: 101/85   Pulse: 82   Resp: 18   Temp: 99.5 °F (37.5 °C)   SpO2: 94%

## 2025-06-13 NOTE — PROGRESS NOTES
Shift report given to Kimberly at bedside. Patient is stable. All end of shift needs have been met. No further assistance needed at this time.

## 2025-06-13 NOTE — PLAN OF CARE
Problem: Chronic Conditions and Co-morbidities  Goal: Patient's chronic conditions and co-morbidity symptoms are monitored and maintained or improved  6/13/2025 1352 by Kimberly Jung RN  Outcome: Progressing  6/13/2025 0049 by Nick Ron RN  Outcome: Progressing  Flowsheets (Taken 6/12/2025 2100)  Care Plan - Patient's Chronic Conditions and Co-Morbidity Symptoms are Monitored and Maintained or Improved:   Monitor and assess patient's chronic conditions and comorbid symptoms for stability, deterioration, or improvement   Collaborate with multidisciplinary team to address chronic and comorbid conditions and prevent exacerbation or deterioration   Update acute care plan with appropriate goals if chronic or comorbid symptoms are exacerbated and prevent overall improvement and discharge     Problem: ABCDS Injury Assessment  Goal: Absence of physical injury  6/13/2025 1352 by Kimberly Jung RN  Outcome: Progressing  6/13/2025 0049 by Nick Ron RN  Outcome: Progressing

## 2025-06-14 ENCOUNTER — APPOINTMENT (OUTPATIENT)
Dept: GENERAL RADIOLOGY | Age: 80
DRG: 919 | End: 2025-06-14
Payer: MEDICARE

## 2025-06-14 LAB
ANION GAP SERPL CALCULATED.3IONS-SCNC: 10 MMOL/L (ref 3–16)
BUN SERPL-MCNC: 16 MG/DL (ref 7–20)
CALCIUM SERPL-MCNC: 7.5 MG/DL (ref 8.3–10.6)
CHLORIDE SERPL-SCNC: 106 MMOL/L (ref 99–110)
CO2 SERPL-SCNC: 24 MMOL/L (ref 21–32)
CREAT SERPL-MCNC: 0.6 MG/DL (ref 0.8–1.3)
GFR SERPLBLD CREATININE-BSD FMLA CKD-EPI: >90 ML/MIN/{1.73_M2}
GLUCOSE BLD-MCNC: 105 MG/DL (ref 70–99)
GLUCOSE BLD-MCNC: 124 MG/DL (ref 70–99)
GLUCOSE BLD-MCNC: 144 MG/DL (ref 70–99)
GLUCOSE BLD-MCNC: 161 MG/DL (ref 70–99)
GLUCOSE BLD-MCNC: 167 MG/DL (ref 70–99)
GLUCOSE SERPL-MCNC: 140 MG/DL (ref 70–99)
MRSA SPEC QL CULT: NORMAL
PERFORMED ON: ABNORMAL
POTASSIUM SERPL-SCNC: 3.8 MMOL/L (ref 3.5–5.1)
SODIUM SERPL-SCNC: 140 MMOL/L (ref 136–145)
VANCOMYCIN TROUGH SERPL-MCNC: 8.9 UG/ML (ref 10–20)

## 2025-06-14 PROCEDURE — 2580000003 HC RX 258: Performed by: SURGERY

## 2025-06-14 PROCEDURE — 6360000002 HC RX W HCPCS: Performed by: INTERNAL MEDICINE

## 2025-06-14 PROCEDURE — 80048 BASIC METABOLIC PNL TOTAL CA: CPT

## 2025-06-14 PROCEDURE — 6360000002 HC RX W HCPCS

## 2025-06-14 PROCEDURE — 73110 X-RAY EXAM OF WRIST: CPT

## 2025-06-14 PROCEDURE — 2500000003 HC RX 250 WO HCPCS: Performed by: SURGERY

## 2025-06-14 PROCEDURE — 6370000000 HC RX 637 (ALT 250 FOR IP): Performed by: NURSE PRACTITIONER

## 2025-06-14 PROCEDURE — 94150 VITAL CAPACITY TEST: CPT

## 2025-06-14 PROCEDURE — 97530 THERAPEUTIC ACTIVITIES: CPT

## 2025-06-14 PROCEDURE — 2580000003 HC RX 258: Performed by: INTERNAL MEDICINE

## 2025-06-14 PROCEDURE — 2700000000 HC OXYGEN THERAPY PER DAY

## 2025-06-14 PROCEDURE — 1200000000 HC SEMI PRIVATE

## 2025-06-14 PROCEDURE — 6370000000 HC RX 637 (ALT 250 FOR IP): Performed by: SURGERY

## 2025-06-14 PROCEDURE — 97166 OT EVAL MOD COMPLEX 45 MIN: CPT

## 2025-06-14 PROCEDURE — 94761 N-INVAS EAR/PLS OXIMETRY MLT: CPT

## 2025-06-14 PROCEDURE — 97535 SELF CARE MNGMENT TRAINING: CPT

## 2025-06-14 PROCEDURE — 80202 ASSAY OF VANCOMYCIN: CPT

## 2025-06-14 PROCEDURE — 99233 SBSQ HOSP IP/OBS HIGH 50: CPT | Performed by: INTERNAL MEDICINE

## 2025-06-14 PROCEDURE — 36415 COLL VENOUS BLD VENIPUNCTURE: CPT

## 2025-06-14 RX ADMIN — METRONIDAZOLE 500 MG: 500 INJECTION, SOLUTION INTRAVENOUS at 00:21

## 2025-06-14 RX ADMIN — VANCOMYCIN HYDROCHLORIDE 1000 MG: 1 INJECTION, POWDER, LYOPHILIZED, FOR SOLUTION INTRAVENOUS at 11:55

## 2025-06-14 RX ADMIN — Medication 750 MG: at 08:16

## 2025-06-14 RX ADMIN — LACOSAMIDE 100 MG: 50 TABLET, FILM COATED ORAL at 20:25

## 2025-06-14 RX ADMIN — APIXABAN 5 MG: 5 TABLET, FILM COATED ORAL at 20:26

## 2025-06-14 RX ADMIN — APIXABAN 5 MG: 5 TABLET, FILM COATED ORAL at 08:20

## 2025-06-14 RX ADMIN — MINERAL SUPPLEMENT IRON 300 MG / 5 ML STRENGTH LIQUID 100 PER BOX UNFLAVORED 300 MG: at 08:19

## 2025-06-14 RX ADMIN — MEMANTINE HYDROCHLORIDE 10 MG: 5 TABLET ORAL at 20:25

## 2025-06-14 RX ADMIN — DILTIAZEM HYDROCHLORIDE 30 MG: 60 TABLET ORAL at 08:19

## 2025-06-14 RX ADMIN — FINASTERIDE 5 MG: 5 TABLET, FILM COATED ORAL at 08:19

## 2025-06-14 RX ADMIN — LANSOPRAZOLE 30 MG: 30 TABLET, ORALLY DISINTEGRATING, DELAYED RELEASE ORAL at 05:50

## 2025-06-14 RX ADMIN — VANCOMYCIN HYDROCHLORIDE 1000 MG: 1 INJECTION, POWDER, LYOPHILIZED, FOR SOLUTION INTRAVENOUS at 23:17

## 2025-06-14 RX ADMIN — CIPROFLOXACIN 400 MG: 2 INJECTION, SOLUTION INTRAVENOUS at 16:18

## 2025-06-14 RX ADMIN — ESCITALOPRAM OXALATE 10 MG: 10 TABLET ORAL at 08:20

## 2025-06-14 RX ADMIN — CIPROFLOXACIN 400 MG: 2 INJECTION, SOLUTION INTRAVENOUS at 04:08

## 2025-06-14 RX ADMIN — Medication 750 MG: at 20:26

## 2025-06-14 RX ADMIN — METRONIDAZOLE 500 MG: 500 INJECTION, SOLUTION INTRAVENOUS at 16:17

## 2025-06-14 RX ADMIN — CARBIDOPA AND LEVODOPA 1 TABLET: 25; 100 TABLET ORAL at 13:32

## 2025-06-14 RX ADMIN — LACOSAMIDE 100 MG: 50 TABLET, FILM COATED ORAL at 08:19

## 2025-06-14 RX ADMIN — Medication 6 MG: at 20:26

## 2025-06-14 RX ADMIN — MEMANTINE HYDROCHLORIDE 10 MG: 5 TABLET ORAL at 08:19

## 2025-06-14 RX ADMIN — LORAZEPAM 1 MG: 1 TABLET ORAL at 14:05

## 2025-06-14 RX ADMIN — DILTIAZEM HYDROCHLORIDE 30 MG: 60 TABLET ORAL at 20:26

## 2025-06-14 RX ADMIN — MINERAL SUPPLEMENT IRON 300 MG / 5 ML STRENGTH LIQUID 100 PER BOX UNFLAVORED 300 MG: at 20:26

## 2025-06-14 RX ADMIN — ATORVASTATIN CALCIUM 80 MG: 40 TABLET, FILM COATED ORAL at 20:25

## 2025-06-14 RX ADMIN — LORAZEPAM 1 MG: 1 TABLET ORAL at 20:25

## 2025-06-14 RX ADMIN — SODIUM CHLORIDE, PRESERVATIVE FREE 10 ML: 5 INJECTION INTRAVENOUS at 20:27

## 2025-06-14 RX ADMIN — CARBIDOPA AND LEVODOPA 1 TABLET: 25; 100 TABLET ORAL at 08:20

## 2025-06-14 RX ADMIN — ACETAMINOPHEN 650 MG: 325 TABLET ORAL at 08:19

## 2025-06-14 RX ADMIN — MONTELUKAST SODIUM 10 MG: 10 TABLET, COATED ORAL at 20:25

## 2025-06-14 RX ADMIN — SODIUM CHLORIDE: 0.9 INJECTION, SOLUTION INTRAVENOUS at 23:14

## 2025-06-14 RX ADMIN — CARBIDOPA AND LEVODOPA 1 TABLET: 25; 100 TABLET ORAL at 20:24

## 2025-06-14 RX ADMIN — METRONIDAZOLE 500 MG: 500 INJECTION, SOLUTION INTRAVENOUS at 08:15

## 2025-06-14 ASSESSMENT — PAIN SCALES - GENERAL
PAINLEVEL_OUTOF10: 2
PAINLEVEL_OUTOF10: 4

## 2025-06-14 ASSESSMENT — PAIN DESCRIPTION - ORIENTATION: ORIENTATION: LOWER

## 2025-06-14 ASSESSMENT — PAIN DESCRIPTION - LOCATION: LOCATION: BACK

## 2025-06-14 ASSESSMENT — PAIN DESCRIPTION - PAIN TYPE: TYPE: CHRONIC PAIN

## 2025-06-14 ASSESSMENT — PAIN DESCRIPTION - FREQUENCY: FREQUENCY: INTERMITTENT

## 2025-06-14 ASSESSMENT — PAIN DESCRIPTION - DESCRIPTORS: DESCRIPTORS: ACHING

## 2025-06-14 ASSESSMENT — PAIN DESCRIPTION - ONSET: ONSET: GRADUAL

## 2025-06-14 NOTE — PROGRESS NOTES
PROGRESS NOTE  S:79 yrs Patient  admitted on 6/10/2025 with Fever of unknown origin [R50.9]  Generalized weakness [R53.1] .    Patient tolerating PEG feedings.  Minimal leakage    Current Hospital Schedued Meds   silver nitrate applicators  1 each Topical Once    vancomycin  1,000 mg IntraVENous Q12H    apixaban  5 mg Per G Tube BID    atorvastatin  80 mg Per G Tube Nightly    carbidopa-levodopa  1 tablet Per G Tube TID    dilTIAZem  30 mg Per G Tube BID    finasteride  5 mg Per G Tube Daily    melatonin  6 mg Per G Tube Nightly    memantine  10 mg Per G Tube BID    montelukast  10 mg Per G Tube Nightly    ferrous Sulfate  300 mg Per G Tube BID    levETIRAcetam  750 mg Per G Tube BID    lansoprazole  30 mg Oral QAM AC    lacosamide  100 mg Oral BID    escitalopram  10 mg Oral Daily    ciprofloxacin  400 mg IntraVENous Q12H    metroNIDAZOLE  500 mg IntraVENous Q8H    sodium chloride flush  5-40 mL IntraVENous 2 times per day     Current Hospital IV Meds   sodium chloride 10 mL/hr at 06/11/25 1541     Current Hospital PRN Meds  chapstick, LORazepam, sodium chloride flush, sodium chloride, potassium chloride **OR** potassium alternative oral replacement **OR** potassium chloride, magnesium sulfate, ondansetron **OR** ondansetron, polyethylene glycol, acetaminophen **OR** acetaminophen    Exam:   Vitals:    06/14/25 1315   BP: 117/66   Pulse: 75   Resp: 16   Temp: 98.1 °F (36.7 °C)   SpO2: 93%     I/O last 3 completed shifts:  In: 960 [NG/GT:960]  Out: 1780 [Urine:1780]   General appearance: alert, appears stated age, and cooperative  HEENT: PERRLA  Neck: no adenopathy, no carotid bruit, no JVD, supple, symmetrical, trachea midline, and thyroid not enlarged, symmetric, no tenderness/mass/nodules  Lungs: clear to auscultation bilaterally  Heart: regular rate and rhythm, S1, S2 normal, no murmur, click, rub or gallop  Abdomen: Peg site examined  Extremities: extremities normal,

## 2025-06-14 NOTE — FLOWSHEET NOTE
06/13/25 2005   Vital Signs   Temp 100.4 °F (38 °C)   Temp Source Oral   Pulse 89   Heart Rate Source Monitor   Respirations 18   /65   MAP (Calculated) 86   BP Location Left upper arm   BP Method Automatic   Patient Position Semi fowlers   Pain Assessment   Pain Assessment None - Denies Pain   Oxygen Therapy   SpO2 93 %   O2 Device None (Room air)     Pt A/O assessment completed. Area around PEG tube cleaned and Alginate AG applied with split gauze and tape to secure. Meds given through PEG tube pt tolerated well. Pt denies any other needs at this time. Call light within reach and bed alarm on

## 2025-06-14 NOTE — PROGRESS NOTES
Inpatient Occupational Therapy Evaluation & Treatment    Unit: [facility]  Date:  6/14/2025  Patient Name:    Rodolfo Luna  Admitting diagnosis:  Fever of unknown origin [R50.9]  Generalized weakness [R53.1]  Admit Date:  6/10/2025  Precautions/Restrictions/WB Status/ Lines/ Wounds/ Oxygen: Fall risk, Bed/chair alarm, Lines (IV, Supplemental O2 (2L), and PEG tube), Telemetry, and Isolation Precautions: Contact Plus    Treatment Time:  0850-0929  Treatment Number:  1  Timed Code Treatment Minutes: 29 minutes  Total Treatment Minutes:  39  minutes    Patient Goals for Therapy: \"to feel better\"          Discharge Recommendations: Home with initial 24/7 supervision  DME needs for discharge: Needs Met       Therapy recommendations for staff:   Assist of 1 for ambulation with use of Single point cane  and gait belt to/from chair  to/from bathroom    History of Present Illness:   [facility]/11  \" fever, weakness  Rodolfo Luna is a 79 y.o. male who presents with fever, weakness and posterior HA x4 days. Couldn't get up today so wife cat him to ED. Fever noted, and techncially meeting SIRS, but no source identified. Admit for close observation. Hemodynamics stable, likley representing viral syndrome. Procal ordered. Admit for supportive care.\"    Preadmission Environment:   Pt lives with                                         with spouse  Home environment:                            one story home  Steps to enter first floor:                     2 steps to enter and Handrail unilateral  Steps to second floor/basement:        N/A  Laundry:                                              1st floor  Bathroom:                                           tub/shower unit, hand held shower head, grab bars in shower, shower chair , and raised toilet seat w/o arms  Pt sleeps in a:                                     Recliner  Equipment owned:                              RW, SPC, manual WC, and raised toilet seat

## 2025-06-14 NOTE — PLAN OF CARE
Problem: Chronic Conditions and Co-morbidities  Goal: Patient's chronic conditions and co-morbidity symptoms are monitored and maintained or improved  6/13/2025 2323 by Franca Valencia, RN  Outcome: Progressing  6/13/2025 1352 by Kimberly Jung RN  Outcome: Progressing     Problem: Discharge Planning  Goal: Discharge to home or other facility with appropriate resources  Outcome: Progressing     Problem: Safety - Adult  Goal: Free from fall injury  Outcome: Progressing     Problem: Nutrition Deficit:  Goal: Optimize nutritional status  Outcome: Progressing

## 2025-06-14 NOTE — FLOWSHEET NOTE
06/14/25 0430   Oxygen Therapy   SpO2 (!) 83 %   O2 Device None (Room air)     Pt had just used urinal and became SOB, pt weak and unable to cough hard enough to clear his throat. 2 liters applied 95 %.

## 2025-06-14 NOTE — PLAN OF CARE
Problem: Chronic Conditions and Co-morbidities  Goal: Patient's chronic conditions and co-morbidity symptoms are monitored and maintained or improved  6/14/2025 1121 by Sarah Lozoya RN  Outcome: Progressing  6/14/2025 1120 by Sarah Lozoya RN  Outcome: Progressing  6/13/2025 2323 by Franca Valencia RN  Outcome: Progressing     Problem: Discharge Planning  Goal: Discharge to home or other facility with appropriate resources  6/14/2025 1121 by Sarah Lozoya RN  Outcome: Progressing  6/14/2025 1120 by Sarah Lozoya RN  Outcome: Progressing  6/13/2025 2323 by Franca Valencia RN  Outcome: Progressing     Problem: ABCDS Injury Assessment  Goal: Absence of physical injury  6/14/2025 1121 by Sarah Lozoya RN  Outcome: Progressing  6/14/2025 1120 by Sarah Lozoya RN  Outcome: Progressing     Problem: Spiritual Struggle  Goal: Verbalizes spiritual struggle  6/14/2025 1121 by Sarah Lozoya RN  Outcome: Progressing  6/14/2025 1120 by Sarah Lozoya RN  Outcome: Progressing  Goal: Gains new understanding/perception  6/14/2025 1121 by Sarah Lozoya RN  Outcome: Progressing  6/14/2025 1120 by Sarah Lozoya RN  Outcome: Progressing  Goal: Growing sense of peace/hope  6/14/2025 1121 by Sarah Lozoya RN  Outcome: Progressing  6/14/2025 1120 by Sarah Lozoya RN  Outcome: Progressing  Goal: Explore resources for additional follow up, post discharge  6/14/2025 1121 by Sarah Lozoya RN  Outcome: Progressing  6/14/2025 1120 by Sarah Lozoya RN  Outcome: Progressing     Problem: Emotional Distress  Goal: Verbalization of thoughts and feelings  6/14/2025 1121 by Sarah Lozoya RN  Outcome: Progressing  6/14/2025 1120 by Sarah Lozoya RN  Outcome: Progressing  Goal: Reduce evidence of anxiety/worry/anger  6/14/2025 1121 by Sarah Lozoya RN  Outcome: Progressing  6/14/2025 1120 by Sarah Lozoya RN  Outcome: Progressing  Goal: Identifies/restores coping resources/skills  6/14/2025 1121 by Sarah Lozoya RN  Outcome: Progressing  6/14/2025 1120 by

## 2025-06-14 NOTE — PROGRESS NOTES
Pts tube feeding volume never cleared. Calculated  volume that was for 12 hours  AM meds given through PEG tube. Pt denies any needs. Call light within reach and bed alarm on

## 2025-06-14 NOTE — PROGRESS NOTES
Pharmacy Vancomycin Consult     Vancomycin Day: 3/7  Current Dosin mg every 12 hours  Current indication: SSTI      Recent Labs     25  0632 25  0542 25  0620   BUN 18 18 16   CREATININE 0.7* 0.6* 0.6*   WBC 4.8 4.4  --        Intake/Output Summary (Last 24 hours) at 2025 0734  Last data filed at 2025 0548  Gross per 24 hour   Intake 870 ml   Output 1300 ml   Net -430 ml       Ht Readings from Last 1 Encounters:   25 1.753 m (5' 9.02\")        Wt Readings from Last 1 Encounters:   25 79.3 kg (174 lb 12.8 oz)       Body mass index is 25.8 kg/m².    Estimated Creatinine Clearance: 100 mL/min (A) (based on SCr of 0.6 mg/dL (L)).    Trough: 8.9    Assessment/Plan:  Continue current dose of Vancomycin 1000 mg every 12 hours.  Pharmacy will continue to monitor and adjust as necessary.  Robbie Larsen [facility]

## 2025-06-14 NOTE — PROGRESS NOTES
Bedside report given to Sarah MENDEZ  pt in stable condition no needs at this time. Call light within reach

## 2025-06-14 NOTE — PROGRESS NOTES
Brief Progress Note    Date: 06/14/25    Subjective: Fever curve improved. Left wrist feels tender, no erythema, left arm mildly swollen. Overall feels a lot better.      Objective:  Vitals:    06/14/25 0436 06/14/25 0729 06/14/25 1315 06/14/25 1940   BP:  123/72 117/66 137/76   Pulse:  79 75 88   Resp:  16 16 16   Temp:  97.9 °F (36.6 °C) 98.1 °F (36.7 °C) 98.9 °F (37.2 °C)   TempSrc:  Oral Oral Oral   SpO2: 95% 94% 93% 94%   Weight:       Height:           Physical Exam:    Gen: No distress. Alert. Appears chronically ill, thin pleasant elderly male  Eyes: PERRL. No sclera icterus. No conjunctival injection.   ENT: No discharge. Pharynx clear.   Neck: No JVD.  Trachea midline.  Resp: No accessory muscle use. No crackles. No wheezes. No rhonchi.   CV: Regular rate. Regular rhythm. No murmur.  No rub. No edema.   Capillary Refill: Brisk,< 3 seconds   GI: Non-tender. Non-distended.  Normal bowel sounds.  +peg tube with mild erythema around tube and tan drainage, ? Tube feed  Skin: Warm and dry. No nodule on exposed extremities. No rash on exposed extremities.   M/S: No cyanosis. No joint deformity. No clubbing.   Neuro: Awake. Grossly nonfocal      Labs:  CBC:   Recent Labs     06/12/25  0632 06/13/25  0542   WBC 4.8 4.4   HGB 9.2* 9.5*   HCT 27.7* 28.3*   MCV 93.2 92.6   PLT 76* 76*     BMP:   Recent Labs     06/12/25  0632 06/13/25  0542 06/14/25  0620    141 140   K 3.1* 3.5 3.8    106 106   CO2 25 25 24   BUN 18 18 16   CREATININE 0.7* 0.6* 0.6*     LIVER PROFILE:   Recent Labs     06/12/25  0632 06/13/25  0542   AST 45* 49*   ALT 24 30   BILIDIR 0.6* 0.4*   BILITOT 1.1* 0.7   ALKPHOS 82 90     PT/INR: No results for input(s): \"PROTIME\", \"INR\" in the last 72 hours.  APTT: No results for input(s): \"APTT\" in the last 72 hours.  UA:  No results for input(s): \"NITRITE\", \"COLORU\", \"PHUR\", \"LABCAST\", \"WBCUA\", \"RBCUA\", \"MUCUS\", \"TRICHOMONAS\", \"YEAST\", \"BACTERIA\", \"CLARITYU\", \"SPECGRAV\", \"LEUKOCYTESUR\",

## 2025-06-15 LAB
ANION GAP SERPL CALCULATED.3IONS-SCNC: 11 MMOL/L (ref 3–16)
BACTERIA BLD CULT ORG #2: NORMAL
BACTERIA BLD CULT: NORMAL
BUN SERPL-MCNC: 19 MG/DL (ref 7–20)
CALCIUM SERPL-MCNC: 7.3 MG/DL (ref 8.3–10.6)
CHLORIDE SERPL-SCNC: 107 MMOL/L (ref 99–110)
CO2 SERPL-SCNC: 23 MMOL/L (ref 21–32)
CREAT SERPL-MCNC: 0.6 MG/DL (ref 0.8–1.3)
CRP SERPL-MCNC: 158 MG/L (ref 0–5.1)
ERYTHROCYTE [SEDIMENTATION RATE] IN BLOOD BY WESTERGREN METHOD: 51 MM/HR (ref 0–20)
GFR SERPLBLD CREATININE-BSD FMLA CKD-EPI: >90 ML/MIN/{1.73_M2}
GLUCOSE BLD-MCNC: 160 MG/DL (ref 70–99)
GLUCOSE BLD-MCNC: 161 MG/DL (ref 70–99)
GLUCOSE BLD-MCNC: 191 MG/DL (ref 70–99)
GLUCOSE BLD-MCNC: 198 MG/DL (ref 70–99)
GLUCOSE SERPL-MCNC: 156 MG/DL (ref 70–99)
PERFORMED ON: ABNORMAL
POTASSIUM SERPL-SCNC: 3.6 MMOL/L (ref 3.5–5.1)
SODIUM SERPL-SCNC: 141 MMOL/L (ref 136–145)

## 2025-06-15 PROCEDURE — 6370000000 HC RX 637 (ALT 250 FOR IP): Performed by: NURSE PRACTITIONER

## 2025-06-15 PROCEDURE — 6360000002 HC RX W HCPCS

## 2025-06-15 PROCEDURE — 6370000000 HC RX 637 (ALT 250 FOR IP): Performed by: INTERNAL MEDICINE

## 2025-06-15 PROCEDURE — 80048 BASIC METABOLIC PNL TOTAL CA: CPT

## 2025-06-15 PROCEDURE — 36415 COLL VENOUS BLD VENIPUNCTURE: CPT

## 2025-06-15 PROCEDURE — 94761 N-INVAS EAR/PLS OXIMETRY MLT: CPT

## 2025-06-15 PROCEDURE — 2500000003 HC RX 250 WO HCPCS: Performed by: SURGERY

## 2025-06-15 PROCEDURE — 1200000000 HC SEMI PRIVATE

## 2025-06-15 PROCEDURE — 6370000000 HC RX 637 (ALT 250 FOR IP): Performed by: SURGERY

## 2025-06-15 PROCEDURE — 6360000002 HC RX W HCPCS: Performed by: INTERNAL MEDICINE

## 2025-06-15 PROCEDURE — 85652 RBC SED RATE AUTOMATED: CPT

## 2025-06-15 PROCEDURE — 2700000000 HC OXYGEN THERAPY PER DAY

## 2025-06-15 PROCEDURE — 99233 SBSQ HOSP IP/OBS HIGH 50: CPT | Performed by: INTERNAL MEDICINE

## 2025-06-15 PROCEDURE — 86140 C-REACTIVE PROTEIN: CPT

## 2025-06-15 PROCEDURE — 2580000003 HC RX 258: Performed by: INTERNAL MEDICINE

## 2025-06-15 RX ORDER — PREDNISONE 20 MG/1
40 TABLET ORAL DAILY
Status: DISCONTINUED | OUTPATIENT
Start: 2025-06-15 | End: 2025-06-17 | Stop reason: HOSPADM

## 2025-06-15 RX ORDER — LACTOBACILLUS RHAMNOSUS GG 10B CELL
1 CAPSULE ORAL
Status: DISCONTINUED | OUTPATIENT
Start: 2025-06-16 | End: 2025-06-17 | Stop reason: HOSPADM

## 2025-06-15 RX ADMIN — LORAZEPAM 1 MG: 1 TABLET ORAL at 13:24

## 2025-06-15 RX ADMIN — DILTIAZEM HYDROCHLORIDE 30 MG: 60 TABLET ORAL at 08:44

## 2025-06-15 RX ADMIN — MEMANTINE HYDROCHLORIDE 10 MG: 5 TABLET ORAL at 22:10

## 2025-06-15 RX ADMIN — CARBIDOPA AND LEVODOPA 1 TABLET: 25; 100 TABLET ORAL at 22:10

## 2025-06-15 RX ADMIN — CARBIDOPA AND LEVODOPA 1 TABLET: 25; 100 TABLET ORAL at 13:24

## 2025-06-15 RX ADMIN — MINERAL SUPPLEMENT IRON 300 MG / 5 ML STRENGTH LIQUID 100 PER BOX UNFLAVORED 300 MG: at 22:09

## 2025-06-15 RX ADMIN — ESCITALOPRAM OXALATE 10 MG: 10 TABLET ORAL at 08:43

## 2025-06-15 RX ADMIN — LACOSAMIDE 100 MG: 50 TABLET, FILM COATED ORAL at 08:43

## 2025-06-15 RX ADMIN — Medication 6 MG: at 22:09

## 2025-06-15 RX ADMIN — CARBIDOPA AND LEVODOPA 1 TABLET: 25; 100 TABLET ORAL at 08:43

## 2025-06-15 RX ADMIN — DILTIAZEM HYDROCHLORIDE 30 MG: 60 TABLET ORAL at 22:12

## 2025-06-15 RX ADMIN — CIPROFLOXACIN 400 MG: 2 INJECTION, SOLUTION INTRAVENOUS at 03:47

## 2025-06-15 RX ADMIN — SODIUM CHLORIDE, PRESERVATIVE FREE 10 ML: 5 INJECTION INTRAVENOUS at 22:38

## 2025-06-15 RX ADMIN — VANCOMYCIN HYDROCHLORIDE 1000 MG: 1 INJECTION, POWDER, LYOPHILIZED, FOR SOLUTION INTRAVENOUS at 10:40

## 2025-06-15 RX ADMIN — LACOSAMIDE 100 MG: 50 TABLET, FILM COATED ORAL at 22:13

## 2025-06-15 RX ADMIN — METRONIDAZOLE 500 MG: 500 INJECTION, SOLUTION INTRAVENOUS at 08:42

## 2025-06-15 RX ADMIN — LANSOPRAZOLE 30 MG: 30 TABLET, ORALLY DISINTEGRATING, DELAYED RELEASE ORAL at 05:49

## 2025-06-15 RX ADMIN — METRONIDAZOLE 500 MG: 500 INJECTION, SOLUTION INTRAVENOUS at 16:14

## 2025-06-15 RX ADMIN — ACETAMINOPHEN 650 MG: 325 TABLET ORAL at 13:24

## 2025-06-15 RX ADMIN — LORAZEPAM 1 MG: 1 TABLET ORAL at 22:50

## 2025-06-15 RX ADMIN — Medication 750 MG: at 21:50

## 2025-06-15 RX ADMIN — MONTELUKAST SODIUM 10 MG: 10 TABLET, COATED ORAL at 22:19

## 2025-06-15 RX ADMIN — APIXABAN 5 MG: 5 TABLET, FILM COATED ORAL at 22:12

## 2025-06-15 RX ADMIN — PREDNISONE 40 MG: 20 TABLET ORAL at 10:35

## 2025-06-15 RX ADMIN — CIPROFLOXACIN 400 MG: 2 INJECTION, SOLUTION INTRAVENOUS at 17:20

## 2025-06-15 RX ADMIN — METRONIDAZOLE 500 MG: 500 INJECTION, SOLUTION INTRAVENOUS at 00:20

## 2025-06-15 RX ADMIN — Medication 750 MG: at 08:43

## 2025-06-15 RX ADMIN — FINASTERIDE 5 MG: 5 TABLET, FILM COATED ORAL at 08:44

## 2025-06-15 RX ADMIN — ACETAMINOPHEN 650 MG: 325 TABLET ORAL at 02:01

## 2025-06-15 RX ADMIN — APIXABAN 5 MG: 5 TABLET, FILM COATED ORAL at 08:43

## 2025-06-15 RX ADMIN — MEMANTINE HYDROCHLORIDE 10 MG: 5 TABLET ORAL at 08:43

## 2025-06-15 RX ADMIN — MINERAL SUPPLEMENT IRON 300 MG / 5 ML STRENGTH LIQUID 100 PER BOX UNFLAVORED 300 MG: at 08:44

## 2025-06-15 RX ADMIN — VANCOMYCIN HYDROCHLORIDE 1000 MG: 1 INJECTION, POWDER, LYOPHILIZED, FOR SOLUTION INTRAVENOUS at 23:09

## 2025-06-15 ASSESSMENT — PAIN DESCRIPTION - ONSET: ONSET: ON-GOING

## 2025-06-15 ASSESSMENT — PAIN DESCRIPTION - PAIN TYPE: TYPE: ACUTE PAIN

## 2025-06-15 ASSESSMENT — PAIN - FUNCTIONAL ASSESSMENT: PAIN_FUNCTIONAL_ASSESSMENT: ACTIVITIES ARE NOT PREVENTED

## 2025-06-15 ASSESSMENT — PAIN SCALES - GENERAL
PAINLEVEL_OUTOF10: 4
PAINLEVEL_OUTOF10: 0
PAINLEVEL_OUTOF10: 4

## 2025-06-15 ASSESSMENT — PAIN DESCRIPTION - ORIENTATION: ORIENTATION: LEFT

## 2025-06-15 ASSESSMENT — PAIN DESCRIPTION - DESCRIPTORS: DESCRIPTORS: ACHING

## 2025-06-15 ASSESSMENT — PAIN DESCRIPTION - LOCATION: LOCATION: HAND

## 2025-06-15 ASSESSMENT — PAIN SCALES - WONG BAKER: WONGBAKER_NUMERICALRESPONSE: NO HURT

## 2025-06-15 ASSESSMENT — PAIN DESCRIPTION - FREQUENCY: FREQUENCY: CONTINUOUS

## 2025-06-15 NOTE — PROGRESS NOTES
Bedside report and transfer of care given to Sarah MENDEZ. Pt currently resting in bed with the call light within reach. Pt denies any other care needs at this time. Pt stable at this time.

## 2025-06-15 NOTE — CARE COORDINATION
Chart reviewed. [facility] is [facility] with [facility]. Plan to return home with spouse and resume HHC. Spouse will be transporting pt home. CM following.

## 2025-06-15 NOTE — PLAN OF CARE
Problem: Chronic Conditions and Co-morbidities  Goal: Patient's chronic conditions and co-morbidity symptoms are monitored and maintained or improved  6/15/2025 1101 by Sarah Lozoya RN  Outcome: Progressing  6/15/2025 0144 by Erika Nance RN  Outcome: Progressing     Problem: Discharge Planning  Goal: Discharge to home or other facility with appropriate resources  6/15/2025 1101 by Sarah Lozoya RN  Outcome: Progressing  6/15/2025 0144 by Erika Nance RN  Outcome: Progressing     Problem: ABCDS Injury Assessment  Goal: Absence of physical injury  6/15/2025 1101 by Sarah Lozoya RN  Outcome: Progressing  6/15/2025 0144 by Erika Nance RN  Outcome: Progressing     Problem: Spiritual Struggle  Goal: Verbalizes spiritual struggle  6/15/2025 1101 by Sarah Lozoya RN  Outcome: Progressing  6/15/2025 0144 by Erika Nance RN  Outcome: Progressing  Goal: Gains new understanding/perception  6/15/2025 1101 by Sarah Lozoya RN  Outcome: Progressing  6/15/2025 0144 by Erika Nance RN  Outcome: Progressing  Goal: Growing sense of peace/hope  6/15/2025 1101 by Sarah Lozoya RN  Outcome: Progressing  6/15/2025 0144 by Erika Nance RN  Outcome: Progressing  Goal: Explore resources for additional follow up, post discharge  6/15/2025 1101 by Sarah Lozoya RN  Outcome: Progressing  6/15/2025 0144 by Erika Nance RN  Outcome: Progressing     Problem: Emotional Distress  Goal: Verbalization of thoughts and feelings  6/15/2025 1101 by Sarah Lozoya RN  Outcome: Progressing  6/15/2025 0144 by Erika Nance RN  Outcome: Progressing  Goal: Reduce evidence of anxiety/worry/anger  6/15/2025 1101 by Sarah Lozoya RN  Outcome: Progressing  6/15/2025 0144 by Erika Nance RN  Outcome: Progressing  Goal: Identifies/restores coping resources/skills  6/15/2025 1101 by Sarah Lozoya RN  Outcome: Progressing  6/15/2025 0144 by Erika Nance RN  Outcome: Progressing  Goal: Growing sense of peace/hope  6/15/2025 1101 by

## 2025-06-15 NOTE — FLOWSHEET NOTE
06/15/25 0830   Vital Signs   Temp 98.5 °F (36.9 °C)   Temp Source Oral   Pulse 82   Heart Rate Source Monitor   Respirations 17   /66   MAP (Calculated) 82   BP Location Right upper arm   BP Method Automatic   Patient Position High fowlers   Pain Assessment   Pain Assessment None - Denies Pain   Oxygen Therapy   SpO2 95 %   O2 Device Nasal cannula   O2 Flow Rate (L/min) 2 L/min     Alert and oriented. Skin w/d. Resp ee unlabored meds given per peg tube.  Dressing changed to site and shown to GI doctor.  Messaged Dr. Huitron regarding left wrist and hand remains swollen and warm.  Nrsg asmt completed. See flow sheet and MAR. Call light in easy reach.

## 2025-06-15 NOTE — PROGRESS NOTES
Brief Progress Note    Date: 06/15/25    Subjective: T max 100.3. Left wrist feels more swollen than yesterday. Otherwise he says he is feeling pretty good.      Objective:  Vitals:    06/15/25 0156 06/15/25 0830 06/15/25 1250 06/15/25 1315   BP: 119/68 115/66 127/71 117/73   Pulse: 67 82 80 83   Resp: 16 17 16 16   Temp: 100.3 °F (37.9 °C) 98.5 °F (36.9 °C) 97.7 °F (36.5 °C) 98.6 °F (37 °C)   TempSrc: Oral Oral Oral Oral   SpO2: 94% 95% 96% 95%   Weight:       Height:           Physical Exam:    Gen: No distress. Alert. Appears chronically ill, thin pleasant elderly male  Eyes: PERRL. No sclera icterus. No conjunctival injection.   ENT: No discharge. Pharynx clear.   Neck: No JVD.  Trachea midline.  Resp: No accessory muscle use. No crackles. No wheezes. No rhonchi.   CV: Regular rate. Regular rhythm. No murmur.  No rub. No edema.   Capillary Refill: Brisk,< 3 seconds   GI: Non-tender. Non-distended.  Normal bowel sounds.  +peg tube with mild erythema around tube and tan drainage, ? Tube feed  Skin: no rash. Left wrist and forearm appear slightly more swollen, no erythema.   M/S: No cyanosis. No joint deformity. No clubbing.   Neuro: Awake. Grossly nonfocal      Labs:  CBC:   Recent Labs     06/13/25  0542   WBC 4.4   HGB 9.5*   HCT 28.3*   MCV 92.6   PLT 76*     BMP:   Recent Labs     06/13/25  0542 06/14/25  0620 06/15/25  0544    140 141   K 3.5 3.8 3.6    106 107   CO2 25 24 23   BUN 18 16 19   CREATININE 0.6* 0.6* 0.6*     LIVER PROFILE:   Recent Labs     06/13/25  0542   AST 49*   ALT 30   BILIDIR 0.4*   BILITOT 0.7   ALKPHOS 90     PT/INR: No results for input(s): \"PROTIME\", \"INR\" in the last 72 hours.  APTT: No results for input(s): \"APTT\" in the last 72 hours.  UA:  No results for input(s): \"NITRITE\", \"COLORU\", \"PHUR\", \"LABCAST\", \"WBCUA\", \"RBCUA\", \"MUCUS\", \"TRICHOMONAS\", \"YEAST\", \"BACTERIA\", \"CLARITYU\", \"SPECGRAV\", \"LEUKOCYTESUR\", \"UROBILINOGEN\", \"BILIRUBINUR\", \"BLOODU\", \"GLUCOSEU\", \"AMORPHOUS\"

## 2025-06-15 NOTE — PROGRESS NOTES
PROGRESS NOTE  S:79 yrs Patient  admitted on 6/10/2025 with Fever of unknown origin [R50.9]  Generalized weakness [R53.1] .  Persistent leakage from tube per nursing    Current Hospital Schedued Meds   predniSONE  40 mg Oral Daily    [START ON 6/16/2025] lactobacillus  1 capsule Oral Daily with breakfast    silver nitrate applicators  1 each Topical Once    vancomycin  1,000 mg IntraVENous Q12H    apixaban  5 mg Per G Tube BID    atorvastatin  80 mg Per G Tube Nightly    carbidopa-levodopa  1 tablet Per G Tube TID    dilTIAZem  30 mg Per G Tube BID    finasteride  5 mg Per G Tube Daily    melatonin  6 mg Per G Tube Nightly    memantine  10 mg Per G Tube BID    montelukast  10 mg Per G Tube Nightly    ferrous Sulfate  300 mg Per G Tube BID    levETIRAcetam  750 mg Per G Tube BID    lansoprazole  30 mg Oral QAM AC    lacosamide  100 mg Oral BID    escitalopram  10 mg Oral Daily    ciprofloxacin  400 mg IntraVENous Q12H    metroNIDAZOLE  500 mg IntraVENous Q8H    sodium chloride flush  5-40 mL IntraVENous 2 times per day     Current Hospital IV Meds   sodium chloride 5 mL/hr at 06/14/25 2314     Current Hospital PRN Meds  chapstick, LORazepam, sodium chloride flush, sodium chloride, potassium chloride **OR** potassium alternative oral replacement **OR** potassium chloride, magnesium sulfate, ondansetron **OR** ondansetron, polyethylene glycol, acetaminophen **OR** acetaminophen    Exam:   Vitals:    06/15/25 1315   BP: 117/73   Pulse: 83   Resp: 16   Temp: 98.6 °F (37 °C)   SpO2: 95%     I/O last 3 completed shifts:  In: 2510 [NG/GT:2510]  Out: 825 [Urine:825]   General appearance: alert, appears stated age, and cooperative  HEENT: PERRLA  Neck: no adenopathy, no carotid bruit, no JVD, supple, symmetrical, trachea midline, and thyroid not enlarged, symmetric, no tenderness/mass/nodules  Lungs: clear to auscultation bilaterally  Heart: regular rate and rhythm, S1, S2 normal, no

## 2025-06-15 NOTE — PROGRESS NOTES
Vancomycin Day: 4/7  Current Regimen: 1000 mg IV every 12 hours    Patient's labs, cultures, vitals, and vancomycin regimen reviewed.   SCr stable    Plan:   No change today   Robbie Larsen RPH 6/15/2025 9:11 AM

## 2025-06-15 NOTE — PROGRESS NOTES
Patient resting in bed, patient is pink, warm, and dry. Respirations E/E on 2l/m/nc. Iv site unremarkable. No S/S of acute distress noted. Head to toe completed at this time. Medication given via g tube patient tolerated well. Call light in easy reach, patient reminded to use call light for needs and assistance. Bed locked and in lowest position side rails up x2.

## 2025-06-16 LAB
ANION GAP SERPL CALCULATED.3IONS-SCNC: 10 MMOL/L (ref 3–16)
BUN SERPL-MCNC: 20 MG/DL (ref 7–20)
CALCIUM SERPL-MCNC: 7.6 MG/DL (ref 8.3–10.6)
CHLORIDE SERPL-SCNC: 107 MMOL/L (ref 99–110)
CO2 SERPL-SCNC: 25 MMOL/L (ref 21–32)
CREAT SERPL-MCNC: 0.5 MG/DL (ref 0.8–1.3)
GFR SERPLBLD CREATININE-BSD FMLA CKD-EPI: >90 ML/MIN/{1.73_M2}
GLUCOSE BLD-MCNC: 169 MG/DL (ref 70–99)
GLUCOSE BLD-MCNC: 171 MG/DL (ref 70–99)
GLUCOSE BLD-MCNC: 173 MG/DL (ref 70–99)
GLUCOSE SERPL-MCNC: 130 MG/DL (ref 70–99)
PERFORMED ON: ABNORMAL
POTASSIUM SERPL-SCNC: 4 MMOL/L (ref 3.5–5.1)
SODIUM SERPL-SCNC: 142 MMOL/L (ref 136–145)

## 2025-06-16 PROCEDURE — 2700000000 HC OXYGEN THERAPY PER DAY

## 2025-06-16 PROCEDURE — 87205 SMEAR GRAM STAIN: CPT

## 2025-06-16 PROCEDURE — 6370000000 HC RX 637 (ALT 250 FOR IP): Performed by: INTERNAL MEDICINE

## 2025-06-16 PROCEDURE — 6370000000 HC RX 637 (ALT 250 FOR IP): Performed by: SURGERY

## 2025-06-16 PROCEDURE — 99232 SBSQ HOSP IP/OBS MODERATE 35: CPT | Performed by: INTERNAL MEDICINE

## 2025-06-16 PROCEDURE — 80048 BASIC METABOLIC PNL TOTAL CA: CPT

## 2025-06-16 PROCEDURE — 94761 N-INVAS EAR/PLS OXIMETRY MLT: CPT

## 2025-06-16 PROCEDURE — 6360000002 HC RX W HCPCS

## 2025-06-16 PROCEDURE — 36415 COLL VENOUS BLD VENIPUNCTURE: CPT

## 2025-06-16 PROCEDURE — 87070 CULTURE OTHR SPECIMN AEROBIC: CPT

## 2025-06-16 PROCEDURE — 6370000000 HC RX 637 (ALT 250 FOR IP): Performed by: NURSE PRACTITIONER

## 2025-06-16 PROCEDURE — 1200000000 HC SEMI PRIVATE

## 2025-06-16 PROCEDURE — 6360000002 HC RX W HCPCS: Performed by: INTERNAL MEDICINE

## 2025-06-16 PROCEDURE — 2580000003 HC RX 258: Performed by: INTERNAL MEDICINE

## 2025-06-16 PROCEDURE — 2500000003 HC RX 250 WO HCPCS: Performed by: SURGERY

## 2025-06-16 RX ORDER — LIDOCAINE HYDROCHLORIDE 10 MG/ML
5 INJECTION, SOLUTION INFILTRATION; PERINEURAL ONCE
Status: DISCONTINUED | OUTPATIENT
Start: 2025-06-16 | End: 2025-06-17 | Stop reason: HOSPADM

## 2025-06-16 RX ADMIN — CIPROFLOXACIN 400 MG: 2 INJECTION, SOLUTION INTRAVENOUS at 18:11

## 2025-06-16 RX ADMIN — VANCOMYCIN HYDROCHLORIDE 1000 MG: 1 INJECTION, POWDER, LYOPHILIZED, FOR SOLUTION INTRAVENOUS at 10:09

## 2025-06-16 RX ADMIN — CARBIDOPA AND LEVODOPA 1 TABLET: 25; 100 TABLET ORAL at 14:36

## 2025-06-16 RX ADMIN — CIPROFLOXACIN 400 MG: 2 INJECTION, SOLUTION INTRAVENOUS at 03:48

## 2025-06-16 RX ADMIN — ESCITALOPRAM OXALATE 10 MG: 10 TABLET ORAL at 10:12

## 2025-06-16 RX ADMIN — MONTELUKAST SODIUM 10 MG: 10 TABLET, COATED ORAL at 20:57

## 2025-06-16 RX ADMIN — MINERAL SUPPLEMENT IRON 300 MG / 5 ML STRENGTH LIQUID 100 PER BOX UNFLAVORED 300 MG: at 20:55

## 2025-06-16 RX ADMIN — ATORVASTATIN CALCIUM 80 MG: 40 TABLET, FILM COATED ORAL at 20:58

## 2025-06-16 RX ADMIN — ACETAMINOPHEN 650 MG: 325 TABLET ORAL at 20:55

## 2025-06-16 RX ADMIN — MEMANTINE HYDROCHLORIDE 10 MG: 5 TABLET ORAL at 20:56

## 2025-06-16 RX ADMIN — LACOSAMIDE 100 MG: 50 TABLET, FILM COATED ORAL at 20:58

## 2025-06-16 RX ADMIN — METRONIDAZOLE 500 MG: 500 INJECTION, SOLUTION INTRAVENOUS at 18:12

## 2025-06-16 RX ADMIN — PREDNISONE 40 MG: 20 TABLET ORAL at 10:12

## 2025-06-16 RX ADMIN — MEMANTINE HYDROCHLORIDE 10 MG: 5 TABLET ORAL at 10:12

## 2025-06-16 RX ADMIN — CARBIDOPA AND LEVODOPA 1 TABLET: 25; 100 TABLET ORAL at 20:57

## 2025-06-16 RX ADMIN — LORAZEPAM 1 MG: 1 TABLET ORAL at 21:17

## 2025-06-16 RX ADMIN — APIXABAN 5 MG: 5 TABLET, FILM COATED ORAL at 20:56

## 2025-06-16 RX ADMIN — APIXABAN 5 MG: 5 TABLET, FILM COATED ORAL at 10:12

## 2025-06-16 RX ADMIN — LACOSAMIDE 100 MG: 50 TABLET, FILM COATED ORAL at 10:12

## 2025-06-16 RX ADMIN — Medication 750 MG: at 21:27

## 2025-06-16 RX ADMIN — METRONIDAZOLE 500 MG: 500 INJECTION, SOLUTION INTRAVENOUS at 00:43

## 2025-06-16 RX ADMIN — Medication 750 MG: at 10:14

## 2025-06-16 RX ADMIN — Medication 1 CAPSULE: at 10:12

## 2025-06-16 RX ADMIN — METRONIDAZOLE 500 MG: 500 INJECTION, SOLUTION INTRAVENOUS at 10:05

## 2025-06-16 RX ADMIN — FINASTERIDE 5 MG: 5 TABLET, FILM COATED ORAL at 10:12

## 2025-06-16 RX ADMIN — LORAZEPAM 1 MG: 1 TABLET ORAL at 04:53

## 2025-06-16 RX ADMIN — SODIUM CHLORIDE, PRESERVATIVE FREE 10 ML: 5 INJECTION INTRAVENOUS at 10:06

## 2025-06-16 RX ADMIN — SODIUM CHLORIDE, PRESERVATIVE FREE 5 ML: 5 INJECTION INTRAVENOUS at 22:09

## 2025-06-16 RX ADMIN — DILTIAZEM HYDROCHLORIDE 30 MG: 60 TABLET ORAL at 10:12

## 2025-06-16 RX ADMIN — Medication 6 MG: at 20:56

## 2025-06-16 RX ADMIN — LORAZEPAM 1 MG: 1 TABLET ORAL at 10:56

## 2025-06-16 RX ADMIN — DILTIAZEM HYDROCHLORIDE 30 MG: 60 TABLET ORAL at 20:58

## 2025-06-16 RX ADMIN — MINERAL SUPPLEMENT IRON 300 MG / 5 ML STRENGTH LIQUID 100 PER BOX UNFLAVORED 300 MG: at 10:12

## 2025-06-16 RX ADMIN — CARBIDOPA AND LEVODOPA 1 TABLET: 25; 100 TABLET ORAL at 10:12

## 2025-06-16 RX ADMIN — LANSOPRAZOLE 30 MG: 30 TABLET, ORALLY DISINTEGRATING, DELAYED RELEASE ORAL at 04:55

## 2025-06-16 ASSESSMENT — PAIN - FUNCTIONAL ASSESSMENT: PAIN_FUNCTIONAL_ASSESSMENT: PREVENTS OR INTERFERES SOME ACTIVE ACTIVITIES AND ADLS

## 2025-06-16 ASSESSMENT — PAIN DESCRIPTION - LOCATION: LOCATION: BACK

## 2025-06-16 ASSESSMENT — PAIN SCALES - GENERAL
PAINLEVEL_OUTOF10: 6
PAINLEVEL_OUTOF10: 2
PAINLEVEL_OUTOF10: 0

## 2025-06-16 ASSESSMENT — PAIN DESCRIPTION - ORIENTATION: ORIENTATION: MID

## 2025-06-16 ASSESSMENT — PAIN DESCRIPTION - DESCRIPTORS: DESCRIPTORS: ACHING

## 2025-06-16 NOTE — PLAN OF CARE
Problem: Chronic Conditions and Co-morbidities  Goal: Patient's chronic conditions and co-morbidity symptoms are monitored and maintained or improved  6/16/2025 1046 by Sandy Hernandez RN  Outcome: Progressing  6/16/2025 0053 by Kayley Hu RN  Outcome: Progressing     Problem: Discharge Planning  Goal: Discharge to home or other facility with appropriate resources  6/16/2025 1046 by Sandy Hernandez RN  Outcome: Progressing  6/16/2025 0053 by Kayley Hu RN  Outcome: Progressing     Problem: ABCDS Injury Assessment  Goal: Absence of physical injury  6/16/2025 1046 by Sandy Hernandez RN  Outcome: Progressing  6/16/2025 0053 by Kayley Hu RN  Outcome: Progressing     Problem: Spiritual Struggle  Goal: Verbalizes spiritual struggle  6/16/2025 1046 by Sandy Hernandez RN  Outcome: Progressing  6/16/2025 0053 by Kayley Hu RN  Outcome: Progressing  Goal: Gains new understanding/perception  6/16/2025 1046 by Sandy Hernandez RN  Outcome: Progressing  6/16/2025 0053 by Kayley Hu RN  Outcome: Progressing  Goal: Growing sense of peace/hope  6/16/2025 1046 by Sandy Hernandez RN  Outcome: Progressing  6/16/2025 0053 by Kayley Hu RN  Outcome: Progressing  Goal: Explore resources for additional follow up, post discharge  6/16/2025 1046 by Sandy Hernandez RN  Outcome: Progressing  6/16/2025 0053 by Kayley Hu RN  Outcome: Progressing     Problem: Emotional Distress  Goal: Verbalization of thoughts and feelings  6/16/2025 1046 by Sandy Hernandez RN  Outcome: Progressing  6/16/2025 0053 by Kayley Hu RN  Outcome: Progressing  Goal: Reduce evidence of anxiety/worry/anger  6/16/2025 1046 by Sandy Hernandez RN  Outcome: Progressing  6/16/2025 0053 by Kayley Hu RN  Outcome: Progressing  Goal: Identifies/restores coping resources/skills  6/16/2025 1046 by Sandy Hernandez RN  Outcome: Progressing  6/16/2025 0053 by Fritz,

## 2025-06-16 NOTE — CARE COORDINATION
INTERDISCIPLINARY PLAN OF CARE CONFERENCE    Date/Time: 6/16/2025 11:10 AM  Completed by: Clementina Bacon RN, Case Management      Patient Name:  Rodolfo Luna  YOB: 1945  Admitting Diagnosis: Fever of unknown origin [R50.9]  Generalized weakness [R53.1]     Admit Date/Time:  6/10/2025  8:11 PM    Chart reviewed. Interdisciplinary team contacted or reviewed plan related to patient progress and discharge plans.   Disciplines included Case Management, Nursing, and Dietitian.    Current Status:Stable  PT/OT recommendation for discharge plan of care: Home with initial 24/7 supervision   Expected D/C Disposition:  Home  Confirmed plan with patient  Yes   Met with:patient  Discharge Plan Comments: Reviewed chart and met with pt who cont plan to return home with wife at dc. Noted [facility]. Has home PEG/T.F.  prior to admit. Will cont to follow for additional dc needs,    Home O2 in place on admit: No  Pt informed of need to bring portable home O2 tank on day of discharge for nursing to connect prior to leaving:  Not Indicated  Verbalized agreement/Understanding:  Not Indicated

## 2025-06-16 NOTE — PROGRESS NOTES
Pt up in chair. Tube feed running through pump. Iv antibiotics infusing. Pt denies any needs. Chair alarm in place. Call light in reach.

## 2025-06-16 NOTE — PLAN OF CARE
Problem: Chronic Conditions and Co-morbidities  Goal: Patient's chronic conditions and co-morbidity symptoms are monitored and maintained or improved  6/16/2025 0053 by Kayley Hu RN  Outcome: Progressing  6/15/2025 1101 by Sarah Lozoya RN  Outcome: Progressing     Problem: Discharge Planning  Goal: Discharge to home or other facility with appropriate resources  6/16/2025 0053 by Kayley Hu RN  Outcome: Progressing  6/15/2025 1101 by Sarah Lozoya RN  Outcome: Progressing     Problem: ABCDS Injury Assessment  Goal: Absence of physical injury  6/16/2025 0053 by Kayley uH RN  Outcome: Progressing  6/15/2025 1101 by Sarah Lozoya RN  Outcome: Progressing     Problem: Spiritual Struggle  Goal: Verbalizes spiritual struggle  6/16/2025 0053 by Kayley Hu RN  Outcome: Progressing  6/15/2025 1101 by Sarah Lozoya RN  Outcome: Progressing  Goal: Gains new understanding/perception  6/16/2025 0053 by Kayley Hu RN  Outcome: Progressing  6/15/2025 1101 by Sarah Lozoya RN  Outcome: Progressing  Goal: Growing sense of peace/hope  6/16/2025 0053 by Kayley Hu RN  Outcome: Progressing  6/15/2025 1101 by Sarah Lozoya RN  Outcome: Progressing  Goal: Explore resources for additional follow up, post discharge  6/16/2025 0053 by Kayley Hu RN  Outcome: Progressing  6/15/2025 1101 by Sarah Lozoya RN  Outcome: Progressing     Problem: Emotional Distress  Goal: Verbalization of thoughts and feelings  6/16/2025 0053 by Kayley Hu RN  Outcome: Progressing  6/15/2025 1101 by Sarah Lozoya RN  Outcome: Progressing  Goal: Reduce evidence of anxiety/worry/anger  6/16/2025 0053 by Kayley Hu RN  Outcome: Progressing  6/15/2025 1101 by Sarah Lozoya RN  Outcome: Progressing  Goal: Identifies/restores coping resources/skills  6/16/2025 0053 by Kayley Hu, RN  Outcome: Progressing  6/15/2025 1101 by Sarah Lozoya, ANDREA  Outcome:

## 2025-06-16 NOTE — WOUND CARE
Wound Follow-up Progress Note       NAME:  Rodolfo Luna  MEDICAL RECORD NUMBER:  [id number]  AGE:  79 y.o.   GENDER:  male  :  1945  TODAY'S DATE:  2025    Subjective  Pt awake and alert, hx of dementia, answering questions appropriately    Wound Identification:  Wound Type: hypergranulation tissue  Contributing Factors: friction, moisture    Objective  Pt seen for follow up on hypergranulation tissue at peg site.  Area improved.  Additional treatment warranted.  Pt agreeable.  Lidocaine cream applied and left in place x 15 minutes prior to tx.          Patient Goal of Care:  Wound Healing     BP (!) 161/84   Pulse 69   Temp 98.3 °F (36.8 °C) (Oral)   Resp 20   Ht 1.753 m (5' 9.02\")   Wt 79.3 kg (174 lb 12.8 oz)   SpO2 93%   BMI 25.80 kg/m²   Jorge Risk Score: Jorge Scale Score: 19    Assessment    Peg site:  hypergranulation tissue decreased in size, no bleeding noted.         Response to treatment:  Well tolerated by patient.     Pain Assessment:  Severity:  0 / 10  Quality of pain: N/A  Wound Pain Timing/Severity: none  Premedicated: N/A    Plan  Silver nitrate treatment done on hypergranulation tissue.  Area covered with Alginate \Ag, over bumper and hypergranular tissue, covered with 2x2 gauze and secured with tape.  Peg tube remains secured to [facility]         Specialty Bed Required : N/A        Current Diet: Diet NPO  ADULT TUBE FEEDING; PEG; Standard with Fiber; Continuous; 30; Yes; 20; Q 4 hours; 65; 30; Q 4 hours  Dietician consult:  Yes    Discharge Plan:  Placement for patient upon discharge: Home with Support   Patient appropriate for Outpatient Wound Care Center: No    Referrals:  Supplies    Patient/Caregiver Teaching:  Level of patient/caregiver understanding able to:   Verbal understanding and Needs reinforcement        Electronically signed by Stephanie Hawley RN, CWOCN on 2025 at 11:34 AM

## 2025-06-16 NOTE — PROGRESS NOTES
PROGRESS NOTE  S:79 yrs Patient  admitted on 6/10/2025 with Fever of unknown origin [R50.9]  Generalized weakness [R53.1] .  Today, he reports feeling well. TF running at goal. PEG site with minimal drainage. He denies pain. He has remained afebrile. He reports left wrist pain.     Exam:   Vitals:    06/16/25 0304   BP: 133/66   Pulse: 76   Resp: 18   Temp: 97.9 °F (36.6 °C)   SpO2: 94%   Generalized: alert, no acute distress  HEENT: sclera clear, anicteric  Neck supple, trachea midline  Heart NSR  Abdomen: soft, +PEG, NT. ND  Extremities no edema    Medications: Reviewed    Labs:  BMP:   Recent Labs     06/14/25  0620 06/15/25  0544 06/16/25  0604    141 142   K 3.8 3.6 4.0    107 107   CO2 24 23 25   BUN 16 19 20   CREATININE 0.6* 0.6* 0.5*     Attending Supervising Physician's Attestation Statement  The patient is a 79 y.o. male. I have performed a history and physical examination of the patient. I discussed the case with GEOVANNA Lane    I reviewed the patient's Past Medical History, Past Surgical History, Medications, and Allergies.     Physical Exam:  Vitals:    06/16/25 0304 06/16/25 0945 06/16/25 1030 06/16/25 1430   BP: 133/66 (!) 161/84  119/76   Pulse: 76 (!) 112 69 78   Resp: 18 20  22   Temp: 97.9 °F (36.6 °C) 98.3 °F (36.8 °C)  97.7 °F (36.5 °C)   TempSrc: Oral Oral  Oral   SpO2: 94% 93%  95%   Weight:       Height:           Physical Examination:   General - improved and dehydrated  Mental status - agitated  Eyes - sclera anicteric  Neck - supple, no significant adenopathy  Heart - irregularly irregular   Abdomen - soft, NT, ND. PEG site clean and dry.   Extremities - no pedal edema     Assessment   80 yo male with pmx of Parkinson's disease, seizures, A.fib s/p watchman, CAD, PE, dysphagia s/p PEG, lung cancer s/p VATS, and CVA admitted with fever and generalized weakness. PEG culture positive for MRSA.      Plan   Continue supportive

## 2025-06-16 NOTE — CONSULTS
Inpatient Neurology consult        [facility] Neurology      Rodolfo Luna  1945    Date of Service: 6/11/2025    Referring Physician: Christy Grover,*      Reason for the consult and CC: Posterior headache/generalized weakness    HPI:   The patient is a 79 y.o. male with a past medical history of atrial fibrillation, anxiety, Parkinson's disease, seizures, CAD, hyperlipidemia, dysphagia status post PEG,  and dementia originally presenting to the hospital for concerns of fever of unknown origin and headache.  Patient's wife at bedside reports over the last week patient has become more fatigued and weak to the point that he is having difficulties even standing up and over the last 3 to 4 days he has had been complaining of a posterior headache.  Patient denies any recent changes to his medications, recent illnesses, urinary difficulties, or flu/cold-like symptoms.  Patient reports minimal relief with Tylenol at home.  Neurology has been consulted for further evaluation and management of a posterior headache and generalized weakness.        Constitutional:   Vitals:    06/10/25 2340 06/11/25 0308 06/11/25 0345 06/11/25 1137   BP:  114/64 116/76 128/66   Pulse:  70  99   Resp:  13 18 18   Temp: 100 °F (37.8 °C) 97.5 °F (36.4 °C) 98.1 °F (36.7 °C) 98.6 °F (37 °C)   TempSrc: Oral Oral Oral Oral   SpO2:  92% 92% 91%   Weight:   79.3 kg (174 lb 12.8 oz)    Height:   1.753 m (5' 9\")      I personally reviewed and updated social history, past medical history, medications, allergy, surgical history, and family history as documented in the patient's electronic health records.     ROS: 10-14 ROS reviewed with the patient/nurse/family which were unremarkable except mentioned in H&P.    General appearance:  Normal development and appear in no acute distress.   Mental Status:   Orientation to person, place, time, situation  Memory good immediate recall and intact remote memory    Attention intact as able to 
Consultant: Rodolfo Armstrong MD  From: Dr. Huitron  Reason: L wrist pain    Chief Complaint   Patient presents with    Headache    Extremity Weakness     Head for a few days, can't stand up per wife.         History of Present Illness      Rodolfo Luna is a 79 y.o. male who presents with acute left wrist pain.  No preceding wrist pain that the patient endorses.  He is admitted for fevers potentially related to PEG tube infection.    Past History       Past Medical History:   Diagnosis Date    A-fib ([facility])     MIRNA (acute kidney injury) 03/31/2022    Anxiety     Arthritis     OA    Bradycardia 04/19/2014    Cancer ([facility])     skin cancer-forearm right , face    Coronary artery disease involving native coronary artery of native heart without angina pectoris 07/19/2022    Hemoptysis 10/16/2014    Since Ablation    History of implantation of penile prosthesis     Irregular heart  beats     Mixed hyperlipidemia 03/17/2023    Parkinson disease ([facility])     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    S/P drug eluting coronary stent placement 03/06/2017    2.25 x 18 Xience [employer] ADRIÁN - Distal LAD    Seizure disorder ([facility])     Abnormal EEG with left temporal shapr waves    Seizures ([facility])     Shortness of breath 09/04/2014    Total knee replacement status 01/12/2011     Past Surgical History:   Procedure Laterality Date    ABLATION OF DYSRHYTHMIC FOCUS  9/2014    CARPAL TUNNEL RELEASE      CERVICAL DISC SURGERY  2010    COLONOSCOPY  09/26/2016    normal    CORONARY ANGIOPLASTY WITH STENT PLACEMENT      ESOPHAGEAL DILATATION N/A 7/5/2024    ESOPHAGEAL DILATION KATHRYN performed by Abraham Lagos DO at [facility]    FEMUR SURGERY      left    KNEE SURGERY  1-12-11 R total knee replacement with femoral nerve block for pain control    LUNG REMOVAL, PARTIAL Right     20%    OTHER SURGICAL HISTORY Bilateral     excisions of lesions on bilat.neck and back    OTHER SURGICAL HISTORY  9/2014    Reveal Loop recorder placed in 
[facility]   Pharmacy Pharmacokinetic Monitoring Service - Vancomycin     Rodolfo Luna is a 79 y.o. male starting on vancomycin therapy for SSTI x 7 days. Pharmacy consulted by Dr Huitron for monitoring and adjustment.    Target Concentration: Goal AUC/CLARIBEL 400-600 mg*hr/L    Additional Antimicrobials: Cipro, Metronidazole    Pertinent Laboratory Values:   Wt Readings from Last 1 Encounters:   06/11/25 79.3 kg (174 lb 12.8 oz)     Temp Readings from Last 1 Encounters:   06/12/25 (!) 102 °F (38.9 °C) (Oral)     Estimated Creatinine Clearance: 86 mL/min (A) (based on SCr of 0.7 mg/dL (L)).  Recent Labs     06/11/25  0828 06/12/25  0632   CREATININE 0.8 0.7*   BUN 16 18   WBC 8.5 4.8     Procalcitonin: ordered    MRSA Nasal Swab: N/A. Non-respiratory infection.    Plan:  Dosing recommendations based on Bayesian software  Will start vancomycin 1000 mg IV every 12 hours.  Anticipated AUC of 509 and trough concentration of 15.4 at steady state  Renal labs as indicated   Vancomycin trough level ordered for 6/14 @ 0600.   Pharmacy will continue to monitor patient and adjust therapy as indicated    Thank you for the consult.  
sweats      Other/Food Other (See Comments)     Pt. States that there is another med that he is allergic to,does not know the name states that it makes him sweat  Other reaction(s): Throat irritation    Rivaroxaban      Other reaction(s): Coordination problem, Other (See Comments)    Apixaban      hot flashes and stomach pain and he is peeing more than usual    Clopidogrel Rash    Codeine Anxiety, Nausea Only and Rash     dizzy  Other reaction(s): Nausea/ Sweaty, Other (See Comments)  dizzy      Penicillins Nausea And Vomiting and Rash     Other reaction(s): Dizzy, Other (See Comments), shaking    Plavix [Clopidogrel Bisulfate] Rash       ROS: Constitutional: negative for chills, fevers and sweats  Eyes: negative for cataracts, icterus and redness  Ears, nose, mouth, throat, and face: negative for epistaxis, hearing loss and sore throat  Respiratory: negative for cough, hemoptysis and sputum  Cardiovascular: negative for chest pain, dyspnea and lower extremity edema  Gastrointestinal: as per HPI  Genitourinary:negative for dysuria, frequency and hematuria  Neurological: negative for coordination problems, dizziness and gait problems  Behavioral/Psych: negative for anxiety, depression and mood swings    Physical Exam   /66   Pulse 99   Temp 98.6 °F (37 °C) (Oral)   Resp 18   Ht 1.753 m (5' 9\")   Wt 79.3 kg (174 lb 12.8 oz)   SpO2 91%   BMI 25.81 kg/m²     Generalized: alert, no acute distress  HEENT: sclera clear, anicteric  Neck supple, trachea midline  Heart NSR  Abdomen: soft, +PEG w/ erythema, ND  Extremities no edema    Lab and Imaging Review   Labs:  CBC:   Recent Labs     06/10/25  2026 06/11/25  0828   WBC 7.6 8.5   HGB 11.4* 11.1*   HCT 35.2* 33.7*   MCV 95.4 96.7   * 95*     BMP:   Recent Labs     06/10/25  2026 06/11/25  0828   * 135*   K 4.3 4.9   CL 97* 102   CO2 22 18*   BUN 19 16   CREATININE 0.8 0.8     LIVER PROFILE:   Recent Labs     06/10/25  2026   AST 37   ALT 18

## 2025-06-16 NOTE — PROGRESS NOTES
IM Progress Note    Date: 06/16/25     Admitted for fevers.   Wound infection at PEG site. On IV abx        Subjective:     Patient seen .   PEG tube site looks clean- seen by wound care and dressing changed -getting IV antibiotics vancomycin and Flagyl for MRSA wound infection.  S/p aspiration of his left wrist today- culture sent  Patient otherwise stable   awake alert and oriented   O2 sat stable on 2 L   no fevers    DC planning for tomorrow    Objective:  Vitals:    06/15/25 2006 06/16/25 0304 06/16/25 0945 06/16/25 1030   BP: 118/67 133/66 (!) 161/84    Pulse: 80 76 (!) 112 69   Resp: 18 18 20    Temp: 98.5 °F (36.9 °C) 97.9 °F (36.6 °C) 98.3 °F (36.8 °C)    TempSrc: Oral Oral Oral    SpO2: 97% 94% 93%    Weight:       Height:           Physical Exam:    Gen: No distress. Alert. Appears chronically ill, thin pleasant elderly male  Eyes: PERRL. No sclera icterus. No conjunctival injection.   ENT: No discharge. Pharynx clear.   Neck: No JVD.  Trachea midline.  Resp: No accessory muscle use. No crackles. No wheezes. No rhonchi.   CV: Regular rate. Regular rhythm. No murmur.  No rub. No edema.   Capillary Refill: Brisk,< 3 seconds   GI: Non-tender. Non-distended.  Normal bowel sounds.  +peg tube  dressing +   Skin: no rash. Left wrist and forearm appear slightly more swollen, no erythema.   M/S: No cyanosis. No joint deformity. No clubbing.   Neuro: Awake. Grossly nonfocal      Labs:  CBC:   No results for input(s): \"WBC\", \"HGB\", \"HCT\", \"MCV\", \"PLT\" in the last 72 hours.    BMP:   Recent Labs     06/14/25  0620 06/15/25  0544 06/16/25  0604    141 142   K 3.8 3.6 4.0    107 107   CO2 24 23 25   BUN 16 19 20   CREATININE 0.6* 0.6* 0.5*     LIVER PROFILE:   No results for input(s): \"AST\", \"ALT\", \"LIPASE\", \"AMYLASE\", \"BILIDIR\", \"BILITOT\", \"ALKPHOS\" in the last 72 hours.    Invalid input(s): \"ALB\"    PT/INR: No results for input(s): \"PROTIME\", \"INR\" in the last 72 hours.  APTT: No results for input(s):

## 2025-06-16 NOTE — PROGRESS NOTES
Pt in bed during assessment. Pt is alert and oriented. Pt denies pain, complains of some discomfort at peg tube site. Tolerating tube feeding. Bed alarm in place. Call light in reach. No new concerns at this time.     Bedside Mobility Assessment Tool (BMAT):     Assessment Level 1- Sit and Shake    1. From a semi-reclined position, ask patient to sit up and rotate to a seated position at the side of the bed. Can use the bedrail.    2. Ask patient to reach out and grab your hand and shake making sure patient reaches across his/her midline.   Pass- Patient is able to come to a seated position, maintain core strength. Maintains seated balance while reaching across midline. Move on to Assessment Level 2.     Assessment Level 2- Stretch and Point   1. With patient in seated position at the side of the bed, have patient place both feet on the floor (or stool) with knees no higher than hips.    2. Ask patient to stretch one leg and straighten the knee, then bend the ankle/flex and point the toes. If appropriate, repeat with the other leg.   Pass- Patient is able to demonstrate appropriate quad strength on intended weight bearing limb(s). Move onto Assessment Level 3.     Assessment Level 3- Stand   1. Ask patient to elevate off the bed or chair (seated to standing) using an assistive device (cane, bedrail).    2. Patient should be able to raise buttocks off be and hold for a count of five. May repeat once.   Pass- Patient maintains standing stability for at least 5 seconds, proceed to assessment level 4.    Assessment Level 4- Walk   1. Ask patient to march in place at bedside.    2. Then ask patient to advance step and return each foot. Some medical conditions may render a patient from stepping backwards, use your best clinical judgement.   Fail- Patient not able to complete tasks OR requires use of assistive device. Patient is MOBILITY LEVEL 3.       Mobility Level- 3

## 2025-06-17 VITALS
DIASTOLIC BLOOD PRESSURE: 73 MMHG | SYSTOLIC BLOOD PRESSURE: 133 MMHG | HEART RATE: 93 BPM | BODY MASS INDEX: 25.89 KG/M2 | HEIGHT: 69 IN | WEIGHT: 174.8 LBS | RESPIRATION RATE: 20 BRPM | TEMPERATURE: 97.5 F | OXYGEN SATURATION: 90 %

## 2025-06-17 LAB
ANION GAP SERPL CALCULATED.3IONS-SCNC: 11 MMOL/L (ref 3–16)
BUN SERPL-MCNC: 23 MG/DL (ref 7–20)
CALCIUM SERPL-MCNC: 7.5 MG/DL (ref 8.3–10.6)
CHLORIDE SERPL-SCNC: 106 MMOL/L (ref 99–110)
CO2 SERPL-SCNC: 24 MMOL/L (ref 21–32)
CREAT SERPL-MCNC: 0.5 MG/DL (ref 0.8–1.3)
GFR SERPLBLD CREATININE-BSD FMLA CKD-EPI: >90 ML/MIN/{1.73_M2}
GLUCOSE BLD-MCNC: 164 MG/DL (ref 70–99)
GLUCOSE BLD-MCNC: 168 MG/DL (ref 70–99)
GLUCOSE SERPL-MCNC: 128 MG/DL (ref 70–99)
PERFORMED ON: ABNORMAL
PERFORMED ON: ABNORMAL
POTASSIUM SERPL-SCNC: 4 MMOL/L (ref 3.5–5.1)
SODIUM SERPL-SCNC: 141 MMOL/L (ref 136–145)

## 2025-06-17 PROCEDURE — 36415 COLL VENOUS BLD VENIPUNCTURE: CPT

## 2025-06-17 PROCEDURE — 94761 N-INVAS EAR/PLS OXIMETRY MLT: CPT

## 2025-06-17 PROCEDURE — 6370000000 HC RX 637 (ALT 250 FOR IP): Performed by: SURGERY

## 2025-06-17 PROCEDURE — 6360000002 HC RX W HCPCS: Performed by: INTERNAL MEDICINE

## 2025-06-17 PROCEDURE — 6370000000 HC RX 637 (ALT 250 FOR IP): Performed by: INTERNAL MEDICINE

## 2025-06-17 PROCEDURE — 2700000000 HC OXYGEN THERAPY PER DAY

## 2025-06-17 PROCEDURE — 2580000003 HC RX 258: Performed by: INTERNAL MEDICINE

## 2025-06-17 PROCEDURE — 6360000002 HC RX W HCPCS

## 2025-06-17 PROCEDURE — 6370000000 HC RX 637 (ALT 250 FOR IP): Performed by: NURSE PRACTITIONER

## 2025-06-17 PROCEDURE — 80048 BASIC METABOLIC PNL TOTAL CA: CPT

## 2025-06-17 RX ORDER — LACTOBACILLUS RHAMNOSUS GG 10B CELL
1 CAPSULE ORAL
Qty: 30 CAPSULE | Refills: 0 | Status: ON HOLD | OUTPATIENT
Start: 2025-06-18

## 2025-06-17 RX ORDER — CARBIDOPA AND LEVODOPA 25; 100 MG/1; MG/1
1 TABLET ORAL 3 TIMES DAILY
Status: ON HOLD | COMMUNITY
Start: 2025-06-17

## 2025-06-17 RX ORDER — MEMANTINE HYDROCHLORIDE 10 MG/1
10 TABLET ORAL 2 TIMES DAILY
Status: ON HOLD | COMMUNITY
Start: 2025-06-17

## 2025-06-17 RX ORDER — DILTIAZEM HYDROCHLORIDE 30 MG/1
30 TABLET, FILM COATED ORAL 2 TIMES DAILY
Qty: 60 TABLET | Refills: 0 | Status: ON HOLD | OUTPATIENT
Start: 2025-06-17 | End: 2025-07-17

## 2025-06-17 RX ORDER — FAMOTIDINE 20 MG/1
20 TABLET, FILM COATED ORAL 2 TIMES DAILY
COMMUNITY
Start: 2025-06-17 | End: 2025-06-22

## 2025-06-17 RX ORDER — LEVETIRACETAM 500 MG/1
750 TABLET ORAL 2 TIMES DAILY
Status: ON HOLD | COMMUNITY
Start: 2025-06-17

## 2025-06-17 RX ORDER — ESCITALOPRAM OXALATE 10 MG/1
10 TABLET ORAL DAILY
Status: ON HOLD | COMMUNITY
Start: 2025-06-17

## 2025-06-17 RX ORDER — DOXYCYCLINE HYCLATE 100 MG
100 TABLET ORAL 2 TIMES DAILY
Qty: 10 TABLET | Refills: 0 | Status: ON HOLD | OUTPATIENT
Start: 2025-06-17 | End: 2025-06-22

## 2025-06-17 RX ADMIN — ESCITALOPRAM OXALATE 10 MG: 10 TABLET ORAL at 08:06

## 2025-06-17 RX ADMIN — Medication 750 MG: at 08:06

## 2025-06-17 RX ADMIN — Medication 1 CAPSULE: at 08:06

## 2025-06-17 RX ADMIN — METRONIDAZOLE 500 MG: 500 INJECTION, SOLUTION INTRAVENOUS at 00:48

## 2025-06-17 RX ADMIN — ACETAMINOPHEN 650 MG: 325 TABLET ORAL at 03:41

## 2025-06-17 RX ADMIN — MINERAL SUPPLEMENT IRON 300 MG / 5 ML STRENGTH LIQUID 100 PER BOX UNFLAVORED 300 MG: at 08:06

## 2025-06-17 RX ADMIN — DILTIAZEM HYDROCHLORIDE 30 MG: 60 TABLET ORAL at 08:05

## 2025-06-17 RX ADMIN — APIXABAN 5 MG: 5 TABLET, FILM COATED ORAL at 08:06

## 2025-06-17 RX ADMIN — MEMANTINE HYDROCHLORIDE 10 MG: 5 TABLET ORAL at 08:06

## 2025-06-17 RX ADMIN — VANCOMYCIN HYDROCHLORIDE 1000 MG: 1 INJECTION, POWDER, LYOPHILIZED, FOR SOLUTION INTRAVENOUS at 00:13

## 2025-06-17 RX ADMIN — FINASTERIDE 5 MG: 5 TABLET, FILM COATED ORAL at 08:06

## 2025-06-17 RX ADMIN — CARBIDOPA AND LEVODOPA 1 TABLET: 25; 100 TABLET ORAL at 08:06

## 2025-06-17 RX ADMIN — VANCOMYCIN HYDROCHLORIDE 1000 MG: 1 INJECTION, POWDER, LYOPHILIZED, FOR SOLUTION INTRAVENOUS at 10:49

## 2025-06-17 RX ADMIN — CIPROFLOXACIN 400 MG: 2 INJECTION, SOLUTION INTRAVENOUS at 03:49

## 2025-06-17 RX ADMIN — LORAZEPAM 1 MG: 1 TABLET ORAL at 10:50

## 2025-06-17 RX ADMIN — LACOSAMIDE 100 MG: 50 TABLET, FILM COATED ORAL at 08:06

## 2025-06-17 RX ADMIN — LORAZEPAM 1 MG: 1 TABLET ORAL at 03:41

## 2025-06-17 RX ADMIN — METRONIDAZOLE 500 MG: 500 INJECTION, SOLUTION INTRAVENOUS at 08:01

## 2025-06-17 RX ADMIN — PREDNISONE 40 MG: 20 TABLET ORAL at 08:06

## 2025-06-17 RX ADMIN — LANSOPRAZOLE 30 MG: 30 TABLET, ORALLY DISINTEGRATING, DELAYED RELEASE ORAL at 07:39

## 2025-06-17 ASSESSMENT — PAIN SCALES - GENERAL: PAINLEVEL_OUTOF10: 0

## 2025-06-17 ASSESSMENT — PAIN SCALES - WONG BAKER: WONGBAKER_NUMERICALRESPONSE: NO HURT

## 2025-06-17 NOTE — PROGRESS NOTES
Vancomycin Day: 6/7  Current Regimen: 1000 mg IV every 12 hours    Patient's labs, cultures, vitals, and vancomycin regimen reviewed.   SCr decreased slightly from 0.6 to 0.5  U/O not measured/well documented  [employer] updated    Plan:   AUC low with this regimen, would increase dose to 1250mg q12h  Maida Arita Pharm D 6/17/20251:51 PM  .

## 2025-06-17 NOTE — FLOWSHEET NOTE
06/16/25 1930   Vital Signs   Temp 98.2 °F (36.8 °C)   Temp Source Oral   Pulse 76   Heart Rate Source Monitor   Respirations 20   /79   MAP (Calculated) 97   BP Location Right upper arm   BP Method Automatic   Patient Position Semi fowlers   Pain Assessment   Pain Assessment 0-10   Pain Level 2   Opioid-Induced Sedation   POSS Score 1   Oxygen Therapy   SpO2 98 %   O2 Device Nasal cannula   O2 Flow Rate (L/min) 2 L/min     Shift assessment is complete. The pt is A&O x 4,  The pt denies any further needs at this time. The pt call light and personal items are with in reach. The bed alarm is on. Will continue to monitor.

## 2025-06-17 NOTE — PROGRESS NOTES
Provided pt with discharge instructions. Pt wife farideh at bedside. Questions about discharge answered. Pt transported to [facility] by wheel chair. Pt denies any other needs. Vital stable. IV removed no complications.

## 2025-06-17 NOTE — PLAN OF CARE
Problem: Chronic Conditions and Co-morbidities  Goal: Patient's chronic conditions and co-morbidity symptoms are monitored and maintained or improved  Outcome: Progressing     Problem: Discharge Planning  Goal: Discharge to home or other facility with appropriate resources  Outcome: Progressing     Problem: ABCDS Injury Assessment  Goal: Absence of physical injury  Outcome: Progressing     Problem: Spiritual Struggle  Goal: Verbalizes spiritual struggle  Outcome: Progressing     Problem: Spiritual Struggle  Goal: Gains new understanding/perception  Outcome: Progressing     Problem: Spiritual Struggle  Goal: Growing sense of peace/hope  Outcome: Progressing     Problem: Spiritual Struggle  Goal: Explore resources for additional follow up, post discharge  Outcome: Progressing     Problem: Emotional Distress  Goal: Verbalization of thoughts and feelings  Outcome: Progressing     Problem: Emotional Distress  Goal: Reduce evidence of anxiety/worry/anger  Outcome: Progressing     Problem: Life Adjustment  Goal: Verbalization of feelings regarding change/loss  Outcome: Progressing     Problem: Life Adjustment  Goal: Finding meaning/purpose in the midst of illness/suffering  Outcome: Progressing     Problem: Life Adjustment  Goal: Identifies/restores coping resources/skills  Outcome: Progressing     Problem: Life Adjustment  Goal: Growing sense of peace/hope  Outcome: Progressing     Problem: Life Adjustment  Goal: Explore resources for additional follow up, post discharge  Outcome: Progressing

## 2025-06-17 NOTE — PLAN OF CARE
Problem: Chronic Conditions and Co-morbidities  Goal: Patient's chronic conditions and co-morbidity symptoms are monitored and maintained or improved  6/17/2025 1104 by Sandy Hernandez RN  Outcome: Not Progressing  6/17/2025 1058 by Sandy Hernandez RN  Outcome: Adequate for Discharge  6/17/2025 0056 by Queenie Amezcua RN  Outcome: Progressing     Problem: Discharge Planning  Goal: Discharge to home or other facility with appropriate resources  6/17/2025 1104 by Sandy Hernandez RN  Outcome: Not Progressing  6/17/2025 1058 by Sandy Hernandez RN  Outcome: Adequate for Discharge  6/17/2025 0056 by Queenie Amezcua RN  Outcome: Progressing  Flowsheets (Taken 6/16/2025 1942)  Discharge to home or other facility with appropriate resources: Identify barriers to discharge with patient and caregiver     Problem: ABCDS Injury Assessment  Goal: Absence of physical injury  6/17/2025 1104 by Sandy Hernandez RN  Outcome: Not Progressing  6/17/2025 1058 by Sandy Hernandez RN  Outcome: Adequate for Discharge  6/17/2025 0056 by Queenie Amezcua RN  Outcome: Progressing     Problem: Spiritual Struggle  Goal: Verbalizes spiritual struggle  6/17/2025 1104 by Sandy Hernandez RN  Outcome: Not Progressing  6/17/2025 1058 by Sandy Hernandez RN  Outcome: Adequate for Discharge  6/17/2025 0056 by Queenie Amezcua RN  Outcome: Progressing  Goal: Gains new understanding/perception  6/17/2025 1104 by Sandy Hernandez RN  Outcome: Not Progressing  6/17/2025 1058 by Sandy Hernandez RN  Outcome: Adequate for Discharge  6/17/2025 0056 by Queenie Amezcua RN  Outcome: Progressing  Goal: Growing sense of peace/hope  6/17/2025 1104 by Sandy Hernandez RN  Outcome: Not Progressing  6/17/2025 1058 by Sandy Hernandez RN  Outcome: Adequate for Discharge  6/17/2025 0056 by Queenie Amezcua RN  Outcome: Progressing  Goal: Explore resources for additional follow up, post discharge  6/17/2025 1104 by Sandy Hernandez, RN  Outcome: Not

## 2025-06-17 NOTE — PROGRESS NOTES
Patient c/o back pain rating 6/10, patient request for pain medicine, gave 2 tab tylenol given po.

## 2025-06-17 NOTE — PROGRESS NOTES
Physician Progress Note      PATIENT:               HARSHA DON  CSN #:                  [id number]  :                       1945  ADMIT DATE:       6/10/2025 8:11 PM  DISCH DATE:        2025 1:13 PM  RESPONDING  PROVIDER #:        Jose Miguel Tan MD          QUERY TEXT:    Gastrostomy site infection is documented in the GI consult on 25.  Please   clarify any associated diagnoses:    The clinical indicators include:  78yo male with history of AF, CAD s/p ADRIÁN, CVA, Parkinsonism, Orthostasis,   seizure disorder, lung cancer, porphyria cutanea tarda    Admission VS- 102.2, 103, 27, 130/76  WBC 7.6, Sed rate 51, .0....PEG wound culture- Staph aureus MRSA    BC's, Wound culture, Cipro IV, Flagyl IV, Vanco IV  Options provided:  -- Sepsis due to PEG site infection, present on admission  -- Sepsis due to PEG site infection, present on admission, now resolved  -- PEG site infection without sepsis  -- Other - I will add my own diagnosis  -- Disagree - Not applicable / Not valid  -- Disagree - Clinically unable to determine / Unknown  -- Refer to Clinical Documentation Reviewer    PROVIDER RESPONSE TEXT:    This patient was treated for sepsis this admission due to PEG site infection   which was present on admission and is currently resolved.    Query created by: Sarah Delgado on 2025 7:15 AM      Electronically signed by:  Jose Miguel Tan MD 2025 2:21 PM

## 2025-06-17 NOTE — FLOWSHEET NOTE
06/17/25 0718   Handoff   Communication Given Shift Handoff   Handoff Given To Sandy MENDEZ   Handoff Received From Queenie RN   Handoff Communication Face to Face;At bedside   Time Handoff Given 0710   End of Shift Check Performed Yes     Patient is stable. All end of shift needs have been met. No further assistance needed at this time.

## 2025-06-17 NOTE — DISCHARGE INSTR - DIET

## 2025-06-17 NOTE — PROGRESS NOTES
Pt in bed during assessment. Pt is alert and oriented. Pt denies pain. Medications given through peg tube. Pt denies any other needs. Bed alarm in place. Call light in reach. No new concerns at this time.     Bedside Mobility Assessment Tool (BMAT):     Assessment Level 1- Sit and Shake    1. From a semi-reclined position, ask patient to sit up and rotate to a seated position at the side of the bed. Can use the bedrail.    2. Ask patient to reach out and grab your hand and shake making sure patient reaches across his/her midline.   Pass- Patient is able to come to a seated position, maintain core strength. Maintains seated balance while reaching across midline. Move on to Assessment Level 2.     Assessment Level 2- Stretch and Point   1. With patient in seated position at the side of the bed, have patient place both feet on the floor (or stool) with knees no higher than hips.    2. Ask patient to stretch one leg and straighten the knee, then bend the ankle/flex and point the toes. If appropriate, repeat with the other leg.   Pass- Patient is able to demonstrate appropriate quad strength on intended weight bearing limb(s). Move onto Assessment Level 3.     Assessment Level 3- Stand   1. Ask patient to elevate off the bed or chair (seated to standing) using an assistive device (cane, bedrail).    2. Patient should be able to raise buttocks off be and hold for a count of five. May repeat once.   Pass- Patient maintains standing stability for at least 5 seconds, proceed to assessment level 4.    Assessment Level 4- Walk   1. Ask patient to march in place at bedside.    2. Then ask patient to advance step and return each foot. Some medical conditions may render a patient from stepping backwards, use your best clinical judgement.   Fail- Patient not able to complete tasks OR requires use of assistive device. Patient is MOBILITY LEVEL 3.       Mobility Level- 3

## 2025-06-17 NOTE — DISCHARGE INSTRUCTIONS
Your information:  Name: Rodolfo Luna  : 1945    Your instructions:    Follow up with PCP with in 1 week.     What to do after you leave the hospital:    Recommended diet: regular diet    Recommended activity: activity as tolerated        The following personal items were collected during your admission and were returned to you:    Belongings  Dental Appliances: Lowers, Uppers, At home  Vision - Corrective Lenses: Eyeglasses, Sent home  Hearing Aid: Bilateral hearing aids, At home  Clothing: Sent home, Shirt, Undergarments, Shorts, Socks  Jewelry: None  Body Piercings Removed: No  Electronic Devices: None  Weapons (Notify Protective Services/Security): None  Other Valuables: Sent home  Home Medications: None  Valuables Given To: Patient  Provide Name(s) of Who Valuable(s) Were Given To: NA    Information obtained by:  By signing below, I understand that if any problems occur once I leave the hospital I am to contact PCP.  I understand and acknowledge receipt of the instructions indicated above.

## 2025-06-17 NOTE — PROGRESS NOTES
IM Progress Note    Date: 06/17/25     Admitted for fevers.   Wound infection at PEG site. On IV abx        Subjective:     Patient seen .   PEG tube site looks clean- seen by wound care and dressing changed -getting IV antibiotics vancomycin and Flagyl for MRSA wound infection.  S/p aspiration of his left wrist today- culture sent  Patient otherwise stable   awake alert and oriented   O2 sat stable on 2 L   no fevers       - stable   DC planning for today    Objective:  Vitals:    06/16/25 1930 06/16/25 2055 06/17/25 0140 06/17/25 0745   BP: 132/79 121/78 106/68 108/68   Pulse: 76 76 68 70   Resp: 20  16 18   Temp: 98.2 °F (36.8 °C)  97.5 °F (36.4 °C) 97.3 °F (36.3 °C)   TempSrc: Oral  Oral Axillary   SpO2: 98%  98% 94%   Weight:       Height:           Physical Exam:    Gen: No distress. Alert. Appears chronically ill, thin pleasant elderly male  Eyes: PERRL. No sclera icterus. No conjunctival injection.   ENT: No discharge. Pharynx clear.   Neck: No JVD.  Trachea midline.  Resp: No accessory muscle use. No crackles. No wheezes. No rhonchi.   CV: Regular rate. Regular rhythm. No murmur.  No rub. No edema.   Capillary Refill: Brisk,< 3 seconds   GI: Non-tender. Non-distended.  Normal bowel sounds.  +peg tube  dressing +   Skin: no rash. Left wrist and forearm appear slightly more swollen, no erythema.   M/S: No cyanosis. No joint deformity. No clubbing.   Neuro: Awake. Grossly nonfocal      Labs:  CBC:   No results for input(s): \"WBC\", \"HGB\", \"HCT\", \"MCV\", \"PLT\" in the last 72 hours.    BMP:   Recent Labs     06/15/25  0544 06/16/25  0604 06/17/25  0540    142 141   K 3.6 4.0 4.0    107 106   CO2 23 25 24   BUN 19 20 23*   CREATININE 0.6* 0.5* 0.5*     LIVER PROFILE:   No results for input(s): \"AST\", \"ALT\", \"LIPASE\", \"AMYLASE\", \"BILIDIR\", \"BILITOT\", \"ALKPHOS\" in the last 72 hours.    Invalid input(s): \"ALB\"    PT/INR: No results for input(s): \"PROTIME\", \"INR\" in the last 72 hours.  APTT: No results for

## 2025-06-17 NOTE — CARE COORDINATION
DISCHARGE ORDER  Date/Time 2025 1:07 PM  Completed by: Mary Grace Scott, RN, Case Management    Patient Name: Rodolfo Luna      : 1945  Admitting Diagnosis: Fever of unknown origin [R50.9]  Generalized weakness [R53.1]      Admit order Date and Status:6/10/2025 Stable  (verify MD's last order for status of admission)      Noted discharge order.   If applicable PT/OT recommendation at Discharge:  supervision at home  DME recommendation by PT/OT: needs met  Confirmed discharge plan with patient and wife at bedside.  If pt confirmed DC plan does family need to be contacted by CM No if yes who______  Discharge Plan: Met with patient and wife at bedside, discussed d/c needs. Pt and wife made aware HC services can be resumed through [facility]. Home Care order placed to have HC resumed. Spoke with Angella sun [facility] who verified will be resumed.     CM delivered second IMM to patient. Verbal explanation provided of 4 hours to review notice. Patient voiced understanding and is agreeable to discharge.     Date of Last IMM Given: 25    Reviewed chart.  Role of discharge planner explained and patient verbalized understanding. Discharge order is noted.    Has Home O2 in place on admit:  Yes  Informed of need to bring portable home O2 tank on day of discharge for nursing to connect prior to leaving:   Not Indicated  Verbalized agreement/Understanding:   Yes  Pt is being d/c'd to home today. Pt's O2 sats are 90% on RA.    Discharge timeout done with CM, patient and RN Sandy. All discharge needs and concerns addressed.

## 2025-06-18 ENCOUNTER — TELEPHONE (OUTPATIENT)
Dept: FAMILY MEDICINE CLINIC | Age: 80
End: 2025-06-18

## 2025-06-18 NOTE — TELEPHONE ENCOUNTER
Care Transitions Initial Follow Up Call    Outreach made within 2 business days of discharge: Yes    Patient: Rodolfo Luna Patient : 1945   MRN: [id number]  Reason for Admission: Fever  Discharge Date: 25       Spoke with: Wife     Discharge department/facility: [facility] Interactive Patient Contact:  Was patient able to fill all prescriptions: Yes  Was patient instructed to bring all medications to the follow-up visit: Yes  Is patient taking all medications as directed in the discharge summary? Yes  Does patient understand their discharge instructions: Yes  Does patient have questions or concerns that need addressed prior to 7-14 day follow up office visit: no    Additional needs identified to be addressed with provider  No needs identified             Scheduled appointment with PCP within 7-14 days    Follow Up  Future Appointments   Date Time Provider Department Center   2025 11:20 AM Carlton Leavitt APRN - CNP [place] [facility]   2025 11:30 AM Abraham Aquino MD [facility] Neurology -   2025  1:00 PM Rodolfo Oneill MD [place]

## 2025-06-19 LAB
BACTERIA SPEC AEROBE CULT: NORMAL
GRAM STN SPEC: NORMAL

## 2025-06-22 ENCOUNTER — APPOINTMENT (OUTPATIENT)
Dept: GENERAL RADIOLOGY | Age: 80
DRG: 291 | End: 2025-06-22
Payer: MEDICARE

## 2025-06-22 ENCOUNTER — HOSPITAL ENCOUNTER (INPATIENT)
Age: 80
LOS: 7 days | Discharge: HOSPICE | DRG: 291 | End: 2025-06-29
Attending: STUDENT IN AN ORGANIZED HEALTH CARE EDUCATION/TRAINING PROGRAM | Admitting: INTERNAL MEDICINE
Payer: MEDICARE

## 2025-06-22 DIAGNOSIS — D69.6 THROMBOCYTOPENIA: ICD-10-CM

## 2025-06-22 DIAGNOSIS — R60.0 LEG EDEMA: ICD-10-CM

## 2025-06-22 DIAGNOSIS — R06.02 SHORTNESS OF BREATH: ICD-10-CM

## 2025-06-22 DIAGNOSIS — I50.9 ACUTE CONGESTIVE HEART FAILURE, UNSPECIFIED HEART FAILURE TYPE (HCC): Primary | ICD-10-CM

## 2025-06-22 DIAGNOSIS — D64.9 NORMOCYTIC ANEMIA: ICD-10-CM

## 2025-06-22 DIAGNOSIS — R79.89 ELEVATED TROPONIN: ICD-10-CM

## 2025-06-22 DIAGNOSIS — M79.89 LEFT ARM SWELLING: ICD-10-CM

## 2025-06-22 PROBLEM — I50.1 PULMONARY EDEMA CARDIAC CAUSE (HCC): Status: ACTIVE | Noted: 2025-06-22

## 2025-06-22 LAB
ALBUMIN SERPL-MCNC: 2.7 G/DL (ref 3.4–5)
ALBUMIN SERPL-MCNC: 2.8 G/DL (ref 3.4–5)
ALBUMIN/GLOB SERPL: 0.8 {RATIO} (ref 1.1–2.2)
ALP SERPL-CCNC: 91 U/L (ref 40–129)
ALT SERPL-CCNC: 65 U/L (ref 10–40)
ANION GAP SERPL CALCULATED.3IONS-SCNC: 11 MMOL/L (ref 3–16)
ANION GAP SERPL CALCULATED.3IONS-SCNC: 11 MMOL/L (ref 3–16)
AST SERPL-CCNC: 35 U/L (ref 15–37)
BASE EXCESS BLDV CALC-SCNC: 2.5 MMOL/L (ref -3–3)
BASOPHILS # BLD: 0 K/UL (ref 0–0.2)
BASOPHILS NFR BLD: 0 %
BILIRUB SERPL-MCNC: 0.7 MG/DL (ref 0–1)
BUN SERPL-MCNC: 14 MG/DL (ref 7–20)
BUN SERPL-MCNC: 16 MG/DL (ref 7–20)
CALCIUM SERPL-MCNC: 8 MG/DL (ref 8.3–10.6)
CALCIUM SERPL-MCNC: 8.2 MG/DL (ref 8.3–10.6)
CHLORIDE SERPL-SCNC: 100 MMOL/L (ref 99–110)
CHLORIDE SERPL-SCNC: 101 MMOL/L (ref 99–110)
CO2 BLDV-SCNC: 29 MMOL/L
CO2 SERPL-SCNC: 27 MMOL/L (ref 21–32)
CO2 SERPL-SCNC: 28 MMOL/L (ref 21–32)
COHGB MFR BLDV: 1.6 % (ref 0–1.5)
CREAT SERPL-MCNC: 0.6 MG/DL (ref 0.8–1.3)
CREAT SERPL-MCNC: 0.6 MG/DL (ref 0.8–1.3)
DACRYOCYTES BLD QL SMEAR: ABNORMAL
DEPRECATED RDW RBC AUTO: 15.6 % (ref 12.4–15.4)
EKG ATRIAL RATE: 366 BPM
EKG DIAGNOSIS: NORMAL
EKG DIAGNOSIS: NORMAL
EKG Q-T INTERVAL: 366 MS
EKG Q-T INTERVAL: 408 MS
EKG QRS DURATION: 76 MS
EKG QRS DURATION: 80 MS
EKG QTC CALCULATION (BAZETT): 442 MS
EKG QTC CALCULATION (BAZETT): 507 MS
EKG R AXIS: 26 DEGREES
EKG R AXIS: 5 DEGREES
EKG T AXIS: 46 DEGREES
EKG T AXIS: 52 DEGREES
EKG VENTRICULAR RATE: 88 BPM
EKG VENTRICULAR RATE: 93 BPM
EOSINOPHIL # BLD: 0 K/UL (ref 0–0.6)
EOSINOPHIL NFR BLD: 0 %
GFR SERPLBLD CREATININE-BSD FMLA CKD-EPI: >90 ML/MIN/{1.73_M2}
GFR SERPLBLD CREATININE-BSD FMLA CKD-EPI: >90 ML/MIN/{1.73_M2}
GLUCOSE BLD-MCNC: 111 MG/DL (ref 70–99)
GLUCOSE BLD-MCNC: 83 MG/DL (ref 70–99)
GLUCOSE SERPL-MCNC: 118 MG/DL (ref 70–99)
GLUCOSE SERPL-MCNC: 97 MG/DL (ref 70–99)
HCO3 BLDV-SCNC: 27.3 MMOL/L (ref 23–29)
HCT VFR BLD AUTO: 29.3 % (ref 40.5–52.5)
HGB BLD-MCNC: 9.8 G/DL (ref 13.5–17.5)
HYPOCHROMIA BLD QL SMEAR: ABNORMAL
INR PPP: 2.48 (ref 0.86–1.14)
LACTATE BLDV-SCNC: 2 MMOL/L (ref 0.4–2)
LYMPHOCYTES # BLD: 2.3 K/UL (ref 1–5.1)
LYMPHOCYTES NFR BLD: 24 %
MCH RBC QN AUTO: 30.6 PG (ref 26–34)
MCHC RBC AUTO-ENTMCNC: 33.3 G/DL (ref 31–36)
MCV RBC AUTO: 91.7 FL (ref 80–100)
METHGB MFR BLDV: 0.3 %
MONOCYTES # BLD: 0.4 K/UL (ref 0–1.3)
MONOCYTES NFR BLD: 4 %
NEUTROPHILS # BLD: 7 K/UL (ref 1.7–7.7)
NEUTROPHILS NFR BLD: 70 %
NEUTS BAND NFR BLD MANUAL: 2 % (ref 0–7)
NT-PROBNP SERPL-MCNC: 5731 PG/ML (ref 0–449)
O2 CT VFR BLDV CALC: 12 VOL %
O2 THERAPY: ABNORMAL
PCO2 BLDV: 42.8 MMHG (ref 40–50)
PERFORMED ON: ABNORMAL
PERFORMED ON: NORMAL
PH BLDV: 7.42 [PH] (ref 7.35–7.45)
PHOSPHATE SERPL-MCNC: 2.2 MG/DL (ref 2.5–4.9)
PLATELET # BLD AUTO: 117 K/UL (ref 135–450)
PLATELET BLD QL SMEAR: ABNORMAL
PMV BLD AUTO: 8.5 FL (ref 5–10.5)
PO2 BLDV: 50.3 MMHG (ref 25–40)
POIKILOCYTOSIS BLD QL SMEAR: ABNORMAL
POLYCHROMASIA BLD QL SMEAR: ABNORMAL
POTASSIUM SERPL-SCNC: 3.7 MMOL/L (ref 3.5–5.1)
POTASSIUM SERPL-SCNC: 3.8 MMOL/L (ref 3.5–5.1)
PROT SERPL-MCNC: 6.4 G/DL (ref 6.4–8.2)
PROTHROMBIN TIME: 26.4 SEC (ref 12.1–14.9)
RBC # BLD AUTO: 3.19 M/UL (ref 4.2–5.9)
SAO2 % BLDV: 86 %
SLIDE REVIEW: ABNORMAL
SODIUM SERPL-SCNC: 139 MMOL/L (ref 136–145)
SODIUM SERPL-SCNC: 139 MMOL/L (ref 136–145)
TROPONIN, HIGH SENSITIVITY: 41 NG/L (ref 0–22)
TROPONIN, HIGH SENSITIVITY: 41 NG/L (ref 0–22)
TROPONIN, HIGH SENSITIVITY: 42 NG/L (ref 0–22)
WBC # BLD AUTO: 9.7 K/UL (ref 4–11)

## 2025-06-22 PROCEDURE — 71045 X-RAY EXAM CHEST 1 VIEW: CPT

## 2025-06-22 PROCEDURE — 82803 BLOOD GASES ANY COMBINATION: CPT

## 2025-06-22 PROCEDURE — 99223 1ST HOSP IP/OBS HIGH 75: CPT | Performed by: INTERNAL MEDICINE

## 2025-06-22 PROCEDURE — 93005 ELECTROCARDIOGRAM TRACING: CPT | Performed by: NURSE PRACTITIONER

## 2025-06-22 PROCEDURE — 83880 ASSAY OF NATRIURETIC PEPTIDE: CPT

## 2025-06-22 PROCEDURE — 94761 N-INVAS EAR/PLS OXIMETRY MLT: CPT

## 2025-06-22 PROCEDURE — 96374 THER/PROPH/DIAG INJ IV PUSH: CPT

## 2025-06-22 PROCEDURE — 6370000000 HC RX 637 (ALT 250 FOR IP): Performed by: INTERNAL MEDICINE

## 2025-06-22 PROCEDURE — 85025 COMPLETE CBC W/AUTO DIFF WBC: CPT

## 2025-06-22 PROCEDURE — 2700000000 HC OXYGEN THERAPY PER DAY

## 2025-06-22 PROCEDURE — 93010 ELECTROCARDIOGRAM REPORT: CPT | Performed by: INTERNAL MEDICINE

## 2025-06-22 PROCEDURE — 83605 ASSAY OF LACTIC ACID: CPT

## 2025-06-22 PROCEDURE — 84484 ASSAY OF TROPONIN QUANT: CPT

## 2025-06-22 PROCEDURE — 99285 EMERGENCY DEPT VISIT HI MDM: CPT

## 2025-06-22 PROCEDURE — 6360000002 HC RX W HCPCS: Performed by: INTERNAL MEDICINE

## 2025-06-22 PROCEDURE — 1200000000 HC SEMI PRIVATE

## 2025-06-22 PROCEDURE — 2500000003 HC RX 250 WO HCPCS: Performed by: INTERNAL MEDICINE

## 2025-06-22 PROCEDURE — 85610 PROTHROMBIN TIME: CPT

## 2025-06-22 PROCEDURE — 87081 CULTURE SCREEN ONLY: CPT

## 2025-06-22 PROCEDURE — 6360000002 HC RX W HCPCS: Performed by: STUDENT IN AN ORGANIZED HEALTH CARE EDUCATION/TRAINING PROGRAM

## 2025-06-22 PROCEDURE — 93005 ELECTROCARDIOGRAM TRACING: CPT | Performed by: STUDENT IN AN ORGANIZED HEALTH CARE EDUCATION/TRAINING PROGRAM

## 2025-06-22 PROCEDURE — 36415 COLL VENOUS BLD VENIPUNCTURE: CPT

## 2025-06-22 PROCEDURE — 80053 COMPREHEN METABOLIC PANEL: CPT

## 2025-06-22 RX ORDER — FUROSEMIDE 10 MG/ML
40 INJECTION INTRAMUSCULAR; INTRAVENOUS ONCE
Status: COMPLETED | OUTPATIENT
Start: 2025-06-22 | End: 2025-06-22

## 2025-06-22 RX ORDER — CETIRIZINE HYDROCHLORIDE 10 MG/1
5 TABLET ORAL DAILY
Status: DISCONTINUED | OUTPATIENT
Start: 2025-06-22 | End: 2025-06-29 | Stop reason: HOSPADM

## 2025-06-22 RX ORDER — LORAZEPAM 1 MG/1
1 TABLET ORAL EVERY 6 HOURS PRN
Status: DISCONTINUED | OUTPATIENT
Start: 2025-06-22 | End: 2025-06-24

## 2025-06-22 RX ORDER — ENOXAPARIN SODIUM 100 MG/ML
40 INJECTION SUBCUTANEOUS DAILY
Status: DISCONTINUED | OUTPATIENT
Start: 2025-06-22 | End: 2025-06-22 | Stop reason: ALTCHOICE

## 2025-06-22 RX ORDER — ESCITALOPRAM OXALATE 10 MG/1
10 TABLET ORAL DAILY
Status: DISCONTINUED | OUTPATIENT
Start: 2025-06-22 | End: 2025-06-29 | Stop reason: HOSPADM

## 2025-06-22 RX ORDER — LEVETIRACETAM 100 MG/ML
750 SOLUTION ORAL 2 TIMES DAILY
Status: DISCONTINUED | OUTPATIENT
Start: 2025-06-22 | End: 2025-06-29 | Stop reason: HOSPADM

## 2025-06-22 RX ORDER — SODIUM CHLORIDE 9 MG/ML
INJECTION, SOLUTION INTRAVENOUS PRN
Status: DISCONTINUED | OUTPATIENT
Start: 2025-06-22 | End: 2025-06-29 | Stop reason: HOSPADM

## 2025-06-22 RX ORDER — SODIUM CHLORIDE 0.9 % (FLUSH) 0.9 %
5-40 SYRINGE (ML) INJECTION EVERY 12 HOURS SCHEDULED
Status: DISCONTINUED | OUTPATIENT
Start: 2025-06-22 | End: 2025-06-29 | Stop reason: HOSPADM

## 2025-06-22 RX ORDER — MEMANTINE HYDROCHLORIDE 5 MG/1
10 TABLET ORAL 2 TIMES DAILY
Status: DISCONTINUED | OUTPATIENT
Start: 2025-06-22 | End: 2025-06-29 | Stop reason: HOSPADM

## 2025-06-22 RX ORDER — IPRATROPIUM BROMIDE 42 UG/1
2 SPRAY, METERED NASAL 4 TIMES DAILY PRN
Status: DISCONTINUED | OUTPATIENT
Start: 2025-06-22 | End: 2025-06-29 | Stop reason: HOSPADM

## 2025-06-22 RX ORDER — ONDANSETRON 4 MG/1
4 TABLET, ORALLY DISINTEGRATING ORAL EVERY 8 HOURS PRN
Status: DISCONTINUED | OUTPATIENT
Start: 2025-06-22 | End: 2025-06-29 | Stop reason: HOSPADM

## 2025-06-22 RX ORDER — LANSOPRAZOLE 30 MG/1
30 TABLET, ORALLY DISINTEGRATING, DELAYED RELEASE ORAL
Status: DISCONTINUED | OUTPATIENT
Start: 2025-06-23 | End: 2025-06-29 | Stop reason: HOSPADM

## 2025-06-22 RX ORDER — ASPIRIN 81 MG/1
81 TABLET, CHEWABLE ORAL DAILY
Status: DISCONTINUED | OUTPATIENT
Start: 2025-06-22 | End: 2025-06-29 | Stop reason: HOSPADM

## 2025-06-22 RX ORDER — LACTOBACILLUS RHAMNOSUS GG 10B CELL
1 CAPSULE ORAL
Status: DISCONTINUED | OUTPATIENT
Start: 2025-06-23 | End: 2025-06-29 | Stop reason: HOSPADM

## 2025-06-22 RX ORDER — POTASSIUM CHLORIDE 1500 MG/1
40 TABLET, EXTENDED RELEASE ORAL PRN
Status: DISCONTINUED | OUTPATIENT
Start: 2025-06-22 | End: 2025-06-29 | Stop reason: HOSPADM

## 2025-06-22 RX ORDER — ATROPINE SULFATE 10 MG/ML
1 SOLUTION/ DROPS OPHTHALMIC 4 TIMES DAILY PRN
Status: DISCONTINUED | OUTPATIENT
Start: 2025-06-22 | End: 2025-06-29 | Stop reason: HOSPADM

## 2025-06-22 RX ORDER — LACOSAMIDE 50 MG/1
100 TABLET ORAL 2 TIMES DAILY
Status: DISCONTINUED | OUTPATIENT
Start: 2025-06-22 | End: 2025-06-29 | Stop reason: HOSPADM

## 2025-06-22 RX ORDER — FINASTERIDE 5 MG/1
5 TABLET, FILM COATED ORAL DAILY
Status: DISCONTINUED | OUTPATIENT
Start: 2025-06-22 | End: 2025-06-29 | Stop reason: HOSPADM

## 2025-06-22 RX ORDER — CARBIDOPA AND LEVODOPA 25; 100 MG/1; MG/1
1 TABLET ORAL 3 TIMES DAILY
Status: DISCONTINUED | OUTPATIENT
Start: 2025-06-22 | End: 2025-06-29 | Stop reason: HOSPADM

## 2025-06-22 RX ORDER — MAGNESIUM SULFATE IN WATER 40 MG/ML
2000 INJECTION, SOLUTION INTRAVENOUS PRN
Status: DISCONTINUED | OUTPATIENT
Start: 2025-06-22 | End: 2025-06-29 | Stop reason: HOSPADM

## 2025-06-22 RX ORDER — POTASSIUM CHLORIDE 7.45 MG/ML
10 INJECTION INTRAVENOUS PRN
Status: DISCONTINUED | OUTPATIENT
Start: 2025-06-22 | End: 2025-06-29 | Stop reason: HOSPADM

## 2025-06-22 RX ORDER — IPRATROPIUM BROMIDE AND ALBUTEROL SULFATE 2.5; .5 MG/3ML; MG/3ML
1 SOLUTION RESPIRATORY (INHALATION) EVERY 6 HOURS PRN
Status: DISCONTINUED | OUTPATIENT
Start: 2025-06-22 | End: 2025-06-29 | Stop reason: HOSPADM

## 2025-06-22 RX ORDER — ONDANSETRON 2 MG/ML
4 INJECTION INTRAMUSCULAR; INTRAVENOUS EVERY 6 HOURS PRN
Status: DISCONTINUED | OUTPATIENT
Start: 2025-06-22 | End: 2025-06-29 | Stop reason: HOSPADM

## 2025-06-22 RX ORDER — FERROUS SULFATE 300 MG/5ML
300 LIQUID (ML) ORAL 2 TIMES DAILY
Status: DISCONTINUED | OUTPATIENT
Start: 2025-06-22 | End: 2025-06-29 | Stop reason: HOSPADM

## 2025-06-22 RX ORDER — POLYETHYLENE GLYCOL 3350 17 G/17G
17 POWDER, FOR SOLUTION ORAL DAILY PRN
Status: DISCONTINUED | OUTPATIENT
Start: 2025-06-22 | End: 2025-06-29 | Stop reason: HOSPADM

## 2025-06-22 RX ORDER — DILTIAZEM HCL 60 MG
30 TABLET ORAL 2 TIMES DAILY
Status: DISCONTINUED | OUTPATIENT
Start: 2025-06-22 | End: 2025-06-24

## 2025-06-22 RX ORDER — ACETAMINOPHEN 650 MG/1
650 SUPPOSITORY RECTAL EVERY 6 HOURS PRN
Status: DISCONTINUED | OUTPATIENT
Start: 2025-06-22 | End: 2025-06-29 | Stop reason: HOSPADM

## 2025-06-22 RX ORDER — ARIPIPRAZOLE 10 MG/1
5 TABLET ORAL DAILY
Status: DISCONTINUED | OUTPATIENT
Start: 2025-06-22 | End: 2025-06-29 | Stop reason: HOSPADM

## 2025-06-22 RX ORDER — SODIUM CHLORIDE 0.9 % (FLUSH) 0.9 %
5-40 SYRINGE (ML) INJECTION PRN
Status: DISCONTINUED | OUTPATIENT
Start: 2025-06-22 | End: 2025-06-29 | Stop reason: HOSPADM

## 2025-06-22 RX ORDER — ACETAMINOPHEN 325 MG/1
650 TABLET ORAL EVERY 6 HOURS PRN
Status: DISCONTINUED | OUTPATIENT
Start: 2025-06-22 | End: 2025-06-29 | Stop reason: HOSPADM

## 2025-06-22 RX ORDER — RIBOFLAVIN (VITAMIN B2) 100 MG
200 TABLET ORAL 2 TIMES DAILY
Status: DISCONTINUED | OUTPATIENT
Start: 2025-06-22 | End: 2025-06-22 | Stop reason: RX

## 2025-06-22 RX ORDER — MONTELUKAST SODIUM 10 MG/1
10 TABLET ORAL NIGHTLY
Status: DISCONTINUED | OUTPATIENT
Start: 2025-06-22 | End: 2025-06-29 | Stop reason: HOSPADM

## 2025-06-22 RX ORDER — ATORVASTATIN CALCIUM 40 MG/1
80 TABLET, FILM COATED ORAL NIGHTLY
Status: DISCONTINUED | OUTPATIENT
Start: 2025-06-22 | End: 2025-06-29 | Stop reason: HOSPADM

## 2025-06-22 RX ORDER — FUROSEMIDE 10 MG/ML
20 INJECTION INTRAMUSCULAR; INTRAVENOUS EVERY 6 HOURS
Status: DISPENSED | OUTPATIENT
Start: 2025-06-22 | End: 2025-06-24

## 2025-06-22 RX ORDER — MAGNESIUM HYDROXIDE/ALUMINUM HYDROXICE/SIMETHICONE 120; 1200; 1200 MG/30ML; MG/30ML; MG/30ML
10 SUSPENSION ORAL EVERY 4 HOURS PRN
Status: DISCONTINUED | OUTPATIENT
Start: 2025-06-22 | End: 2025-06-29 | Stop reason: HOSPADM

## 2025-06-22 RX ADMIN — Medication 750 MG: at 22:59

## 2025-06-22 RX ADMIN — CETIRIZINE HYDROCHLORIDE 5 MG: 10 TABLET ORAL at 18:47

## 2025-06-22 RX ADMIN — MEMANTINE 10 MG: 5 TABLET ORAL at 22:12

## 2025-06-22 RX ADMIN — POLYETHYLENE GLYCOL (3350) 17 G: 17 POWDER, FOR SOLUTION ORAL at 22:12

## 2025-06-22 RX ADMIN — CARBIDOPA AND LEVODOPA 1 TABLET: 25; 100 TABLET ORAL at 22:11

## 2025-06-22 RX ADMIN — ATROPINE SULFATE 1 DROP: 10 SOLUTION/ DROPS OPHTHALMIC at 18:44

## 2025-06-22 RX ADMIN — ATORVASTATIN CALCIUM 80 MG: 40 TABLET, FILM COATED ORAL at 22:12

## 2025-06-22 RX ADMIN — ALUMINUM HYDROXIDE, MAGNESIUM HYDROXIDE, AND SIMETHICONE 10 ML: 200; 200; 20 SUSPENSION ORAL at 18:45

## 2025-06-22 RX ADMIN — FUROSEMIDE 40 MG: 10 INJECTION, SOLUTION INTRAMUSCULAR; INTRAVENOUS at 08:09

## 2025-06-22 RX ADMIN — ESCITALOPRAM OXALATE 10 MG: 10 TABLET ORAL at 18:48

## 2025-06-22 RX ADMIN — LACOSAMIDE 100 MG: 50 TABLET, FILM COATED ORAL at 22:11

## 2025-06-22 RX ADMIN — ASPIRIN 81 MG: 81 TABLET, CHEWABLE ORAL at 18:47

## 2025-06-22 RX ADMIN — ARIPIPRAZOLE 5 MG: 10 TABLET ORAL at 18:47

## 2025-06-22 RX ADMIN — Medication 6 MG: at 22:12

## 2025-06-22 RX ADMIN — APIXABAN 5 MG: 5 TABLET, FILM COATED ORAL at 22:11

## 2025-06-22 RX ADMIN — FINASTERIDE 5 MG: 5 TABLET, FILM COATED ORAL at 18:47

## 2025-06-22 RX ADMIN — MONTELUKAST SODIUM 10 MG: 10 TABLET, COATED ORAL at 22:11

## 2025-06-22 RX ADMIN — FUROSEMIDE 20 MG: 10 INJECTION, SOLUTION INTRAMUSCULAR; INTRAVENOUS at 18:47

## 2025-06-22 RX ADMIN — Medication 10 ML: at 22:13

## 2025-06-22 ASSESSMENT — PAIN DESCRIPTION - DESCRIPTORS: DESCRIPTORS: SHARP

## 2025-06-22 ASSESSMENT — LIFESTYLE VARIABLES
HOW OFTEN DO YOU HAVE A DRINK CONTAINING ALCOHOL: NEVER
HOW OFTEN DO YOU HAVE A DRINK CONTAINING ALCOHOL: NEVER
HOW MANY STANDARD DRINKS CONTAINING ALCOHOL DO YOU HAVE ON A TYPICAL DAY: PATIENT DOES NOT DRINK

## 2025-06-22 ASSESSMENT — PAIN SCALES - GENERAL
PAINLEVEL_OUTOF10: 0
PAINLEVEL_OUTOF10: 8

## 2025-06-22 ASSESSMENT — PAIN DESCRIPTION - PAIN TYPE: TYPE: ACUTE PAIN

## 2025-06-22 ASSESSMENT — PAIN DESCRIPTION - LOCATION: LOCATION: CHEST

## 2025-06-22 ASSESSMENT — PAIN DESCRIPTION - FREQUENCY: FREQUENCY: CONTINUOUS

## 2025-06-22 ASSESSMENT — PAIN DESCRIPTION - ONSET: ONSET: ON-GOING

## 2025-06-22 ASSESSMENT — PAIN DESCRIPTION - ORIENTATION: ORIENTATION: UPPER;LEFT;RIGHT;ANTERIOR

## 2025-06-22 ASSESSMENT — PAIN - FUNCTIONAL ASSESSMENT: PAIN_FUNCTIONAL_ASSESSMENT: PREVENTS OR INTERFERES SOME ACTIVE ACTIVITIES AND ADLS

## 2025-06-22 NOTE — PROGRESS NOTES
Patient is able to demonstrate the ability to move from a reclining position to an upright position within the recliner.     Bedside Mobility Assessment Tool (BMAT):     Assessment Level 1- Sit and Shake    1. From a semi-reclined position, ask patient to sit up and rotate to a seated position at the side of the bed. Can use the bedrail.    2. Ask patient to reach out and grab your hand and shake making sure patient reaches across his/her midline.   Pass- Patient is able to come to a seated position, maintain core strength. Maintains seated balance while reaching across midline. Move on to Assessment Level 2.     Assessment Level 2- Stretch and Point   1. With patient in seated position at the side of the bed, have patient place both feet on the floor (or stool) with knees no higher than hips.    2. Ask patient to stretch one leg and straighten the knee, then bend the ankle/flex and point the toes. If appropriate, repeat with the other leg.   Pass- Patient is able to demonstrate appropriate quad strength on intended weight bearing limb(s). Move onto Assessment Level 3.     Assessment Level 3- Stand   1. Ask patient to elevate off the bed or chair (seated to standing) using an assistive device (cane, bedrail).    2. Patient should be able to raise buttocks off be and hold for a count of five. May repeat once.   Pass- Patient maintains standing stability for at least 5 seconds, proceed to assessment level 4.    Assessment Level 4- Walk   1. Ask patient to march in place at bedside.    2. Then ask patient to advance step and return each foot. Some medical conditions may render a patient from stepping backwards, use your best clinical judgement.   Fail- Patient not able to complete tasks OR requires use of assistive device. Patient is MOBILITY LEVEL 3.       Mobility Level- 3

## 2025-06-22 NOTE — ED NOTES
Rodolfo Luna is a 79 y.o. male admitted for  Principal Problem:    Pulmonary edema cardiac cause (HCC)  Resolved Problems:    * No resolved hospital problems. *  .   Patient Home via EMS transportation with   Chief Complaint   Patient presents with    Shortness of Breath     Pt arrives via EMS with c/o of SOB for 2 weeks. Home health nurse came to his house and gave him water pill. Pt has been wearing 2-3Lpm of oxygen for 2 weeks now. Noted left arm swelling and bilateral lower leg pitting edema    .  Patient is alert and Person, Place, Time, and Situation  Patient's baseline mobility: Baseline Mobility: Walker  Code Status: Full Code   Cardiac Rhythm:       Is patient on baseline Oxygen: yes, NC how many Liters2:   Abnormal Assessment Findings: LUE swelling, BLE swelling - ace wraps placed per Dr. Durant.     Isolation: None      NIH Score:    C-SSRS: Risk of Suicide: No Risk  Bedside swallow:        Active LDA's:   Peripheral IV 06/22/25 Posterior;Right Wrist (Active)     Patient admitted with a abbasi: no   Patient admitted with Central Line:  NA .       Family/Caregiver Present no Any Concerns: no   Restraints no  Sitter no         Vitals:      Vitals:    06/22/25 0636 06/22/25 0639 06/22/25 0700 06/22/25 0730   BP:  113/68 114/77 118/66   Pulse: 89  97 89   Resp: 20  23 18   Temp: 99.2 °F (37.3 °C)      TempSrc: Oral      SpO2: 96%  97% 97%   Weight:  75.8 kg (167 lb)     Height:  1.753 m (5' 9\")         Last documented pain score (0-10 scale)    Pain medication administered No.    Pertinent or High Risk Medications/Drips: No.    Pending Blood Product Administration: no    Abnormal labs:   Abnormal Labs Reviewed   CBC WITH AUTO DIFFERENTIAL - Abnormal; Notable for the following components:       Result Value    RBC 3.19 (*)     Hemoglobin 9.8 (*)     Hematocrit 29.3 (*)     RDW 15.6 (*)     Platelets 117 (*)     Polychromasia Occasional (*)     Hypochromia Occasional (*)     Poikilocytes Occasional (*)

## 2025-06-22 NOTE — ED NOTES
Stop BiPAP and place on NC per ANDREI Bucio.       Midline just placed, fluids started. Cultures obtained, per ANDREI Bucio, obtain second set of cultures before starting antibiotics, unable to obtain until now. Will start antibiotics.

## 2025-06-22 NOTE — PROGRESS NOTES
4 Eyes Skin Assessment     NAME:  Rodolfo Luna  YOB: 1945  MEDICAL RECORD NUMBER:  [id number]    The patient is being assessed for  Admission    I agree that at least one RN has performed a thorough Head to Toe Skin Assessment on the patient. ALL assessment sites listed below have been assessed.      Areas assessed by both nurses:    -Scattered bruising and abrasions.  -blanchable redness around PEG site.    Head, Face, Ears, Shoulders, Back, Chest, Arms, Elbows, Hands, Sacrum. Buttock, Coccyx, Ischium, Legs. Feet and Heels, and Under Medical Devices         Does the Patient have a Wound? No noted wound(s)       Jorge Prevention initiated by RN: No  Wound Care Orders initiated by RN: No    Pressure Injury (Stage 3,4, Unstageable, DTI, NWPT, and Complex wounds) if present, place Wound referral order by RN under : No    New Ostomies, if present place, Ostomy referral order under : No     Nurse 1 eSignature: Electronically signed by Mariel Olmedo RN on 6/22/25 at 7:07 PM EDT    **SHARE this note so that the co-signing nurse can place an eSignature**    Nurse 2 eSignature: Electronically signed by NGA FREDERICK RN on 6/22/25 at 7:27 PM EDT

## 2025-06-22 NOTE — PROGRESS NOTES
PHARMACY NOTE  Patient was ordered Riboflavin 200 mg.  Per the Formulary Committee, this medication is non-formulary and not stocked by pharmacy.  The medication can be reordered at discharge.   FREDY AaronPh.6/22/20251:50 PM

## 2025-06-22 NOTE — H&P
Hospital Medicine History & Physical      PCP: Carlton Leavitt, APRN - CNP    Date of Admission: 6/22/2025    Date of Service: Pt seen/examined on 06/22/25      Chief Complaint:    Chief Complaint   Patient presents with    Shortness of Breath     Pt arrives via EMS with c/o of SOB for 2 weeks. Home health nurse came to his house and gave him water pill. Pt has been wearing 2-3Lpm of oxygen for 2 weeks now. Noted left arm swelling and bilateral lower leg pitting edema          History Of Present Illness:      The patient is a 79 y.o. male with PMH of afib, s/p watchman procedure, anxiety, Parkinson's disease, Dysphagia, s/p Peg tube placement, CAD s/p PCI, seizures, who presents to [facility] with shortness of breath and BLE edema.     History obtained from the patient and review of EMR.     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    S/P drug eluting coronary stent placement 03/06/2017    2.25 x 18 Xience Alpine ADRIÁN - Distal LAD    Seizure disorder ([facility])     Abnormal EEG with left temporal shapr waves    Seizures ([facility])     Shortness of breath 09/04/2014    Total knee replacement status 01/12/2011       Past Surgical History:        Procedure Laterality Date    ABLATION OF DYSRHYTHMIC FOCUS  9/2014    CARPAL TUNNEL RELEASE      CERVICAL DISC SURGERY  2010    COLONOSCOPY  09/26/2016    normal    CORONARY ANGIOPLASTY WITH STENT PLACEMENT      ESOPHAGEAL DILATATION N/A 7/5/2024    ESOPHAGEAL DILATION KATHRYN

## 2025-06-22 NOTE — ED PROVIDER NOTES
Emergency Department Provider Note  Location: [facility] EMERGENCY DEPARTMENT  6/22/2025     Patient Identification  Rodolfo Luna is a 79 y.o. male    Chief Complaint  Shortness of Breath (Pt arrives via EMS with c/o of SOB for 2 weeks. Home health nurse came to his house and gave him water pill. Pt has been wearing 2-3Lpm of oxygen for 2 weeks now. Noted left arm swelling and bilateral lower leg pitting edema )      Mode of Arrival  EMS    HPI  (History provided by patient)  This is a 79 y.o. male with a PMH significant for hypertension, CAD, Parkinson's, atrial fibrillation presented today for shortness of breath.  Symptoms have progressed over the last 2 weeks.  He has been wearing 2 to 3 L of oxygen for the last 2 weeks.  He is endorsing bilateral leg swelling.  He reports a dry cough.  Denies any fever.  Denies any chest pain.  Patient reports feeling bloated but denies any abdominal pain.  Denies any vomiting.    ROS  Review of Systems   Respiratory:  Positive for shortness of breath.    All other systems reviewed and are negative.        I have reviewed the following nursing documentation:  Allergies:   Allergies   Allergen Reactions    Cefuroxime Axetil      Other reaction(s): Hives    Lisinopril      Other reaction(s): Other (See Comments), unknown/H&P    Morphine      Bad sweats  Other reaction(s): GI upset, Other (See Comments), shaking  Bad sweats  Bad sweats      Rivaroxaban      Other reaction(s): Coordination problem, Other (See Comments)    Apixaban      hot flashes and stomach pain and he is peeing more than usual    Clopidogrel Rash    Codeine Anxiety, Nausea Only and Rash     dizzy  Other reaction(s): Nausea/ Sweaty, Other (See Comments)  dizzy      Penicillins Nausea And Vomiting and Rash     Other reaction(s): Dizzy, Other (See Comments), shaking    Plavix [Clopidogrel Bisulfate] Rash       Past medical history:  has a past medical history of A-fib (HCC), MIRNA (acute kidney injury)

## 2025-06-23 PROBLEM — I50.31 ACUTE DIASTOLIC CHF (CONGESTIVE HEART FAILURE) (HCC): Status: ACTIVE | Noted: 2025-06-23

## 2025-06-23 LAB
ALBUMIN SERPL-MCNC: 2.4 G/DL (ref 3.4–5)
ALBUMIN SERPL-MCNC: 2.8 G/DL (ref 3.4–5)
ANION GAP SERPL CALCULATED.3IONS-SCNC: 14 MMOL/L (ref 3–16)
ANION GAP SERPL CALCULATED.3IONS-SCNC: 15 MMOL/L (ref 3–16)
BASOPHILS # BLD: 0 K/UL (ref 0–0.2)
BASOPHILS NFR BLD: 0 %
BUN SERPL-MCNC: 14 MG/DL (ref 7–20)
BUN SERPL-MCNC: 16 MG/DL (ref 7–20)
CALCIUM SERPL-MCNC: 8.3 MG/DL (ref 8.3–10.6)
CALCIUM SERPL-MCNC: 8.5 MG/DL (ref 8.3–10.6)
CHLORIDE SERPL-SCNC: 96 MMOL/L (ref 99–110)
CHLORIDE SERPL-SCNC: 97 MMOL/L (ref 99–110)
CO2 SERPL-SCNC: 22 MMOL/L (ref 21–32)
CO2 SERPL-SCNC: 27 MMOL/L (ref 21–32)
CREAT SERPL-MCNC: 0.6 MG/DL (ref 0.8–1.3)
CREAT SERPL-MCNC: 0.6 MG/DL (ref 0.8–1.3)
DACRYOCYTES BLD QL SMEAR: ABNORMAL
DEPRECATED RDW RBC AUTO: 16.2 % (ref 12.4–15.4)
EOSINOPHIL # BLD: 0 K/UL (ref 0–0.6)
EOSINOPHIL NFR BLD: 0 %
GFR SERPLBLD CREATININE-BSD FMLA CKD-EPI: >90 ML/MIN/{1.73_M2}
GFR SERPLBLD CREATININE-BSD FMLA CKD-EPI: >90 ML/MIN/{1.73_M2}
GLUCOSE BLD-MCNC: 58 MG/DL (ref 70–99)
GLUCOSE BLD-MCNC: 67 MG/DL (ref 70–99)
GLUCOSE BLD-MCNC: 82 MG/DL (ref 70–99)
GLUCOSE BLD-MCNC: 89 MG/DL (ref 70–99)
GLUCOSE BLD-MCNC: 90 MG/DL (ref 70–99)
GLUCOSE SERPL-MCNC: 72 MG/DL (ref 70–99)
GLUCOSE SERPL-MCNC: 89 MG/DL (ref 70–99)
HCT VFR BLD AUTO: 31.8 % (ref 40.5–52.5)
HGB BLD-MCNC: 10.5 G/DL (ref 13.5–17.5)
LYMPHOCYTES # BLD: 1.3 K/UL (ref 1–5.1)
LYMPHOCYTES NFR BLD: 13 %
MCH RBC QN AUTO: 30.9 PG (ref 26–34)
MCHC RBC AUTO-ENTMCNC: 33 G/DL (ref 31–36)
MCV RBC AUTO: 93.7 FL (ref 80–100)
MONOCYTES # BLD: 1.5 K/UL (ref 0–1.3)
MONOCYTES NFR BLD: 15 %
MYELOCYTES NFR BLD MANUAL: 1 %
NEUTROPHILS # BLD: 7.1 K/UL (ref 1.7–7.7)
NEUTROPHILS NFR BLD: 70 %
NEUTS BAND NFR BLD MANUAL: 1 % (ref 0–7)
NEUTS VAC BLD QL SMEAR: PRESENT
OVALOCYTES BLD QL SMEAR: ABNORMAL
PERFORMED ON: ABNORMAL
PERFORMED ON: ABNORMAL
PERFORMED ON: NORMAL
PHOSPHATE SERPL-MCNC: 2.8 MG/DL (ref 2.5–4.9)
PHOSPHATE SERPL-MCNC: 3 MG/DL (ref 2.5–4.9)
PLATELET # BLD AUTO: 116 K/UL (ref 135–450)
PLATELET BLD QL SMEAR: ABNORMAL
PMV BLD AUTO: 9.3 FL (ref 5–10.5)
POIKILOCYTOSIS BLD QL SMEAR: ABNORMAL
POTASSIUM SERPL-SCNC: 3.6 MMOL/L (ref 3.5–5.1)
POTASSIUM SERPL-SCNC: 4.1 MMOL/L (ref 3.5–5.1)
POTASSIUM SERPL-SCNC: 4.1 MMOL/L (ref 3.5–5.1)
RBC # BLD AUTO: 3.39 M/UL (ref 4.2–5.9)
SCHISTOCYTES BLD QL SMEAR: ABNORMAL
SLIDE REVIEW: ABNORMAL
SODIUM SERPL-SCNC: 134 MMOL/L (ref 136–145)
SODIUM SERPL-SCNC: 137 MMOL/L (ref 136–145)
TOXIC GRANULES BLD QL SMEAR: PRESENT
WBC # BLD AUTO: 9.9 K/UL (ref 4–11)

## 2025-06-23 PROCEDURE — 97162 PT EVAL MOD COMPLEX 30 MIN: CPT

## 2025-06-23 PROCEDURE — 94761 N-INVAS EAR/PLS OXIMETRY MLT: CPT

## 2025-06-23 PROCEDURE — 36415 COLL VENOUS BLD VENIPUNCTURE: CPT

## 2025-06-23 PROCEDURE — 97110 THERAPEUTIC EXERCISES: CPT

## 2025-06-23 PROCEDURE — 85025 COMPLETE CBC W/AUTO DIFF WBC: CPT

## 2025-06-23 PROCEDURE — 2500000003 HC RX 250 WO HCPCS: Performed by: INTERNAL MEDICINE

## 2025-06-23 PROCEDURE — 99233 SBSQ HOSP IP/OBS HIGH 50: CPT | Performed by: INTERNAL MEDICINE

## 2025-06-23 PROCEDURE — 97530 THERAPEUTIC ACTIVITIES: CPT

## 2025-06-23 PROCEDURE — 1200000000 HC SEMI PRIVATE

## 2025-06-23 PROCEDURE — 2580000003 HC RX 258: Performed by: INTERNAL MEDICINE

## 2025-06-23 PROCEDURE — 97166 OT EVAL MOD COMPLEX 45 MIN: CPT

## 2025-06-23 PROCEDURE — 6360000002 HC RX W HCPCS: Performed by: INTERNAL MEDICINE

## 2025-06-23 PROCEDURE — 2700000000 HC OXYGEN THERAPY PER DAY

## 2025-06-23 PROCEDURE — 6370000000 HC RX 637 (ALT 250 FOR IP): Performed by: INTERNAL MEDICINE

## 2025-06-23 PROCEDURE — 97116 GAIT TRAINING THERAPY: CPT

## 2025-06-23 PROCEDURE — 80069 RENAL FUNCTION PANEL: CPT

## 2025-06-23 RX ORDER — DEXTROSE MONOHYDRATE 100 MG/ML
INJECTION, SOLUTION INTRAVENOUS CONTINUOUS PRN
Status: DISCONTINUED | OUTPATIENT
Start: 2025-06-23 | End: 2025-06-29 | Stop reason: HOSPADM

## 2025-06-23 RX ORDER — SENNA AND DOCUSATE SODIUM 50; 8.6 MG/1; MG/1
1 TABLET, FILM COATED ORAL 2 TIMES DAILY
Status: DISCONTINUED | OUTPATIENT
Start: 2025-06-23 | End: 2025-06-29 | Stop reason: HOSPADM

## 2025-06-23 RX ORDER — GLUCAGON 1 MG/ML
1 KIT INJECTION PRN
Status: DISCONTINUED | OUTPATIENT
Start: 2025-06-23 | End: 2025-06-29 | Stop reason: HOSPADM

## 2025-06-23 RX ADMIN — CARBIDOPA AND LEVODOPA 1 TABLET: 25; 100 TABLET ORAL at 10:57

## 2025-06-23 RX ADMIN — POLYETHYLENE GLYCOL (3350) 17 G: 17 POWDER, FOR SOLUTION ORAL at 15:30

## 2025-06-23 RX ADMIN — ARIPIPRAZOLE 5 MG: 10 TABLET ORAL at 10:57

## 2025-06-23 RX ADMIN — ESCITALOPRAM OXALATE 10 MG: 10 TABLET ORAL at 11:03

## 2025-06-23 RX ADMIN — LANSOPRAZOLE 30 MG: 30 TABLET, ORALLY DISINTEGRATING, DELAYED RELEASE ORAL at 07:17

## 2025-06-23 RX ADMIN — LACOSAMIDE 100 MG: 50 TABLET, FILM COATED ORAL at 10:57

## 2025-06-23 RX ADMIN — ACETAMINOPHEN 650 MG: 325 TABLET ORAL at 10:57

## 2025-06-23 RX ADMIN — MEMANTINE 10 MG: 5 TABLET ORAL at 10:57

## 2025-06-23 RX ADMIN — CARBIDOPA AND LEVODOPA 1 TABLET: 25; 100 TABLET ORAL at 15:30

## 2025-06-23 RX ADMIN — FINASTERIDE 5 MG: 5 TABLET, FILM COATED ORAL at 10:57

## 2025-06-23 RX ADMIN — ASPIRIN 81 MG: 81 TABLET, CHEWABLE ORAL at 10:57

## 2025-06-23 RX ADMIN — FUROSEMIDE 20 MG: 10 INJECTION, SOLUTION INTRAMUSCULAR; INTRAVENOUS at 17:34

## 2025-06-23 RX ADMIN — CETIRIZINE HYDROCHLORIDE 5 MG: 10 TABLET ORAL at 10:57

## 2025-06-23 RX ADMIN — Medication 1 CAPSULE: at 10:57

## 2025-06-23 RX ADMIN — Medication 750 MG: at 11:02

## 2025-06-23 RX ADMIN — APIXABAN 5 MG: 5 TABLET, FILM COATED ORAL at 10:57

## 2025-06-23 RX ADMIN — DEXTROSE MONOHYDRATE 125 ML: 100 INJECTION, SOLUTION INTRAVENOUS at 07:48

## 2025-06-23 RX ADMIN — Medication 10 ML: at 10:58

## 2025-06-23 RX ADMIN — FUROSEMIDE 20 MG: 10 INJECTION, SOLUTION INTRAMUSCULAR; INTRAVENOUS at 10:56

## 2025-06-23 RX ADMIN — FUROSEMIDE 20 MG: 10 INJECTION, SOLUTION INTRAMUSCULAR; INTRAVENOUS at 02:18

## 2025-06-23 ASSESSMENT — PAIN DESCRIPTION - DESCRIPTORS: DESCRIPTORS: THROBBING

## 2025-06-23 ASSESSMENT — PAIN DESCRIPTION - LOCATION: LOCATION: HEAD

## 2025-06-23 ASSESSMENT — PAIN SCALES - GENERAL
PAINLEVEL_OUTOF10: 4
PAINLEVEL_OUTOF10: 6

## 2025-06-23 ASSESSMENT — PAIN DESCRIPTION - FREQUENCY: FREQUENCY: CONTINUOUS

## 2025-06-23 ASSESSMENT — PAIN DESCRIPTION - PAIN TYPE: TYPE: ACUTE PAIN

## 2025-06-23 ASSESSMENT — PAIN DESCRIPTION - ORIENTATION: ORIENTATION: ANTERIOR

## 2025-06-23 ASSESSMENT — PAIN DESCRIPTION - ONSET: ONSET: GRADUAL

## 2025-06-23 ASSESSMENT — PAIN - FUNCTIONAL ASSESSMENT: PAIN_FUNCTIONAL_ASSESSMENT: ACTIVITIES ARE NOT PREVENTED

## 2025-06-23 NOTE — PLAN OF CARE
Problem: Chronic Conditions and Co-morbidities  Goal: Patient's chronic conditions and co-morbidity symptoms are monitored and maintained or improved  Outcome: Progressing  Flowsheets (Taken 6/22/2025 2214)  Care Plan - Patient's Chronic Conditions and Co-Morbidity Symptoms are Monitored and Maintained or Improved: Monitor and assess patient's chronic conditions and comorbid symptoms for stability, deterioration, or improvement     Problem: Safety - Adult  Goal: Free from fall injury  Outcome: Progressing     Problem: Discharge Planning  Goal: Discharge to home or other facility with appropriate resources  Recent Flowsheet Documentation  Taken 6/22/2025 2214 by Justino Chen RN  Discharge to home or other facility with appropriate resources: Identify barriers to discharge with patient and caregiver     Problem: ABCDS Injury Assessment  Goal: Absence of physical injury  Recent Flowsheet Documentation  Taken 6/23/2025 0155 by Justino Chen RN  Absence of Physical Injury: Implement safety measures based on patient assessment     Problem: Respiratory - Adult  Goal: Achieves optimal ventilation and oxygenation  Recent Flowsheet Documentation  Taken 6/22/2025 2214 by Justino Chen RN  Achieves optimal ventilation and oxygenation: Assess for changes in respiratory status     Problem: Cardiovascular - Adult  Goal: Maintains optimal cardiac output and hemodynamic stability  Recent Flowsheet Documentation  Taken 6/22/2025 2214 by Justino Chen RN  Maintains optimal cardiac output and hemodynamic stability: Monitor blood pressure and heart rate  Goal: Absence of cardiac dysrhythmias or at baseline  Recent Flowsheet Documentation  Taken 6/22/2025 2214 by Justino Chen RN  Absence of cardiac dysrhythmias or at baseline: Monitor cardiac rate and rhythm     Problem: Skin/Tissue Integrity - Adult  Goal: Skin integrity remains intact  Recent Flowsheet Documentation  Taken 6/23/2025 0155 by Justino Chen RN  Skin

## 2025-06-23 NOTE — PROGRESS NOTES
Inpatient Physical Therapy Evaluation & Treatment    Unit: [facility]  Date:  6/23/2025  Patient Name:    Rodolfo Luan  Admitting diagnosis:  Shortness of breath [R06.02]  Normocytic anemia [D64.9]  Leg edema [R60.0]  Thrombocytopenia [D69.6]  Pulmonary edema cardiac cause (HCC) [I50.1]  Elevated troponin [R79.89]  Acute congestive heart failure, unspecified heart failure type (HCC) [I50.9]  Admit Date:  6/22/2025  Precautions/Restrictions/WB Status/ Lines/ Wounds/ Oxygen: Fall risk, Bed/chair alarm, Lines (IV), Telemetry, and Isolation Precautions: Contact      Treatment Time:  1010- 1101  Treatment Number:  1   Timed Code Treatment Minutes: 38 minutes  Total Treatment Minutes:  51  minutes    Patient Stated Goals for Therapy: none stated           Discharge Recommendations: SNF  DME needs for discharge: Defer to facility       Therapy recommendation for EMS Transport: can transport by wheelchair    Therapy recommendations for staff:   Assist of 1 for ambulation with use of rolling walker (RW) and gait belt to/from chair  to/from bathroom    History of Present Illness:  79 y.o. male with PMH of afib, s/p watchman procedure, anxiety, Parkinson's disease, Dysphagia, s/p Peg tube placement, CAD s/p PCI, seizures, who presents to [facility] with shortness of breath and BLE edema.      Acute Diastolic CHF  Chronic Pleural effusions  Recent wrist and lower arm edema  S/p aspiration   COPD no exacerbation      Preadmission Environment:   Pt lives with                                         with spouse  Home environment:                            one story home  Steps to enter first floor:                     2 steps to enter and Handrail unilateral  Steps to second floor/basement:        N/A  Laundry:                                              1st floor  Bathroom:                                           tub/shower unit, hand held shower head, grab bars in shower, shower chair , and raised toilet seat w/o arms  Pt  introduction to new information    []Requires intermittent cues  []Other:     Education provided via:  [x]Oral instruction  [x]Demonstration  []Written: None

## 2025-06-23 NOTE — PLAN OF CARE
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     Weights Reviewed: Yes   Admission weight: 75.8 kg (167 lb)   Wt Readings from Last 3 Encounters:   06/23/25 78.9 kg (174 lb)   06/11/25 79.3 kg (174 lb 12.8 oz)   06/02/25 78.5 kg (173 lb)     Intake & Output Reviewed: Yes     Intake/Output Summary (Last 24 hours) at 6/23/2025 1821  Last data filed at 6/23/2025 1730  Gross per 24 hour   Intake 495.42 ml   Output 2400 ml   Net -1904.58 ml       ECHOCARDIOGRAM Reviewed: Yes   Patient's Ejection Fraction (EF) is greater than 40%     Medications Reviewed: Yes   SCHEDULED HOSPITAL MEDICATIONS:   sennosides-docusate sodium  1 tablet PEG Tube BID    SMOG Enema  330 mL Rectal Once    apixaban  5 mg Per G Tube BID    ARIPiprazole  5 mg Per G Tube Daily    aspirin  81 mg Per G Tube Daily    atorvastatin  80 mg Oral Nightly    carbidopa-levodopa  1 tablet Per G Tube TID    cetirizine  5 mg Per G Tube Daily    [Held by provider] dilTIAZem  30 mg Per G Tube BID    escitalopram  10 mg Per G Tube Daily    [Held by provider] ferrous Sulfate  300 mg Per G Tube BID    finasteride  5 mg Per G Tube Daily    lacosamide  100 mg Oral BID    lactobacillus  1 capsule Per G Tube Daily with breakfast    levETIRAcetam  750 mg Per G Tube BID    memantine  10 mg Per G Tube BID    montelukast  10 mg Per G Tube Nightly    lansoprazole  30 mg Per G Tube QAM AC    sodium chloride flush   route 4 times daily as needed (chronic stuffy and runny nose)   Yes Kel Mckeon MD   montelukast (SINGULAIR) 10 MG tablet 1 tablet by Per G Tube route nightly   Yes Kel Mckeon MD   pantoprazole sodium (PROTONIX) 40 MG PACK packet 1 packet by Per G Tube route every morning (before breakfast) Mix one packet with apple juice/sauce   Yes Kel Mckeon MD   LORazepam (ATIVAN) 1 MG tablet Take 1 tablet by mouth every 6 hours as needed for Anxiety. Every morning as needed and 2 tabs at bedtime   Yes Kel Mckeon MD   lacosamide (VIMPAT) 100 MG TABS tablet Take 1 tablet by mouth 2 times daily for 360 days. Max Daily Amount: 200 mg 5/20/25 5/15/26 Yes Abraham Aquino MD   ipratropium 0.5 mg-albuterol 2.5 mg (DUONEB) 0.5-2.5 (3) MG/3ML SOLN nebulizer solution Inhale 3 mLs into the lungs every 6 hours as needed for Shortness of Breath or Wheezing 8/13/24  Yes Kel Mckeon MD   melatonin 3 MG TABS tablet Take 1 tablet by mouth nightly  Patient taking differently: Take 2 tablets by mouth nightly 5/21/24  Yes Jose Miguel Tan MD   atorvastatin (LIPITOR) 80 MG tablet Take 1 tablet by mouth nightly 4/5/24  Yes Christy Grover MD   cetirizine (ZYRTEC) 10 MG tablet 1 tablet by Per G Tube route daily 3/13/24  Yes Kel Mckeon MD   finasteride (PROSCAR) 5 MG tablet 1 tablet by Per G Tube route daily   Yes Kel Mckeon MD   vitamin B-2 (RIBOFLAVIN) 100 MG TABS tablet 2 tablets by Per G Tube route in the morning and at bedtime   Yes Kle Mckeon MD      Diet Reviewed: Yes   Diet NPO  ADULT TUBE FEEDING; PEG; Standard with Fiber; Continuous; 20; Yes; 20; Other (specify); every 2 hours as tolerated; 60; 30; Q 3 hours; Protein; 1 Dose; Daily    Goal of Care Reviewed: Yes   Patient and/or Family's stated Goal of Care this Admission: reduce lower extremity edema prior to discharge.     Electronically signed by Mariel Ortega RN on 6/23/2025 at 6:21 PM

## 2025-06-23 NOTE — PROGRESS NOTES
06/22/25 2000   RT Protocol   History Pulmonary Disease 1   Respiratory pattern 0   Breath sounds 2   Cough 0   Indications for Bronchodilator Therapy None   Bronchodilator Assessment Score 3     RT Inhaler-Nebulizer Bronchodilator Protocol Note    There is a bronchodilator order in the chart from a provider indicating to follow the RT Bronchodilator Protocol and there is an “Initiate RT Inhaler-Nebulizer Bronchodilator Protocol” order as well (see protocol at bottom of note).    CXR Findings:  XR CHEST PORTABLE  Result Date: 6/22/2025  Worsening pattern of pulmonary edema with bilateral pleural effusions.       The findings from the last RT Protocol Assessment were as follows:   History Pulmonary Disease: Smoker 15 pack years or more  Respiratory Pattern: Regular pattern and RR 12-20 bpm  Breath Sounds: Slightly diminished and/or crackles  Cough: Strong, spontaneous, non-productive  Indication for Bronchodilator Therapy: None  Bronchodilator Assessment Score: 3    Aerosolized bronchodilator medication orders have been revised according to the RT Inhaler-Nebulizer Bronchodilator Protocol below.    Respiratory Therapist to perform RT Therapy Protocol Assessment initially then follow the protocol.  Repeat RT Therapy Protocol Assessment PRN for score 0-3 or on second treatment, BID, and PRN for scores above 3.    No Indications - adjust the frequency to every 6 hours PRN wheezing or bronchospasm, if no treatments needed after 48 hours then discontinue using Per Protocol order mode.     If indication present, adjust the RT bronchodilator orders based on the Bronchodilator Assessment Score as indicated below.  Use Inhaler orders unless patient has one or more of the following: on home nebulizer, not able to hold breath for 10 seconds, is not alert and oriented, cannot activate and use MDI correctly, or respiratory rate 25 breaths per minute or more, then use the equivalent nebulizer order(s) with same Frequency and

## 2025-06-23 NOTE — PROGRESS NOTES
Comprehensive Nutrition Assessment    Type and Reason for Visit:  Initial, Consult (consult for TF ordering and management)    Nutrition Recommendations/Plan:   Continue NPO status until patient is medically cleared to receive nutrition therapy.   TF recommendations - ADULT TUBE FEEDING; PEG tube; Standard with Fiber formula - Jevity 1.5 with a goal rate of 60 ml/hr x 20 hours. Start with 20 ml/hr and increase by 20 ml every 2 hours, as tolerated by patient, until goal rate can be achieved and maintained. Water flushes, 30 ml every 3 hours for tube patency. Please administer one prosource TF 20 protein supplement once daily.   Monitor nutrition-related labs, bowel function, and weight trends.      Malnutrition Assessment:  Malnutrition Status:  At risk for malnutrition (06/23/25 1150)    Context:  Acute Illness     Findings of the 6 clinical characteristics of malnutrition:  Energy Intake:  Mild decrease in energy intake  Weight Loss:  No weight loss     Body Fat Loss:  Unable to assess     Muscle Mass Loss:  Unable to assess    Fluid Accumulation:  Mild (BLE + 1-2 pitting edema) Extremities   Strength:  Not Performed    Nutrition Assessment:    patient is nutritionally compromised - inadequate oral intake r/t inadequate protein-energy intake, difficulty swallowing, and impaired respiratory function AEB NPO status, need for EN as sole source of nutrition, and Parkinson's disease + dementia; he is at risk for further compromise d/t NPO status and altered nutrition-related labs; will continue NPO status and provide TF recommendations    Nutrition Related Findings:    patient is A & O; he presented with c/o worsening SOB x 2 weeks PTA; patient has a hx of Parkinson's disease, dementia, and CHF; patient has a hx of dysphagia and has a PEG tube for nutrition delivery; patient is from home with spouse; he uses a cane and a walker; last documented BM was on 6/19/25; BLE + 1-2 pitting edema noted Wound Type: None

## 2025-06-23 NOTE — CARE COORDINATION
06/23/25 0913   Readmission Assessment   Number of Days since last admission? 1-7 days   Previous Disposition Home with Home Health   Who is being Interviewed Patient   What was the patient's/caregiver's perception as to why they think they needed to return back to the hospital? Other (Comment)  (chest pain, shortness of breath)   Did you visit your Primary Care Physician after you left the hospital, before you returned this time? No   Why weren't you able to visit your PCP? Other (Comment)  (returned to hospital prior to appt)   Did you see a specialist, such as Cardiac, Pulmonary, Orthopedic Physician, etc. after you left the hospital? No   Who advised the patient to return to the hospital? Self-referral   Does the patient report anything that got in the way of taking their medications? No   In our efforts to provide the best possible care to you and others like you, can you think of anything that we could have done to help you after you left the hospital the first time, so that you might not have needed to return so soon? Other (Comment)  (nothing)     Case Management Assessment  Initial Evaluation    Date/Time of Evaluation: 6/23/2025 9:14 AM  Assessment Completed by: Chastity East RN    If patient is discharged prior to next notation, then this note serves as note for discharge by case management.    Patient Name: Rodolfo Luna                   YOB: 1945  Diagnosis: Shortness of breath [R06.02]  Normocytic anemia [D64.9]  Leg edema [R60.0]  Thrombocytopenia [D69.6]  Pulmonary edema cardiac cause (HCC) [I50.1]  Elevated troponin [R79.89]  Acute congestive heart failure, unspecified heart failure type (HCC) [I50.9]                   Date / Time: 6/22/2025  6:29 AM    Patient Admission Status: Inpatient   Readmission Risk (Low < 19, Mod (19-27), High > 27): Readmission Risk Score: 28.3    Current PCP: Carlton Leavitt, APRN - CNP  PCP verified by CM? Yes    Chart Reviewed: Yes     [id number]  Phone: [phone] Fax: [phone]    [facility] - [place], [place] - [address] [place] [address] - P [phone] - F [phone]  [address]  [address]  [facility] [address]  Phone: [phone] Fax: [phone]      Notes:    Factors facilitating achievement of predicted outcomes: Family support, Cooperative, and Pleasant    Barriers to discharge: Pain    Additional Case Management Notes: Reviewed chart. Met with the pt at bedside. Pt from home with spouse and would like to return. Awaiting PT rec. Pt active with [facility] for SN,PT/OT. Pt uses cane and walker at baseline. Referral to [facility] care. See notes. Following.    The Plan for Transition of Care is related to the following treatment goals of Shortness of breath [R06.02]  Normocytic anemia [D64.9]  Leg edema [R60.0]  Thrombocytopenia [D69.6]  Pulmonary edema cardiac cause (HCC) [I50.1]  Elevated troponin [R79.89]  Acute congestive heart failure, unspecified heart failure type (HCC) [I50.9]    IF APPLICABLE: The Patient and/or patient representative Rodolfo and his family were provided with a choice of provider and agrees with the discharge plan. Freedom of choice list with basic dialogue that supports the patient's individualized plan of care/goals and shares the quality data associated with the providers was provided to: Patient   Patient Representative Name:       The Patient and/or Patient Representative Agree with the Discharge Plan? Yes    Chastity East RN  Case Management Department

## 2025-06-23 NOTE — FLOWSHEET NOTE
Pt A/Ox4. See VS below. C/o acute headache, requesting Tylenol. Pain 6/10, see assessment below. Pt unlabored at rest; respirations even and regular. No distress noted. Attempted to wean off O2 this AM, reassessed O2 saturation 88% on RA. Reapplied oxygen at 1 L via NC. Shift assessment complete. See flowsheet. AM med's given. See MAR. Denies needs at this time. Telemetry remains in place. Pt up in chair with wheels locked and alarm in place. Call light within reach.     Bedside Mobility Assessment Tool (BMAT):     Assessment Level 1- Sit and Shake    1. From a semi-reclined position, ask patient to sit up and rotate to a seated position at the side of the bed. Can use the bedrail.    2. Ask patient to reach out and grab your hand and shake making sure patient reaches across his/her midline.   Pass- Patient is able to come to a seated position, maintain core strength. Maintains seated balance while reaching across midline. Move on to Assessment Level 2.     Assessment Level 2- Stretch and Point   1. With patient in seated position at the side of the bed, have patient place both feet on the floor (or stool) with knees no higher than hips.    2. Ask patient to stretch one leg and straighten the knee, then bend the ankle/flex and point the toes. If appropriate, repeat with the other leg.   Pass- Patient is able to demonstrate appropriate quad strength on intended weight bearing limb(s). Move onto Assessment Level 3.     Assessment Level 3- Stand   1. Ask patient to elevate off the bed or chair (seated to standing) using an assistive device (cane, bedrail).    2. Patient should be able to raise buttocks off be and hold for a count of five. May repeat once.   Pass- Patient maintains standing stability for at least 5 seconds, proceed to assessment level 4.    Assessment Level 4- Walk   1. Ask patient to march in place at bedside.    2. Then ask patient to advance step and return each foot. Some medical conditions may

## 2025-06-23 NOTE — CONSULTS
[facility]  Palliative Medicine Consultation Note      Date Of Admission:6/22/2025  Date of consult: 06/23/25  Seen by PC in the past:  No    Recommendations:        Writer met with the pt at bedside to introduce palliative/hospice options and had a voluntary discussion related to advance care planning. Palliative care consulted for goals of care and code status discussion. Code status discussed with the pt and pt elects Limited code with four no answers. PS sent to  to change in epic. Pt agreeable to referral to [facility]iative care and referral sent to [facility]. Awaiting PT rec. Pt would like to return home at [place] if possible.     Message sent to ELMER Brower related to above conversation.      1. Goals of Care/Advanced Care planning/Code status: Limited code X four no answers  2. Pain: chronic right arm and knee pain  3. SOB: denies  4. Disposition: to be decided    Reason for Consult:         [x]  Goals of Care  [x]  Code Status Discussion/Advanced Care Planning   []  Psychosocial/Family Support  []  Symptom Management  []  Other (Specify)    Requesting Physician: Dr. Jaylene Durnat    CHIEF COMPLAINT:  Shortness of breath    History Obtained From:  patient, electronic medical record    History of Present Illness:         Rodolfo Luna is a 79 y.o. male with PMH of (see below list) who presented with with fatigue and a headache for the past few days PTA.  Per spouse states that x one week he has become more fatigued and weak; now  having difficulty ambulating. Awaiting PT rec. Spouse reports pt has had a PEG tube for about a year due to difficulty swallowing, has had a recent procedure to remove excess skin from around the area, he is still having a significant amount of drainage that looks like pus per ED notes. Palliative care to follow along at [place]. Pt may need rehab vrs LTC placement.    Subjective:         Past Medical History:        Diagnosis Date    A-fib (HCC)

## 2025-06-23 NOTE — FLOWSHEET NOTE
06/23/25 1730   Gastrostomy/Enterostomy/Jejunostomy Tube Percutaneous Endoscopic Gastrostomy (PEG) LUQ 1 20 fr   Placement Date/Time: 07/12/24 1539   Present on Admission/Arrival: No  Inserted by: Dr Elizondo  Type: Percutaneous Endoscopic Gastrostomy (PEG)  Location: LUQ  Tube Number: 1  Tube Size (fr): 20 fr   G Port Status Infusing   Surrounding Skin Reddened;Other (Comment)  (drainage)   Dressing Type Split gauze   G-Tube Care Completed Yes   Tube Feeding Standard with Fiber   Tube feeding/verify rate (mL/hr) 20 mL/hr  (Goal rate: 60 mL/hr, increase by 20 ml/hr every 2 hrs as tolerated to reach goal rate. TF 20 hrs daily)   Tube Feeding Supplement (Product) Protein Modular   Free Water/Flush (mL) 60 mL     Started TF per order. TF infusing at initial rate at 20 mL/hr. Daily protein given via PEG tube.

## 2025-06-23 NOTE — PROGRESS NOTES
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     Weights Reviewed: Yes   Admission weight: 75.8 kg (167 lb)   Wt Readings from Last 3 Encounters:   06/22/25 79.5 kg (175 lb 4.8 oz)   06/11/25 79.3 kg (174 lb 12.8 oz)   06/02/25 78.5 kg (173 lb)     Intake & Output Reviewed: Yes     Intake/Output Summary (Last 24 hours) at 6/23/2025 0157  Last data filed at 6/23/2025 0100  Gross per 24 hour   Intake 90 ml   Output 3120 ml   Net -3030 ml       ECHOCARDIOGRAM Reviewed: Yes   Patient's Ejection Fraction (EF) is greater than 40%     Medications Reviewed: Yes   SCHEDULED HOSPITAL MEDICATIONS:   apixaban  5 mg Per G Tube BID    ARIPiprazole  5 mg Per G Tube Daily    aspirin  81 mg Per G Tube Daily    atorvastatin  80 mg Oral Nightly    carbidopa-levodopa  1 tablet Per G Tube TID    cetirizine  5 mg Per G Tube Daily    [Held by provider] dilTIAZem  30 mg Per G Tube BID    escitalopram  10 mg Per G Tube Daily    [Held by provider] ferrous Sulfate  300 mg Per G Tube BID    finasteride  5 mg Per G Tube Daily    lacosamide  100 mg Oral BID    lactobacillus  1 capsule Per G Tube Daily with breakfast    levETIRAcetam  750 mg Per G Tube BID    memantine  10 mg Per G Tube BID    montelukast  10 mg Per G Tube Nightly    lansoprazole  30 mg Per G Tube QAM AC    sodium chloride flush  5-40 mL IntraVENous 2 times per day    furosemide  20 mg IntraVENous Q6H    melatonin  6  mg Per G Tube Nightly     HOME MEDICATIONS:  Prior to Admission medications    Medication Sig Start Date End Date Taking? Authorizing Provider   levETIRAcetam (KEPPRA) 500 MG tablet Take 1.5 tablets by mouth 2 times daily Per g-tube 6/17/25  Yes Jose Miguel Tan MD   escitalopram (LEXAPRO) 10 MG tablet Take 1 tablet by mouth daily 6/17/25  Yes Jose Miguel Tan MD   lactobacillus (CULTURELLE) capsule Take 1 capsule by mouth daily (with breakfast) 6/18/25  Yes Jose Miguel Tan MD   carbidopa-levodopa (SINEMET)  MG per tablet 1 tablet by Per G Tube route 3 times daily 6/17/25  Yes Jose Miguel Tan MD   dilTIAZem (CARDIZEM) 30 MG immediate release tablet 1 tablet by Per G Tube route in the morning and at bedtime 6/17/25 7/17/25 Yes Jose Miguel Tan MD   memantine (NAMENDA) 10 MG tablet 1 tablet by Per G Tube route 2 times daily 6/17/25  Yes Jose Miguel Tan MD   aluminum & magnesium hydroxide-simethicone (MYLANTA) 400-400-40 MG/5ML SUSP 10 mLs by Per G Tube route every 4 hours as needed (heartburn)   Yes Kel Mckeon MD   apixaban (ELIQUIS) 5 MG TABS tablet 1 tablet by Per G Tube route 2 times daily   Yes Kel Mckeon MD   ARIPiprazole (ABILIFY) 10 MG tablet 0.5 tablets by Per G Tube route daily   Yes Kel Mckeon MD   aspirin 81 MG chewable tablet 1 tablet by Per G Tube route daily   Yes Kel Mckeon MD   atropine 1 % ophthalmic solution Place 1 drop under the tongue 4 times daily as needed for Secretions   Yes Kel Mckeon MD   menthol-zinc oxide (CALMOSEPTINE) 0.44-20.6 % OINT ointment Apply liberal amount topically as directed to protect skin from skin irritation.   Yes Kel Mckeon MD   ferrous Sulfate 300 (60 Fe) MG/5ML solution 5 mLs by Per G Tube route 2 times daily   Yes Kel Mckeon MD   ipratropium (ATROVENT) 0.06 % nasal spray 2 sprays by Each Nostril route 4 times daily as needed (chronic stuffy and runny nose)   Yes Linda Historical,

## 2025-06-23 NOTE — PROGRESS NOTES
Progress Note    Admit Date:  6/22/2025    Subjective:  Mr. Luna was admitted for dyspnea. Work up showed fluid overload. He was recently admitted to [facility] for PEG site infection. After he went home he had dyspnea.     Objective:   /63   Pulse 97   Temp 98.8 °F (37.1 °C) (Oral)   Resp 18   Ht 1.753 m (5' 9\")   Wt 78.9 kg (174 lb)   SpO2 91%   BMI 25.70 kg/m²        Intake/Output Summary (Last 24 hours) at 6/23/2025 1017  Last data filed at 6/23/2025 0901  Gross per 24 hour   Intake 215.42 ml   Output 4100 ml   Net -3884.58 ml       Physical Exam:   Gen: No distress. Alert. Ill appearing  Eyes: PERRL. No sclera icterus. No conjunctival injection.   ENT: No discharge. Pharynx clear.   Neck: No JVD.  No Carotid Bruit. Trachea midline.  Resp: No accessory muscle use. mild crackles. No wheezes. No rhonchi.   CV: Regular rate. Regular rhythm. No murmur.  No rub. Trace edema of legs. Chronic edema of the LUE  Capillary Refill: Brisk,< 3 seconds   Peripheral Pulses: +2 palpable, equal bilaterally   GI: Non-tender. Non-distended. No masses. No organomegaly. Normal bowel sounds. No hernia.   Skin: Warm and dry. No nodule on exposed extremities. No rash on exposed extremities.   M/S: No cyanosis. No joint deformity. No clubbing.   Neuro: Awake. Grossly nonfocal    Psych: Oriented x 3. No anxiety or agitation.         Medications:  apixaban, 5 mg, BID  ARIPiprazole, 5 mg, Daily  aspirin, 81 mg, Daily  atorvastatin, 80 mg, Nightly  carbidopa-levodopa, 1 tablet, TID  cetirizine, 5 mg, Daily  [Held by provider] dilTIAZem, 30 mg, BID  escitalopram, 10 mg, Daily  [Held by provider] ferrous Sulfate, 300 mg, BID  finasteride, 5 mg, Daily  lacosamide, 100 mg, BID  lactobacillus, 1 capsule, Daily with breakfast  levETIRAcetam, 750 mg, BID  memantine, 10 mg, BID  montelukast, 10 mg, Nightly  lansoprazole, 30 mg, QAM AC  sodium chloride flush, 5-40 mL, 2 times per day  furosemide, 20 mg, Q6H  melatonin, 6 mg, Nightly      PRN

## 2025-06-23 NOTE — PROGRESS NOTES
Pt being assisted to [facility] for BM. Pt voiced he wants to hold off on SMOG enema for the time being. He will take it if he does not have BM.

## 2025-06-23 NOTE — PROGRESS NOTES
Transferred care to ANDREA Floyd. Face to face bedside report given, no need voiced at this time. Pt in bed with eyes closed. No signs of distress noted.  Call light within reach.   Denies needs.

## 2025-06-23 NOTE — PROGRESS NOTES
Pt hypoglycemic, asymptomatic. Blood glucose 67. Ordered hypoglycemic protocol. D10% 125 mL bolus infused. Blood glucose recheck 89.

## 2025-06-23 NOTE — FLOWSHEET NOTE
06/23/25 1950   Gastrostomy/Enterostomy/Jejunostomy Tube Percutaneous Endoscopic Gastrostomy (PEG) LUQ 1 20 fr   Placement Date/Time: 07/12/24 1539   Present on Admission/Arrival: No  Inserted by: Dr Elizondo  Type: Percutaneous Endoscopic Gastrostomy (PEG)  Location: LUQ  Tube Number: 1  Tube Size (fr): 20 fr   G Port Status Infusing   Tube feeding/verify rate (mL/hr) 40 mL/hr   Tube Feeding Intake (mL) 48 ml   Free Water/Flush (mL) 0 mL   Residual Volume (ml) 10 ml     Pt tolerating TF. No abd pain or N/V. Residual volume 10 mL. Increased rate to 40 mL/hr per order.

## 2025-06-24 ENCOUNTER — APPOINTMENT (OUTPATIENT)
Dept: GENERAL RADIOLOGY | Age: 80
DRG: 291 | End: 2025-06-24
Payer: MEDICARE

## 2025-06-24 PROBLEM — I50.33 ACUTE ON CHRONIC HEART FAILURE WITH PRESERVED EJECTION FRACTION (HFPEF) (HCC): Status: ACTIVE | Noted: 2025-06-24

## 2025-06-24 LAB
ALBUMIN SERPL-MCNC: 2.4 G/DL (ref 3.4–5)
ALBUMIN SERPL-MCNC: 2.6 G/DL (ref 3.4–5)
ANION GAP SERPL CALCULATED.3IONS-SCNC: 11 MMOL/L (ref 3–16)
ANION GAP SERPL CALCULATED.3IONS-SCNC: 14 MMOL/L (ref 3–16)
ANION GAP SERPL CALCULATED.3IONS-SCNC: 17 MMOL/L (ref 3–16)
ANISOCYTOSIS BLD QL SMEAR: ABNORMAL
BASOPHILS # BLD: 0 K/UL (ref 0–0.2)
BASOPHILS NFR BLD: 0 %
BUN SERPL-MCNC: 20 MG/DL (ref 7–20)
BUN SERPL-MCNC: 20 MG/DL (ref 7–20)
BUN SERPL-MCNC: 26 MG/DL (ref 7–20)
CALCIUM SERPL-MCNC: 8 MG/DL (ref 8.3–10.6)
CALCIUM SERPL-MCNC: 8.1 MG/DL (ref 8.3–10.6)
CALCIUM SERPL-MCNC: 8.2 MG/DL (ref 8.3–10.6)
CHLORIDE SERPL-SCNC: 95 MMOL/L (ref 99–110)
CHLORIDE SERPL-SCNC: 96 MMOL/L (ref 99–110)
CHLORIDE SERPL-SCNC: 97 MMOL/L (ref 99–110)
CO2 SERPL-SCNC: 23 MMOL/L (ref 21–32)
CO2 SERPL-SCNC: 27 MMOL/L (ref 21–32)
CO2 SERPL-SCNC: 28 MMOL/L (ref 21–32)
CREAT SERPL-MCNC: 0.6 MG/DL (ref 0.8–1.3)
CREAT SERPL-MCNC: 0.7 MG/DL (ref 0.8–1.3)
CREAT SERPL-MCNC: 0.9 MG/DL (ref 0.8–1.3)
DACRYOCYTES BLD QL SMEAR: ABNORMAL
DEPRECATED RDW RBC AUTO: 15.8 % (ref 12.4–15.4)
EOSINOPHIL # BLD: 0 K/UL (ref 0–0.6)
EOSINOPHIL NFR BLD: 0 %
GFR SERPLBLD CREATININE-BSD FMLA CKD-EPI: 86 ML/MIN/{1.73_M2}
GFR SERPLBLD CREATININE-BSD FMLA CKD-EPI: >90 ML/MIN/{1.73_M2}
GFR SERPLBLD CREATININE-BSD FMLA CKD-EPI: >90 ML/MIN/{1.73_M2}
GLUCOSE BLD-MCNC: 111 MG/DL (ref 70–99)
GLUCOSE BLD-MCNC: 215 MG/DL (ref 70–99)
GLUCOSE BLD-MCNC: 253 MG/DL (ref 70–99)
GLUCOSE BLD-MCNC: 98 MG/DL (ref 70–99)
GLUCOSE SERPL-MCNC: 101 MG/DL (ref 70–99)
GLUCOSE SERPL-MCNC: 200 MG/DL (ref 70–99)
GLUCOSE SERPL-MCNC: 99 MG/DL (ref 70–99)
HCT VFR BLD AUTO: 29.7 % (ref 40.5–52.5)
HGB BLD-MCNC: 9.5 G/DL (ref 13.5–17.5)
HYPOCHROMIA BLD QL SMEAR: ABNORMAL
LYMPHOCYTES # BLD: 1.6 K/UL (ref 1–5.1)
LYMPHOCYTES NFR BLD: 7 %
MCH RBC QN AUTO: 29.5 PG (ref 26–34)
MCHC RBC AUTO-ENTMCNC: 32.2 G/DL (ref 31–36)
MCV RBC AUTO: 91.8 FL (ref 80–100)
MONOCYTES # BLD: 3.7 K/UL (ref 0–1.3)
MONOCYTES NFR BLD: 31 %
MRSA SPEC QL CULT: NORMAL
NEUTROPHILS # BLD: 6.7 K/UL (ref 1.7–7.7)
NEUTROPHILS NFR BLD: 56 %
OVALOCYTES BLD QL SMEAR: ABNORMAL
PATH INTERP BLD-IMP: YES
PERFORMED ON: ABNORMAL
PERFORMED ON: NORMAL
PHOSPHATE SERPL-MCNC: 3 MG/DL (ref 2.5–4.9)
PHOSPHATE SERPL-MCNC: 3.5 MG/DL (ref 2.5–4.9)
PLATELET # BLD AUTO: 114 K/UL (ref 135–450)
PLATELET BLD QL SMEAR: ABNORMAL
PMV BLD AUTO: 10.1 FL (ref 5–10.5)
POIKILOCYTOSIS BLD QL SMEAR: ABNORMAL
POLYCHROMASIA BLD QL SMEAR: ABNORMAL
POTASSIUM SERPL-SCNC: 3.5 MMOL/L (ref 3.5–5.1)
POTASSIUM SERPL-SCNC: 3.6 MMOL/L (ref 3.5–5.1)
POTASSIUM SERPL-SCNC: 3.6 MMOL/L (ref 3.5–5.1)
RBC # BLD AUTO: 3.24 M/UL (ref 4.2–5.9)
SCHISTOCYTES BLD QL SMEAR: ABNORMAL
SLIDE REVIEW: ABNORMAL
SODIUM SERPL-SCNC: 135 MMOL/L (ref 136–145)
SODIUM SERPL-SCNC: 136 MMOL/L (ref 136–145)
SODIUM SERPL-SCNC: 137 MMOL/L (ref 136–145)
VARIANT LYMPHS NFR BLD MANUAL: 6 % (ref 0–6)
WBC # BLD AUTO: 12 K/UL (ref 4–11)

## 2025-06-24 PROCEDURE — 6360000002 HC RX W HCPCS: Performed by: INTERNAL MEDICINE

## 2025-06-24 PROCEDURE — 6370000000 HC RX 637 (ALT 250 FOR IP): Performed by: INTERNAL MEDICINE

## 2025-06-24 PROCEDURE — 2060000000 HC ICU INTERMEDIATE R&B

## 2025-06-24 PROCEDURE — 2580000003 HC RX 258: Performed by: INTERNAL MEDICINE

## 2025-06-24 PROCEDURE — 36415 COLL VENOUS BLD VENIPUNCTURE: CPT

## 2025-06-24 PROCEDURE — 99223 1ST HOSP IP/OBS HIGH 75: CPT | Performed by: INTERNAL MEDICINE

## 2025-06-24 PROCEDURE — 99233 SBSQ HOSP IP/OBS HIGH 50: CPT | Performed by: INTERNAL MEDICINE

## 2025-06-24 PROCEDURE — 94761 N-INVAS EAR/PLS OXIMETRY MLT: CPT

## 2025-06-24 PROCEDURE — 71045 X-RAY EXAM CHEST 1 VIEW: CPT

## 2025-06-24 PROCEDURE — 5A0935A ASSISTANCE WITH RESPIRATORY VENTILATION, LESS THAN 24 CONSECUTIVE HOURS, HIGH NASAL FLOW/VELOCITY: ICD-10-PCS | Performed by: INTERNAL MEDICINE

## 2025-06-24 PROCEDURE — 2700000000 HC OXYGEN THERAPY PER DAY

## 2025-06-24 PROCEDURE — 2500000003 HC RX 250 WO HCPCS: Performed by: INTERNAL MEDICINE

## 2025-06-24 PROCEDURE — 51798 US URINE CAPACITY MEASURE: CPT

## 2025-06-24 PROCEDURE — 80069 RENAL FUNCTION PANEL: CPT

## 2025-06-24 PROCEDURE — 6370000000 HC RX 637 (ALT 250 FOR IP): Performed by: STUDENT IN AN ORGANIZED HEALTH CARE EDUCATION/TRAINING PROGRAM

## 2025-06-24 PROCEDURE — 85025 COMPLETE CBC W/AUTO DIFF WBC: CPT

## 2025-06-24 RX ORDER — LORAZEPAM 0.5 MG/1
0.5 TABLET ORAL EVERY 8 HOURS PRN
Status: DISCONTINUED | OUTPATIENT
Start: 2025-06-24 | End: 2025-06-29 | Stop reason: HOSPADM

## 2025-06-24 RX ORDER — METOLAZONE 2.5 MG/1
5 TABLET ORAL ONCE
Status: COMPLETED | OUTPATIENT
Start: 2025-06-24 | End: 2025-06-24

## 2025-06-24 RX ORDER — FUROSEMIDE 10 MG/ML
40 INJECTION INTRAMUSCULAR; INTRAVENOUS 2 TIMES DAILY
Status: DISCONTINUED | OUTPATIENT
Start: 2025-06-24 | End: 2025-06-25

## 2025-06-24 RX ORDER — DILTIAZEM HYDROCHLORIDE 5 MG/ML
10 INJECTION INTRAVENOUS ONCE
Status: COMPLETED | OUTPATIENT
Start: 2025-06-24 | End: 2025-06-24

## 2025-06-24 RX ADMIN — ESCITALOPRAM OXALATE 10 MG: 10 TABLET ORAL at 09:33

## 2025-06-24 RX ADMIN — SENNOSIDES AND DOCUSATE SODIUM 1 TABLET: 50; 8.6 TABLET ORAL at 00:17

## 2025-06-24 RX ADMIN — MINERAL SUPPLEMENT IRON 300 MG / 5 ML STRENGTH LIQUID 100 PER BOX UNFLAVORED 300 MG: at 09:28

## 2025-06-24 RX ADMIN — ARIPIPRAZOLE 5 MG: 10 TABLET ORAL at 09:29

## 2025-06-24 RX ADMIN — APIXABAN 5 MG: 5 TABLET, FILM COATED ORAL at 00:17

## 2025-06-24 RX ADMIN — Medication 10 ML: at 00:18

## 2025-06-24 RX ADMIN — FUROSEMIDE 40 MG: 10 INJECTION, SOLUTION INTRAMUSCULAR; INTRAVENOUS at 17:27

## 2025-06-24 RX ADMIN — Medication 10 ML: at 21:26

## 2025-06-24 RX ADMIN — LORAZEPAM 0.5 MG: 0.5 TABLET ORAL at 03:01

## 2025-06-24 RX ADMIN — CARBIDOPA AND LEVODOPA 1 TABLET: 25; 100 TABLET ORAL at 09:32

## 2025-06-24 RX ADMIN — POTASSIUM BICARBONATE 50 MEQ: 977.5 TABLET, EFFERVESCENT ORAL at 12:13

## 2025-06-24 RX ADMIN — CARBIDOPA AND LEVODOPA 1 TABLET: 25; 100 TABLET ORAL at 21:11

## 2025-06-24 RX ADMIN — MINERAL SUPPLEMENT IRON 300 MG / 5 ML STRENGTH LIQUID 100 PER BOX UNFLAVORED 300 MG: at 21:11

## 2025-06-24 RX ADMIN — APIXABAN 5 MG: 5 TABLET, FILM COATED ORAL at 09:29

## 2025-06-24 RX ADMIN — ACETAMINOPHEN 650 MG: 325 TABLET ORAL at 14:51

## 2025-06-24 RX ADMIN — Medication 6 MG: at 00:17

## 2025-06-24 RX ADMIN — LACOSAMIDE 100 MG: 50 TABLET, FILM COATED ORAL at 00:16

## 2025-06-24 RX ADMIN — CETIRIZINE HYDROCHLORIDE 5 MG: 10 TABLET ORAL at 09:29

## 2025-06-24 RX ADMIN — DILTIAZEM HYDROCHLORIDE 10 MG: 5 INJECTION, SOLUTION INTRAVENOUS at 11:55

## 2025-06-24 RX ADMIN — APIXABAN 5 MG: 5 TABLET, FILM COATED ORAL at 21:11

## 2025-06-24 RX ADMIN — LACOSAMIDE 100 MG: 50 TABLET, FILM COATED ORAL at 09:28

## 2025-06-24 RX ADMIN — ASPIRIN 81 MG: 81 TABLET, CHEWABLE ORAL at 09:28

## 2025-06-24 RX ADMIN — MEMANTINE 10 MG: 5 TABLET ORAL at 09:29

## 2025-06-24 RX ADMIN — FUROSEMIDE 20 MG: 10 INJECTION, SOLUTION INTRAMUSCULAR; INTRAVENOUS at 05:49

## 2025-06-24 RX ADMIN — CARBIDOPA AND LEVODOPA 1 TABLET: 25; 100 TABLET ORAL at 14:51

## 2025-06-24 RX ADMIN — Medication 6 MG: at 21:11

## 2025-06-24 RX ADMIN — FUROSEMIDE 40 MG: 10 INJECTION, SOLUTION INTRAMUSCULAR; INTRAVENOUS at 11:55

## 2025-06-24 RX ADMIN — METOLAZONE 5 MG: 2.5 TABLET ORAL at 16:54

## 2025-06-24 RX ADMIN — ATORVASTATIN CALCIUM 80 MG: 40 TABLET, FILM COATED ORAL at 00:17

## 2025-06-24 RX ADMIN — ACETAMINOPHEN 650 MG: 325 TABLET ORAL at 00:16

## 2025-06-24 RX ADMIN — DILTIAZEM HYDROCHLORIDE 30 MG: 60 TABLET ORAL at 09:29

## 2025-06-24 RX ADMIN — FUROSEMIDE 20 MG: 10 INJECTION, SOLUTION INTRAMUSCULAR; INTRAVENOUS at 00:17

## 2025-06-24 RX ADMIN — Medication 750 MG: at 00:22

## 2025-06-24 RX ADMIN — SENNOSIDES AND DOCUSATE SODIUM 1 TABLET: 50; 8.6 TABLET ORAL at 09:28

## 2025-06-24 RX ADMIN — Medication 1 CAPSULE: at 09:28

## 2025-06-24 RX ADMIN — Medication 750 MG: at 09:40

## 2025-06-24 RX ADMIN — DILTIAZEM HYDROCHLORIDE 5 MG/HR: 5 INJECTION INTRAVENOUS at 12:12

## 2025-06-24 RX ADMIN — ATORVASTATIN CALCIUM 80 MG: 40 TABLET, FILM COATED ORAL at 21:11

## 2025-06-24 RX ADMIN — FUROSEMIDE 20 MG: 10 INJECTION, SOLUTION INTRAMUSCULAR; INTRAVENOUS at 09:22

## 2025-06-24 RX ADMIN — Medication 750 MG: at 21:12

## 2025-06-24 RX ADMIN — FINASTERIDE 5 MG: 5 TABLET, FILM COATED ORAL at 09:28

## 2025-06-24 RX ADMIN — SENNOSIDES AND DOCUSATE SODIUM 1 TABLET: 50; 8.6 TABLET ORAL at 21:11

## 2025-06-24 RX ADMIN — MONTELUKAST SODIUM 10 MG: 10 TABLET, COATED ORAL at 00:16

## 2025-06-24 RX ADMIN — MEMANTINE 10 MG: 5 TABLET ORAL at 21:11

## 2025-06-24 RX ADMIN — MEMANTINE 10 MG: 5 TABLET ORAL at 00:17

## 2025-06-24 RX ADMIN — LANSOPRAZOLE 30 MG: 30 TABLET, ORALLY DISINTEGRATING, DELAYED RELEASE ORAL at 05:50

## 2025-06-24 RX ADMIN — LACOSAMIDE 100 MG: 50 TABLET, FILM COATED ORAL at 21:11

## 2025-06-24 RX ADMIN — MONTELUKAST SODIUM 10 MG: 10 TABLET, COATED ORAL at 21:11

## 2025-06-24 RX ADMIN — Medication 10 ML: at 09:22

## 2025-06-24 RX ADMIN — CARBIDOPA AND LEVODOPA 1 TABLET: 25; 100 TABLET ORAL at 00:17

## 2025-06-24 ASSESSMENT — PAIN SCALES - GENERAL
PAINLEVEL_OUTOF10: 0
PAINLEVEL_OUTOF10: 2

## 2025-06-24 ASSESSMENT — PAIN DESCRIPTION - DESCRIPTORS: DESCRIPTORS: OTHER (COMMENT)

## 2025-06-24 ASSESSMENT — PAIN DESCRIPTION - ORIENTATION: ORIENTATION: RIGHT

## 2025-06-24 ASSESSMENT — PAIN DESCRIPTION - LOCATION: LOCATION: NECK

## 2025-06-24 NOTE — PROGRESS NOTES
Pt trasnferred from 2w to ICU room #11 from 2w as telemetry patient for cardizem gtt.. New orders released. Pt hooked up to ICU monitoring devices. Pt awake, alert & oriented x4, following commands.    Afib 129, /58, SpO2 92% on 5/NC.      Respirations are tachypneic. Bilateral lung sounds crackles. PEG in place, with TF at 40 mL/hr.  2nd PIV placed RFA, 20g, WNL. 2 PIVs, WNL, saline locked.    External catheter initiated, WNL, CLWS.    Call light within reach. Bed in lowest position. Bed alarm on.       Patient is able to demonstrate the ability to move from a reclining position to an upright position within the recliner.

## 2025-06-24 NOTE — PLAN OF CARE
Problem: Safety - Adult  Goal: Free from fall injury  Outcome: Progressing  Flowsheets (Taken 6/24/2025 0240 by Justino Chen RN)  Free From Fall Injury: Instruct family/caregiver on patient safety   Bed alarm on for safety.  Call light in reach.

## 2025-06-24 NOTE — PROGRESS NOTES
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     Weights Reviewed: Yes   Admission weight: 75.8 kg (167 lb)   Wt Readings from Last 3 Encounters:   06/23/25 78.9 kg (174 lb)   06/11/25 79.3 kg (174 lb 12.8 oz)   06/02/25 78.5 kg (173 lb)     Intake & Output Reviewed: Yes     Intake/Output Summary (Last 24 hours) at 6/24/2025 0241  Last data filed at 6/23/2025 1950  Gross per 24 hour   Intake 453.42 ml   Output 1550 ml   Net -1096.58 ml       ECHOCARDIOGRAM Reviewed: Yes   Patient's Ejection Fraction (EF) is greater than 40%     Medications Reviewed: Yes   SCHEDULED HOSPITAL MEDICATIONS:   sennosides-docusate sodium  1 tablet PEG Tube BID    SMOG Enema  330 mL Rectal Once    apixaban  5 mg Per G Tube BID    ARIPiprazole  5 mg Per G Tube Daily    aspirin  81 mg Per G Tube Daily    atorvastatin  80 mg Oral Nightly    carbidopa-levodopa  1 tablet Per G Tube TID    cetirizine  5 mg Per G Tube Daily    [Held by provider] dilTIAZem  30 mg Per G Tube BID    escitalopram  10 mg Per G Tube Daily    [Held by provider] ferrous Sulfate  300 mg Per G Tube BID    finasteride  5 mg Per G Tube Daily    lacosamide  100 mg Oral BID    lactobacillus  1 capsule Per G Tube Daily with breakfast    levETIRAcetam  750 mg Per G Tube BID    memantine  10 mg Per G Tube BID    montelukast  10 mg Per G Tube Nightly    lansoprazole  30 mg Per G Tube QAM AC    sodium chloride flush

## 2025-06-24 NOTE — PROGRESS NOTES
PM Assessment completed. Pt does not express any pain or discomfort at this time. Respirations are even & easy. No complaints voiced. Pt denies needs at this time. SR up x 2, and bed in low position. Call light is within reach.    Bedside Mobility Assessment Tool (BMAT):     Assessment Level 1- Sit and Shake    1. From a semi-reclined position, ask patient to sit up and rotate to a seated position at the side of the bed. Can use the bedrail.    2. Ask patient to reach out and grab your hand and shake making sure patient reaches across his/her midline.   Pass- Patient is able to come to a seated position, maintain core strength. Maintains seated balance while reaching across midline. Move on to Assessment Level 2.     Assessment Level 2- Stretch and Point   1. With patient in seated position at the side of the bed, have patient place both feet on the floor (or stool) with knees no higher than hips.    2. Ask patient to stretch one leg and straighten the knee, then bend the ankle/flex and point the toes. If appropriate, repeat with the other leg.   Pass- Patient is able to demonstrate appropriate quad strength on intended weight bearing limb(s). Move onto Assessment Level 3.     Assessment Level 3- Stand   1. Ask patient to elevate off the bed or chair (seated to standing) using an assistive device (cane, bedrail).    2. Patient should be able to raise buttocks off be and hold for a count of five. May repeat once.   Pass- Patient maintains standing stability for at least 5 seconds, proceed to assessment level 4.    Assessment Level 4- Walk   1. Ask patient to march in place at bedside.    2. Then ask patient to advance step and return each foot. Some medical conditions may render a patient from stepping backwards, use your best clinical judgement.   Fail- Patient not able to complete tasks OR requires use of assistive device. Patient is MOBILITY LEVEL 3.       Mobility Level- 3

## 2025-06-24 NOTE — PLAN OF CARE
Problem: Chronic Conditions and Co-morbidities  Goal: Patient's chronic conditions and co-morbidity symptoms are monitored and maintained or improved  Outcome: Not Progressing  Flowsheets  Taken 6/24/2025 1321 by Teetee Paulson, RN  Care Plan - Patient's Chronic Conditions and Co-Morbidity Symptoms are Monitored and Maintained or Improved:   Monitor and assess patient's chronic conditions and comorbid symptoms for stability, deterioration, or improvement   Collaborate with multidisciplinary team to address chronic and comorbid conditions and prevent exacerbation or deterioration   Update acute care plan with appropriate goals if chronic or comorbid symptoms are exacerbated and prevent overall improvement and discharge  Note: Pt Afib RVR, cardizem bolus and drip initiated.      Problem: Respiratory - Adult  Goal: Achieves optimal ventilation and oxygenation  Outcome: Not Progressing  Flowsheets  Taken 6/24/2025 1321 by Teetee Paulson RN  Achieves optimal ventilation and oxygenation:   Assess for changes in respiratory status   Assess for changes in mentation and behavior   Oxygen supplementation based on oxygen saturation or arterial blood gases  Note: Pt wears 2L/NC at baseline, currently on 5L/NC.        Problem: Cardiovascular - Adult  Goal: Absence of cardiac dysrhythmias or at baseline  Outcome: Not Progressing        HEART FAILURE CARE PLAN:    Comorbidities Reviewed: Yes   Patient has a past medical history of A-fib ([facility]), MIRNA (acute kidney injury), Anxiety, Arthritis, Bradycardia, Cancer ([facility]), CHF (congestive heart failure) ([facility]), Coronary artery disease involving native coronary artery of native heart without angina pectoris, Hemoptysis, History of implantation of penile prosthesis, Irregular heart  beats, Mixed hyperlipidemia, Parkinson disease ([facility]), 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

## 2025-06-24 NOTE — PROGRESS NOTES
Pt with only 100ml out and small about of incontinence. Bladder scan complete, 248 ml out.     Perfect serve sent ot Dr. Oneill to make aware. Pt is scheduled to receive another 40 mg IVP lasix at 1800

## 2025-06-24 NOTE — PROGRESS NOTES
Progress Note    Admit Date:  6/22/2025    Subjective:  Mr. Luna was admitted for dyspnea. Work up showed fluid overload. [facility] PEG site infection. After he went home he had dyspnea.     6/24- has diuresed well. He is in rapid a fib. His oxygen requirements have gone up. No chest pain. Is anxious. Is shaky-has PD. On 5 L of oxygen this am.    Objective:   /65   Pulse 60   Temp 97.7 °F (36.5 °C) (Oral)   Resp 18   Ht 1.753 m (5' 9.02\")   Wt 78.2 kg (172 lb 6.4 oz)   SpO2 94%   BMI 25.45 kg/m²        Intake/Output Summary (Last 24 hours) at 6/24/2025 1122  Last data filed at 6/24/2025 0906  Gross per 24 hour   Intake 228 ml   Output 950 ml   Net -722 ml       Physical Exam:   Gen: No distress. Alert. Ill appearing  Eyes: PERRL. No sclera icterus. No conjunctival injection.   ENT: No discharge. Pharynx clear.   Neck: No JVD.  No Carotid Bruit. Trachea midline.  Resp: No accessory muscle use. mild crackles. No wheezes. No rhonchi.   CV: Irregular rhythm, tachycardia. No murmur.  No rub. Trace edema of legs. Chronic edema of the LUE  Capillary Refill: Brisk,< 3 seconds   Peripheral Pulses: +2 palpable, equal bilaterally   GI: Non-tender. Non-distended. No masses. No organomegaly. Normal bowel sounds. No hernia.   Skin: Warm and dry. No nodule on exposed extremities. No rash on exposed extremities.   M/S: No cyanosis. No joint deformity. No clubbing.   Neuro: Awake. Grossly nonfocal    Psych: Oriented x 3. No anxiety or agitation.         Medications:  furosemide, 40 mg, BID  dilTIAZem, 10 mg, Once  sennosides-docusate sodium, 1 tablet, BID  SMOG Enema, 330 mL, Once  apixaban, 5 mg, BID  ARIPiprazole, 5 mg, Daily  aspirin, 81 mg, Daily  atorvastatin, 80 mg, Nightly  carbidopa-levodopa, 1 tablet, TID  cetirizine, 5 mg, Daily  escitalopram, 10 mg, Daily  ferrous Sulfate, 300 mg, BID  finasteride, 5 mg, Daily  lacosamide, 100 mg, BID  lactobacillus, 1 capsule, Daily with

## 2025-06-24 NOTE — DISCHARGE INSTRUCTIONS
Heart Failure Resources:  Heart Failure Interactive Workbook:  Go to https://[employer].Incentive/publication/?j=090446 for a Free Heart Failure Interactive Workbook provided by The American [employer] Association. This interactive workbook will provide information on Healthier Living with Heart Failure. Please copy and paste link into search bar. Use your mouse to scroll through the pages.    HF [place] ingrid:   Heart Failure Free smart phone ingrid available for iPhone and Android download. Use your phone to track sodium intake, fluid intake, symptoms, and weight.     Low Sodium Diet / Recipes:  Go to www.[employer] website for “renal” diet which is Low Sodium! [employer] is a dialysis company, but this website offers free seasonal cookbooks. Each quarter, they will release 25-30 new recipes with a breakdown of calories, sodium, and glucose. You can also go to wwwPage2Images/recipes website for free recipes.     Discharge Instruction Video:  Scan the QR code below with your camera and click the canva.com link to open the video and watch educational information on Heart Failure and Medications from one of our nurses.   [email or web address]/edit    Home Exercise Program:   Identification of Green/Yellow/Red zones:  You should be able to identify when you feel good (green zone), if you have 1-2 symptoms of HF (yellow zone), or if you are in need of medical attention (red zone).  In your CHF education folder you were provided a “stop light tool” to outline this information.     We want to you to rate your exertion levels:    Our therapy team has discussed means of identification with you such as the \"Isa scale.\"  The Isa rating scale ranges from 6 to 20, where 6 means \"no exertion at all\" and 20 means \"maximal exertion.\" The goal is to use this to gauge how much effort it is taking for you to do your normal daily tasks.   You should be able to recognize when too much exertion is  being expended.    Elements of Energy Conservation:   Prioritize/Plan: Decide what needs to be done today, and what can wait for a later date, write to do lists, plan ahead to avoid extra trips, and gather supplies and equipment needed before starting an activity.   Position: Avoid tiring and awkward posture that may impair breathing.  Pace: Slow and steady pace, never rushing!  Pursed lip breathing.  Pursed Lip Breathing: \"Smell the roses, blow out the candles\".

## 2025-06-24 NOTE — PROGRESS NOTES
4 Eyes Skin Assessment     NAME:  Rodolfo Luna  YOB: 1945  MEDICAL RECORD NUMBER:  [id number]    The patient is being assessed for  Transfer to New Unit    I agree that at least one RN has performed a thorough Head to Toe Skin Assessment on the patient. ALL assessment sites listed below have been assessed.      Areas assessed by both nurses:    Head, Face, Ears, Shoulders, Back, Chest, Arms, Elbows, Hands, Sacrum. Buttock, Coccyx, Ischium, Legs. Feet and Heels, and Under Medical Devices         Does the Patient have a Wound? No noted wound(s), pt with LUE wrapped d/t weeping, no wounds present.        Jorge Prevention initiated by RN: Yes  Wound Care Orders initiated by RN: No    Pressure Injury (Stage 3,4, Unstageable, DTI, NWPT, and Complex wounds) if present, place Wound referral order by RN under : No    New Ostomies, if present place, Ostomy referral order under : No     Nurse 1 eSignature: Electronically signed by Manda Salcido RN on 6/24/25 at 11:44 AM EDT    **SHARE this note so that the co-signing nurse can place an eSignature**    Nurse 2 eSignature: Electronically signed by Teetee Paulson RN on 6/24/25 at 12:55 PM EDT

## 2025-06-24 NOTE — PLAN OF CARE
Problem: Chronic Conditions and Co-morbidities  Goal: Patient's chronic conditions and co-morbidity symptoms are monitored and maintained or improved  Recent Flowsheet Documentation  Taken 6/24/2025 0102 by Justino Chen RN  Care Plan - Patient's Chronic Conditions and Co-Morbidity Symptoms are Monitored and Maintained or Improved: Collaborate with multidisciplinary team to address chronic and comorbid conditions and prevent exacerbation or deterioration  6/23/2025 1821 by Mariel Olmedo RN  Outcome: Progressing     Problem: Discharge Planning  Goal: Discharge to home or other facility with appropriate resources  Recent Flowsheet Documentation  Taken 6/24/2025 0102 by Justino Chen RN  Discharge to home or other facility with appropriate resources: Arrange for needed discharge resources and transportation as appropriate  6/23/2025 1821 by Mariel Olmedo RN  Outcome: Progressing     Problem: Safety - Adult  Goal: Free from fall injury  Recent Flowsheet Documentation  Taken 6/24/2025 0240 by Justino Chen RN  Free From Fall Injury: Instruct family/caregiver on patient safety  6/23/2025 1821 by Mariel Olmedo RN  Outcome: Progressing     Problem: ABCDS Injury Assessment  Goal: Absence of physical injury  6/24/2025 0241 by Justino Chen RN  Outcome: Progressing  Flowsheets (Taken 6/24/2025 0240)  Absence of Physical Injury: Implement safety measures based on patient assessment  6/23/2025 1821 by Mariel Olmedo RN  Outcome: Progressing     Problem: Pain  Goal: Verbalizes/displays adequate comfort level or baseline comfort level  6/24/2025 0241 by Justino Chen RN  Outcome: Progressing  6/23/2025 1821 by Mariel Olmedo RN  Outcome: Progressing  Flowsheets (Taken 6/23/2025 0548 by Justino Chen RN)  Verbalizes/displays adequate comfort level or baseline comfort level: Encourage patient to monitor pain and request assistance     Problem: Seizure Precautions  Goal: Remains free of injury related

## 2025-06-24 NOTE — PROGRESS NOTES
Transferred care to ANDREA King. Face to face bedside report given, no need voiced at this time. Pt in bed with eyes closed.  No signs of distress noted.  Call light within reach.   Denies needs.

## 2025-06-24 NOTE — CONSULTS
CARDIOLOGY CONSULTATION        Patient Name: Rodolfo Luna  Primary Care physician: Carlton Leavitt, KATIE - CNP    Reason for Referral/Chief Complaint: Rodolfo Luna is a 79 y.o. patient who presents to cardiology clinic today for cardiology follow up.      History of Present Illness:   Rodolfo Luna 78 y.o. with a prior medical history  notable for of HFpEF, paroxsymal atrial fibrillation s/p RFCA of AF 9/2014, S/P watchman 11/14/22, lung cancer s/p thoracotomy, parkinson's disease, porphyria cutanea tarda and coronary artery disease s/p PCI to LAD 3/2017.     Stress test on 5/10/23 showed normal wall motion. No clear inducible ischemia.     Admitted on 4/4/24 for altered mental status, garbled speech and left sided facial droop. Found to have had an acute Left CVA -lacunar infarct, keyshawn by brain MRI.  Limited echo showed no evidence of shunting.  Placed on brilinta 21 days in addition to ASA. EEG planned OP as well as neuro follow up.  He was then admitted 5/9/24 for confusion, generalized weakness and multiple falls.  MRI of brain showed no acute infarct at that time.     6/2024 noted CT with evidence of wilfredo-device leak with respect to prior placed watchman as well as new pulmonary embolism R main PA. Restarted on Eliquis.     In the interim patient was admitted to [facility] 7/1/2024 for persistent cough related to oral intake.  VBG showed compensated chronic respiratory acidosis. Patient was started on Unasyn and admitted to the hospital.  Diagnosed with aspiration pneumonia.  PEG placed 7/12/24.  Discharged 7/16/24.  He was then readmitted 7/18/24 for BLE edema, testicular edema and shortness of breath.  Echocardiogram on 7/19/24 showed EF > 65%, mild aortic sclerosis, mild TR, SPAP 38-41 mmHg.  Patient discharged 7/22/24 to SNF.      I evaluated the patient 9/2024 for heart failure exacerbation.    Patient admitted 6/10-6/17 with weakness.  Fever of unknown origin.    Patient presents this  heart failure) (HCC)           I will address the patient's cardiac risk factors and adjusted pharmacologic treatment as needed. In addition, I have reinforced the need for patient directed risk factor modification.  All questions and concerns were addressed to the patient/family. Alternatives to my treatment were discussed.     Thank you for allowing us to participate in the care of Rodolfo Luna. Please call me with any questions [phone].    Rodolfo Oneill MD, [facility]  Cardiovascular Disease  [facility]  [phone] [place] Office  [phone] [place] Office  6/24/2025 11:28 AM

## 2025-06-25 ENCOUNTER — APPOINTMENT (OUTPATIENT)
Age: 80
DRG: 291 | End: 2025-06-25
Attending: INTERNAL MEDICINE
Payer: MEDICARE

## 2025-06-25 PROBLEM — T82.539A LEAKAGE OF WATCHMAN LEFT ATRIAL APPENDAGE CLOSURE DEVICE: Status: ACTIVE | Noted: 2025-06-25

## 2025-06-25 PROBLEM — Z86.711 HX OF PULMONARY EMBOLUS: Status: ACTIVE | Noted: 2025-06-25

## 2025-06-25 PROBLEM — M79.89 LEFT ARM SWELLING: Status: ACTIVE | Noted: 2025-06-25

## 2025-06-25 LAB
ACANTHOCYTES BLD QL SMEAR: ABNORMAL
ALBUMIN SERPL-MCNC: 2.5 G/DL (ref 3.4–5)
ANION GAP SERPL CALCULATED.3IONS-SCNC: 13 MMOL/L (ref 3–16)
ANION GAP SERPL CALCULATED.3IONS-SCNC: 15 MMOL/L (ref 3–16)
ANISOCYTOSIS BLD QL SMEAR: ABNORMAL
BASOPHILS # BLD: 0 K/UL (ref 0–0.2)
BASOPHILS NFR BLD: 0 %
BUN SERPL-MCNC: 31 MG/DL (ref 7–20)
BUN SERPL-MCNC: 33 MG/DL (ref 7–20)
CALCIUM SERPL-MCNC: 8.1 MG/DL (ref 8.3–10.6)
CALCIUM SERPL-MCNC: 8.4 MG/DL (ref 8.3–10.6)
CHLORIDE SERPL-SCNC: 93 MMOL/L (ref 99–110)
CHLORIDE SERPL-SCNC: 94 MMOL/L (ref 99–110)
CO2 SERPL-SCNC: 31 MMOL/L (ref 21–32)
CO2 SERPL-SCNC: 31 MMOL/L (ref 21–32)
CREAT SERPL-MCNC: 0.7 MG/DL (ref 0.8–1.3)
CREAT SERPL-MCNC: 0.8 MG/DL (ref 0.8–1.3)
DEPRECATED RDW RBC AUTO: 15.2 % (ref 12.4–15.4)
ECHO BSA: 1.97 M2
EOSINOPHIL # BLD: 0 K/UL (ref 0–0.6)
EOSINOPHIL NFR BLD: 0 %
GFR SERPLBLD CREATININE-BSD FMLA CKD-EPI: 90 ML/MIN/{1.73_M2}
GFR SERPLBLD CREATININE-BSD FMLA CKD-EPI: >90 ML/MIN/{1.73_M2}
GLUCOSE BLD-MCNC: 105 MG/DL (ref 70–99)
GLUCOSE BLD-MCNC: 140 MG/DL (ref 70–99)
GLUCOSE BLD-MCNC: 140 MG/DL (ref 70–99)
GLUCOSE BLD-MCNC: 149 MG/DL (ref 70–99)
GLUCOSE BLD-MCNC: 161 MG/DL (ref 70–99)
GLUCOSE BLD-MCNC: 198 MG/DL (ref 70–99)
GLUCOSE BLD-MCNC: 213 MG/DL (ref 70–99)
GLUCOSE SERPL-MCNC: 156 MG/DL (ref 70–99)
GLUCOSE SERPL-MCNC: 160 MG/DL (ref 70–99)
HCT VFR BLD AUTO: 28.3 % (ref 40.5–52.5)
HGB BLD-MCNC: 9.4 G/DL (ref 13.5–17.5)
HYPOCHROMIA BLD QL SMEAR: ABNORMAL
LYMPHOCYTES # BLD: 1.7 K/UL (ref 1–5.1)
LYMPHOCYTES NFR BLD: 5 %
MAGNESIUM SERPL-MCNC: 2.03 MG/DL (ref 1.8–2.4)
MCH RBC QN AUTO: 29.9 PG (ref 26–34)
MCHC RBC AUTO-ENTMCNC: 33.2 G/DL (ref 31–36)
MCV RBC AUTO: 90.1 FL (ref 80–100)
MONOCYTES # BLD: 1.3 K/UL (ref 0–1.3)
MONOCYTES NFR BLD: 8 %
NEUTROPHILS # BLD: 12.9 K/UL (ref 1.7–7.7)
NEUTROPHILS NFR BLD: 81 %
OVALOCYTES BLD QL SMEAR: ABNORMAL
PATH INTERP BLD-IMP: NORMAL
PERFORMED ON: ABNORMAL
PHOSPHATE SERPL-MCNC: 2.5 MG/DL (ref 2.5–4.9)
PLATELET # BLD AUTO: 98 K/UL (ref 135–450)
PLATELET BLD QL SMEAR: ABNORMAL
PMV BLD AUTO: 8.5 FL (ref 5–10.5)
POIKILOCYTOSIS BLD QL SMEAR: ABNORMAL
POLYCHROMASIA BLD QL SMEAR: ABNORMAL
POTASSIUM SERPL-SCNC: 2.7 MMOL/L (ref 3.5–5.1)
POTASSIUM SERPL-SCNC: 2.7 MMOL/L (ref 3.5–5.1)
POTASSIUM SERPL-SCNC: 3.3 MMOL/L (ref 3.5–5.1)
RBC # BLD AUTO: 3.14 M/UL (ref 4.2–5.9)
SCHISTOCYTES BLD QL SMEAR: ABNORMAL
SLIDE REVIEW: ABNORMAL
SODIUM SERPL-SCNC: 138 MMOL/L (ref 136–145)
SODIUM SERPL-SCNC: 139 MMOL/L (ref 136–145)
VARIANT LYMPHS NFR BLD MANUAL: 6 % (ref 0–6)
WBC # BLD AUTO: 15.9 K/UL (ref 4–11)

## 2025-06-25 PROCEDURE — 2500000003 HC RX 250 WO HCPCS: Performed by: INTERNAL MEDICINE

## 2025-06-25 PROCEDURE — 99233 SBSQ HOSP IP/OBS HIGH 50: CPT | Performed by: INTERNAL MEDICINE

## 2025-06-25 PROCEDURE — 6370000000 HC RX 637 (ALT 250 FOR IP): Performed by: INTERNAL MEDICINE

## 2025-06-25 PROCEDURE — 2060000000 HC ICU INTERMEDIATE R&B

## 2025-06-25 PROCEDURE — 94640 AIRWAY INHALATION TREATMENT: CPT

## 2025-06-25 PROCEDURE — 93971 EXTREMITY STUDY: CPT | Performed by: SURGERY

## 2025-06-25 PROCEDURE — 2580000003 HC RX 258: Performed by: INTERNAL MEDICINE

## 2025-06-25 PROCEDURE — 36415 COLL VENOUS BLD VENIPUNCTURE: CPT

## 2025-06-25 PROCEDURE — 94761 N-INVAS EAR/PLS OXIMETRY MLT: CPT

## 2025-06-25 PROCEDURE — 85025 COMPLETE CBC W/AUTO DIFF WBC: CPT

## 2025-06-25 PROCEDURE — 6370000000 HC RX 637 (ALT 250 FOR IP)

## 2025-06-25 PROCEDURE — 80069 RENAL FUNCTION PANEL: CPT

## 2025-06-25 PROCEDURE — 6360000002 HC RX W HCPCS: Performed by: INTERNAL MEDICINE

## 2025-06-25 PROCEDURE — 83735 ASSAY OF MAGNESIUM: CPT

## 2025-06-25 PROCEDURE — 2700000000 HC OXYGEN THERAPY PER DAY

## 2025-06-25 PROCEDURE — 93971 EXTREMITY STUDY: CPT

## 2025-06-25 RX ORDER — METOPROLOL TARTRATE 25 MG/1
TABLET, FILM COATED ORAL
Status: COMPLETED
Start: 2025-06-25 | End: 2025-06-25

## 2025-06-25 RX ORDER — MUPIROCIN 2 %
OINTMENT (GRAM) TOPICAL 2 TIMES DAILY
Status: DISCONTINUED | OUTPATIENT
Start: 2025-06-25 | End: 2025-06-29 | Stop reason: HOSPADM

## 2025-06-25 RX ORDER — FUROSEMIDE 10 MG/ML
40 INJECTION INTRAMUSCULAR; INTRAVENOUS ONCE
Status: COMPLETED | OUTPATIENT
Start: 2025-06-25 | End: 2025-06-25

## 2025-06-25 RX ORDER — GUAIFENESIN/DEXTROMETHORPHAN 100-10MG/5
5 SYRUP ORAL EVERY 6 HOURS PRN
Status: DISCONTINUED | OUTPATIENT
Start: 2025-06-25 | End: 2025-06-29 | Stop reason: HOSPADM

## 2025-06-25 RX ORDER — DILTIAZEM HCL 60 MG
30 TABLET ORAL EVERY 8 HOURS SCHEDULED
Status: DISCONTINUED | OUTPATIENT
Start: 2025-06-25 | End: 2025-06-25

## 2025-06-25 RX ORDER — METOPROLOL TARTRATE 25 MG/1
12.5 TABLET, FILM COATED ORAL 2 TIMES DAILY
Status: DISCONTINUED | OUTPATIENT
Start: 2025-06-25 | End: 2025-06-29 | Stop reason: HOSPADM

## 2025-06-25 RX ORDER — METOPROLOL SUCCINATE 25 MG/1
12.5 TABLET, EXTENDED RELEASE ORAL DAILY
Status: DISCONTINUED | OUTPATIENT
Start: 2025-06-25 | End: 2025-06-25

## 2025-06-25 RX ORDER — IPRATROPIUM BROMIDE AND ALBUTEROL SULFATE 2.5; .5 MG/3ML; MG/3ML
1 SOLUTION RESPIRATORY (INHALATION) 3 TIMES DAILY
Status: DISCONTINUED | OUTPATIENT
Start: 2025-06-25 | End: 2025-06-26

## 2025-06-25 RX ADMIN — ARIPIPRAZOLE 5 MG: 10 TABLET ORAL at 08:10

## 2025-06-25 RX ADMIN — MINERAL SUPPLEMENT IRON 300 MG / 5 ML STRENGTH LIQUID 100 PER BOX UNFLAVORED 300 MG: at 08:09

## 2025-06-25 RX ADMIN — METOPROLOL TARTRATE 12.5 MG: 25 TABLET, FILM COATED ORAL at 20:16

## 2025-06-25 RX ADMIN — MEMANTINE 10 MG: 5 TABLET ORAL at 20:17

## 2025-06-25 RX ADMIN — ATORVASTATIN CALCIUM 80 MG: 40 TABLET, FILM COATED ORAL at 20:16

## 2025-06-25 RX ADMIN — LACOSAMIDE 100 MG: 50 TABLET, FILM COATED ORAL at 20:16

## 2025-06-25 RX ADMIN — POTASSIUM CHLORIDE 10 MEQ: 7.46 INJECTION, SOLUTION INTRAVENOUS at 05:53

## 2025-06-25 RX ADMIN — APIXABAN 5 MG: 5 TABLET, FILM COATED ORAL at 08:10

## 2025-06-25 RX ADMIN — IPRATROPIUM BROMIDE AND ALBUTEROL SULFATE 1 DOSE: .5; 2.5 SOLUTION RESPIRATORY (INHALATION) at 14:02

## 2025-06-25 RX ADMIN — IPRATROPIUM BROMIDE AND ALBUTEROL SULFATE 1 DOSE: 2.5; .5 SOLUTION RESPIRATORY (INHALATION) at 08:53

## 2025-06-25 RX ADMIN — Medication 750 MG: at 20:19

## 2025-06-25 RX ADMIN — MEMANTINE 10 MG: 5 TABLET ORAL at 08:09

## 2025-06-25 RX ADMIN — LANSOPRAZOLE 30 MG: 30 TABLET, ORALLY DISINTEGRATING, DELAYED RELEASE ORAL at 05:41

## 2025-06-25 RX ADMIN — CETIRIZINE HYDROCHLORIDE 5 MG: 10 TABLET ORAL at 08:09

## 2025-06-25 RX ADMIN — MINERAL SUPPLEMENT IRON 300 MG / 5 ML STRENGTH LIQUID 100 PER BOX UNFLAVORED 300 MG: at 20:16

## 2025-06-25 RX ADMIN — Medication 10 ML: at 20:16

## 2025-06-25 RX ADMIN — GUAIFENESIN AND DEXTROMETHORPHAN 5 ML: 100; 10 SYRUP ORAL at 09:44

## 2025-06-25 RX ADMIN — CARBIDOPA AND LEVODOPA 1 TABLET: 25; 100 TABLET ORAL at 08:10

## 2025-06-25 RX ADMIN — METOPROLOL TARTRATE 12.5 MG: 25 TABLET, FILM COATED ORAL at 10:07

## 2025-06-25 RX ADMIN — APIXABAN 5 MG: 5 TABLET, FILM COATED ORAL at 20:16

## 2025-06-25 RX ADMIN — LACOSAMIDE 100 MG: 50 TABLET, FILM COATED ORAL at 08:09

## 2025-06-25 RX ADMIN — POTASSIUM BICARBONATE 40 MEQ: 782 TABLET, EFFERVESCENT ORAL at 15:44

## 2025-06-25 RX ADMIN — ESCITALOPRAM OXALATE 10 MG: 10 TABLET ORAL at 08:10

## 2025-06-25 RX ADMIN — CARBIDOPA AND LEVODOPA 1 TABLET: 25; 100 TABLET ORAL at 14:44

## 2025-06-25 RX ADMIN — Medication 750 MG: at 08:08

## 2025-06-25 RX ADMIN — ASPIRIN 81 MG: 81 TABLET, CHEWABLE ORAL at 08:10

## 2025-06-25 RX ADMIN — Medication 1 CAPSULE: at 08:09

## 2025-06-25 RX ADMIN — MUPIROCIN: 20 OINTMENT TOPICAL at 20:16

## 2025-06-25 RX ADMIN — MUPIROCIN: 20 OINTMENT TOPICAL at 09:44

## 2025-06-25 RX ADMIN — SENNOSIDES AND DOCUSATE SODIUM 1 TABLET: 50; 8.6 TABLET ORAL at 08:10

## 2025-06-25 RX ADMIN — POTASSIUM CHLORIDE 10 MEQ: 7.46 INJECTION, SOLUTION INTRAVENOUS at 07:18

## 2025-06-25 RX ADMIN — IPRATROPIUM BROMIDE AND ALBUTEROL SULFATE 1 DOSE: .5; 2.5 SOLUTION RESPIRATORY (INHALATION) at 19:39

## 2025-06-25 RX ADMIN — LORAZEPAM 0.5 MG: 0.5 TABLET ORAL at 08:10

## 2025-06-25 RX ADMIN — MONTELUKAST SODIUM 10 MG: 10 TABLET, COATED ORAL at 20:17

## 2025-06-25 RX ADMIN — Medication 10 ML: at 08:11

## 2025-06-25 RX ADMIN — FUROSEMIDE 40 MG: 10 INJECTION, SOLUTION INTRAMUSCULAR; INTRAVENOUS at 11:07

## 2025-06-25 RX ADMIN — SENNOSIDES AND DOCUSATE SODIUM 1 TABLET: 50; 8.6 TABLET ORAL at 20:17

## 2025-06-25 RX ADMIN — POTASSIUM CHLORIDE 10 MEQ: 7.46 INJECTION, SOLUTION INTRAVENOUS at 08:23

## 2025-06-25 RX ADMIN — DILTIAZEM HYDROCHLORIDE 2.5 MG/HR: 5 INJECTION INTRAVENOUS at 05:47

## 2025-06-25 RX ADMIN — POTASSIUM BICARBONATE 50 MEQ: 977.5 TABLET, EFFERVESCENT ORAL at 09:44

## 2025-06-25 RX ADMIN — FINASTERIDE 5 MG: 5 TABLET, FILM COATED ORAL at 08:10

## 2025-06-25 RX ADMIN — CARBIDOPA AND LEVODOPA 1 TABLET: 25; 100 TABLET ORAL at 20:16

## 2025-06-25 RX ADMIN — GUAIFENESIN AND DEXTROMETHORPHAN 5 ML: 100; 10 SYRUP ORAL at 17:54

## 2025-06-25 ASSESSMENT — PAIN SCALES - GENERAL
PAINLEVEL_OUTOF10: 0
PAINLEVEL_OUTOF10: 0

## 2025-06-25 NOTE — PROGRESS NOTES
Dr. Oneill at the bedside to examine pt. See progress note for details.     Give x1 40 mg lasix this AM once potassium finished.   Start metoprolol tartrate via peg  Dc cardizem po  Wean cardizem gtt off, okay for heart rate <110

## 2025-06-25 NOTE — PROGRESS NOTES
Patient's potassium critical this morning at 2.7 This RN notified Miguelangel Landeros MD via perfect serve. Patient has PRN replacement orders.

## 2025-06-25 NOTE — PROGRESS NOTES
RT Inhaler-Nebulizer Bronchodilator Protocol Note    There is a bronchodilator order in the chart from a provider indicating to follow the RT Bronchodilator Protocol and there is an “Initiate RT Inhaler-Nebulizer Bronchodilator Protocol” order as well (see protocol at bottom of note).    CXR Findings:  XR CHEST PORTABLE  Result Date: 6/24/2025  1. No acute cardiopulmonary process. 2. Decreased right lung volume. 3. Left basilar haziness in keeping with underlying effusion.       The findings from the last RT Protocol Assessment were as follows:   History Pulmonary Disease: Chronic pulmonary disease  Respiratory Pattern: Dyspnea on exertion or RR 21-25 bpm  Breath Sounds: Slightly diminished and/or crackles  Cough: Strong, spontaneous, non-productive  Indication for Bronchodilator Therapy: Decreased or absent breath sounds  Bronchodilator Assessment Score: 6    Aerosolized bronchodilator medication orders have been revised according to the RT Inhaler-Nebulizer Bronchodilator Protocol below.    Respiratory Therapist to perform RT Therapy Protocol Assessment initially then follow the protocol.  Repeat RT Therapy Protocol Assessment PRN for score 0-3 or on second treatment, BID, and PRN for scores above 3.    No Indications - adjust the frequency to every 6 hours PRN wheezing or bronchospasm, if no treatments needed after 48 hours then discontinue using Per Protocol order mode.     If indication present, adjust the RT bronchodilator orders based on the Bronchodilator Assessment Score as indicated below.  Use Inhaler orders unless patient has one or more of the following: on home nebulizer, not able to hold breath for 10 seconds, is not alert and oriented, cannot activate and use MDI correctly, or respiratory rate 25 breaths per minute or more, then use the equivalent nebulizer order(s) with same Frequency and PRN reasons based on the score.  If a patient is on this medication at home then do not decrease Frequency below

## 2025-06-25 NOTE — PROGRESS NOTES
Reassessment completed, see flowsheets.  VSS.   Cardizem gtt off since 1100, HR 60-80s.     All ICU Lines and monitoring remain in place. Bed locked in lowest position. Call light within reach.

## 2025-06-25 NOTE — PROGRESS NOTES
Shift assessment, completed, see flow sheet. Pt is alert and oriented x 4. Following commands.     Afib 109, /66 (84), SpO2 91% 2L/NC. Respirations are easy, even, and unlabored. Bilateral lung sounds rhonchi.  Productive, persistent cough. PEG in place, with TF at 60 mL/hr. Pt with generalized edema.    2 PIVs, WNL with cardizem 2.5 mg/hr and with potassium replacements infusing with IVF d/t c/o burning.     Pt up x1 with walker to [facility], small green BM. Pt tolerated well.    Call light within reach. Bed in lowest position. Bed alarm on.

## 2025-06-25 NOTE — PROGRESS NOTES
Reassessment completed, see flowsheets, unchanged from prior. VSS.   Remains off cardizem gtt. Continues on 2L/NC     Latest Reference Range & Units 06/25/25 13:54   Potassium 3.5 - 5.1 mmol/L 3.3 (L)     Gave 40 meq potassium via PEG per order\, see MAR.     All ICU Lines and monitoring remain in place. Bed locked in lowest position. Call light within reach. '

## 2025-06-25 NOTE — PROGRESS NOTES
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     Weights Reviewed: Yes   Admission weight: 75.8 kg (167 lb)   Wt Readings from Last 3 Encounters:   06/25/25 71.6 kg (157 lb 13.6 oz)   06/11/25 79.3 kg (174 lb 12.8 oz)   06/02/25 78.5 kg (173 lb)     Intake & Output Reviewed: Yes     Intake/Output Summary (Last 24 hours) at 6/25/2025 1625  Last data filed at 6/25/2025 1451  Gross per 24 hour   Intake 2542.25 ml   Output 2650 ml   Net -107.75 ml       ECHOCARDIOGRAM Reviewed: Yes   Patient's Ejection Fraction (EF) is greater than 40%     Medications Reviewed: Yes   SCHEDULED HOSPITAL MEDICATIONS:   mupirocin   Each Nostril BID    ipratropium 0.5 mg-albuterol 2.5 mg  1 Dose Inhalation TID    metoprolol tartrate  12.5 mg Oral BID    sennosides-docusate sodium  1 tablet PEG Tube BID    apixaban  5 mg Per G Tube BID    ARIPiprazole  5 mg Per G Tube Daily    aspirin  81 mg Per G Tube Daily    atorvastatin  80 mg Oral Nightly    carbidopa-levodopa  1 tablet Per G Tube TID    cetirizine  5 mg Per G Tube Daily    escitalopram  10 mg Per G Tube Daily    ferrous Sulfate  300 mg Per G Tube BID    finasteride  5 mg Per G Tube Daily    lacosamide  100 mg Oral BID    lactobacillus  1 capsule Per G Tube Daily with breakfast    levETIRAcetam  750 mg Per G Tube BID    memantine  10 mg Per G Tube BID    montelukast  10 mg Per G Tube Nightly    lansoprazole  30 mg Per G Tube

## 2025-06-25 NOTE — PROGRESS NOTES
Progress Note    Admit Date:  6/22/2025    was admitted for dyspnea. Work up showed fluid overload. He was recently admitted to [facility] for PEG site infection. After he went home he had dyspnea and comes back with edema, started lasix  Developed Afibb with RVR and transferred to ICU overnight      Subjective:  Mr. Luna seen up in bed, reports he has hx of stroke and parkinsons with dysphagia and had PEG placed   Has ongoing oral secretions, using Yanker catheter for suction   No chest pain or sob  Remains on cardizem gtt with borderline low BP   Good diuresis with lasix but low K this am      Objective:   /62   Pulse 78   Temp 97.7 °F (36.5 °C) (Temporal)   Resp 18   Ht 1.753 m (5' 9.02\")   Wt 71.6 kg (157 lb 13.6 oz)   SpO2 97%   BMI 23.30 kg/m²        Intake/Output Summary (Last 24 hours) at 6/25/2025 1417  Last data filed at 6/25/2025 1325  Gross per 24 hour   Intake 2438 ml   Output 2500 ml   Net -62 ml       Physical Exam:   Gen: elderly male up in bed , No distress. Alert. Ill appearing  Eyes: PERRL. No sclera icterus. No conjunctival injection.   ENT: No discharge. Pharynx clear.   Neck: No JVD.  No Carotid Bruit. Trachea midline.  Resp: No accessory muscle use. Diminished solitario with scattered crackles   CV: Irregular rhythm, tachycardia. No murmur.  No rub. Trace edema of legs. Chronic edema of the LUE 2+  Capillary Refill: Brisk,< 3 seconds   Peripheral Pulses: +2 palpable, equal bilaterally   GI: Non-tender. Non-distended. No masses. No organomegaly. Normal bowel sounds. No hernia.   PEG tube noted with insertion site clear   Skin: Warm and dry. No nodule on exposed extremities. No rash on exposed extremities.   M/S: No cyanosis. No joint deformity. No clubbing.   Neuro: Awake. Grossly nonfocal    : Oriented x 3. No anxiety or agitation. Gen weakness and right UE resting tremor noted        Medications:  mupirocin, , BID  ipratropium 0.5 mg-albuterol 2.5 mg, 1 Dose, TID  metoprolol tartrate, 12.5

## 2025-06-25 NOTE — PROGRESS NOTES
Physical Therapy/Occupational Therapy  Pt will need new orders for PT/OT following transfer from [facility] to ICU.    Erich Keyes PT, DPT [id number]  Jigna Childers, OTR/L #[id number]

## 2025-06-25 NOTE — PLAN OF CARE
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     ECHOCARDIOGRAM Reviewed: Yes   Patient's Ejection Fraction (EF) is greater than 40%    Weights Reviewed: Yes   Admission weight: 75.8 kg (167 lb)   Wt Readings from Last 3 Encounters:   06/24/25 78.2 kg (172 lb 6.4 oz)   06/11/25 79.3 kg (174 lb 12.8 oz)   06/02/25 78.5 kg (173 lb)     Intake & Output Reviewed: Yes     Intake/Output Summary (Last 24 hours) at 6/24/2025 2211  Last data filed at 6/24/2025 2142  Gross per 24 hour   Intake 721.04 ml   Output 1600 ml   Net -878.96 ml     Medications Reviewed: Yes   SCHEDULED HOSPITAL MEDICATIONS:   furosemide  40 mg IntraVENous BID    sennosides-docusate sodium  1 tablet PEG Tube BID    SMOG Enema  330 mL Rectal Once    apixaban  5 mg Per G Tube BID    ARIPiprazole  5 mg Per G Tube Daily    aspirin  81 mg Per G Tube Daily    atorvastatin  80 mg Oral Nightly    carbidopa-levodopa  1 tablet Per G Tube TID    cetirizine  5 mg Per G Tube Daily    escitalopram  10 mg Per G Tube Daily    ferrous Sulfate  300 mg Per G Tube BID    finasteride  5 mg Per G Tube Daily    lacosamide  100 mg Oral BID    lactobacillus  1 capsule Per G Tube Daily with breakfast    levETIRAcetam  750 mg Per G Tube BID    memantine  10 mg Per G Tube BID    montelukast  10 mg Per G Tube Nightly    lansoprazole  30 mg Per G Tube QAM AC    sodium chloride flush  5-40 mL IntraVENous 2 times per day     melatonin  6 mg Per G Tube Nightly     ACE/ARB/ARNI is REQUIRED for EF </= 40% SYSTOLIC FAILURE:   ACE:: N/A  ARB:: N/A  ARNI:: N/A    Evidenced-Based Beta Blocker is REQUIRED for EF </= 40% SYSTOLIC FAILURE:   :: None    Diuretics:  :: Furosemide    Diet Reviewed: Yes   Diet NPO  ADULT TUBE FEEDING; PEG; Standard with Fiber; Continuous; 20; Yes; 20; Other (specify); every 2 hours as tolerated; 60; 30; Q 3 hours; Protein; 1 Dose; Daily    Goal of Care Reviewed: Yes   Patient and/or Family's stated Goal of Care this Admission: reduce shortness of breath, increase activity tolerance, better understand heart failure and disease management, be more comfortable, and reduce lower extremity edema prior to discharge.

## 2025-06-25 NOTE — PROGRESS NOTES
06/25/25 0854   RT Protocol   History Pulmonary Disease 2   Respiratory pattern 0   Breath sounds 6   Cough 2   Indications for Bronchodilator Therapy On home bronchodilators   Bronchodilator Assessment Score 10

## 2025-06-25 NOTE — PROGRESS NOTES
CARDIOLOGY PROGRESS NOTE        Patient Name: Rodolfo Luna  Primary Care physician: Carlton Leavitt, KATIE - CNP    Reason for Referral/Chief Complaint: Rodolfo Luna is a 79 y.o. patient who presents to cardiology clinic today for cardiology follow up.      History of Present Illness:   Rodolfo Luna 78 y.o. with a prior medical history  notable for of HFpEF, paroxsymal atrial fibrillation s/p RFCA of AF 9/2014, S/P watchman 11/14/22, lung cancer s/p thoracotomy, parkinson's disease, porphyria cutanea tarda and coronary artery disease s/p PCI to LAD 3/2017.     Stress test on 5/10/23 showed normal wall motion. No clear inducible ischemia.     Admitted on 4/4/24 for altered mental status, garbled speech and left sided facial droop. Found to have had an acute Left CVA -lacunar infarct, keyshawn by brain MRI.  Limited echo showed no evidence of shunting.  Placed on brilinta 21 days in addition to ASA. EEG planned OP as well as neuro follow up.  He was then admitted 5/9/24 for confusion, generalized weakness and multiple falls.  MRI of brain showed no acute infarct at that time.     6/2024 noted CT with evidence of wilfredo-device leak with respect to prior placed watchman as well as new pulmonary embolism R main PA. Restarted on Eliquis.     In the interim patient was admitted to [facility] 7/1/2024 for persistent cough related to oral intake.  VBG showed compensated chronic respiratory acidosis. Patient was started on Unasyn and admitted to the hospital.  Diagnosed with aspiration pneumonia.  PEG placed 7/12/24.  Discharged 7/16/24.  He was then readmitted 7/18/24 for BLE edema, testicular edema and shortness of breath.  Echocardiogram on 7/19/24 showed EF > 65%, mild aortic sclerosis, mild TR, SPAP 38-41 mmHg.  Patient discharged 7/22/24 to SNF.      I evaluated the patient 9/2024 for heart failure exacerbation.    Patient admitted 6/10-6/17 with weakness.  Fever of unknown origin.    Patient presents this

## 2025-06-25 NOTE — PROGRESS NOTES
Patient alert and oriented x 4. Patient voiding via pure wick. Patient tolerating tube feeds at goal with no c/o nausea or pain at this time. Initial assessment completed, see flowsheet. Cardizem gtt infusing per order. Patient's bed is locked and in lowest position, side rails up x 3 with an active bed alarm. Non slip socks applied.

## 2025-06-25 NOTE — PROGRESS NOTES
[facility], patient uses Jevity 1.5 TF formula with a goal rate of 85 ml/hr x 16 hours. Water flushes, 120 ml every 3 hours for tube patency and hydration.     Patient is currently receiving Jevity 1.5 with a goal rate of 60 ml/hr x 20 hours + 30 ml water flushes every 3 hours for tube patency + one prosource TF 20 protein supplement once daily.     Thank you,   Leigha Wtezel RD, LD  [phone]

## 2025-06-25 NOTE — PROGRESS NOTES
Dr. Mckay at the bedside to examine pt. See progress note for details.     New order for robitussin PRN  Stop IV potassium of 30 meq and then give 50 meq via PEG  Added back cardizem in order to stop cardizem gtt, but discuss with cardiology prior to giving

## 2025-06-25 NOTE — PLAN OF CARE
Problem: Chronic Conditions and Co-morbidities  Goal: Patient's chronic conditions and co-morbidity symptoms are monitored and maintained or improved  6/24/2025 2209 by Nhi Pope RN  Outcome: Progressing  Flowsheets (Taken 6/24/2025 2209)  Care Plan - Patient's Chronic Conditions and Co-Morbidity Symptoms are Monitored and Maintained or Improved: Monitor and assess patient's chronic conditions and comorbid symptoms for stability, deterioration, or improvement     Problem: Discharge Planning  Goal: Discharge to home or other facility with appropriate resources  Outcome: Progressing  Flowsheets (Taken 6/24/2025 2209)  Discharge to home or other facility with appropriate resources: Identify barriers to discharge with patient and caregiver     Problem: Safety - Adult  Goal: Free from fall injury  6/24/2025 2209 by Nhi Pope RN  Outcome: Progressing  Flowsheets (Taken 6/24/2025 2209)  Free From Fall Injury:   Based on caregiver fall risk screen, instruct family/caregiver to ask for assistance with transferring infant if caregiver noted to have fall risk factors   Instruct family/caregiver on patient safety     Problem: ABCDS Injury Assessment  Goal: Absence of physical injury  Outcome: Progressing  Flowsheets (Taken 6/24/2025 2209)  Absence of Physical Injury: Implement safety measures based on patient assessment     Problem: Seizure Precautions  Goal: Remains free of injury related to seizures activity  Outcome: Progressing     Problem: Respiratory - Adult  Goal: Achieves optimal ventilation and oxygenation  6/24/2025 2209 by Nhi Pope RN  Outcome: Progressing     Problem: Cardiovascular - Adult  Goal: Maintains optimal cardiac output and hemodynamic stability  Outcome: Progressing     Problem: Cardiovascular - Adult  Goal: Absence of cardiac dysrhythmias or at baseline  6/24/2025 2209 by Nhi Pope RN  Outcome: Progressing  Flowsheets (Taken 6/24/2025 2209)  Absence of cardiac  dysrhythmias or at baseline: Monitor cardiac rate and rhythm

## 2025-06-26 LAB
ANION GAP SERPL CALCULATED.3IONS-SCNC: 14 MMOL/L (ref 3–16)
ANISOCYTOSIS BLD QL SMEAR: ABNORMAL
BASOPHILS # BLD: 0 K/UL (ref 0–0.2)
BASOPHILS NFR BLD: 0 %
BUN SERPL-MCNC: 35 MG/DL (ref 7–20)
CALCIUM SERPL-MCNC: 8.1 MG/DL (ref 8.3–10.6)
CHLORIDE SERPL-SCNC: 92 MMOL/L (ref 99–110)
CO2 SERPL-SCNC: 31 MMOL/L (ref 21–32)
CREAT SERPL-MCNC: 0.8 MG/DL (ref 0.8–1.3)
DEPRECATED RDW RBC AUTO: 15.5 % (ref 12.4–15.4)
EOSINOPHIL # BLD: 0.1 K/UL (ref 0–0.6)
EOSINOPHIL NFR BLD: 1 %
GFR SERPLBLD CREATININE-BSD FMLA CKD-EPI: 90 ML/MIN/{1.73_M2}
GLUCOSE BLD-MCNC: 123 MG/DL (ref 70–99)
GLUCOSE BLD-MCNC: 128 MG/DL (ref 70–99)
GLUCOSE BLD-MCNC: 134 MG/DL (ref 70–99)
GLUCOSE BLD-MCNC: 171 MG/DL (ref 70–99)
GLUCOSE SERPL-MCNC: 125 MG/DL (ref 70–99)
HCT VFR BLD AUTO: 29.6 % (ref 40.5–52.5)
HGB BLD-MCNC: 9.9 G/DL (ref 13.5–17.5)
LYMPHOCYTES # BLD: 0.6 K/UL (ref 1–5.1)
LYMPHOCYTES NFR BLD: 7 %
MCH RBC QN AUTO: 30.3 PG (ref 26–34)
MCHC RBC AUTO-ENTMCNC: 33.4 G/DL (ref 31–36)
MCV RBC AUTO: 90.9 FL (ref 80–100)
MONOCYTES # BLD: 1.5 K/UL (ref 0–1.3)
MONOCYTES NFR BLD: 18 %
NEUTROPHILS # BLD: 6.1 K/UL (ref 1.7–7.7)
NEUTROPHILS NFR BLD: 73 %
NEUTS BAND NFR BLD MANUAL: 1 % (ref 0–7)
NEUTS VAC BLD QL SMEAR: PRESENT
NRBC BLD-RTO: 1 /100 WBC
PATH INTERP BLD-IMP: NO
PERFORMED ON: ABNORMAL
PLATELET # BLD AUTO: 95 K/UL (ref 135–450)
PLATELET BLD QL SMEAR: ABNORMAL
PMV BLD AUTO: 8.5 FL (ref 5–10.5)
POIKILOCYTOSIS BLD QL SMEAR: ABNORMAL
POTASSIUM SERPL-SCNC: 3.7 MMOL/L (ref 3.5–5.1)
RBC # BLD AUTO: 3.26 M/UL (ref 4.2–5.9)
SLIDE REVIEW: ABNORMAL
SODIUM SERPL-SCNC: 137 MMOL/L (ref 136–145)
TARGETS BLD QL SMEAR: ABNORMAL
TOXIC GRANULES BLD QL SMEAR: PRESENT
WBC # BLD AUTO: 8.3 K/UL (ref 4–11)

## 2025-06-26 PROCEDURE — 6370000000 HC RX 637 (ALT 250 FOR IP): Performed by: INTERNAL MEDICINE

## 2025-06-26 PROCEDURE — 94640 AIRWAY INHALATION TREATMENT: CPT

## 2025-06-26 PROCEDURE — 94761 N-INVAS EAR/PLS OXIMETRY MLT: CPT

## 2025-06-26 PROCEDURE — 85025 COMPLETE CBC W/AUTO DIFF WBC: CPT

## 2025-06-26 PROCEDURE — 2060000000 HC ICU INTERMEDIATE R&B

## 2025-06-26 PROCEDURE — 2700000000 HC OXYGEN THERAPY PER DAY

## 2025-06-26 PROCEDURE — 80048 BASIC METABOLIC PNL TOTAL CA: CPT

## 2025-06-26 PROCEDURE — 99232 SBSQ HOSP IP/OBS MODERATE 35: CPT | Performed by: INTERNAL MEDICINE

## 2025-06-26 PROCEDURE — 2500000003 HC RX 250 WO HCPCS: Performed by: INTERNAL MEDICINE

## 2025-06-26 PROCEDURE — 36415 COLL VENOUS BLD VENIPUNCTURE: CPT

## 2025-06-26 PROCEDURE — 6360000002 HC RX W HCPCS: Performed by: INTERNAL MEDICINE

## 2025-06-26 RX ORDER — FUROSEMIDE 40 MG/1
40 TABLET ORAL DAILY
Status: DISCONTINUED | OUTPATIENT
Start: 2025-06-27 | End: 2025-06-29 | Stop reason: HOSPADM

## 2025-06-26 RX ORDER — FUROSEMIDE 10 MG/ML
40 INJECTION INTRAMUSCULAR; INTRAVENOUS ONCE
Status: COMPLETED | OUTPATIENT
Start: 2025-06-26 | End: 2025-06-26

## 2025-06-26 RX ADMIN — Medication 750 MG: at 08:52

## 2025-06-26 RX ADMIN — FINASTERIDE 5 MG: 5 TABLET, FILM COATED ORAL at 08:53

## 2025-06-26 RX ADMIN — METOPROLOL TARTRATE 12.5 MG: 25 TABLET, FILM COATED ORAL at 08:53

## 2025-06-26 RX ADMIN — DICLOFENAC SODIUM 2 G: 10 GEL TOPICAL at 17:43

## 2025-06-26 RX ADMIN — CARBIDOPA AND LEVODOPA 1 TABLET: 25; 100 TABLET ORAL at 08:53

## 2025-06-26 RX ADMIN — LACOSAMIDE 100 MG: 50 TABLET, FILM COATED ORAL at 20:53

## 2025-06-26 RX ADMIN — Medication 750 MG: at 21:22

## 2025-06-26 RX ADMIN — ATORVASTATIN CALCIUM 80 MG: 40 TABLET, FILM COATED ORAL at 20:53

## 2025-06-26 RX ADMIN — ESCITALOPRAM OXALATE 10 MG: 10 TABLET ORAL at 08:53

## 2025-06-26 RX ADMIN — CARBIDOPA AND LEVODOPA 1 TABLET: 25; 100 TABLET ORAL at 20:53

## 2025-06-26 RX ADMIN — CARBIDOPA AND LEVODOPA 1 TABLET: 25; 100 TABLET ORAL at 13:23

## 2025-06-26 RX ADMIN — ASPIRIN 81 MG: 81 TABLET, CHEWABLE ORAL at 08:52

## 2025-06-26 RX ADMIN — MINERAL SUPPLEMENT IRON 300 MG / 5 ML STRENGTH LIQUID 100 PER BOX UNFLAVORED 300 MG: at 20:53

## 2025-06-26 RX ADMIN — ACETAMINOPHEN 650 MG: 325 TABLET ORAL at 16:44

## 2025-06-26 RX ADMIN — LORAZEPAM 0.5 MG: 0.5 TABLET ORAL at 08:53

## 2025-06-26 RX ADMIN — IPRATROPIUM BROMIDE AND ALBUTEROL SULFATE 1 DOSE: .5; 2.5 SOLUTION RESPIRATORY (INHALATION) at 08:20

## 2025-06-26 RX ADMIN — APIXABAN 5 MG: 5 TABLET, FILM COATED ORAL at 08:53

## 2025-06-26 RX ADMIN — FUROSEMIDE 40 MG: 10 INJECTION, SOLUTION INTRAMUSCULAR; INTRAVENOUS at 13:23

## 2025-06-26 RX ADMIN — SENNOSIDES AND DOCUSATE SODIUM 1 TABLET: 50; 8.6 TABLET ORAL at 08:53

## 2025-06-26 RX ADMIN — MUPIROCIN: 20 OINTMENT TOPICAL at 08:53

## 2025-06-26 RX ADMIN — MONTELUKAST SODIUM 10 MG: 10 TABLET, COATED ORAL at 20:53

## 2025-06-26 RX ADMIN — CETIRIZINE HYDROCHLORIDE 5 MG: 10 TABLET ORAL at 08:52

## 2025-06-26 RX ADMIN — MEMANTINE 10 MG: 5 TABLET ORAL at 08:53

## 2025-06-26 RX ADMIN — Medication 10 ML: at 20:54

## 2025-06-26 RX ADMIN — MEMANTINE 10 MG: 5 TABLET ORAL at 20:53

## 2025-06-26 RX ADMIN — MUPIROCIN: 20 OINTMENT TOPICAL at 20:54

## 2025-06-26 RX ADMIN — ARIPIPRAZOLE 5 MG: 10 TABLET ORAL at 08:52

## 2025-06-26 RX ADMIN — METOPROLOL TARTRATE 12.5 MG: 25 TABLET, FILM COATED ORAL at 20:53

## 2025-06-26 RX ADMIN — SENNOSIDES AND DOCUSATE SODIUM 1 TABLET: 50; 8.6 TABLET ORAL at 20:53

## 2025-06-26 RX ADMIN — LANSOPRAZOLE 30 MG: 30 TABLET, ORALLY DISINTEGRATING, DELAYED RELEASE ORAL at 06:16

## 2025-06-26 RX ADMIN — LACOSAMIDE 100 MG: 50 TABLET, FILM COATED ORAL at 08:53

## 2025-06-26 RX ADMIN — MINERAL SUPPLEMENT IRON 300 MG / 5 ML STRENGTH LIQUID 100 PER BOX UNFLAVORED 300 MG: at 08:52

## 2025-06-26 RX ADMIN — Medication 10 ML: at 08:54

## 2025-06-26 RX ADMIN — APIXABAN 5 MG: 5 TABLET, FILM COATED ORAL at 20:53

## 2025-06-26 RX ADMIN — Medication 1 CAPSULE: at 08:52

## 2025-06-26 ASSESSMENT — PAIN DESCRIPTION - DESCRIPTORS: DESCRIPTORS: DISCOMFORT;DULL

## 2025-06-26 ASSESSMENT — PAIN DESCRIPTION - ORIENTATION: ORIENTATION: MID;RIGHT

## 2025-06-26 ASSESSMENT — PAIN DESCRIPTION - LOCATION: LOCATION: NECK

## 2025-06-26 ASSESSMENT — PAIN SCALES - GENERAL
PAINLEVEL_OUTOF10: 0
PAINLEVEL_OUTOF10: 3

## 2025-06-26 NOTE — PROGRESS NOTES
06/26/25 0823   RT Protocol   History Pulmonary Disease 2   Respiratory pattern 0   Breath sounds 0   Cough 0   Indications for Bronchodilator Therapy Wheezing associated with pulm disorder   Bronchodilator Assessment Score 2

## 2025-06-26 NOTE — PROGRESS NOTES
Shift assessment complete (see flowsheets).     Pt AxOx4. VSS. Following commands.     Respirations are even, easy, and unlabored. Bilateral lung sounds diminished. PEG in place. TF running at goal of 60 mL/hr with 30 Q 3 flushes.     Two PIVs on RFA and RW patent and working well.     Pt up x1 assist with walker. Standing scale weight obtained at 163.2 lbs.     Pt denies pain or any needs at this time. Bed locked in lowest position and call light within reach.

## 2025-06-26 NOTE — CARE COORDINATION
PT/OT to work w/pt today. From home w/spouse. Active [facility]. Referral to [facility]. Plan TBD. tb

## 2025-06-26 NOTE — PROGRESS NOTES
TF stopped for transfer and PEG flushed. Pt wife at bedside. Pt transferred to 301 with all belongings via bed on 2L/NC by transport.     Face to face report given to Moise MENDEZ. POC transferred. ANDREA Gallegos

## 2025-06-26 NOTE — PROGRESS NOTES
Shift assessment, completed, see flow sheet. Pt is alert and oriented x 4. Following commands.      Afib 93, /61 (76), SpO2 90% 2L/NC. Respirations are easy, shallow. Bilateral lung sounds diminished.  Productive, occasional productive cough per pt. Pt with nose bleed this AM.PEG in place, with TF at 60 mL/hr. Pt with generalized edema.     2 PIVs, WNL, saline locked.     Call light within reach. Bed in lowest position. Bed alarm on.

## 2025-06-26 NOTE — PLAN OF CARE
HEART FAILURE CARE PLAN:    Comorbidities Reviewed: Yes   Patient has a past medical history of A-fib ([facility]), MIRNA (acute kidney injury), Anxiety, Arthritis, Bradycardia, Cancer ([facility]), CHF (congestive heart failure) ([facility]), Coronary artery disease involving native coronary artery of native heart without angina pectoris, Hemoptysis, History of implantation of penile prosthesis, Irregular heart  beats, Mixed hyperlipidemia, Parkinson disease ([facility]), 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     Weights Reviewed: Yes   Admission weight: 75.8 kg (167 lb)   Wt Readings from Last 3 Encounters:   06/25/25 71.6 kg (157 lb 13.6 oz)   06/11/25 79.3 kg (174 lb 12.8 oz)   06/02/25 78.5 kg (173 lb)     Intake & Output Reviewed: Yes     Intake/Output Summary (Last 24 hours) at 6/26/2025 0019  Last data filed at 6/26/2025 0000  Gross per 24 hour   Intake 3105.58 ml   Output 2450 ml   Net 655.58 ml       ECHOCARDIOGRAM Reviewed: Yes   Patient's Ejection Fraction (EF) is greater than 40%     Medications Reviewed: Yes   SCHEDULED HOSPITAL MEDICATIONS:   mupirocin   Each Nostril BID    ipratropium 0.5 mg-albuterol 2.5 mg  1 Dose Inhalation TID    metoprolol tartrate  12.5 mg Oral BID    sennosides-docusate sodium  1 tablet PEG Tube BID    apixaban  5 mg Per G Tube BID    ARIPiprazole  5 mg Per G Tube Daily    aspirin  81 mg Per G Tube Daily    atorvastatin  80 mg Oral Nightly    carbidopa-levodopa  1 tablet Per G Tube TID    cetirizine  5 mg Per G Tube Daily    escitalopram  10 mg Per G Tube Daily    ferrous Sulfate  300 mg Per G Tube BID    finasteride  5 mg Per G Tube Daily    lacosamide  100 mg Oral BID    lactobacillus  1 capsule Per G Tube Daily with breakfast    levETIRAcetam  750 mg Per G Tube BID    memantine  10 mg Per G Tube BID    montelukast  10 mg Per G Tube Nightly    lansoprazole  30 mg Per G Tube

## 2025-06-26 NOTE — PROGRESS NOTES
CARDIOLOGY PROGRESS NOTE        Patient Name: Rodolfo Luna  Primary Care physician: Carlton Leavitt, KATIE - CNP    Reason for Referral/Chief Complaint: Rodolfo Luna is a 79 y.o. patient who presents to cardiology clinic today for cardiology follow up.      History of Present Illness:   Rodolfo Luna 78 y.o. with a prior medical history  notable for of HFpEF, paroxsymal atrial fibrillation s/p RFCA of AF 9/2014, S/P watchman 11/14/22, lung cancer s/p thoracotomy, parkinson's disease, porphyria cutanea tarda and coronary artery disease s/p PCI to LAD 3/2017.     Stress test on 5/10/23 showed normal wall motion. No clear inducible ischemia.     Admitted on 4/4/24 for altered mental status, garbled speech and left sided facial droop. Found to have had an acute Left CVA -lacunar infarct, keyshawn by brain MRI.  Limited echo showed no evidence of shunting.  Placed on brilinta 21 days in addition to ASA. EEG planned OP as well as neuro follow up.  He was then admitted 5/9/24 for confusion, generalized weakness and multiple falls.  MRI of brain showed no acute infarct at that time.     6/2024 noted CT with evidence of wilfredo-device leak with respect to prior placed watchman as well as new pulmonary embolism R main PA. Restarted on Eliquis.     In the interim patient was admitted to [facility] 7/1/2024 for persistent cough related to oral intake.  VBG showed compensated chronic respiratory acidosis. Patient was started on Unasyn and admitted to the hospital.  Diagnosed with aspiration pneumonia.  PEG placed 7/12/24.  Discharged 7/16/24.  He was then readmitted 7/18/24 for BLE edema, testicular edema and shortness of breath.  Echocardiogram on 7/19/24 showed EF > 65%, mild aortic sclerosis, mild TR, SPAP 38-41 mmHg.  Patient discharged 7/22/24 to SNF.      I evaluated the patient 9/2024 for heart failure exacerbation.    Patient admitted 6/10-6/17 with weakness.  Fever of unknown origin.    Patient presents this

## 2025-06-26 NOTE — PROGRESS NOTES
Progress Note    Admit Date:  6/22/2025    was admitted for dyspnea. Work up showed fluid overload. He was recently admitted to [facility] for PEG site infection. After he went home he had dyspnea and comes back with edema, started lasix  Developed Afibb with RVR and transferred to ICU overnight      Subjective:  Mr. Luna seen up in bed, reports he has hx of stroke and parkinsons with dysphagia and had PEG placed   Has ongoing oral secretions, using Yanker catheter for suction   No chest pain or sob  Remains on cardizem gtt with borderline low BP   Good diuresis with lasix but low K this am      Objective:   BP (!) 117/51   Pulse 91   Temp 97.7 °F (36.5 °C) (Temporal)   Resp 23   Ht 1.753 m (5' 9.02\")   Wt 74 kg (163 lb 3.2 oz)   SpO2 91%   BMI 24.09 kg/m²        Intake/Output Summary (Last 24 hours) at 6/26/2025 0713  Last data filed at 6/26/2025 0400  Gross per 24 hour   Intake 2512.87 ml   Output 2250 ml   Net 262.87 ml       Physical Exam:   Gen: elderly male up in bed , No distress. Alert. Ill appearing  Eyes: PERRL. No sclera icterus. No conjunctival injection.   ENT: No discharge. Pharynx clear.   Neck: No JVD.  No Carotid Bruit. Trachea midline.  Resp: No accessory muscle use. Diminished solitario with scattered crackles   CV: Irregular rhythm, tachycardia. No murmur.  No rub. Trace edema of legs. Chronic edema of the LUE 2+  Capillary Refill: Brisk,< 3 seconds   Peripheral Pulses: +2 palpable, equal bilaterally   GI: Non-tender. Non-distended. No masses. No organomegaly. Normal bowel sounds. No hernia.   PEG tube noted with insertion site clear   Skin: Warm and dry. No nodule on exposed extremities. No rash on exposed extremities.   M/S: No cyanosis. No joint deformity. No clubbing.   Neuro: Awake. Grossly nonfocal    : Oriented x 3. No anxiety or agitation. Gen weakness and right UE resting tremor noted        Medications:  mupirocin, , BID  ipratropium 0.5 mg-albuterol 2.5 mg, 1 Dose, TID  metoprolol tartrate,        Thrombocytopenia - chronic.  Anemia - chronic.   - previous admission platelets as low as 76  - platelets 117 > 116.  -hemoglobin 10.5 >9.5  stable from baseline.  - monitor with daily CBC.        BPH  - resume Proscar      Hx of porphyria cutanea tarda     Note above makes patient higher risk for morbidity and mortality requiring testing and treatment.         DVT Prophylaxis: Eliquis  Diet: Diet NPO  ADULT TUBE FEEDING; PEG; Standard with Fiber; Continuous; 20; Yes; 20; Other (specify); every 2 hours as tolerated; 60; 30; Q 3 hours; Protein; 1 Dose; Daily  Code Status: Limited          Trevor Mckay MD 6/26/2025 7:13 AM

## 2025-06-27 ENCOUNTER — APPOINTMENT (OUTPATIENT)
Dept: CT IMAGING | Age: 80
DRG: 291 | End: 2025-06-27
Payer: MEDICARE

## 2025-06-27 ENCOUNTER — APPOINTMENT (OUTPATIENT)
Dept: GENERAL RADIOLOGY | Age: 80
DRG: 291 | End: 2025-06-27
Payer: MEDICARE

## 2025-06-27 PROBLEM — R65.20 SEVERE SEPSIS (HCC): Status: ACTIVE | Noted: 2025-06-27

## 2025-06-27 PROBLEM — A41.9 SEVERE SEPSIS (HCC): Status: ACTIVE | Noted: 2025-06-27

## 2025-06-27 LAB
ACANTHOCYTES BLD QL SMEAR: ABNORMAL
ALBUMIN SERPL-MCNC: 2.4 G/DL (ref 3.4–5)
ALP SERPL-CCNC: 127 U/L (ref 40–129)
ALT SERPL-CCNC: 21 U/L (ref 10–40)
ANION GAP SERPL CALCULATED.3IONS-SCNC: 17 MMOL/L (ref 3–16)
ANISOCYTOSIS BLD QL SMEAR: ABNORMAL
AST SERPL-CCNC: 51 U/L (ref 15–37)
BASOPHILS # BLD: 0 K/UL (ref 0–0.2)
BASOPHILS NFR BLD: 0 %
BILIRUB DIRECT SERPL-MCNC: 0.7 MG/DL (ref 0–0.3)
BILIRUB INDIRECT SERPL-MCNC: 0.4 MG/DL (ref 0–1)
BILIRUB SERPL-MCNC: 1.1 MG/DL (ref 0–1)
BUN SERPL-MCNC: 46 MG/DL (ref 7–20)
CALCIUM SERPL-MCNC: 8.3 MG/DL (ref 8.3–10.6)
CHLORIDE SERPL-SCNC: 88 MMOL/L (ref 99–110)
CO2 SERPL-SCNC: 30 MMOL/L (ref 21–32)
CREAT SERPL-MCNC: 1 MG/DL (ref 0.8–1.3)
DACRYOCYTES BLD QL SMEAR: ABNORMAL
DEPRECATED RDW RBC AUTO: 15.9 % (ref 12.4–15.4)
EKG DIAGNOSIS: NORMAL
EKG Q-T INTERVAL: 384 MS
EKG QRS DURATION: 88 MS
EKG QTC CALCULATION (BAZETT): 495 MS
EKG R AXIS: 46 DEGREES
EKG T AXIS: 41 DEGREES
EKG VENTRICULAR RATE: 100 BPM
EOSINOPHIL # BLD: 0 K/UL (ref 0–0.6)
EOSINOPHIL NFR BLD: 0 %
GFR SERPLBLD CREATININE-BSD FMLA CKD-EPI: 76 ML/MIN/{1.73_M2}
GLUCOSE BLD-MCNC: 120 MG/DL (ref 70–99)
GLUCOSE BLD-MCNC: 150 MG/DL (ref 70–99)
GLUCOSE BLD-MCNC: 169 MG/DL (ref 70–99)
GLUCOSE BLD-MCNC: 179 MG/DL (ref 70–99)
GLUCOSE SERPL-MCNC: 146 MG/DL (ref 70–99)
HCT VFR BLD AUTO: 28.7 % (ref 40.5–52.5)
HGB BLD-MCNC: 9.4 G/DL (ref 13.5–17.5)
LACTATE BLDV-SCNC: 4 MMOL/L (ref 0.4–2)
LYMPHOCYTES # BLD: 1.3 K/UL (ref 1–5.1)
LYMPHOCYTES NFR BLD: 5 %
MAGNESIUM SERPL-MCNC: 2.73 MG/DL (ref 1.8–2.4)
MCH RBC QN AUTO: 29.4 PG (ref 26–34)
MCHC RBC AUTO-ENTMCNC: 32.7 G/DL (ref 31–36)
MCV RBC AUTO: 90.1 FL (ref 80–100)
MONOCYTES # BLD: 4.3 K/UL (ref 0–1.3)
MONOCYTES NFR BLD: 17 %
MONONUC CELLS NFR BLD MANUAL: 2 %
NEUTROPHILS # BLD: 19.4 K/UL (ref 1.7–7.7)
NEUTROPHILS NFR BLD: 75 %
NEUTS BAND NFR BLD MANUAL: 1 % (ref 0–7)
NEUTS VAC BLD QL SMEAR: PRESENT
OVALOCYTES BLD QL SMEAR: ABNORMAL
PATH INTERP BLD-IMP: YES
PERFORMED ON: ABNORMAL
PLATELET # BLD AUTO: 86 K/UL (ref 135–450)
PLATELET BLD QL SMEAR: ABNORMAL
PMV BLD AUTO: 9.1 FL (ref 5–10.5)
POIKILOCYTOSIS BLD QL SMEAR: ABNORMAL
POLYCHROMASIA BLD QL SMEAR: ABNORMAL
POTASSIUM SERPL-SCNC: 3.7 MMOL/L (ref 3.5–5.1)
PROCALCITONIN SERPL IA-MCNC: 3.02 NG/ML (ref 0–0.15)
PROT SERPL-MCNC: 6.9 G/DL (ref 6.4–8.2)
RBC # BLD AUTO: 3.19 M/UL (ref 4.2–5.9)
SCHISTOCYTES BLD QL SMEAR: ABNORMAL
SLIDE REVIEW: ABNORMAL
SODIUM SERPL-SCNC: 135 MMOL/L (ref 136–145)
STOMATOCYTES BLD QL SMEAR: ABNORMAL
TARGETS BLD QL SMEAR: ABNORMAL
TROPONIN, HIGH SENSITIVITY: 62 NG/L (ref 0–22)
WBC # BLD AUTO: 25.5 K/UL (ref 4–11)

## 2025-06-27 PROCEDURE — 2580000003 HC RX 258: Performed by: FAMILY MEDICINE

## 2025-06-27 PROCEDURE — 2580000003 HC RX 258: Performed by: INTERNAL MEDICINE

## 2025-06-27 PROCEDURE — 6360000002 HC RX W HCPCS: Performed by: INTERNAL MEDICINE

## 2025-06-27 PROCEDURE — 97166 OT EVAL MOD COMPLEX 45 MIN: CPT

## 2025-06-27 PROCEDURE — 87040 BLOOD CULTURE FOR BACTERIA: CPT

## 2025-06-27 PROCEDURE — 74177 CT ABD & PELVIS W/CONTRAST: CPT

## 2025-06-27 PROCEDURE — 97530 THERAPEUTIC ACTIVITIES: CPT

## 2025-06-27 PROCEDURE — 6370000000 HC RX 637 (ALT 250 FOR IP): Performed by: INTERNAL MEDICINE

## 2025-06-27 PROCEDURE — 2060000000 HC ICU INTERMEDIATE R&B

## 2025-06-27 PROCEDURE — 83605 ASSAY OF LACTIC ACID: CPT

## 2025-06-27 PROCEDURE — 83735 ASSAY OF MAGNESIUM: CPT

## 2025-06-27 PROCEDURE — 85025 COMPLETE CBC W/AUTO DIFF WBC: CPT

## 2025-06-27 PROCEDURE — 87077 CULTURE AEROBIC IDENTIFY: CPT

## 2025-06-27 PROCEDURE — 2700000000 HC OXYGEN THERAPY PER DAY

## 2025-06-27 PROCEDURE — 93005 ELECTROCARDIOGRAM TRACING: CPT | Performed by: INTERNAL MEDICINE

## 2025-06-27 PROCEDURE — 80048 BASIC METABOLIC PNL TOTAL CA: CPT

## 2025-06-27 PROCEDURE — 84484 ASSAY OF TROPONIN QUANT: CPT

## 2025-06-27 PROCEDURE — 76937 US GUIDE VASCULAR ACCESS: CPT

## 2025-06-27 PROCEDURE — 36415 COLL VENOUS BLD VENIPUNCTURE: CPT

## 2025-06-27 PROCEDURE — 6360000004 HC RX CONTRAST MEDICATION: Performed by: INTERNAL MEDICINE

## 2025-06-27 PROCEDURE — 87205 SMEAR GRAM STAIN: CPT

## 2025-06-27 PROCEDURE — 84145 PROCALCITONIN (PCT): CPT

## 2025-06-27 PROCEDURE — 87070 CULTURE OTHR SPECIMN AEROBIC: CPT

## 2025-06-27 PROCEDURE — 97535 SELF CARE MNGMENT TRAINING: CPT

## 2025-06-27 PROCEDURE — 87075 CULTR BACTERIA EXCEPT BLOOD: CPT

## 2025-06-27 PROCEDURE — 97164 PT RE-EVAL EST PLAN CARE: CPT

## 2025-06-27 PROCEDURE — 87186 SC STD MICRODIL/AGAR DIL: CPT

## 2025-06-27 PROCEDURE — 80076 HEPATIC FUNCTION PANEL: CPT

## 2025-06-27 PROCEDURE — 87641 MR-STAPH DNA AMP PROBE: CPT

## 2025-06-27 PROCEDURE — 99233 SBSQ HOSP IP/OBS HIGH 50: CPT | Performed by: INTERNAL MEDICINE

## 2025-06-27 PROCEDURE — 71045 X-RAY EXAM CHEST 1 VIEW: CPT

## 2025-06-27 PROCEDURE — 93010 ELECTROCARDIOGRAM REPORT: CPT | Performed by: INTERNAL MEDICINE

## 2025-06-27 PROCEDURE — 94761 N-INVAS EAR/PLS OXIMETRY MLT: CPT

## 2025-06-27 RX ORDER — 0.9 % SODIUM CHLORIDE 0.9 %
250 INTRAVENOUS SOLUTION INTRAVENOUS ONCE
Status: COMPLETED | OUTPATIENT
Start: 2025-06-27 | End: 2025-06-27

## 2025-06-27 RX ORDER — IOPAMIDOL 755 MG/ML
75 INJECTION, SOLUTION INTRAVASCULAR
Status: COMPLETED | OUTPATIENT
Start: 2025-06-27 | End: 2025-06-27

## 2025-06-27 RX ORDER — SODIUM CHLORIDE 9 MG/ML
INJECTION, SOLUTION INTRAVENOUS CONTINUOUS
Status: DISCONTINUED | OUTPATIENT
Start: 2025-06-27 | End: 2025-06-27

## 2025-06-27 RX ORDER — MIDODRINE HYDROCHLORIDE 5 MG/1
5 TABLET ORAL
Status: DISCONTINUED | OUTPATIENT
Start: 2025-06-27 | End: 2025-06-29 | Stop reason: HOSPADM

## 2025-06-27 RX ADMIN — SODIUM CHLORIDE: 0.9 INJECTION, SOLUTION INTRAVENOUS at 21:08

## 2025-06-27 RX ADMIN — LORAZEPAM 0.5 MG: 0.5 TABLET ORAL at 17:50

## 2025-06-27 RX ADMIN — SODIUM CHLORIDE: 0.9 INJECTION, SOLUTION INTRAVENOUS at 20:32

## 2025-06-27 RX ADMIN — ATORVASTATIN CALCIUM 80 MG: 40 TABLET, FILM COATED ORAL at 20:41

## 2025-06-27 RX ADMIN — MINERAL SUPPLEMENT IRON 300 MG / 5 ML STRENGTH LIQUID 100 PER BOX UNFLAVORED 300 MG: at 08:02

## 2025-06-27 RX ADMIN — APIXABAN 5 MG: 5 TABLET, FILM COATED ORAL at 20:40

## 2025-06-27 RX ADMIN — IOPAMIDOL 75 ML: 755 INJECTION, SOLUTION INTRAVENOUS at 22:35

## 2025-06-27 RX ADMIN — MIDODRINE HYDROCHLORIDE 5 MG: 5 TABLET ORAL at 17:05

## 2025-06-27 RX ADMIN — FUROSEMIDE 40 MG: 40 TABLET ORAL at 08:03

## 2025-06-27 RX ADMIN — APIXABAN 5 MG: 5 TABLET, FILM COATED ORAL at 08:04

## 2025-06-27 RX ADMIN — SENNOSIDES AND DOCUSATE SODIUM 1 TABLET: 50; 8.6 TABLET ORAL at 20:40

## 2025-06-27 RX ADMIN — SODIUM CHLORIDE 250 ML: 0.9 INJECTION, SOLUTION INTRAVENOUS at 19:01

## 2025-06-27 RX ADMIN — ACETAMINOPHEN 650 MG: 325 TABLET ORAL at 08:03

## 2025-06-27 RX ADMIN — ASPIRIN 81 MG: 81 TABLET, CHEWABLE ORAL at 08:02

## 2025-06-27 RX ADMIN — Medication 750 MG: at 20:58

## 2025-06-27 RX ADMIN — MEMANTINE 10 MG: 5 TABLET ORAL at 20:40

## 2025-06-27 RX ADMIN — PIPERACILLIN AND TAZOBACTAM 4500 MG: 4; .5 INJECTION, POWDER, LYOPHILIZED, FOR SOLUTION INTRAVENOUS at 20:34

## 2025-06-27 RX ADMIN — CARBIDOPA AND LEVODOPA 1 TABLET: 25; 100 TABLET ORAL at 12:10

## 2025-06-27 RX ADMIN — LACOSAMIDE 100 MG: 50 TABLET, FILM COATED ORAL at 08:03

## 2025-06-27 RX ADMIN — MUPIROCIN: 20 OINTMENT TOPICAL at 20:56

## 2025-06-27 RX ADMIN — ARIPIPRAZOLE 5 MG: 10 TABLET ORAL at 08:02

## 2025-06-27 RX ADMIN — Medication 1 CAPSULE: at 08:04

## 2025-06-27 RX ADMIN — FINASTERIDE 5 MG: 5 TABLET, FILM COATED ORAL at 08:02

## 2025-06-27 RX ADMIN — VANCOMYCIN HYDROCHLORIDE 1500 MG: 500 INJECTION, POWDER, LYOPHILIZED, FOR SOLUTION INTRAVENOUS at 20:38

## 2025-06-27 RX ADMIN — CARBIDOPA AND LEVODOPA 1 TABLET: 25; 100 TABLET ORAL at 08:03

## 2025-06-27 RX ADMIN — Medication 750 MG: at 09:00

## 2025-06-27 RX ADMIN — MEMANTINE 10 MG: 5 TABLET ORAL at 08:04

## 2025-06-27 RX ADMIN — LACOSAMIDE 100 MG: 50 TABLET, FILM COATED ORAL at 20:41

## 2025-06-27 RX ADMIN — MINERAL SUPPLEMENT IRON 300 MG / 5 ML STRENGTH LIQUID 100 PER BOX UNFLAVORED 300 MG: at 20:40

## 2025-06-27 RX ADMIN — CETIRIZINE HYDROCHLORIDE 5 MG: 10 TABLET ORAL at 08:03

## 2025-06-27 RX ADMIN — MONTELUKAST SODIUM 10 MG: 10 TABLET, COATED ORAL at 20:56

## 2025-06-27 RX ADMIN — SENNOSIDES AND DOCUSATE SODIUM 1 TABLET: 50; 8.6 TABLET ORAL at 08:02

## 2025-06-27 RX ADMIN — MIDODRINE HYDROCHLORIDE 5 MG: 5 TABLET ORAL at 12:10

## 2025-06-27 RX ADMIN — ESCITALOPRAM OXALATE 10 MG: 10 TABLET ORAL at 08:03

## 2025-06-27 RX ADMIN — MUPIROCIN: 20 OINTMENT TOPICAL at 08:59

## 2025-06-27 RX ADMIN — LANSOPRAZOLE 30 MG: 30 TABLET, ORALLY DISINTEGRATING, DELAYED RELEASE ORAL at 06:35

## 2025-06-27 RX ADMIN — CARBIDOPA AND LEVODOPA 1 TABLET: 25; 100 TABLET ORAL at 20:41

## 2025-06-27 RX ADMIN — METOPROLOL TARTRATE 12.5 MG: 25 TABLET, FILM COATED ORAL at 08:03

## 2025-06-27 NOTE — PROGRESS NOTES
Writer asked patient if he was anxious and needed any medications- requesting something  Ativan 0.5mg given at 1750 via PEG.

## 2025-06-27 NOTE — PROGRESS NOTES
Comprehensive Nutrition Assessment    Type and Reason for Visit:  Reassess    Nutrition Recommendations/Plan:   Continue NPO   Continue - ADULT TUBE FEEDING; PEG tube; Standard with Fiber formula - Jevity 1.5 with a goal rate of 60 ml/hr x 20 hours. Start with 20 ml/hr and increase by 20 ml every 2 hours, as tolerated by patient, until goal rate can be achieved and maintained. Water flushes, 30 ml every 3 hours for tube patency. Please administer one prosource TF 20 protein supplement once daily.   Monitor nutrition-related labs, bowel function, and weight trends.      Malnutrition Assessment:  Malnutrition Status:  At risk for malnutrition (06/27/25 8306)    Context:  Acute Illness     Findings of the 6 clinical characteristics of malnutrition:  Energy Intake:  Mild decrease in energy intake  Weight Loss:  No weight loss     Body Fat Loss:  Unable to assess     Muscle Mass Loss:  Unable to assess    Fluid Accumulation:  Unable to assess Extremities   Strength:  Not Performed    Nutrition Assessment:    Pt improving from a nutritional standpoint AEB current enteral nutrition is infsuing at goal and meeting 100% of his nutritional needs .  Remains at risk for further nutritional compromise r/t SOB ,NPO status and altered nutrition-related labs .  Will continue enteral feeding as ordered.        Nutrition Related Findings:    pt was asleep at time of visit; family at bedside;  TF infusing at Goal rate ; low h/h Wound Type: None       Current Nutrition Intake & Therapies:    Average Meal Intake: NPO  Average Supplements Intake: NPO  Current Tube Feeding (TF) Orders:  Feeding Route: PEG  Formula: Standard with Fiber  Schedule: Continuous  Feeding Regimen: Jevity 1.5 with a goal rate of 60 ml/hr x 20 hours  Additives/Modulars: Protein (one prosource TF 20 protein supplement once daily)  Water Flushes: 30 ml every 3 hours for tube patency  Current TF Provides: Jevity 1.5 with a goal rate of 60 ml/hr x 20 hours = 1200

## 2025-06-27 NOTE — PROGRESS NOTES
[facility] Vascular Access  Ultrasound Guided Peripheral Insertion Procedure Note.     Rodolfo Luna   Admitted- 6/22/2025  6:29 AM  Admission diagnosis- Shortness of breath [R06.02]  Normocytic anemia [D64.9]  Leg edema [R60.0]  Thrombocytopenia [D69.6]  Pulmonary edema cardiac cause (HCC) [I50.1]  Elevated troponin [R79.89]  Acute congestive heart failure, unspecified heart failure type (HCC) [I50.9]      Attending Physician- Jaylene Durant MD  Ordering Physician- Franca Huitron  Indication for Insertion: Limited Access    USG PIV placed to left forearm using sterile technique. Brisk blood return noted, line flushes with ease. Patient tolerated well. Bed returned to lowest position, call light within reach. See US images below.        Teetee Ricks RN  Vascular Access Team

## 2025-06-27 NOTE — PROGRESS NOTES
Patient complaints of chest heaviness to primary RN.   Primary RN calls writer for assistance.  Patient with no signs of distress.    VS obtained   97.7    RR 14  BP 98/57  O2 94% 3L NC    PS IM 1704.   Call placed to clinical.   STAT EKG ordered  Clinical advised MARCELL Mendoza on-call.   MARCELL Mendoza, responds she is not on-call.       Spouse requested administration of ativan. Nursing yielded on the side of caution until response from IM.    Orders received from IM 1721.      Call placed to lab for STAT orders.     Spouse requested ativan. Ativan administered via primary RN.

## 2025-06-27 NOTE — CONSULTS
Pharmacy to dose IV Vanco  & Zosyn for Sepsis  Blood Cx have been drawn per nursing .  Will begin Vanco with a 1.5g IV loading dose x1 now to provide gram+ org coverage to include MRSA.  Will continue at 750mg IV Q12hrs starting in the AM.  Trough check 6/29 at 0500.  Pred , tr 13.9.  On IV Zosyn, monitor SCr closely.  Kaiden Lombardo RPH PharmD 6/27/2025 7:21 PM

## 2025-06-27 NOTE — PLAN OF CARE
Problem: Chronic Conditions and Co-morbidities  Goal: Patient's chronic conditions and co-morbidity symptoms are monitored and maintained or improved  Outcome: Progressing     Problem: Discharge Planning  Goal: Discharge to home or other facility with appropriate resources  Outcome: Progressing     Problem: Safety - Adult  Goal: Free from fall injury  Outcome: Progressing     Problem: ABCDS Injury Assessment  Goal: Absence of physical injury  Outcome: Progressing     Problem: Pain  Goal: Verbalizes/displays adequate comfort level or baseline comfort level  Outcome: Progressing     Problem: Seizure Precautions  Goal: Remains free of injury related to seizures activity  Outcome: Progressing

## 2025-06-27 NOTE — PROGRESS NOTES
Bedside report and transfer of care given to ANDREA Melgar  . Pt currently resting in bed with the call light within reach. Pt denies any other care needs at this time. Pt stable at this time.

## 2025-06-27 NOTE — PLAN OF CARE
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     Weights Reviewed: Yes   Admission weight: 75.8 kg (167 lb)   Wt Readings from Last 3 Encounters:   06/26/25 74 kg (163 lb 3.2 oz)   06/11/25 79.3 kg (174 lb 12.8 oz)   06/02/25 78.5 kg (173 lb)     Intake & Output Reviewed: Yes     Intake/Output Summary (Last 24 hours) at 6/26/2025 2210  Last data filed at 6/26/2025 2107  Gross per 24 hour   Intake 1431 ml   Output 1900 ml   Net -469 ml       ECHOCARDIOGRAM Reviewed: Yes   Patient's Ejection Fraction (EF) is greater than 40%     Medications Reviewed: Yes   SCHEDULED HOSPITAL MEDICATIONS:   [START ON 6/27/2025] furosemide  40 mg Oral Daily    mupirocin   Each Nostril BID    metoprolol tartrate  12.5 mg Oral BID    sennosides-docusate sodium  1 tablet PEG Tube BID    apixaban  5 mg Per G Tube BID    ARIPiprazole  5 mg Per G Tube Daily    aspirin  81 mg Per G Tube Daily    atorvastatin  80 mg Oral Nightly    carbidopa-levodopa  1 tablet Per G Tube TID    cetirizine  5 mg Per G Tube Daily    escitalopram  10 mg Per G Tube Daily    ferrous Sulfate  300 mg Per G Tube BID    finasteride  5 mg Per G Tube Daily    lacosamide  100 mg Oral BID    lactobacillus  1 capsule Per G Tube Daily with breakfast    levETIRAcetam  750 mg Per G Tube BID    memantine  10 mg Per G Tube BID    montelukast  10 mg Per G Tube Nightly    lansoprazole  30 mg Per G Tube QAM AC    sodium  discharge.     Electronically signed by Talia Mahajan RN on 6/26/2025 at 10:10 PM

## 2025-06-27 NOTE — PROGRESS NOTES
Progress Note    Admit Date:  6/22/2025    was admitted for dyspnea. Work up showed fluid overload. He was recently admitted to [facility] for PEG site infection. After he went home he had dyspnea and comes back with edema, started lasix  Developed Afibb with RVR and transferred to ICU overnight      Subjective:  Mr. Luna seen up in bed, appears fatigued. He reports his only complaint is that he has neck pain and stiffness. He says this started after he was doing some physical therapy. BP was lower this am. HR overall less than 100.    Objective:   BP (!) 100/55   Pulse (!) 101   Temp 97.7 °F (36.5 °C) (Axillary)   Resp 17   Ht 1.753 m (5' 9.02\")   Wt 78.2 kg (172 lb 4.8 oz)   SpO2 94%   BMI 25.43 kg/m²        Intake/Output Summary (Last 24 hours) at 6/27/2025 1437  Last data filed at 6/27/2025 0950  Gross per 24 hour   Intake 1039 ml   Output 950 ml   Net 89 ml       Physical Exam:   Gen: elderly male up in bed , No distress. Alert. Ill appearing  Eyes: PERRL. No sclera icterus. No conjunctival injection.   ENT: No discharge. Pharynx clear.   Neck: No JVD.  No Carotid Bruit. Trachea midline.  Resp: No accessory muscle use. Diminished solitario with scattered crackles   CV: Irregular rhythm, tachycardia. No murmur.  No rub. Trace edema of legs. Chronic edema of the LUE 2+  Capillary Refill: Brisk,< 3 seconds   Peripheral Pulses: +2 palpable, equal bilaterally   GI: Non-tender. Non-distended. No masses. No organomegaly. Normal bowel sounds. No hernia.   PEG tube noted with insertion site clear   Skin: Warm and dry. No nodule on exposed extremities. No rash on exposed extremities.   M/S: No cyanosis. No joint deformity. No clubbing.   Neuro: Awake. Grossly nonfocal    : Oriented x 3. No anxiety or agitation. Gen weakness and right UE resting tremor noted    Medications:  midodrine, 5 mg, TID WC  furosemide, 40 mg, Daily  mupirocin, , BID  metoprolol tartrate, 12.5 mg, BID  sennosides-docusate sodium, 1 tablet,

## 2025-06-27 NOTE — CONSULTS
Pharmacy to dose IV Zosyn - Sepsis  Please give a 3.375g IV loading dose over 30mins.  Begin extended infusion over 4 hrs at 0200 6/28/25.  Also on IV Vanco, monitor SCr closely.  Bllod Cx have been drawn.  Kaiden Lombardo RPH PharmD 6/27/2025 7:25 PM

## 2025-06-27 NOTE — PROGRESS NOTES
Inpatient Physical Therapy Re-Evaluation    Unit: PCU  Date:  6/27/2025  Patient Name:    Rodolfo Luna  Admitting diagnosis:  Shortness of breath [R06.02]  Normocytic anemia [D64.9]  Leg edema [R60.0]  Thrombocytopenia [D69.6]  Pulmonary edema cardiac cause (HCC) [I50.1]  Elevated troponin [R79.89]  Acute congestive heart failure, unspecified heart failure type (HCC) [I50.9]  Admit Date:  6/22/2025  Precautions/Restrictions/WB Status/ Lines/ Wounds/ Oxygen: Fall risk, Bed/chair alarm, Lines (IV, Supplemental O2 (3L), external catheter, and PEG tube), Telemetry, and Isolation Precautions: Contact      Pt seen for cotreatment this date due to patient safety, complexity of condition, and acute illness/injury    Treatment Time:  1030-1120  Treatment Number:  1   Timed Code Treatment Minutes: 40 minutes  Total Treatment Minutes:  50  minutes    Patient Stated Goals for Therapy: none stated          Discharge Recommendations: SNF  DME needs for discharge: Defer to facility       Therapy recommendation for EMS Transport: requires transport by cot due to pt needs A x 2 for safe transfers and pt with poor sitting balance/tolerance    Therapy recommendations for staff:   Assist of 2 for sitting EOB    History of Present Illness:   79 y.o. male with PMH of afib, s/p watchman procedure, anxiety, Parkinson's disease, Dysphagia, s/p Peg tube placement, CAD s/p PCI, seizures, who presents to [facility] with shortness of breath and BLE edema.      Acute Diastolic CHF -Chronic Pleural effusions  Recent wrist and lower arm edema S/p aspiration   COPD no exacerbation      Transferred to ICU from  on 6/24/25  Transferred to PCU from ICU on 6/26/25    Preadmission Environment: Per previous PT note from 6/23/25  Pt lives with                                         with spouse  Home environment:                            one story home  Steps to enter first floor:                     2 steps to enter and Handrail   Very hard (can still go on, but really has to push himself. It feels very heavy, and the person is very tired.)  []18  []19  Extremely hard (For most people this is the most strenuous exercise they have ever experienced.)  []20  Maximal exertion.    3. HF Therapeutic Exercises  [] Goal Met. Pt educated in and completed Therapeutic exercises (Supine, Seated,Standing, walking) as indicated below for 1 set to promote circulation and prevent complications of bedrest with patient verbalizing understanding of employing green zone, yellow zone and red zone to seek provider input and evaluation.   [x] Goal established but not yet met; will introduce or reinforce next session.  [] Goal N/A    Today's session patient completed:   []Supine - Level 1: Bed Exercise   []Ankle Pumps  []Heel Slides  []Hip Abduction  []Buttocks Squeeze  []Diaphragmatic Breathing with TA set  []Shoulder Shrugs  []Bicep Curl  []Hands open/close                          []Sitting - Level 2: Seated Exercises   []Toe raise/heel raise  []Long Arc Quad  []Seated March  []Seated Clamshell  []Diaphragmatic Breathing with TA set and BUE ER and IR  []Shoulder Shrugs  []Bicep Curls  []Hands open/close                         []Standing - Level 3: Standing Exercises   []Sit to/from stand  []Standing march  []Standing side steps  []Standing bilateral heel raise  []Diaphragmatic breathing with TA set and BUE flex/ext  []Shoulder Shrugs  []Bicep Curls  []Hands open/close                        []Walking - Level 1: Educated patient in initiating walking when in the Green zone and to Start with 2-5 minutes daily and work up to 10 minutes per day. Walk at a slow, comfortable pace with your exertion level Very Light (CECILIA 9) to Light (CECILIA 11). You should be able to comfortably hold a conversation while walking.    4. Daily Weight check/Stepping on Scale - not initiated  [] Goal Met. Pt educated in importance of checking body weight daily and:

## 2025-06-27 NOTE — PROGRESS NOTES
Inpatient Occupational Therapy Re-Evaluation    Unit: PCU  Date:  6/27/2025  Patient Name:    Rodolfo Luna  Admitting diagnosis:  Shortness of breath [R06.02]  Normocytic anemia [D64.9]  Leg edema [R60.0]  Thrombocytopenia [D69.6]  Pulmonary edema cardiac cause (HCC) [I50.1]  Elevated troponin [R79.89]  Acute congestive heart failure, unspecified heart failure type (HCC) [I50.9]  Admit Date:  6/22/2025  Precautions/Restrictions/WB Status/ Lines/ Wounds/ Oxygen: Fall risk, Bed/chair alarm, Lines (IV, Supplemental O2 (3L), and external catheter), Telemetry, and Isolation Precautions: Contact    Pt seen for cotreatment this date due to patient safety, patient endurance, acute illness/injury, limited functional status information, and need for the assistance of 2 skilled therapists    Treatment Time:  3289-0143  Treatment Number:  1  Timed Code Treatment Minutes: 40 minutes  Total Treatment Minutes:  50  minutes    Patient Goals for Therapy: none stated          Discharge Recommendations: SNF  DME needs for discharge: Defer to facility       Therapy recommendations for staff:   Assist of 2 for sitting EOB    History of Present Illness: 79 y.o. male with PMH of afib, s/p watchman procedure, anxiety, Parkinson's disease, Dysphagia, s/p Peg tube placement, CAD s/p PCI, seizures, who presents to [facility] with shortness of breath and BLE edema.      Acute Diastolic CHF -Chronic Pleural effusions  Recent wrist and lower arm edema S/p aspiration   COPD no exacerbation     Transferred to ICU from  on 6/24/25  Transferred to PCU from ICU on 6/26/25    Preadmission Environment:   Pt lives with                                         with spouse  Home environment:                            one story home  Steps to enter first floor:                     2 steps to enter and Handrail unilateral  Steps to second floor/basement:        N/A  Laundry:                                              1st floor  Bathroom:

## 2025-06-27 NOTE — CARE COORDINATION
Patient has recommendations for SNF.  Spoke to Dr. Huitron. Palliative is good but does not feel patient is ready for a hospice situation.    ELMER spoke with Carmen/[facility] to ask about making a referral for patient.  Carmen provided number to call for approved nursing homes.  Patient is 100% vested with the [facility].    VA/Dept that approves SNFs:  P - [phone]  F - [phone]    ELMER LV requesting a list of approved SNFs. [facility] will return call in 1 business day. ELMER left requested information identifying patient.    ELMER contacted Carmen again to ask if we have to wait until Monday for a list. Carmen will send a message to the team asking them to expedite getting the list to contracted facilities.    Spoke with Herminia who will send SNF list once she receives clinicals from ELMER.    All clinicals faxed to [facility].     Met with spouse. Patient would like to go to [facility].  Spoke to [facility]. [facility] bed.   Allysen will start precert.  Pt has a new peg tube.   Patient is using [facility] Medicare - not the [facility]. VA advised if patient wants a facility not on their list he can use his own insurance.

## 2025-06-27 NOTE — PLAN OF CARE
Problem: Chronic Conditions and Co-morbidities  Goal: Patient's chronic conditions and co-morbidity symptoms are monitored and maintained or improved  Outcome: Progressing     Problem: Discharge Planning  Goal: Discharge to home or other facility with appropriate resources  Outcome: Progressing     Problem: Safety - Adult  Goal: Free from fall injury  Outcome: Progressing     Problem: ABCDS Injury Assessment  Goal: Absence of physical injury  Outcome: Progressing     Problem: Pain  Goal: Verbalizes/displays adequate comfort level or baseline comfort level  Outcome: Progressing     Problem: Seizure Precautions  Goal: Remains free of injury related to seizures activity  Outcome: Progressing     Problem: Respiratory - Adult  Goal: Achieves optimal ventilation and oxygenation  Outcome: Progressing     Problem: Cardiovascular - Adult  Goal: Maintains optimal cardiac output and hemodynamic stability  Outcome: Progressing  Goal: Absence of cardiac dysrhythmias or at baseline  Outcome: Progressing     Problem: Skin/Tissue Integrity - Adult  Goal: Skin integrity remains intact  Description: 1.  Monitor for areas of redness and/or skin breakdown  2.  Assess vascular access sites hourly  3.  Every 4-6 hours minimum:  Change oxygen saturation probe site  4.  Every 4-6 hours:  If on nasal continuous positive airway pressure, respiratory therapy assess nares and determine need for appliance change or resting period  Outcome: Progressing  Flowsheets (Taken 6/26/2025 2049)  Skin Integrity Remains Intact:   Turn and reposition as indicated   Monitor skin under medical devices   Monitor for areas of redness and/or skin breakdown     Problem: Musculoskeletal - Adult  Goal: Return mobility to safest level of function  Outcome: Progressing  Goal: Return ADL status to a safe level of function  Outcome: Progressing     Problem: Gastrointestinal - Adult  Goal: Maintains adequate nutritional intake  Outcome: Progressing     Problem:

## 2025-06-27 NOTE — FLOWSHEET NOTE
06/26/25 1912   Vital Signs   Temp 99.3 °F (37.4 °C)   Temp Source Oral   Pulse 95   Heart Rate Source Monitor   Respirations 18   /65   MAP (Calculated) 79   BP Location Right upper arm   BP Method Automatic   Oxygen Therapy   SpO2 94 %   O2 Device High flow nasal cannula   O2 Flow Rate (L/min) 2 L/min     PM Assessment completed. Scheduled medications given per MAR, On 2L of oxygen, A&O X4, Vital Signs completed and Charted, Patient denies any further needs at this time. Call light within reach, Reminded patient to call RN if he needs anything.

## 2025-06-28 LAB
ANION GAP SERPL CALCULATED.3IONS-SCNC: 13 MMOL/L (ref 3–16)
BUN SERPL-MCNC: 47 MG/DL (ref 7–20)
CALCIUM SERPL-MCNC: 8.3 MG/DL (ref 8.3–10.6)
CHLORIDE SERPL-SCNC: 90 MMOL/L (ref 99–110)
CO2 SERPL-SCNC: 33 MMOL/L (ref 21–32)
CREAT SERPL-MCNC: 1 MG/DL (ref 0.8–1.3)
DEPRECATED RDW RBC AUTO: 16 % (ref 12.4–15.4)
GFR SERPLBLD CREATININE-BSD FMLA CKD-EPI: 76 ML/MIN/{1.73_M2}
GLUCOSE BLD-MCNC: 131 MG/DL (ref 70–99)
GLUCOSE BLD-MCNC: 150 MG/DL (ref 70–99)
GLUCOSE BLD-MCNC: 155 MG/DL (ref 70–99)
GLUCOSE BLD-MCNC: 174 MG/DL (ref 70–99)
GLUCOSE SERPL-MCNC: 156 MG/DL (ref 70–99)
HCT VFR BLD AUTO: 29.4 % (ref 40.5–52.5)
HGB BLD-MCNC: 9.7 G/DL (ref 13.5–17.5)
LACTATE BLDV-SCNC: 3 MMOL/L (ref 0.4–2)
MAGNESIUM SERPL-MCNC: 2.43 MG/DL (ref 1.8–2.4)
MCH RBC QN AUTO: 29.4 PG (ref 26–34)
MCHC RBC AUTO-ENTMCNC: 32.9 G/DL (ref 31–36)
MCV RBC AUTO: 89.3 FL (ref 80–100)
MRSA DNA SPEC QL NAA+PROBE: NORMAL
PERFORMED ON: ABNORMAL
PLATELET # BLD AUTO: 89 K/UL (ref 135–450)
PMV BLD AUTO: 9.4 FL (ref 5–10.5)
POTASSIUM SERPL-SCNC: 3.5 MMOL/L (ref 3.5–5.1)
RBC # BLD AUTO: 3.29 M/UL (ref 4.2–5.9)
SODIUM SERPL-SCNC: 136 MMOL/L (ref 136–145)
WBC # BLD AUTO: 18.8 K/UL (ref 4–11)

## 2025-06-28 PROCEDURE — 6370000000 HC RX 637 (ALT 250 FOR IP): Performed by: INTERNAL MEDICINE

## 2025-06-28 PROCEDURE — 36415 COLL VENOUS BLD VENIPUNCTURE: CPT

## 2025-06-28 PROCEDURE — 2580000003 HC RX 258: Performed by: INTERNAL MEDICINE

## 2025-06-28 PROCEDURE — 94761 N-INVAS EAR/PLS OXIMETRY MLT: CPT

## 2025-06-28 PROCEDURE — 6370000000 HC RX 637 (ALT 250 FOR IP)

## 2025-06-28 PROCEDURE — 80048 BASIC METABOLIC PNL TOTAL CA: CPT

## 2025-06-28 PROCEDURE — 2060000000 HC ICU INTERMEDIATE R&B

## 2025-06-28 PROCEDURE — 94640 AIRWAY INHALATION TREATMENT: CPT

## 2025-06-28 PROCEDURE — 85027 COMPLETE CBC AUTOMATED: CPT

## 2025-06-28 PROCEDURE — 6360000002 HC RX W HCPCS: Performed by: INTERNAL MEDICINE

## 2025-06-28 PROCEDURE — 99233 SBSQ HOSP IP/OBS HIGH 50: CPT | Performed by: INTERNAL MEDICINE

## 2025-06-28 PROCEDURE — 83735 ASSAY OF MAGNESIUM: CPT

## 2025-06-28 PROCEDURE — 2700000000 HC OXYGEN THERAPY PER DAY

## 2025-06-28 RX ADMIN — VANCOMYCIN HYDROCHLORIDE 750 MG: 750 INJECTION, POWDER, LYOPHILIZED, FOR SOLUTION INTRAVENOUS at 18:53

## 2025-06-28 RX ADMIN — APIXABAN 5 MG: 5 TABLET, FILM COATED ORAL at 22:00

## 2025-06-28 RX ADMIN — Medication 1 CAPSULE: at 08:26

## 2025-06-28 RX ADMIN — MIDODRINE HYDROCHLORIDE 5 MG: 5 TABLET ORAL at 08:26

## 2025-06-28 RX ADMIN — ESCITALOPRAM OXALATE 10 MG: 10 TABLET ORAL at 08:26

## 2025-06-28 RX ADMIN — LORAZEPAM 0.5 MG: 0.5 TABLET ORAL at 04:29

## 2025-06-28 RX ADMIN — Medication 750 MG: at 22:08

## 2025-06-28 RX ADMIN — Medication: at 15:00

## 2025-06-28 RX ADMIN — SENNOSIDES AND DOCUSATE SODIUM 1 TABLET: 50; 8.6 TABLET ORAL at 22:00

## 2025-06-28 RX ADMIN — MONTELUKAST SODIUM 10 MG: 10 TABLET, COATED ORAL at 22:00

## 2025-06-28 RX ADMIN — MUPIROCIN: 20 OINTMENT TOPICAL at 22:03

## 2025-06-28 RX ADMIN — CARBIDOPA AND LEVODOPA 1 TABLET: 25; 100 TABLET ORAL at 08:26

## 2025-06-28 RX ADMIN — VANCOMYCIN HYDROCHLORIDE 750 MG: 750 INJECTION, POWDER, LYOPHILIZED, FOR SOLUTION INTRAVENOUS at 06:23

## 2025-06-28 RX ADMIN — PIPERACILLIN AND TAZOBACTAM 3375 MG: 3; .375 INJECTION, POWDER, LYOPHILIZED, FOR SOLUTION INTRAVENOUS at 09:06

## 2025-06-28 RX ADMIN — LACOSAMIDE 100 MG: 50 TABLET, FILM COATED ORAL at 08:25

## 2025-06-28 RX ADMIN — ATORVASTATIN CALCIUM 80 MG: 40 TABLET, FILM COATED ORAL at 22:01

## 2025-06-28 RX ADMIN — SENNOSIDES AND DOCUSATE SODIUM 1 TABLET: 50; 8.6 TABLET ORAL at 08:26

## 2025-06-28 RX ADMIN — MEMANTINE 10 MG: 5 TABLET ORAL at 22:00

## 2025-06-28 RX ADMIN — PIPERACILLIN AND TAZOBACTAM 3375 MG: 3; .375 INJECTION, POWDER, LYOPHILIZED, FOR SOLUTION INTRAVENOUS at 01:56

## 2025-06-28 RX ADMIN — CARBIDOPA AND LEVODOPA 1 TABLET: 25; 100 TABLET ORAL at 14:43

## 2025-06-28 RX ADMIN — LANSOPRAZOLE 30 MG: 30 TABLET, ORALLY DISINTEGRATING, DELAYED RELEASE ORAL at 06:23

## 2025-06-28 RX ADMIN — MINERAL SUPPLEMENT IRON 300 MG / 5 ML STRENGTH LIQUID 100 PER BOX UNFLAVORED 300 MG: at 08:28

## 2025-06-28 RX ADMIN — APIXABAN 5 MG: 5 TABLET, FILM COATED ORAL at 08:26

## 2025-06-28 RX ADMIN — CETIRIZINE HYDROCHLORIDE 5 MG: 10 TABLET ORAL at 08:26

## 2025-06-28 RX ADMIN — FINASTERIDE 5 MG: 5 TABLET, FILM COATED ORAL at 08:26

## 2025-06-28 RX ADMIN — MEMANTINE 10 MG: 5 TABLET ORAL at 08:26

## 2025-06-28 RX ADMIN — PIPERACILLIN AND TAZOBACTAM 3375 MG: 3; .375 INJECTION, POWDER, LYOPHILIZED, FOR SOLUTION INTRAVENOUS at 18:54

## 2025-06-28 RX ADMIN — IPRATROPIUM BROMIDE AND ALBUTEROL SULFATE 1 DOSE: 2.5; .5 SOLUTION RESPIRATORY (INHALATION) at 14:06

## 2025-06-28 RX ADMIN — ARIPIPRAZOLE 5 MG: 10 TABLET ORAL at 08:25

## 2025-06-28 RX ADMIN — MUPIROCIN: 20 OINTMENT TOPICAL at 08:27

## 2025-06-28 RX ADMIN — LACOSAMIDE 100 MG: 50 TABLET, FILM COATED ORAL at 22:01

## 2025-06-28 RX ADMIN — METOPROLOL TARTRATE 12.5 MG: 25 TABLET, FILM COATED ORAL at 22:02

## 2025-06-28 RX ADMIN — METOPROLOL TARTRATE 12.5 MG: 25 TABLET, FILM COATED ORAL at 08:26

## 2025-06-28 RX ADMIN — ASPIRIN 81 MG: 81 TABLET, CHEWABLE ORAL at 08:26

## 2025-06-28 RX ADMIN — CARBIDOPA AND LEVODOPA 1 TABLET: 25; 100 TABLET ORAL at 22:00

## 2025-06-28 RX ADMIN — Medication 750 MG: at 08:38

## 2025-06-28 ASSESSMENT — PAIN SCALES - WONG BAKER: WONGBAKER_NUMERICALRESPONSE: NO HURT

## 2025-06-28 NOTE — PROGRESS NOTES
Tube feed ran empty this AM at 1030am.  PEG clamped.  Feed restarted with new lines and new jug of tube feed at 1515pm. Head of bed adjusted and locked at 30 degrees.  Denies any further needs at this time.  Call light within reach

## 2025-06-28 NOTE — PROGRESS NOTES
Patient having difficulty clearing secretions.  RT called for assessment.  Family would like to consider inpatient hospice.  Hospice Consult ordered. MD notified

## 2025-06-28 NOTE — PROGRESS NOTES
Fifiurnist at bedside. New orders noted, including stop IV (due to CHF and assessment), labs for the morning and repeat lactic now. Shama CarrascoRN Clinical

## 2025-06-28 NOTE — PLAN OF CARE
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     Weights Reviewed: Yes   Admission weight: 75.8 kg (167 lb)   Wt Readings from Last 3 Encounters:   06/27/25 78.2 kg (172 lb 4.8 oz)   06/11/25 79.3 kg (174 lb 12.8 oz)   06/02/25 78.5 kg (173 lb)     Intake & Output Reviewed: Yes     Intake/Output Summary (Last 24 hours) at 6/28/2025 0039  Last data filed at 6/27/2025 1905  Gross per 24 hour   Intake 1039 ml   Output 550 ml   Net 489 ml       ECHOCARDIOGRAM Reviewed: Yes   Patient's Ejection Fraction (EF) is greater than 40%     Medications Reviewed: Yes   SCHEDULED HOSPITAL MEDICATIONS:   midodrine  5 mg Oral TID WC    piperacillin-tazobactam  3,375 mg IntraVENous Q8H    vancomycin  750 mg IntraVENous Q12H    [Held by provider] furosemide  40 mg Oral Daily    mupirocin   Each Nostril BID    metoprolol tartrate  12.5 mg Oral BID    sennosides-docusate sodium  1 tablet PEG Tube BID    apixaban  5 mg Per G Tube BID    ARIPiprazole  5 mg Per G Tube Daily    aspirin  81 mg Per G Tube Daily    atorvastatin  80 mg Oral Nightly    carbidopa-levodopa  1 tablet Per G Tube TID    cetirizine  5 mg Per G Tube Daily    escitalopram  10 mg Per G Tube Daily    ferrous Sulfate  300 mg Per G Tube BID    finasteride  5 mg Per G Tube Daily    lacosamide  100 mg Oral BID    lactobacillus  1 capsule Per G Tube Daily with breakfast    levETIRAcetam  750 mg Per G Tube BID

## 2025-06-28 NOTE — PROGRESS NOTES
Perfect serve to IM- WBC 25.5   Previous 8.3 prior.  New orders obtained  Cultures obtained and sent to lab  250ml bolus started    Patient laying in bed- sweaty, appears ill looking.

## 2025-06-28 NOTE — PROGRESS NOTES
Patient assessed and AM medications given via PEG tube.  Connector changed due to occlusion.  Family at bedside. Denies any further needs at this time.  Call light within reach.

## 2025-06-28 NOTE — PROGRESS NOTES
significant anasarca  - previous echo as above 2024 with normal EF   - given Lasix 40 mg bid with good response but low BP is limiting diuresis   - daily weights, low sodium diet, strict I/O.  - cardiology consulted   - control HR and restarted diuresis, can add midodrine - midodrine added  - transitioned to PO lasix 40 mg daily - on hold today    Atrial fibrillation with RVR   - holding PO Cardizem -started IV cardizem gtt , rates improved, transition to low dose BB today   - s/p watchman procedure but non occlusive device with periprosthetic leak  - Secondary hypercoagulable state in a patient with atrial fibrillation  - on Eliquis, cardiology consulted  - monitor on telemetry      Severe sepsis (HR, BP, WBC, lactic acid) - new problem  Monitor closely for toxicity with vancomycin  Low threshold for transfer to ICU  Pt reported chest heaviness 6/27 afternoon, HR elevated repeat labs ordered stat. Showed jump in WBC from 8 -> 25k. Awaiting renal panel. Pan-culture and CXR. Ordered bolus and broad spectrum antibiotics, MRSA probe. (patient has CHF and limited code therefore did not order large bolus). Recent admission for infection at PEG tube site. Cr back and is ok.   - CT imaging showed aspiration pneumonia and colitis  - continue vanc/zosyn for now  - culture data pending  - will need to hold TF d/t concern of aspiration  - send stool studies if diarrhea    Recent wrist and lower arm edema  S/p aspiration of wrist - cx neg ( last adm)  - was placed on prednisone previous admission for inflammatory arthritis and improving now  - has chronic left UE edema, rule out dvt - unlikely given eliquis use    CAD  S/p ADRIÁN 2017  Chronic elevated troponin   - continue statin, eliquis   - denies chest pain   - no acute ST changes on EKG      Hx of PE  - continue eliquis      COPD no exacerbation  Chronic hypoxic respiratory failure  - on oxygen at home- wears PRN now 2 liters  - resume inhalers as needed   - monitor     Hx of

## 2025-06-28 NOTE — PROGRESS NOTES
Bedside report and transfer of care given to ANDREA Melgar. Pt currently resting in bed with the call light within reach. Pt denies any other care needs at this time. Pt stable at this time.

## 2025-06-28 NOTE — FLOWSHEET NOTE
06/27/25 1908   Vital Signs   Temp 97.4 °F (36.3 °C)   Temp Source Axillary   Pulse 94   Heart Rate Source Monitor   Respirations 12   /62   MAP (Calculated) 75   BP Location Left upper arm   BP Method Automatic   Patient Position Semi fowlers   Oxygen Therapy   SpO2 95 %   O2 Device Nasal cannula   O2 Flow Rate (L/min) 3 L/min     PM Assessment completed. Scheduled medications given per MAR, On 3L of oxygen, A&O X4, Vital Signs completed and Charted, Patient denies any further needs at this time. Call light within reach, Reminded patient to call RN if he needs anything. Patient resting in bed at this time.

## 2025-06-28 NOTE — PROGRESS NOTES
Returned Carol Crespo phone call at this time, updated her on the patient, informed her writer took pt down for CT, Carol asked if anything changes to keep her updated

## 2025-06-28 NOTE — FLOWSHEET NOTE
06/27/25 1943   Treatment Team Notification   Reason for Communication Critical results   Type of Critical Result Laboratory   Critical Lab Result Type Lactic acid   Critical Lab Information with Verbal Readback Lactic 4.0   Person Result Received From Lab   Name of Team Member Notified Yandel Boyer MD   Treatment Team Role Consulting Provider   Method of Communication Secure Message   Response Waiting for response   Notification Time 1943     500 bolus started at this time per Dr. Boyer orders, CT chest and ABD ordered, repeat lactic at 2330

## 2025-06-28 NOTE — PLAN OF CARE
Integrity - Adult  Goal: Skin integrity remains intact  Description: 1.  Monitor for areas of redness and/or skin breakdown  2.  Assess vascular access sites hourly  3.  Every 4-6 hours minimum:  Change oxygen saturation probe site  4.  Every 4-6 hours:  If on nasal continuous positive airway pressure, respiratory therapy assess nares and determine need for appliance change or resting period  6/28/2025 0037 by Talia Mahajan RN  Outcome: Progressing  6/27/2025 1251 by Valentina Syed RN  Outcome: Progressing     Problem: Musculoskeletal - Adult  Goal: Return mobility to safest level of function  6/28/2025 0037 by Talia Mahajan RN  Outcome: Progressing  6/27/2025 1251 by Valentina Syed RN  Outcome: Progressing  Goal: Return ADL status to a safe level of function  6/28/2025 0037 by Talia Mahajan RN  Outcome: Progressing  6/27/2025 1251 by Valentina Syed RN  Outcome: Progressing     Problem: Gastrointestinal - Adult  Goal: Maintains adequate nutritional intake  6/28/2025 0037 by Talia Mahajan RN  Outcome: Progressing  6/27/2025 1251 by Valentina Syed RN  Outcome: Progressing     Problem: Infection - Adult  Goal: Absence of infection at discharge  6/28/2025 0037 by Talia Mahajan RN  Outcome: Progressing  6/27/2025 1251 by Valentina Syed RN  Outcome: Progressing     Problem: Metabolic/Fluid and Electrolytes - Adult  Goal: Electrolytes maintained within normal limits  6/28/2025 0037 by Talia Mahajan RN  Outcome: Progressing  6/27/2025 1251 by Valentina Syed RN  Outcome: Progressing  Goal: Glucose maintained within prescribed range  6/28/2025 0037 by Talia Mahajan RN  Outcome: Progressing  6/27/2025 1251 by Valentina Syed RN  Outcome: Progressing     Problem: Nutrition Deficit:  Goal: Optimize nutritional status  6/28/2025 0037 by Talia Mahajan RN  Outcome: Progressing  6/27/2025 1841 by Barbara Wilson RD, LD  Outcome: Progressing  Flowsheets (Taken 6/27/2025  1832)  Nutrient intake appropriate for improving, restoring, or maintaining nutritional needs:   Assess nutritional status and recommend course of action   Monitor oral intake, labs, and treatment plans   Recommend, monitor, and adjust tube feedings and TPN/PPN based on assessed needs  6/27/2025 1251 by Valentina Syed, RN  Outcome: Progressing     Problem: Skin/Tissue Integrity  Goal: Skin integrity remains intact  Description: 1.  Monitor for areas of redness and/or skin breakdown  2.  Assess vascular access sites hourly  3.  Every 4-6 hours minimum:  Change oxygen saturation probe site  4.  Every 4-6 hours:  If on nasal continuous positive airway pressure, respiratory therapy assess nares and determine need for appliance change or resting period  6/28/2025 0037 by Talia Mahajan, RN  Outcome: Progressing  6/27/2025 1251 by Valentina Syed, RN  Outcome: Progressing

## 2025-06-29 ENCOUNTER — HOSPITAL ENCOUNTER (INPATIENT)
Age: 80
LOS: 1 days | DRG: 951 | End: 2025-06-30
Attending: INTERNAL MEDICINE | Admitting: INTERNAL MEDICINE
Payer: MEDICARE

## 2025-06-29 VITALS
HEIGHT: 69 IN | SYSTOLIC BLOOD PRESSURE: 95 MMHG | TEMPERATURE: 99.2 F | OXYGEN SATURATION: 92 % | HEART RATE: 91 BPM | DIASTOLIC BLOOD PRESSURE: 58 MMHG | RESPIRATION RATE: 20 BRPM | BODY MASS INDEX: 25.95 KG/M2 | WEIGHT: 175.19 LBS

## 2025-06-29 PROBLEM — Z71.89 GOALS OF CARE, COUNSELING/DISCUSSION: Status: ACTIVE | Noted: 2025-06-29

## 2025-06-29 LAB
ANION GAP SERPL CALCULATED.3IONS-SCNC: 17 MMOL/L (ref 3–16)
ANISOCYTOSIS BLD QL SMEAR: ABNORMAL
BASOPHILS # BLD: 0 K/UL (ref 0–0.2)
BASOPHILS NFR BLD: 0 %
BUN SERPL-MCNC: 54 MG/DL (ref 7–20)
CALCIUM SERPL-MCNC: 7.8 MG/DL (ref 8.3–10.6)
CHLORIDE SERPL-SCNC: 92 MMOL/L (ref 99–110)
CO2 SERPL-SCNC: 26 MMOL/L (ref 21–32)
CREAT SERPL-MCNC: 1.2 MG/DL (ref 0.8–1.3)
DACRYOCYTES BLD QL SMEAR: ABNORMAL
DEPRECATED RDW RBC AUTO: 16.1 % (ref 12.4–15.4)
EOSINOPHIL # BLD: 0 K/UL (ref 0–0.6)
EOSINOPHIL NFR BLD: 0 %
GFR SERPLBLD CREATININE-BSD FMLA CKD-EPI: 61 ML/MIN/{1.73_M2}
GLUCOSE BLD-MCNC: 132 MG/DL (ref 70–99)
GLUCOSE BLD-MCNC: 140 MG/DL (ref 70–99)
GLUCOSE BLD-MCNC: 148 MG/DL (ref 70–99)
GLUCOSE SERPL-MCNC: 111 MG/DL (ref 70–99)
HCT VFR BLD AUTO: 28.7 % (ref 40.5–52.5)
HGB BLD-MCNC: 9.5 G/DL (ref 13.5–17.5)
HYPOCHROMIA BLD QL SMEAR: ABNORMAL
LYMPHOCYTES # BLD: 1.5 K/UL (ref 1–5.1)
LYMPHOCYTES NFR BLD: 9 %
MCH RBC QN AUTO: 29.7 PG (ref 26–34)
MCHC RBC AUTO-ENTMCNC: 33.1 G/DL (ref 31–36)
MCV RBC AUTO: 89.8 FL (ref 80–100)
MONOCYTES # BLD: 3.1 K/UL (ref 0–1.3)
MONOCYTES NFR BLD: 18 %
NEUTROPHILS # BLD: 12.4 K/UL (ref 1.7–7.7)
NEUTROPHILS NFR BLD: 73 %
NEUTS VAC BLD QL SMEAR: PRESENT
OVALOCYTES BLD QL SMEAR: ABNORMAL
PATH INTERP BLD-IMP: NO
PERFORMED ON: ABNORMAL
PLATELET # BLD AUTO: 81 K/UL (ref 135–450)
PLATELET BLD QL SMEAR: ABNORMAL
PMV BLD AUTO: 10.1 FL (ref 5–10.5)
POIKILOCYTOSIS BLD QL SMEAR: ABNORMAL
POLYCHROMASIA BLD QL SMEAR: ABNORMAL
POTASSIUM SERPL-SCNC: 3.6 MMOL/L (ref 3.5–5.1)
RBC # BLD AUTO: 3.2 M/UL (ref 4.2–5.9)
SLIDE REVIEW: ABNORMAL
SODIUM SERPL-SCNC: 135 MMOL/L (ref 136–145)
TARGETS BLD QL SMEAR: ABNORMAL
VANCOMYCIN TROUGH SERPL-MCNC: 16.3 UG/ML (ref 10–20)
WBC # BLD AUTO: 17 K/UL (ref 4–11)

## 2025-06-29 PROCEDURE — 6360000002 HC RX W HCPCS: Performed by: INTERNAL MEDICINE

## 2025-06-29 PROCEDURE — 85025 COMPLETE CBC W/AUTO DIFF WBC: CPT

## 2025-06-29 PROCEDURE — 99239 HOSP IP/OBS DSCHRG MGMT >30: CPT | Performed by: INTERNAL MEDICINE

## 2025-06-29 PROCEDURE — 80048 BASIC METABOLIC PNL TOTAL CA: CPT

## 2025-06-29 PROCEDURE — 94761 N-INVAS EAR/PLS OXIMETRY MLT: CPT

## 2025-06-29 PROCEDURE — 2700000000 HC OXYGEN THERAPY PER DAY

## 2025-06-29 PROCEDURE — 6370000000 HC RX 637 (ALT 250 FOR IP): Performed by: INTERNAL MEDICINE

## 2025-06-29 PROCEDURE — 1250000000 HC SEMI PRIVATE HOSPICE R&B

## 2025-06-29 PROCEDURE — 2580000003 HC RX 258: Performed by: INTERNAL MEDICINE

## 2025-06-29 PROCEDURE — 80202 ASSAY OF VANCOMYCIN: CPT

## 2025-06-29 PROCEDURE — 36415 COLL VENOUS BLD VENIPUNCTURE: CPT

## 2025-06-29 RX ORDER — MONTELUKAST SODIUM 10 MG/1
10 TABLET ORAL NIGHTLY
Status: DISCONTINUED | OUTPATIENT
Start: 2025-06-29 | End: 2025-06-29 | Stop reason: HOSPADM

## 2025-06-29 RX ORDER — ASPIRIN 81 MG/1
81 TABLET, CHEWABLE ORAL DAILY
Status: DISCONTINUED | OUTPATIENT
Start: 2025-06-30 | End: 2025-06-29 | Stop reason: HOSPADM

## 2025-06-29 RX ORDER — LEVETIRACETAM 100 MG/ML
750 SOLUTION ORAL 2 TIMES DAILY
Status: CANCELLED | OUTPATIENT
Start: 2025-06-29

## 2025-06-29 RX ORDER — GLUCAGON 1 MG/ML
1 KIT INJECTION PRN
Status: DISCONTINUED | OUTPATIENT
Start: 2025-06-29 | End: 2025-06-29

## 2025-06-29 RX ORDER — MAGNESIUM HYDROXIDE/ALUMINUM HYDROXICE/SIMETHICONE 120; 1200; 1200 MG/30ML; MG/30ML; MG/30ML
10 SUSPENSION ORAL EVERY 4 HOURS PRN
Status: DISCONTINUED | OUTPATIENT
Start: 2025-06-29 | End: 2025-07-01 | Stop reason: HOSPADM

## 2025-06-29 RX ORDER — SCOPOLAMINE 1 MG/3D
1 PATCH, EXTENDED RELEASE TRANSDERMAL
Status: CANCELLED | OUTPATIENT
Start: 2025-06-29

## 2025-06-29 RX ORDER — ESCITALOPRAM OXALATE 10 MG/1
10 TABLET ORAL DAILY
Status: DISCONTINUED | OUTPATIENT
Start: 2025-06-30 | End: 2025-06-29 | Stop reason: HOSPADM

## 2025-06-29 RX ORDER — ATROPINE SULFATE 10 MG/ML
1 SOLUTION/ DROPS OPHTHALMIC 4 TIMES DAILY PRN
Status: DISCONTINUED | OUTPATIENT
Start: 2025-06-29 | End: 2025-07-01 | Stop reason: HOSPADM

## 2025-06-29 RX ORDER — ONDANSETRON 2 MG/ML
4 INJECTION INTRAMUSCULAR; INTRAVENOUS EVERY 6 HOURS PRN
Status: DISCONTINUED | OUTPATIENT
Start: 2025-06-29 | End: 2025-06-29 | Stop reason: SDUPTHER

## 2025-06-29 RX ORDER — SCOPOLAMINE 1 MG/3D
1 PATCH, EXTENDED RELEASE TRANSDERMAL
Status: DISCONTINUED | OUTPATIENT
Start: 2025-06-29 | End: 2025-07-01 | Stop reason: HOSPADM

## 2025-06-29 RX ORDER — ASPIRIN 81 MG/1
81 TABLET, CHEWABLE ORAL DAILY
Status: CANCELLED | OUTPATIENT
Start: 2025-06-30

## 2025-06-29 RX ORDER — MAGNESIUM SULFATE IN WATER 40 MG/ML
2000 INJECTION, SOLUTION INTRAVENOUS PRN
Status: DISCONTINUED | OUTPATIENT
Start: 2025-06-29 | End: 2025-06-29 | Stop reason: HOSPADM

## 2025-06-29 RX ORDER — ACETAMINOPHEN 325 MG/1
650 TABLET ORAL EVERY 6 HOURS PRN
Status: DISCONTINUED | OUTPATIENT
Start: 2025-06-29 | End: 2025-07-01 | Stop reason: HOSPADM

## 2025-06-29 RX ORDER — LORAZEPAM 2 MG/ML
1 CONCENTRATE ORAL
Status: DISCONTINUED | OUTPATIENT
Start: 2025-06-29 | End: 2025-07-01 | Stop reason: HOSPADM

## 2025-06-29 RX ORDER — POLYETHYLENE GLYCOL 3350 17 G/17G
17 POWDER, FOR SOLUTION ORAL DAILY PRN
Status: CANCELLED | OUTPATIENT
Start: 2025-06-29

## 2025-06-29 RX ORDER — MEMANTINE HYDROCHLORIDE 5 MG/1
10 TABLET ORAL 2 TIMES DAILY
Status: CANCELLED | OUTPATIENT
Start: 2025-06-29

## 2025-06-29 RX ORDER — CETIRIZINE HYDROCHLORIDE 10 MG/1
5 TABLET ORAL DAILY
Status: CANCELLED | OUTPATIENT
Start: 2025-06-30

## 2025-06-29 RX ORDER — FINASTERIDE 5 MG/1
5 TABLET, FILM COATED ORAL DAILY
Status: CANCELLED | OUTPATIENT
Start: 2025-06-30

## 2025-06-29 RX ORDER — GLUCAGON 1 MG/ML
1 KIT INJECTION PRN
Status: CANCELLED | OUTPATIENT
Start: 2025-06-29

## 2025-06-29 RX ORDER — ONDANSETRON 2 MG/ML
4 INJECTION INTRAMUSCULAR; INTRAVENOUS EVERY 6 HOURS PRN
Status: CANCELLED | OUTPATIENT
Start: 2025-06-29

## 2025-06-29 RX ORDER — LACOSAMIDE 50 MG/1
100 TABLET ORAL 2 TIMES DAILY
Status: DISCONTINUED | OUTPATIENT
Start: 2025-06-29 | End: 2025-06-29 | Stop reason: HOSPADM

## 2025-06-29 RX ORDER — LANSOPRAZOLE 30 MG/1
30 TABLET, ORALLY DISINTEGRATING, DELAYED RELEASE ORAL
Status: DISCONTINUED | OUTPATIENT
Start: 2025-06-30 | End: 2025-06-29 | Stop reason: HOSPADM

## 2025-06-29 RX ORDER — LACTOBACILLUS RHAMNOSUS GG 10B CELL
1 CAPSULE ORAL
Status: DISCONTINUED | OUTPATIENT
Start: 2025-06-30 | End: 2025-06-29 | Stop reason: HOSPADM

## 2025-06-29 RX ORDER — ATROPINE SULFATE 10 MG/ML
1 SOLUTION/ DROPS OPHTHALMIC 4 TIMES DAILY PRN
Status: CANCELLED | OUTPATIENT
Start: 2025-06-29

## 2025-06-29 RX ORDER — IPRATROPIUM BROMIDE AND ALBUTEROL SULFATE 2.5; .5 MG/3ML; MG/3ML
1 SOLUTION RESPIRATORY (INHALATION) EVERY 6 HOURS PRN
Status: CANCELLED | OUTPATIENT
Start: 2025-06-29

## 2025-06-29 RX ORDER — ATORVASTATIN CALCIUM 40 MG/1
80 TABLET, FILM COATED ORAL NIGHTLY
Status: DISCONTINUED | OUTPATIENT
Start: 2025-06-29 | End: 2025-06-29 | Stop reason: HOSPADM

## 2025-06-29 RX ORDER — LORAZEPAM 0.5 MG/1
0.5 TABLET ORAL EVERY 8 HOURS PRN
Status: DISCONTINUED | OUTPATIENT
Start: 2025-06-29 | End: 2025-06-29 | Stop reason: ALTCHOICE

## 2025-06-29 RX ORDER — MUPIROCIN 2 %
OINTMENT (GRAM) TOPICAL 2 TIMES DAILY
Status: ACTIVE | OUTPATIENT
Start: 2025-06-29 | End: 2025-06-30

## 2025-06-29 RX ORDER — ESCITALOPRAM OXALATE 10 MG/1
10 TABLET ORAL DAILY
Status: CANCELLED | OUTPATIENT
Start: 2025-06-30

## 2025-06-29 RX ORDER — LACOSAMIDE 50 MG/1
100 TABLET ORAL 2 TIMES DAILY
Status: CANCELLED | OUTPATIENT
Start: 2025-06-29

## 2025-06-29 RX ORDER — MONTELUKAST SODIUM 10 MG/1
10 TABLET ORAL NIGHTLY
Status: CANCELLED | OUTPATIENT
Start: 2025-06-29

## 2025-06-29 RX ORDER — SENNA AND DOCUSATE SODIUM 50; 8.6 MG/1; MG/1
1 TABLET, FILM COATED ORAL 2 TIMES DAILY
Status: CANCELLED | OUTPATIENT
Start: 2025-06-29

## 2025-06-29 RX ORDER — CETIRIZINE HYDROCHLORIDE 10 MG/1
5 TABLET ORAL DAILY
Status: DISCONTINUED | OUTPATIENT
Start: 2025-06-30 | End: 2025-06-29 | Stop reason: HOSPADM

## 2025-06-29 RX ORDER — GUAIFENESIN/DEXTROMETHORPHAN 100-10MG/5
5 SYRUP ORAL EVERY 6 HOURS PRN
Status: DISCONTINUED | OUTPATIENT
Start: 2025-06-29 | End: 2025-07-01 | Stop reason: HOSPADM

## 2025-06-29 RX ORDER — SODIUM CHLORIDE 9 MG/ML
INJECTION, SOLUTION INTRAVENOUS PRN
Status: CANCELLED | OUTPATIENT
Start: 2025-06-29

## 2025-06-29 RX ORDER — LORAZEPAM 0.5 MG/1
0.5 TABLET ORAL EVERY 8 HOURS PRN
Status: CANCELLED | OUTPATIENT
Start: 2025-06-29

## 2025-06-29 RX ORDER — MAGNESIUM HYDROXIDE/ALUMINUM HYDROXICE/SIMETHICONE 120; 1200; 1200 MG/30ML; MG/30ML; MG/30ML
10 SUSPENSION ORAL EVERY 4 HOURS PRN
Status: CANCELLED | OUTPATIENT
Start: 2025-06-29

## 2025-06-29 RX ORDER — SODIUM CHLORIDE 0.9 % (FLUSH) 0.9 %
5-40 SYRINGE (ML) INJECTION EVERY 12 HOURS SCHEDULED
Status: CANCELLED | OUTPATIENT
Start: 2025-06-29

## 2025-06-29 RX ORDER — ONDANSETRON 4 MG/1
4 TABLET, ORALLY DISINTEGRATING ORAL EVERY 8 HOURS PRN
Status: CANCELLED | OUTPATIENT
Start: 2025-06-29

## 2025-06-29 RX ORDER — FERROUS SULFATE 300 MG/5ML
300 LIQUID (ML) ORAL 2 TIMES DAILY
Status: DISCONTINUED | OUTPATIENT
Start: 2025-06-29 | End: 2025-06-29 | Stop reason: HOSPADM

## 2025-06-29 RX ORDER — MEMANTINE HYDROCHLORIDE 5 MG/1
10 TABLET ORAL 2 TIMES DAILY
Status: DISCONTINUED | OUTPATIENT
Start: 2025-06-29 | End: 2025-06-29 | Stop reason: HOSPADM

## 2025-06-29 RX ORDER — SODIUM CHLORIDE 0.9 % (FLUSH) 0.9 %
5-40 SYRINGE (ML) INJECTION PRN
Status: DISCONTINUED | OUTPATIENT
Start: 2025-06-29 | End: 2025-07-01 | Stop reason: HOSPADM

## 2025-06-29 RX ORDER — ARIPIPRAZOLE 10 MG/1
5 TABLET ORAL DAILY
Status: DISCONTINUED | OUTPATIENT
Start: 2025-06-30 | End: 2025-06-29 | Stop reason: HOSPADM

## 2025-06-29 RX ORDER — METOPROLOL TARTRATE 25 MG/1
12.5 TABLET, FILM COATED ORAL 2 TIMES DAILY
Status: DISCONTINUED | OUTPATIENT
Start: 2025-06-29 | End: 2025-06-29 | Stop reason: HOSPADM

## 2025-06-29 RX ORDER — IPRATROPIUM BROMIDE 42 UG/1
2 SPRAY, METERED NASAL 4 TIMES DAILY PRN
Status: CANCELLED | OUTPATIENT
Start: 2025-06-29

## 2025-06-29 RX ORDER — ONDANSETRON 4 MG/1
4 TABLET, ORALLY DISINTEGRATING ORAL EVERY 8 HOURS PRN
Status: DISCONTINUED | OUTPATIENT
Start: 2025-06-29 | End: 2025-07-01 | Stop reason: HOSPADM

## 2025-06-29 RX ORDER — POLYETHYLENE GLYCOL 3350 17 G/17G
17 POWDER, FOR SOLUTION ORAL DAILY PRN
Status: DISCONTINUED | OUTPATIENT
Start: 2025-06-29 | End: 2025-07-01 | Stop reason: HOSPADM

## 2025-06-29 RX ORDER — METOPROLOL TARTRATE 25 MG/1
12.5 TABLET, FILM COATED ORAL 2 TIMES DAILY
Status: CANCELLED | OUTPATIENT
Start: 2025-06-29

## 2025-06-29 RX ORDER — FINASTERIDE 5 MG/1
5 TABLET, FILM COATED ORAL DAILY
Status: DISCONTINUED | OUTPATIENT
Start: 2025-06-30 | End: 2025-06-29 | Stop reason: HOSPADM

## 2025-06-29 RX ORDER — DEXTROSE MONOHYDRATE 100 MG/ML
INJECTION, SOLUTION INTRAVENOUS CONTINUOUS PRN
Status: DISCONTINUED | OUTPATIENT
Start: 2025-06-29 | End: 2025-06-29

## 2025-06-29 RX ORDER — ACETAMINOPHEN 650 MG/1
650 SUPPOSITORY RECTAL EVERY 6 HOURS PRN
Status: CANCELLED | OUTPATIENT
Start: 2025-06-29

## 2025-06-29 RX ORDER — ACETAMINOPHEN 325 MG/1
650 TABLET ORAL EVERY 6 HOURS PRN
Status: CANCELLED | OUTPATIENT
Start: 2025-06-29

## 2025-06-29 RX ORDER — FERROUS SULFATE 300 MG/5ML
300 LIQUID (ML) ORAL 2 TIMES DAILY
Status: CANCELLED | OUTPATIENT
Start: 2025-06-29

## 2025-06-29 RX ORDER — LANSOPRAZOLE 30 MG/1
30 TABLET, ORALLY DISINTEGRATING, DELAYED RELEASE ORAL
Status: CANCELLED | OUTPATIENT
Start: 2025-06-30

## 2025-06-29 RX ORDER — MAGNESIUM SULFATE IN WATER 40 MG/ML
2000 INJECTION, SOLUTION INTRAVENOUS PRN
Status: CANCELLED | OUTPATIENT
Start: 2025-06-29

## 2025-06-29 RX ORDER — MIDODRINE HYDROCHLORIDE 5 MG/1
5 TABLET ORAL
Status: CANCELLED | OUTPATIENT
Start: 2025-06-29

## 2025-06-29 RX ORDER — SODIUM CHLORIDE 9 MG/ML
INJECTION, SOLUTION INTRAVENOUS PRN
Status: DISCONTINUED | OUTPATIENT
Start: 2025-06-29 | End: 2025-07-01 | Stop reason: HOSPADM

## 2025-06-29 RX ORDER — ARIPIPRAZOLE 10 MG/1
5 TABLET ORAL DAILY
Status: CANCELLED | OUTPATIENT
Start: 2025-06-30

## 2025-06-29 RX ORDER — SODIUM CHLORIDE 0.9 % (FLUSH) 0.9 %
5-40 SYRINGE (ML) INJECTION EVERY 12 HOURS SCHEDULED
Status: DISCONTINUED | OUTPATIENT
Start: 2025-06-29 | End: 2025-07-01 | Stop reason: HOSPADM

## 2025-06-29 RX ORDER — CARBIDOPA AND LEVODOPA 25; 100 MG/1; MG/1
1 TABLET ORAL 3 TIMES DAILY
Status: DISCONTINUED | OUTPATIENT
Start: 2025-06-29 | End: 2025-06-29 | Stop reason: HOSPADM

## 2025-06-29 RX ORDER — CARBIDOPA AND LEVODOPA 25; 100 MG/1; MG/1
1 TABLET ORAL 3 TIMES DAILY
Status: CANCELLED | OUTPATIENT
Start: 2025-06-29

## 2025-06-29 RX ORDER — SODIUM CHLORIDE 0.9 % (FLUSH) 0.9 %
5-40 SYRINGE (ML) INJECTION PRN
Status: CANCELLED | OUTPATIENT
Start: 2025-06-29

## 2025-06-29 RX ORDER — IPRATROPIUM BROMIDE 42 UG/1
2 SPRAY, METERED NASAL 4 TIMES DAILY PRN
Status: DISCONTINUED | OUTPATIENT
Start: 2025-06-29 | End: 2025-07-01 | Stop reason: HOSPADM

## 2025-06-29 RX ORDER — ONDANSETRON 2 MG/ML
4 INJECTION INTRAMUSCULAR; INTRAVENOUS EVERY 6 HOURS PRN
Status: DISCONTINUED | OUTPATIENT
Start: 2025-06-29 | End: 2025-07-01 | Stop reason: HOSPADM

## 2025-06-29 RX ORDER — MIDODRINE HYDROCHLORIDE 5 MG/1
5 TABLET ORAL
Status: DISCONTINUED | OUTPATIENT
Start: 2025-06-29 | End: 2025-06-29 | Stop reason: HOSPADM

## 2025-06-29 RX ORDER — LORAZEPAM 2 MG/ML
1 CONCENTRATE ORAL EVERY 4 HOURS PRN
Status: DISCONTINUED | OUTPATIENT
Start: 2025-06-29 | End: 2025-06-29

## 2025-06-29 RX ORDER — IPRATROPIUM BROMIDE AND ALBUTEROL SULFATE 2.5; .5 MG/3ML; MG/3ML
1 SOLUTION RESPIRATORY (INHALATION) EVERY 6 HOURS PRN
Status: DISCONTINUED | OUTPATIENT
Start: 2025-06-29 | End: 2025-07-01 | Stop reason: HOSPADM

## 2025-06-29 RX ORDER — LORAZEPAM 2 MG/ML
1 CONCENTRATE ORAL EVERY 4 HOURS PRN
Status: CANCELLED | OUTPATIENT
Start: 2025-06-29

## 2025-06-29 RX ORDER — DEXTROSE MONOHYDRATE 100 MG/ML
INJECTION, SOLUTION INTRAVENOUS CONTINUOUS PRN
Status: CANCELLED | OUTPATIENT
Start: 2025-06-29

## 2025-06-29 RX ORDER — ATORVASTATIN CALCIUM 40 MG/1
80 TABLET, FILM COATED ORAL NIGHTLY
Status: CANCELLED | OUTPATIENT
Start: 2025-06-29

## 2025-06-29 RX ORDER — GUAIFENESIN/DEXTROMETHORPHAN 100-10MG/5
5 SYRUP ORAL EVERY 6 HOURS PRN
Status: CANCELLED | OUTPATIENT
Start: 2025-06-29

## 2025-06-29 RX ORDER — LACTOBACILLUS RHAMNOSUS GG 10B CELL
1 CAPSULE ORAL
Status: CANCELLED | OUTPATIENT
Start: 2025-06-30

## 2025-06-29 RX ORDER — LEVETIRACETAM 100 MG/ML
750 SOLUTION ORAL 2 TIMES DAILY
Status: DISCONTINUED | OUTPATIENT
Start: 2025-06-29 | End: 2025-06-29 | Stop reason: HOSPADM

## 2025-06-29 RX ORDER — SENNA AND DOCUSATE SODIUM 50; 8.6 MG/1; MG/1
1 TABLET, FILM COATED ORAL 2 TIMES DAILY
Status: DISCONTINUED | OUTPATIENT
Start: 2025-06-29 | End: 2025-07-01 | Stop reason: HOSPADM

## 2025-06-29 RX ORDER — ACETAMINOPHEN 650 MG/1
650 SUPPOSITORY RECTAL EVERY 6 HOURS PRN
Status: DISCONTINUED | OUTPATIENT
Start: 2025-06-29 | End: 2025-07-01 | Stop reason: HOSPADM

## 2025-06-29 RX ORDER — MUPIROCIN 2 %
OINTMENT (GRAM) TOPICAL 2 TIMES DAILY
Status: CANCELLED | OUTPATIENT
Start: 2025-06-29 | End: 2025-06-30

## 2025-06-29 RX ADMIN — CETIRIZINE HYDROCHLORIDE 5 MG: 10 TABLET ORAL at 09:51

## 2025-06-29 RX ADMIN — MUPIROCIN: 20 OINTMENT TOPICAL at 09:54

## 2025-06-29 RX ADMIN — LACOSAMIDE 100 MG: 50 TABLET, FILM COATED ORAL at 09:50

## 2025-06-29 RX ADMIN — SENNOSIDES AND DOCUSATE SODIUM 1 TABLET: 50; 8.6 TABLET ORAL at 09:50

## 2025-06-29 RX ADMIN — VANCOMYCIN HYDROCHLORIDE 750 MG: 750 INJECTION, POWDER, LYOPHILIZED, FOR SOLUTION INTRAVENOUS at 06:20

## 2025-06-29 RX ADMIN — PIPERACILLIN AND TAZOBACTAM 3375 MG: 3; .375 INJECTION, POWDER, LYOPHILIZED, FOR SOLUTION INTRAVENOUS at 02:19

## 2025-06-29 RX ADMIN — PIPERACILLIN AND TAZOBACTAM 3375 MG: 3; .375 INJECTION, POWDER, LYOPHILIZED, FOR SOLUTION INTRAVENOUS at 10:17

## 2025-06-29 RX ADMIN — CARBIDOPA AND LEVODOPA 1 TABLET: 25; 100 TABLET ORAL at 09:51

## 2025-06-29 RX ADMIN — Medication 750 MG: at 09:53

## 2025-06-29 RX ADMIN — MIDODRINE HYDROCHLORIDE 5 MG: 5 TABLET ORAL at 09:50

## 2025-06-29 RX ADMIN — ASPIRIN 81 MG: 81 TABLET, CHEWABLE ORAL at 09:51

## 2025-06-29 RX ADMIN — LANSOPRAZOLE 30 MG: 30 TABLET, ORALLY DISINTEGRATING, DELAYED RELEASE ORAL at 09:57

## 2025-06-29 RX ADMIN — Medication 1 MG: at 21:08

## 2025-06-29 RX ADMIN — ARIPIPRAZOLE 5 MG: 10 TABLET ORAL at 09:51

## 2025-06-29 RX ADMIN — ESCITALOPRAM OXALATE 10 MG: 10 TABLET ORAL at 09:50

## 2025-06-29 RX ADMIN — HYDROMORPHONE HYDROCHLORIDE 0.25 MG: 1 INJECTION, SOLUTION INTRAMUSCULAR; INTRAVENOUS; SUBCUTANEOUS at 19:21

## 2025-06-29 RX ADMIN — HYDROMORPHONE HYDROCHLORIDE 0.5 MG: 1 INJECTION, SOLUTION INTRAMUSCULAR; INTRAVENOUS; SUBCUTANEOUS at 21:52

## 2025-06-29 RX ADMIN — MEMANTINE 10 MG: 5 TABLET ORAL at 09:51

## 2025-06-29 RX ADMIN — METOPROLOL TARTRATE 12.5 MG: 25 TABLET, FILM COATED ORAL at 09:50

## 2025-06-29 RX ADMIN — FINASTERIDE 5 MG: 5 TABLET, FILM COATED ORAL at 09:51

## 2025-06-29 RX ADMIN — MIDODRINE HYDROCHLORIDE 5 MG: 5 TABLET ORAL at 12:14

## 2025-06-29 RX ADMIN — Medication 1 CAPSULE: at 09:50

## 2025-06-29 RX ADMIN — APIXABAN 5 MG: 5 TABLET, FILM COATED ORAL at 09:50

## 2025-06-29 ASSESSMENT — PAIN DESCRIPTION - LOCATION: LOCATION: GENERALIZED

## 2025-06-29 ASSESSMENT — PAIN SCALES - WONG BAKER: WONGBAKER_NUMERICALRESPONSE: NO HURT

## 2025-06-29 ASSESSMENT — PAIN DESCRIPTION - DESCRIPTORS: DESCRIPTORS: ACHING

## 2025-06-29 ASSESSMENT — PAIN SCALES - GENERAL
PAINLEVEL_OUTOF10: 0
PAINLEVEL_OUTOF10: 0

## 2025-06-29 NOTE — PROGRESS NOTES
Patient transitioned to Hospice Comfort Care.  Medications discontinued.  Tele Discontinued.  IV fluid discontinued.  Comfort care obtained for family.  Patient bathed and made comfortable.  Family denies any further needs at this time.  Call light within reach.

## 2025-06-29 NOTE — FLOWSHEET NOTE
06/29/25 0430   Vital Signs   Temp 97.8 °F (36.6 °C)   Temp Source Axillary   Pulse (!) 103   Heart Rate Source Monitor   Respirations 20   BP (!) 120/57   MAP (Calculated) 78   BP Location Right upper arm   BP Method Automatic   Patient Position Semi fowlers   Pain Assessment   Pain Assessment Schultz-Baker FACES   Schultz-Baker Pain Rating 0   Oxygen Therapy   SpO2 94 %   Pulse Oximeter Device Mode Continuous   Pulse Oximeter Device Location Right;Hand   O2 Device Nasal cannula   O2 Flow Rate (L/min) 5 L/min   Skin Assessment Clean, dry, & intact

## 2025-06-29 NOTE — CARE COORDINATION
Received call from pt nurse inquiring about hospice mtg time. Spoke with intake at [facility] who does not have referral. Referral info given. Await call re: mtg time. Will follow.    Stephanie from [facility] here to meet with pt and family.    Pt will GIP with [facility] here today.

## 2025-06-29 NOTE — PLAN OF CARE
Problem: Chronic Conditions and Co-morbidities  Goal: Patient's chronic conditions and co-morbidity symptoms are monitored and maintained or improved  6/29/2025 1330 by Jorge Fonseca RN  Outcome: Completed  6/29/2025 1045 by Jorge Fonseca RN  Outcome: Not Progressing  6/29/2025 1040 by Jorge Fonseca RN  Outcome: Progressing     Problem: Respiratory - Adult  Goal: Achieves optimal ventilation and oxygenation  6/29/2025 1330 by Jorge Fonseca RN  Outcome: Completed  6/29/2025 1045 by Jorge Fonseca RN  Outcome: Not Progressing  6/29/2025 1040 by Jorge Fonseca RN  Outcome: Progressing     Problem: Musculoskeletal - Adult  Goal: Return mobility to safest level of function  6/29/2025 1330 by Jorge Fonseca RN  Outcome: Completed  6/29/2025 1045 by Jorge Fonseca RN  Outcome: Not Progressing  6/29/2025 1040 by Jorge Fonseca RN  Outcome: Progressing  Goal: Return ADL status to a safe level of function  6/29/2025 1330 by Jorge Fonseca RN  Outcome: Completed  6/29/2025 1045 by Jorge Fonseca RN  Outcome: Not Progressing  6/29/2025 1040 by Jorge Fonseca RN  Outcome: Progressing     Problem: Gastrointestinal - Adult  Goal: Maintains adequate nutritional intake  6/29/2025 1330 by Jorge Fonseca RN  Outcome: Completed  6/29/2025 1045 by Jorge Fonseca RN  Outcome: Not Progressing  6/29/2025 1040 by Jorge Fonseca RN  Outcome: Progressing     Problem: Nutrition Deficit:  Goal: Optimize nutritional status  6/29/2025 1330 by Jorge Fonseca RN  Outcome: Completed  6/29/2025 1045 by Jorge Fonseca RN  Outcome: Not Progressing  6/29/2025 1040 by Jorge Fonseca RN  Outcome: Progressing

## 2025-06-29 NOTE — CARE COORDINATION
Faxed FS to [facility] for hospice consult tomorrow.    The patient has [employer] and is 100% vested.  The patient also has [facility] Medicare.    The family is considering inpatient hospice.  The patient has [place] [employer] and also [facility] Medicare.     If the patient qualifies for inpatient should be able to use his Medicare.

## 2025-06-29 NOTE — FLOWSHEET NOTE
06/29/25 0008   Vital Signs   Temp (!) 100.6 °F (38.1 °C)   Temp Source Oral   Pulse (!) 104   Heart Rate Source Monitor   Respirations 20   /64   MAP (Calculated) 84   BP Location Right lower arm   BP Method Automatic   Patient Position Semi fowlers   Oxygen Therapy   SpO2 91 %   O2 Device Nasal cannula   O2 Flow Rate (L/min) 5 L/min     Room warm-removed socks and blanket. Temp recheck=99.0

## 2025-06-29 NOTE — PLAN OF CARE
Problem: Metabolic/Fluid and Electrolytes - Adult  Goal: Electrolytes maintained within normal limits  6/29/2025 1045 by Jorge Fonseca RN  Outcome: Progressing  6/29/2025 1040 by Jorge Fonseca RN  Outcome: Progressing  6/28/2025 2310 by Leora Velásquez RN  Outcome: Progressing  Flowsheets (Taken 6/28/2025 2030)  Electrolytes maintained within normal limits: Monitor labs and assess patient for signs and symptoms of electrolyte imbalances  Goal: Glucose maintained within prescribed range  6/29/2025 1045 by Jorge Fonseca RN  Outcome: Progressing  6/29/2025 1040 by Jorge Fonseca RN  Outcome: Progressing  6/28/2025 2310 by Leora Velásquez RN  Outcome: Progressing  Flowsheets (Taken 6/28/2025 2030)  Glucose maintained within prescribed range: Monitor blood glucose as ordered     Problem: Metabolic/Fluid and Electrolytes - Adult  Goal: Glucose maintained within prescribed range  6/29/2025 1045 by Jorge Fonseca RN  Outcome: Progressing  6/29/2025 1040 by Jorge Fonseca RN  Outcome: Progressing  6/28/2025 2310 by Leora Velásquez RN  Outcome: Progressing  Flowsheets (Taken 6/28/2025 2030)  Glucose maintained within prescribed range: Monitor blood glucose as ordered     Problem: Nutrition Deficit:  Goal: Optimize nutritional status  6/29/2025 1045 by Jorge Fonseca RN  Outcome: Not Progressing  6/29/2025 1040 by Jorge Fonseca RN  Outcome: Progressing  6/28/2025 2310 by Leora Velásquez RN  Outcome: Progressing     Problem: Skin/Tissue Integrity  Goal: Skin integrity remains intact  Description: 1.  Monitor for areas of redness and/or skin breakdown  2.  Assess vascular access sites hourly  3.  Every 4-6 hours minimum:  Change oxygen saturation probe site  4.  Every 4-6 hours:  If on nasal continuous positive airway pressure, respiratory therapy assess nares and determine need for appliance change or resting period  6/29/2025 1045 by Jorge Fonseca RN  Outcome:

## 2025-06-29 NOTE — DISCHARGE SUMMARY
Hospital Medicine Discharge Summary    Patient: Rodolfo Luna     Gender: male  : 1945   Age: 79 y.o.  MRN: [id number]    Admitting Physician: Jaylene Durant MD  Discharge Physician: Franca Huitron,     Code Status: Limited     Admit Date: 2025   Discharge Date:       Discharge Diagnoses:    Active Hospital Problems    Diagnosis Date Noted    Severe sepsis (HCC) [A41.9, R65.20] 2025    Left arm swelling [M79.89] 2025    Leakage of Watchman left atrial appendage closure device [T82.539A] 2025    Hx of pulmonary embolus [Z86.711] 2025    Acute on chronic heart failure with preserved ejection fraction (HFpEF) (HCC) [I50.33] 2025    Acute diastolic CHF (congestive heart failure) (HCC) [I50.31] 2025    Pulmonary edema cardiac cause (HCC) [I50.1] 2025    Acute congestive heart failure (HCC) [I50.9] 2025    Shortness of breath [R06.02] 2025    Leg edema [R60.0] 2025    Elevated troponin [R79.89] 2024    Atrial fibrillation with RVR (HCC) [I48.91] 2024    Normocytic anemia [D64.9] 2011         Condition at Discharge: Stable    Hospital Course:           Additional findings or notes to primary provider:  None at this time    Discharge Medications:   Current Discharge Medication List        Current Discharge Medication List        Current Discharge Medication List        CONTINUE these medications which have NOT CHANGED    Details   levETIRAcetam (KEPPRA) 500 MG tablet Take 1.5 tablets by mouth 2 times daily Per g-tube    Associated Diagnoses: Nonintractable epilepsy without status epilepticus, unspecified epilepsy type (HCC)      escitalopram (LEXAPRO) 10 MG tablet Take 1 tablet by mouth daily      lactobacillus (CULTURELLE) capsule Take 1 capsule by mouth daily (with breakfast)  Qty: 30 capsule, Refills: 0      carbidopa-levodopa (SINEMET)  MG per tablet 1 tablet by Per G Tube route 3 times daily    Associated Diagnoses:  HISTORY: ORDERING SYSTEM PROVIDED HISTORY: possible sepsis, unknown source TECHNOLOGIST PROVIDED HISTORY: Reason for exam:->possible sepsis, unknown source Decision Support Exception - unselect if not a suspected or confirmed emergency medical condition->Emergency Medical Condition (MA) Reason for Exam: Headache; Extremity Weakness FINDINGS: Lower neck: Unremarkable. Heart: A moderate cardiomegaly with multichamber enlargement.  Occlusion device within the left atrial appendage. Coronary artery: Severe coronary artery calcifications. Aorta: Mild aortic valve calcification.  Fusiform ascending thoracic aortic ectasia measuring 4.1 cm. Pulmonary artery: Enlarged main pulmonary artery measuring 3.4 cm, can be seen the setting of pulmonary hypertension. Pericardium and mediastinal soft tissue: Unremarkable. Lymph nodes: Unremarkable. Esophagus: Unremarkable. Airways and lungs: Stable postsurgical change right upper lobectomy with scarring at the hilar region, although limited evaluation due to lack of IV contrast.  Severe centrilobular emphysema.  Scarring within the right lung base.  Calcified granulomas within right lung, unchanged.  No new or enlarging pulmonary nodules. Pleura: Small loculated right pleural effusion, unchanged from prior exam. Upper abdomen: Unremarkable. Soft Tissues/Bones: No aggressive osseous lesions.No acute fracture or malalignment.Diffuse idiopathic skeletal hyperostosis of the thoracic spine.     No acute process.     CT Head W/O Contrast  Result Date: 6/10/2025  EXAMINATION: CT OF THE HEAD WITHOUT CONTRAST  6/10/2025 9:45 pm TECHNIQUE: CT of the head was performed without the administration of intravenous contrast. Automated exposure control, iterative reconstruction, and/or weight based adjustment of the mA/kV was utilized to reduce the radiation dose to as low as reasonably achievable. COMPARISON: 07/11/2024 HISTORY: ORDERING SYSTEM PROVIDED HISTORY: new onset posterior headaches

## 2025-06-29 NOTE — FLOWSHEET NOTE
06/28/25 1859   Vital Signs   Temp 97.8 °F (36.6 °C)   Temp Source Oral   Pulse 97   Heart Rate Source Monitor   /71   MAP (Calculated) 89   BP Location Right upper arm   BP Method Automatic   Patient Position Semi fowlers   Oxygen Therapy   SpO2 95 %   O2 Device Nasal cannula   O2 Flow Rate (L/min) 5 L/min     Pt resting comfortably in bed. IV infusing as ordered. Bed alarm on, call light within reach. No needs expressed at this time. Will continue to monitor.

## 2025-06-29 NOTE — PROGRESS NOTES
6/29  Vanc trough = 16.3 mcg/mL at 0534.  Renal function has slightly worsened.  Calculated AUC of 693.  Will decrease vancomycin to 500 mg q12.  Recheck vanc trough tomorrow (6/30 at 0600).  Eliseo Vizcaino, PharmD  6/29/2025 7:07 AM

## 2025-06-29 NOTE — PLAN OF CARE
Problem: Chronic Conditions and Co-morbidities  Goal: Patient's chronic conditions and co-morbidity symptoms are monitored and maintained or improved  Outcome: Progressing  Flowsheets (Taken 6/28/2025 2030)  Care Plan - Patient's Chronic Conditions and Co-Morbidity Symptoms are Monitored and Maintained or Improved: Monitor and assess patient's chronic conditions and comorbid symptoms for stability, deterioration, or improvement     Problem: Discharge Planning  Goal: Discharge to home or other facility with appropriate resources  Outcome: Progressing  Flowsheets (Taken 6/28/2025 2030)  Discharge to home or other facility with appropriate resources: Identify barriers to discharge with patient and caregiver     Problem: Safety - Adult  Goal: Free from fall injury  Outcome: Progressing     Problem: ABCDS Injury Assessment  Goal: Absence of physical injury  Outcome: Progressing     Problem: Pain  Goal: Verbalizes/displays adequate comfort level or baseline comfort level  Outcome: Progressing     Problem: Seizure Precautions  Goal: Remains free of injury related to seizures activity  Outcome: Progressing  Flowsheets (Taken 6/28/2025 2030)  Remains free of injury related to seizure activity: Maintain airway, patient safety  and administer oxygen as ordered     Problem: Respiratory - Adult  Goal: Achieves optimal ventilation and oxygenation  Outcome: Progressing  Flowsheets (Taken 6/28/2025 2030)  Achieves optimal ventilation and oxygenation: Assess for changes in respiratory status     Problem: Cardiovascular - Adult  Goal: Maintains optimal cardiac output and hemodynamic stability  Outcome: Progressing  Flowsheets (Taken 6/28/2025 2030)  Maintains optimal cardiac output and hemodynamic stability: Monitor blood pressure and heart rate  Goal: Absence of cardiac dysrhythmias or at baseline  Outcome: Progressing  Flowsheets (Taken 6/28/2025 2030)  Absence of cardiac dysrhythmias or at baseline: Monitor cardiac rate and  rhythm     Problem: Skin/Tissue Integrity - Adult  Goal: Skin integrity remains intact  Description: 1.  Monitor for areas of redness and/or skin breakdown  2.  Assess vascular access sites hourly  3.  Every 4-6 hours minimum:  Change oxygen saturation probe site  4.  Every 4-6 hours:  If on nasal continuous positive airway pressure, respiratory therapy assess nares and determine need for appliance change or resting period  Outcome: Progressing  Flowsheets (Taken 6/28/2025 2030)  Skin Integrity Remains Intact: Monitor for areas of redness and/or skin breakdown     Problem: Musculoskeletal - Adult  Goal: Return mobility to safest level of function  Outcome: Progressing  Flowsheets (Taken 6/28/2025 2030)  Return Mobility to Safest Level of Function: Assess patient stability and activity tolerance for standing, transferring and ambulating with or without assistive devices  Goal: Return ADL status to a safe level of function  Outcome: Progressing  Flowsheets (Taken 6/28/2025 2030)  Return ADL Status to a Safe Level of Function: Administer medication as ordered     Problem: Gastrointestinal - Adult  Goal: Maintains adequate nutritional intake  Outcome: Progressing  Flowsheets (Taken 6/28/2025 2030)  Maintains adequate nutritional intake: Monitor percentage of each meal consumed     Problem: Infection - Adult  Goal: Absence of infection at discharge  Outcome: Progressing  Flowsheets (Taken 6/28/2025 2030)  Absence of infection at discharge: Assess and monitor for signs and symptoms of infection     Problem: Metabolic/Fluid and Electrolytes - Adult  Goal: Electrolytes maintained within normal limits  Outcome: Progressing  Flowsheets (Taken 6/28/2025 2030)  Electrolytes maintained within normal limits: Monitor labs and assess patient for signs and symptoms of electrolyte imbalances  Goal: Glucose maintained within prescribed range  Outcome: Progressing  Flowsheets (Taken 6/28/2025 2030)  Glucose maintained within prescribed

## 2025-06-29 NOTE — PROGRESS NOTES
Patient assessed and AM medications given via PEG tube.  Tube feed continues to be on hold due to potential aspiration.  Patient has had less secretions since tube feed held.  Family at bedside.  Note from Case Management states Hospice was contacted yesterday for a potential consult today.  Have not received word from Hospice yet this shift.  Vitals stable.  Denies any further needs at this time.  Call light within reach.

## 2025-06-29 NOTE — PLAN OF CARE
Problem: Chronic Conditions and Co-morbidities  Goal: Patient's chronic conditions and co-morbidity symptoms are monitored and maintained or improved  6/29/2025 1040 by Jorge Fonseca RN  Outcome: Progressing  6/28/2025 2310 by Leora Velásquez RN  Outcome: Progressing  Flowsheets (Taken 6/28/2025 2030)  Care Plan - Patient's Chronic Conditions and Co-Morbidity Symptoms are Monitored and Maintained or Improved: Monitor and assess patient's chronic conditions and comorbid symptoms for stability, deterioration, or improvement     Problem: Discharge Planning  Goal: Discharge to home or other facility with appropriate resources  6/29/2025 1040 by Jorge Fonseca RN  Outcome: Progressing  6/28/2025 2310 by Leora Velásquez RN  Outcome: Progressing  Flowsheets (Taken 6/28/2025 2030)  Discharge to home or other facility with appropriate resources: Identify barriers to discharge with patient and caregiver     Problem: Safety - Adult  Goal: Free from fall injury  6/29/2025 1040 by Jorge Fonseca RN  Outcome: Progressing  6/28/2025 2310 by Leora Velásquez RN  Outcome: Progressing     Problem: ABCDS Injury Assessment  Goal: Absence of physical injury  6/29/2025 1040 by Jorge Fonseca RN  Outcome: Progressing  6/28/2025 2310 by Leora Velásquez RN  Outcome: Progressing     Problem: Pain  Goal: Verbalizes/displays adequate comfort level or baseline comfort level  6/29/2025 1040 by Jorge Fonseca RN  Outcome: Progressing  6/28/2025 2310 by Leora Velásquez RN  Outcome: Progressing     Problem: Seizure Precautions  Goal: Remains free of injury related to seizures activity  6/29/2025 1040 by Jorge Fonseca RN  Outcome: Progressing  6/28/2025 2310 by Leora Velásquez RN  Outcome: Progressing  Flowsheets (Taken 6/28/2025 2030)  Remains free of injury related to seizure activity: Maintain airway, patient safety  and administer oxygen as ordered     Problem: Respiratory - Adult  Goal:

## 2025-06-30 VITALS
SYSTOLIC BLOOD PRESSURE: 56 MMHG | HEART RATE: 175 BPM | TEMPERATURE: 100.2 F | RESPIRATION RATE: 22 BRPM | DIASTOLIC BLOOD PRESSURE: 37 MMHG | OXYGEN SATURATION: 78 %

## 2025-06-30 PROBLEM — Z51.5 HOSPICE CARE PATIENT: Status: ACTIVE | Noted: 2025-06-30

## 2025-06-30 PROBLEM — I50.32 CHRONIC DIASTOLIC CHF (CONGESTIVE HEART FAILURE) (HCC): Status: ACTIVE | Noted: 2025-06-30

## 2025-06-30 PROBLEM — F03.90 DEMENTIA (HCC): Status: ACTIVE | Noted: 2025-06-30

## 2025-06-30 LAB
GLUCOSE BLD-MCNC: 74 MG/DL (ref 70–99)
PATH INTERP BLD-IMP: NORMAL
PERFORMED ON: NORMAL

## 2025-06-30 PROCEDURE — 2700000000 HC OXYGEN THERAPY PER DAY

## 2025-06-30 PROCEDURE — 6360000002 HC RX W HCPCS: Performed by: INTERNAL MEDICINE

## 2025-06-30 PROCEDURE — 6370000000 HC RX 637 (ALT 250 FOR IP): Performed by: INTERNAL MEDICINE

## 2025-06-30 PROCEDURE — 94761 N-INVAS EAR/PLS OXIMETRY MLT: CPT

## 2025-06-30 PROCEDURE — 99232 SBSQ HOSP IP/OBS MODERATE 35: CPT | Performed by: INTERNAL MEDICINE

## 2025-06-30 RX ADMIN — ATROPINE SULFATE 1 DROP: 10 SOLUTION/ DROPS OPHTHALMIC at 08:23

## 2025-06-30 RX ADMIN — ATROPINE SULFATE 1 DROP: 10 SOLUTION/ DROPS OPHTHALMIC at 19:09

## 2025-06-30 RX ADMIN — HYDROMORPHONE HYDROCHLORIDE 0.5 MG: 1 INJECTION, SOLUTION INTRAMUSCULAR; INTRAVENOUS; SUBCUTANEOUS at 19:09

## 2025-06-30 RX ADMIN — HYDROMORPHONE HYDROCHLORIDE 0.5 MG: 1 INJECTION, SOLUTION INTRAMUSCULAR; INTRAVENOUS; SUBCUTANEOUS at 17:32

## 2025-06-30 RX ADMIN — Medication 1 MG: at 10:36

## 2025-06-30 RX ADMIN — ATROPINE SULFATE 1 DROP: 10 SOLUTION/ DROPS OPHTHALMIC at 14:10

## 2025-06-30 RX ADMIN — Medication 1 MG: at 07:34

## 2025-06-30 RX ADMIN — Medication 1 MG: at 19:08

## 2025-06-30 RX ADMIN — Medication 1 MG: at 14:12

## 2025-06-30 RX ADMIN — HYDROMORPHONE HYDROCHLORIDE 0.5 MG: 1 INJECTION, SOLUTION INTRAMUSCULAR; INTRAVENOUS; SUBCUTANEOUS at 14:13

## 2025-06-30 RX ADMIN — HYDROMORPHONE HYDROCHLORIDE 0.5 MG: 1 INJECTION, SOLUTION INTRAMUSCULAR; INTRAVENOUS; SUBCUTANEOUS at 07:33

## 2025-06-30 RX ADMIN — Medication 1 MG: at 17:31

## 2025-06-30 RX ADMIN — HYDROMORPHONE HYDROCHLORIDE 0.5 MG: 1 INJECTION, SOLUTION INTRAMUSCULAR; INTRAVENOUS; SUBCUTANEOUS at 10:36

## 2025-06-30 NOTE — DISCHARGE INSTRUCTIONS
Heart Failure Resources:  Heart Failure Interactive Workbook:  Go to [email or web address].myVBO/publication/?d=504264 for a Free Heart Failure Interactive Workbook provided by The American Heart Association. This interactive workbook will provide information on Healthier Living with Heart Failure. Please copy and paste link into search bar. Use your mouse to scroll through the pages.    HF [place] ingrid:   Heart Failure Free smart phone ingrid available for iPhone and Android download. Use your phone to track sodium intake, fluid intake, symptoms, and weight.     Low Sodium Diet / Recipes:  Go to www.[employer] website for “renal” diet which is Low Sodium! [employer] is a dialysis company, but this website offers free seasonal cookbooks. Each quarter, they will release 25-30 new recipes with a breakdown of calories, sodium, and glucose. You can also go to [email or web address]/recipes website for free recipes.     Discharge Instruction Video:  Scan the QR code below with your camera and click the canva.com link to open the video and watch educational information on Heart Failure and Medications from one of our nurses.   [email or web address]/[email or web address]/edit    Home Exercise Program:   Identification of Green/Yellow/Red zones:  You should be able to identify when you feel good (green zone), if you have 1-2 symptoms of HF (yellow zone), or if you are in need of medical attention (red zone).  In your CHF education folder you were provided a “stop light tool” to outline this information.     We want to you to rate your exertion levels:    Our therapy team has discussed means of identification with you such as the \"Isa scale.\"  The Isa rating scale ranges from 6 to 20, where 6 means \"no exertion at all\" and 20 means \"maximal exertion.\" The goal is to use this to gauge how much effort it is taking for you to do your normal daily tasks.   You should be able to recognize when too much exertion is

## 2025-06-30 NOTE — H&P
Hospital Medicine History & Physical      PCP: Carlton Leavitt, APRN - CNP    Date of Admission: 6/29/2025    Date of Service: Pt seen/examined on 06/29/25 and Admitted to Inpatient with expected LOS greater than two midnights due to medical therapy.     Chief Complaint:  Admission to inpatient hospice    History Of Present Illness:      Rodolfo Luna is a 79 y.o. male with past medical history as below, was admitted for dyspnea. Work up showed fluid overload. He was recently admitted to [facility] for PEG site infection. After he went home he had dyspnea and comes back with edema, started lasix  Developed Afibb with RVR, acute CHF, subsequently developed sepsis due to aspiration pneumonia and colitis. Patient also has Parkinson's disease with dementia and dysphagia and history of lung cancer. Discussed hospice care with the patient and one of his family members at the bedside. Subsequently patient and wife requested hospice consult and admission to inpatient hospice.     Past Medical History:          Diagnosis Date    A-fib ([facility])     MIRNA (acute kidney injury) 03/31/2022    Anxiety     Arthritis     OA    Bradycardia 04/19/2014    Cancer (Former[facility])     skin cancer-forearm right , face    CHF (congestive heart failure) (Formerly [facility])     Coronary artery disease involving native coronary artery of native heart without angina pectoris 07/19/2022    Hemoptysis 10/16/2014    Since Ablation    History of implantation of penile prosthesis     Irregular heart  beats     Mixed hyperlipidemia 03/17/2023    Parkinson disease ([facility])     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

## 2025-06-30 NOTE — FLOWSHEET NOTE
06/30/25 0726   Vital Signs   Temp 98.6 °F (37 °C)   Temp Source Axillary   Pulse (!) 104   Heart Rate Source Monitor   Respirations 24   /60   MAP (Calculated) 75   BP Location Right upper arm   BP Method Automatic   Patient Position Semi fowlers   Oxygen Therapy   SpO2 (!) 89 %   O2 Device Nasal cannula   O2 Flow Rate (L/min) 4 L/min       Shift assessment complete - see flow sheet, IP hospice CC, spouse at bedside. SP requested CC medications - administered as per orders, AM meal ordered for SP. No further needs at this time.

## 2025-06-30 NOTE — PROGRESS NOTES
Pt is not responsive. Chap prayed with Pt's spouse and sister-in-law upon their request.   Family has a Religion and  that is visiting with them.     Jeferson Titus

## 2025-06-30 NOTE — FLOWSHEET NOTE
06/30/25 1409   Vital Signs   Temp 98.2 °F (36.8 °C)   Temp Source Axillary   Pulse (!) 130   Heart Rate Source Monitor   Respirations 22   BP 96/60   MAP (Calculated) 72   BP Location Right upper arm   BP Method Automatic   Patient Position Semi fowlers   Oxygen Therapy   SpO2 (!) 72 %   O2 Device Nasal cannula   O2 Flow Rate (L/min) 4 L/min       IP hospice CC. VS as shown. PRN medication administered as per ordered. Family remains at bedside.

## 2025-06-30 NOTE — PROGRESS NOTES
Patient , BP 56/37, agonal breathing. SP and family at bedside requested patient to have CC medication. Writer scanned in CC medication Dilaudid 0.5mg and ativan 2mg/ml then writer noted no breaths. Assessment completed and no heart beat detected. No BP detected. Second RN verification noted no heart beat. Clinical updated and charge RN updated.     PRN CC medication wasted as per protocol and second RN verification completed.     Time of death 1910.     Electronically signed by Ashley Wen RN on 6/30/2025 at 7:24 PM    Electronically signed by Moise Kinsey RN on 6/30/2025 at 7:31 PM

## 2025-06-30 NOTE — PROGRESS NOTES
IM Progress Note    Admit Date:  6/29/2025    Patient readmitted to hospice care    Subjective:  Mr. Luna seen.  Not responding    Objective:   /60   Pulse (!) 104   Temp 98.6 °F (37 °C) (Axillary)   Resp 24   SpO2 (!) 89%     Intake/Output Summary (Last 24 hours) at 6/30/2025 0935  Last data filed at 6/30/2025 0735  Gross per 24 hour   Intake --   Output 700 ml   Net -700 ml         Physical Exam:  General: Patient not responding.  Skin:  Warm and dry  Neck:  JVD absent. Neck supple  Chest: Diminished breath sounds . no wheezes, rales or rhonchi.   Cardiovascular:  RRR ,S1S2 normal  Abdomen:  Soft, non tender, non distended, BS +  Extremities:  No edema.  Intact peripheral pulses. Brisk cap refill, < 2 secs  Neuro: Grossly non focal      Medications:   Scheduled Meds:   sodium chloride flush  5-40 mL IntraVENous 2 times per day    sennosides-docusate sodium  1 tablet PEG Tube BID    scopolamine  1 patch TransDERmal Q72H       Continuous Infusions:   sodium chloride         Data:  CBC:   Recent Labs     06/27/25  1754 06/28/25  0512 06/29/25  0534   WBC 25.5* 18.8* 17.0*   RBC 3.19* 3.29* 3.20*   HGB 9.4* 9.7* 9.5*   HCT 28.7* 29.4* 28.7*   MCV 90.1 89.3 89.8   RDW 15.9* 16.0* 16.1*   PLT 86* 89* 81*     BMP:   Recent Labs     06/27/25  1853 06/28/25  0512 06/29/25  0534   * 136 135*   K 3.7 3.5 3.6   CL 88* 90* 92*   CO2 30 33* 26   BUN 46* 47* 54*   CREATININE 1.0 1.0 1.2     BNP: No results for input(s): \"BNP\" in the last 72 hours.  PT/INR: No results for input(s): \"PROTIME\", \"INR\" in the last 72 hours.  APTT: No results for input(s): \"APTT\" in the last 72 hours.  CARDIAC ENZYMES: No results for input(s): \"CKMB\", \"CKMBINDEX\", \"TROPONINI\" in the last 72 hours.    Invalid input(s): \"CKTOTAL;3\"  FASTING LIPID PANEL:  Lab Results   Component Value Date    CHOL 130 05/17/2024    HDL 51 05/17/2024    TRIG 61 05/17/2024     LIVER PROFILE:   Recent Labs     06/27/25  1754   AST 51*   ALT 21   BILIDIR

## 2025-06-30 NOTE — CARE COORDINATION
Sharyn with [facility] made CM aware patient family would like to stay inpatient another 24 hours.

## 2025-07-01 NOTE — PROGRESS NOTES
Post mortem care completed at this time. [facility] called at [phone]. Security called and came to  the patient at this time.  Electronically signed by Wilder Michel RN on 2025 at 10:30 PM

## 2025-07-01 NOTE — PROGRESS NOTES
Physician Progress Note      PATIENT:               RODOLFO DON  CSN #:                  [id number]  :                       1945  ADMIT DATE:       2025 6:29 AM  DISCH DATE:        2025 1:41 PM  RESPONDING  PROVIDER #:        Rodolfo Oneill MD          QUERY TEXT:    Congestive Heart Failure is documented in the medical record; noted to have   HTN and CAD.  Please document further specificity regarding the most likely   etiology of the CHF:    The clinical indicators include:  79 year old male w/ acute diastolic CHF and CAD     Troponin 41=> 41=> 42   BP- 113//76     H&P- CAD- s/p ADRIÁN 2017. Chronic elevated troponin... denies chest pain.   No acute ST changes on EKG    Treatment- labs, EKG, cxr, aspirin, atorvastatin, Lasix IV  Options provided:  -- CHF related to Hypertensive Heart Disease  -- CHF related to Hypertensive Heart Disease and CAD  -- CHF not related to Hypertension but related to CAD  -- Other - I will add my own diagnosis  -- Disagree - Not applicable / Not valid  -- Disagree - Clinically unable to determine / Unknown  -- Refer to Clinical Documentation Reviewer    PROVIDER RESPONSE TEXT:    This patient has CHF related to hypertensive heart disease and CAD.    Query created by: Merline Cotto on 2025 11:30 AM      Electronically signed by:  Rodolfo Oneill MD 2025 4:12 PM

## 2025-07-01 NOTE — DEATH NOTES
Death Pronouncement Note  Patient's Name: Rodolfo Luna   Patient's YOB: 1945  MRN Number: [id number]    Admitting Provider: Franca Huitron DO  Attending Provider: Franca Huitron DO  Was called to the bedside because patient had  at 1910 on 2025.  Death  had already been confirmed by 2 nurses    Patient was examined and the following were absent:   Pulses and Respiratory effort    I declared the patient dead on 2025 at 1910.      Electronically signed by Miguelangel Landeros MD on 25 at 8:17 PM EDT

## 2025-07-02 LAB
BACTERIA BLD CULT ORG #2: NORMAL
BACTERIA BLD CULT: NORMAL
BACTERIA SPEC AEROBE CULT: ABNORMAL
BACTERIA SPEC ANAEROBE CULT: ABNORMAL
GRAM STN SPEC: ABNORMAL
ORGANISM: ABNORMAL
ORGANISM: ABNORMAL

## (undated) DEVICE — CONMED SCOPE SAVER BITE BLOCK, 20X27 MM: Brand: SCOPE SAVER

## (undated) DEVICE — PEG KIT SAFETY 24 FR PUL W/ ENFIT ENDOVIVE

## (undated) DEVICE — FORCEPS BX L240CM DIA2.4MM L NDL RAD JAW 4 133340

## (undated) DEVICE — CANNULA,OXY,ADULT,SUPERSOFT,W/7'TUB,SC: Brand: MEDLINE INDUSTRIES, INC.

## (undated) DEVICE — ELECTRODE ECG MONITR FOAM TEAR DROP ADLT RED

## (undated) DEVICE — ENDO CARRY-ON PROCEDURE KIT INCLUDES SUCTION TUBING, LUBRICANT, GAUZE, BIOHAZARD STICKER, TRANSPORT PAD AND INTERCEPT BEDSIDE KIT.: Brand: ENDO CARRY-ON PROCEDURE KIT